# Patient Record
Sex: FEMALE | Race: WHITE | NOT HISPANIC OR LATINO | Employment: OTHER | URBAN - METROPOLITAN AREA
[De-identification: names, ages, dates, MRNs, and addresses within clinical notes are randomized per-mention and may not be internally consistent; named-entity substitution may affect disease eponyms.]

---

## 2017-02-13 RX ORDER — LEVOTHYROXINE SODIUM 0.15 MG/1
TABLET ORAL
Qty: 30 TABLET | Refills: 2 | Status: SHIPPED | OUTPATIENT
Start: 2017-02-13 | End: 2017-06-20 | Stop reason: SDUPTHER

## 2017-02-18 RX ORDER — DILTIAZEM HYDROCHLORIDE 60 MG/1
TABLET, FILM COATED ORAL
Qty: 1 INHALER | Refills: 0 | Status: SHIPPED | OUTPATIENT
Start: 2017-02-18 | End: 2017-02-25 | Stop reason: SDUPTHER

## 2017-02-27 RX ORDER — DILTIAZEM HYDROCHLORIDE 60 MG/1
TABLET, FILM COATED ORAL
Qty: 1 INHALER | Refills: 0 | Status: SHIPPED | OUTPATIENT
Start: 2017-02-27 | End: 2017-03-03

## 2017-03-03 RX ORDER — BUDESONIDE AND FORMOTEROL FUMARATE DIHYDRATE 160; 4.5 UG/1; UG/1
2 AEROSOL RESPIRATORY (INHALATION)
Qty: 1 INHALER | Refills: 2 | Status: SHIPPED | OUTPATIENT
Start: 2017-03-03 | End: 2017-05-15 | Stop reason: SDUPTHER

## 2017-03-16 ENCOUNTER — OFFICE VISIT (OUTPATIENT)
Dept: FAMILY MEDICINE CLINIC | Facility: CLINIC | Age: 57
End: 2017-03-16

## 2017-03-16 VITALS
BODY MASS INDEX: 29.76 KG/M2 | TEMPERATURE: 98.4 F | HEIGHT: 65 IN | OXYGEN SATURATION: 97 % | WEIGHT: 178.6 LBS | HEART RATE: 75 BPM | SYSTOLIC BLOOD PRESSURE: 132 MMHG | DIASTOLIC BLOOD PRESSURE: 80 MMHG | RESPIRATION RATE: 18 BRPM

## 2017-03-16 DIAGNOSIS — J11.1 FLU: ICD-10-CM

## 2017-03-16 DIAGNOSIS — R68.89 FLU-LIKE SYMPTOMS: Primary | ICD-10-CM

## 2017-03-16 LAB
EXPIRATION DATE: NORMAL
FLUAV AG NPH QL: NORMAL
FLUBV AG NPH QL: NORMAL
INTERNAL CONTROL: NORMAL
Lab: NORMAL

## 2017-03-16 PROCEDURE — 87804 INFLUENZA ASSAY W/OPTIC: CPT | Performed by: FAMILY MEDICINE

## 2017-03-16 PROCEDURE — 99213 OFFICE O/P EST LOW 20 MIN: CPT | Performed by: FAMILY MEDICINE

## 2017-03-16 RX ORDER — OSELTAMIVIR PHOSPHATE 75 MG/1
75 CAPSULE ORAL 2 TIMES DAILY
Qty: 10 CAPSULE | Refills: 0 | Status: SHIPPED | OUTPATIENT
Start: 2017-03-16 | End: 2017-05-25

## 2017-04-03 RX ORDER — ESCITALOPRAM OXALATE 20 MG/1
TABLET ORAL
Qty: 30 TABLET | Refills: 4 | Status: SHIPPED | OUTPATIENT
Start: 2017-04-03 | End: 2017-06-01

## 2017-05-15 RX ORDER — BUDESONIDE AND FORMOTEROL FUMARATE DIHYDRATE 160; 4.5 UG/1; UG/1
AEROSOL RESPIRATORY (INHALATION)
Qty: 1 INHALER | Refills: 3 | Status: SHIPPED | OUTPATIENT
Start: 2017-05-15 | End: 2017-08-27 | Stop reason: SDUPTHER

## 2017-05-25 ENCOUNTER — HOSPITAL ENCOUNTER (OUTPATIENT)
Dept: GENERAL RADIOLOGY | Facility: HOSPITAL | Age: 57
Discharge: HOME | End: 2017-05-25
Attending: FAMILY MEDICINE | Admitting: FAMILY MEDICINE

## 2017-05-25 ENCOUNTER — OFFICE VISIT (OUTPATIENT)
Dept: FAMILY MEDICINE CLINIC | Facility: CLINIC | Age: 57
End: 2017-05-25

## 2017-05-25 VITALS
HEART RATE: 60 BPM | WEIGHT: 177.4 LBS | OXYGEN SATURATION: 97 % | RESPIRATION RATE: 18 BRPM | DIASTOLIC BLOOD PRESSURE: 82 MMHG | TEMPERATURE: 98.3 F | BODY MASS INDEX: 29.56 KG/M2 | SYSTOLIC BLOOD PRESSURE: 122 MMHG | HEIGHT: 65 IN

## 2017-05-25 DIAGNOSIS — R07.81 PLEURITIC CHEST PAIN: ICD-10-CM

## 2017-05-25 DIAGNOSIS — M50.10 CERVICAL DISC DISORDER WITH RADICULOPATHY: ICD-10-CM

## 2017-05-25 DIAGNOSIS — M50.10 CERVICAL DISC DISORDER WITH RADICULOPATHY: Primary | ICD-10-CM

## 2017-05-25 PROCEDURE — 72040 X-RAY EXAM NECK SPINE 2-3 VW: CPT

## 2017-05-25 PROCEDURE — 71020 HC CHEST PA AND LATERAL: CPT

## 2017-05-25 PROCEDURE — 99213 OFFICE O/P EST LOW 20 MIN: CPT | Performed by: FAMILY MEDICINE

## 2017-05-25 RX ORDER — DILTIAZEM HYDROCHLORIDE 60 MG/1
TABLET, FILM COATED ORAL
COMMUNITY
Start: 2017-03-24 | End: 2018-02-02

## 2017-05-25 RX ORDER — METHYLPREDNISOLONE 4 MG/1
TABLET ORAL
Qty: 1 EACH | Refills: 0 | Status: SHIPPED | OUTPATIENT
Start: 2017-05-25 | End: 2017-06-01

## 2017-05-25 RX ORDER — BACLOFEN 10 MG/1
10 TABLET ORAL 3 TIMES DAILY
Qty: 30 TABLET | Refills: 0 | Status: SHIPPED | OUTPATIENT
Start: 2017-05-25 | End: 2019-04-17

## 2017-06-01 ENCOUNTER — OFFICE VISIT (OUTPATIENT)
Dept: FAMILY MEDICINE CLINIC | Facility: CLINIC | Age: 57
End: 2017-06-01

## 2017-06-01 ENCOUNTER — DOCUMENTATION (OUTPATIENT)
Dept: FAMILY MEDICINE CLINIC | Facility: CLINIC | Age: 57
End: 2017-06-01

## 2017-06-01 VITALS
HEIGHT: 65 IN | BODY MASS INDEX: 29.56 KG/M2 | OXYGEN SATURATION: 97 % | DIASTOLIC BLOOD PRESSURE: 88 MMHG | SYSTOLIC BLOOD PRESSURE: 130 MMHG | HEART RATE: 59 BPM | WEIGHT: 177.4 LBS | RESPIRATION RATE: 16 BRPM

## 2017-06-01 DIAGNOSIS — M50.10 CERVICAL DISC DISORDER WITH RADICULOPATHY: Primary | ICD-10-CM

## 2017-06-01 DIAGNOSIS — F32.89 OTHER DEPRESSION: ICD-10-CM

## 2017-06-01 PROCEDURE — 99214 OFFICE O/P EST MOD 30 MIN: CPT | Performed by: FAMILY MEDICINE

## 2017-06-01 RX ORDER — TRAMADOL HYDROCHLORIDE 50 MG/1
50 TABLET ORAL AS NEEDED
COMMUNITY
Start: 2017-05-25 | End: 2018-02-02 | Stop reason: SDUPTHER

## 2017-06-01 RX ORDER — VENLAFAXINE HYDROCHLORIDE 75 MG/1
75 CAPSULE, EXTENDED RELEASE ORAL DAILY
Qty: 302 CAPSULE | Refills: 1 | Status: SHIPPED | OUTPATIENT
Start: 2017-06-01 | End: 2017-10-27 | Stop reason: SDUPTHER

## 2017-06-01 RX ORDER — GABAPENTIN 100 MG/1
CAPSULE ORAL
Qty: 60 CAPSULE | Refills: 1 | Status: SHIPPED | OUTPATIENT
Start: 2017-06-01 | End: 2017-10-27 | Stop reason: SDUPTHER

## 2017-06-01 NOTE — PROGRESS NOTES
"Subjective   Yessica Galvin is a 56 y.o. female.     Neck Pain    This is a chronic (Radicular pain from the left neck and the left arm and upper chest) problem. The current episode started 1 to 4 weeks ago. The problem occurs constantly. The problem has been gradually improving. The pain is present in the left side. The pain is moderate. Associated symptoms include numbness. Pertinent negatives include no chest pain, fever, headaches or pain with swallowing. The treatment provided moderate relief.   Depression   Visit Type: follow-up  Patient presents with the following symptoms: anhedonia, decreased concentration, excessive worry, fatigue, irritability, nervousness/anxiety and shortness of breath.  Patient is not experiencing: palpitations, panic, restlessness and suicidal ideas.       Yessica feels like her and her depression is no longer effective she has many days where she does not feel like getting out of bed.  She has a decreased energy level and mood and lacks enjoyment.  The following portions of the patient's history were reviewed and updated as appropriate: allergies, current medications, past social history and problem list.    Review of Systems   Constitutional: Positive for irritability. Negative for fever.   HENT: Negative for congestion and sore throat.    Respiratory: Positive for cough, shortness of breath and wheezing.    Cardiovascular: Negative for chest pain and palpitations.   Gastrointestinal: Negative for diarrhea, nausea and vomiting.   Genitourinary: Negative for dysuria and hematuria.   Musculoskeletal: Positive for arthralgias, neck pain and neck stiffness.   Neurological: Positive for numbness. Negative for dizziness, light-headedness and headaches.   Psychiatric/Behavioral: Positive for decreased concentration. Negative for suicidal ideas. The patient is nervous/anxious.        Objective   /88  Pulse 59  Resp 16  Ht 65\" (165.1 cm)  Wt 177 lb 6.4 oz (80.5 kg)  SpO2 97%  BMI " 29.52 kg/m2  Physical Exam   Constitutional: She is oriented to person, place, and time. She appears well-developed and well-nourished. She is cooperative.   HENT:   Head: Normocephalic.   Nose: Nose normal.   Mouth/Throat: Oropharynx is clear and moist.   Eyes: Conjunctivae are normal. Pupils are equal, round, and reactive to light. No scleral icterus.   Neck: Neck supple. Carotid bruit is not present. No thyromegaly present.   Cardiovascular: Normal rate and regular rhythm.    Pulmonary/Chest: Effort normal. She has wheezes.   Scattered upper airway rhonchi   Abdominal: There is no hepatosplenomegaly.   Musculoskeletal: Normal range of motion.       paresthesia of the left upper arm and left scapular region radiating around to the left axillary region   Neurological: She is alert and oriented to person, place, and time.   No focal deficits no lateralizing signs   Skin: Skin is warm and dry. No rash noted.   Psychiatric: She has a normal mood and affect. Her behavior is normal. Cognition and memory are normal.   Nursing note and vitals reviewed.      Assessment/Plan   Problem List Items Addressed This Visit        Musculoskeletal and Integument    Cervical disc disorder with radiculopathy - Primary      Other Visit Diagnoses     Other depression        Relevant Medications    venlafaxine XR (EFFEXOR XR) 75 MG 24 hr capsule          New Medications Ordered This Visit   Medications   • traMADol (ULTRAM) 50 MG tablet     Sig: Take 50 mg by mouth As Needed.   • venlafaxine XR (EFFEXOR XR) 75 MG 24 hr capsule     Sig: Take 1 capsule by mouth Daily.     Dispense:  302 capsule     Refill:  1   • gabapentin (NEURONTIN) 100 MG capsule     Sig: Take 1 po at night for 1 week then 2 qhs     Dispense:  60 capsule     Refill:  1     Follow-up with her neck surgeon is recommended she was treated by someone at [facility] and will try to find that him and and schedule an appointment.  I gave her stop smoking advice again.   Patient agreed to a trial of gabapentin for her nerve pain.  May need to consider pain clinic referral.  Switch to Effexor for better depression and energy level treatment all if experiences any problems.

## 2017-06-20 RX ORDER — LEVOTHYROXINE SODIUM 0.15 MG/1
TABLET ORAL
Qty: 30 TABLET | Refills: 0 | Status: SHIPPED | OUTPATIENT
Start: 2017-06-20 | End: 2017-07-29 | Stop reason: SDUPTHER

## 2017-07-31 RX ORDER — LEVOTHYROXINE SODIUM 0.15 MG/1
TABLET ORAL
Qty: 30 TABLET | Refills: 4 | Status: SHIPPED | OUTPATIENT
Start: 2017-07-31 | End: 2018-02-02 | Stop reason: SDUPTHER

## 2017-08-22 ENCOUNTER — OFFICE VISIT (OUTPATIENT)
Dept: FAMILY MEDICINE CLINIC | Facility: CLINIC | Age: 57
End: 2017-08-22

## 2017-08-22 VITALS
BODY MASS INDEX: 29.39 KG/M2 | HEIGHT: 65 IN | OXYGEN SATURATION: 96 % | WEIGHT: 176.4 LBS | SYSTOLIC BLOOD PRESSURE: 126 MMHG | DIASTOLIC BLOOD PRESSURE: 70 MMHG | RESPIRATION RATE: 16 BRPM | HEART RATE: 65 BPM

## 2017-08-22 DIAGNOSIS — M54.40 LOW BACK PAIN WITH SCIATICA, SCIATICA LATERALITY UNSPECIFIED, UNSPECIFIED BACK PAIN LATERALITY, UNSPECIFIED CHRONICITY: Primary | ICD-10-CM

## 2017-08-22 PROCEDURE — 99213 OFFICE O/P EST LOW 20 MIN: CPT | Performed by: FAMILY MEDICINE

## 2017-08-22 RX ORDER — DICLOFENAC SODIUM 75 MG/1
75 TABLET, DELAYED RELEASE ORAL 2 TIMES DAILY
Qty: 60 TABLET | Refills: 1 | Status: SHIPPED | OUTPATIENT
Start: 2017-08-22 | End: 2017-10-27 | Stop reason: SDUPTHER

## 2017-08-22 RX ORDER — ESCITALOPRAM OXALATE 20 MG/1
20 TABLET ORAL NIGHTLY
COMMUNITY
Start: 2017-08-21 | End: 2017-10-27

## 2017-08-23 NOTE — PROGRESS NOTES
"Subjective   Yessica Galvin is a 56 y.o. female.     Back Pain   This is a new problem. The current episode started 1 to 4 weeks ago. The problem occurs constantly. The problem has been gradually worsening since onset. The pain is present in the lumbar spine. The quality of the pain is described as aching. The pain radiates to the left thigh and right thigh. The pain is moderate. The symptoms are aggravated by standing. Associated symptoms include numbness and tingling. Pertinent negatives include no bladder incontinence, bowel incontinence, chest pain, dysuria, fever or weakness. Risk factors include menopause, poor posture and sedentary lifestyle. She has tried analgesics for the symptoms. The treatment provided mild relief.        The following portions of the patient's history were reviewed and updated as appropriate: allergies, current medications, past social history and problem list.    Review of Systems   Constitutional: Negative.  Negative for chills and fever.   HENT: Negative for congestion, postnasal drip and sore throat.    Respiratory: Negative.  Negative for cough and shortness of breath.    Cardiovascular: Negative for chest pain and palpitations.   Gastrointestinal: Negative.  Negative for bowel incontinence.   Endocrine: Negative for cold intolerance and heat intolerance.   Genitourinary: Negative for bladder incontinence, dysuria and flank pain.   Musculoskeletal: Positive for back pain. Negative for arthralgias, gait problem and myalgias.   Neurological: Positive for tingling and numbness. Negative for dizziness, tremors and weakness.   Psychiatric/Behavioral: Negative for behavioral problems and dysphoric mood. The patient is not nervous/anxious.        Objective   /70  Pulse 65  Resp 16  Ht 65\" (165.1 cm)  Wt 176 lb 6.4 oz (80 kg)  SpO2 96%  BMI 29.35 kg/m2  Physical Exam   Constitutional: She is oriented to person, place, and time. She appears well-developed and well-nourished. She " is cooperative.   HENT:   Head: Normocephalic.   Right Ear: External ear normal.   Left Ear: External ear normal.   Nose: Nose normal.   Mouth/Throat: Oropharynx is clear and moist.   Eyes: Conjunctivae are normal. Pupils are equal, round, and reactive to light. No scleral icterus.   Neck: Neck supple. Carotid bruit is not present. No thyromegaly present.   Cardiovascular: Normal rate and regular rhythm.    Pulmonary/Chest: Effort normal and breath sounds normal.   Abdominal: There is no hepatosplenomegaly.   Musculoskeletal: Normal range of motion. She exhibits tenderness.   Localizes pain to lower lumbar region   Neurological: She is alert and oriented to person, place, and time.   SLR neg dtrs equal pulses intact    Skin: Skin is warm and dry. No rash noted.   Psychiatric: She has a normal mood and affect. Cognition and memory are normal.   Nursing note and vitals reviewed.      Assessment/Plan   Problem List Items Addressed This Visit        Nervous and Auditory    Low back pain - Primary    Relevant Orders    XR Spine Lumbar 2 or 3 View          New Medications Ordered This Visit   Medications   • escitalopram (LEXAPRO) 20 MG tablet     Sig: Take 20 mg by mouth Every Night.   • diclofenac (VOLTAREN) 75 MG EC tablet     Sig: Take 1 tablet by mouth 2 (Two) Times a Day.     Dispense:  60 tablet     Refill:  1

## 2017-08-24 ENCOUNTER — HOSPITAL ENCOUNTER (OUTPATIENT)
Dept: GENERAL RADIOLOGY | Facility: HOSPITAL | Age: 57
Discharge: HOME | End: 2017-08-24
Attending: FAMILY MEDICINE | Admitting: FAMILY MEDICINE

## 2017-08-24 DIAGNOSIS — M54.40 LOW BACK PAIN WITH SCIATICA, SCIATICA LATERALITY UNSPECIFIED, UNSPECIFIED BACK PAIN LATERALITY, UNSPECIFIED CHRONICITY: ICD-10-CM

## 2017-08-24 PROCEDURE — 72100 X-RAY EXAM L-S SPINE 2/3 VWS: CPT

## 2017-08-28 RX ORDER — BUDESONIDE AND FORMOTEROL FUMARATE DIHYDRATE 160; 4.5 UG/1; UG/1
AEROSOL RESPIRATORY (INHALATION)
Qty: 1 INHALER | Refills: 4 | Status: SHIPPED | OUTPATIENT
Start: 2017-08-28 | End: 2018-01-01 | Stop reason: SDUPTHER

## 2017-09-19 ENCOUNTER — OFFICE VISIT (OUTPATIENT)
Dept: FAMILY MEDICINE CLINIC | Facility: CLINIC | Age: 57
End: 2017-09-19

## 2017-09-19 VITALS
WEIGHT: 180.2 LBS | RESPIRATION RATE: 16 BRPM | HEART RATE: 66 BPM | SYSTOLIC BLOOD PRESSURE: 136 MMHG | BODY MASS INDEX: 30.02 KG/M2 | OXYGEN SATURATION: 97 % | DIASTOLIC BLOOD PRESSURE: 82 MMHG | HEIGHT: 65 IN

## 2017-09-19 DIAGNOSIS — L03.114 CELLULITIS OF LEFT UPPER EXTREMITY: ICD-10-CM

## 2017-09-19 DIAGNOSIS — M70.32 BURSITIS OF LEFT ELBOW: Primary | ICD-10-CM

## 2017-09-19 PROBLEM — M51.9 LUMBAR DISC DISEASE: Status: ACTIVE | Noted: 2017-09-19

## 2017-09-19 PROCEDURE — 99213 OFFICE O/P EST LOW 20 MIN: CPT | Performed by: FAMILY MEDICINE

## 2017-09-19 RX ORDER — METHYLPREDNISOLONE 4 MG/1
TABLET ORAL
Qty: 1 EACH | Refills: 0 | Status: SHIPPED | OUTPATIENT
Start: 2017-09-19 | End: 2017-10-27

## 2017-09-19 RX ORDER — INFLUENZA VIRUS VACCINE 15; 15; 15; 15 UG/.5ML; UG/.5ML; UG/.5ML; UG/.5ML
SUSPENSION INTRAMUSCULAR
COMMUNITY
Start: 2017-08-16 | End: 2018-05-08

## 2017-09-19 RX ORDER — SULFAMETHOXAZOLE AND TRIMETHOPRIM 800; 160 MG/1; MG/1
1 TABLET ORAL 2 TIMES DAILY
Qty: 14 TABLET | Refills: 0 | Status: SHIPPED | OUTPATIENT
Start: 2017-09-19 | End: 2017-10-27

## 2017-09-19 NOTE — PROGRESS NOTES
"Subjective   Yessica Galvin is a 56 y.o. female.     Upper Extremity Issue   This is a new problem. The current episode started in the past 7 days. The problem occurs constantly. The problem has been gradually improving. Associated symptoms include coughing, joint swelling and a rash. Pertinent negatives include no arthralgias, chest pain, chills, congestion, fever or sore throat. The symptoms are aggravated by exertion. She has tried immobilization for the symptoms. The treatment provided mild relief.        The following portions of the patient's history were reviewed and updated as appropriate: allergies, current medications, past social history and problem list.    Review of Systems   Constitutional: Negative for chills and fever.   HENT: Negative for congestion, postnasal drip and sore throat.    Respiratory: Positive for cough. Negative for shortness of breath.    Cardiovascular: Negative for chest pain and leg swelling.   Musculoskeletal: Positive for joint swelling. Negative for arthralgias.   Skin: Positive for color change and rash.       Objective   /82  Pulse 66  Resp 16  Ht 65\" (165.1 cm)  Wt 180 lb 3.2 oz (81.7 kg)  SpO2 97%  BMI 29.99 kg/m2  Physical Exam   Constitutional: She is oriented to person, place, and time. She appears well-developed and well-nourished.   HENT:   Head: Normocephalic and atraumatic.   Eyes: Conjunctivae are normal. Pupils are equal, round, and reactive to light.   Neck: Neck supple.   Cardiovascular: Normal rate and regular rhythm.    Pulmonary/Chest: Effort normal.   Distant breath sounds and a few crackles   Musculoskeletal:   Swelling of the left olecranon bursa and some surrounding erythema and some swelling extending down the forearm oh streaks up the arm peripheral pulses normal sensation intact   Neurological: She is alert and oriented to person, place, and time.   Nursing note and vitals reviewed.      Assessment/Plan   Problem List Items Addressed This " Visit     None      Visit Diagnoses     Bursitis of left elbow    -  Primary    Cellulitis of left upper extremity              New Medications Ordered This Visit   Medications   • sulfamethoxazole-trimethoprim (BACTRIM DS,SEPTRA DS) 800-160 MG per tablet     Sig: Take 1 tablet by mouth 2 (Two) Times a Day.     Dispense:  14 tablet     Refill:  0   • MethylPREDNISolone (MEDROL, REGINA,) 4 MG tablet     Sig: Take as directed on package instructions.     Dispense:  1 each     Refill:  0     Return to clinic if symptoms do not improve over the next 2-3 days.

## 2017-10-27 ENCOUNTER — OFFICE VISIT (OUTPATIENT)
Dept: FAMILY MEDICINE CLINIC | Facility: CLINIC | Age: 57
End: 2017-10-27

## 2017-10-27 VITALS
DIASTOLIC BLOOD PRESSURE: 76 MMHG | OXYGEN SATURATION: 97 % | BODY MASS INDEX: 30.09 KG/M2 | SYSTOLIC BLOOD PRESSURE: 124 MMHG | WEIGHT: 180.6 LBS | HEART RATE: 57 BPM | RESPIRATION RATE: 16 BRPM | HEIGHT: 65 IN

## 2017-10-27 DIAGNOSIS — M25.562 CHRONIC PAIN OF BOTH KNEES: Primary | ICD-10-CM

## 2017-10-27 DIAGNOSIS — F41.1 GENERALIZED ANXIETY DISORDER: ICD-10-CM

## 2017-10-27 DIAGNOSIS — M25.561 CHRONIC PAIN OF BOTH KNEES: ICD-10-CM

## 2017-10-27 DIAGNOSIS — M25.561 CHRONIC PAIN OF BOTH KNEES: Primary | ICD-10-CM

## 2017-10-27 DIAGNOSIS — M54.9 OTHER CHRONIC BACK PAIN: ICD-10-CM

## 2017-10-27 DIAGNOSIS — G89.29 CHRONIC PAIN OF BOTH KNEES: ICD-10-CM

## 2017-10-27 DIAGNOSIS — M25.562 CHRONIC PAIN OF BOTH KNEES: ICD-10-CM

## 2017-10-27 DIAGNOSIS — G89.29 OTHER CHRONIC BACK PAIN: ICD-10-CM

## 2017-10-27 DIAGNOSIS — G89.29 CHRONIC PAIN OF BOTH KNEES: Primary | ICD-10-CM

## 2017-10-27 DIAGNOSIS — E03.9 ACQUIRED HYPOTHYROIDISM: ICD-10-CM

## 2017-10-27 DIAGNOSIS — M51.9 LUMBAR DISC DISEASE: Primary | ICD-10-CM

## 2017-10-27 LAB
25(OH)D3 SERPL-MCNC: 11.1 NG/ML
ALBUMIN SERPL-MCNC: 4 G/DL (ref 3.2–4.8)
ALBUMIN/GLOB SERPL: 1.9 G/DL (ref 1.5–2.5)
ALP SERPL-CCNC: 70 U/L (ref 25–100)
ALT SERPL W P-5'-P-CCNC: 22 U/L (ref 7–40)
ANION GAP SERPL CALCULATED.3IONS-SCNC: 7 MMOL/L (ref 3–11)
ARTICHOKE IGE QN: 143 MG/DL (ref 0–130)
AST SERPL-CCNC: 22 U/L (ref 0–33)
BASOPHILS # BLD AUTO: 0.02 10*3/MM3 (ref 0–0.2)
BASOPHILS NFR BLD AUTO: 0.3 % (ref 0–1)
BILIRUB SERPL-MCNC: 0.3 MG/DL (ref 0.3–1.2)
BUN BLD-MCNC: 13 MG/DL (ref 9–23)
BUN/CREAT SERPL: 21.7 (ref 7–25)
CALCIUM SPEC-SCNC: 9.3 MG/DL (ref 8.7–10.4)
CHLORIDE SERPL-SCNC: 104 MMOL/L (ref 99–109)
CHOLEST SERPL-MCNC: 214 MG/DL (ref 0–200)
CO2 SERPL-SCNC: 26 MMOL/L (ref 20–31)
CREAT BLD-MCNC: 0.6 MG/DL (ref 0.6–1.3)
DEPRECATED RDW RBC AUTO: 54.7 FL (ref 37–54)
EOSINOPHIL # BLD AUTO: 0.28 10*3/MM3 (ref 0–0.3)
EOSINOPHIL NFR BLD AUTO: 4 % (ref 0–3)
ERYTHROCYTE [DISTWIDTH] IN BLOOD BY AUTOMATED COUNT: 15.2 % (ref 11.3–14.5)
ERYTHROCYTE [SEDIMENTATION RATE] IN BLOOD: 6 MM/HR (ref 0–30)
GFR SERPL CREATININE-BSD FRML MDRD: 102 ML/MIN/1.73
GLOBULIN UR ELPH-MCNC: 2.1 GM/DL
GLUCOSE BLD-MCNC: 86 MG/DL (ref 70–100)
HCT VFR BLD AUTO: 42 % (ref 34.5–44)
HDLC SERPL-MCNC: 86 MG/DL (ref 40–60)
HGB BLD-MCNC: 13.1 G/DL (ref 11.5–15.5)
IMM GRANULOCYTES # BLD: 0.03 10*3/MM3 (ref 0–0.03)
IMM GRANULOCYTES NFR BLD: 0.4 % (ref 0–0.6)
LYMPHOCYTES # BLD AUTO: 2.38 10*3/MM3 (ref 0.6–4.8)
LYMPHOCYTES NFR BLD AUTO: 34 % (ref 24–44)
MCH RBC QN AUTO: 30.3 PG (ref 27–31)
MCHC RBC AUTO-ENTMCNC: 31.2 G/DL (ref 32–36)
MCV RBC AUTO: 97.2 FL (ref 80–99)
MONOCYTES # BLD AUTO: 0.5 10*3/MM3 (ref 0–1)
MONOCYTES NFR BLD AUTO: 7.2 % (ref 0–12)
NEUTROPHILS # BLD AUTO: 3.78 10*3/MM3 (ref 1.5–8.3)
NEUTROPHILS NFR BLD AUTO: 54.1 % (ref 41–71)
PLATELET # BLD AUTO: 306 10*3/MM3 (ref 150–450)
PMV BLD AUTO: 9.7 FL (ref 6–12)
POTASSIUM BLD-SCNC: 4.4 MMOL/L (ref 3.5–5.5)
PROT SERPL-MCNC: 6.1 G/DL (ref 5.7–8.2)
RBC # BLD AUTO: 4.32 10*6/MM3 (ref 3.89–5.14)
SODIUM BLD-SCNC: 137 MMOL/L (ref 132–146)
TRIGL SERPL-MCNC: 64 MG/DL (ref 0–150)
TSH SERPL DL<=0.05 MIU/L-ACNC: 4.47 MIU/ML (ref 0.35–5.35)
URATE SERPL-MCNC: 4.3 MG/DL (ref 3.1–7.8)
WBC NRBC COR # BLD: 6.99 10*3/MM3 (ref 3.5–10.8)

## 2017-10-27 PROCEDURE — 82306 VITAMIN D 25 HYDROXY: CPT | Performed by: FAMILY MEDICINE

## 2017-10-27 PROCEDURE — 36415 COLL VENOUS BLD VENIPUNCTURE: CPT | Performed by: FAMILY MEDICINE

## 2017-10-27 PROCEDURE — 84443 ASSAY THYROID STIM HORMONE: CPT | Performed by: FAMILY MEDICINE

## 2017-10-27 PROCEDURE — 99214 OFFICE O/P EST MOD 30 MIN: CPT | Performed by: FAMILY MEDICINE

## 2017-10-27 PROCEDURE — 86431 RHEUMATOID FACTOR QUANT: CPT | Performed by: FAMILY MEDICINE

## 2017-10-27 PROCEDURE — 86038 ANTINUCLEAR ANTIBODIES: CPT | Performed by: FAMILY MEDICINE

## 2017-10-27 PROCEDURE — 84550 ASSAY OF BLOOD/URIC ACID: CPT | Performed by: FAMILY MEDICINE

## 2017-10-27 PROCEDURE — 80053 COMPREHEN METABOLIC PANEL: CPT | Performed by: FAMILY MEDICINE

## 2017-10-27 PROCEDURE — 80061 LIPID PANEL: CPT | Performed by: FAMILY MEDICINE

## 2017-10-27 PROCEDURE — 85652 RBC SED RATE AUTOMATED: CPT | Performed by: FAMILY MEDICINE

## 2017-10-27 PROCEDURE — 85025 COMPLETE CBC W/AUTO DIFF WBC: CPT | Performed by: FAMILY MEDICINE

## 2017-10-27 RX ORDER — GABAPENTIN 100 MG/1
200 CAPSULE ORAL
Qty: 60 CAPSULE | Refills: 3 | Status: SHIPPED | OUTPATIENT
Start: 2017-10-27 | End: 2018-02-02 | Stop reason: DRUGHIGH

## 2017-10-27 RX ORDER — DICLOFENAC SODIUM 75 MG/1
75 TABLET, DELAYED RELEASE ORAL 2 TIMES DAILY
Qty: 60 TABLET | Refills: 5 | Status: SHIPPED | OUTPATIENT
Start: 2017-10-27 | End: 2019-05-31 | Stop reason: SDUPTHER

## 2017-10-27 RX ORDER — VENLAFAXINE HYDROCHLORIDE 75 MG/1
75 CAPSULE, EXTENDED RELEASE ORAL DAILY
Qty: 30 CAPSULE | Refills: 3 | Status: SHIPPED | OUTPATIENT
Start: 2017-10-27 | End: 2019-02-11 | Stop reason: SDUPTHER

## 2017-10-27 NOTE — PROGRESS NOTES
Subjective   Yessica Galvin is a 57 y.o. female.     Back Pain   This is a recurrent problem. The problem occurs daily. The problem is unchanged. The pain is present in the lumbar spine. The quality of the pain is described as aching and shooting. The pain is moderate. The pain is worse during the day. The symptoms are aggravated by standing. Associated symptoms include paresthesias. Pertinent negatives include no abdominal pain, bladder incontinence, bowel incontinence, dysuria, headaches, numbness, perianal numbness, tingling or weakness. Risk factors: stands on concrete all day at work. She has tried NSAIDs and analgesics for the symptoms. The treatment provided mild relief.   Knee Pain    The incident occurred more than 1 week ago. There was no injury mechanism. The pain is present in the right knee and left knee. The quality of the pain is described as aching. The pain is moderate. The pain has been worsening since onset. Pertinent negatives include no numbness or tingling. The symptoms are aggravated by weight bearing. She has tried NSAIDs for the symptoms. The treatment provided mild relief.   Hypothyroidism   This is a chronic problem. The problem has been unchanged. Associated symptoms include arthralgias and myalgias. Pertinent negatives include no abdominal pain, congestion, fatigue, headaches, nausea, numbness or weakness. Nothing aggravates the symptoms. The treatment provided significant relief.   Anxiety   Presents for follow-up visit. Symptoms include decreased concentration, irritability and nervous/anxious behavior. Patient reports no confusion, dizziness, nausea, panic, shortness of breath or suicidal ideas. Symptoms occur most days. The severity of symptoms is moderate. The quality of sleep is fair. Nighttime awakenings: occasional.            The following portions of the patient's history were reviewed and updated as appropriate: allergies, current medications, past social history and problem  "list.    Review of Systems   Constitutional: Positive for irritability. Negative for appetite change and fatigue.   HENT: Negative for congestion and sinus pressure.    Respiratory: Negative for chest tightness and shortness of breath.    Gastrointestinal: Negative for abdominal pain, bowel incontinence, diarrhea and nausea.   Genitourinary: Negative for bladder incontinence, dysuria and hematuria.   Musculoskeletal: Positive for arthralgias, back pain and myalgias.   Neurological: Positive for paresthesias. Negative for dizziness, tingling, tremors, weakness, light-headedness, numbness and headaches.   Psychiatric/Behavioral: Positive for decreased concentration and sleep disturbance. Negative for agitation, behavioral problems, confusion, dysphoric mood and suicidal ideas. The patient is nervous/anxious.        Objective   /76  Pulse 57  Resp 16  Ht 65\" (165.1 cm)  Wt 180 lb 9.6 oz (81.9 kg)  SpO2 97%  BMI 30.05 kg/m2  Physical Exam   Constitutional: She is oriented to person, place, and time. She appears well-developed and well-nourished. She is cooperative.   HENT:   Head: Normocephalic.   Right Ear: External ear normal.   Left Ear: External ear normal.   Nose: Nose normal.   Mouth/Throat: Oropharynx is clear and moist.   Eyes: Conjunctivae are normal. Pupils are equal, round, and reactive to light. No scleral icterus.   Neck: Neck supple. Carotid bruit is not present. No thyromegaly present.   Cardiovascular: Normal rate, regular rhythm and normal heart sounds.    Pulmonary/Chest: Effort normal and breath sounds normal.   Abdominal: There is no hepatosplenomegaly.   Musculoskeletal: Normal range of motion. She exhibits tenderness.   Knee rom normal some creps  Lumbar pain xray reviewed shows DDD and OA   Neurological: She is alert and oriented to person, place, and time.   No focal deficits no lateralizing signs   Skin: Skin is warm and dry. No rash noted.   Psychiatric: She has a normal mood and " affect. Cognition and memory are normal.   Nursing note and vitals reviewed.      Assessment/Plan   Problem List Items Addressed This Visit     Generalized anxiety disorder    Relevant Medications    venlafaxine XR (EFFEXOR XR) 75 MG 24 hr capsule    Hypothyroidism    Relevant Orders    Comprehensive Metabolic Panel    Lipid Panel    CBC & Differential    TSH    Vitamin D 25 Hydroxy    KISHA    Rheumatoid Factor, Quant    Sedimentation Rate    Uric Acid    Ambulatory Referral to Orthopedic Surgery    CBC Auto Differential    Lumbar disc disease - Primary      Other Visit Diagnoses     Chronic pain of both knees        Relevant Orders    Rheumatoid Factor, Quant    Sedimentation Rate    Uric Acid          New Medications Ordered This Visit   Medications   • venlafaxine XR (EFFEXOR XR) 75 MG 24 hr capsule     Sig: Take 1 capsule by mouth Daily.     Dispense:  30 capsule     Refill:  3   • diclofenac (VOLTAREN) 75 MG EC tablet     Sig: Take 1 tablet by mouth 2 (Two) Times a Day.     Dispense:  60 tablet     Refill:  5   • gabapentin (NEURONTIN) 100 MG capsule     Sig: Take 2 capsules by mouth every night at bedtime. Take 1 po at night for 1 week then 2 qhs     Dispense:  60 capsule     Refill:  3

## 2017-10-28 LAB — RHEUMATOID FACT SERPL-ACNC: NEGATIVE [IU]/ML

## 2017-10-30 LAB — ANA SER QL: NEGATIVE

## 2017-11-12 RX ORDER — ERGOCALCIFEROL 1.25 MG/1
50000 CAPSULE ORAL WEEKLY
Qty: 12 CAPSULE | Refills: 0 | Status: SHIPPED | OUTPATIENT
Start: 2017-11-12 | End: 2018-02-02 | Stop reason: SDUPTHER

## 2017-11-28 RX ORDER — ESCITALOPRAM OXALATE 20 MG/1
TABLET ORAL
Qty: 30 TABLET | Refills: 4 | Status: SHIPPED | OUTPATIENT
Start: 2017-11-28 | End: 2018-04-29 | Stop reason: SDUPTHER

## 2017-11-30 ENCOUNTER — OFFICE VISIT (OUTPATIENT)
Dept: FAMILY MEDICINE CLINIC | Facility: CLINIC | Age: 57
End: 2017-11-30

## 2017-11-30 VITALS
BODY MASS INDEX: 33.31 KG/M2 | SYSTOLIC BLOOD PRESSURE: 130 MMHG | RESPIRATION RATE: 22 BRPM | HEART RATE: 60 BPM | HEIGHT: 62 IN | TEMPERATURE: 98.5 F | OXYGEN SATURATION: 95 % | WEIGHT: 181 LBS | DIASTOLIC BLOOD PRESSURE: 76 MMHG

## 2017-11-30 DIAGNOSIS — R11.0 NAUSEA: ICD-10-CM

## 2017-11-30 DIAGNOSIS — R09.89 UPPER RESPIRATORY SYMPTOM: Primary | ICD-10-CM

## 2017-11-30 LAB
EXPIRATION DATE: NORMAL
FLUAV AG NPH QL: NEGATIVE
FLUBV AG NPH QL: NEGATIVE
INTERNAL CONTROL: NORMAL
Lab: NORMAL

## 2017-11-30 PROCEDURE — 87804 INFLUENZA ASSAY W/OPTIC: CPT | Performed by: NURSE PRACTITIONER

## 2017-11-30 PROCEDURE — 99213 OFFICE O/P EST LOW 20 MIN: CPT | Performed by: NURSE PRACTITIONER

## 2017-11-30 RX ORDER — ONDANSETRON 4 MG/1
4 TABLET, ORALLY DISINTEGRATING ORAL EVERY 8 HOURS PRN
Qty: 20 TABLET | Refills: 0 | Status: SHIPPED | OUTPATIENT
Start: 2017-11-30 | End: 2018-02-02

## 2017-11-30 RX ORDER — PREDNISONE 10 MG/1
10 TABLET ORAL SEE ADMIN INSTRUCTIONS
Qty: 21 TABLET | Refills: 0 | Status: SHIPPED | OUTPATIENT
Start: 2017-11-30 | End: 2017-12-06

## 2018-01-02 RX ORDER — BUDESONIDE AND FORMOTEROL FUMARATE DIHYDRATE 160; 4.5 UG/1; UG/1
AEROSOL RESPIRATORY (INHALATION)
Qty: 1 INHALER | Refills: 1 | Status: SHIPPED | OUTPATIENT
Start: 2018-01-02 | End: 2018-01-04 | Stop reason: SDUPTHER

## 2018-01-04 RX ORDER — BUDESONIDE AND FORMOTEROL FUMARATE DIHYDRATE 160; 4.5 UG/1; UG/1
2 AEROSOL RESPIRATORY (INHALATION)
Qty: 3 INHALER | Refills: 2 | Status: SHIPPED | OUTPATIENT
Start: 2018-01-04 | End: 2018-02-02 | Stop reason: SDUPTHER

## 2018-02-02 ENCOUNTER — OFFICE VISIT (OUTPATIENT)
Dept: FAMILY MEDICINE CLINIC | Facility: CLINIC | Age: 58
End: 2018-02-02

## 2018-02-02 VITALS
WEIGHT: 175 LBS | SYSTOLIC BLOOD PRESSURE: 124 MMHG | HEART RATE: 60 BPM | OXYGEN SATURATION: 98 % | HEIGHT: 62 IN | DIASTOLIC BLOOD PRESSURE: 82 MMHG | BODY MASS INDEX: 32.2 KG/M2 | RESPIRATION RATE: 16 BRPM

## 2018-02-02 DIAGNOSIS — F41.1 GENERALIZED ANXIETY DISORDER: Primary | ICD-10-CM

## 2018-02-02 DIAGNOSIS — J45.40 MODERATE PERSISTENT ASTHMATIC BRONCHITIS WITHOUT COMPLICATION: ICD-10-CM

## 2018-02-02 DIAGNOSIS — M19.90 ARTHRITIS: ICD-10-CM

## 2018-02-02 PROCEDURE — 99214 OFFICE O/P EST MOD 30 MIN: CPT | Performed by: FAMILY MEDICINE

## 2018-02-02 RX ORDER — GABAPENTIN 300 MG/1
300 CAPSULE ORAL
Qty: 30 CAPSULE | Refills: 5
Start: 2018-02-02 | End: 2018-08-07 | Stop reason: SDUPTHER

## 2018-02-02 RX ORDER — ERGOCALCIFEROL 1.25 MG/1
50000 CAPSULE ORAL WEEKLY
Qty: 12 CAPSULE | Refills: 0 | Status: SHIPPED | OUTPATIENT
Start: 2018-02-02 | End: 2018-05-03

## 2018-02-02 RX ORDER — ALBUTEROL SULFATE 90 UG/1
2 AEROSOL, METERED RESPIRATORY (INHALATION) EVERY 6 HOURS PRN
Qty: 1 INHALER | Refills: 5 | Status: SHIPPED | OUTPATIENT
Start: 2018-02-02 | End: 2020-05-11 | Stop reason: SDUPTHER

## 2018-02-02 RX ORDER — TRAMADOL HYDROCHLORIDE 50 MG/1
100 TABLET ORAL EVERY 8 HOURS PRN
Qty: 180 TABLET | Refills: 2
Start: 2018-02-02 | End: 2018-05-08 | Stop reason: SDUPTHER

## 2018-02-02 RX ORDER — LEVOTHYROXINE SODIUM 0.15 MG/1
150 TABLET ORAL DAILY
Qty: 90 TABLET | Refills: 3 | Status: SHIPPED | OUTPATIENT
Start: 2018-02-02 | End: 2019-03-06 | Stop reason: SDUPTHER

## 2018-02-02 RX ORDER — BUDESONIDE AND FORMOTEROL FUMARATE DIHYDRATE 160; 4.5 UG/1; UG/1
2 AEROSOL RESPIRATORY (INHALATION)
Qty: 3 INHALER | Refills: 2 | Status: SHIPPED | OUTPATIENT
Start: 2018-02-02 | End: 2019-02-03 | Stop reason: SDUPTHER

## 2018-02-02 NOTE — PROGRESS NOTES
Subjective   Yessica Galvin is a 57 y.o. female.     Knee Pain    The incident occurred more than 1 week ago. There was no injury mechanism. The pain is present in the right knee and left knee. The quality of the pain is described as aching. The pain is moderate. The pain has been worsening since onset. The symptoms are aggravated by weight bearing. She has tried NSAIDs for the symptoms. The treatment provided mild relief.   Hypothyroidism   This is a chronic problem. The problem has been unchanged. Associated symptoms include arthralgias and coughing. Pertinent negatives include no chest pain, chills, congestion, fatigue, fever, myalgias or nausea. Nothing aggravates the symptoms. The treatment provided significant relief.   Anxiety   Presents for follow-up visit. Symptoms include decreased concentration, irritability and nervous/anxious behavior. Patient reports no chest pain, confusion, dizziness, nausea, panic, shortness of breath or suicidal ideas. Symptoms occur occasionally. The severity of symptoms is moderate. The quality of sleep is fair. Nighttime awakenings: occasional.     Her past medical history is significant for asthma.   Wheezing    This is a chronic problem. The current episode started more than 1 year ago. The problem has been gradually improving. Associated symptoms include coughing. Pertinent negatives include no chest pain, chills, fever, hemoptysis or shortness of breath. Exacerbated by: smoking. She has tried beta agonist inhalers and steroid inhaler for the symptoms. The treatment provided moderate relief. Her past medical history is significant for asthma.        The following portions of the patient's history were reviewed and updated as appropriate: allergies, current medications, past social history and problem list.    Review of Systems   Constitutional: Positive for irritability. Negative for chills, fatigue and fever.   HENT: Negative for congestion.    Respiratory: Positive for  "cough and wheezing. Negative for hemoptysis, chest tightness and shortness of breath.    Cardiovascular: Negative for chest pain.   Gastrointestinal: Negative for nausea.   Endocrine: Negative for cold intolerance and heat intolerance.   Genitourinary: Negative for dysuria and hematuria.   Musculoskeletal: Positive for arthralgias. Negative for myalgias.   Skin: Negative.    Neurological: Negative for dizziness and syncope.   Psychiatric/Behavioral: Positive for decreased concentration. Negative for confusion and suicidal ideas. The patient is nervous/anxious.        Objective   /82  Pulse 60  Resp 16  Ht 157.5 cm (62\")  Wt 79.4 kg (175 lb)  SpO2 98%  BMI 32.01 kg/m2  Physical Exam   Constitutional: She is oriented to person, place, and time. She appears well-developed and well-nourished. She is cooperative.   HENT:   Head: Normocephalic.   Right Ear: External ear normal.   Left Ear: External ear normal.   Nose: Nose normal.   Mouth/Throat: Oropharynx is clear and moist.   Eyes: Conjunctivae are normal. Pupils are equal, round, and reactive to light. No scleral icterus.   Neck: Neck supple. Carotid bruit is not present. No thyromegaly present.   Cardiovascular: Normal rate, regular rhythm and normal heart sounds.    Pulmonary/Chest: Effort normal. She has wheezes.   Abdominal: There is no hepatosplenomegaly.   Musculoskeletal: Normal range of motion. She exhibits tenderness. She exhibits no edema.   Neurological: She is alert and oriented to person, place, and time.   No focal deficits no lateralizing signs   Skin: Skin is warm and dry. No rash noted.   Psychiatric: She has a normal mood and affect. Cognition and memory are normal.   Nursing note and vitals reviewed.      Assessment/Plan   Problem List Items Addressed This Visit     Asthmatic bronchitis    Relevant Medications    budesonide-formoterol (SYMBICORT) 160-4.5 MCG/ACT inhaler    albuterol (VENTOLIN HFA) 108 (90 Base) MCG/ACT inhaler    " Generalized anxiety disorder - Primary      Other Visit Diagnoses     Arthritis              New Medications Ordered This Visit   Medications   • ergocalciferol (DRISDOL) 09789 units capsule     Sig: Take 1 capsule by mouth 1 (One) Time Per Week for 90 days.     Dispense:  12 capsule     Refill:  0   • levothyroxine (SYNTHROID, LEVOTHROID) 150 MCG tablet     Sig: Take 1 tablet by mouth Daily.     Dispense:  90 tablet     Refill:  3   • budesonide-formoterol (SYMBICORT) 160-4.5 MCG/ACT inhaler     Sig: Inhale 2 puffs 2 (Two) Times a Day.     Dispense:  3 inhaler     Refill:  2   • albuterol (VENTOLIN HFA) 108 (90 Base) MCG/ACT inhaler     Sig: Inhale 2 puffs Every 6 (Six) Hours As Needed for Wheezing.     Dispense:  1 inhaler     Refill:  5   • traMADol (ULTRAM) 50 MG tablet     Sig: Take 2 tablets by mouth Every 8 (Eight) Hours As Needed for Moderate Pain .     Dispense:  180 tablet     Refill:  2   • gabapentin (NEURONTIN) 300 MG capsule     Sig: Take 1 capsule by mouth every night at bedtime.     Dispense:  30 capsule     Refill:  5

## 2018-04-30 ENCOUNTER — OFFICE VISIT (OUTPATIENT)
Dept: FAMILY MEDICINE CLINIC | Facility: CLINIC | Age: 58
End: 2018-04-30

## 2018-04-30 VITALS
HEIGHT: 62 IN | WEIGHT: 172 LBS | BODY MASS INDEX: 31.65 KG/M2 | DIASTOLIC BLOOD PRESSURE: 82 MMHG | SYSTOLIC BLOOD PRESSURE: 122 MMHG | HEART RATE: 67 BPM | OXYGEN SATURATION: 98 % | RESPIRATION RATE: 16 BRPM

## 2018-04-30 DIAGNOSIS — S61.218D LACERATION OF INDEX FINGER WITHOUT FOREIGN BODY WITHOUT DAMAGE TO NAIL, UNSPECIFIED LATERALITY, SUBSEQUENT ENCOUNTER: Primary | ICD-10-CM

## 2018-04-30 PROCEDURE — 90471 IMMUNIZATION ADMIN: CPT | Performed by: FAMILY MEDICINE

## 2018-04-30 PROCEDURE — 90715 TDAP VACCINE 7 YRS/> IM: CPT | Performed by: FAMILY MEDICINE

## 2018-04-30 PROCEDURE — 99213 OFFICE O/P EST LOW 20 MIN: CPT | Performed by: FAMILY MEDICINE

## 2018-04-30 RX ORDER — CEPHALEXIN 500 MG/1
500 CAPSULE ORAL 3 TIMES DAILY
Qty: 21 CAPSULE | Refills: 0 | Status: SHIPPED | OUTPATIENT
Start: 2018-04-30 | End: 2018-05-08

## 2018-04-30 RX ORDER — ESCITALOPRAM OXALATE 20 MG/1
TABLET ORAL
Qty: 90 TABLET | Refills: 1 | Status: SHIPPED | OUTPATIENT
Start: 2018-04-30 | End: 2019-02-11

## 2018-04-30 NOTE — PROGRESS NOTES
"Subjective   Yessica Galvin is a 57 y.o. female.     Yessica cut her index finger on a cabinet she taped the edges together placed a bandage and went on to work this morning.  He has had some bleeding and some tenderness particularly over the PIP      Finger Laceration   This is a new problem. The current episode started today. The problem has been unchanged. Pertinent negatives include no chest pain, chills, congestion, coughing, fever, rash or sore throat. The symptoms are aggravated by bending. Treatments tried: wound strips. The treatment provided significant relief.        The following portions of the patient's history were reviewed and updated as appropriate: allergies, current medications, past social history and problem list.    Review of Systems   Constitutional: Negative for chills and fever.   HENT: Negative for congestion and sore throat.    Respiratory: Negative for cough and choking.    Cardiovascular: Negative for chest pain and leg swelling.   Skin: Positive for wound. Negative for color change, pallor and rash.       Objective   /82   Pulse 67   Resp 16   Ht 157.5 cm (62\")   Wt 78 kg (172 lb)   SpO2 98%   BMI 31.46 kg/m²   Physical Exam   Constitutional: She appears well-developed and well-nourished.   Eyes: Conjunctivae are normal. Pupils are equal, round, and reactive to light.   Neck: Neck supple.   Cardiovascular: Normal rate and regular rhythm.    Pulmonary/Chest: Effort normal.   Skin:   Ulceration to the index finger perpendicular to the finger in the midline of the flexor surface wound is closed nicely with the bandage strips she applied early this morning and in my judgment is too late to apply sutures.  Bulky tube gauze bandage was applied asked her to leave the pain gets in tact until I check it again in 2-3 days.   Nursing note and vitals reviewed.      Assessment/Plan   Problem List Items Addressed This Visit     None      Visit Diagnoses     Laceration of index finger " without foreign body without damage to nail, unspecified laterality, subsequent encounter    -  Primary          New Medications Ordered This Visit   Medications   • cephalexin (KEFLEX) 500 MG capsule     Sig: Take 1 capsule by mouth 3 (Three) Times a Day.     Dispense:  21 capsule     Refill:  0     DTaP was given for tetanus, will return sooner if redness swelling pus or bleeding ensue or increased pain

## 2018-05-03 ENCOUNTER — OFFICE VISIT (OUTPATIENT)
Dept: FAMILY MEDICINE CLINIC | Facility: CLINIC | Age: 58
End: 2018-05-03

## 2018-05-03 VITALS
OXYGEN SATURATION: 98 % | HEIGHT: 62 IN | DIASTOLIC BLOOD PRESSURE: 78 MMHG | RESPIRATION RATE: 16 BRPM | SYSTOLIC BLOOD PRESSURE: 132 MMHG | HEART RATE: 95 BPM

## 2018-05-03 DIAGNOSIS — S61.210S: Primary | ICD-10-CM

## 2018-05-03 PROCEDURE — 99213 OFFICE O/P EST LOW 20 MIN: CPT | Performed by: FAMILY MEDICINE

## 2018-05-03 RX ORDER — HEPATITIS A VACCINE 1440 [IU]/ML
INJECTION, SUSPENSION INTRAMUSCULAR
COMMUNITY
Start: 2018-04-25 | End: 2019-09-26

## 2018-05-04 NOTE — PROGRESS NOTES
"Subjective   Yessica Galvin is a 57 y.o. female.     Finger Laceration   This is a new problem. The current episode started in the past 7 days. The problem has been gradually improving. Associated symptoms include coughing. Pertinent negatives include no chest pain, chills, congestion, fever or sore throat. The symptoms are aggravated by smoking. The treatment provided moderate relief.        The following portions of the patient's history were reviewed and updated as appropriate: allergies, current medications, past social history and problem list.    Review of Systems   Constitutional: Negative for chills and fever.   HENT: Negative for congestion and sore throat.    Respiratory: Positive for cough and wheezing.    Cardiovascular: Negative for chest pain and leg swelling.   Genitourinary: Negative for dysuria and hematuria.   Skin: Positive for wound. Negative for color change.       Objective   /78   Pulse 95   Resp 16   Ht 157.5 cm (62\")   SpO2 98%   Physical Exam   Constitutional: She appears well-developed and well-nourished.   HENT:   Head: Normocephalic and atraumatic.   Eyes: Conjunctivae are normal. Pupils are equal, round, and reactive to light.   Cardiovascular: Normal rate.    Pulmonary/Chest: Effort normal.   Skin:   Wound is healing but has been wet   Nursing note and vitals reviewed.      Assessment/Plan   Problem List Items Addressed This Visit     None      Visit Diagnoses     Laceration of right index finger without damage to nail, foreign body presence unspecified, sequela    -  Primary          No orders of the defined types were placed in this encounter.  steri strips replaced  and a sterile gauze bandage placed do not put hand in water rtc 1 week if not healed         "

## 2018-05-08 ENCOUNTER — OFFICE VISIT (OUTPATIENT)
Dept: FAMILY MEDICINE CLINIC | Facility: CLINIC | Age: 58
End: 2018-05-08

## 2018-05-08 VITALS
OXYGEN SATURATION: 98 % | HEART RATE: 62 BPM | HEIGHT: 62 IN | DIASTOLIC BLOOD PRESSURE: 82 MMHG | RESPIRATION RATE: 16 BRPM | BODY MASS INDEX: 32.39 KG/M2 | SYSTOLIC BLOOD PRESSURE: 122 MMHG | WEIGHT: 176 LBS

## 2018-05-08 DIAGNOSIS — F17.200 SMOKER: ICD-10-CM

## 2018-05-08 DIAGNOSIS — F41.1 GENERALIZED ANXIETY DISORDER: Primary | ICD-10-CM

## 2018-05-08 DIAGNOSIS — Z12.9 SCREENING FOR CANCER: ICD-10-CM

## 2018-05-08 DIAGNOSIS — E03.9 ACQUIRED HYPOTHYROIDISM: ICD-10-CM

## 2018-05-08 PROCEDURE — 99213 OFFICE O/P EST LOW 20 MIN: CPT | Performed by: FAMILY MEDICINE

## 2018-05-08 RX ORDER — TRAMADOL HYDROCHLORIDE 50 MG/1
100 TABLET ORAL EVERY 8 HOURS PRN
Qty: 180 TABLET | Refills: 2
Start: 2018-05-08 | End: 2018-08-07 | Stop reason: SDUPTHER

## 2018-05-09 NOTE — PROGRESS NOTES
Subjective   Yessica Galvin is a 57 y.o. female.     Anxiety   Presents for follow-up visit. Symptoms include decreased concentration, irritability and nervous/anxious behavior. Patient reports no chest pain, confusion, dizziness, nausea, panic, shortness of breath or suicidal ideas. Symptoms occur most days. The severity of symptoms is moderate. The quality of sleep is fair. Nighttime awakenings: occasional.     Her past medical history is significant for asthma.   Arthritis   Presents for follow-up visit. She complains of stiffness. The symptoms have been stable. Affected locations include the right hip, left hip, right wrist and left wrist. Pertinent negatives include no diarrhea, dysuria, fatigue, fever or rash.   Hypothyroidism   This is a chronic problem. The problem has been unchanged. Associated symptoms include arthralgias and myalgias. Pertinent negatives include no abdominal pain, chest pain, chills, congestion, coughing, fatigue, fever, headaches, nausea, rash or weakness. Nothing aggravates the symptoms. The treatment provided significant relief.   Wheezing    This is a chronic problem. The current episode started more than 1 year ago. The problem occurs daily. The problem has been unchanged. Pertinent negatives include no abdominal pain, chest pain, chills, coughing, diarrhea, fever, headaches, rash or shortness of breath. The symptoms are aggravated by smoke. She has tried beta agonist inhalers and steroid inhaler for the symptoms. The treatment provided significant relief. Her past medical history is significant for asthma and COPD.        The following portions of the patient's history were reviewed and updated as appropriate: allergies, current medications, past social history and problem list.    Review of Systems   Constitutional: Positive for irritability. Negative for appetite change, chills, fatigue and fever.   HENT: Negative for congestion.    Respiratory: Positive for wheezing. Negative for  "cough, chest tightness and shortness of breath.    Cardiovascular: Negative for chest pain.   Gastrointestinal: Negative for abdominal pain, diarrhea and nausea.   Genitourinary: Negative for dysuria.   Musculoskeletal: Positive for arthralgias, arthritis, myalgias and stiffness.   Skin: Negative for rash.   Neurological: Negative for dizziness, tremors, weakness, light-headedness and headaches.   Psychiatric/Behavioral: Positive for decreased concentration, dysphoric mood and sleep disturbance. Negative for agitation, behavioral problems, confusion and suicidal ideas. The patient is nervous/anxious.        Objective   /82   Pulse 62   Resp 16   Ht 157.5 cm (62\")   Wt 79.8 kg (176 lb)   SpO2 98%   BMI 32.19 kg/m²   Physical Exam   Constitutional: She is oriented to person, place, and time. She appears well-developed and well-nourished. She is cooperative.   HENT:   Head: Normocephalic.   Right Ear: External ear normal.   Left Ear: External ear normal.   Nose: Nose normal.   Mouth/Throat: Oropharynx is clear and moist.   Eyes: Conjunctivae are normal. Pupils are equal, round, and reactive to light. No scleral icterus.   Neck: Neck supple. No JVD present. Carotid bruit is not present. No thyromegaly present.   Cardiovascular: Normal rate and regular rhythm.    Pulmonary/Chest: Effort normal. She has wheezes.   Abdominal: There is no hepatosplenomegaly.   Musculoskeletal: Normal range of motion.   Neurological: She is alert and oriented to person, place, and time.   No focal deficits no lateralizing signs   Skin: Skin is warm and dry. No rash noted.   Psychiatric: She has a normal mood and affect. Cognition and memory are normal.   Nursing note and vitals reviewed.      Assessment/Plan   Problem List Items Addressed This Visit     Generalized anxiety disorder - Primary    Relevant Medications    varenicline (CHANTIX STARTING MONTH REGINA) 0.5 MG X 11 & 1 MG X 42 tablet    Hypothyroidism      Other Visit " Diagnoses     Screening for cancer        Relevant Orders    Ambulatory Referral For Screening Colonoscopy    Mammo Screening Digital Tomosynthesis Bilateral With CAD    Smoker              New Medications Ordered This Visit   Medications   • traMADol (ULTRAM) 50 MG tablet     Sig: Take 2 tablets by mouth Every 8 (Eight) Hours As Needed for Moderate Pain .     Dispense:  180 tablet     Refill:  2   • varenicline (CHANTIX STARTING MONTH PAK) 0.5 MG X 11 & 1 MG X 42 tablet     Sig: Take 0.5 mg one daily on days 1-3 and and 0.5 mg twice daily on days 4-7.Then 1 mg twice daily for a total of 12 weeks.     Dispense:  42 tablet     Refill:  0

## 2018-06-14 DIAGNOSIS — Z12.11 SCREENING FOR COLON CANCER: Primary | ICD-10-CM

## 2018-06-14 RX ORDER — SODIUM, POTASSIUM,MAG SULFATES 17.5-3.13G
1 SOLUTION, RECONSTITUTED, ORAL ORAL TAKE AS DIRECTED
Qty: 2 BOTTLE | Refills: 0 | Status: SHIPPED | OUTPATIENT
Start: 2018-06-14 | End: 2018-09-20

## 2018-06-21 ENCOUNTER — LAB REQUISITION (OUTPATIENT)
Dept: LAB | Facility: HOSPITAL | Age: 58
End: 2018-06-21

## 2018-06-21 ENCOUNTER — OUTSIDE FACILITY SERVICE (OUTPATIENT)
Dept: GASTROENTEROLOGY | Facility: CLINIC | Age: 58
End: 2018-06-21

## 2018-06-21 DIAGNOSIS — D12.5 BENIGN NEOPLASM OF SIGMOID COLON: ICD-10-CM

## 2018-06-21 DIAGNOSIS — Z12.11 ENCOUNTER FOR SCREENING FOR MALIGNANT NEOPLASM OF COLON: ICD-10-CM

## 2018-06-21 DIAGNOSIS — K64.8 OTHER HEMORRHOIDS: ICD-10-CM

## 2018-06-21 DIAGNOSIS — D12.3 BENIGN NEOPLASM OF TRANSVERSE COLON: ICD-10-CM

## 2018-06-21 PROCEDURE — 45385 COLONOSCOPY W/LESION REMOVAL: CPT | Performed by: INTERNAL MEDICINE

## 2018-06-21 PROCEDURE — 88305 TISSUE EXAM BY PATHOLOGIST: CPT | Performed by: INTERNAL MEDICINE

## 2018-06-22 LAB
CYTO UR: NORMAL
LAB AP CASE REPORT: NORMAL
LAB AP CLINICAL INFORMATION: NORMAL
PATH REPORT.FINAL DX SPEC: NORMAL
PATH REPORT.GROSS SPEC: NORMAL

## 2018-06-29 ENCOUNTER — TELEPHONE (OUTPATIENT)
Dept: GASTROENTEROLOGY | Facility: CLINIC | Age: 58
End: 2018-06-29

## 2018-06-29 NOTE — TELEPHONE ENCOUNTER
----- Message from Atul Tavarez MD sent at 6/28/2018  6:44 PM EDT -----  Let Mrs. Galvin know there were 3 adenoma type polyps.  She will need a repeat exam in 3 years.  Thank you,  JUAN

## 2018-08-03 ENCOUNTER — HOSPITAL ENCOUNTER (OUTPATIENT)
Dept: MAMMOGRAPHY | Facility: HOSPITAL | Age: 58
Discharge: HOME | End: 2018-08-03
Attending: FAMILY MEDICINE | Admitting: FAMILY MEDICINE

## 2018-08-03 DIAGNOSIS — Z12.9 SCREENING FOR CANCER: ICD-10-CM

## 2018-08-03 PROCEDURE — 77063 BREAST TOMOSYNTHESIS BI: CPT

## 2018-08-03 PROCEDURE — 77063 BREAST TOMOSYNTHESIS BI: CPT | Performed by: RADIOLOGY

## 2018-08-03 PROCEDURE — 77067 SCR MAMMO BI INCL CAD: CPT | Performed by: RADIOLOGY

## 2018-08-03 PROCEDURE — 77067 SCR MAMMO BI INCL CAD: CPT

## 2018-08-07 ENCOUNTER — OFFICE VISIT (OUTPATIENT)
Dept: FAMILY MEDICINE CLINIC | Facility: CLINIC | Age: 58
End: 2018-08-07

## 2018-08-07 VITALS
SYSTOLIC BLOOD PRESSURE: 126 MMHG | BODY MASS INDEX: 32.2 KG/M2 | HEART RATE: 50 BPM | WEIGHT: 175 LBS | DIASTOLIC BLOOD PRESSURE: 82 MMHG | HEIGHT: 62 IN | OXYGEN SATURATION: 97 % | RESPIRATION RATE: 16 BRPM

## 2018-08-07 DIAGNOSIS — F41.1 GENERALIZED ANXIETY DISORDER: Primary | ICD-10-CM

## 2018-08-07 DIAGNOSIS — M51.9 LUMBAR DISC DISEASE: ICD-10-CM

## 2018-08-07 DIAGNOSIS — E03.9 ACQUIRED HYPOTHYROIDISM: ICD-10-CM

## 2018-08-07 PROCEDURE — 99213 OFFICE O/P EST LOW 20 MIN: CPT | Performed by: FAMILY MEDICINE

## 2018-08-07 RX ORDER — GABAPENTIN 300 MG/1
300 CAPSULE ORAL
Qty: 30 CAPSULE | Refills: 5
Start: 2018-08-07 | End: 2020-05-20

## 2018-08-07 RX ORDER — TRAMADOL HYDROCHLORIDE 50 MG/1
100 TABLET ORAL EVERY 8 HOURS PRN
Qty: 180 TABLET | Refills: 2
Start: 2018-08-07 | End: 2019-04-17 | Stop reason: SDUPTHER

## 2018-08-07 NOTE — PROGRESS NOTES
Subjective   Yessica Galvin is a 57 y.o. female.     Yessica is generally doing some better she is no longer working and has applied for disability, she has stopped smoking and her breathing is better.      Anxiety   Presents for follow-up visit. Symptoms include decreased concentration, irritability and nervous/anxious behavior. Patient reports no confusion, dizziness, nausea, panic, shortness of breath or suicidal ideas. Symptoms occur most days. The severity of symptoms is moderate. The quality of sleep is fair. Nighttime awakenings: occasional.       Hypothyroidism   This is a chronic problem. The problem has been unchanged. Associated symptoms include arthralgias, coughing and myalgias. Pertinent negatives include no congestion, fatigue, nausea, sore throat, vomiting or weakness. Nothing aggravates the symptoms. The treatment provided significant relief.   Arthritis   Presents for follow-up visit. She complains of stiffness. The symptoms have been stable. Affected locations include the right hip, left hip, right wrist and left wrist. Pertinent negatives include no diarrhea, dysuria or fatigue.        The following portions of the patient's history were reviewed and updated as appropriate: allergies, current medications, past social history and problem list.    Review of Systems   Constitutional: Positive for irritability. Negative for fatigue.   HENT: Negative for congestion and sore throat.    Respiratory: Positive for cough. Negative for shortness of breath and wheezing.    Gastrointestinal: Negative for diarrhea, nausea and vomiting.   Endocrine: Negative for cold intolerance and heat intolerance.   Genitourinary: Negative for dysuria and hematuria.   Musculoskeletal: Positive for arthralgias, arthritis, myalgias and stiffness.   Skin: Negative.    Neurological: Negative for dizziness and weakness.   Psychiatric/Behavioral: Positive for decreased concentration. Negative for confusion and suicidal ideas. The  "patient is nervous/anxious.        Objective   /82   Pulse 50   Resp 16   Ht 157.5 cm (62\")   Wt 79.4 kg (175 lb)   SpO2 97%   BMI 32.01 kg/m²   Physical Exam   Constitutional: She is oriented to person, place, and time. She appears well-developed and well-nourished. She is cooperative.   HENT:   Head: Normocephalic.   Right Ear: External ear normal.   Left Ear: External ear normal.   Nose: Nose normal.   Mouth/Throat: Oropharynx is clear and moist.   Eyes: Pupils are equal, round, and reactive to light. Conjunctivae are normal. No scleral icterus.   Neck: Neck supple. Carotid bruit is not present. No thyromegaly present.   Cardiovascular: Normal rate, regular rhythm and normal heart sounds.    Pulmonary/Chest: Effort normal and breath sounds normal. She has no wheezes.   Abdominal: There is no hepatosplenomegaly.   Musculoskeletal: Normal range of motion.   Neurological: She is alert and oriented to person, place, and time.   No focal deficits no lateralizing signs   Skin: Skin is warm and dry. No rash noted.   Psychiatric: She has a normal mood and affect. Cognition and memory are normal.   Nursing note and vitals reviewed.      Assessment/Plan   Problem List Items Addressed This Visit     Generalized anxiety disorder - Primary    Hypothyroidism    Lumbar disc disease          New Medications Ordered This Visit   Medications   • traMADol (ULTRAM) 50 MG tablet     Sig: Take 2 tablets by mouth Every 8 (Eight) Hours As Needed for Moderate Pain .     Dispense:  180 tablet     Refill:  2   • gabapentin (NEURONTIN) 300 MG capsule     Sig: Take 1 capsule by mouth every night at bedtime.     Dispense:  30 capsule     Refill:  5              "

## 2018-08-08 RX ORDER — CEPHALEXIN 500 MG/1
500 CAPSULE ORAL 3 TIMES DAILY
Qty: 30 CAPSULE | Refills: 0 | Status: SHIPPED | OUTPATIENT
Start: 2018-08-08 | End: 2018-09-20

## 2018-09-20 ENCOUNTER — OFFICE VISIT (OUTPATIENT)
Dept: FAMILY MEDICINE CLINIC | Facility: CLINIC | Age: 58
End: 2018-09-20

## 2018-09-20 VITALS
DIASTOLIC BLOOD PRESSURE: 82 MMHG | TEMPERATURE: 97.9 F | HEART RATE: 65 BPM | SYSTOLIC BLOOD PRESSURE: 140 MMHG | RESPIRATION RATE: 16 BRPM | WEIGHT: 174 LBS | OXYGEN SATURATION: 98 % | BODY MASS INDEX: 31.83 KG/M2

## 2018-09-20 DIAGNOSIS — B34.9 VIRAL SYNDROME: Primary | ICD-10-CM

## 2018-09-20 DIAGNOSIS — R68.89 FLU-LIKE SYMPTOMS: ICD-10-CM

## 2018-09-20 PROCEDURE — 87804 INFLUENZA ASSAY W/OPTIC: CPT | Performed by: FAMILY MEDICINE

## 2018-09-20 PROCEDURE — 99213 OFFICE O/P EST LOW 20 MIN: CPT | Performed by: FAMILY MEDICINE

## 2018-09-20 RX ORDER — PROMETHAZINE HYDROCHLORIDE 25 MG/1
25 TABLET ORAL EVERY 6 HOURS PRN
Qty: 20 TABLET | Refills: 0 | Status: SHIPPED | OUTPATIENT
Start: 2018-09-20 | End: 2019-09-26 | Stop reason: SDUPTHER

## 2018-09-20 NOTE — PROGRESS NOTES
Subjective   Yessica Galvin is a 57 y.o. female.     Nausea   This is a new problem. The current episode started yesterday. The problem occurs constantly. The problem has been unchanged. Associated symptoms include congestion, coughing, headaches, nausea, vomiting and weakness. Pertinent negatives include no chest pain. Nothing aggravates the symptoms. She has tried rest and drinking for the symptoms. The treatment provided mild relief.        The following portions of the patient's history were reviewed and updated as appropriate: allergies, current medications, past social history and problem list.    Review of Systems   HENT: Positive for congestion and sinus pressure.    Respiratory: Positive for cough. Negative for shortness of breath.    Cardiovascular: Negative for chest pain and palpitations.   Gastrointestinal: Positive for nausea and vomiting. Negative for diarrhea.   Genitourinary: Negative for dysuria and hematuria.   Skin: Negative.    Neurological: Positive for weakness and headaches.       Objective   /82   Pulse 65   Temp 97.9 °F (36.6 °C) (Temporal Artery )   Resp 16   Wt 78.9 kg (174 lb)   SpO2 98%   BMI 31.83 kg/m²   Physical Exam   Constitutional: She is oriented to person, place, and time. She appears well-developed and well-nourished. She is cooperative.   HENT:   Head: Normocephalic.   Right Ear: External ear normal.   Left Ear: External ear normal.   Nose: Nose normal.   Mouth/Throat: Oropharynx is clear and moist.   Clear postnasal drip no exudate   Eyes: Pupils are equal, round, and reactive to light. Conjunctivae are normal. No scleral icterus.   Neck: Neck supple. Carotid bruit is not present. No thyromegaly present.   Cardiovascular: Normal rate and regular rhythm.    Pulmonary/Chest: Effort normal and breath sounds normal.   A few scattered upper airway rhonchi no rales   Abdominal: Soft. Bowel sounds are normal. There is no hepatosplenomegaly. There is no tenderness.    Musculoskeletal: Normal range of motion.   Neurological: She is alert and oriented to person, place, and time. She displays normal reflexes. No cranial nerve deficit. Coordination normal.   No focal deficits no lateralizing signs   Skin: Skin is warm and dry. No rash noted.   Psychiatric: She has a normal mood and affect. Cognition and memory are normal.   Nursing note and vitals reviewed.      Assessment/Plan   Problem List Items Addressed This Visit     None      Visit Diagnoses     Flu-like symptoms    -  Primary    Relevant Orders    POCT Influenza A/B (Completed)          New Medications Ordered This Visit   Medications   • promethazine (PHENERGAN) 25 MG tablet     Sig: Take 1 tablet by mouth Every 6 (Six) Hours As Needed for Nausea or Vomiting.     Dispense:  20 tablet     Refill:  0       Increase hydration, take Tylenol or tramadol as needed for headache under clinic if symptoms persist or worsen.

## 2018-11-02 ENCOUNTER — OFFICE VISIT (OUTPATIENT)
Dept: FAMILY MEDICINE CLINIC | Facility: CLINIC | Age: 58
End: 2018-11-02

## 2018-11-02 VITALS
BODY MASS INDEX: 32.19 KG/M2 | RESPIRATION RATE: 20 BRPM | HEART RATE: 65 BPM | OXYGEN SATURATION: 94 % | SYSTOLIC BLOOD PRESSURE: 132 MMHG | DIASTOLIC BLOOD PRESSURE: 74 MMHG | TEMPERATURE: 98.9 F | WEIGHT: 176 LBS

## 2018-11-02 DIAGNOSIS — J45.20 MILD INTERMITTENT ASTHMATIC BRONCHITIS WITHOUT COMPLICATION: ICD-10-CM

## 2018-11-02 DIAGNOSIS — J01.00 ACUTE MAXILLARY SINUSITIS, RECURRENCE NOT SPECIFIED: Primary | ICD-10-CM

## 2018-11-02 PROCEDURE — 99213 OFFICE O/P EST LOW 20 MIN: CPT | Performed by: NURSE PRACTITIONER

## 2018-11-02 RX ORDER — AZITHROMYCIN 250 MG/1
TABLET, FILM COATED ORAL
Qty: 6 TABLET | Refills: 0 | Status: SHIPPED | OUTPATIENT
Start: 2018-11-02 | End: 2018-11-07

## 2018-11-02 RX ORDER — DEXTROMETHORPHAN HYDROBROMIDE AND PROMETHAZINE HYDROCHLORIDE 15; 6.25 MG/5ML; MG/5ML
5 SYRUP ORAL 4 TIMES DAILY PRN
Qty: 240 ML | Refills: 0 | Status: SHIPPED | OUTPATIENT
Start: 2018-11-02 | End: 2018-11-07

## 2018-11-02 NOTE — PROGRESS NOTES
Subjective   Yessica Galvin is a 58 y.o. female.     History of Present Illness 10 days ago had sinus pressure and epistaxis right nostril. Now complains of nasal congestion, cloudy nasal discharge, cough productive of light green sputum. Had fever and sweats last week. Associates with myaglia and nausea. Now she just feels weak. Has some sight headache. Has a poor appetite. Using OTC meds for cough suppressant. Using proventil inhaler tid and symbicort bid. Former smoker    The following portions of the patient's history were reviewed and updated as appropriate: allergies, current medications, past family history, past medical history, past social history, past surgical history and problem list.    Review of Systems   Constitutional: Positive for fatigue. Negative for appetite change, fever, unexpected weight gain and unexpected weight loss.   HENT: Positive for congestion, nosebleeds, rhinorrhea, sinus pressure and sore throat. Negative for trouble swallowing.    Eyes: Negative for visual disturbance.   Respiratory: Positive for cough. Negative for shortness of breath and wheezing.    Cardiovascular: Negative for chest pain, palpitations and leg swelling.   Gastrointestinal: Positive for nausea. Negative for abdominal pain, blood in stool, constipation, diarrhea and vomiting.   Endocrine: Negative for polydipsia, polyphagia and polyuria.   Genitourinary: Negative for dysuria, frequency and hematuria.   Musculoskeletal: Positive for myalgias. Negative for arthralgias and joint swelling.   Skin: Negative for rash.   Neurological: Negative for dizziness, seizures, syncope and numbness.   Hematological: Negative for adenopathy. Does not bruise/bleed easily.   Psychiatric/Behavioral: Negative for behavioral problems, sleep disturbance and depressed mood. The patient is not nervous/anxious.        Objective   Physical Exam   Constitutional: She is oriented to person, place, and time. She appears well-developed and  well-nourished. No distress.   HENT:   Head: Normocephalic and atraumatic.   Right Ear: Tympanic membrane and external ear normal.   Left Ear: Tympanic membrane and external ear normal.   Nose: Right sinus exhibits maxillary sinus tenderness and frontal sinus tenderness. Left sinus exhibits maxillary sinus tenderness and frontal sinus tenderness.   Mouth/Throat: Oropharynx is clear and moist. No oropharyngeal exudate.   Eyes: Pupils are equal, round, and reactive to light. Conjunctivae are normal. Right eye exhibits no discharge. Left eye exhibits no discharge. No scleral icterus.   Neck: Neck supple. No tracheal deviation present. No thyromegaly present.   Cardiovascular: Normal rate, regular rhythm and normal heart sounds.  Exam reveals no gallop and no friction rub.    No murmur heard.  Pulmonary/Chest: Effort normal and breath sounds normal. No respiratory distress. She has no wheezes.   Abdominal: Soft. Bowel sounds are normal. She exhibits no distension and no mass. There is no tenderness.   Musculoskeletal: She exhibits no edema or deformity.   Lymphadenopathy:     She has no cervical adenopathy.   Neurological: She is alert and oriented to person, place, and time. Coordination normal.   Skin: Skin is warm and dry. Capillary refill takes less than 2 seconds. No rash noted. No erythema.   Psychiatric: She has a normal mood and affect. Her speech is normal and behavior is normal. Judgment and thought content normal.   Nursing note and vitals reviewed.        Assessment/Plan   Yessica was seen today for headache.    Diagnoses and all orders for this visit:    Acute maxillary sinusitis, recurrence not specified  -     azithromycin (ZITHROMAX Z-REGINA) 250 MG tablet; Take 2 tablets the first day, then 1 tablet daily for 4 days.  -     promethazine-dextromethorphan (PROMETHAZINE-DM) 6.25-15 MG/5ML syrup; Take 5 mL by mouth 4 (Four) Times a Day As Needed for Cough.    Mild intermittent asthmatic bronchitis without  complication    Increase fluids. Use inhaler 2 puffs qid. Instructions on proper use of inhaler provided. Follow up symptoms persist or worsen.

## 2018-11-07 ENCOUNTER — HOSPITAL ENCOUNTER (OUTPATIENT)
Dept: GENERAL RADIOLOGY | Facility: HOSPITAL | Age: 58
Discharge: HOME | End: 2018-11-07
Attending: FAMILY MEDICINE | Admitting: FAMILY MEDICINE

## 2018-11-07 ENCOUNTER — OFFICE VISIT (OUTPATIENT)
Dept: FAMILY MEDICINE CLINIC | Facility: CLINIC | Age: 58
End: 2018-11-07

## 2018-11-07 VITALS
HEART RATE: 61 BPM | BODY MASS INDEX: 32.79 KG/M2 | WEIGHT: 178.2 LBS | SYSTOLIC BLOOD PRESSURE: 130 MMHG | DIASTOLIC BLOOD PRESSURE: 80 MMHG | RESPIRATION RATE: 16 BRPM | HEIGHT: 62 IN

## 2018-11-07 DIAGNOSIS — J40 BRONCHITIS: Primary | ICD-10-CM

## 2018-11-07 DIAGNOSIS — J40 BRONCHITIS: ICD-10-CM

## 2018-11-07 PROCEDURE — 99213 OFFICE O/P EST LOW 20 MIN: CPT | Performed by: FAMILY MEDICINE

## 2018-11-07 PROCEDURE — 71046 X-RAY EXAM CHEST 2 VIEWS: CPT

## 2018-11-07 RX ORDER — DOXYCYCLINE HYCLATE 100 MG
100 TABLET ORAL DAILY
Qty: 21 TABLET | Refills: 0 | Status: SHIPPED | OUTPATIENT
Start: 2018-11-07 | End: 2019-02-11

## 2018-11-07 NOTE — PROGRESS NOTES
"Subjective   Yessica Galvin is a 58 y.o. female.     Anxiety   Presents for follow-up visit. Symptoms include shortness of breath. Patient reports no chest pain, nausea or palpitations. The quality of sleep is fair. Nighttime awakenings: occasional.       Hypothyroidism   This is a chronic problem. The current episode started more than 1 year ago. The problem occurs constantly. The problem has been unchanged. Associated symptoms include congestion and coughing. Pertinent negatives include no arthralgias, chest pain, chills, fever, myalgias, nausea or vomiting. The treatment provided significant relief.   Cough   This is a recurrent problem. The current episode started 1 to 4 weeks ago. The problem has been unchanged. The problem occurs every few minutes. The cough is non-productive. Associated symptoms include postnasal drip and shortness of breath. Pertinent negatives include no chest pain, chills, fever, hemoptysis or myalgias. The symptoms are aggravated by exercise. She has tried a beta-agonist inhaler (zithromax) for the symptoms. The treatment provided mild relief. Her past medical history is significant for bronchitis and COPD.        The following portions of the patient's history were reviewed and updated as appropriate: allergies, current medications, past social history and problem list.    Review of Systems   Constitutional: Negative for chills and fever.   HENT: Positive for congestion and postnasal drip. Negative for voice change.    Respiratory: Positive for cough and shortness of breath. Negative for hemoptysis.    Cardiovascular: Negative for chest pain and palpitations.   Gastrointestinal: Negative for diarrhea, nausea and vomiting.   Endocrine: Negative for cold intolerance and heat intolerance.   Genitourinary: Negative for dysuria and hematuria.   Musculoskeletal: Negative for arthralgias and myalgias.       Objective   /80   Pulse 61   Resp 16   Ht 157.5 cm (62\")   Wt 80.8 kg (178 lb " 3.2 oz)   BMI 32.59 kg/m²   Physical Exam   Constitutional: She is oriented to person, place, and time. She appears well-developed and well-nourished. She is cooperative.   HENT:   Head: Normocephalic.   Right Ear: External ear normal.   Left Ear: External ear normal.   Nose: Nose normal.   Mouth/Throat: Oropharynx is clear and moist.   Cloudy postnasal drip   Eyes: Pupils are equal, round, and reactive to light. Conjunctivae are normal. No scleral icterus.   Neck: Neck supple. Carotid bruit is not present. No thyromegaly present.   Cardiovascular: Normal rate and regular rhythm.    Pulmonary/Chest: Effort normal.   Dry staccato upper airway rhonchi   Abdominal: There is no hepatosplenomegaly.   Musculoskeletal: Normal range of motion.   Neurological: She is alert and oriented to person, place, and time.   No focal deficits no lateralizing signs   Skin: Skin is warm and dry. No rash noted.   Psychiatric: She has a normal mood and affect. Cognition and memory are normal.   Nursing note and vitals reviewed.      Assessment/Plan   Problem List Items Addressed This Visit     None      Visit Diagnoses     Bronchitis    -  Primary    Relevant Orders    XR Chest 2 View (Completed)          New Medications Ordered This Visit   Medications   • doxycycline (VIBRAMYICN) 100 MG tablet     Sig: Take 1 tablet by mouth Daily.     Dispense:  21 tablet     Refill:  0     Increase hydration continue Mucinex and respiratory medications

## 2018-11-09 RX ORDER — ERGOCALCIFEROL 1.25 MG/1
CAPSULE ORAL
Qty: 12 CAPSULE | Refills: 0 | OUTPATIENT
Start: 2018-11-09

## 2019-02-04 RX ORDER — BUDESONIDE AND FORMOTEROL FUMARATE DIHYDRATE 160; 4.5 UG/1; UG/1
AEROSOL RESPIRATORY (INHALATION)
Qty: 1 INHALER | Refills: 3 | Status: SHIPPED | OUTPATIENT
Start: 2019-02-04 | End: 2019-12-03 | Stop reason: SDUPTHER

## 2019-02-11 ENCOUNTER — OFFICE VISIT (OUTPATIENT)
Dept: FAMILY MEDICINE CLINIC | Facility: CLINIC | Age: 59
End: 2019-02-11

## 2019-02-11 VITALS
HEIGHT: 62 IN | SYSTOLIC BLOOD PRESSURE: 138 MMHG | RESPIRATION RATE: 16 BRPM | OXYGEN SATURATION: 98 % | TEMPERATURE: 98.7 F | HEART RATE: 76 BPM | BODY MASS INDEX: 33.23 KG/M2 | DIASTOLIC BLOOD PRESSURE: 88 MMHG | WEIGHT: 180.6 LBS

## 2019-02-11 DIAGNOSIS — E55.9 VITAMIN D DEFICIENCY: ICD-10-CM

## 2019-02-11 DIAGNOSIS — F32.0 CURRENT MILD EPISODE OF MAJOR DEPRESSIVE DISORDER, UNSPECIFIED WHETHER RECURRENT (HCC): Primary | ICD-10-CM

## 2019-02-11 LAB — 25(OH)D3+25(OH)D2 SERPL-MCNC: 25.5 NG/ML

## 2019-02-11 PROCEDURE — 99213 OFFICE O/P EST LOW 20 MIN: CPT | Performed by: FAMILY MEDICINE

## 2019-02-11 PROCEDURE — 36415 COLL VENOUS BLD VENIPUNCTURE: CPT | Performed by: FAMILY MEDICINE

## 2019-02-11 RX ORDER — VENLAFAXINE HYDROCHLORIDE 75 MG/1
75 CAPSULE, EXTENDED RELEASE ORAL DAILY
Qty: 30 CAPSULE | Refills: 2 | Status: SHIPPED | OUTPATIENT
Start: 2019-02-11 | End: 2019-04-17 | Stop reason: SDUPTHER

## 2019-02-12 NOTE — PROGRESS NOTES
"Subjective   Yessica Galvin is a 58 y.o. female.     Cough   This is a recurrent problem. The current episode started 1 to 4 weeks ago. The problem has been gradually improving. The problem occurs hourly. The cough is non-productive. Associated symptoms include postnasal drip. Pertinent negatives include no chills, fever, headaches, hemoptysis or shortness of breath. The symptoms are aggravated by exercise. She has tried OTC cough suppressant for the symptoms. The treatment provided mild relief. Her past medical history is significant for bronchitis and COPD.   Depression   Visit Type: follow-up (azam and get down in mood a lot concerned med not helping)  Patient presents with the following symptoms: depressed mood, irritability and nervousness/anxiety.  Patient is not experiencing: confusion, decreased concentration, shortness of breath and suicidal ideas.  Frequency of symptoms: most days   Severity: moderate   Sleep quality: fair  Nighttime awakenings: occasional         The following portions of the patient's history were reviewed and updated as appropriate: allergies, current medications, past social history and problem list.    Review of Systems   Constitutional: Positive for irritability. Negative for appetite change, chills, fatigue and fever.   HENT: Positive for postnasal drip.    Respiratory: Positive for cough. Negative for hemoptysis, chest tightness and shortness of breath.    Gastrointestinal: Negative for abdominal pain, diarrhea and nausea.   Neurological: Negative for dizziness, tremors, weakness, light-headedness and headaches.   Psychiatric/Behavioral: Positive for dysphoric mood and sleep disturbance. Negative for agitation, behavioral problems, confusion, decreased concentration and suicidal ideas. The patient is nervous/anxious.        Objective   /88   Pulse 76   Temp 98.7 °F (37.1 °C)   Resp 16   Ht 157.5 cm (62\")   Wt 81.9 kg (180 lb 9.6 oz)   SpO2 98%   BMI 33.03 kg/m² "   Physical Exam   Constitutional: She appears well-developed and well-nourished. No distress.   HENT:   Head: Normocephalic and atraumatic.   Nose: Nose normal.   Mouth/Throat: Oropharynx is clear and moist.   Eyes: Conjunctivae are normal. Pupils are equal, round, and reactive to light.   Neck: Neck supple. No JVD present.   Cardiovascular: Normal rate, regular rhythm and normal heart sounds.   No murmur heard.  Pulmonary/Chest: Effort normal. No stridor. No respiratory distress. She has wheezes.   Upper rhonchi improved   Musculoskeletal: She exhibits no edema.   Lymphadenopathy:     She has no cervical adenopathy.   Skin: She is not diaphoretic.   Psychiatric:   Decreased mood and energy   Nursing note and vitals reviewed.      Assessment/Plan   Problem List Items Addressed This Visit     None      Visit Diagnoses     Current mild episode of major depressive disorder, unspecified whether recurrent (CMS/HCC)    -  Primary    Relevant Medications    venlafaxine XR (EFFEXOR XR) 75 MG 24 hr capsule    Vitamin D deficiency        Relevant Orders    Vitamin D 25 Hydroxy (Completed)          New Medications Ordered This Visit   Medications   • venlafaxine XR (EFFEXOR XR) 75 MG 24 hr capsule     Sig: Take 1 capsule by mouth Daily.     Dispense:  30 capsule     Refill:  2

## 2019-02-13 RX ORDER — ERGOCALCIFEROL 1.25 MG/1
50000 CAPSULE ORAL WEEKLY
Qty: 12 CAPSULE | Refills: 0 | Status: SHIPPED | OUTPATIENT
Start: 2019-02-13 | End: 2019-05-14

## 2019-02-28 ENCOUNTER — OFFICE VISIT (OUTPATIENT)
Dept: FAMILY MEDICINE CLINIC | Facility: CLINIC | Age: 59
End: 2019-02-28

## 2019-02-28 VITALS
DIASTOLIC BLOOD PRESSURE: 86 MMHG | WEIGHT: 181 LBS | HEIGHT: 62 IN | TEMPERATURE: 99 F | OXYGEN SATURATION: 97 % | HEART RATE: 84 BPM | BODY MASS INDEX: 33.31 KG/M2 | RESPIRATION RATE: 16 BRPM | SYSTOLIC BLOOD PRESSURE: 132 MMHG

## 2019-02-28 DIAGNOSIS — J40 BRONCHITIS: ICD-10-CM

## 2019-02-28 DIAGNOSIS — J01.00 SUBACUTE MAXILLARY SINUSITIS: Primary | ICD-10-CM

## 2019-02-28 PROCEDURE — 96372 THER/PROPH/DIAG INJ SC/IM: CPT | Performed by: FAMILY MEDICINE

## 2019-02-28 PROCEDURE — 99213 OFFICE O/P EST LOW 20 MIN: CPT | Performed by: FAMILY MEDICINE

## 2019-02-28 RX ORDER — SULFAMETHOXAZOLE AND TRIMETHOPRIM 800; 160 MG/1; MG/1
1 TABLET ORAL 2 TIMES DAILY
Qty: 20 TABLET | Refills: 0 | Status: SHIPPED | OUTPATIENT
Start: 2019-02-28 | End: 2019-04-17

## 2019-02-28 RX ORDER — METHYLPREDNISOLONE ACETATE 40 MG/ML
40 INJECTION, SUSPENSION INTRA-ARTICULAR; INTRALESIONAL; INTRAMUSCULAR; SOFT TISSUE ONCE
Status: COMPLETED | OUTPATIENT
Start: 2019-02-28 | End: 2019-02-28

## 2019-02-28 RX ADMIN — METHYLPREDNISOLONE ACETATE 40 MG: 40 INJECTION, SUSPENSION INTRA-ARTICULAR; INTRALESIONAL; INTRAMUSCULAR; SOFT TISSUE at 15:00

## 2019-03-06 RX ORDER — LEVOTHYROXINE SODIUM 0.15 MG/1
TABLET ORAL
Qty: 30 TABLET | Refills: 0 | Status: SHIPPED | OUTPATIENT
Start: 2019-03-06 | End: 2019-04-17 | Stop reason: SDUPTHER

## 2019-04-15 RX ORDER — LEVOTHYROXINE SODIUM 0.15 MG/1
150 TABLET ORAL DAILY
Qty: 30 TABLET | Refills: 0 | Status: CANCELLED | OUTPATIENT
Start: 2019-04-15

## 2019-04-17 ENCOUNTER — OFFICE VISIT (OUTPATIENT)
Dept: FAMILY MEDICINE CLINIC | Facility: CLINIC | Age: 59
End: 2019-04-17

## 2019-04-17 VITALS
SYSTOLIC BLOOD PRESSURE: 122 MMHG | RESPIRATION RATE: 16 BRPM | HEIGHT: 62 IN | OXYGEN SATURATION: 96 % | HEART RATE: 68 BPM | BODY MASS INDEX: 33.79 KG/M2 | WEIGHT: 183.6 LBS | DIASTOLIC BLOOD PRESSURE: 78 MMHG

## 2019-04-17 DIAGNOSIS — E03.9 ACQUIRED HYPOTHYROIDISM: ICD-10-CM

## 2019-04-17 DIAGNOSIS — M54.50 CHRONIC MIDLINE LOW BACK PAIN WITHOUT SCIATICA: ICD-10-CM

## 2019-04-17 DIAGNOSIS — G89.29 CHRONIC MIDLINE LOW BACK PAIN WITHOUT SCIATICA: ICD-10-CM

## 2019-04-17 DIAGNOSIS — F41.9 ANXIETY: Primary | ICD-10-CM

## 2019-04-17 DIAGNOSIS — E55.9 VITAMIN D DEFICIENCY: ICD-10-CM

## 2019-04-17 PROCEDURE — 99214 OFFICE O/P EST MOD 30 MIN: CPT | Performed by: FAMILY MEDICINE

## 2019-04-17 RX ORDER — LEVOTHYROXINE SODIUM 0.15 MG/1
150 TABLET ORAL DAILY
Qty: 90 TABLET | Refills: 1 | Status: SHIPPED | OUTPATIENT
Start: 2019-04-17 | End: 2019-11-26 | Stop reason: SDUPTHER

## 2019-04-17 RX ORDER — VENLAFAXINE HYDROCHLORIDE 75 MG/1
75 CAPSULE, EXTENDED RELEASE ORAL DAILY
Qty: 30 CAPSULE | Refills: 5 | Status: SHIPPED | OUTPATIENT
Start: 2019-04-17 | End: 2019-04-17

## 2019-04-17 RX ORDER — VENLAFAXINE HYDROCHLORIDE 150 MG/1
150 CAPSULE, EXTENDED RELEASE ORAL DAILY
Qty: 30 CAPSULE | Refills: 2 | Status: SHIPPED | OUTPATIENT
Start: 2019-04-17 | End: 2020-01-07 | Stop reason: SDUPTHER

## 2019-04-17 RX ORDER — TRAMADOL HYDROCHLORIDE 50 MG/1
100 TABLET ORAL EVERY 8 HOURS PRN
Qty: 180 TABLET | Refills: 2
Start: 2019-04-17 | End: 2019-09-26 | Stop reason: SDUPTHER

## 2019-04-18 LAB
25(OH)D3+25(OH)D2 SERPL-MCNC: 26.8 NG/ML (ref 30–100)
BUN SERPL-MCNC: 4 MG/DL (ref 8–23)
BUN/CREAT SERPL: 6.6 (ref 7–25)
CALCIUM SERPL-MCNC: 9.3 MG/DL (ref 8.6–10.5)
CHLORIDE SERPL-SCNC: 101 MMOL/L (ref 98–107)
CO2 SERPL-SCNC: 27.9 MMOL/L (ref 22–29)
CREAT SERPL-MCNC: 0.61 MG/DL (ref 0.57–1)
ERYTHROCYTE [DISTWIDTH] IN BLOOD BY AUTOMATED COUNT: 14.4 % (ref 12.3–15.4)
GLUCOSE SERPL-MCNC: 89 MG/DL (ref 65–99)
HCT VFR BLD AUTO: 37.1 % (ref 34–46.6)
HGB BLD-MCNC: 11.7 G/DL (ref 12–15.9)
MCH RBC QN AUTO: 30.4 PG (ref 26.6–33)
MCHC RBC AUTO-ENTMCNC: 31.5 G/DL (ref 31.5–35.7)
MCV RBC AUTO: 96.4 FL (ref 79–97)
PLATELET # BLD AUTO: 280 10*3/MM3 (ref 140–450)
POTASSIUM SERPL-SCNC: 4 MMOL/L (ref 3.5–5.2)
RBC # BLD AUTO: 3.85 10*6/MM3 (ref 3.77–5.28)
SODIUM SERPL-SCNC: 139 MMOL/L (ref 136–145)
TSH SERPL DL<=0.005 MIU/L-ACNC: 8.87 UIU/ML (ref 0.45–4.5)
WBC # BLD AUTO: 7.17 10*3/MM3 (ref 3.4–10.8)

## 2019-04-18 NOTE — PROGRESS NOTES
Subjective   Yessica Galvin is a 58 y.o. female.     Hypothyroidism   This is a chronic problem. The current episode started more than 1 year ago. The problem occurs constantly. The problem has been unchanged. Associated symptoms include congestion. Pertinent negatives include no abdominal pain, arthralgias, fatigue, headaches, myalgias, nausea, numbness, vomiting or weakness. The treatment provided significant relief.   Back Pain   This is a chronic problem. The problem occurs daily. The problem is unchanged. The pain is present in the lumbar spine. The quality of the pain is described as aching. The pain does not radiate. The pain is moderate. The symptoms are aggravated by bending and standing. Pertinent negatives include no abdominal pain, bladder incontinence, bowel incontinence, headaches, leg pain, numbness or weakness. She has tried analgesics, NSAIDs and heat for the symptoms. The treatment provided moderate relief.   Anxiety   Presents for follow-up (anxiety is a little worse mood is some better) visit. Symptoms include nervous/anxious behavior. Patient reports no confusion, decreased concentration, dizziness, nausea, palpitations, shortness of breath or suicidal ideas. The quality of sleep is fair. Nighttime awakenings: occasional.            The following portions of the patient's history were reviewed and updated as appropriate: allergies, current medications, past social history and problem list.    Review of Systems   Constitutional: Negative for appetite change and fatigue.   HENT: Positive for congestion.    Respiratory: Negative for chest tightness and shortness of breath.    Cardiovascular: Negative for palpitations.   Gastrointestinal: Negative for abdominal pain, bowel incontinence, diarrhea, nausea and vomiting.   Endocrine: Negative for cold intolerance and heat intolerance.   Genitourinary: Negative for bladder incontinence.   Musculoskeletal: Positive for back pain. Negative for arthralgias  "and myalgias.   Neurological: Negative for dizziness, tremors, weakness, light-headedness, numbness and headaches.   Psychiatric/Behavioral: Positive for sleep disturbance. Negative for agitation, behavioral problems, confusion, decreased concentration, dysphoric mood and suicidal ideas. The patient is nervous/anxious.        Objective   /78   Pulse 68   Resp 16   Ht 157.5 cm (62\")   Wt 83.3 kg (183 lb 9.6 oz)   SpO2 96%   BMI 33.58 kg/m²   Physical Exam   Constitutional: She is oriented to person, place, and time. She appears well-developed and well-nourished. She is cooperative.   HENT:   Head: Normocephalic.   Right Ear: External ear normal.   Left Ear: External ear normal.   Nose: Nose normal.   Mouth/Throat: Oropharynx is clear and moist.   Eyes: Conjunctivae are normal. Pupils are equal, round, and reactive to light. No scleral icterus.   Neck: Neck supple. No JVD present. Carotid bruit is not present. No thyromegaly present.   Cardiovascular: Normal rate, regular rhythm, normal heart sounds and intact distal pulses.   Pulmonary/Chest: Effort normal and breath sounds normal.   Abdominal: There is no hepatosplenomegaly.   Musculoskeletal: Normal range of motion. She exhibits no edema.   Lymphadenopathy:     She has no cervical adenopathy.   Neurological: She is alert and oriented to person, place, and time.   No focal deficits no lateralizing signs   Skin: Skin is warm and dry. No rash noted.   Psychiatric: She has a normal mood and affect. Cognition and memory are normal.   anxious   Nursing note and vitals reviewed.      Assessment/Plan   Problem List Items Addressed This Visit        Active Problems    Acquired hypothyroidism    Relevant Medications    levothyroxine (SYNTHROID, LEVOTHROID) 150 MCG tablet    Other Relevant Orders    TSH    CBC (No Diff)    Basic Metabolic Panel    Chronic midline low back pain without sciatica      Other Visit Diagnoses     Anxiety    -  Primary    Relevant Orders "    Basic Metabolic Panel    Vitamin D deficiency        Relevant Orders    Vitamin D 25 Hydroxy          New Medications Ordered This Visit   Medications   • levothyroxine (SYNTHROID, LEVOTHROID) 150 MCG tablet     Sig: Take 1 tablet by mouth Daily.     Dispense:  90 tablet     Refill:  1     Patient needs appt for labs for further refills   • venlafaxine XR (EFFEXOR XR) 150 MG 24 hr capsule     Sig: Take 1 capsule by mouth Daily.     Dispense:  30 capsule     Refill:  2     Disregard previous 75 mg refill thanks   • traMADol (ULTRAM) 50 MG tablet     Sig: Take 2 tablets by mouth Every 8 (Eight) Hours As Needed for Moderate Pain .     Dispense:  180 tablet     Refill:  2     Increase effexor and reduce caffeine and more exercise

## 2019-06-03 RX ORDER — DICLOFENAC SODIUM 75 MG/1
TABLET, DELAYED RELEASE ORAL
Qty: 60 TABLET | Refills: 2 | Status: SHIPPED | OUTPATIENT
Start: 2019-06-03 | End: 2020-01-30 | Stop reason: SDUPTHER

## 2019-06-07 ENCOUNTER — OFFICE VISIT (OUTPATIENT)
Dept: FAMILY MEDICINE CLINIC | Facility: CLINIC | Age: 59
End: 2019-06-07

## 2019-06-07 VITALS
WEIGHT: 181.6 LBS | BODY MASS INDEX: 33.42 KG/M2 | DIASTOLIC BLOOD PRESSURE: 80 MMHG | RESPIRATION RATE: 16 BRPM | HEART RATE: 76 BPM | OXYGEN SATURATION: 93 % | HEIGHT: 62 IN | SYSTOLIC BLOOD PRESSURE: 154 MMHG

## 2019-06-07 DIAGNOSIS — J40 BRONCHITIS: Primary | ICD-10-CM

## 2019-06-07 DIAGNOSIS — E03.9 ACQUIRED HYPOTHYROIDISM: ICD-10-CM

## 2019-06-07 PROCEDURE — 96372 THER/PROPH/DIAG INJ SC/IM: CPT | Performed by: FAMILY MEDICINE

## 2019-06-07 PROCEDURE — 99213 OFFICE O/P EST LOW 20 MIN: CPT | Performed by: FAMILY MEDICINE

## 2019-06-07 RX ORDER — METHYLPREDNISOLONE ACETATE 40 MG/ML
40 INJECTION, SUSPENSION INTRA-ARTICULAR; INTRALESIONAL; INTRAMUSCULAR; SOFT TISSUE ONCE
Status: COMPLETED | OUTPATIENT
Start: 2019-06-07 | End: 2019-06-07

## 2019-06-07 RX ORDER — LEVOFLOXACIN 500 MG/1
500 TABLET, FILM COATED ORAL DAILY
Qty: 10 TABLET | Refills: 0 | Status: SHIPPED | OUTPATIENT
Start: 2019-06-07 | End: 2019-09-26

## 2019-06-07 RX ADMIN — METHYLPREDNISOLONE ACETATE 40 MG: 40 INJECTION, SUSPENSION INTRA-ARTICULAR; INTRALESIONAL; INTRAMUSCULAR; SOFT TISSUE at 11:58

## 2019-06-07 NOTE — PROGRESS NOTES
Subjective   Yessica Galvin is a 58 y.o. female seen today for Cough (productive) and Shortness of Breath.     Cough   This is a new problem. The current episode started 1 to 4 weeks ago. The problem has been gradually worsening. The problem occurs every few minutes. The cough is productive of purulent sputum. Associated symptoms include ear pain, postnasal drip, shortness of breath and wheezing. Pertinent negatives include no chest pain, chills, fever, headaches, rash or sore throat. Exacerbated by: exertion, allergies, asthma  Treatments tried: symbicort, albuterol  The treatment provided mild relief. Her past medical history is significant for asthma and environmental allergies.   Shortness of Breath   This is a new problem. The current episode started 1 to 4 weeks ago. The problem occurs intermittently. The problem has been gradually worsening. Associated symptoms include ear pain, sputum production and wheezing. Pertinent negatives include no chest pain, fever, headaches, leg swelling, rash, sore throat or vomiting. Exacerbated by: exertion  Treatments tried: symbicort, albuterol. The treatment provided mild relief. Her past medical history is significant for asthma.        The following portions of the patient's history were reviewed and updated as appropriate: allergies, current medications, past social history and problem list.    Review of Systems   Constitutional: Negative for chills, fatigue and fever.   HENT: Positive for ear pain and postnasal drip. Negative for congestion and sore throat.         Ear itching   Eyes: Negative for pain and visual disturbance.   Respiratory: Positive for cough, sputum production, shortness of breath and wheezing. Negative for chest tightness.    Cardiovascular: Negative for chest pain, palpitations and leg swelling.   Gastrointestinal: Positive for nausea. Negative for constipation, diarrhea and vomiting.   Endocrine: Negative for polydipsia, polyphagia and polyuria.  "  Genitourinary: Negative for dysuria and hematuria.   Musculoskeletal: Positive for arthralgias. Negative for joint swelling.   Skin: Negative for color change and rash.   Allergic/Immunologic: Positive for environmental allergies. Negative for food allergies.   Neurological: Negative for syncope and headaches.   Hematological: Negative for adenopathy. Does not bruise/bleed easily.   Psychiatric/Behavioral: Negative for decreased concentration and sleep disturbance. The patient is not nervous/anxious.        Objective   /80   Pulse 76   Resp 16   Ht 157.5 cm (62\")   Wt 82.4 kg (181 lb 9.6 oz)   SpO2 93%   BMI 33.22 kg/m²   Physical Exam   Constitutional: She is oriented to person, place, and time. She appears well-developed and well-nourished.   HENT:   Head: Normocephalic.   Right Ear: External ear normal.   Left Ear: External ear normal.   Nose: Nose normal.   Mouth/Throat: Oropharynx is clear and moist.   Eyes: Conjunctivae are normal. Pupils are equal, round, and reactive to light. No scleral icterus.   Neck: Neck supple. No thyromegaly present.   Cardiovascular: Normal rate, regular rhythm and normal heart sounds.   Pulmonary/Chest: Effort normal. She has wheezes.   Coarse upper airway rhonchi   Musculoskeletal: Normal range of motion.   Lymphadenopathy:     She has no cervical adenopathy.   Neurological: She is alert and oriented to person, place, and time.   Skin: Skin is warm and dry. No rash noted.   Psychiatric: She has a normal mood and affect.   Nursing note and vitals reviewed.      Assessment/Plan   Problem List Items Addressed This Visit        Active Problems    Acquired hypothyroidism      Other Visit Diagnoses     Bronchitis    -  Primary    Relevant Medications    methylPREDNISolone acetate (DEPO-medrol) injection 40 mg (Completed)    levoFLOXacin (LEVAQUIN) 500 MG tablet       Continue inhalers.        "

## 2019-06-08 RX ORDER — GABAPENTIN 300 MG/1
CAPSULE ORAL
Qty: 30 CAPSULE | Refills: 4 | OUTPATIENT
Start: 2019-06-08

## 2019-06-12 ENCOUNTER — OFFICE VISIT (OUTPATIENT)
Dept: FAMILY MEDICINE CLINIC | Facility: CLINIC | Age: 59
End: 2019-06-12

## 2019-06-12 VITALS
HEART RATE: 83 BPM | RESPIRATION RATE: 16 BRPM | HEIGHT: 62 IN | DIASTOLIC BLOOD PRESSURE: 80 MMHG | OXYGEN SATURATION: 96 % | SYSTOLIC BLOOD PRESSURE: 122 MMHG | BODY MASS INDEX: 33.22 KG/M2

## 2019-06-12 DIAGNOSIS — E03.9 ACQUIRED HYPOTHYROIDISM: Primary | ICD-10-CM

## 2019-06-12 DIAGNOSIS — R05.9 COUGH: ICD-10-CM

## 2019-06-12 PROCEDURE — 99213 OFFICE O/P EST LOW 20 MIN: CPT | Performed by: FAMILY MEDICINE

## 2019-06-12 PROCEDURE — 36415 COLL VENOUS BLD VENIPUNCTURE: CPT | Performed by: FAMILY MEDICINE

## 2019-06-12 NOTE — PROGRESS NOTES
Subjective   Yessica Galvin is a 58 y.o. female seen today for Bronchitis (f/u ) and Labs Only (TSH recheck).     Cough   This is a new problem. The current episode started 1 to 4 weeks ago. The problem has been gradually improving. The problem occurs every few minutes. The cough is productive of sputum. Associated symptoms include postnasal drip. Pertinent negatives include no chills or myalgias. Exacerbated by: exertion  Treatments tried: mucinex, symbicort, albuterol, antibiotics, steroid injection. The treatment provided moderate relief. Her past medical history is significant for bronchitis. There is no history of environmental allergies.   Hypothyroidism   This is a chronic problem. The current episode started more than 1 year ago. The problem occurs constantly. The problem has been unchanged. Associated symptoms include congestion, coughing and fatigue. Pertinent negatives include no arthralgias, chills, joint swelling or myalgias. Treatments tried: levothyroxine  The treatment provided significant relief.        The following portions of the patient's history were reviewed and updated as appropriate: allergies, current medications, past social history and problem list.    Review of Systems   Constitutional: Positive for fatigue. Negative for chills.   HENT: Positive for congestion and postnasal drip.    Eyes: Negative for pain and visual disturbance.   Respiratory: Positive for cough.         Congestion   Gastrointestinal: Negative for constipation and diarrhea.   Endocrine: Negative for polydipsia, polyphagia and polyuria.   Genitourinary: Negative for hematuria.   Musculoskeletal: Negative for arthralgias, joint swelling and myalgias.   Allergic/Immunologic: Negative for environmental allergies and food allergies.   Neurological: Negative for syncope.   Hematological: Negative for adenopathy. Does not bruise/bleed easily.   Psychiatric/Behavioral: Negative for dysphoric mood and sleep disturbance.  "      Objective   /80   Pulse 83   Resp 16   Ht 157.5 cm (62\")   SpO2 96%   BMI 33.22 kg/m²   Physical Exam   Constitutional: She is oriented to person, place, and time. She appears well-developed and well-nourished.   HENT:   Head: Normocephalic and atraumatic.   Eyes: Conjunctivae are normal. Pupils are equal, round, and reactive to light. No scleral icterus.   Neck: Neck supple. No JVD present. No thyromegaly present.   Cardiovascular: Normal rate, regular rhythm and normal heart sounds.   Pulmonary/Chest: Effort normal. She has wheezes.   Rhonchi and expiratory wheezes are improving   Musculoskeletal: Normal range of motion. She exhibits no edema.   Lymphadenopathy:     She has no cervical adenopathy.   Neurological: She is alert and oriented to person, place, and time.   Skin: Skin is warm and dry. No rash noted.   Psychiatric: She has a normal mood and affect.   Nursing note and vitals reviewed.      Assessment/Plan   Problem List Items Addressed This Visit        Endocrine    Acquired hypothyroidism - Primary    Relevant Orders    TSH      Other Visit Diagnoses     Cough            Finish course of Levaquin and continue inhalers as directed.       "

## 2019-06-13 LAB — TSH SERPL DL<=0.005 MIU/L-ACNC: 1.81 UIU/ML (ref 0.45–4.5)

## 2019-09-26 ENCOUNTER — OFFICE VISIT (OUTPATIENT)
Dept: FAMILY MEDICINE CLINIC | Facility: CLINIC | Age: 59
End: 2019-09-26

## 2019-09-26 ENCOUNTER — TELEPHONE (OUTPATIENT)
Dept: FAMILY MEDICINE CLINIC | Facility: CLINIC | Age: 59
End: 2019-09-26

## 2019-09-26 VITALS
HEIGHT: 62 IN | BODY MASS INDEX: 33.13 KG/M2 | DIASTOLIC BLOOD PRESSURE: 76 MMHG | RESPIRATION RATE: 16 BRPM | HEART RATE: 87 BPM | SYSTOLIC BLOOD PRESSURE: 130 MMHG | WEIGHT: 180 LBS | OXYGEN SATURATION: 97 %

## 2019-09-26 DIAGNOSIS — J45.909 ASTHMATIC BRONCHITIS WITHOUT COMPLICATION, UNSPECIFIED ASTHMA SEVERITY, UNSPECIFIED WHETHER PERSISTENT: ICD-10-CM

## 2019-09-26 DIAGNOSIS — K59.00 CONSTIPATION, UNSPECIFIED CONSTIPATION TYPE: ICD-10-CM

## 2019-09-26 DIAGNOSIS — E03.9 ACQUIRED HYPOTHYROIDISM: Primary | ICD-10-CM

## 2019-09-26 PROCEDURE — 99213 OFFICE O/P EST LOW 20 MIN: CPT | Performed by: FAMILY MEDICINE

## 2019-09-26 RX ORDER — PROMETHAZINE HYDROCHLORIDE 25 MG/1
25 TABLET ORAL EVERY 6 HOURS PRN
Qty: 20 TABLET | Refills: 1 | Status: SHIPPED | OUTPATIENT
Start: 2019-09-26 | End: 2020-01-30 | Stop reason: SDUPTHER

## 2019-09-26 RX ORDER — TRAMADOL HYDROCHLORIDE 50 MG/1
100 TABLET ORAL EVERY 8 HOURS PRN
Qty: 180 TABLET | Refills: 2
Start: 2019-09-26 | End: 2020-03-17 | Stop reason: HOSPADM

## 2019-09-26 NOTE — TELEPHONE ENCOUNTER
Pt was informed.  ABIOLA Lr  RMA    ----- Message from Kota Swain MD sent at 9/26/2019  4:31 PM EDT -----  Regarding: RE: RX REFILL  Contact: [phone]  rx written  ----- Message -----  From: James Lr MA  Sent: 9/26/2019   3:23 PM  To: Kota Swain MD  Subject: FW: RX REFILL                                        ----- Message -----  From: Zelda Cowan RegSched Rep  Sent: 9/26/2019   3:01 PM  To: James Lr MA  Subject: RX REFILL                                        PT FORGOT TO GET A REFILL OF traMADol (ULTRAM) 50 MG tablet WHILE SHE WAS HERE TODAY. CAN THAT BE DONE SO SHE CAN COME PICK IT UP?

## 2019-09-27 NOTE — PROGRESS NOTES
"Subjective   Yessica Galvin is a 58 y.o. female.     Nausea   This is a new problem. The current episode started in the past 7 days. The problem has been gradually improving. Associated symptoms include coughing and nausea. Pertinent negatives include no chest pain, chills, congestion, fever, sore throat or vomiting. Nothing aggravates the symptoms. She has tried rest (Liquid diet) for the symptoms. The treatment provided moderate relief.   Constipation   This is a recurrent problem. The current episode started 1 to 4 weeks ago. The problem is unchanged. The patient is not on a high fiber diet. She does not exercise regularly. There has not been adequate water intake. Associated symptoms include nausea. Pertinent negatives include no fever or vomiting.        The following portions of the patient's history were reviewed and updated as appropriate: allergies, current medications, past social history and problem list.    Review of Systems   Constitutional: Positive for appetite change. Negative for chills and fever.   HENT: Negative for congestion and sore throat.    Respiratory: Positive for cough. Negative for shortness of breath.    Cardiovascular: Negative for chest pain and palpitations.   Gastrointestinal: Positive for constipation and nausea. Negative for blood in stool and vomiting.   Genitourinary: Negative for dysuria and hematuria.       Objective   /76   Pulse 87   Resp 16   Ht 157.5 cm (62\")   Wt 81.6 kg (180 lb)   SpO2 97%   BMI 32.92 kg/m²   Physical Exam   Constitutional: She is oriented to person, place, and time. She appears well-developed and well-nourished. She is cooperative.   HENT:   Head: Normocephalic.   Right Ear: External ear normal.   Left Ear: External ear normal.   Nose: Nose normal.   Mouth/Throat: Oropharynx is clear and moist.   Eyes: Conjunctivae are normal. Pupils are equal, round, and reactive to light. No scleral icterus.   Neck: Neck supple. Carotid bruit is not " present. No thyromegaly present.   Cardiovascular: Normal rate, regular rhythm and normal heart sounds.   Pulmonary/Chest: Effort normal and breath sounds normal.   Abdominal: Soft. Bowel sounds are normal. There is no hepatosplenomegaly. There is no tenderness.   Musculoskeletal: Normal range of motion.   Neurological: She is alert and oriented to person, place, and time.   No focal deficits no lateralizing signs   Skin: Skin is warm and dry. No rash noted.   Psychiatric: She has a normal mood and affect. Cognition and memory are normal.   Nursing note and vitals reviewed.      Assessment/Plan   Problem List Items Addressed This Visit        Active Problems    Asthmatic bronchitis    Relevant Medications    promethazine (PHENERGAN) 25 MG tablet    Acquired hypothyroidism - Primary      Other Visit Diagnoses     Constipation, unspecified constipation type              New Medications Ordered This Visit   Medications   • promethazine (PHENERGAN) 25 MG tablet     Sig: Take 1 tablet by mouth Every 6 (Six) Hours As Needed for Nausea or Vomiting.     Dispense:  20 tablet     Refill:  1     Start MiraLAX on a daily basis increase water in the diet.  To clinic if symptoms persist or worsen.

## 2019-10-29 ENCOUNTER — OFFICE VISIT (OUTPATIENT)
Dept: FAMILY MEDICINE CLINIC | Facility: CLINIC | Age: 59
End: 2019-10-29

## 2019-10-29 VITALS
DIASTOLIC BLOOD PRESSURE: 72 MMHG | RESPIRATION RATE: 16 BRPM | WEIGHT: 180 LBS | SYSTOLIC BLOOD PRESSURE: 122 MMHG | BODY MASS INDEX: 33.13 KG/M2 | HEART RATE: 73 BPM | HEIGHT: 62 IN | TEMPERATURE: 97.1 F | OXYGEN SATURATION: 96 %

## 2019-10-29 DIAGNOSIS — J01.00 SUBACUTE MAXILLARY SINUSITIS: Primary | ICD-10-CM

## 2019-10-29 PROCEDURE — 99213 OFFICE O/P EST LOW 20 MIN: CPT | Performed by: FAMILY MEDICINE

## 2019-10-29 RX ORDER — LEVOCETIRIZINE DIHYDROCHLORIDE 5 MG/1
5 TABLET, FILM COATED ORAL EVERY EVENING
Qty: 30 TABLET | Refills: 1 | Status: SHIPPED | OUTPATIENT
Start: 2019-10-29 | End: 2020-01-30 | Stop reason: SDUPTHER

## 2019-10-29 RX ORDER — DOXYCYCLINE HYCLATE 100 MG/1
100 CAPSULE ORAL 2 TIMES DAILY
Qty: 20 CAPSULE | Refills: 0 | Status: SHIPPED | OUTPATIENT
Start: 2019-10-29 | End: 2020-01-30

## 2019-10-30 NOTE — PROGRESS NOTES
"Subjective   Yessica Galvin is a 59 y.o. female.     Sinusitis   This is a new problem. The current episode started 1 to 4 weeks ago. The problem has been gradually worsening since onset. The pain is mild. Associated symptoms include congestion, coughing, ear pain, headaches and sinus pressure. Pertinent negatives include no chills, shortness of breath, sore throat or swollen glands. Past treatments include acetaminophen. The treatment provided mild relief.        The following portions of the patient's history were reviewed and updated as appropriate: allergies, current medications, past social history and problem list.    Review of Systems   Constitutional: Negative for chills, fatigue and fever.   HENT: Positive for congestion, ear pain, postnasal drip, rhinorrhea and sinus pressure. Negative for sore throat.    Eyes: Negative for pain, discharge and itching.   Respiratory: Positive for cough. Negative for shortness of breath.    Cardiovascular: Negative for chest pain and palpitations.   Gastrointestinal: Negative.    Genitourinary: Negative.    Musculoskeletal: Negative for myalgias.   Neurological: Positive for headaches. Negative for dizziness and light-headedness.   Hematological: Negative for adenopathy.   Psychiatric/Behavioral: Negative for sleep disturbance.       Objective   /72   Pulse 73   Temp 97.1 °F (36.2 °C)   Resp 16   Ht 157.5 cm (62\")   Wt 81.6 kg (180 lb)   SpO2 96%   BMI 32.92 kg/m²   Physical Exam   Constitutional: She is oriented to person, place, and time. She appears well-developed and well-nourished. She is cooperative.   HENT:   Head: Normocephalic.   Right Ear: External ear normal.   Left Ear: External ear normal.   Nose: Nose normal.   Thick yellow pnd   Eyes: Conjunctivae are normal. Pupils are equal, round, and reactive to light. No scleral icterus.   Neck: Neck supple. No JVD present. Carotid bruit is not present. No thyromegaly present.   Cardiovascular: Normal rate, " regular rhythm, normal heart sounds and intact distal pulses.   Pulmonary/Chest: Effort normal. She has no wheezes. She has no rales.   Scattered upper rhonchi   Abdominal: There is no hepatosplenomegaly.   Musculoskeletal: Normal range of motion. She exhibits no edema.   Neurological: She is alert and oriented to person, place, and time.   No focal deficits no lateralizing signs   Skin: Skin is warm and dry. No rash noted.   Psychiatric: She has a normal mood and affect. Cognition and memory are normal.   Nursing note and vitals reviewed.      Assessment/Plan   Problem List Items Addressed This Visit     None      Visit Diagnoses     Subacute maxillary sinusitis    -  Primary    Relevant Medications    doxycycline (VIBRAMYCIN) 100 MG capsule    levocetirizine (XYZAL) 5 MG tablet          New Medications Ordered This Visit   Medications   • doxycycline (VIBRAMYCIN) 100 MG capsule     Sig: Take 1 capsule by mouth 2 (Two) Times a Day.     Dispense:  20 capsule     Refill:  0   • levocetirizine (XYZAL) 5 MG tablet     Sig: Take 1 tablet by mouth Every Evening.     Dispense:  30 tablet     Refill:  1

## 2019-11-26 RX ORDER — LEVOTHYROXINE SODIUM 0.15 MG/1
150 TABLET ORAL DAILY
Qty: 30 TABLET | Refills: 3 | Status: SHIPPED | OUTPATIENT
Start: 2019-11-26 | End: 2020-01-30 | Stop reason: SDUPTHER

## 2019-12-04 RX ORDER — BUDESONIDE AND FORMOTEROL FUMARATE DIHYDRATE 160; 4.5 UG/1; UG/1
AEROSOL RESPIRATORY (INHALATION)
Qty: 1 INHALER | Refills: 5 | Status: SHIPPED | OUTPATIENT
Start: 2019-12-04 | End: 2020-06-01 | Stop reason: SDUPTHER

## 2019-12-30 RX ORDER — VENLAFAXINE HYDROCHLORIDE 75 MG/1
CAPSULE, EXTENDED RELEASE ORAL
Qty: 30 CAPSULE | Refills: 4 | OUTPATIENT
Start: 2019-12-30

## 2020-01-02 RX ORDER — VENLAFAXINE HYDROCHLORIDE 75 MG/1
CAPSULE, EXTENDED RELEASE ORAL
Qty: 30 CAPSULE | Refills: 4 | OUTPATIENT
Start: 2020-01-02

## 2020-01-07 ENCOUNTER — TELEPHONE (OUTPATIENT)
Dept: FAMILY MEDICINE CLINIC | Facility: CLINIC | Age: 60
End: 2020-01-07

## 2020-01-07 RX ORDER — VENLAFAXINE HYDROCHLORIDE 150 MG/1
150 CAPSULE, EXTENDED RELEASE ORAL DAILY
Qty: 30 CAPSULE | Refills: 0 | Status: SHIPPED | OUTPATIENT
Start: 2020-01-07 | End: 2020-01-30 | Stop reason: SDUPTHER

## 2020-01-07 RX ORDER — VENLAFAXINE HYDROCHLORIDE 150 MG/1
150 CAPSULE, EXTENDED RELEASE ORAL DAILY
Qty: 30 CAPSULE | Refills: 2 | Status: CANCELLED | OUTPATIENT
Start: 2020-01-07

## 2020-01-07 NOTE — TELEPHONE ENCOUNTER
Pt called stating she has been out of her meds for a week now and [facility] [address] Pt stated she needs the gabapentin and the venlafaxine filled. Please give patient a call and let her know if this can't be done.

## 2020-01-08 RX ORDER — GABAPENTIN 300 MG/1
CAPSULE ORAL
Qty: 30 CAPSULE | Refills: 4 | OUTPATIENT
Start: 2020-01-08

## 2020-01-30 ENCOUNTER — OFFICE VISIT (OUTPATIENT)
Dept: FAMILY MEDICINE CLINIC | Facility: CLINIC | Age: 60
End: 2020-01-30

## 2020-01-30 VITALS
WEIGHT: 184.8 LBS | HEIGHT: 62 IN | RESPIRATION RATE: 16 BRPM | TEMPERATURE: 97 F | DIASTOLIC BLOOD PRESSURE: 82 MMHG | OXYGEN SATURATION: 96 % | HEART RATE: 70 BPM | BODY MASS INDEX: 34.01 KG/M2 | SYSTOLIC BLOOD PRESSURE: 138 MMHG

## 2020-01-30 DIAGNOSIS — N95.9 POST MENOPAUSAL PROBLEMS: ICD-10-CM

## 2020-01-30 DIAGNOSIS — Z00.00 WELL ADULT EXAM: Primary | ICD-10-CM

## 2020-01-30 DIAGNOSIS — E03.9 ACQUIRED HYPOTHYROIDISM: ICD-10-CM

## 2020-01-30 DIAGNOSIS — M19.90 ARTHRITIS: ICD-10-CM

## 2020-01-30 DIAGNOSIS — E55.9 VITAMIN D DEFICIENCY: ICD-10-CM

## 2020-01-30 DIAGNOSIS — Z12.39 BREAST CANCER SCREENING: ICD-10-CM

## 2020-01-30 LAB
BILIRUB BLD-MCNC: NEGATIVE MG/DL
CLARITY, POC: CLEAR
COLOR UR: YELLOW
GLUCOSE UR STRIP-MCNC: NEGATIVE MG/DL
KETONES UR QL: NEGATIVE
LEUKOCYTE EST, POC: NEGATIVE
NITRITE UR-MCNC: NEGATIVE MG/ML
PH UR: 5 [PH] (ref 5–8)
PROT UR STRIP-MCNC: NEGATIVE MG/DL
RBC # UR STRIP: NEGATIVE /UL
SP GR UR: 1.01 (ref 1–1.03)
UROBILINOGEN UR QL: NORMAL

## 2020-01-30 PROCEDURE — 99396 PREV VISIT EST AGE 40-64: CPT | Performed by: FAMILY MEDICINE

## 2020-01-30 PROCEDURE — 81003 URINALYSIS AUTO W/O SCOPE: CPT | Performed by: FAMILY MEDICINE

## 2020-01-30 RX ORDER — PROMETHAZINE HYDROCHLORIDE 25 MG/1
25 TABLET ORAL EVERY 6 HOURS PRN
Qty: 20 TABLET | Refills: 1 | Status: SHIPPED | OUTPATIENT
Start: 2020-01-30 | End: 2020-03-17 | Stop reason: HOSPADM

## 2020-01-30 RX ORDER — LEVOCETIRIZINE DIHYDROCHLORIDE 5 MG/1
5 TABLET, FILM COATED ORAL EVERY EVENING
Qty: 30 TABLET | Refills: 1 | Status: SHIPPED | OUTPATIENT
Start: 2020-01-30 | End: 2020-08-20 | Stop reason: SDUPTHER

## 2020-01-30 RX ORDER — DICLOFENAC SODIUM 75 MG/1
75 TABLET, DELAYED RELEASE ORAL 2 TIMES DAILY
Qty: 60 TABLET | Refills: 5 | Status: SHIPPED | OUTPATIENT
Start: 2020-01-30 | End: 2020-03-17 | Stop reason: HOSPADM

## 2020-01-30 RX ORDER — VENLAFAXINE HYDROCHLORIDE 150 MG/1
150 CAPSULE, EXTENDED RELEASE ORAL DAILY
Qty: 30 CAPSULE | Refills: 3 | Status: SHIPPED | OUTPATIENT
Start: 2020-01-30 | End: 2020-05-20 | Stop reason: SDUPTHER

## 2020-01-30 RX ORDER — LEVOTHYROXINE SODIUM 0.15 MG/1
150 TABLET ORAL DAILY
Qty: 90 TABLET | Refills: 3 | Status: SHIPPED | OUTPATIENT
Start: 2020-01-30 | End: 2020-12-02 | Stop reason: DRUGHIGH

## 2020-01-30 NOTE — PROGRESS NOTES
"Subjective   Yessica Galvin is a 59 y.o. female and is here for a comprehensive physical exam. The patient reports problems - Coughing drainage.      The following portions of the patient's history were reviewed and updated as appropriate: allergies, current medications, past family history, past medical history, past social history, past surgical history and problem list.    Allergies   Allergen Reactions   • Aspirin    • Codeine    • Penicillins        Past Medical History:   Diagnosis Date   • Acute bronchitis    • Acute sinusitis    • Asthma    • Asthma with acute exacerbation    • Asthmatic bronchitis    • Disc degeneration, lumbar    • Disc degeneration, lumbar    • History of mammogram    • Hypothyroidism    • Lower back pain    • Plantar fasciitis    • Restless legs syndrome      Past Surgical History:   Procedure Laterality Date   • HYSTERECTOMY     • NECK SURGERY     • OOPHORECTOMY       Family History   Problem Relation Age of Onset   • Heart disease Mother    • Diabetes Mother    • Alzheimer's disease Father    • Asthma Father    • Breast cancer Neg Hx    • Ovarian cancer Neg Hx      Social History     Socioeconomic History   • Marital status:      Spouse name: Not on file   • Number of children: Not on file   • Years of education: Not on file   • Highest education level: Not on file   Tobacco Use   • Smoking status: Former Smoker     Types: Cigarettes   • Smokeless tobacco: Never Used   Substance and Sexual Activity   • Alcohol use: No   • Drug use: No   • Sexual activity: Defer         Objective     Review of Systems    /82   Pulse 70   Temp 97 °F (36.1 °C)   Resp 16   Ht 157.5 cm (62\")   Wt 83.8 kg (184 lb 12.8 oz)   SpO2 96%   BMI 33.80 kg/m²     Review of Systems   Constitutional: Negative for activity change and unexpected weight change.   HENT: Positive for postnasal drip. Negative for congestion and sore throat.    Eyes: Negative for pain and visual disturbance.        Eye " exam due   Respiratory: Negative for cough and shortness of breath.    Cardiovascular: Negative for chest pain, palpitations and leg swelling.   Gastrointestinal: Negative for diarrhea, nausea and vomiting.        Occasional heartburn   Endocrine: Negative for cold intolerance and heat intolerance.   Genitourinary: Negative for dysuria and hematuria.   Musculoskeletal: Positive for arthralgias. Negative for joint swelling.        Arthritis in the hands and lower back   Skin: Negative for color change and rash.   Allergic/Immunologic: Negative for environmental allergies and food allergies.   Neurological: Negative for syncope and headaches.   Hematological: Negative for adenopathy. Does not bruise/bleed easily.   Psychiatric/Behavioral: Negative for dysphoric mood and sleep disturbance. The patient is not nervous/anxious.            Physical Exam     Physical Exam   Constitutional: She is oriented to person, place, and time. She appears well-developed and well-nourished. She is cooperative.   HENT:   Head: Normocephalic.   Right Ear: External ear normal.   Left Ear: External ear normal.   Nose: Nose normal.   Mouth/Throat: Oropharynx is clear and moist.   Cloudy postnasal drip   Eyes: Pupils are equal, round, and reactive to light. Conjunctivae are normal. No scleral icterus.   Neck: Neck supple. No JVD present. Carotid bruit is not present. No thyromegaly present.   Cardiovascular: Normal rate, regular rhythm and normal heart sounds.   Pulmonary/Chest: Effort normal and breath sounds normal. She has no wheezes. She has no rales.   Abdominal: Soft. Bowel sounds are normal. She exhibits no mass. There is no hepatosplenomegaly.   Musculoskeletal: Normal range of motion. She exhibits tenderness. She exhibits no edema.   Neurological: She is alert and oriented to person, place, and time.   No focal deficits no lateralizing signs   Skin: Skin is warm and dry. No rash noted.   Psychiatric: She has a normal mood and  affect. Her behavior is normal. Judgment and thought content normal. Cognition and memory are normal.   Nursing note and vitals reviewed.          Assessment/Plan   Healthy female exam.     1.   Patient Active Problem List   Diagnosis   • Asthmatic bronchitis   • Generalized anxiety disorder   • Gout   • Headache   • Acquired hypothyroidism   • Chronic midline low back pain without sciatica   • Seasonal allergic rhinitis   • Cervical disc disorder with radiculopathy   • Lumbar disc disease     2. Patient Counseling:  --Nutrition: Stressed importance of moderation in sodium/caffeine intake, saturated fat and cholesterol, caloric balance, sufficient intake of fresh fruits, vegetables, fiber, calcium, iron, and 1 mg of folate supplement per day (for females capable of pregnancy).  --Discussed the issue of estrogen replacement, calcium supplement, and the daily use of baby aspirin.  --Exercise: Stressed the importance of regular exercise.   --Substance Abuse: Discussed cessation/primary prevention of tobacco, alcohol, or other drug use; driving or other dangerous activities under the influence; availability of treatment for abuse.    --Sexuality: Discussed sexually transmitted diseases, partner selection, use of condoms, avoidance of unintended pregnancy  and contraceptive alternatives.   --Injury prevention: Discussed safety belts, safety helmets, smoke detector, smoking near bedding or upholstery.   --Dental health: Discussed importance of regular tooth brushing, flossing, and dental visits.  --Immunizations reviewed.  --Discussed benefits of screening colonoscopy.  --After hours service discussed with patient    3. Discussed the patient's BMI with her.  The BMI is above average; BMI management plan is completed  4. Follow up in 3 months    See form  Procedures         Kota Swain MD  01/30/2020  5:32 PM

## 2020-01-31 LAB
25(OH)D3+25(OH)D2 SERPL-MCNC: 24.9 NG/ML (ref 30–100)
ALBUMIN SERPL-MCNC: 4.5 G/DL (ref 3.5–5.2)
ALBUMIN/GLOB SERPL: 2 G/DL
ALP SERPL-CCNC: 83 U/L (ref 39–117)
ALT SERPL-CCNC: 17 U/L (ref 1–33)
AST SERPL-CCNC: 16 U/L (ref 1–32)
BILIRUB SERPL-MCNC: 0.2 MG/DL (ref 0.2–1.2)
BUN SERPL-MCNC: 10 MG/DL (ref 8–23)
BUN/CREAT SERPL: 16.1 (ref 7–25)
CALCIUM SERPL-MCNC: 9.3 MG/DL (ref 8.6–10.5)
CHLORIDE SERPL-SCNC: 97 MMOL/L (ref 98–107)
CHOLEST SERPL-MCNC: 239 MG/DL (ref 0–200)
CO2 SERPL-SCNC: 28.3 MMOL/L (ref 22–29)
CREAT SERPL-MCNC: 0.62 MG/DL (ref 0.57–1)
ERYTHROCYTE [DISTWIDTH] IN BLOOD BY AUTOMATED COUNT: 13.4 % (ref 12.3–15.4)
GLOBULIN SER CALC-MCNC: 2.2 GM/DL
GLUCOSE SERPL-MCNC: 77 MG/DL (ref 65–99)
HCT VFR BLD AUTO: 39.2 % (ref 34–46.6)
HCV AB S/CO SERPL IA: 0.1 S/CO RATIO (ref 0–0.9)
HDLC SERPL-MCNC: 91 MG/DL (ref 40–60)
HGB BLD-MCNC: 13.2 G/DL (ref 12–15.9)
LDLC SERPL CALC-MCNC: 134 MG/DL (ref 0–100)
MCH RBC QN AUTO: 31.2 PG (ref 26.6–33)
MCHC RBC AUTO-ENTMCNC: 33.7 G/DL (ref 31.5–35.7)
MCV RBC AUTO: 92.7 FL (ref 79–97)
PLATELET # BLD AUTO: 294 10*3/MM3 (ref 140–450)
POTASSIUM SERPL-SCNC: 4.2 MMOL/L (ref 3.5–5.2)
PROT SERPL-MCNC: 6.7 G/DL (ref 6–8.5)
RBC # BLD AUTO: 4.23 10*6/MM3 (ref 3.77–5.28)
SODIUM SERPL-SCNC: 139 MMOL/L (ref 136–145)
TRIGL SERPL-MCNC: 68 MG/DL (ref 0–150)
TSH SERPL DL<=0.005 MIU/L-ACNC: 0.8 UIU/ML (ref 0.27–4.2)
VLDLC SERPL CALC-MCNC: 13.6 MG/DL
WBC # BLD AUTO: 9.98 10*3/MM3 (ref 3.4–10.8)

## 2020-02-08 ENCOUNTER — OFFICE VISIT (OUTPATIENT)
Dept: FAMILY MEDICINE CLINIC | Facility: CLINIC | Age: 60
End: 2020-02-08

## 2020-02-08 VITALS
TEMPERATURE: 97.6 F | HEART RATE: 93 BPM | HEIGHT: 62 IN | DIASTOLIC BLOOD PRESSURE: 72 MMHG | BODY MASS INDEX: 33.86 KG/M2 | OXYGEN SATURATION: 95 % | SYSTOLIC BLOOD PRESSURE: 136 MMHG | WEIGHT: 184 LBS

## 2020-02-08 DIAGNOSIS — J40 BRONCHITIS: Primary | ICD-10-CM

## 2020-02-08 PROCEDURE — 99213 OFFICE O/P EST LOW 20 MIN: CPT | Performed by: FAMILY MEDICINE

## 2020-02-08 RX ORDER — SULFAMETHOXAZOLE AND TRIMETHOPRIM 800; 160 MG/1; MG/1
1 TABLET ORAL 2 TIMES DAILY
Qty: 20 TABLET | Refills: 0 | Status: SHIPPED | OUTPATIENT
Start: 2020-02-08 | End: 2020-03-17 | Stop reason: HOSPADM

## 2020-02-08 RX ORDER — DEXTROMETHORPHAN HYDROBROMIDE AND PROMETHAZINE HYDROCHLORIDE 15; 6.25 MG/5ML; MG/5ML
5 SYRUP ORAL 4 TIMES DAILY PRN
Qty: 120 ML | Refills: 0 | Status: SHIPPED | OUTPATIENT
Start: 2020-02-08 | End: 2020-03-17 | Stop reason: HOSPADM

## 2020-02-08 NOTE — PROGRESS NOTES
"Subjective   Yessica Galvin is a 59 y.o. female.     Cough   This is a new problem. The current episode started in the past 7 days. The problem has been gradually worsening. The cough is productive of sputum. Associated symptoms include postnasal drip and wheezing. Pertinent negatives include no chest pain, chills, fever, hemoptysis, sore throat or shortness of breath. The symptoms are aggravated by exercise. She has tried OTC cough suppressant and a beta-agonist inhaler for the symptoms. The treatment provided mild relief. Her past medical history is significant for asthma and bronchitis.        The following portions of the patient's history were reviewed and updated as appropriate: allergies, current medications, past social history and problem list.    Review of Systems   Constitutional: Negative for chills, fatigue and fever.   HENT: Positive for postnasal drip. Negative for congestion and sore throat.    Respiratory: Positive for cough, chest tightness and wheezing. Negative for hemoptysis and shortness of breath.    Cardiovascular: Negative for chest pain and palpitations.   Gastrointestinal: Negative for diarrhea, nausea and vomiting.   Skin: Negative.        Objective   /72   Pulse 93   Temp 97.6 °F (36.4 °C)   Ht 157.5 cm (62\")   Wt 83.5 kg (184 lb)   SpO2 95%   Breastfeeding No   BMI 33.65 kg/m²   Physical Exam   Constitutional: She is oriented to person, place, and time. She appears well-developed and well-nourished. She is cooperative.   HENT:   Head: Normocephalic.   Right Ear: External ear normal.   Left Ear: External ear normal.   Nose: Nose normal.   Cloudy postnasal drip   Eyes: Pupils are equal, round, and reactive to light. Conjunctivae are normal. No scleral icterus.   Neck: Neck supple. Carotid bruit is not present. No thyromegaly present.   Cardiovascular: Normal rate and regular rhythm.   Pulmonary/Chest: Effort normal.   Coarse upper airway rhonchi few scattered expiratory " wheezes   Abdominal: There is no hepatosplenomegaly.   Musculoskeletal: Normal range of motion.   Lymphadenopathy:     She has no cervical adenopathy.   Neurological: She is alert and oriented to person, place, and time.   No focal deficits no lateralizing signs   Skin: Skin is warm and dry. No rash noted.   Psychiatric: She has a normal mood and affect. Cognition and memory are normal.   Nursing note and vitals reviewed.      Assessment/Plan   Problem List Items Addressed This Visit     None      Visit Diagnoses     Bronchitis    -  Primary    Relevant Medications    sulfamethoxazole-trimethoprim (BACTRIM DS) 800-160 MG per tablet    promethazine-dextromethorphan (PROMETHAZINE-DM) 6.25-15 MG/5ML syrup          New Medications Ordered This Visit   Medications   • sulfamethoxazole-trimethoprim (BACTRIM DS) 800-160 MG per tablet     Sig: Take 1 tablet by mouth 2 (Two) Times a Day.     Dispense:  20 tablet     Refill:  0   • promethazine-dextromethorphan (PROMETHAZINE-DM) 6.25-15 MG/5ML syrup     Sig: Take 5 mL by mouth 4 (Four) Times a Day As Needed for Cough.     Dispense:  120 mL     Refill:  0

## 2020-03-07 ENCOUNTER — HOSPITAL ENCOUNTER (INPATIENT)
Facility: HOSPITAL | Age: 60
LOS: 10 days | Discharge: NURSING, REHAB OR LONG-TERM CARE | End: 2020-03-17
Attending: EMERGENCY MEDICINE | Admitting: INTERNAL MEDICINE

## 2020-03-07 ENCOUNTER — APPOINTMENT (OUTPATIENT)
Dept: GENERAL RADIOLOGY | Facility: HOSPITAL | Age: 60
End: 2020-03-07

## 2020-03-07 ENCOUNTER — APPOINTMENT (OUTPATIENT)
Dept: CT IMAGING | Facility: HOSPITAL | Age: 60
End: 2020-03-07
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
## 2020-03-07 DIAGNOSIS — Z78.9 IMPAIRED MOBILITY AND ADLS: ICD-10-CM

## 2020-03-07 DIAGNOSIS — Z74.09 IMPAIRED MOBILITY AND ADLS: ICD-10-CM

## 2020-03-07 DIAGNOSIS — S82.141A CLOSED FRACTURE OF RIGHT TIBIAL PLATEAU, INITIAL ENCOUNTER: Primary | ICD-10-CM

## 2020-03-07 DIAGNOSIS — S52.532A CLOSED COLLES' FRACTURE OF LEFT RADIUS, INITIAL ENCOUNTER: ICD-10-CM

## 2020-03-07 DIAGNOSIS — S70.01XA CONTUSION OF RIGHT HIP, INITIAL ENCOUNTER: ICD-10-CM

## 2020-03-07 PROBLEM — S82.209A TIBIAL FRACTURE: Status: ACTIVE | Noted: 2020-03-07

## 2020-03-07 PROBLEM — Z72.0 TOBACCO ABUSE: Status: ACTIVE | Noted: 2020-03-07

## 2020-03-07 PROBLEM — M54.9 CHRONIC BACK PAIN: Status: ACTIVE | Noted: 2020-03-07

## 2020-03-07 PROBLEM — S52.92XA LEFT RADIAL FRACTURE: Status: ACTIVE | Noted: 2020-03-07

## 2020-03-07 PROBLEM — J45.909 ASTHMA: Status: ACTIVE | Noted: 2020-03-07

## 2020-03-07 PROBLEM — G89.29 CHRONIC BACK PAIN: Status: ACTIVE | Noted: 2020-03-07

## 2020-03-07 PROBLEM — S82.201A FRACTURE OF RIGHT TIBIA: Status: ACTIVE | Noted: 2020-03-07

## 2020-03-07 PROBLEM — R74.01 ELEVATED TRANSAMINASE LEVEL: Status: ACTIVE | Noted: 2020-03-07

## 2020-03-07 LAB
ALBUMIN SERPL-MCNC: 4.2 G/DL (ref 3.5–5.2)
ALBUMIN/GLOB SERPL: 1.8 G/DL
ALP SERPL-CCNC: 89 U/L (ref 39–117)
ALT SERPL W P-5'-P-CCNC: 61 U/L (ref 1–33)
ANION GAP SERPL CALCULATED.3IONS-SCNC: 15 MMOL/L (ref 5–15)
AST SERPL-CCNC: 103 U/L (ref 1–32)
BASOPHILS # BLD AUTO: 0.03 10*3/MM3 (ref 0–0.2)
BASOPHILS NFR BLD AUTO: 0.3 % (ref 0–1.5)
BILIRUB SERPL-MCNC: 0.6 MG/DL (ref 0.2–1.2)
BUN BLD-MCNC: 11 MG/DL (ref 8–23)
BUN/CREAT SERPL: 19.3 (ref 7–25)
CALCIUM SPEC-SCNC: 9.4 MG/DL (ref 8.6–10.5)
CHLORIDE SERPL-SCNC: 98 MMOL/L (ref 98–107)
CO2 SERPL-SCNC: 25 MMOL/L (ref 22–29)
CREAT BLD-MCNC: 0.57 MG/DL (ref 0.57–1)
DEPRECATED RDW RBC AUTO: 49.2 FL (ref 37–54)
EOSINOPHIL # BLD AUTO: 0.27 10*3/MM3 (ref 0–0.4)
EOSINOPHIL NFR BLD AUTO: 2.4 % (ref 0.3–6.2)
ERYTHROCYTE [DISTWIDTH] IN BLOOD BY AUTOMATED COUNT: 14.4 % (ref 12.3–15.4)
GFR SERPL CREATININE-BSD FRML MDRD: 108 ML/MIN/1.73
GLOBULIN UR ELPH-MCNC: 2.3 GM/DL
GLUCOSE BLD-MCNC: 107 MG/DL (ref 65–99)
HCT VFR BLD AUTO: 37.5 % (ref 34–46.6)
HGB BLD-MCNC: 12.2 G/DL (ref 12–15.9)
IMM GRANULOCYTES # BLD AUTO: 0.02 10*3/MM3 (ref 0–0.05)
IMM GRANULOCYTES NFR BLD AUTO: 0.2 % (ref 0–0.5)
LYMPHOCYTES # BLD AUTO: 2.39 10*3/MM3 (ref 0.7–3.1)
LYMPHOCYTES NFR BLD AUTO: 21.1 % (ref 19.6–45.3)
MCH RBC QN AUTO: 30.3 PG (ref 26.6–33)
MCHC RBC AUTO-ENTMCNC: 32.5 G/DL (ref 31.5–35.7)
MCV RBC AUTO: 93.3 FL (ref 79–97)
MONOCYTES # BLD AUTO: 1.08 10*3/MM3 (ref 0.1–0.9)
MONOCYTES NFR BLD AUTO: 9.5 % (ref 5–12)
NEUTROPHILS # BLD AUTO: 7.53 10*3/MM3 (ref 1.7–7)
NEUTROPHILS NFR BLD AUTO: 66.5 % (ref 42.7–76)
NRBC BLD AUTO-RTO: 0 /100 WBC (ref 0–0.2)
PLATELET # BLD AUTO: 303 10*3/MM3 (ref 140–450)
PMV BLD AUTO: 9.1 FL (ref 6–12)
POTASSIUM BLD-SCNC: 4.4 MMOL/L (ref 3.5–5.2)
PROT SERPL-MCNC: 6.5 G/DL (ref 6–8.5)
RBC # BLD AUTO: 4.02 10*6/MM3 (ref 3.77–5.28)
SODIUM BLD-SCNC: 138 MMOL/L (ref 136–145)
WBC NRBC COR # BLD: 11.32 10*3/MM3 (ref 3.4–10.8)

## 2020-03-07 PROCEDURE — 73560 X-RAY EXAM OF KNEE 1 OR 2: CPT

## 2020-03-07 PROCEDURE — 25010000002 LORAZEPAM PER 2 MG: Performed by: EMERGENCY MEDICINE

## 2020-03-07 PROCEDURE — 73700 CT LOWER EXTREMITY W/O DYE: CPT

## 2020-03-07 PROCEDURE — 25010000002 HYDROMORPHONE PER 4 MG: Performed by: NURSE PRACTITIONER

## 2020-03-07 PROCEDURE — 93005 ELECTROCARDIOGRAM TRACING: CPT | Performed by: NURSE PRACTITIONER

## 2020-03-07 PROCEDURE — 80053 COMPREHEN METABOLIC PANEL: CPT | Performed by: NURSE PRACTITIONER

## 2020-03-07 PROCEDURE — 86850 RBC ANTIBODY SCREEN: CPT | Performed by: INTERNAL MEDICINE

## 2020-03-07 PROCEDURE — 71045 X-RAY EXAM CHEST 1 VIEW: CPT

## 2020-03-07 PROCEDURE — 25010000002 HYDROMORPHONE 1 MG/ML SOLUTION: Performed by: EMERGENCY MEDICINE

## 2020-03-07 PROCEDURE — 2W3DX1Z IMMOBILIZATION OF LEFT LOWER ARM USING SPLINT: ICD-10-PCS | Performed by: INTERNAL MEDICINE

## 2020-03-07 PROCEDURE — 99223 1ST HOSP IP/OBS HIGH 75: CPT | Performed by: INTERNAL MEDICINE

## 2020-03-07 PROCEDURE — 86900 BLOOD TYPING SEROLOGIC ABO: CPT | Performed by: INTERNAL MEDICINE

## 2020-03-07 PROCEDURE — 73110 X-RAY EXAM OF WRIST: CPT

## 2020-03-07 PROCEDURE — 2W3LX1Z IMMOBILIZATION OF RIGHT LOWER EXTREMITY USING SPLINT: ICD-10-PCS | Performed by: INTERNAL MEDICINE

## 2020-03-07 PROCEDURE — 25010000002 HYDROMORPHONE 1 MG/ML SOLUTION: Performed by: NURSE PRACTITIONER

## 2020-03-07 PROCEDURE — 80074 ACUTE HEPATITIS PANEL: CPT | Performed by: INTERNAL MEDICINE

## 2020-03-07 PROCEDURE — 73502 X-RAY EXAM HIP UNI 2-3 VIEWS: CPT

## 2020-03-07 PROCEDURE — 86901 BLOOD TYPING SEROLOGIC RH(D): CPT | Performed by: INTERNAL MEDICINE

## 2020-03-07 PROCEDURE — 99284 EMERGENCY DEPT VISIT MOD MDM: CPT

## 2020-03-07 PROCEDURE — 73610 X-RAY EXAM OF ANKLE: CPT

## 2020-03-07 PROCEDURE — 85025 COMPLETE CBC W/AUTO DIFF WBC: CPT | Performed by: NURSE PRACTITIONER

## 2020-03-07 PROCEDURE — 25010000002 ONDANSETRON PER 1 MG: Performed by: EMERGENCY MEDICINE

## 2020-03-07 RX ORDER — VENLAFAXINE HYDROCHLORIDE 75 MG/1
150 CAPSULE, EXTENDED RELEASE ORAL DAILY
Status: DISCONTINUED | OUTPATIENT
Start: 2020-03-08 | End: 2020-03-17 | Stop reason: HOSPADM

## 2020-03-07 RX ORDER — ONDANSETRON 2 MG/ML
4 INJECTION INTRAMUSCULAR; INTRAVENOUS EVERY 6 HOURS PRN
Status: DISCONTINUED | OUTPATIENT
Start: 2020-03-07 | End: 2020-03-17 | Stop reason: HOSPADM

## 2020-03-07 RX ORDER — ACETAMINOPHEN 160 MG/5ML
650 SOLUTION ORAL EVERY 4 HOURS PRN
Status: DISCONTINUED | OUTPATIENT
Start: 2020-03-07 | End: 2020-03-17 | Stop reason: HOSPADM

## 2020-03-07 RX ORDER — BUDESONIDE AND FORMOTEROL FUMARATE DIHYDRATE 160; 4.5 UG/1; UG/1
2 AEROSOL RESPIRATORY (INHALATION)
Status: DISCONTINUED | OUTPATIENT
Start: 2020-03-08 | End: 2020-03-17 | Stop reason: HOSPADM

## 2020-03-07 RX ORDER — ACETAMINOPHEN 650 MG/1
650 SUPPOSITORY RECTAL EVERY 4 HOURS PRN
Status: DISCONTINUED | OUTPATIENT
Start: 2020-03-07 | End: 2020-03-17 | Stop reason: HOSPADM

## 2020-03-07 RX ORDER — SODIUM CHLORIDE 0.9 % (FLUSH) 0.9 %
10 SYRINGE (ML) INJECTION AS NEEDED
Status: DISCONTINUED | OUTPATIENT
Start: 2020-03-07 | End: 2020-03-17 | Stop reason: HOSPADM

## 2020-03-07 RX ORDER — HYDROMORPHONE HYDROCHLORIDE 1 MG/ML
0.5 INJECTION, SOLUTION INTRAMUSCULAR; INTRAVENOUS; SUBCUTANEOUS
Status: DISCONTINUED | OUTPATIENT
Start: 2020-03-07 | End: 2020-03-08

## 2020-03-07 RX ORDER — NICOTINE 21 MG/24HR
1 PATCH, TRANSDERMAL 24 HOURS TRANSDERMAL ONCE AS NEEDED
Status: ACTIVE | OUTPATIENT
Start: 2020-03-07 | End: 2020-03-08

## 2020-03-07 RX ORDER — LEVOTHYROXINE SODIUM 0.15 MG/1
150 TABLET ORAL
Status: DISCONTINUED | OUTPATIENT
Start: 2020-03-08 | End: 2020-03-17 | Stop reason: HOSPADM

## 2020-03-07 RX ORDER — ALBUTEROL SULFATE 2.5 MG/3ML
2.5 SOLUTION RESPIRATORY (INHALATION) EVERY 6 HOURS PRN
Status: DISCONTINUED | OUTPATIENT
Start: 2020-03-07 | End: 2020-03-17 | Stop reason: HOSPADM

## 2020-03-07 RX ORDER — SODIUM CHLORIDE 9 MG/ML
125 INJECTION, SOLUTION INTRAVENOUS CONTINUOUS
Status: DISCONTINUED | OUTPATIENT
Start: 2020-03-07 | End: 2020-03-17

## 2020-03-07 RX ORDER — NICOTINE 21 MG/24HR
1 PATCH, TRANSDERMAL 24 HOURS TRANSDERMAL
Status: DISCONTINUED | OUTPATIENT
Start: 2020-03-08 | End: 2020-03-17 | Stop reason: HOSPADM

## 2020-03-07 RX ORDER — ACETAMINOPHEN 325 MG/1
650 TABLET ORAL EVERY 4 HOURS PRN
Status: DISCONTINUED | OUTPATIENT
Start: 2020-03-07 | End: 2020-03-17 | Stop reason: HOSPADM

## 2020-03-07 RX ORDER — HYDROMORPHONE HYDROCHLORIDE 1 MG/ML
0.5 INJECTION, SOLUTION INTRAMUSCULAR; INTRAVENOUS; SUBCUTANEOUS ONCE
Status: COMPLETED | OUTPATIENT
Start: 2020-03-07 | End: 2020-03-07

## 2020-03-07 RX ORDER — TRAMADOL HYDROCHLORIDE 50 MG/1
100 TABLET ORAL EVERY 8 HOURS PRN
Status: DISCONTINUED | OUTPATIENT
Start: 2020-03-07 | End: 2020-03-08

## 2020-03-07 RX ORDER — GABAPENTIN 300 MG/1
300 CAPSULE ORAL NIGHTLY
Status: DISCONTINUED | OUTPATIENT
Start: 2020-03-08 | End: 2020-03-17 | Stop reason: HOSPADM

## 2020-03-07 RX ORDER — NALOXONE HCL 0.4 MG/ML
0.4 VIAL (ML) INJECTION
Status: DISCONTINUED | OUTPATIENT
Start: 2020-03-07 | End: 2020-03-08

## 2020-03-07 RX ORDER — LORAZEPAM 2 MG/ML
1 INJECTION INTRAMUSCULAR ONCE
Status: COMPLETED | OUTPATIENT
Start: 2020-03-07 | End: 2020-03-07

## 2020-03-07 RX ORDER — SODIUM CHLORIDE 0.9 % (FLUSH) 0.9 %
10 SYRINGE (ML) INJECTION EVERY 12 HOURS SCHEDULED
Status: DISCONTINUED | OUTPATIENT
Start: 2020-03-08 | End: 2020-03-17 | Stop reason: HOSPADM

## 2020-03-07 RX ORDER — SODIUM CHLORIDE 9 MG/ML
75 INJECTION, SOLUTION INTRAVENOUS CONTINUOUS
Status: ACTIVE | OUTPATIENT
Start: 2020-03-08 | End: 2020-03-08

## 2020-03-07 RX ORDER — ONDANSETRON 2 MG/ML
4 INJECTION INTRAMUSCULAR; INTRAVENOUS ONCE
Status: COMPLETED | OUTPATIENT
Start: 2020-03-07 | End: 2020-03-07

## 2020-03-07 RX ADMIN — ONDANSETRON 4 MG: 2 INJECTION INTRAMUSCULAR; INTRAVENOUS at 18:10

## 2020-03-07 RX ADMIN — HYDROMORPHONE HYDROCHLORIDE 0.5 MG: 1 INJECTION, SOLUTION INTRAMUSCULAR; INTRAVENOUS; SUBCUTANEOUS at 18:38

## 2020-03-07 RX ADMIN — SODIUM CHLORIDE 125 ML/HR: 9 INJECTION, SOLUTION INTRAVENOUS at 23:08

## 2020-03-07 RX ADMIN — HYDROMORPHONE HYDROCHLORIDE 0.5 MG: 1 INJECTION, SOLUTION INTRAMUSCULAR; INTRAVENOUS; SUBCUTANEOUS at 18:10

## 2020-03-07 RX ADMIN — HYDROMORPHONE HYDROCHLORIDE 0.5 MG: 1 INJECTION, SOLUTION INTRAMUSCULAR; INTRAVENOUS; SUBCUTANEOUS at 21:21

## 2020-03-07 RX ADMIN — HYDROMORPHONE HYDROCHLORIDE 0.5 MG: 1 INJECTION, SOLUTION INTRAMUSCULAR; INTRAVENOUS; SUBCUTANEOUS at 20:33

## 2020-03-07 RX ADMIN — SODIUM CHLORIDE 500 ML: 9 INJECTION, SOLUTION INTRAVENOUS at 23:39

## 2020-03-07 RX ADMIN — LORAZEPAM 1 MG: 2 INJECTION INTRAMUSCULAR; INTRAVENOUS at 22:01
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
## 2020-03-08 ENCOUNTER — ANESTHESIA EVENT (OUTPATIENT)
Dept: TELEMETRY | Facility: HOSPITAL | Age: 60
End: 2020-03-08

## 2020-03-08 ENCOUNTER — ANESTHESIA (OUTPATIENT)
Dept: TELEMETRY | Facility: HOSPITAL | Age: 60
End: 2020-03-08

## 2020-03-08 LAB
ABO GROUP BLD: NORMAL
ABO GROUP BLD: NORMAL
ALBUMIN SERPL-MCNC: 3.7 G/DL (ref 3.5–5.2)
ALP SERPL-CCNC: 108 U/L (ref 39–117)
ALT SERPL W P-5'-P-CCNC: 230 U/L (ref 1–33)
ANION GAP SERPL CALCULATED.3IONS-SCNC: 9 MMOL/L (ref 5–15)
AST SERPL-CCNC: 320 U/L (ref 1–32)
BASOPHILS # BLD AUTO: 0.03 10*3/MM3 (ref 0–0.2)
BASOPHILS NFR BLD AUTO: 0.3 % (ref 0–1.5)
BILIRUB CONJ SERPL-MCNC: 0.2 MG/DL (ref 0.2–0.3)
BILIRUB INDIRECT SERPL-MCNC: 0.3 MG/DL
BILIRUB SERPL-MCNC: 0.5 MG/DL (ref 0.2–1.2)
BILIRUB UR QL STRIP: NEGATIVE
BLD GP AB SCN SERPL QL: NEGATIVE
BUN BLD-MCNC: 11 MG/DL (ref 8–23)
BUN/CREAT SERPL: 20.4 (ref 7–25)
CALCIUM SPEC-SCNC: 8.8 MG/DL (ref 8.6–10.5)
CHLORIDE SERPL-SCNC: 103 MMOL/L (ref 98–107)
CHOLEST SERPL-MCNC: 216 MG/DL (ref 0–200)
CLARITY UR: CLEAR
CO2 SERPL-SCNC: 27 MMOL/L (ref 22–29)
COLOR UR: YELLOW
CREAT BLD-MCNC: 0.54 MG/DL (ref 0.57–1)
DEPRECATED RDW RBC AUTO: 51.6 FL (ref 37–54)
EOSINOPHIL # BLD AUTO: 0.09 10*3/MM3 (ref 0–0.4)
EOSINOPHIL NFR BLD AUTO: 1 % (ref 0.3–6.2)
ERYTHROCYTE [DISTWIDTH] IN BLOOD BY AUTOMATED COUNT: 14.7 % (ref 12.3–15.4)
GFR SERPL CREATININE-BSD FRML MDRD: 115 ML/MIN/1.73
GLUCOSE BLD-MCNC: 129 MG/DL (ref 65–99)
GLUCOSE UR STRIP-MCNC: NEGATIVE MG/DL
HAV IGM SERPL QL IA: NORMAL
HBV CORE IGM SERPL QL IA: NORMAL
HBV SURFACE AG SERPL QL IA: NORMAL
HCT VFR BLD AUTO: 33 % (ref 34–46.6)
HCV AB SER DONR QL: NORMAL
HDLC SERPL-MCNC: 86 MG/DL (ref 40–60)
HGB BLD-MCNC: 10.6 G/DL (ref 12–15.9)
HGB UR QL STRIP.AUTO: NEGATIVE
HOLD SPECIMEN: NORMAL
IMM GRANULOCYTES # BLD AUTO: 0.06 10*3/MM3 (ref 0–0.05)
IMM GRANULOCYTES NFR BLD AUTO: 0.7 % (ref 0–0.5)
KETONES UR QL STRIP: ABNORMAL
LDLC SERPL CALC-MCNC: 118 MG/DL (ref 0–100)
LDLC/HDLC SERPL: 1.37 {RATIO}
LEUKOCYTE ESTERASE UR QL STRIP.AUTO: NEGATIVE
LYMPHOCYTES # BLD AUTO: 1.01 10*3/MM3 (ref 0.7–3.1)
LYMPHOCYTES NFR BLD AUTO: 11.1 % (ref 19.6–45.3)
MCH RBC QN AUTO: 30.9 PG (ref 26.6–33)
MCHC RBC AUTO-ENTMCNC: 32.1 G/DL (ref 31.5–35.7)
MCV RBC AUTO: 96.2 FL (ref 79–97)
MONOCYTES # BLD AUTO: 1.06 10*3/MM3 (ref 0.1–0.9)
MONOCYTES NFR BLD AUTO: 11.7 % (ref 5–12)
NEUTROPHILS # BLD AUTO: 6.84 10*3/MM3 (ref 1.7–7)
NEUTROPHILS NFR BLD AUTO: 75.2 % (ref 42.7–76)
NITRITE UR QL STRIP: NEGATIVE
NRBC BLD AUTO-RTO: 0 /100 WBC (ref 0–0.2)
PH UR STRIP.AUTO: 5.5 [PH] (ref 5–8)
PLATELET # BLD AUTO: 246 10*3/MM3 (ref 140–450)
PMV BLD AUTO: 9.4 FL (ref 6–12)
POTASSIUM BLD-SCNC: 4 MMOL/L (ref 3.5–5.2)
PROT SERPL-MCNC: 5.9 G/DL (ref 6–8.5)
PROT UR QL STRIP: NEGATIVE
RBC # BLD AUTO: 3.43 10*6/MM3 (ref 3.77–5.28)
RH BLD: POSITIVE
RH BLD: POSITIVE
SODIUM BLD-SCNC: 139 MMOL/L (ref 136–145)
SP GR UR STRIP: 1.01 (ref 1–1.03)
T&S EXPIRATION DATE: NORMAL
TRIGL SERPL-MCNC: 61 MG/DL (ref 0–150)
TSH SERPL DL<=0.05 MIU/L-ACNC: 1.28 UIU/ML (ref 0.27–4.2)
UROBILINOGEN UR QL STRIP: ABNORMAL
VLDLC SERPL-MCNC: 12.2 MG/DL
WBC NRBC COR # BLD: 9.09 10*3/MM3 (ref 3.4–10.8)

## 2020-03-08 PROCEDURE — 86901 BLOOD TYPING SEROLOGIC RH(D): CPT

## 2020-03-08 PROCEDURE — 80061 LIPID PANEL: CPT | Performed by: INTERNAL MEDICINE

## 2020-03-08 PROCEDURE — 94799 UNLISTED PULMONARY SVC/PX: CPT

## 2020-03-08 PROCEDURE — 80076 HEPATIC FUNCTION PANEL: CPT | Performed by: INTERNAL MEDICINE

## 2020-03-08 PROCEDURE — 86900 BLOOD TYPING SEROLOGIC ABO: CPT

## 2020-03-08 PROCEDURE — 85025 COMPLETE CBC W/AUTO DIFF WBC: CPT | Performed by: INTERNAL MEDICINE

## 2020-03-08 PROCEDURE — P9612 CATHETERIZE FOR URINE SPEC: HCPCS

## 2020-03-08 PROCEDURE — 94640 AIRWAY INHALATION TREATMENT: CPT

## 2020-03-08 PROCEDURE — 25010000002 HYDROMORPHONE PER 4 MG: Performed by: INTERNAL MEDICINE

## 2020-03-08 PROCEDURE — 84443 ASSAY THYROID STIM HORMONE: CPT | Performed by: INTERNAL MEDICINE

## 2020-03-08 PROCEDURE — 80048 BASIC METABOLIC PNL TOTAL CA: CPT | Performed by: INTERNAL MEDICINE

## 2020-03-08 PROCEDURE — 25010000002 HYDROMORPHONE 1 MG/ML SOLUTION: Performed by: NURSE PRACTITIONER

## 2020-03-08 PROCEDURE — 99232 SBSQ HOSP IP/OBS MODERATE 35: CPT | Performed by: ORTHOPAEDIC SURGERY

## 2020-03-08 PROCEDURE — 25010000002 KETOROLAC TROMETHAMINE PER 15 MG: Performed by: NURSE PRACTITIONER

## 2020-03-08 PROCEDURE — 81003 URINALYSIS AUTO W/O SCOPE: CPT | Performed by: INTERNAL MEDICINE

## 2020-03-08 PROCEDURE — 99232 SBSQ HOSP IP/OBS MODERATE 35: CPT | Performed by: INTERNAL MEDICINE

## 2020-03-08 PROCEDURE — 25010000002 ROPIVACAINE PER 1 MG: Performed by: ANESTHESIOLOGY

## 2020-03-08 PROCEDURE — 99024 POSTOP FOLLOW-UP VISIT: CPT | Performed by: ORTHOPAEDIC SURGERY

## 2020-03-08 RX ORDER — BUPIVACAINE HYDROCHLORIDE 2.5 MG/ML
INJECTION, SOLUTION EPIDURAL; INFILTRATION; INTRACAUDAL
Status: COMPLETED | OUTPATIENT
Start: 2020-03-08 | End: 2020-03-08

## 2020-03-08 RX ORDER — HYDROMORPHONE HYDROCHLORIDE 1 MG/ML
0.5 INJECTION, SOLUTION INTRAMUSCULAR; INTRAVENOUS; SUBCUTANEOUS
Status: CANCELLED | OUTPATIENT
Start: 2020-03-08 | End: 2020-03-18

## 2020-03-08 RX ORDER — ROPIVACAINE HYDROCHLORIDE 2 MG/ML
10 INJECTION, SOLUTION EPIDURAL; INFILTRATION CONTINUOUS
Status: DISCONTINUED | OUTPATIENT
Start: 2020-03-08 | End: 2020-03-10

## 2020-03-08 RX ORDER — NALOXONE HCL 0.4 MG/ML
0.4 VIAL (ML) INJECTION
Status: DISCONTINUED | OUTPATIENT
Start: 2020-03-08 | End: 2020-03-17 | Stop reason: HOSPADM

## 2020-03-08 RX ORDER — KETOROLAC TROMETHAMINE 15 MG/ML
15 INJECTION, SOLUTION INTRAMUSCULAR; INTRAVENOUS ONCE AS NEEDED
Status: COMPLETED | OUTPATIENT
Start: 2020-03-08 | End: 2020-03-08

## 2020-03-08 RX ORDER — HYDROCODONE BITARTRATE AND ACETAMINOPHEN 7.5; 325 MG/1; MG/1
1 TABLET ORAL EVERY 6 HOURS PRN
Status: DISCONTINUED | OUTPATIENT
Start: 2020-03-08 | End: 2020-03-17

## 2020-03-08 RX ORDER — NALOXONE HCL 0.4 MG/ML
0.4 VIAL (ML) INJECTION
Status: CANCELLED | OUTPATIENT
Start: 2020-03-08

## 2020-03-08 RX ORDER — HYDROXYZINE HYDROCHLORIDE 25 MG/1
25 TABLET, FILM COATED ORAL 3 TIMES DAILY PRN
Status: DISCONTINUED | OUTPATIENT
Start: 2020-03-08 | End: 2020-03-17 | Stop reason: HOSPADM

## 2020-03-08 RX ADMIN — SODIUM CHLORIDE 125 ML/HR: 9 INJECTION, SOLUTION INTRAVENOUS at 06:04

## 2020-03-08 RX ADMIN — TRAMADOL HYDROCHLORIDE 100 MG: 50 TABLET, FILM COATED ORAL at 05:14

## 2020-03-08 RX ADMIN — HYDROMORPHONE HYDROCHLORIDE 0.5 MG: 1 INJECTION, SOLUTION INTRAMUSCULAR; INTRAVENOUS; SUBCUTANEOUS at 03:30

## 2020-03-08 RX ADMIN — BUPIVACAINE HYDROCHLORIDE 15 ML: 2.5 INJECTION, SOLUTION EPIDURAL; INFILTRATION; INTRACAUDAL; PERINEURAL at 17:13

## 2020-03-08 RX ADMIN — NICOTINE 1 PATCH: 21 PATCH, EXTENDED RELEASE TRANSDERMAL at 08:00

## 2020-03-08 RX ADMIN — HYDROCODONE BITARTRATE AND ACETAMINOPHEN 1 TABLET: 7.5; 325 TABLET ORAL at 20:18

## 2020-03-08 RX ADMIN — BUPIVACAINE HYDROCHLORIDE 15 ML: 2.5 INJECTION, SOLUTION EPIDURAL; INFILTRATION; INTRACAUDAL; PERINEURAL at 17:14

## 2020-03-08 RX ADMIN — GABAPENTIN 300 MG: 300 CAPSULE ORAL at 20:17

## 2020-03-08 RX ADMIN — BUDESONIDE AND FORMOTEROL FUMARATE DIHYDRATE 2 PUFF: 160; 4.5 AEROSOL RESPIRATORY (INHALATION) at 21:12

## 2020-03-08 RX ADMIN — HYDROMORPHONE HYDROCHLORIDE 0.5 MG: 1 INJECTION, SOLUTION INTRAMUSCULAR; INTRAVENOUS; SUBCUTANEOUS at 15:21

## 2020-03-08 RX ADMIN — ACETAMINOPHEN 650 MG: 325 TABLET, FILM COATED ORAL at 17:33

## 2020-03-08 RX ADMIN — HYDROMORPHONE HYDROCHLORIDE 0.5 MG: 1 INJECTION, SOLUTION INTRAMUSCULAR; INTRAVENOUS; SUBCUTANEOUS at 09:09

## 2020-03-08 RX ADMIN — LEVOTHYROXINE SODIUM 150 MCG: 150 TABLET ORAL at 05:14

## 2020-03-08 RX ADMIN — ACETAMINOPHEN 650 MG: 325 TABLET, FILM COATED ORAL at 06:35

## 2020-03-08 RX ADMIN — ROPIVACAINE HYDROCHLORIDE 10 ML/HR: 2 INJECTION, SOLUTION EPIDURAL; INFILTRATION at 17:17

## 2020-03-08 RX ADMIN — KETOROLAC TROMETHAMINE 15 MG: 15 INJECTION, SOLUTION INTRAMUSCULAR; INTRAVENOUS at 06:47

## 2020-03-08 RX ADMIN — HYDROCODONE BITARTRATE AND ACETAMINOPHEN 1 TABLET: 7.5; 325 TABLET ORAL at 12:11

## 2020-03-08 RX ADMIN — BUDESONIDE AND FORMOTEROL FUMARATE DIHYDRATE 2 PUFF: 160; 4.5 AEROSOL RESPIRATORY (INHALATION) at 08:59

## 2020-03-08 RX ADMIN — HYDROMORPHONE HYDROCHLORIDE 0.5 MG: 1 INJECTION, SOLUTION INTRAMUSCULAR; INTRAVENOUS; SUBCUTANEOUS at 05:58
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
## 2020-03-08 NOTE — ANESTHESIA PROCEDURE NOTES
Peripheral Block      Patient reassessed immediately prior to procedure    Patient location during procedure: OR  Stop time: 3/8/2020 5:15 PM  Reason for block: post-op pain management  Performed by  Anesthesiologist: Julio Leiva MD  Preanesthetic Checklist  Completed: patient identified, site marked, surgical consent, pre-op evaluation, timeout performed, IV checked and monitors and equipment checked  Prep:  Pt Position: supine  Sterile barriers:gloves, cap, sterile barriers and mask  Prep: ChloraPrep  Patient monitoring: blood pressure monitoring, continuous pulse oximetry and EKG  Procedure    Guidance:ultrasound guided, nerve stimulator and landmark technique  Images:still images not obtained    Block Type:femoral  Injection Technique:single-shot  Needle Type:short-bevel  Needle Gauge:22 G  Resistance on Injection: none    Medications Used: bupivacaine PF (MARCAINE) 0.25 % injection, 15 mL      Post Assessment  Injection Assessment: negative aspiration for heme, no paresthesia on injection and incremental injection  Patient Tolerance:comfortable throughout block  Complications:no  Additional Notes  The BBRaun 360 degree echogenic needle was introduced in plane, in a lateral to medial direction at the level of the inguinal crease.  Under ultrasound guidance, the femoral artery and vein where located.  The needle was then directed below Fascia Iliacus towards the Femoral nerve.  NS was utilized to hydro dissect and jonathon needle advancement towards the target structure.   LA was injected incrementally in 3-5 ml aliquots with negative aspirate.  LA spread was visualized around the nerve, negative intraneural injection, low injection pressures.  Thank you
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
## 2020-03-08 NOTE — PLAN OF CARE
Problem: Skin Injury Risk (Adult)  Goal: Identify Related Risk Factors and Signs and Symptoms  Outcome: Ongoing (interventions implemented as appropriate)  Flowsheets (Taken 3/8/2020 1919)  Related Risk Factors (Skin Injury Risk): mobility impaired     Problem: Fall Risk (Adult)  Goal: Identify Related Risk Factors and Signs and Symptoms  Outcome: Ongoing (interventions implemented as appropriate)  Flowsheets (Taken 3/8/2020 1919)  Related Risk Factors (Fall Risk): gait/mobility problems; history of falls; fatigue/slow reaction  Goal: Absence of Fall  Outcome: Ongoing (interventions implemented as appropriate)  Flowsheets (Taken 3/8/2020 1919)  Absence of Fall: achieves outcome     Problem: Patient Care Overview  Goal: Plan of Care Review  Outcome: Ongoing (interventions implemented as appropriate)  Flowsheets (Taken 3/8/2020 1919)  Progress: improving  Plan of Care Reviewed With: patient  Outcome Summary: Patient has been in significant RLE pain throughout shift. Diladid and Norco given PRN Tramadol d/c as pt reported no relief when taking it. Ice pack used as well. Contact Dr. Thakur if ok for anasthesia to place block. Anaesthesia placed ropivacaine pump on LUE and one time block for right knee. On arrival pt very anxious PRN Atarax ordered by .  Unable to void I & O cathed at 1800 600 ml obtained. Pt currently asleep resting. Surgery scheduled for Tuesday. Will continue to monitor   Goal: Individualization and Mutuality  Outcome: Ongoing (interventions implemented as appropriate)

## 2020-03-08 NOTE — ANESTHESIA PROCEDURE NOTES
Peripheral Block      Patient reassessed immediately prior to procedure    Patient location during procedure: pre-op  Reason for block: at surgeon's request and post-op pain management  Performed by  Anesthesiologist: Julio Leiva MD  Preanesthetic Checklist  Completed: patient identified, site marked, surgical consent, pre-op evaluation, timeout performed, risks and benefits discussed and monitors and equipment checked  Prep:  Sterile barriers:cap, gloves, mask and sterile barriers  Prep: ChloraPrep  Patient monitoring: blood pressure monitoring, continuous pulse oximetry and EKG  Procedure  Sedation:yes    Guidance:ultrasound guided  Images:still images obtained, printed/placed on chart    Laterality:left  Block Type:infraclavicular  Injection Technique:catheter  Needle Type:Tuohy and echogenic  Needle Gauge:18 G  Resistance on Injection: none  Catheter Size:20 G  Cath Depth at skin: 8 cm    Medications Used: bupivacaine PF (MARCAINE) 0.25 % injection, 15 mL      Post Assessment  Injection Assessment: negative aspiration for heme, no paresthesia on injection and incremental injection  Patient Tolerance:comfortable throughout block  Complications:no  Additional Notes  Procedure:                                                 The affected upper extremity was adducted within the patients ROM.  The US probe was placed approximately at the distal inferior third of the clavicle in a cephalad to caudad orientation.  The brachial plexus, subclavian artery and vein where visualized and the patient was marked and sterile prep and drape where completed.  The pt was anesthetized with  IV Sedation( see meds).  Skin and cutaneous tissue was infiltrated and anesthetized with 1% Lidocaine 3 mls via a 25g needle.   A 4 inch B-Grant echogenic Touhy 360 degree needle was placed inferiorly to the clavicle in a caudad direction, in plane US technique.  The needle was visualized as it passed through Pectoralis Major and to the  posterior aspect of the artery where LA was placed and spread was visualized. Injection of LA was incremental, and negative aspiration every 5 MLs.  Injection pressure was also noted as minimal and patient denied pain on injection.   A 20 gauge catheter was then placed through the needle and positioned and location was confirmed with injection of LA.  The catheter insertions site was sealed with skin afix and steristrips secured the curled catheter.  A sterile CHG/Tegaderm was placed over the site and the LABELED catheter taped to skin.  Thank you

## 2020-03-08 NOTE — PLAN OF CARE
Pt presents with a left wrist and right knee fracture, both areas splinted, and a bruised hip. Arrived to room 353 from the ED at 2300, pt very sedated, requiring 3L of O2, BP low, IVF NS at 125ml/hr started, BP still low, Lea FRIEDMAN contacted at 2330, 500ml NS bolus, BP improved to low 90's, HS gabapentin held due to sedation. NSR, oriented x4. Denies n/t, +2 RLE pulse, unable to feel LUE radial pulse due to splint, weak , weak dorsi plantarflexion. Hospitalist admitted, Dr Thakur to see AM. Labs in AM pending. Pt NPO since 0000, CHG x1 completed. Spouse will be here in the AM. UA collected via in/out cath, pt unable to void due to pain, UA pending. Difficulty managing pain PRN tramadol and dilaudid used, ineffective, pt refused ice packs, Chavo FRIEDMAN re-contacted one time Toradol 15mg IV ordered this AM.
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
## 2020-03-09 ENCOUNTER — ANESTHESIA EVENT (OUTPATIENT)
Dept: TELEMETRY | Facility: HOSPITAL | Age: 60
End: 2020-03-09

## 2020-03-09 ENCOUNTER — ANESTHESIA (OUTPATIENT)
Dept: TELEMETRY | Facility: HOSPITAL | Age: 60
End: 2020-03-09

## 2020-03-09 PROBLEM — S82.141A CLOSED FRACTURE OF RIGHT TIBIAL PLATEAU: Status: ACTIVE | Noted: 2020-03-07

## 2020-03-09 LAB
ANION GAP SERPL CALCULATED.3IONS-SCNC: 11 MMOL/L (ref 5–15)
BASOPHILS # BLD AUTO: 0.03 10*3/MM3 (ref 0–0.2)
BASOPHILS NFR BLD AUTO: 0.3 % (ref 0–1.5)
BUN BLD-MCNC: 7 MG/DL (ref 8–23)
BUN/CREAT SERPL: 15.6 (ref 7–25)
CALCIUM SPEC-SCNC: 8.5 MG/DL (ref 8.6–10.5)
CHLORIDE SERPL-SCNC: 101 MMOL/L (ref 98–107)
CO2 SERPL-SCNC: 24 MMOL/L (ref 22–29)
CREAT BLD-MCNC: 0.45 MG/DL (ref 0.57–1)
DEPRECATED RDW RBC AUTO: 51.5 FL (ref 37–54)
EOSINOPHIL # BLD AUTO: 0.16 10*3/MM3 (ref 0–0.4)
EOSINOPHIL NFR BLD AUTO: 1.9 % (ref 0.3–6.2)
ERYTHROCYTE [DISTWIDTH] IN BLOOD BY AUTOMATED COUNT: 14.3 % (ref 12.3–15.4)
GFR SERPL CREATININE-BSD FRML MDRD: 142 ML/MIN/1.73
GLUCOSE BLD-MCNC: 98 MG/DL (ref 65–99)
HCT VFR BLD AUTO: 31.9 % (ref 34–46.6)
HGB BLD-MCNC: 10 G/DL (ref 12–15.9)
IMM GRANULOCYTES # BLD AUTO: 0.02 10*3/MM3 (ref 0–0.05)
IMM GRANULOCYTES NFR BLD AUTO: 0.2 % (ref 0–0.5)
LYMPHOCYTES # BLD AUTO: 1.54 10*3/MM3 (ref 0.7–3.1)
LYMPHOCYTES NFR BLD AUTO: 17.9 % (ref 19.6–45.3)
MCH RBC QN AUTO: 30.8 PG (ref 26.6–33)
MCHC RBC AUTO-ENTMCNC: 31.3 G/DL (ref 31.5–35.7)
MCV RBC AUTO: 98.2 FL (ref 79–97)
MONOCYTES # BLD AUTO: 1.02 10*3/MM3 (ref 0.1–0.9)
MONOCYTES NFR BLD AUTO: 11.9 % (ref 5–12)
NEUTROPHILS # BLD AUTO: 5.81 10*3/MM3 (ref 1.7–7)
NEUTROPHILS NFR BLD AUTO: 67.8 % (ref 42.7–76)
NRBC BLD AUTO-RTO: 0 /100 WBC (ref 0–0.2)
PLATELET # BLD AUTO: 191 10*3/MM3 (ref 140–450)
PMV BLD AUTO: 9.7 FL (ref 6–12)
POTASSIUM BLD-SCNC: 3.8 MMOL/L (ref 3.5–5.2)
RBC # BLD AUTO: 3.25 10*6/MM3 (ref 3.77–5.28)
SODIUM BLD-SCNC: 136 MMOL/L (ref 136–145)
WBC NRBC COR # BLD: 8.58 10*3/MM3 (ref 3.4–10.8)

## 2020-03-09 PROCEDURE — 94799 UNLISTED PULMONARY SVC/PX: CPT

## 2020-03-09 PROCEDURE — 85025 COMPLETE CBC W/AUTO DIFF WBC: CPT | Performed by: INTERNAL MEDICINE

## 2020-03-09 PROCEDURE — 25010000002 PROPOFOL 10 MG/ML EMULSION: Performed by: NURSE ANESTHETIST, CERTIFIED REGISTERED

## 2020-03-09 PROCEDURE — 25010000002 HYDROMORPHONE 1 MG/ML SOLUTION: Performed by: NURSE PRACTITIONER

## 2020-03-09 PROCEDURE — P9612 CATHETERIZE FOR URINE SPEC: HCPCS

## 2020-03-09 PROCEDURE — 80048 BASIC METABOLIC PNL TOTAL CA: CPT | Performed by: INTERNAL MEDICINE

## 2020-03-09 PROCEDURE — 25010000002 DEXAMETHASONE SODIUM PHOSPHATE 10 MG/ML SOLUTION: Performed by: NURSE ANESTHETIST, CERTIFIED REGISTERED

## 2020-03-09 PROCEDURE — 25010000002 ROPIVACAINE PER 1 MG: Performed by: ANESTHESIOLOGY

## 2020-03-09 PROCEDURE — 99232 SBSQ HOSP IP/OBS MODERATE 35: CPT | Performed by: NURSE PRACTITIONER

## 2020-03-09 PROCEDURE — 25010000002 BUPRENORPHINE PER 0.1 MG: Performed by: NURSE ANESTHETIST, CERTIFIED REGISTERED

## 2020-03-09 RX ORDER — BUPRENORPHINE HYDROCHLORIDE 0.32 MG/ML
INJECTION INTRAMUSCULAR; INTRAVENOUS
Status: COMPLETED | OUTPATIENT
Start: 2020-03-09 | End: 2020-03-09

## 2020-03-09 RX ORDER — DEXAMETHASONE SODIUM PHOSPHATE 10 MG/ML
INJECTION, SOLUTION INTRAMUSCULAR; INTRAVENOUS
Status: COMPLETED | OUTPATIENT
Start: 2020-03-09 | End: 2020-03-09

## 2020-03-09 RX ORDER — BUPIVACAINE HYDROCHLORIDE 2.5 MG/ML
INJECTION, SOLUTION EPIDURAL; INFILTRATION; INTRACAUDAL
Status: COMPLETED | OUTPATIENT
Start: 2020-03-09 | End: 2020-03-09

## 2020-03-09 RX ORDER — PROPOFOL 10 MG/ML
VIAL (ML) INTRAVENOUS AS NEEDED
Status: DISCONTINUED | OUTPATIENT
Start: 2020-03-09 | End: 2020-03-12 | Stop reason: HOSPADM

## 2020-03-09 RX ADMIN — ACETAMINOPHEN 650 MG: 325 TABLET, FILM COATED ORAL at 09:51

## 2020-03-09 RX ADMIN — GABAPENTIN 300 MG: 300 CAPSULE ORAL at 20:41

## 2020-03-09 RX ADMIN — VENLAFAXINE HYDROCHLORIDE 150 MG: 75 CAPSULE, EXTENDED RELEASE ORAL at 08:24

## 2020-03-09 RX ADMIN — HYDROCODONE BITARTRATE AND ACETAMINOPHEN 1 TABLET: 7.5; 325 TABLET ORAL at 03:33

## 2020-03-09 RX ADMIN — PROPOFOL 30 MG: 10 INJECTION, EMULSION INTRAVENOUS at 09:03

## 2020-03-09 RX ADMIN — NICOTINE 1 PATCH: 21 PATCH, EXTENDED RELEASE TRANSDERMAL at 08:23

## 2020-03-09 RX ADMIN — HYDROCODONE BITARTRATE AND ACETAMINOPHEN 1 TABLET: 7.5; 325 TABLET ORAL at 19:22

## 2020-03-09 RX ADMIN — BUDESONIDE AND FORMOTEROL FUMARATE DIHYDRATE 2 PUFF: 160; 4.5 AEROSOL RESPIRATORY (INHALATION) at 19:21

## 2020-03-09 RX ADMIN — BUPIVACAINE HYDROCHLORIDE 30 ML: 2.5 INJECTION, SOLUTION EPIDURAL; INFILTRATION; INTRACAUDAL; PERINEURAL at 09:15

## 2020-03-09 RX ADMIN — ROPIVACAINE HYDROCHLORIDE 10 ML/HR: 2 INJECTION, SOLUTION EPIDURAL; INFILTRATION at 15:31

## 2020-03-09 RX ADMIN — LEVOTHYROXINE SODIUM 150 MCG: 150 TABLET ORAL at 05:51

## 2020-03-09 RX ADMIN — HYDROCODONE BITARTRATE AND ACETAMINOPHEN 1 TABLET: 7.5; 325 TABLET ORAL at 14:01

## 2020-03-09 RX ADMIN — BUPRENORPHINE HYDROCHLORIDE 0.3 MG: 0.32 INJECTION INTRAMUSCULAR; INTRAVENOUS at 09:15

## 2020-03-09 RX ADMIN — DEXAMETHASONE SODIUM PHOSPHATE 2 MG: 10 INJECTION, SOLUTION INTRAMUSCULAR; INTRAVENOUS at 09:15

## 2020-03-09 RX ADMIN — HYDROMORPHONE HYDROCHLORIDE 0.5 MG: 1 INJECTION, SOLUTION INTRAMUSCULAR; INTRAVENOUS; SUBCUTANEOUS at 08:23

## 2020-03-09 NOTE — PROGRESS NOTES
[facility]    Acute pain service Inpatient Progress Note    Patient Name: Yessica Galvin  :  1960  MRN:  [id number]        Acute Pain  Service Inpatient Progress Note:    Analgesia:Good  Pain Score:2/10  LOC: alert and awake  Resp Status: supplemental oxygen  Cardiac: VS stable  Side Effects:None  Catheter Site:clean, dressing intact and dry  Cath type: peripheral nerve cath with ON Q  Infusion rate: 8ml/hr  Dosing/Volume: ropivacaine 0.2%  Catheter Plan:Catheter to remain Insitu and Continue catheter infusion rate unchanged  Comments: ---------------------------------------------------------------------------------  Physical Therapy Manual Muscle Testing Results:   ]    Physical Therapy - Plan of Care Review - Outcome Summary:   ]    Occupational Therapy - Plan of Care Review - Outcome Summary:   ]  ----------------------------------------------------------------------------------
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
## 2020-03-09 NOTE — ANESTHESIA PROCEDURE NOTES
Peripheral Block      Patient reassessed immediately prior to procedure    Patient location during procedure: pre-op  Start time: 3/9/2020 9:00 AM  Stop time: 3/9/2020 9:15 AM  Reason for block: at surgeon's request and post-op pain management  Performed by  CRNA: Akash Cazares CRNA  Assisted by: Km Fernández CRNA  Preanesthetic Checklist  Completed: patient identified, site marked, surgical consent, pre-op evaluation, timeout performed, IV checked, risks and benefits discussed and monitors and equipment checked  Prep:  Pt Position: left lateral decubitus  Sterile barriers:cap, gloves, mask and sterile barriers  Prep: ChloraPrep  Patient monitoring: blood pressure monitoring, continuous pulse oximetry and EKG  Procedure  Sedation:yes  Performed under: local infiltration  Guidance:ultrasound guided  ULTRASOUND INTERPRETATION. Using ultrasound guidance a 20 G gauge needle was placed in close proximity to the nerve, at which point, under ultrasound guidance anesthetic was injected in the area of the nerve and spread of the anesthesia was seen on ultrasound in close proximity thereto.  There were no abnormalities seen on ultrasound; a digital image was taken; and the patient tolerated the procedure with no complications. Images:still images obtained, printed/placed on chart    Laterality:right  Block Type:popliteal  Injection Technique:single-shot  Needle Type:echogenic  Needle Gauge:20 G  Resistance on Injection: none  Catheter Size:20 G    Medications Used: buprenorphine (BUPRENEX) injection, 0.3 mg  dexamethasone sodium phosphate injection, 2 mg  bupivacaine PF (MARCAINE) 0.25 % injection, 30 mL  Med admintered at 3/9/2020 9:15 AM      Post Assessment  Injection Assessment: negative aspiration for heme, no paresthesia on injection and incremental injection  Patient Tolerance:comfortable throughout block  Complications:no  Additional Notes  Procedure:                                                         The  pt was placed in  lateral position.  The Insertion site was  prepped and Draped in sterile fashion.  The pt was anesthetized with  IV Sedation( see meds).  Skin and cutaneous tissue was infiltrated and anesthetized with 1% Lidocaine 3 mls via a 25g needle.  A BBraun 4 inch 20ga echogenic needle was then  inserted approximately 3 cm proximal to the popliteal judie a at the lateral mid biceps femoris and advanced In-plane with Ultrasound guidance.  Normal Saline PSF was utilized for hydrodissection of tissue.  The popliteal artery was visualized and the common peroneal and tibial bifurcation was located.  LA injection spread was visualized, injection was incremental 1-5ml, injection pressure was normal or little, no intraneural injection, no vascular injection. Attempted to view sciatic nerve on proximal thigh to avoid surgical site, but unable to do so with confidence. Opted to place single shot sciatic injection at location stated above, confirmed with nerve stimulation Will reassess after surgery for need of continuous catheter. Thank you

## 2020-03-09 NOTE — PLAN OF CARE
Problem: Skin Injury Risk (Adult)  Goal: Identify Related Risk Factors and Signs and Symptoms  Outcome: Ongoing (interventions implemented as appropriate)  Flowsheets (Taken 3/9/2020 1834)  Related Risk Factors (Skin Injury Risk): advanced age; mobility impaired  Goal: Skin Health and Integrity  Outcome: Ongoing (interventions implemented as appropriate)  Flowsheets (Taken 3/9/2020 1834)  Skin Health and Integrity: making progress toward outcome     Problem: Fall Risk (Adult)  Goal: Identify Related Risk Factors and Signs and Symptoms  Outcome: Ongoing (interventions implemented as appropriate)  Flowsheets (Taken 3/9/2020 1834)  Related Risk Factors (Fall Risk): fatigue/slow reaction; gait/mobility problems; history of falls  Goal: Absence of Fall  Outcome: Ongoing (interventions implemented as appropriate)  Flowsheets (Taken 3/9/2020 1834)  Absence of Fall: achieves outcome     Problem: Patient Care Overview  Goal: Plan of Care Review  Outcome: Ongoing (interventions implemented as appropriate)  Flowsheets (Taken 3/9/2020 1834)  Progress: improving  Plan of Care Reviewed With: patient  Outcome Summary: Patient taken down by ronnie in the morning to block back part of the knee since yesterday only front part of the knee was blocked. back to the unit at aprox. Pt has been noticeably more comfortable. Has finally been able to sleep in between care. Pt has been able to turn independently no skin issues noted.  Vitals have been stable low grade temp in the morning tylenol given. Pain managed only with oral pain meds after block last dose given at 1400. I&O cathed twice this shift. 1000 ml removed at 1730. APRN notified while on unit. Surgery scheduled tomorrow consent obtained.   Goal: Individualization and Mutuality  Outcome: Ongoing (interventions implemented as appropriate)

## 2020-03-09 NOTE — PROGRESS NOTES
Discharge Planning Assessment  [facility]     Patient Name: Yessica Galvin  MRN: [id number]  Today's Date: 3/9/2020    Admit Date: 3/7/2020    Discharge Needs Assessment     Row Name 03/09/20 1153       Living Environment    Lives With  alone    Current Living Arrangements  home/apartment/condo    Primary Care Provided by  self    Provides Primary Care For  no one    Family Caregiver if Needed  spouse;child(harry), adult    Able to Return to Prior Arrangements  yes       Transition Planning    Patient/Family Anticipates Transition to  home    Transportation Anticipated  family or friend will provide       Discharge Needs Assessment    Equipment Currently Used at Home  walker, rolling;shower chair        Discharge Plan     Row Name 03/09/20 1150       Plan    Plan  TBD    Provided Post Acute Provider List?  Yes    Post Acute Provider List  Nursing Home;Inpatient Rehab    Patient/Family in Agreement with Plan  yes    Plan Comments  Spoke w/ patient and family at bedside. She lives with her  in a two story, bed and bath on the first floor, in [facility]. PTA she was independent with ADLs and used a RW for mobility assist. Per Ortho note, surgery tentatively planned for tomorrow. Will await therapy eval s/p surgery. D/w patient and she is interested in short term rehab, if indicated, and she is agreeable. Her first choices are, [facility], [facility], and [facility]. Referrals will be made pending therapy recs. Plan tbd. CM following.         Destination      Coordination has not been started for this encounter.      Durable Medical Equipment      Coordination has not been started for this encounter.      Dialysis/Infusion      Coordination has not been started for this encounter.      Home Medical Care      Coordination has not been started for this encounter.      Therapy      Coordination has not been started for this encounter.      Community Resources      Coordination has not been started for this  encounter.        Expected Discharge Date and Time     Expected Discharge Date Expected Discharge Time    Mar 11, 2020         Demographic Summary     Row Name 03/09/20 1153       General Information    Arrived From  home    Reason for Consult  discharge planning        Functional Status     Row Name 03/09/20 1153       Functional Status    Usual Activity Tolerance  moderate        Psychosocial    No documentation.       Abuse/Neglect    No documentation.       Legal    No documentation.       Substance Abuse    No documentation.       Patient Forms    No documentation.           Olena Osman RN

## 2020-03-10 ENCOUNTER — ANESTHESIA EVENT (OUTPATIENT)
Dept: PERIOP | Facility: HOSPITAL | Age: 60
End: 2020-03-10

## 2020-03-10 ENCOUNTER — APPOINTMENT (OUTPATIENT)
Dept: GENERAL RADIOLOGY | Facility: HOSPITAL | Age: 60
End: 2020-03-10

## 2020-03-10 ENCOUNTER — ANESTHESIA (OUTPATIENT)
Dept: PERIOP | Facility: HOSPITAL | Age: 60
End: 2020-03-10

## 2020-03-10 PROBLEM — E87.6 HYPOKALEMIA: Status: ACTIVE | Noted: 2020-03-10

## 2020-03-10 LAB
ALBUMIN SERPL-MCNC: 2.7 G/DL (ref 3.5–5.2)
ALP SERPL-CCNC: 96 U/L (ref 39–117)
ALT SERPL W P-5'-P-CCNC: 105 U/L (ref 1–33)
ANION GAP SERPL CALCULATED.3IONS-SCNC: 9 MMOL/L (ref 5–15)
AST SERPL-CCNC: 44 U/L (ref 1–32)
BASOPHILS # BLD AUTO: 0.02 10*3/MM3 (ref 0–0.2)
BASOPHILS NFR BLD AUTO: 0.3 % (ref 0–1.5)
BILIRUB CONJ SERPL-MCNC: <0.2 MG/DL (ref 0.2–0.3)
BILIRUB INDIRECT SERPL-MCNC: ABNORMAL MG/DL
BILIRUB SERPL-MCNC: 0.4 MG/DL (ref 0.2–1.2)
BUN BLD-MCNC: 4 MG/DL (ref 8–23)
BUN/CREAT SERPL: 12.5 (ref 7–25)
CALCIUM SPEC-SCNC: 7.2 MG/DL (ref 8.6–10.5)
CHLORIDE SERPL-SCNC: 107 MMOL/L (ref 98–107)
CO2 SERPL-SCNC: 23 MMOL/L (ref 22–29)
CREAT BLD-MCNC: 0.32 MG/DL (ref 0.57–1)
DEPRECATED RDW RBC AUTO: 49 FL (ref 37–54)
EOSINOPHIL # BLD AUTO: 0.24 10*3/MM3 (ref 0–0.4)
EOSINOPHIL NFR BLD AUTO: 3.1 % (ref 0.3–6.2)
ERYTHROCYTE [DISTWIDTH] IN BLOOD BY AUTOMATED COUNT: 13.9 % (ref 12.3–15.4)
GFR SERPL CREATININE-BSD FRML MDRD: >150 ML/MIN/1.73
GLUCOSE BLD-MCNC: 92 MG/DL (ref 65–99)
HCT VFR BLD AUTO: 29.4 % (ref 34–46.6)
HGB BLD-MCNC: 9.4 G/DL (ref 12–15.9)
IMM GRANULOCYTES # BLD AUTO: 0.03 10*3/MM3 (ref 0–0.05)
IMM GRANULOCYTES NFR BLD AUTO: 0.4 % (ref 0–0.5)
LYMPHOCYTES # BLD AUTO: 1.37 10*3/MM3 (ref 0.7–3.1)
LYMPHOCYTES NFR BLD AUTO: 17.8 % (ref 19.6–45.3)
MCH RBC QN AUTO: 31.1 PG (ref 26.6–33)
MCHC RBC AUTO-ENTMCNC: 32 G/DL (ref 31.5–35.7)
MCV RBC AUTO: 97.4 FL (ref 79–97)
MONOCYTES # BLD AUTO: 0.99 10*3/MM3 (ref 0.1–0.9)
MONOCYTES NFR BLD AUTO: 12.9 % (ref 5–12)
NEUTROPHILS # BLD AUTO: 5.03 10*3/MM3 (ref 1.7–7)
NEUTROPHILS NFR BLD AUTO: 65.5 % (ref 42.7–76)
NRBC BLD AUTO-RTO: 0 /100 WBC (ref 0–0.2)
PLATELET # BLD AUTO: 165 10*3/MM3 (ref 140–450)
PMV BLD AUTO: 9.8 FL (ref 6–12)
POTASSIUM BLD-SCNC: 2.8 MMOL/L (ref 3.5–5.2)
POTASSIUM BLD-SCNC: 3.3 MMOL/L (ref 3.5–5.2)
PROT SERPL-MCNC: 4.9 G/DL (ref 6–8.5)
RBC # BLD AUTO: 3.02 10*6/MM3 (ref 3.77–5.28)
SODIUM BLD-SCNC: 139 MMOL/L (ref 136–145)
WBC NRBC COR # BLD: 7.68 10*3/MM3 (ref 3.4–10.8)

## 2020-03-10 PROCEDURE — C1713 ANCHOR/SCREW BN/BN,TIS/BN: HCPCS | Performed by: ORTHOPAEDIC SURGERY

## 2020-03-10 PROCEDURE — 94799 UNLISTED PULMONARY SVC/PX: CPT

## 2020-03-10 PROCEDURE — 25010000002 FENTANYL CITRATE (PF) 100 MCG/2ML SOLUTION: Performed by: NURSE ANESTHETIST, CERTIFIED REGISTERED

## 2020-03-10 PROCEDURE — 25010000002 ROPIVACAINE PER 1 MG: Performed by: NURSE ANESTHETIST, CERTIFIED REGISTERED

## 2020-03-10 PROCEDURE — 25010000002 DEXAMETHASONE SODIUM PHOSPHATE 10 MG/ML SOLUTION: Performed by: NURSE ANESTHETIST, CERTIFIED REGISTERED

## 2020-03-10 PROCEDURE — 25010000002 PROPOFOL 10 MG/ML EMULSION: Performed by: NURSE ANESTHETIST, CERTIFIED REGISTERED

## 2020-03-10 PROCEDURE — 80048 BASIC METABOLIC PNL TOTAL CA: CPT | Performed by: NURSE PRACTITIONER

## 2020-03-10 PROCEDURE — 29877 ARTHRS KNEE SURG DBRDMT/SHVG: CPT | Performed by: ORTHOPAEDIC SURGERY

## 2020-03-10 PROCEDURE — 99232 SBSQ HOSP IP/OBS MODERATE 35: CPT | Performed by: INTERNAL MEDICINE

## 2020-03-10 PROCEDURE — 0QSG04Z REPOSITION RIGHT TIBIA WITH INTERNAL FIXATION DEVICE, OPEN APPROACH: ICD-10-PCS | Performed by: ORTHOPAEDIC SURGERY

## 2020-03-10 PROCEDURE — C1769 GUIDE WIRE: HCPCS | Performed by: ORTHOPAEDIC SURGERY

## 2020-03-10 PROCEDURE — 27536 TREAT KNEE FRACTURE: CPT | Performed by: ORTHOPAEDIC SURGERY

## 2020-03-10 PROCEDURE — 80076 HEPATIC FUNCTION PANEL: CPT | Performed by: INTERNAL MEDICINE

## 2020-03-10 PROCEDURE — 25010000002 BUPRENORPHINE PER 0.1 MG: Performed by: NURSE ANESTHETIST, CERTIFIED REGISTERED

## 2020-03-10 PROCEDURE — 0PSJ04Z REPOSITION LEFT RADIUS WITH INTERNAL FIXATION DEVICE, OPEN APPROACH: ICD-10-PCS | Performed by: ORTHOPAEDIC SURGERY

## 2020-03-10 PROCEDURE — 25010000003 CEFAZOLIN IN DEXTROSE 2-4 GM/100ML-% SOLUTION: Performed by: ORTHOPAEDIC SURGERY

## 2020-03-10 PROCEDURE — 25010000002 NEOSTIGMINE 10 MG/10ML SOLUTION: Performed by: NURSE ANESTHETIST, CERTIFIED REGISTERED

## 2020-03-10 PROCEDURE — 25609 OPTX DST RD XART FX/EP SEP3+: CPT | Performed by: ORTHOPAEDIC SURGERY

## 2020-03-10 PROCEDURE — 25010000002 ONDANSETRON PER 1 MG: Performed by: NURSE ANESTHETIST, CERTIFIED REGISTERED

## 2020-03-10 PROCEDURE — 84132 ASSAY OF SERUM POTASSIUM: CPT | Performed by: INTERNAL MEDICINE

## 2020-03-10 PROCEDURE — 76000 FLUOROSCOPY <1 HR PHYS/QHP: CPT

## 2020-03-10 PROCEDURE — 85025 COMPLETE CBC W/AUTO DIFF WBC: CPT | Performed by: NURSE PRACTITIONER

## 2020-03-10 PROCEDURE — 25010000002 ENOXAPARIN PER 10 MG: Performed by: ORTHOPAEDIC SURGERY

## 2020-03-10 PROCEDURE — 0SCC4ZZ EXTIRPATION OF MATTER FROM RIGHT KNEE JOINT, PERCUTANEOUS ENDOSCOPIC APPROACH: ICD-10-PCS | Performed by: ORTHOPAEDIC SURGERY

## 2020-03-10 PROCEDURE — 25010000002 PROMETHAZINE PER 50 MG: Performed by: NURSE ANESTHETIST, CERTIFIED REGISTERED

## 2020-03-10 DEVICE — SCRW LK VA W STRDRV 2.4X22MM: Type: IMPLANTABLE DEVICE | Status: FUNCTIONAL

## 2020-03-10 DEVICE — SCRW LK VA/LCP S/TAP STRDRV 3.5X54MM: Type: IMPLANTABLE DEVICE | Status: FUNCTIONAL

## 2020-03-10 DEVICE — IMPLANTABLE DEVICE: Type: IMPLANTABLE DEVICE | Status: FUNCTIONAL

## 2020-03-10 DEVICE — SCRW LK VA W STRDRV 2.4X24MM: Type: IMPLANTABLE DEVICE | Status: FUNCTIONAL

## 2020-03-10 DEVICE — SCRW LK VA/LCP S/TAP STRDRV 3.5X75MM: Type: IMPLANTABLE DEVICE | Status: FUNCTIONAL

## 2020-03-10 DEVICE — SCRW LK VA W STRDRV 2.4X16MM: Type: IMPLANTABLE DEVICE | Status: FUNCTIONAL

## 2020-03-10 DEVICE — SCRW CORT S/TAP STRDRV 2.7X18MM: Type: IMPLANTABLE DEVICE | Status: FUNCTIONAL

## 2020-03-10 DEVICE — SCRW CORT S/TAP 3.5X38MM: Type: IMPLANTABLE DEVICE | Status: FUNCTIONAL

## 2020-03-10 DEVICE — SCRW LK VA/LCP S/TAP STRDRV 3.5X80MM: Type: IMPLANTABLE DEVICE | Status: FUNCTIONAL

## 2020-03-10 DEVICE — PLT LCP VA 2CLMN NRW6H 2HL SHFT SS2.4LT: Type: IMPLANTABLE DEVICE | Status: FUNCTIONAL

## 2020-03-10 DEVICE — SCRW LK VA/LCP S/TAP STRDRV 3.5X34MM: Type: IMPLANTABLE DEVICE | Status: FUNCTIONAL

## 2020-03-10 DEVICE — SCRW LK VA/LCP S/TAP STRDRV 3.5X32MM: Type: IMPLANTABLE DEVICE | Status: FUNCTIONAL

## 2020-03-10 DEVICE — SCRW LK VA W STRDRV 2.4X18MM: Type: IMPLANTABLE DEVICE | Status: FUNCTIONAL

## 2020-03-10 DEVICE — SCRW LK VA W STRDRV 2.4X20MM: Type: IMPLANTABLE DEVICE | Status: FUNCTIONAL

## 2020-03-10 RX ORDER — MAGNESIUM HYDROXIDE 1200 MG/15ML
LIQUID ORAL AS NEEDED
Status: DISCONTINUED | OUTPATIENT
Start: 2020-03-10 | End: 2020-03-10 | Stop reason: HOSPADM

## 2020-03-10 RX ORDER — SODIUM CHLORIDE 0.9 % (FLUSH) 0.9 %
10 SYRINGE (ML) INJECTION AS NEEDED
Status: DISCONTINUED | OUTPATIENT
Start: 2020-03-10 | End: 2020-03-17 | Stop reason: HOSPADM

## 2020-03-10 RX ORDER — SODIUM CHLORIDE 0.9 % (FLUSH) 0.9 %
10 SYRINGE (ML) INJECTION AS NEEDED
Status: DISCONTINUED | OUTPATIENT
Start: 2020-03-10 | End: 2020-03-10 | Stop reason: HOSPADM

## 2020-03-10 RX ORDER — NEOSTIGMINE METHYLSULFATE 1 MG/ML
INJECTION, SOLUTION INTRAVENOUS AS NEEDED
Status: DISCONTINUED | OUTPATIENT
Start: 2020-03-10 | End: 2020-03-10 | Stop reason: SURG

## 2020-03-10 RX ORDER — SODIUM CHLORIDE, SODIUM LACTATE, POTASSIUM CHLORIDE, CALCIUM CHLORIDE 600; 310; 30; 20 MG/100ML; MG/100ML; MG/100ML; MG/100ML
9 INJECTION, SOLUTION INTRAVENOUS CONTINUOUS PRN
Status: DISCONTINUED | OUTPATIENT
Start: 2020-03-10 | End: 2020-03-17 | Stop reason: HOSPADM

## 2020-03-10 RX ORDER — PROMETHAZINE HYDROCHLORIDE 25 MG/ML
12.5 INJECTION, SOLUTION INTRAMUSCULAR; INTRAVENOUS ONCE AS NEEDED
Status: COMPLETED | OUTPATIENT
Start: 2020-03-10 | End: 2020-03-10

## 2020-03-10 RX ORDER — CEFAZOLIN SODIUM 2 G/100ML
2 INJECTION, SOLUTION INTRAVENOUS EVERY 8 HOURS
Status: COMPLETED | OUTPATIENT
Start: 2020-03-10 | End: 2020-03-11

## 2020-03-10 RX ORDER — FAMOTIDINE 20 MG/1
20 TABLET, FILM COATED ORAL ONCE
Status: DISCONTINUED | OUTPATIENT
Start: 2020-03-10 | End: 2020-03-10

## 2020-03-10 RX ORDER — BISACODYL 10 MG
10 SUPPOSITORY, RECTAL RECTAL DAILY PRN
Status: DISCONTINUED | OUTPATIENT
Start: 2020-03-10 | End: 2020-03-17 | Stop reason: HOSPADM

## 2020-03-10 RX ORDER — PROMETHAZINE HYDROCHLORIDE 25 MG/1
25 TABLET ORAL ONCE AS NEEDED
Status: COMPLETED | OUTPATIENT
Start: 2020-03-10 | End: 2020-03-10

## 2020-03-10 RX ORDER — DEXAMETHASONE SODIUM PHOSPHATE 10 MG/ML
INJECTION, SOLUTION INTRAMUSCULAR; INTRAVENOUS
Status: COMPLETED | OUTPATIENT
Start: 2020-03-10 | End: 2020-03-10

## 2020-03-10 RX ORDER — SODIUM CHLORIDE 0.9 % (FLUSH) 0.9 %
10 SYRINGE (ML) INJECTION EVERY 12 HOURS SCHEDULED
Status: DISCONTINUED | OUTPATIENT
Start: 2020-03-10 | End: 2020-03-10

## 2020-03-10 RX ORDER — SODIUM CHLORIDE 0.9 % (FLUSH) 0.9 %
10 SYRINGE (ML) INJECTION EVERY 12 HOURS SCHEDULED
Status: DISCONTINUED | OUTPATIENT
Start: 2020-03-10 | End: 2020-03-17 | Stop reason: HOSPADM

## 2020-03-10 RX ORDER — CEFAZOLIN SODIUM 2 G/100ML
2 INJECTION, SOLUTION INTRAVENOUS ONCE
Status: COMPLETED | OUTPATIENT
Start: 2020-03-10 | End: 2020-03-10

## 2020-03-10 RX ORDER — SODIUM CHLORIDE, SODIUM LACTATE, POTASSIUM CHLORIDE, CALCIUM CHLORIDE 600; 310; 30; 20 MG/100ML; MG/100ML; MG/100ML; MG/100ML
9 INJECTION, SOLUTION INTRAVENOUS CONTINUOUS
Status: DISCONTINUED | OUTPATIENT
Start: 2020-03-10 | End: 2020-03-10

## 2020-03-10 RX ORDER — PROMETHAZINE HYDROCHLORIDE 25 MG/1
25 SUPPOSITORY RECTAL ONCE AS NEEDED
Status: COMPLETED | OUTPATIENT
Start: 2020-03-10 | End: 2020-03-10

## 2020-03-10 RX ORDER — POTASSIUM CHLORIDE 750 MG/1
40 CAPSULE, EXTENDED RELEASE ORAL AS NEEDED
Status: DISCONTINUED | OUTPATIENT
Start: 2020-03-10 | End: 2020-03-17 | Stop reason: HOSPADM

## 2020-03-10 RX ORDER — POTASSIUM CHLORIDE 7.45 MG/ML
10 INJECTION INTRAVENOUS
Status: DISCONTINUED | OUTPATIENT
Start: 2020-03-10 | End: 2020-03-17 | Stop reason: HOSPADM

## 2020-03-10 RX ORDER — SODIUM CHLORIDE, SODIUM LACTATE, POTASSIUM CHLORIDE, CALCIUM CHLORIDE 600; 310; 30; 20 MG/100ML; MG/100ML; MG/100ML; MG/100ML
INJECTION, SOLUTION INTRAVENOUS CONTINUOUS PRN
Status: DISCONTINUED | OUTPATIENT
Start: 2020-03-10 | End: 2020-03-10 | Stop reason: SURG

## 2020-03-10 RX ORDER — ROPIVACAINE HYDROCHLORIDE 2 MG/ML
8 INJECTION, SOLUTION EPIDURAL; INFILTRATION CONTINUOUS
Status: DISPENSED | OUTPATIENT
Start: 2020-03-10 | End: 2020-03-15

## 2020-03-10 RX ORDER — BUPRENORPHINE HYDROCHLORIDE 0.32 MG/ML
INJECTION INTRAMUSCULAR; INTRAVENOUS
Status: COMPLETED | OUTPATIENT
Start: 2020-03-10 | End: 2020-03-10

## 2020-03-10 RX ORDER — BISACODYL 5 MG/1
5 TABLET, DELAYED RELEASE ORAL DAILY PRN
Status: DISCONTINUED | OUTPATIENT
Start: 2020-03-10 | End: 2020-03-17 | Stop reason: HOSPADM

## 2020-03-10 RX ORDER — PROPOFOL 10 MG/ML
VIAL (ML) INTRAVENOUS AS NEEDED
Status: DISCONTINUED | OUTPATIENT
Start: 2020-03-10 | End: 2020-03-10 | Stop reason: SURG

## 2020-03-10 RX ORDER — SODIUM CHLORIDE 9 MG/ML
INJECTION, SOLUTION INTRAVENOUS CONTINUOUS PRN
Status: DISCONTINUED | OUTPATIENT
Start: 2020-03-10 | End: 2020-03-10 | Stop reason: SURG

## 2020-03-10 RX ORDER — FENTANYL CITRATE 50 UG/ML
INJECTION, SOLUTION INTRAMUSCULAR; INTRAVENOUS
Status: COMPLETED | OUTPATIENT
Start: 2020-03-10 | End: 2020-03-10

## 2020-03-10 RX ORDER — POTASSIUM CHLORIDE 1.5 G/1.77G
40 POWDER, FOR SOLUTION ORAL AS NEEDED
Status: DISCONTINUED | OUTPATIENT
Start: 2020-03-10 | End: 2020-03-17 | Stop reason: HOSPADM

## 2020-03-10 RX ORDER — BUPIVACAINE HYDROCHLORIDE 2.5 MG/ML
INJECTION, SOLUTION EPIDURAL; INFILTRATION; INTRACAUDAL
Status: COMPLETED | OUTPATIENT
Start: 2020-03-10 | End: 2020-03-10

## 2020-03-10 RX ORDER — LIDOCAINE HYDROCHLORIDE 10 MG/ML
0.5 INJECTION, SOLUTION EPIDURAL; INFILTRATION; INTRACAUDAL; PERINEURAL ONCE AS NEEDED
Status: DISCONTINUED | OUTPATIENT
Start: 2020-03-10 | End: 2020-03-10 | Stop reason: HOSPADM

## 2020-03-10 RX ORDER — FAMOTIDINE 10 MG/ML
20 INJECTION, SOLUTION INTRAVENOUS ONCE
Status: DISCONTINUED | OUTPATIENT
Start: 2020-03-10 | End: 2020-03-10

## 2020-03-10 RX ORDER — DEXAMETHASONE SODIUM PHOSPHATE 10 MG/ML
INJECTION, SOLUTION INTRAMUSCULAR; INTRAVENOUS AS NEEDED
Status: DISCONTINUED | OUTPATIENT
Start: 2020-03-10 | End: 2020-03-10 | Stop reason: SURG

## 2020-03-10 RX ORDER — HYDROMORPHONE HYDROCHLORIDE 1 MG/ML
0.5 INJECTION, SOLUTION INTRAMUSCULAR; INTRAVENOUS; SUBCUTANEOUS
Status: DISCONTINUED | OUTPATIENT
Start: 2020-03-10 | End: 2020-03-10 | Stop reason: HOSPADM

## 2020-03-10 RX ORDER — ROCURONIUM BROMIDE 10 MG/ML
INJECTION, SOLUTION INTRAVENOUS AS NEEDED
Status: DISCONTINUED | OUTPATIENT
Start: 2020-03-10 | End: 2020-03-10 | Stop reason: SURG

## 2020-03-10 RX ORDER — SODIUM CHLORIDE, SODIUM LACTATE, POTASSIUM CHLORIDE, AND CALCIUM CHLORIDE .6; .31; .03; .02 G/100ML; G/100ML; G/100ML; G/100ML
IRRIGANT IRRIGATION AS NEEDED
Status: DISCONTINUED | OUTPATIENT
Start: 2020-03-10 | End: 2020-03-10 | Stop reason: HOSPADM

## 2020-03-10 RX ORDER — GLYCOPYRROLATE 0.2 MG/ML
INJECTION INTRAMUSCULAR; INTRAVENOUS AS NEEDED
Status: DISCONTINUED | OUTPATIENT
Start: 2020-03-10 | End: 2020-03-10 | Stop reason: SURG

## 2020-03-10 RX ORDER — ROPIVACAINE HYDROCHLORIDE 5 MG/ML
INJECTION, SOLUTION EPIDURAL; INFILTRATION; PERINEURAL
Status: COMPLETED | OUTPATIENT
Start: 2020-03-10 | End: 2020-03-10

## 2020-03-10 RX ORDER — ONDANSETRON 2 MG/ML
INJECTION INTRAMUSCULAR; INTRAVENOUS AS NEEDED
Status: DISCONTINUED | OUTPATIENT
Start: 2020-03-10 | End: 2020-03-10 | Stop reason: SURG

## 2020-03-10 RX ORDER — FAMOTIDINE 20 MG/1
20 TABLET, FILM COATED ORAL
Status: COMPLETED | OUTPATIENT
Start: 2020-03-10 | End: 2020-03-10

## 2020-03-10 RX ADMIN — PROPOFOL 25 MCG/KG/MIN: 10 INJECTION, EMULSION INTRAVENOUS at 12:57

## 2020-03-10 RX ADMIN — NICOTINE 1 PATCH: 21 PATCH, EXTENDED RELEASE TRANSDERMAL at 08:11

## 2020-03-10 RX ADMIN — PROMETHAZINE HYDROCHLORIDE 12.5 MG: 25 INJECTION INTRAMUSCULAR; INTRAVENOUS at 15:50

## 2020-03-10 RX ADMIN — HYDROXYZINE HYDROCHLORIDE 25 MG: 25 TABLET, FILM COATED ORAL at 03:47

## 2020-03-10 RX ADMIN — HYDROCODONE BITARTRATE AND ACETAMINOPHEN 1 TABLET: 7.5; 325 TABLET ORAL at 08:11

## 2020-03-10 RX ADMIN — ROCURONIUM BROMIDE 40 MG: 10 INJECTION INTRAVENOUS at 12:51

## 2020-03-10 RX ADMIN — ENOXAPARIN SODIUM 40 MG: 40 INJECTION SUBCUTANEOUS at 19:58

## 2020-03-10 RX ADMIN — GLYCOPYRROLATE 0.4 MG: 0.2 INJECTION INTRAMUSCULAR; INTRAVENOUS at 15:04

## 2020-03-10 RX ADMIN — FENTANYL CITRATE 100 MCG: 50 INJECTION, SOLUTION INTRAMUSCULAR; INTRAVENOUS at 12:00

## 2020-03-10 RX ADMIN — VENLAFAXINE HYDROCHLORIDE 150 MG: 75 CAPSULE, EXTENDED RELEASE ORAL at 08:11

## 2020-03-10 RX ADMIN — HYDROCODONE BITARTRATE AND ACETAMINOPHEN 1 TABLET: 7.5; 325 TABLET ORAL at 20:01

## 2020-03-10 RX ADMIN — FENTANYL CITRATE 50 MCG: 50 INJECTION, SOLUTION INTRAMUSCULAR; INTRAVENOUS at 14:10

## 2020-03-10 RX ADMIN — BUPRENORPHINE HYDROCHLORIDE 0.15 MG: 0.32 INJECTION INTRAMUSCULAR; INTRAVENOUS at 12:25

## 2020-03-10 RX ADMIN — BUDESONIDE AND FORMOTEROL FUMARATE DIHYDRATE 2 PUFF: 160; 4.5 AEROSOL RESPIRATORY (INHALATION) at 07:00

## 2020-03-10 RX ADMIN — DEXAMETHASONE SODIUM PHOSPHATE 2 MG: 10 INJECTION INTRAMUSCULAR; INTRAVENOUS at 12:25

## 2020-03-10 RX ADMIN — BUPIVACAINE HYDROCHLORIDE 20 ML: 2.5 INJECTION, SOLUTION EPIDURAL; INFILTRATION; INTRACAUDAL; PERINEURAL at 12:15

## 2020-03-10 RX ADMIN — BUPRENORPHINE HYDROCHLORIDE 0.15 MG: 0.32 INJECTION INTRAMUSCULAR; INTRAVENOUS at 12:15

## 2020-03-10 RX ADMIN — DEXAMETHASONE SODIUM PHOSPHATE 2 MG: 10 INJECTION INTRAMUSCULAR; INTRAVENOUS at 12:15

## 2020-03-10 RX ADMIN — LEVOTHYROXINE SODIUM 150 MCG: 150 TABLET ORAL at 05:43

## 2020-03-10 RX ADMIN — PROPOFOL 150 MG: 10 INJECTION, EMULSION INTRAVENOUS at 12:51

## 2020-03-10 RX ADMIN — SODIUM CHLORIDE, PRESERVATIVE FREE 10 ML: 5 INJECTION INTRAVENOUS at 11:10

## 2020-03-10 RX ADMIN — POTASSIUM CHLORIDE 40 MEQ: 10 CAPSULE, COATED, EXTENDED RELEASE ORAL at 10:37

## 2020-03-10 RX ADMIN — FENTANYL CITRATE 50 MCG: 50 INJECTION, SOLUTION INTRAMUSCULAR; INTRAVENOUS at 14:45

## 2020-03-10 RX ADMIN — DEXAMETHASONE SODIUM PHOSPHATE 8 MG: 10 INJECTION INTRAMUSCULAR; INTRAVENOUS at 13:27

## 2020-03-10 RX ADMIN — FAMOTIDINE 20 MG: 20 TABLET ORAL at 11:33

## 2020-03-10 RX ADMIN — SODIUM CHLORIDE: 9 INJECTION, SOLUTION INTRAVENOUS at 14:48

## 2020-03-10 RX ADMIN — CEFAZOLIN SODIUM 1 G: 2 INJECTION, SOLUTION INTRAVENOUS at 14:25

## 2020-03-10 RX ADMIN — SODIUM CHLORIDE, POTASSIUM CHLORIDE, SODIUM LACTATE AND CALCIUM CHLORIDE 9 ML/HR: 600; 310; 30; 20 INJECTION, SOLUTION INTRAVENOUS at 11:10

## 2020-03-10 RX ADMIN — EPHEDRINE SULFATE 5 MG: 50 INJECTION INTRAMUSCULAR; INTRAVENOUS; SUBCUTANEOUS at 14:27

## 2020-03-10 RX ADMIN — ACETAMINOPHEN 650 MG: 325 TABLET, FILM COATED ORAL at 03:47

## 2020-03-10 RX ADMIN — ROPIVACAINE HYDROCHLORIDE 8 ML/HR: 2 INJECTION, SOLUTION EPIDURAL; INFILTRATION at 15:35

## 2020-03-10 RX ADMIN — ROCURONIUM BROMIDE 10 MG: 10 INJECTION INTRAVENOUS at 14:39

## 2020-03-10 RX ADMIN — NEOSTIGMINE 3 MG: 1 INJECTION INTRAVENOUS at 15:04

## 2020-03-10 RX ADMIN — CEFAZOLIN SODIUM 2 G: 2 INJECTION, SOLUTION INTRAVENOUS at 12:59

## 2020-03-10 RX ADMIN — PROPOFOL 50 MG: 10 INJECTION, EMULSION INTRAVENOUS at 12:53

## 2020-03-10 RX ADMIN — ROPIVACAINE HYDROCHLORIDE 15 ML: 5 INJECTION, SOLUTION EPIDURAL; INFILTRATION; PERINEURAL at 12:00

## 2020-03-10 RX ADMIN — SODIUM CHLORIDE, POTASSIUM CHLORIDE, SODIUM LACTATE AND CALCIUM CHLORIDE: 600; 310; 30; 20 INJECTION, SOLUTION INTRAVENOUS at 12:51

## 2020-03-10 RX ADMIN — EPHEDRINE SULFATE 5 MG: 50 INJECTION INTRAMUSCULAR; INTRAVENOUS; SUBCUTANEOUS at 14:24

## 2020-03-10 RX ADMIN — HYDROCODONE BITARTRATE AND ACETAMINOPHEN 1 TABLET: 7.5; 325 TABLET ORAL at 01:23

## 2020-03-10 RX ADMIN — BUDESONIDE AND FORMOTEROL FUMARATE DIHYDRATE 2 PUFF: 160; 4.5 AEROSOL RESPIRATORY (INHALATION) at 20:06

## 2020-03-10 RX ADMIN — ONDANSETRON 4 MG: 2 INJECTION INTRAMUSCULAR; INTRAVENOUS at 14:59

## 2020-03-10 RX ADMIN — CEFAZOLIN SODIUM 2 G: 2 INJECTION, SOLUTION INTRAVENOUS at 19:58

## 2020-03-10 RX ADMIN — BUPIVACAINE HYDROCHLORIDE 20 ML: 2.5 INJECTION, SOLUTION EPIDURAL; INFILTRATION; INTRACAUDAL; PERINEURAL at 12:25

## 2020-03-10 RX ADMIN — GABAPENTIN 300 MG: 300 CAPSULE ORAL at 19:58

## 2020-03-10 NOTE — OP NOTE
OPERATIVE REPORT     DATE OF PROCEDURE: 3/10/2020     SURGEON: Guanaco Thakur M.D.     STAFF: Circulator: Sneha Pedroza RN  Radiology Technologist: Ruddy Sams  Scrub Person: GiNader  Vendor Representative: Toni Rod  Nursing Assistant: Omid Martines  Assistant: Kaila Davis PA     PREOPERATIVE DIAGNOSIS: Closed fracture of right tibial plateau, initial encounter [S82.141A]  Left wrist fracture  POSTOPERATIVE DIAGNOSIS: Right tibial plateau fracture and left wrist fracture       Post-Op Diagnosis Codes:     * Closed fracture of right tibial plateau, initial encounter [S82.141A] and left wrist fracture    Procedure(s):  TIBIAL PLATEAU OPEN REDUCTION INTERNAL FIXATION, right  Right KNEE ARTHROSCOPY  WRIST OPEN REDUCTION INTERNAL FIXATION LEFT    Surgeon(s):  Guanaco Thakur MD     SURGICAL DETAILS:     APPROACH: Arthroscopic right knee,  open right tibia and open left wrist    ANESTHESIA: General with Block    PREOPERATIVE ANTIBIOTICS: Ancef    ESTIMATED BLOOD LOSS: none     TOURNIQUET TIME: 65 minutes on the right lower extremity and 331 minutes at 250 mmHg on the left upper extremity    SPECIMENS:   Order Name Source Comment Collection Info Order Time   POTASSIUM  For all patients with renal disease, within 3 days if taking digoxin, potassium-depleting anti-hypertensives, or diuretics.  3/10/2020 11:39 AM        IMPLANTS:   Implants     Implant    Plt Tib Va/Lcp Prox Lgbnd Ss 6h 3.5x117 Rt - [id number] - Implanted      Inventory item: PLT TIB VA/LCP PROX LGBND SS 6H 3.5X117 RT Model/Cat number: [id number]    : DEPUY SYNTHES      As of 3/10/2020     Status: Implanted                  Scrw Cem S/Tap 3.5x38mm - [id number] - Implanted      Inventory item: SCRW CEM S/TAP 3.5X38MM Model/Cat number: [id number]    : DEPUY SYNTHES      As of 3/10/2020     Status: Implanted                  Scrw Lk Va/Lcp S/Tap Strdrv 3.5x80mm - [id number] - Implanted      Inventory item:  SCRW LK VA/LCP S/TAP STRDRV 3.5X80MM Model/Cat number: [id number]    : DEPUY SYNTHES      As of 3/10/2020     Status: Implanted                  Scrw Lk Va/Lcp S/Tap Strdrv 3.5x85mm - [id number] - Implanted      Inventory item: SCRW LK VA/LCP S/TAP STRDRV 3.5X85MM Model/Cat number: [id number]    : DEPUY SYNTHES      As of 3/10/2020     Status: Implanted                  Scrw Lk Va/Lcp S/Tap Strdrv 3.5x75mm - [id number] - Implanted      Inventory item: SCRW LK VA/LCP S/TAP STRDRV 3.5X75MM Model/Cat number: [id number]    : DEPUY SYNTHES      As of 3/10/2020     Status: Implanted                  Scrw Lk Va/Lcp S/Tap Strdrv 3.5x34mm - [id number] - Implanted      Inventory item: SCRW LK VA/LCP S/TAP STRDRV 3.5X34MM Model/Cat number: [id number]    : DEPUY SYNTHES      As of 3/10/2020     Status: Implanted                  Scrw Lk Va/Lcp S/Tap Strdrv 3.5x32mm - [id number] - Implanted      Inventory item: SCRW LK VA/LCP S/TAP STRDRV 3.5X32MM Model/Cat number: [id number]    : DEPUY SYNTHES      As of 3/10/2020     Status: Implanted                  Scrw Lk Va/Lcp S/Tap Strdrv 3.5x54mm - [id number] - Implanted      Inventory item: SCRW LK VA/LCP S/TAP STRDRV 3.5X54MM Model/Cat number: [id number]    : DEPUY SYNTHES      As of 3/10/2020     Status: Implanted                  Plt Lcp Va 2clmn Nrw6h 2hl Shft Ss2.4lt - [id number] - Implanted      Inventory item: PLT LCP VA 2CLMN NRW6H 2HL SHFT SS2.4LT Model/Cat number: [id number]    : DEPUY SYNTHES      As of 3/10/2020     Status: Implanted                  Scrw Lk Va W Strdrv 2.4x20mm - [id number] - Implanted      Inventory item: SCRW LK VA W STRDRV 2.4X20MM Model/Cat number: [id number]    : DEPeRelyx SYNTHES      As of 3/10/2020     Status: Implanted                  Scrw Lk Va W Strdrv 2.4x24mm - [id number] - Implanted      Inventory item: SCRW LK VA W STRDRV 2.4X24MM Model/Cat number: [id number]     : DEPUY SYNTHES      As of 3/10/2020     Status: Implanted                  Scrw Lk Va W Strdrv 2.4x22mm - [id number] - Implanted      Inventory item: SCRW LK VA W STRDRV 2.4X22MM Model/Cat number: [id number]    : DEPUY SYNTHES      As of 3/10/2020     Status: Implanted                  Scrw Lk Va W Strdrv 2.4x18mm - [id number] - Implanted      Inventory item: SCRW LK VA W STRDRV 2.4X18MM Model/Cat number: [id number]    : DEPUY SYNTHES      As of 3/10/2020     Status: Implanted                  Scrw Cem S/Tap Strdrv 2.7x18mm - [id number] - Implanted      Inventory item: SCRW CEM S/TAP STRDRV 2.7X18MM Model/Cat number: [id number]    : DEPUY SYNTHES      As of 3/10/2020     Status: Implanted                  Scrw Lk Va W Strdrv 2.4x16mm - [id number] - Implanted      Inventory item: SCRW LK VA W STRDRV 2.4X16MM Model/Cat number: [id number]    : DEPUY Idomoo      As of 3/10/2020     Status: Implanted                         DRAINS:   [REMOVED] External Urinary Catheter (Removed)   Site Assessment Clean;Skin intact 3/10/2020 10:00 AM   Application/Removal external catheter applied;skin care provided 3/7/2020 11:00 PM   Collection Container Wall suction 3/10/2020 10:00 AM   Wall suction (mmHG) 80 mmHG 3/9/2020  8:00 PM   Securement Method Securing device 3/10/2020 10:00 AM       COMPLICATIONS: None       INDICATIONS FOR PROCEDURE:  The risks, benefits, alternatives, and potential complications of the this surgery were discussed with the patient in detail to include but not limited to infection, bleeding, anesthesia risks, nerve injury, vessel injury, pain, blood clots, possible need for blood transfusion, possible need for further procedures, myocardial infarction, stroke, and death. Specific details of the procedure, hospitalization, recovery, rehabilitation, and long-term precautions were also provided. Pre-operative teaching was provided. Surgical device selection  was outlined, and the many options available were explained; final choices will be made at the time of the procedure to match the anatomy and condition of the bone, and soft tissue structures. Understanding of all topics was conveyed to me by the patient, and consent was given to proceed with right knee arthroscopy, right tibia open reduction and fixation and left wrist open reduction internal fixation    INTRAOPERATIVE FINDINGS: Intra-articular right knee loose cartilage    PROCEDURE: The patient was identified in the preoperative holding area. The operative site was confirmed and marked. A sequential compression device was placed on the nonoperative leg. The risks, benefits, and alternatives to surgery were again confirmed with the patient and the patient wished to proceed. The patient was brought to the operating room and placed on the operating room table in the supine position. A huddle was performed with the patient and all vital surgical team members to confirm the correct operative site, procedure, anesthesia type, and operative plan with the patient. After anesthesia was performed, the patient was positioned in the supine position. All bony prominences were padded. Intravenous antibiotic prophylaxis was given and confirmed with the anesthesia team. The operative extremity was prepped and draped in the usual sterile fashion. A surgical time out was performed immediately preceding the incision with all personnel in the operating room to confirm patient identity, the correct operative site and extremity, correct radiographic studies, availability of appropriate surgical equipment and agreement on the planned procedure.     First right knee arthroscopy was performed there is extensive hematoma in the joint.  There is scar tissue and loose cartilage floating around but had to be removed with the shaver.  The ACL and PCL are intact.  The medial lateral meniscus intact.  Mostly chondromalacia was in the lateral  compartment.  It was debrided with a shaver to a smooth surface.    Next an open incision was made over the lateral tibial plateau.  A Synthes lateral tibial plateau plate was applied.  First it was held in place with guidewires to make sure reduction and plate placement was correct.  Then under fluoroscopic guidance proximal locking screws and distal locking screws were applied.  There is anatomic reduction.  Multiple screws were placed.  The wound was closed in layers.  The fascia was pie crusted and Vicryl suture was used to close the fascia followed by 2-0 Vicryl subcuticular layer 3 oh strata fix in the skin subcuticular layer and pernio dressing.    The left wrist was done next after routine prep and drape.  A volar incision was made.  Blunt dissection down to the bone.  The fracture was reduced.  The fracture extended into the joint.  There is dorsal comminution.  There was greater than 3 fragments.  A Synthes short narrow locking plate was applied.  All screw holes were filled with a combination of locking screws after an initial compression screw to pull the bone reduced to the plate.  After anatomic reduction the wound was irrigated closed with Vicryl and Monocryl.  A sterile dressing and splint was applied.    No apparent complications occurred during the procedure Instrument, sponge and needle counts were correct x 2.     POST OPERATIVE PLAN:   Weight Bearing: Nonweightbearing right lower extremity and left wrist, she may weight-bear through the left elbow  DVT prophylaxis: Lovenox  23 hour perioperative antibiotic prophylaxis   Social work for discharge planning

## 2020-03-10 NOTE — ANESTHESIA PREPROCEDURE EVALUATION
Anesthesia Evaluation     Patient summary reviewed and Nursing notes reviewed   NPO Solid Status: > 8 hours  NPO Liquid Status: > 8 hours           Airway   Mallampati: II  TM distance: >3 FB  Neck ROM: full  No difficulty expected  Dental - normal exam     Pulmonary - normal exam   (+) a smoker, asthma,  Cardiovascular - negative cardio ROS and normal exam        Neuro/Psych- negative ROS  GI/Hepatic/Renal/Endo    (+) morbid obesity,  thyroid problem hypothyroidism    Musculoskeletal     Abdominal  - normal exam    Bowel sounds: normal.   Substance History - negative use     OB/GYN negative ob/gyn ROS         Other   arthritis,                    Anesthesia Plan    ASA 3     general with block     intravenous induction     Anesthetic plan, all risks, benefits, and alternatives have been provided, discussed and informed consent has been obtained with: patient.    Plan discussed with CRNA.

## 2020-03-10 NOTE — PROGRESS NOTES
[facility]    Acute pain service Inpatient Progress Note    Patient Name: Yessica Galvin  :  1960  MRN:  [id number]        Acute Pain  Service Inpatient Progress Note:    Analgesia:Poor  Pain Score:8/10  LOC: alert and awake  Side Effects:None  Catheter Site:clean, dry and dressing intact  Cath type: peripheral nerve cath(MOOG pump)  Infusion rate: 8ml/hr  Catheter Plan:Catheter to remain Insitu and Continue catheter infusion rate unchanged  Comments: Will evaluate catheter in preop later today. Fingers are numb.

## 2020-03-10 NOTE — PLAN OF CARE
Problem: Pain, Chronic (Adult)  Goal: Identify Related Risk Factors and Signs and Symptoms  Outcome: Ongoing (interventions implemented as appropriate)  Goal: Acceptable Pain/Comfort Level and Functional Ability  Outcome: Ongoing (interventions implemented as appropriate)     Problem: Skin Injury Risk (Adult)  Goal: Identify Related Risk Factors and Signs and Symptoms  Outcome: Ongoing (interventions implemented as appropriate)  Goal: Skin Health and Integrity  Outcome: Ongoing (interventions implemented as appropriate)     Problem: Fall Risk (Adult)  Goal: Identify Related Risk Factors and Signs and Symptoms  Outcome: Ongoing (interventions implemented as appropriate)  Goal: Absence of Fall  Outcome: Ongoing (interventions implemented as appropriate)     Problem: Patient Care Overview  Goal: Plan of Care Review  Outcome: Ongoing (interventions implemented as appropriate)  Flowsheets (Taken 3/10/2020 1966)  Progress: improving  Plan of Care Reviewed With: patient  Outcome Summary: Pt went down for surgery at 1030 this morning. Pt arrived back on the floor from PACU around 1630. VSS, 5L NC. Pt still very drowsy from anethesia, but opens eyes and arouses to voice. Pt is still due to void. No C/O of pain. Ropive @8ml/hr. Dressing to RLE and LUE CDI. Knee immobilizer to RLE. Continue to monitor. CM following.  Goal: Individualization and Mutuality  Outcome: Ongoing (interventions implemented as appropriate)  Goal: Discharge Needs Assessment  Outcome: Ongoing (interventions implemented as appropriate)  Goal: Interprofessional Rounds/Family Conf  Outcome: Ongoing (interventions implemented as appropriate)

## 2020-03-10 NOTE — PLAN OF CARE
Problem: Skin Injury Risk (Adult)  Goal: Identify Related Risk Factors and Signs and Symptoms  Flowsheets (Taken 3/9/2020 1834 by Asuncion Garza, RN)  Related Risk Factors (Skin Injury Risk): advanced age;mobility impaired     Problem: Fall Risk (Adult)  Goal: Identify Related Risk Factors and Signs and Symptoms  Flowsheets (Taken 3/9/2020 1834 by Asuncion Garza, RN)  Related Risk Factors (Fall Risk): fatigue/slow reaction;gait/mobility problems;history of falls     Problem: Patient Care Overview  Goal: Plan of Care Review  Flowsheets (Taken 3/10/2020 0623)  Progress: no change  Plan of Care Reviewed With: patient  Outcome Summary: VSS, voids well, npo since midnight, possible surgery today, will continue to monitor for changes.

## 2020-03-10 NOTE — ANESTHESIA PROCEDURE NOTES
Airway  Urgency: elective    Date/Time: 3/10/2020 12:53 PM  Airway not difficult    General Information and Staff    Patient location during procedure: OR  CRNA: Giselle Caro CRNA    Indications and Patient Condition  Indications for airway management: airway protection    Preoxygenated: yes  MILS not maintained throughout  Mask difficulty assessment: 1 - vent by mask    Final Airway Details  Final airway type: endotracheal airway      Successful airway: ETT  Cuffed: yes   Successful intubation technique: direct laryngoscopy  Facilitating devices/methods: intubating stylet  Endotracheal tube insertion site: oral  Blade: Juaquin  Blade size: 3  ETT size (mm): 7.0  Cormack-Lehane Classification: grade I - full view of glottis  Placement verified by: chest auscultation and capnometry   Measured from: lips  ETT/EBT  to lips (cm): 21  Number of attempts at approach: 1  Assessment: lips, teeth, and gum same as pre-op and atraumatic intubation    Additional Comments  Negative epigastric sounds, Breath sound equal bilaterally with symmetric chest rise and fall

## 2020-03-10 NOTE — ANESTHESIA PROCEDURE NOTES
Peripheral Block      Patient reassessed immediately prior to procedure    Patient location during procedure: OR  Reason for block: post-op pain management  Performed by  CRNA: Vladimir Johnson CRNA  Assisted by: Km Fernández CRNA  Preanesthetic Checklist  Completed: patient identified, site marked, surgical consent, pre-op evaluation, timeout performed, IV checked and monitors and equipment checked  Prep:  Sterile barriers:gloves, cap, sterile barriers and mask  Prep: ChloraPrep  Patient monitoring: blood pressure monitoring, continuous pulse oximetry and EKG  Procedure    Guidance:ultrasound guided, nerve stimulator and landmark technique  Images:still images not obtained    Laterality:right  Block Type:femoral  Injection Technique:single-shot  Needle Type:short-bevel  Needle Gauge:20 G  Resistance on Injection: none    Medications Used: buprenorphine (BUPRENEX) injection, 0.15 mg  dexamethasone sodium phosphate injection, 2 mg  bupivacaine PF (MARCAINE) 0.25 % injection, 20 mL  Med admintered at 3/10/2020 12:15 PM      Post Assessment  Injection Assessment: negative aspiration for heme, no paresthesia on injection and incremental injection  Patient Tolerance:comfortable throughout block  Complications:no  Additional Notes  The BBRaun 360 degree echogenic needle was introduced in plane, in a lateral to medial direction at the level of the inguinal crease.  Under ultrasound guidance, the femoral artery and vein where located.  The needle was then directed below Fascia Iliacus towards the Femoral nerve.  NS was utilized to hydro dissect and jonahton needle advancement towards the target structure.   LA was injected incrementally in 3-5 ml aliquots with negative aspirate.  LA spread was visualized around the nerve, negative intraneural injection, low injection pressures.  Thank you

## 2020-03-10 NOTE — ANESTHESIA PROCEDURE NOTES
Peripheral Block      Patient reassessed immediately prior to procedure    Patient location during procedure: pre-op  Reason for block: at surgeon's request and post-op pain management  Performed by  CRNA: Vladimir Johnson CRNA  Assisted by: Kevin Silva CRNA  Preanesthetic Checklist  Completed: patient identified, site marked, surgical consent, pre-op evaluation, timeout performed, risks and benefits discussed and monitors and equipment checked  Prep:  Sterile barriers:cap, gloves, mask and sterile barriers  Prep: ChloraPrep  Patient monitoring: blood pressure monitoring, continuous pulse oximetry and EKG  Procedure  Sedation:yes    Guidance:ultrasound guided  Images:still images obtained, printed/placed on chart    Block Type:infraclavicular  Injection Technique:single-shot  Needle Type:Tuohy and echogenic  Needle Gauge:18 G  Resistance on Injection: none  Catheter Size:20 G    Medications Used: buprenorphine (BUPRENEX) injection, 0.15 mg  dexamethasone sodium phosphate injection, 2 mg  bupivacaine PF (MARCAINE) 0.25 % injection, 20 mL  Med admintered at 3/10/2020 12:25 PM      Post Assessment  Injection Assessment: negative aspiration for heme, no paresthesia on injection and incremental injection  Patient Tolerance:comfortable throughout block  Complications:no  Additional Notes  Procedure:                                                 The affected upper extremity was adducted within the patients ROM.  The US probe was placed approximately at the distal inferior third of the clavicle in a cephalad to caudad orientation.  The brachial plexus, subclavian artery and vein where visualized and the patient was marked and sterile prep and drape where completed.  The pt was anesthetized with  IV Sedation( see meds).  Skin and cutaneous tissue was infiltrated and anesthetized with 1% Lidocaine 3 mls via a 25g needle.   A 4 inch B-Grant echogenic Touhy 360 degree needle was placed inferiorly to the clavicle in a  caudad direction, in plane US technique.  The needle was visualized as it passed through Pectoralis Major and to the posterior aspect of the artery where LA was placed and spread was visualized. Injection of LA was incremental, and negative aspiration every 5 MLs.  Injection pressure was also noted as minimal and patient denied pain on injection.  Thank you

## 2020-03-10 NOTE — ANESTHESIA POSTPROCEDURE EVALUATION
Patient: Yessica Galvin    Procedure Summary     Date:  03/10/20 Room / Location:   [facility]  [facility]    Anesthesia Start:  1245 Anesthesia Stop:      Procedures:       TIBIAL PLATEAU OPEN REDUCTION INTERNAL FIXATION, right (Right Leg Lower)      Right KNEE ARTHROSCOPY (Right Knee)      WRIST OPEN REDUCTION INTERNAL FIXATION LEFT (Left Hand) Diagnosis:       Closed fracture of right tibial plateau, initial encounter      (Closed fracture of right tibial plateau, initial encounter [S82.141A])    Surgeon:  Guancao Thakur MD Provider:  Kam Puente MD    Anesthesia Type:  general with block ASA Status:  3          Anesthesia Type: general with block    Vitals  Vitals Value Taken Time   /61 3/10/2020  3:16 PM   Temp 98 °F (36.7 °C) 3/10/2020  3:15 PM   Pulse 74 3/10/2020  3:18 PM   Resp 16 3/10/2020  3:15 PM   SpO2 91 % 3/10/2020  3:18 PM   Vitals shown include unvalidated device data.        Post Anesthesia Care and Evaluation    Patient location during evaluation: PACU  Patient participation: waiting for patient participation  Pain score: 0  Pain management: adequate  Airway patency: patent  Anesthetic complications: No anesthetic complications  PONV Status: none  Cardiovascular status: hemodynamically stable and acceptable  Respiratory status: nonlabored ventilation, acceptable, nasal cannula and oral airway  Hydration status: acceptable

## 2020-03-10 NOTE — ANESTHESIA PROCEDURE NOTES
Peripheral Block      Patient reassessed immediately prior to procedure    Patient location during procedure: pre-op  Reason for block: at surgeon's request and post-op pain management  Performed by  Anesthesiologist: Julio Leiva MD  Assisted by: Vladimir Johnson CRNA  Preanesthetic Checklist  Completed: patient identified, site marked, surgical consent, pre-op evaluation, timeout performed, IV checked, risks and benefits discussed and monitors and equipment checked  Prep:  Sterile barriers:cap, gloves, mask and sterile barriers  Prep: ChloraPrep  Patient monitoring: blood pressure monitoring, continuous pulse oximetry and EKG  Procedure  Sedation:yes  Performed under: local infiltration  Guidance:ultrasound guided  Images:still images obtained, printed/placed on chart    Laterality:right  Block Type:popliteal  Injection Technique:catheter  Needle Type:echogenic  Needle Gauge:18 G  Resistance on Injection: none  Catheter Size:20 G  Cath Depth at skin: 10 cm    Medications Used: fentaNYL citrate (PF) (SUBLIMAZE) injection, 100 mcg  ropivacaine (NAROPIN) 0.5 % injection, 15 mL  Med admintered at 3/10/2020 12:00 PM      Post Assessment  Injection Assessment: negative aspiration for heme, no paresthesia on injection and incremental injection  Patient Tolerance:comfortable throughout block  Complications:no  Additional Notes  Procedure:                                                         The pt was placed in  lateral position.  The Insertion site was  prepped and Draped in sterile fashion.  The pt was anesthetized with  IV Sedation( see meds).  Skin and cutaneous tissue was infiltrated and anesthetized with 1% Lidocaine 3 mls via a 25g needle.  A BBraun 4 inch 18g echogenic needle was then  inserted approximately 3 cm proximal to the popliteal judie a at the lateral mid biceps femoris and advanced In-plane with Ultrasound guidance.  Normal Saline PSF was utilized for hydrodissection of tissue.  The popliteal artery  was visualized and the common peroneal and tibial bifurcation was located.  LA injection spread was visualized, injection was incremental 1-5ml, injection pressure was normal or little, no intraneural injection, no vascular injection.  .  A BBraun 20g wire stylet catheter was placed via the needle with ultrasound visualization and confirmation with NS fluid bolus. The labeled Catheter was then secured at insertions site with skin afix,  mastisol, steristrips  and a CHG transparent dressing was placed over. Thank you

## 2020-03-10 NOTE — PROGRESS NOTES
[facility] Medicine Services  PROGRESS NOTE    Patient Name: Yessica Galvin  : 1960  MRN: [id number]    Date of Admission: 3/7/2020  Primary Care Physician: Kota Swain MD    Subjective   Subjective     CC:  Fall with left radial/right tibial plateau fracture    HPI:  Patient resting in bed. Family at bedside.  Reports good pain control yesterday but slight worsening overnight. Nerve block remains in place.  Anticipating OR today around noon.   All questions answered.     Review of Systems   Gen- No fevers, chills  CV- No chest pain, palpitations  Resp- No cough, dyspnea  GI- No N/V/D, abd pain  MS- right leg pain and left wrist pain persists    Objective   Objective     Vital Signs:   Temp:  [98.3 °F (36.8 °C)-99.4 °F (37.4 °C)] 98.9 °F (37.2 °C)  Heart Rate:  [67-87] 67  Resp:  [14-18] 16  BP: (124-154)/(72-83) 153/83        Physical Exam:  Constitutional: Awake, alert, resting in bed with spouse at the bedside, drowsy   Eyes: PERRLA, sclerae anicteric, no conjunctival injection  HENT: NCAT, mucous membranes moist  Neck: Supple, no thyromegaly, no lymphadenopathy, trachea midline  Respiratory: ctab, on NC  Cardiovascular: RRR, no murmurs  Gastrointestinal: Positive bowel sounds, soft, nontender, nondistended  Musculoskeletal: left wrist wrapped- right leg wrapped- did not remove dressing  Psychiatric: Appropriate affect, cooperative  Neurologic: Oriented x 3, no focal deficits noted  Skin: No rashes    Results Reviewed:  Results from last 7 days   Lab Units 03/10/20  0549 20  0603 20  0510   WBC 10*3/mm3 7.68 8.58 9.09   HEMOGLOBIN g/dL 9.4* 10.0* 10.6*   HEMATOCRIT % 29.4* 31.9* 33.0*   PLATELETS 10*3/mm3 165 191 246     Estimated Creatinine Clearance: 206.9 mL/min (A) (by C-G formula based on SCr of 0.32 mg/dL (L)).    I have reviewed the medications:  Scheduled Meds:    budesonide-formoterol 2 puff Inhalation BID - RT   gabapentin 300 mg Oral Nightly      levothyroxine 150 mcg Oral Q AM   nicotine 1 patch Transdermal Q24H   sodium chloride 10 mL Intravenous Q12H   venlafaxine  mg Oral Daily     Continuous Infusions:    ropivacaine (NAROPIN) 0.2% peripheral nerve cath (moog) 10 mL/hr Last Rate: 10 mL/hr (03/09/20 1531)   sodium chloride 125 mL/hr Last Rate: 125 mL/hr (03/08/20 0604)     PRN Meds:.•  acetaminophen **OR** acetaminophen **OR** acetaminophen  •  albuterol  •  HYDROcodone-acetaminophen  •  HYDROmorphone **AND** [PENDING] HYDROmorphone **AND** naloxone **AND** [PENDING] naloxone  •  hydrOXYzine  •  ondansetron  •  sodium chloride    Assessment/Plan   Assessment & Plan     Active Hospital Problems    Diagnosis  POA   • **Closed fracture of right tibial plateau [S82.141A]  Unknown   • Fracture of right tibia [S82.201A]  Yes   • Left radial fracture [S52.92XA]  Yes   • Asthma [J45.909]  Yes   • Tobacco abuse [Z72.0]  Yes   • Elevated transaminase level [R74.0]  Yes   • Chronic back pain [M54.9, G89.29]  Unknown   • Generalized anxiety disorder [F41.1]  Yes   • Acquired hypothyroidism [E03.9]  Yes      Resolved Hospital Problems   No resolved problems to display.        Brief Hospital Course to date:  Yessica Galvin is a 59 y.o. female with history of asthma, chronic pain, hypothyroidism who presents 3/7 evening after injury at home (per report was noted to fall off scooter while playing with grandkids) to RLE and left wrist. Patient sustained a right tibia fx, left radius fx. Planning for ORIF of R tibial fx as well as L radial fx 3/10 afternoon.     Right tibia fracture, left radius fracture  Hypoxia related to pain medication  - orthopedics recommendations reviewed for ORIF of right tibial plateau fx as well as left radial fx-- tentatively planned for 3/10 afternoon, patient is NPO in prep for this  -- per orthopedics recs, patient to be NWB RLE for 6 weeks   -- continue pain control, cautious as patient developed somnolence and hypoxia following  pain medication administration in ED, nerve block placed 3/8, remains in place   -- CXR reviewed without acute process   -- pulmonary toilet, wean oxygen as able     Hypokalemia  -- noted 2.8 today- replace aggressively per electrolyte replacement protocol  -- BMP in AM     Asthma, tobacco abuse  - Counseled smoking cessation, nicotine patch  - Symbicort, scheduled nebs  - CXRAY: pulmonary hyperinflation, otherwise, neg chest xray     Elevated transaminase level, mild  - Acute hepatitis panel checked and negative  - worsened 3/8 but improving on labs today, monitor      Hypothyroidism  -Continue home levothyroxine  -TSH noted to be normal      Generalized anxiety disorder  - Continue home Effexor     Chronic back pain  -Continue home tramadol, gabapentin        DVT prophylaxis: SCD to LLE       Disposition: I expect the patient to be discharged pending further workup and surgery. Suspect patient will need rehab following OR.    CODE STATUS:   Code Status and Medical Interventions:   Ordered at: 03/07/20 2212     Level Of Support Discussed With:    Patient     Code Status:    CPR     Medical Interventions (Level of Support Prior to Arrest):    Full     Electronically signed by Lexie Samaniego MD, 03/10/20, 09:35.

## 2020-03-11 ENCOUNTER — APPOINTMENT (OUTPATIENT)
Dept: GENERAL RADIOLOGY | Facility: HOSPITAL | Age: 60
End: 2020-03-11

## 2020-03-11 LAB
B PARAPERT DNA SPEC QL NAA+PROBE: NOT DETECTED
B PERT DNA SPEC QL NAA+PROBE: NOT DETECTED
BASOPHILS # BLD AUTO: 0.01 10*3/MM3 (ref 0–0.2)
BASOPHILS NFR BLD AUTO: 0.1 % (ref 0–1.5)
BILIRUB UR QL STRIP: NEGATIVE
C PNEUM DNA NPH QL NAA+NON-PROBE: NOT DETECTED
CLARITY UR: CLEAR
COLOR UR: YELLOW
DEPRECATED RDW RBC AUTO: 49.6 FL (ref 37–54)
EOSINOPHIL # BLD AUTO: 0.07 10*3/MM3 (ref 0–0.4)
EOSINOPHIL NFR BLD AUTO: 0.9 % (ref 0.3–6.2)
ERYTHROCYTE [DISTWIDTH] IN BLOOD BY AUTOMATED COUNT: 14.1 % (ref 12.3–15.4)
FLUAV H1 2009 PAND RNA NPH QL NAA+PROBE: NOT DETECTED
FLUAV H1 HA GENE NPH QL NAA+PROBE: NOT DETECTED
FLUAV H3 RNA NPH QL NAA+PROBE: NOT DETECTED
FLUAV SUBTYP SPEC NAA+PROBE: NOT DETECTED
FLUBV RNA ISLT QL NAA+PROBE: NOT DETECTED
GLUCOSE UR STRIP-MCNC: NEGATIVE MG/DL
HADV DNA SPEC NAA+PROBE: NOT DETECTED
HCOV 229E RNA SPEC QL NAA+PROBE: NOT DETECTED
HCOV HKU1 RNA SPEC QL NAA+PROBE: NOT DETECTED
HCOV NL63 RNA SPEC QL NAA+PROBE: NOT DETECTED
HCOV OC43 RNA SPEC QL NAA+PROBE: NOT DETECTED
HCT VFR BLD AUTO: 25 % (ref 34–46.6)
HGB BLD-MCNC: 8.1 G/DL (ref 12–15.9)
HGB UR QL STRIP.AUTO: NEGATIVE
HMPV RNA NPH QL NAA+NON-PROBE: NOT DETECTED
HPIV1 RNA SPEC QL NAA+PROBE: NOT DETECTED
HPIV2 RNA SPEC QL NAA+PROBE: NOT DETECTED
HPIV3 RNA NPH QL NAA+PROBE: NOT DETECTED
HPIV4 P GENE NPH QL NAA+PROBE: NOT DETECTED
IMM GRANULOCYTES # BLD AUTO: 0.03 10*3/MM3 (ref 0–0.05)
IMM GRANULOCYTES NFR BLD AUTO: 0.4 % (ref 0–0.5)
KETONES UR QL STRIP: NEGATIVE
LEUKOCYTE ESTERASE UR QL STRIP.AUTO: NEGATIVE
LYMPHOCYTES # BLD AUTO: 1.23 10*3/MM3 (ref 0.7–3.1)
LYMPHOCYTES NFR BLD AUTO: 15.3 % (ref 19.6–45.3)
M PNEUMO IGG SER IA-ACNC: NOT DETECTED
MCH RBC QN AUTO: 31.2 PG (ref 26.6–33)
MCHC RBC AUTO-ENTMCNC: 32.4 G/DL (ref 31.5–35.7)
MCV RBC AUTO: 96.2 FL (ref 79–97)
MONOCYTES # BLD AUTO: 1.06 10*3/MM3 (ref 0.1–0.9)
MONOCYTES NFR BLD AUTO: 13.2 % (ref 5–12)
NEUTROPHILS # BLD AUTO: 5.63 10*3/MM3 (ref 1.7–7)
NEUTROPHILS NFR BLD AUTO: 70.1 % (ref 42.7–76)
NITRITE UR QL STRIP: NEGATIVE
NRBC BLD AUTO-RTO: 0 /100 WBC (ref 0–0.2)
PH UR STRIP.AUTO: 6 [PH] (ref 5–8)
PLATELET # BLD AUTO: 178 10*3/MM3 (ref 140–450)
PMV BLD AUTO: 9.7 FL (ref 6–12)
PROT UR QL STRIP: NEGATIVE
RBC # BLD AUTO: 2.6 10*6/MM3 (ref 3.77–5.28)
RHINOVIRUS RNA SPEC NAA+PROBE: NOT DETECTED
RSV RNA NPH QL NAA+NON-PROBE: NOT DETECTED
SP GR UR STRIP: 1.01 (ref 1–1.03)
UROBILINOGEN UR QL STRIP: NORMAL
WBC NRBC COR # BLD: 8.03 10*3/MM3 (ref 3.4–10.8)

## 2020-03-11 PROCEDURE — 87040 BLOOD CULTURE FOR BACTERIA: CPT | Performed by: NURSE PRACTITIONER

## 2020-03-11 PROCEDURE — 25010000002 CEFTRIAXONE PER 250 MG: Performed by: HOSPITALIST

## 2020-03-11 PROCEDURE — 25010000002 ROPIVACAINE PER 1 MG: Performed by: ORTHOPAEDIC SURGERY

## 2020-03-11 PROCEDURE — 94799 UNLISTED PULMONARY SVC/PX: CPT

## 2020-03-11 PROCEDURE — 25010000002 ENOXAPARIN PER 10 MG: Performed by: ORTHOPAEDIC SURGERY

## 2020-03-11 PROCEDURE — 97162 PT EVAL MOD COMPLEX 30 MIN: CPT

## 2020-03-11 PROCEDURE — 0100U HC BIOFIRE FILMARRAY RESP PANEL 2: CPT | Performed by: NURSE PRACTITIONER

## 2020-03-11 PROCEDURE — 71045 X-RAY EXAM CHEST 1 VIEW: CPT

## 2020-03-11 PROCEDURE — 25010000002 HYDROMORPHONE 1 MG/ML SOLUTION: Performed by: ORTHOPAEDIC SURGERY

## 2020-03-11 PROCEDURE — 85025 COMPLETE CBC W/AUTO DIFF WBC: CPT | Performed by: ORTHOPAEDIC SURGERY

## 2020-03-11 PROCEDURE — 97166 OT EVAL MOD COMPLEX 45 MIN: CPT

## 2020-03-11 PROCEDURE — 81003 URINALYSIS AUTO W/O SCOPE: CPT | Performed by: NURSE PRACTITIONER

## 2020-03-11 PROCEDURE — 25010000003 CEFAZOLIN IN DEXTROSE 2-4 GM/100ML-% SOLUTION: Performed by: ORTHOPAEDIC SURGERY

## 2020-03-11 PROCEDURE — 94640 AIRWAY INHALATION TREATMENT: CPT

## 2020-03-11 PROCEDURE — 99233 SBSQ HOSP IP/OBS HIGH 50: CPT | Performed by: HOSPITALIST

## 2020-03-11 PROCEDURE — 99024 POSTOP FOLLOW-UP VISIT: CPT | Performed by: ORTHOPAEDIC SURGERY

## 2020-03-11 RX ORDER — DOXYCYCLINE 100 MG/1
100 CAPSULE ORAL EVERY 12 HOURS SCHEDULED
Status: DISCONTINUED | OUTPATIENT
Start: 2020-03-11 | End: 2020-03-17

## 2020-03-11 RX ORDER — IPRATROPIUM BROMIDE AND ALBUTEROL SULFATE 2.5; .5 MG/3ML; MG/3ML
3 SOLUTION RESPIRATORY (INHALATION)
Status: DISCONTINUED | OUTPATIENT
Start: 2020-03-11 | End: 2020-03-13

## 2020-03-11 RX ADMIN — SODIUM CHLORIDE, PRESERVATIVE FREE 10 ML: 5 INJECTION INTRAVENOUS at 20:38

## 2020-03-11 RX ADMIN — DOXYCYCLINE 100 MG: 100 CAPSULE ORAL at 20:33

## 2020-03-11 RX ADMIN — ACETAMINOPHEN 650 MG: 325 TABLET, FILM COATED ORAL at 08:04

## 2020-03-11 RX ADMIN — ENOXAPARIN SODIUM 40 MG: 40 INJECTION SUBCUTANEOUS at 17:39

## 2020-03-11 RX ADMIN — HYDROCODONE BITARTRATE AND ACETAMINOPHEN 1 TABLET: 7.5; 325 TABLET ORAL at 01:02

## 2020-03-11 RX ADMIN — GABAPENTIN 300 MG: 300 CAPSULE ORAL at 20:33

## 2020-03-11 RX ADMIN — HYDROCODONE BITARTRATE AND ACETAMINOPHEN 1 TABLET: 7.5; 325 TABLET ORAL at 08:04

## 2020-03-11 RX ADMIN — MICONAZOLE NITRATE 1 APPLICATION: 2 CREAM TOPICAL at 14:31

## 2020-03-11 RX ADMIN — CEFAZOLIN SODIUM 2 G: 2 INJECTION, SOLUTION INTRAVENOUS at 05:34

## 2020-03-11 RX ADMIN — IPRATROPIUM BROMIDE AND ALBUTEROL SULFATE 3 ML: 2.5; .5 SOLUTION RESPIRATORY (INHALATION) at 15:36

## 2020-03-11 RX ADMIN — SODIUM CHLORIDE, PRESERVATIVE FREE 10 ML: 5 INJECTION INTRAVENOUS at 13:06

## 2020-03-11 RX ADMIN — CEFTRIAXONE SODIUM 1 G: 1 INJECTION, POWDER, FOR SOLUTION INTRAMUSCULAR; INTRAVENOUS at 10:39

## 2020-03-11 RX ADMIN — IPRATROPIUM BROMIDE AND ALBUTEROL SULFATE 3 ML: 2.5; .5 SOLUTION RESPIRATORY (INHALATION) at 19:18

## 2020-03-11 RX ADMIN — HYDROCODONE BITARTRATE AND ACETAMINOPHEN 1 TABLET: 7.5; 325 TABLET ORAL at 14:26

## 2020-03-11 RX ADMIN — ACETAMINOPHEN 650 MG: 325 TABLET, FILM COATED ORAL at 03:26

## 2020-03-11 RX ADMIN — HYDROMORPHONE HYDROCHLORIDE 0.5 MG: 1 INJECTION, SOLUTION INTRAMUSCULAR; INTRAVENOUS; SUBCUTANEOUS at 03:26

## 2020-03-11 RX ADMIN — ROPIVACAINE HYDROCHLORIDE 8 ML/HR: 2 INJECTION, SOLUTION EPIDURAL; INFILTRATION at 14:26

## 2020-03-11 RX ADMIN — VENLAFAXINE HYDROCHLORIDE 150 MG: 75 CAPSULE, EXTENDED RELEASE ORAL at 08:04

## 2020-03-11 RX ADMIN — HYDROMORPHONE HYDROCHLORIDE 0.5 MG: 1 INJECTION, SOLUTION INTRAMUSCULAR; INTRAVENOUS; SUBCUTANEOUS at 17:39

## 2020-03-11 RX ADMIN — HYDROMORPHONE HYDROCHLORIDE 0.5 MG: 1 INJECTION, SOLUTION INTRAMUSCULAR; INTRAVENOUS; SUBCUTANEOUS at 20:33

## 2020-03-11 RX ADMIN — HYDROMORPHONE HYDROCHLORIDE 0.5 MG: 1 INJECTION, SOLUTION INTRAMUSCULAR; INTRAVENOUS; SUBCUTANEOUS at 12:38

## 2020-03-11 RX ADMIN — BUDESONIDE AND FORMOTEROL FUMARATE DIHYDRATE 2 PUFF: 160; 4.5 AEROSOL RESPIRATORY (INHALATION) at 19:20

## 2020-03-11 RX ADMIN — HYDROCODONE BITARTRATE AND ACETAMINOPHEN 1 TABLET: 7.5; 325 TABLET ORAL at 20:33

## 2020-03-11 RX ADMIN — LEVOTHYROXINE SODIUM 150 MCG: 150 TABLET ORAL at 05:34

## 2020-03-11 RX ADMIN — HYDROXYZINE HYDROCHLORIDE 25 MG: 25 TABLET, FILM COATED ORAL at 03:26

## 2020-03-11 RX ADMIN — MICONAZOLE NITRATE: 2 CREAM TOPICAL at 20:38

## 2020-03-11 RX ADMIN — DOXYCYCLINE 100 MG: 100 CAPSULE ORAL at 10:40

## 2020-03-11 NOTE — THERAPY EVALUATION
Patient Name: Yessica Galvin  : 1960    MRN: [id number]                              Today's Date: 3/11/2020       Admit Date: 3/7/2020    Visit Dx:     ICD-10-CM ICD-9-CM   1. Closed fracture of right tibial plateau, initial encounter S82.141A 823.00   2. Closed Colles' fracture of left radius, initial encounter S52.532A 813.41   3. Fall with significant injury, initial encounter W19.XXXA E888.9   4. Contusion of right hip, initial encounter S70.01XA 924.01   5. Impaired mobility and ADLs Z74.09 799.89     Patient Active Problem List   Diagnosis   • Asthmatic bronchitis   • Generalized anxiety disorder   • Gout   • Headache   • Acquired hypothyroidism   • Chronic midline low back pain without sciatica   • Seasonal allergic rhinitis   • Cervical disc disorder with radiculopathy   • Lumbar disc disease   • Fracture of right tibia   • Left radial fracture   • Asthma   • Tobacco abuse   • Elevated transaminase level   • Chronic back pain   • Closed fracture of right tibial plateau   • Hypokalemia     Past Medical History:   Diagnosis Date   • Acute bronchitis    • Acute sinusitis    • Asthma    • Asthma with acute exacerbation    • Asthmatic bronchitis    • Disc degeneration, lumbar    • Disc degeneration, lumbar    • History of mammogram    • Hypothyroidism    • Lower back pain    • Plantar fasciitis    • Restless legs syndrome      Past Surgical History:   Procedure Laterality Date   • HYSTERECTOMY     • KNEE ARTHROSCOPY Right 3/10/2020    Procedure: KNEE ARTHROSCOPY RIGHT;  Surgeon: Guanaco Thakur MD;  Location:  [facility];  Service: Orthopedics;  Laterality: Right;   • NECK SURGERY     • OOPHORECTOMY     • ORIF WRIST FRACTURE Left 3/10/2020    Procedure: WRIST OPEN REDUCTION INTERNAL FIXATION LEFT;  Surgeon: Guanaco Thakur MD;  Location: [facility] OR;  Service: Orthopedics;  Laterality: Left;   • TIBIAL PLATEAU OPEN REDUCTION INTERNAL FIXATION Right 3/10/2020    Procedure: TIBIAL PLATEAU OPEN REDUCTION  INTERNAL FIXATION RIGHT;  Surgeon: Guanaco Thakur MD;  Location: [facility];  Service: Orthopedics;  Laterality: Right;     General Information     Row Name 03/11/20 1146          PT Evaluation Time/Intention    Document Type  evaluation  -SC     Mode of Treatment  physical therapy  -SC     Row Name 03/11/20 1146          General Information    Patient Profile Reviewed?  yes  -SC     Existing Precautions/Restrictions  -- NWB R LE, L UE , KI on R LE  -SC     Row Name 03/11/20 1146          Cognitive Assessment/Intervention- PT/OT    Orientation Status (Cognition)  oriented x 4  -SC     Cognitive Assessment/Intervention Comment  following commands, alert  -SC     Row Name 03/11/20 1146          Safety Issues, Functional Mobility    Safety Issues Affecting Function (Mobility)  -- nerve cath to R le  -SC     Impairments Affecting Function (Mobility)  balance;pain;range of motion (ROM);strength;sensation/sensory awareness R foot numbness  -SC       User Key  (r) = Recorded By, (t) = Taken By, (c) = Cosigned By    Initials Name Provider Type    SC Rosaline Fierro PT Physical Therapist        Mobility     Row Name 03/11/20 1147          Bed Mobility Assessment/Treatment    Bed Mobility Assessment/Treatment  supine-sit;scooting/bridging  -SC     Scooting/Bridging [place] (Bed Mobility)  independent;verbal cues  -SC     Supine-Sit [place] (Bed Mobility)  verbal cues;minimum assist (75% patient effort)  -SC     Assistive Device (Bed Mobility)  head of bed elevated  -SC     Comment (Bed Mobility)  up to edge of bed with cues not to push with L wrist  -SC     Row Name 03/11/20 1147          Transfer Assessment/Treatment    Comment (Transfers)  STS from elevated bed with cues for R UE pushing. Able to stand and not wt bear R LE. Cues to hold R LE off floor more  -SC     Row Name 03/11/20 1147          Sit-Stand Transfer    Sit-Stand [place] (Transfers)  verbal cues;2 person assist;minimum assist (75% patient  effort)  -SC     Assistive Device (Sit-Stand Transfers)  walker, rolling platform  -[facility] Name 03/11/20 1147          Gait/Stairs Assessment/Training    Gait/Stairs Assessment/Training  gait/ambulation independence  -SC     [place] Level (Gait)  contact guard;2 person assist;minimum assist (75% patient effort)  -SC     Assistive Device (Gait)  walker, rolling platform  -SC     Distance in Feet (Gait)  4  -SC     Pattern (Gait)  step-to  -SC     Comment (Gait/Stairs)  Gt training focued on wt bearing through L elbow and sequencing walker. Required some help to turn walker  -[facility] Name 03/11/20 1147          Mobility Assessment/Intervention    Extremity Weight-bearing Status  left upper extremity;right lower extremity  -SC     Left Upper Extremity (Weight-bearing Status)  non weight-bearing (NWB)  -SC     Right Lower Extremity (Weight-bearing Status)  non weight-bearing (NWB)  -SC       User Key  (r) = Recorded By, (t) = Taken By, (c) = Cosigned By    Initials Name Provider Type    SC Rosaline iFerro, PT Physical Therapist        Obj/Interventions     [facility] Name 03/11/20 1151          General ROM    GENERAL ROM COMMENTS  LE in knee immobilizer,. L wirst splinted. other jts wfl  -[facility] Name 03/11/20 1151          MMT (Manual Muscle Testing)    General MMT Comments  R LE : foot 0/5, quads1/5. L LE 5/5  -[facility] Name 03/11/20 1151          Therapeutic Exercise    Lower Extremity (Therapeutic Exercise)  gastroc stretch, bilateral;gluteal sets;quad sets, bilateral  -SC     Sets/Reps (Therapeutic Exercise)  10  -[facility] Name 03/11/20 1151          Dynamic Standing Balance    Level of [place], Reaches Outside Midline (Standing, Dynamic Balance)  1 person to manage equipment  -SC     Assistive Device Utilized (Supported Standing, Dynamic Balance)  walker, rolling platform  -[facility] Name 03/11/20 1151          Sensory Assessment/Intervention    Sensory General Assessment  -- R foot +numbness   -SC       User Key  (r) = Recorded By, (t) = Taken By, (c) = Cosigned By    Initials Name Provider Type    SC Rosaline Fierro A, PT Physical Therapist        Goals/Plan     Row Name 03/11/20 1158          Bed Mobility Goal 1 (PT)    Activity/Assistive Device (Bed Mobility Goal 1, PT)  bed mobility activities, all  -SC     [place] Level/Cues Needed (Bed Mobility Goal 1, PT)  conditional independence  -SC     Time Frame (Bed Mobility Goal 1, PT)  long term goal (LTG);10 days  -[facility] Name 03/11/20 1158          Transfer Goal 1 (PT)    Activity/Assistive Device (Transfer Goal 1, PT)  transfers, all;bed-to-chair/chair-to-bed;walker, platform  -SC     [place] Level/Cues Needed (Transfer Goal 1, PT)  contact guard assist  -SC     Time Frame (Transfer Goal 1, PT)  long term goal (LTG);10 days  -[facility] Name 03/11/20 1158          Gait Training Goal 1 (PT)    Activity/Assistive Device (Gait Training Goal 1, PT)  gait (walking locomotion);walker, platform  -SC     [place] Level (Gait Training Goal 1, PT)  contact guard assist  -SC     Distance (Gait Goal 1, PT)  150  -SC     Time Frame (Gait Training Goal 1, PT)  long term goal (LTG);10 days  -[facility] Name 03/11/20 1158          Stairs Goal 1 (PT)    Activity/Assistive Device (Stairs Goal 1, PT)  stairs, all skills  -SC     [place] Level/Cues Needed (Stairs Goal 1, PT)  verbal cues required;minimum assist (75% or more patient effort)  -SC     Number of Stairs (Stairs Goal 1, PT)  1  -SC     Time Frame (Stairs Goal 1, PT)  long term goal (LTG);10 days  -SC       User Key  (r) = Recorded By, (t) = Taken By, (c) = Cosigned By    Initials Name Provider Type    SC Rosaline Fierro, PT Physical Therapist        Clinical Impression     [facility] Name 03/11/20 7522          Pain Assessment    Additional Documentation  Pain Scale: FACES Pre/Post-Treatment (Group)  -SC     Row Name 03/11/20 3965          Pain Scale: Numbers Pre/Post-Treatment    Pain Scale:  Numbers, Pretreatment  0/10 - no pain  -SC     Pain Scale: Numbers, Post-Treatment  0/10 - no pain  -SC     Row Name 03/11/20 1155          Plan of Care Review    Plan of Care Reviewed With  patient;spouse  -SC     Progress  improving  -SC     Outcome Summary  Patient requires minimal assistance to get out of bed. She was able to stand and take steps maintaining nwb R LE. She required cues for safety and quickly fatigues.  She needs 24/7 care if going home. Recommend rolling platform walker and also can benefit from w/c rental  -SC     Row Name 03/11/20 1153          Physical Therapy Clinical Impression    Criteria for Skilled Interventions Met (PT Clinical Impression)  yes;treatment indicated  -SC     Rehab Potential (PT Clinical Summary)  good, to achieve stated therapy goals  -SC     Row Name 03/11/20 1153          Positioning and Restraints    Pre-Treatment Position  in bed  -SC     Post Treatment Position  chair  -SC     In Chair  notified nsg;reclined;sitting;call light within reach;encouraged to call for assist;with family/caregiver  -SC       User Key  (r) = Recorded By, (t) = Taken By, (c) = Cosigned By    Initials Name Provider Type    Rosaline Vinson, PT Physical Therapist        Outcome Measures     Row Name 03/11/20 5267          How much help from another person do you currently need...    Turning from your back to your side while in flat bed without using bedrails?  3  -SC     Moving from lying on back to sitting on the side of a flat bed without bedrails?  3  -SC     Moving to and from a bed to a chair (including a wheelchair)?  3  -SC     Standing up from a chair using your arms (e.g., wheelchair, bedside chair)?  3  -SC     Climbing 3-5 steps with a railing?  1  -SC     To walk in hospital room?  3  -SC     AM-PAC 6 Clicks Score (PT)  16  -SC     Row Name 03/11/20 1155          Functional Assessment    Outcome Measure Options  AM-PAC 6 Clicks Basic Mobility (PT)  -SC       User Key  (r) =  Recorded By, (t) = Taken By, (c) = Cosigned By    Initials Name Provider Type    SC Rosaline Fierro PT Physical Therapist          PT Recommendation and Plan  Planned Therapy Interventions (PT Eval): bed mobility training, gait training, home exercise program, patient/family education, strengthening, transfer training  Outcome Summary/Treatment Plan (PT)  Anticipated Equipment Needs at Discharge (PT): platform walker, wheelchair  Anticipated Discharge Disposition (PT): home with assist  Plan of Care Reviewed With: patient, spouse  Progress: improving  Outcome Summary: Patient requires minimal assistance to get out of bed. She was able to stand and take steps maintaining nwb R LE. She required cues for safety and quickly fatigues.  She needs 24/7 care if going home. Recommend rolling platform walker and also can benefit from w/c rental     Time Calculation:   PT Charges     Row Name 03/11/20 1100             Time Calculation    Start Time  1100  -SC      PT Received On  03/04/20  -SC      PT Goal Re-Cert Due Date  03/21/20  -SC        User Key  (r) = Recorded By, (t) = Taken By, (c) = Cosigned By    Initials Name Provider Type    SC Rosaline Fierro PT Physical Therapist        Therapy Charges for Today     Code Description Service Date Service Provider Modifiers Qty    [id number]  PT EVAL MOD COMPLEXITY 4 3/11/2020 Rosaline Fierro PT GP 1          PT G-Codes  Outcome Measure Options: AM-PAC 6 Clicks Basic Mobility (PT)  AM-PAC 6 Clicks Score (PT): 16  AM-PAC 6 Clicks Score (OT): 11    Rosaline Fierro PT  3/11/2020

## 2020-03-11 NOTE — PROGRESS NOTES
Continued Stay Note  [facility]     Patient Name: Yessica Galvin  MRN: [id number]  Today's Date: 3/11/2020    Admit Date: 3/7/2020    Discharge Plan     Row Name 03/11/20 1351       Plan    Plan  Home    Patient/Family in Agreement with Plan  yes    Plan Comments  Spoke with patient and her  in room.  Therapy recommending home with 24/7 assist.  Patient states that her  will be available to assist.  therapy also recommending a bedside commode, transport chair and rolling walker with left platform attachment.  Orders called to [place] with [facility].  DME to be delivered to patient's bedside prior to discharge.  Patient denies any other needs at this time.  CM will continue to follow.  Bridgette Kaufman RN [phone]    Final Discharge Disposition Code  01 - home or self-care        Discharge Codes    No documentation.       Expected Discharge Date and Time     Expected Discharge Date Expected Discharge Time    Mar 12, 2020             Bridgette Kaufman RN

## 2020-03-11 NOTE — PLAN OF CARE
Problem: Skin Injury Risk (Adult)  Goal: Identify Related Risk Factors and Signs and Symptoms  Flowsheets (Taken 3/9/2020 1834 by Asuncion Garza, RN)  Related Risk Factors (Skin Injury Risk): advanced age;mobility impaired     Problem: Fall Risk (Adult)  Goal: Identify Related Risk Factors and Signs and Symptoms  Flowsheets (Taken 3/9/2020 1834 by Asuncion Garza, RN)  Related Risk Factors (Fall Risk): fatigue/slow reaction;gait/mobility problems;history of falls     Problem: Patient Care Overview  Goal: Plan of Care Review  Flowsheets (Taken 3/11/2020 0506)  Progress: no change  Plan of Care Reviewed With: patient  Outcome Summary: VSS, voids well pt spiked temp tonight, 4L NC, pain managed with PRN medications, will continue to monitor for changes.

## 2020-03-11 NOTE — PLAN OF CARE
Problem: Patient Care Overview  Goal: Plan of Care Review  Outcome: Ongoing (interventions implemented as appropriate)  Flowsheets (Taken 3/11/2020 4583)  Progress: improving  Plan of Care Reviewed With: patient; spouse  Note:   Patient consult for red/bruised gluteals and reddened area to back. Bruising noted from fall-off-load per patient comfort-may apply Z guard for moisture barrier due to patient low mobility. Two red/raised areas on back-present as fungal rash-Micotin cream ordered-all interventions in place-WOC will continue to follow. RN at bedside and updated on POC

## 2020-03-11 NOTE — PROGRESS NOTES
"Orthopaedic Surgery Progress Note     LOS: 4 days   Patient Care Team:  Kota Swain MD as PCP - General    POD 1    Subjective     Interval History:   Patient Reports: Complaints of pain.  Anesthesia here to re-dose nerve catheter.    Objective     Vital Signs:  Temp (24hrs), Av.9 °F (37.2 °C), Min:98 °F (36.7 °C), Max:101 °F (38.3 °C)    /87 (BP Location: Right arm, Patient Position: Lying)   Pulse 97   Temp 99.2 °F (37.3 °C)   Resp 12   Ht 165.1 cm (65\")   Wt 92.1 kg (203 lb)   SpO2 100%   BMI 33.78 kg/m²     Labs:  Lab Results (last 24 hours)     Procedure Component Value Units Date/Time    Urinalysis With Culture If Indicated - Urine, Clean Catch [[id number]]  (Normal) Collected:  20    Specimen:  Urine, Clean Catch Updated:  20     Color, UA Yellow     Appearance, UA Clear     pH, UA 6.0     Specific Gravity, UA 1.009     Glucose, UA Negative     Ketones, UA Negative     Bilirubin, UA Negative     Blood, UA Negative     Protein, UA Negative     Leuk Esterase, UA Negative     Nitrite, UA Negative     Urobilinogen, UA 1.0 E.U./dL    Narrative:       Urine microscopic not indicated.    Blood Culture - Blood, Hand, Right [[id number]] Collected:  20    Specimen:  Blood from Hand, Right Updated:  20    Blood Culture - Blood, Arm, Right [[id number]] Collected:  20    Specimen:  Blood from Arm, Right Updated:  20    CBC & Differential [[id number]] Collected:  20    Specimen:  Blood Updated:  20 05    Narrative:       The following orders were created for panel order CBC & Differential.  Procedure                               Abnormality         Status                     ---------                               -----------         ------                     CBC Auto Differential[[id number]]        Abnormal            Final result                 Please view results for these tests on the individual orders.    CBC Auto " Differential [[id number]]  (Abnormal) Collected:  03/11/20 0519    Specimen:  Blood Updated:  03/11/20 0535     WBC 8.03 10*3/mm3      RBC 2.60 10*6/mm3      Hemoglobin 8.1 g/dL      Hematocrit 25.0 %      MCV 96.2 fL      MCH 31.2 pg      MCHC 32.4 g/dL      RDW 14.1 %      RDW-SD 49.6 fl      MPV 9.7 fL      Platelets 178 10*3/mm3      Neutrophil % 70.1 %      Lymphocyte % 15.3 %      Monocyte % 13.2 %      Eosinophil % 0.9 %      Basophil % 0.1 %      Immature Grans % 0.4 %      Neutrophils, Absolute 5.63 10*3/mm3      Lymphocytes, Absolute 1.23 10*3/mm3      Monocytes, Absolute 1.06 10*3/mm3      Eosinophils, Absolute 0.07 10*3/mm3      Basophils, Absolute 0.01 10*3/mm3      Immature Grans, Absolute 0.03 10*3/mm3      nRBC 0.0 /100 WBC     Potassium [[id number]]  (Abnormal) Collected:  03/10/20 1117    Specimen:  Blood Updated:  03/10/20 1143     Potassium 3.3 mmol/L     Hepatic Function Panel [[id number]]  (Abnormal) Collected:  03/10/20 0549    Specimen:  Blood Updated:  03/10/20 0834     Total Protein 4.9 g/dL      Albumin 2.70 g/dL      ALT (SGPT) 105 U/L      AST (SGOT) 44 U/L      Alkaline Phosphatase 96 U/L      Total Bilirubin 0.4 mg/dL      Bilirubin, Direct <0.2 mg/dL      Bilirubin, Indirect --     Comment: Unable to calculate             Imaging:  FL C Arm During Surgery  Narrative: EXAMINATION: FL C-ARM DURING SURGERY-03/10/2020:     INDICATION: ORIF Tibial Plateau; S82.141A-Displaced bicondylar fracture  of right tibia, initial encounter for closed fracture; S52.532A-Colles'  fracture of left radius, initial encounter for closed fracture;  W19.XXXA-Unspecified fall, initial encounter; S70.01XA-Contusion of  right hip, initial encounter.      TECHNIQUE: Intraoperative fluoroscopy for improved localization and  treatment planning.     COMPARISON: NONE.     FINDINGS: Intraoperative fluoroscopy with total fluoroscopic time usage  38 seconds and a single representative image saved during right  tibial  plateau ORIF and left wrist ORIF.     Impression: Intraoperative fluoroscopy utilized during right tibial ORIF  and left wrist ORIF.     D:  03/10/2020  E:  03/10/2020            This report was finalized on 3/10/2020 6:37 PM by Dr. Jimi Ayoub.          Physical Exam:  Right lower extremity compartments are very soft.  Motor function and sensory unable to truly assess secondary to the block.  Left upper extremity fingers motion sensory grossly intact  Assessment/Plan   Postop day 1 status post right tibia ORIF and left wrist ORIF  She is nonweightbearing right lower extremity and nonweightbearing left wrist.  She may weight-bear through the left elbow.  Continue physical therapy.  Anesthesia to work on pain management  Guanaco Thakur MD  03/11/20  08:05

## 2020-03-11 NOTE — PLAN OF CARE
Problem: Patient Care Overview  Goal: Plan of Care Review  Flowsheets (Taken 3/11/2020 7254)  Outcome Summary: OT IE completed. Pt presents with significant pain and bilateral WB precautions limiting mobility and ADL performance. Assist x2 for bed mobility. Dependent for LB ADLs and toileting. Education initiated for ROM HEP initiated LUE/hand and positioning for edema mgmt. OT will follow IP to advance ADL and transfers as tolerated. Recommend IRF at d/c for best outcome.

## 2020-03-11 NOTE — PLAN OF CARE
Problem: Patient Care Overview  Goal: Plan of Care Review  Flowsheets  Taken 3/11/2020 1200  Progress: improving  Plan of Care Reviewed With: patient;spouse  Taken 3/11/2020 1153  Outcome Summary: Patient requires minimal assistance to get out of bed. She was able to stand and take steps maintaining nwb R LE. She required cues for safety and quickly fatigues.  She needs 24/7 care if going home. Recommend rolling platform walker and also can benefit from w/c rental

## 2020-03-11 NOTE — PROGRESS NOTES
[facility] Medicine Services  PROGRESS NOTE    Patient Name: Yessica Galvin  : 1960  MRN: [id number]    Date of Admission: 3/7/2020  Primary Care Physician: Kota Swain MD    Subjective   Subjective     CC:  R Leg pain    HPI:  Patient resting in bed. Notes RLE pain. Having throbbing, but better now. Throbbing in L wrist. Notes cough/congestion. No dysuria. No n/v. No diarrhea.    Review of Systems   HENT: Positive for congestion.    Respiratory: Positive for cough and wheezing.    Cardiovascular: Negative.    Gastrointestinal: Negative.    Genitourinary: Negative.    Musculoskeletal: Positive for arthralgias and myalgias.   Skin: Negative.    Neurological: Negative.    Hematological: Negative.    Psychiatric/Behavioral: Positive for dysphoric mood.          Objective   Objective     Vital Signs:   Temp:  [98 °F (36.7 °C)-101 °F (38.3 °C)] 99.9 °F (37.7 °C)  Heart Rate:  [] 100  Resp:  [12-20] 16  BP: (109-139)/(61-95) 123/79        Physical Exam:  NAD, alert and oriented x 3  OP clear, MMM  PERRL  Face symmetric, speech clear  Neck supple  No LAD  Tachy  Coarse cough noted, B wheezing, no rhonchi/rales  +BS, ND, NT, soft  No c/c/e  R LE in splints, LUE in splint, KIM otherwise  No obvious rashes  Normal affect    Results Reviewed:  Results from last 7 days   Lab Units 20  0519 03/10/20  0549 20  0603   WBC 10*3/mm3 8.03 7.68 8.58   HEMOGLOBIN g/dL 8.1* 9.4* 10.0*   HEMATOCRIT % 25.0* 29.4* 31.9*   PLATELETS 10*3/mm3 178 165 191     Estimated Creatinine Clearance: 206.9 mL/min (A) (by C-G formula based on SCr of 0.32 mg/dL (L)).    I have reviewed the medications:  Scheduled Meds:    budesonide-formoterol 2 puff Inhalation BID - RT   enoxaparin 40 mg Subcutaneous Q24H   gabapentin 300 mg Oral Nightly   levothyroxine 150 mcg Oral Q AM   nicotine 1 patch Transdermal Q24H   sodium chloride 10 mL Intravenous Q12H   sodium chloride 10 mL Intravenous Q12H      venlafaxine  mg Oral Daily     Continuous Infusions:    lactated ringers 9 mL/hr Last Rate: 9 mL/hr (03/10/20 1110)   ropivacaine (NAROPIN) 0.2% peripheral nerve cath (moog) 8 mL/hr Last Rate: 8 mL/hr (03/10/20 1535)   sodium chloride 125 mL/hr Last Rate: 125 mL/hr (03/08/20 0604)     PRN Meds:.•  acetaminophen **OR** acetaminophen **OR** acetaminophen  •  albuterol  •  bisacodyl  •  bisacodyl  •  HYDROcodone-acetaminophen  •  HYDROmorphone **AND** [PENDING] HYDROmorphone **AND** naloxone **AND** [PENDING] naloxone  •  hydrOXYzine  •  lactated ringers  •  magnesium hydroxide  •  ondansetron  •  potassium chloride **OR** potassium chloride **OR** potassium chloride  •  sodium chloride  •  sodium chloride    Assessment/Plan   Assessment & Plan     Active Hospital Problems    Diagnosis  POA   • **Closed fracture of right tibial plateau [S82.141A]  Unknown   • Hypokalemia [E87.6]  Unknown   • Fracture of right tibia [S82.201A]  Yes   • Left radial fracture [S52.92XA]  Yes   • Asthma [J45.909]  Yes   • Tobacco abuse [Z72.0]  Yes   • Elevated transaminase level [R74.0]  Yes   • Chronic back pain [M54.9, G89.29]  Unknown   • Generalized anxiety disorder [F41.1]  Yes   • Acquired hypothyroidism [E03.9]  Yes      Resolved Hospital Problems   No resolved problems to display.        Brief Hospital Course to date:  Yessica Galvin is a 59 y.o. female with history of asthma, chronic pain, hypothyroidism who presents 3/7 evening after injury at home (per report was noted to fall off scooter while playing with grandkids) to RLE and left wrist. Patient sustained a right tibia fx, left radius fx. S/P ORIF of RLE and L wrist    Right tibia fracture, left radius fracture  Hypoxia related to pain medication  --s/p ORIF of R tibia and L radial fracture  --nonweightbearing, PT/OT as tolerated/instructed  --awaiting rehab  --anesthesia optimizing pain control    Cough/congestion  --flutter valve/spirometer, repeat CXR  --nebs  prn    Hypokalemia  --replace prn     Asthma, tobacco abuse  -nebs/spirometer     Elevated transaminase level, mild  -Acute hepatitis panel checked and negative  -Monitor     Hypothyroidism  -stable on levothyroxine     Generalized anxiety disorder  -Stable on Effexor     Chronic back pain  -Continue home tramadol, gabapentin        DVT prophylaxis: SCD to LLE       Disposition: I expect the patient to be discharged to rehab soon    CODE STATUS:   Code Status and Medical Interventions:   Ordered at: 03/07/20 2212     Level Of Support Discussed With:    Patient     Code Status:    CPR     Medical Interventions (Level of Support Prior to Arrest):    Full     Electronically signed by Nomi Ortiz MD, 03/11/20, 08:14.

## 2020-03-11 NOTE — THERAPY EVALUATION
Acute Care - Occupational Therapy Initial Evaluation  [facility]     Patient Name: Yessica Galvin  : 1960  MRN: [id number]  Today's Date: 3/11/2020  Onset of Illness/Injury or Date of Surgery: 20  Date of Referral to OT: 03/10/20  Referring Physician: Blaze    Admit Date: 3/7/2020       ICD-10-CM ICD-9-CM   1. Closed fracture of right tibial plateau, initial encounter S82.141A 823.00   2. Closed Colles' fracture of left radius, initial encounter S52.532A 813.41   3. Fall with significant injury, initial encounter W19.XXXA E888.9   4. Contusion of right hip, initial encounter S70.01XA 924.01   5. Impaired mobility and ADLs Z74.09 799.89     Patient Active Problem List   Diagnosis   • Asthmatic bronchitis   • Generalized anxiety disorder   • Gout   • Headache   • Acquired hypothyroidism   • Chronic midline low back pain without sciatica   • Seasonal allergic rhinitis   • Cervical disc disorder with radiculopathy   • Lumbar disc disease   • Fracture of right tibia   • Left radial fracture   • Asthma   • Tobacco abuse   • Elevated transaminase level   • Chronic back pain   • Closed fracture of right tibial plateau   • Hypokalemia     Past Medical History:   Diagnosis Date   • Acute bronchitis    • Acute sinusitis    • Asthma    • Asthma with acute exacerbation    • Asthmatic bronchitis    • Disc degeneration, lumbar    • Disc degeneration, lumbar    • History of mammogram    • Hypothyroidism    • Lower back pain    • Plantar fasciitis    • Restless legs syndrome      Past Surgical History:   Procedure Laterality Date   • HYSTERECTOMY     • NECK SURGERY     • OOPHORECTOMY            OT ASSESSMENT FLOWSHEET (last 12 hours)      Occupational Therapy Evaluation     Row Name 20 0755                   OT Evaluation Time/Intention    Subjective Information  complains of;pain  -TB        Document Type  evaluation  -TB        Mode of Treatment  occupational therapy;individual therapy  -TB        Patient  Effort  good  -TB        Symptoms Noted During/After Treatment  increased pain  -TB        Comment  Pt motivated to work with therapy  -TB           General Information    Patient Profile Reviewed?  yes  -TB        Onset of Illness/Injury or Date of Surgery  03/07/20  -TB        Referring Physician  Blaze  -TB        Patient Observations  alert;cooperative;agree to therapy  -TB        Patient/Family Observations  No family present  -TB        Equipment Currently Used at Home  none  -TB        Pertinent History of Current Functional Problem  s/p fall with R tibial plateau fracture and L wrist fracture; s/p ORIF R tibia and s/p ORIF L wrist  -TB        Existing Precautions/Restrictions  (S) fall;non-weight bearing;other (see comments);brace worn when out of bed NWB and KI LLE, NWB L wrist - cleared to WB through L elbow  -TB        Limitations/Impairments  safety/cognitive  -TB           Relationship/Environment    Lives With  spouse  -TB        Family Caregiver if Needed  spouse;child(harry), adult  -TB           Resource/Environmental Concerns    Current Living Arrangements  home/apartment/condo  -TB           Home Main Entrance    Number of Stairs, Main Entrance  two  -TB           Cognitive Assessment/Intervention- PT/OT    Orientation Status (Cognition)  oriented x 4  -TB        Follows Commands (Cognition)  follows one step commands;over 90% accuracy;verbal cues/prompting required  -TB        Safety Deficit (Cognitive)  safety precautions awareness;safety precautions follow-through/compliance  -TB        Cognitive Assessment/Intervention Comment  New learning deficits  -TB           Safety Issues, Functional Mobility    Safety Issues Affecting Function (Mobility)  safety precaution awareness;safety precautions follow-through/compliance  -TB        Impairments Affecting Function (Mobility)  balance;pain;range of motion (ROM);strength;sensation/sensory awareness R foot densely numb  -TB        Comment, Safety  Issues/Impairments (Mobility)  --  -TB           Mobility Assessment/Treatment    Extremity Weight-bearing Status  left upper extremity;right lower extremity  -TB        Left Upper Extremity (Weight-bearing Status)  non weight-bearing (NWB) NWB at wrist, cleared to WB through L elbow  -TB        Right Lower Extremity (Weight-bearing Status)  non weight-bearing (NWB)  -TB           Bed Mobility Assessment/Treatment    Bed Mobility Assessment/Treatment  rolling left;rolling right  -TB        Rolling Left [place] (Bed Mobility)  maximum assist (25% patient effort);2 person assist  -TB        Rolling Right [place] (Bed Mobility)  maximum assist (25% patient effort);2 person assist  -TB        Bed Mobility, Safety Issues  decreased use of arms for pushing/pulling;decreased use of legs for bridging/pushing  -TB        Assistive Device (Bed Mobility)  draw sheet  -TB           Functional Mobility    Functional Mobility- Comment  Defer to PT  -TB           Transfer Assessment/Treatment    Comment (Transfers)  Deferred d/t acute pain and densely numb RLE  -TB           ADL Assessment/Intervention    BADL Assessment/Intervention  grooming;feeding;toileting  -TB           Grooming Assessment/Training    [place] Level (Grooming)  set up;wash face, hands  -TB        Grooming Position  supine  -TB           Self-Feeding Assessment/Training    [place] Level (Feeding)  set up;prepare tray/open items;independent;scoop food and bring to mouth;liquids to mouth  -TB           Toileting Assessment/Training    [place] Level (Toileting)  dependent (less than 25% patient effort)  -TB        Toileting Position  supine  -TB           BADL Safety/Performance    Impairments, BADL Safety/Performance  balance;endurance/activity tolerance;pain;range of motion;sensory awareness;strength  -TB        Skilled BADL Treatment/Intervention  BADL process/adaptation training  -TB           General ROM    GENERAL ROM COMMENTS  RUE  AROM WFL, L shoulder elbow WFL and pt tolerates flex/ext all digits - splint L wrist  -TB           MMT (Manual Muscle Testing)    General MMT Comments  Generalized weakness; RUE functionally 4/5, Lshoulder and elbow 3+/5  -TB           Motor Assessment/Interventions    Additional Documentation  Therapeutic Exercise (Group);Therapeutic Exercise Interventions (Group)  -TB           Therapeutic Exercise    Additional Documentation  Therapeutic Exercise (Row)  -TB           Therapeutic Exercise    Upper Extremity Range of Motion (Therapeutic Exercise)  shoulder flexion/extension, left;shoulder abduction/adduction, left;elbow flexion/extension, left  -TB        Hand (Therapeutic Exercise)  finger flexion/extension, left;thumb finger opposition, left  -TB        Exercise Type (Therapeutic Exercise)  AROM (active range of motion)  -TB        Sets/Reps (Therapeutic Exercise)  2x5  -TB        Comment (Therapeutic Exercise)  Education initiated for LUE/hand ROM HEP and elevated positioning to support edema mgmt  -TB           Sensory Assessment/Intervention    Sensory General Assessment  light touch sensation deficits identified  -TB           Light Touch Sensation Assessment    Left Upper Extremity: Light Touch Sensation Assessment  intact  -TB        Right Upper Extremity: Light Touch Sensation Assessment  intact  -TB        Left Lower Extremity: Light Touch Sensation Assessment  intact  -TB        Right Lower Extremity: Light Touch Sensation Assessment  severe impairment, less than 50% correct responses  -TB        Comment, Right Lower Extremity: Light Touch Sensation Assessment  Nerve block   -TB           Positioning and Restraints    Pre-Treatment Position  in bed  -TB        Post Treatment Position  bed  -TB        In Bed  notified nsg;fowlers;call light within reach;encouraged to call for assist;exit alarm on  -TB           Pain Assessment    Additional Documentation  Pain Scale: Numbers Pre/Post-Treatment (Group)   -TB           Pain Scale: Numbers Pre/Post-Treatment    Pain Scale: Numbers, Pretreatment  10/10  -TB        Pain Scale: Numbers, Post-Treatment  10/10  -TB        Pain Location - Side  Left  -TB        Pain Location  knee  -TB        Pre/Post Treatment Pain Comment  Pt tolerates activity  -TB        Pain Intervention(s)  Repositioned gentle ROM, RN passed pain meds  -TB           Edema Assessment & Management    Location (Edema)  upper extremity, left  -TB        Additional Documentation  Edema Symptoms/Interventions (Group);Location (Edema) (Row)  -TB           Edema Symptoms/Interventions    Treatment Interventions (Edema)  active range of motion;positioning  -TB           Wound 03/10/20 1311 Right anterior;lower leg Incision    Wound - Properties Group Date first assessed: 03/10/20  -TV Time first assessed: 1311  -TV Present on Hospital Admission: N  -TV Side: Right  -TV Orientation: anterior;lower  -TV Location: leg  -TV Primary Wound Type: Incision  -TV       Wound 03/10/20 1311 Left lower wrist Incision    Wound - Properties Group Date first assessed: 03/10/20  -TV Time first assessed: 1311  -TV Present on Hospital Admission: N  -TV Side: Left  -TV Orientation: lower  -TV Location: wrist  -TV Primary Wound Type: Incision  -TV       Coping    Observed Emotional State  calm;cooperative  -TB        Verbalized Emotional State  acceptance  -TB           Plan of Care Review    Plan of Care Reviewed With  patient  -TB           Clinical Impression (OT)    Date of Referral to OT  03/10/20  -TB        OT Diagnosis  Impaired mobility and ADL  -TB        Patient/Family Goals Statement (OT Eval)  pt requesting rehab at d/c  -TB        Criteria for Skilled Therapeutic Interventions Met (OT Eval)  yes;treatment indicated  -TB        Rehab Potential (OT Eval)  fair, will monitor progress closely  -TB        Therapy Frequency (OT Eval)  daily  -TB        Care Plan Review (OT)  evaluation/treatment results reviewed;care  plan/treatment goals reviewed;risks/benefits reviewed;patient/other agree to care plan  -TB        Anticipated Equipment Needs at Discharge (OT)  bedside commode;tub bench Defer to rehab  -TB        Anticipated Discharge Disposition (OT)  inpatient rehabilitation facility  -TB           Vital Signs    Pre Systolic BP Rehab  -- RN cleared OT, VSS per nursing assessment  -TB        O2 Delivery Post Treatment  room air  -TB        Pre Patient Position  Supine  -TB        Intra Patient Position  Side Lying  -TB        Post Patient Position  Supine  -TB           Planned OT Interventions    Planned Therapy Interventions (OT Eval)  transfer/mobility retraining;ROM/therapeutic exercise;occupation/activity based interventions;adaptive equipment training;BADL retraining  -TB           OT Goals    Transfer Goal Selection (OT)  transfer, OT goal 1  -TB        Dressing Goal Selection (OT)  dressing, OT goal 1  -TB        ROM Goal Selection (OT)  ROM, OT goal 1  -TB           Transfer Goal 1 (OT)    Activity/Assistive Device (Transfer Goal 1, OT)  toilet;commode, bedside without drop arms;walker, platform  -TB        Vincent Level/Cues Needed (Transfer Goal 1, OT)  moderate assist (50-74% patient effort);2 person assist;verbal cues required  -TB        Barriers (Transfers Goal 1, OT)  NWB RLE, L wrist; platform wx  -TB        Progress/Outcome (Transfer Goal 1, OT)  goal ongoing  -TB           Dressing Goal 1 (OT)    Activity/Assistive Device (Dressing Goal 1, OT)  lower body dressing  -TB        Vincent/Cues Needed (Dressing Goal 1, OT)  maximum assist (25-49% patient effort);verbal cues required;tactile cues required  -TB        Time Frame (Dressing Goal 1, OT)  by discharge  -TB        Barriers (Dressing Goal 1, OT)  AE TBD in treatment  -TB        Progress/Outcome (Dressing Goal 1, OT)  goal ongoing  -TB           ROM Goal 1 (OT)    ROM Goal 1 (OT)  Pt demonstrates understanding to complete LUE/hand ROM and  positioning for edema mgmt   -TB        Time Frame (ROM Goal 1, OT)  by discharge  -TB        Progress/Outcome (ROM Goal 1, OT)  goal ongoing  -TB           Living Environment    Home Accessibility  stairs to enter home;tub/shower is not walk in Education initiated for DME options for Tub and BSC  -TB          User Key  (r) = Recorded By, (t) = Taken By, (c) = Cosigned By    Initials Name Effective Dates    TB Charlee Hogan, OT 06/08/18 -     TV Sneha Pedroza RN 06/16/16 -                OT Recommendation and Plan  Outcome Summary/Treatment Plan (OT)  Anticipated Equipment Needs at Discharge (OT): bedside commode, tub bench(Defer to rehab)  Anticipated Discharge Disposition (OT): inpatient rehabilitation facility  Planned Therapy Interventions (OT Eval): transfer/mobility retraining, ROM/therapeutic exercise, occupation/activity based interventions, adaptive equipment training, BADL retraining  Therapy Frequency (OT Eval): daily  Plan of Care Review  Plan of Care Reviewed With: patient  Plan of Care Reviewed With: patient  Outcome Summary: OT IE completed. Pt presents with significant pain and bilateral WB precautions limiting mobility and ADL performance. Assist x2 for bed mobility. Dependent for LB ADLs and toileting. Education initiated for ROM HEP initiated LUE/hand and positioning for edema mgmt. OT will follow IP to advance ADL and transfers as tolerated. Recommend IRF at d/c for best outcome.    Outcome Measures     Row Name 03/11/20 0755             How much help from another is currently needed...    Putting on and taking off regular lower body clothing?  1  -TB      Bathing (including washing, rinsing, and drying)  2  -TB      Toileting (which includes using toilet bed pan or urinal)  1  -TB      Putting on and taking off regular upper body clothing  2  -TB      Taking care of personal grooming (such as brushing teeth)  2  -TB      Eating meals  3  -TB      AM-PAC 6 Clicks Score (OT)  11   -TB         Functional Assessment    Outcome Measure Options  AM-PAC 6 Clicks Daily Activity (OT)  -TB        User Key  (r) = Recorded By, (t) = Taken By, (c) = Cosigned By    Initials Name Provider Type    Charlee Hassan OT Occupational Therapist          Time Calculation:   Time Calculation- OT     Row Name 03/11/20 0903             Time Calculation- OT    OT Start Time  0755  -TB      OT Received On  03/11/20  -TB      OT Goal Re-Cert Due Date  03/21/20  -TB        User Key  (r) = Recorded By, (t) = Taken By, (c) = Cosigned By    Initials Name Provider Type    Charlee Hassan OT Occupational Therapist        Therapy Charges for Today     Code Description Service Date Service Provider Modifiers Qty    09222753812 HC OT EVAL MOD COMPLEXITY 4 3/11/2020 Charlee Hogan OT GO 1               Charlee Hogan OT  3/11/2020

## 2020-03-11 NOTE — PROGRESS NOTES
[facility]    Acute pain service Inpatient Progress Note    Patient Name: Yessica Galvin  :  1960  MRN:  [id number]        Acute Pain  Service Inpatient Progress Note:    Analgesia:Fair  Pain Score:7/10 (pain below knee down shin bone)  Side Effects:None  Catheter Site:clean, dry and dressing intact  Cath type: peripheral nerve cath(MOOG pump)  Infusion rate: 6ml/hr  Anesthesia block local: 0.375% 15 ml given via cath.  Catheter Plan:Catheter to remain Insitu and Bolus Catheter

## 2020-03-12 PROCEDURE — 94799 UNLISTED PULMONARY SVC/PX: CPT

## 2020-03-12 PROCEDURE — 99024 POSTOP FOLLOW-UP VISIT: CPT | Performed by: ORTHOPAEDIC SURGERY

## 2020-03-12 PROCEDURE — 99232 SBSQ HOSP IP/OBS MODERATE 35: CPT | Performed by: HOSPITALIST

## 2020-03-12 PROCEDURE — 25010000002 ENOXAPARIN PER 10 MG: Performed by: ORTHOPAEDIC SURGERY

## 2020-03-12 PROCEDURE — 25010000002 ROPIVACAINE PER 1 MG: Performed by: ORTHOPAEDIC SURGERY

## 2020-03-12 PROCEDURE — 25010000002 HYDROMORPHONE 1 MG/ML SOLUTION: Performed by: ORTHOPAEDIC SURGERY

## 2020-03-12 PROCEDURE — 97116 GAIT TRAINING THERAPY: CPT

## 2020-03-12 PROCEDURE — 25010000002 CEFTRIAXONE PER 250 MG: Performed by: HOSPITALIST

## 2020-03-12 PROCEDURE — 97530 THERAPEUTIC ACTIVITIES: CPT

## 2020-03-12 PROCEDURE — 97110 THERAPEUTIC EXERCISES: CPT

## 2020-03-12 RX ORDER — GUAIFENESIN 600 MG/1
600 TABLET, EXTENDED RELEASE ORAL EVERY 12 HOURS SCHEDULED
Status: DISCONTINUED | OUTPATIENT
Start: 2020-03-12 | End: 2020-03-17 | Stop reason: HOSPADM

## 2020-03-12 RX ORDER — SACCHAROMYCES BOULARDII 250 MG
250 CAPSULE ORAL 2 TIMES DAILY
Status: DISCONTINUED | OUTPATIENT
Start: 2020-03-12 | End: 2020-03-17 | Stop reason: HOSPADM

## 2020-03-12 RX ADMIN — ROPIVACAINE HYDROCHLORIDE 8 ML/HR: 2 INJECTION, SOLUTION EPIDURAL; INFILTRATION at 05:25

## 2020-03-12 RX ADMIN — IPRATROPIUM BROMIDE AND ALBUTEROL SULFATE 3 ML: 2.5; .5 SOLUTION RESPIRATORY (INHALATION) at 16:29

## 2020-03-12 RX ADMIN — ACETAMINOPHEN 650 MG: 325 TABLET, FILM COATED ORAL at 00:30

## 2020-03-12 RX ADMIN — ROPIVACAINE HYDROCHLORIDE 8 ML/HR: 2 INJECTION, SOLUTION EPIDURAL; INFILTRATION at 23:36

## 2020-03-12 RX ADMIN — HYDROCODONE BITARTRATE AND ACETAMINOPHEN 1 TABLET: 7.5; 325 TABLET ORAL at 20:14

## 2020-03-12 RX ADMIN — MICONAZOLE NITRATE: 2 CREAM TOPICAL at 20:14

## 2020-03-12 RX ADMIN — DOXYCYCLINE 100 MG: 100 CAPSULE ORAL at 20:14

## 2020-03-12 RX ADMIN — MICONAZOLE NITRATE: 2 CREAM TOPICAL at 09:01

## 2020-03-12 RX ADMIN — HYDROCODONE BITARTRATE AND ACETAMINOPHEN 1 TABLET: 7.5; 325 TABLET ORAL at 13:06

## 2020-03-12 RX ADMIN — CEFTRIAXONE SODIUM 1 G: 1 INJECTION, POWDER, FOR SOLUTION INTRAMUSCULAR; INTRAVENOUS at 09:02

## 2020-03-12 RX ADMIN — GABAPENTIN 300 MG: 300 CAPSULE ORAL at 20:13

## 2020-03-12 RX ADMIN — IPRATROPIUM BROMIDE AND ALBUTEROL SULFATE 3 ML: 2.5; .5 SOLUTION RESPIRATORY (INHALATION) at 19:25

## 2020-03-12 RX ADMIN — ENOXAPARIN SODIUM 40 MG: 40 INJECTION SUBCUTANEOUS at 16:19

## 2020-03-12 RX ADMIN — ACETAMINOPHEN 650 MG: 325 TABLET, FILM COATED ORAL at 15:23

## 2020-03-12 RX ADMIN — BISACODYL 10 MG: 10 SUPPOSITORY RECTAL at 10:30

## 2020-03-12 RX ADMIN — HYDROMORPHONE HYDROCHLORIDE 0.5 MG: 1 INJECTION, SOLUTION INTRAMUSCULAR; INTRAVENOUS; SUBCUTANEOUS at 00:30

## 2020-03-12 RX ADMIN — ROPIVACAINE HYDROCHLORIDE 8 ML/HR: 2 INJECTION, SOLUTION EPIDURAL; INFILTRATION at 13:03

## 2020-03-12 RX ADMIN — LEVOTHYROXINE SODIUM 150 MCG: 150 TABLET ORAL at 05:24

## 2020-03-12 RX ADMIN — HYDROMORPHONE HYDROCHLORIDE 0.5 MG: 1 INJECTION, SOLUTION INTRAMUSCULAR; INTRAVENOUS; SUBCUTANEOUS at 10:30

## 2020-03-12 RX ADMIN — HYDROMORPHONE HYDROCHLORIDE 0.5 MG: 1 INJECTION, SOLUTION INTRAMUSCULAR; INTRAVENOUS; SUBCUTANEOUS at 15:23

## 2020-03-12 RX ADMIN — SODIUM CHLORIDE, PRESERVATIVE FREE 10 ML: 5 INJECTION INTRAVENOUS at 09:03

## 2020-03-12 RX ADMIN — IPRATROPIUM BROMIDE AND ALBUTEROL SULFATE 3 ML: 2.5; .5 SOLUTION RESPIRATORY (INHALATION) at 12:17

## 2020-03-12 RX ADMIN — VENLAFAXINE HYDROCHLORIDE 150 MG: 75 CAPSULE, EXTENDED RELEASE ORAL at 09:03

## 2020-03-12 RX ADMIN — Medication 10 ML: at 09:18

## 2020-03-12 RX ADMIN — BUDESONIDE AND FORMOTEROL FUMARATE DIHYDRATE 2 PUFF: 160; 4.5 AEROSOL RESPIRATORY (INHALATION) at 07:00

## 2020-03-12 RX ADMIN — BUDESONIDE AND FORMOTEROL FUMARATE DIHYDRATE 2 PUFF: 160; 4.5 AEROSOL RESPIRATORY (INHALATION) at 19:25

## 2020-03-12 RX ADMIN — GUAIFENESIN 600 MG: 600 TABLET, EXTENDED RELEASE ORAL at 10:30

## 2020-03-12 RX ADMIN — GUAIFENESIN 600 MG: 600 TABLET, EXTENDED RELEASE ORAL at 20:13

## 2020-03-12 RX ADMIN — DOXYCYCLINE 100 MG: 100 CAPSULE ORAL at 09:02

## 2020-03-12 RX ADMIN — BISACODYL 5 MG: 5 TABLET, COATED ORAL at 09:02

## 2020-03-12 RX ADMIN — IPRATROPIUM BROMIDE AND ALBUTEROL SULFATE 3 ML: 2.5; .5 SOLUTION RESPIRATORY (INHALATION) at 07:00

## 2020-03-12 RX ADMIN — Medication 250 MG: at 20:14

## 2020-03-12 RX ADMIN — Medication 250 MG: at 10:30

## 2020-03-12 RX ADMIN — HYDROCODONE BITARTRATE AND ACETAMINOPHEN 1 TABLET: 7.5; 325 TABLET ORAL at 05:24

## 2020-03-12 NOTE — PROGRESS NOTES
[facility] Medicine Services  PROGRESS NOTE    Patient Name: Yessica Galvin  : 1960  MRN: [id number]    Date of Admission: 3/7/2020  Primary Care Physician: Kota Swain MD    Subjective   Subjective     CC:  R Leg pain    HPI:    Patient resting in bed. Cough/congestion better. Notes leg and arm throbbing. NO f/c. NO n/v. No diarrhea. No dysuria.    Review of Systems   HENT: Positive for congestion.    Respiratory: Positive for cough and wheezing.    Cardiovascular: Negative.    Gastrointestinal: Negative.    Genitourinary: Negative.    Musculoskeletal: Positive for arthralgias and myalgias.   Skin: Negative.    Neurological: Negative.    Hematological: Negative.    Psychiatric/Behavioral: Positive for dysphoric mood.   Cough and congestion there but better, otherwise unchanged 3/11      Objective   Objective     Vital Signs:   Temp:  [97.6 °F (36.4 °C)-100.1 °F (37.8 °C)] 98.5 °F (36.9 °C)  Heart Rate:  [75-91] 83  Resp:  [14-18] 16  BP: (102-135)/(59-70) 102/61        Physical Exam:  NAD, alert and oriented x 3, stable  OP clear, MMM, stable  PERRL  Face symmetric, speech clear, stable  Neck supple  No LAD  RRR  Coarse cough noted, no wheezing, no rales  +BS, ND, NT, soft, unchanged  No c/c/e, stable  R LE in splints, LUE in splint, KIM otherwise, stable  No obvious rashes  Normal affect    Results Reviewed:  Results from last 7 days   Lab Units 20  0519 03/10/20  0549 20  0603   WBC 10*3/mm3 8.03 7.68 8.58   HEMOGLOBIN g/dL 8.1* 9.4* 10.0*   HEMATOCRIT % 25.0* 29.4* 31.9*   PLATELETS 10*3/mm3 178 165 191     Estimated Creatinine Clearance: 206.9 mL/min (A) (by C-G formula based on SCr of 0.32 mg/dL (L)).    I have reviewed the medications:  Scheduled Meds:    budesonide-formoterol 2 puff Inhalation BID - RT   cefTRIAXone 1 g Intravenous Daily   doxycycline 100 mg Oral Q12H   enoxaparin 40 mg Subcutaneous Q24H   gabapentin 300 mg Oral Nightly   guaiFENesin 600  mg Oral Q12H   ipratropium-albuterol 3 mL Nebulization 4x Daily - RT   levothyroxine 150 mcg Oral Q AM   miconazole  Topical Q12H   nicotine 1 patch Transdermal Q24H   saccharomyces boulardii 250 mg Oral BID   sodium chloride 10 mL Intravenous Q12H   sodium chloride 10 mL Intravenous Q12H   venlafaxine  mg Oral Daily     Continuous Infusions:    lactated ringers 9 mL/hr Last Rate: 9 mL/hr (03/10/20 1110)   ropivacaine (NAROPIN) 0.2% peripheral nerve cath (moog) 8 mL/hr Last Rate: 8 mL/hr (03/12/20 0525)   sodium chloride 125 mL/hr Last Rate: 125 mL/hr (03/08/20 0604)     PRN Meds:.•  acetaminophen **OR** acetaminophen **OR** acetaminophen  •  albuterol  •  bisacodyl  •  bisacodyl  •  HYDROcodone-acetaminophen  •  HYDROmorphone **AND** [PENDING] HYDROmorphone **AND** naloxone **AND** [PENDING] naloxone  •  hydrOXYzine  •  lactated ringers  •  magnesium hydroxide  •  ondansetron  •  potassium chloride **OR** potassium chloride **OR** potassium chloride  •  sodium chloride  •  sodium chloride    Assessment/Plan   Assessment & Plan     Active Hospital Problems    Diagnosis  POA   • **Closed fracture of right tibial plateau [S82.141A]  Unknown   • Hypokalemia [E87.6]  Unknown   • Fracture of right tibia [S82.201A]  Yes   • Left radial fracture [S52.92XA]  Yes   • Asthma [J45.909]  Yes   • Tobacco abuse [Z72.0]  Yes   • Elevated transaminase level [R74.0]  Yes   • Chronic back pain [M54.9, G89.29]  Unknown   • Generalized anxiety disorder [F41.1]  Yes   • Acquired hypothyroidism [E03.9]  Yes      Resolved Hospital Problems   No resolved problems to display.        Brief Hospital Course to date:  Yessica Galvin is a 59 y.o. female with history of asthma, chronic pain, hypothyroidism who presents 3/7 evening after injury at home (per report was noted to fall off scooter while playing with grandkids) to RLE and left wrist. Patient sustained a right tibia fx, left radius fx. S/P ORIF of RLE and L wrist    Right tibia  fracture, left radius fracture  Hypoxia related to pain medication  --s/p ORIF of R tibia and L radial fracture  --nonweightbearing, PT/OT as tolerated/instructed  --awaiting rehab, possibly home with HH  --anesthesia optimizing pain control    PNA  --ceftriaxone/doxy  --nebs/mucinex/OPEP    Hypokalemia  --replace prn     Asthma, tobacco abuse  -nebs/spirometer     Elevated transaminase level, mild  -Acute hepatitis panel checked and negative  -Monitor     Hypothyroidism  -stable on levothyroxine     Generalized anxiety disorder  -Stable on Effexor     Chronic back pain  -Continue home tramadol, gabapentin        DVT prophylaxis: SCD to LLE       Disposition: I expect the patient to be discharged to rehab soon    CODE STATUS:   Code Status and Medical Interventions:   Ordered at: 03/07/20 2212     Level Of Support Discussed With:    Patient     Code Status:    CPR     Medical Interventions (Level of Support Prior to Arrest):    Full     Electronically signed by Nomi Ortiz MD, 03/12/20, 09:40.

## 2020-03-12 NOTE — THERAPY TREATMENT NOTE
Patient Name: Yessica Galvin  : 1960    MRN: [id number]                              Today's Date: 3/12/2020       Admit Date: 3/7/2020    Visit Dx:     ICD-10-CM ICD-9-CM   1. Closed fracture of right tibial plateau, initial encounter S82.141A 823.00   2. Closed Colles' fracture of left radius, initial encounter S52.532A 813.41   3. Fall with significant injury, initial encounter W19.XXXA E888.9   4. Contusion of right hip, initial encounter S70.01XA 924.01   5. Impaired mobility and ADLs Z74.09 799.89     Patient Active Problem List   Diagnosis   • Asthmatic bronchitis   • Generalized anxiety disorder   • Gout   • Headache   • Acquired hypothyroidism   • Chronic midline low back pain without sciatica   • Seasonal allergic rhinitis   • Cervical disc disorder with radiculopathy   • Lumbar disc disease   • Fracture of right tibia   • Left radial fracture   • Asthma   • Tobacco abuse   • Elevated transaminase level   • Chronic back pain   • Closed fracture of right tibial plateau   • Hypokalemia     Past Medical History:   Diagnosis Date   • Acute bronchitis    • Acute sinusitis    • Asthma    • Asthma with acute exacerbation    • Asthmatic bronchitis    • Disc degeneration, lumbar    • Disc degeneration, lumbar    • History of mammogram    • Hypothyroidism    • Lower back pain    • Plantar fasciitis    • Restless legs syndrome      Past Surgical History:   Procedure Laterality Date   • HYSTERECTOMY     • KNEE ARTHROSCOPY Right 3/10/2020    Procedure: KNEE ARTHROSCOPY RIGHT;  Surgeon: Guanaco Thakur MD;  Location:  [facility];  Service: Orthopedics;  Laterality: Right;   • NECK SURGERY     • OOPHORECTOMY     • ORIF WRIST FRACTURE Left 3/10/2020    Procedure: WRIST OPEN REDUCTION INTERNAL FIXATION LEFT;  Surgeon: Guanaco Thakur MD;  [facility];  Service: Orthopedics;  Laterality: Left;   • TIBIAL PLATEAU OPEN REDUCTION INTERNAL FIXATION Right 3/10/2020    Procedure: TIBIAL PLATEAU OPEN REDUCTION  INTERNAL FIXATION RIGHT;  Surgeon: Guanaco Thakur MD;  Location: [facility];  Service: Orthopedics;  Laterality: Right;     General Information     Row Name 03/12/20 1423          PT Evaluation Time/Intention    Document Type  therapy note (daily note)  -KG     Mode of Treatment  physical therapy  -KG     Row Name 03/12/20 1423          General Information    Patient Profile Reviewed?  yes  -KG     Existing Precautions/Restrictions  other (see comments) NWB RLE, LUE, KI on RLE when OOB, peripheral nerve catheter (MOOG pump)  -KG     Row Name 03/12/20 1423          Cognitive Assessment/Intervention- PT/OT    Orientation Status (Cognition)  oriented x 4  -KG     Row Name 03/12/20 1423          Safety Issues, Functional Mobility    Impairments Affecting Function (Mobility)  balance;pain;range of motion (ROM);strength;sensation/sensory awareness  -KG       User Key  (r) = Recorded By, (t) = Taken By, (c) = Cosigned By    Initials Name Provider Type    KG Meli Sánchez Physical Therapist        Mobility     Row Name 03/12/20 1424          Bed Mobility Assessment/Treatment    Bed Mobility Assessment/Treatment  supine-sit;scooting/bridging  -KG     Rolling Left [place] (Bed Mobility)  minimum assist (75% patient effort)  -KG     Rolling Right [place] (Bed Mobility)  minimum assist (75% patient effort)  -KG     Scooting/Bridging [place] (Bed Mobility)  independent;verbal cues  -KG     Supine-Sit [place] (Bed Mobility)  verbal cues;minimum assist (75% patient effort)  -KG     Assistive Device (Bed Mobility)  head of bed elevated  -KG     Comment (Bed Mobility)  cues not to push with LUE, leg  to assist RLE  -KG     Row Name 03/12/20 1424          Transfer Assessment/Treatment    Comment (Transfers)  STS from elevated bed, cues to push with RUE and avoid WB RLE.  STS x3 from EOB  -KG     Row Name 03/12/20 1424          Sit-Stand Transfer    Sit-Stand [place] (Transfers)  verbal cues;2  person assist;minimum assist (75% patient effort)  -KG     Assistive Device (Sit-Stand Transfers)  walker, rolling platform  -KG     Row Name 03/12/20 1424          Gait/Stairs Assessment/Training    Gait/Stairs Assessment/Training  gait/ambulation independence  -KG     [place] Level (Gait)  contact guard;2 person assist;minimum assist (75% patient effort)  -KG     Assistive Device (Gait)  walker, rolling platform  -KG     Distance in Feet (Gait)  7 ft  -KG     Pattern (Gait)  step-to  -KG     Comment (Gait/Stairs)  Gait training with cues to maintain RLE NWB and avoid WB through left wrist.  Required min-A for walker management.  Limited by SpO2, fatigue, pain and HR.  HR elevated to 143 bpm, but lowered to 100 after 1 min sitting.  SpO2 dropped to 85%, but improved to 92% after 1 min sitting.    -KG     Row Name 03/12/20 1424          Mobility Assessment/Intervention    Extremity Weight-bearing Status  left upper extremity;right lower extremity  -KG     Left Upper Extremity (Weight-bearing Status)  non weight-bearing (NWB)  -KG     Right Lower Extremity (Weight-bearing Status)  non weight-bearing (NWB)  -KG       User Key  (r) = Recorded By, (t) = Taken By, (c) = Cosigned By    Initials Name Provider Type    KG Meli Sánchez Physical Therapist        Obj/Interventions     Row Name 03/12/20 2049          Therapeutic Exercise    Lower Extremity (Therapeutic Exercise)  heel slides, right;SLR (straight leg raise), right;quad sets, bilateral  -KG     Lower Extremity Range of Motion (Therapeutic Exercise)  ankle dorsiflexion/plantar flexion, bilateral  -KG     Exercise Type (Therapeutic Exercise)  AROM (active range of motion);AAROM (active assistive range of motion)  -KG     Position (Therapeutic Exercise)  supine  -KG     Sets/Reps (Therapeutic Exercise)  10  -KG     Expected Outcome (Therapeutic Exercise)  improve functional stability;facilitate normal movement patterns  -KG     Row Name 03/12/20 4389           Static Sitting Balance    Level of [place] (Unsupported Sitting, Static Balance)  supervision  -KG     Sitting Position (Unsupported Sitting, Static Balance)  sitting on edge of bed  -KG     Row Name 03/12/20 1433          Static Standing Balance    Level of [place] (Supported Standing, Static Balance)  contact guard assist  -KG     Time Able to Maintain Position (Supported Standing, Static Balance)  1 to 2 minutes  -KG     Assistive Device Utilized (Supported Standing, Static Balance)  walker, rolling platform  -KG     Row Name 03/12/20 1433          Dynamic Standing Balance    Level of [place], Reaches Outside Midline (Standing, Dynamic Balance)  minimal assist, 75% patient effort;contact guard assist  -KG     Time Able to Maintain Position, Reaches Outside Midline (Standing, Dynamic Balance)  30 to 45 seconds  -KG     Assistive Device Utilized (Supported Standing, Dynamic Balance)  walker, rolling platform  -KG       User Key  (r) = Recorded By, (t) = Taken By, (c) = Cosigned By    Initials Name Provider Type    KG Meli Sánchez Physical Therapist        Goals/Plan    No documentation.       Clinical Impression     Row Name 03/12/20 1434          Pain Assessment    Additional Documentation  Pain Scale: Numbers Pre/Post-Treatment (Group)  -KG     Row Name 03/12/20 1434          Pain Scale: Numbers Pre/Post-Treatment    Pain Scale: Numbers, Pretreatment  4/10  -KG     Pain Scale: Numbers, Post-Treatment  4/10  -KG     Pain Location - Side  Right  -KG     Pain Location  knee  -KG     Pain Intervention(s)  Repositioned;Ambulation/increased activity  -KG     Row Name 03/12/20 1434          Plan of Care Review    Plan of Care Reviewed With  patient  -KG     Progress  improving  -KG     Outcome Summary  Gait training: CGA to min-A, 7 ft platform walker with chair follow, cues to maintain RLE NWB and avoid WB through left wrist.  Limited by SpO2, fatigue, pain and HR.  HR elevated to 143 bpm, but  lowered to 100 after 1 min sitting.  SpO2 dropped to 85%, but improved to 92% after 1 min sitting.  Greatly concerned about patient safety and ability if d/c home, recommend inpatient rehab.    -KG     Row Name 03/12/20 1434          Vital Signs    Pre SpO2 (%)  92  -KG     O2 Delivery Pre Treatment  supplemental O2  -KG     Intra SpO2 (%)  84  -KG     O2 Delivery Intra Treatment  supplemental O2  -KG     Post SpO2 (%)  93  -KG     O2 Delivery Post Treatment  supplemental O2  -KG     Row Name 03/12/20 1434          Positioning and Restraints    Pre-Treatment Position  in bed  -KG     Post Treatment Position  chair  -KG     In Chair  notified nsg;call light within reach;exit alarm on;encouraged to call for assist;waffle cushion;on mechanical lift sling;legs elevated  -KG       User Key  (r) = Recorded By, (t) = Taken By, (c) = Cosigned By    Initials Name Provider Type    Meli Shelton Physical Therapist        Outcome Measures     Row Name 03/12/20 1439          How much help from another person do you currently need...    Turning from your back to your side while in flat bed without using bedrails?  3  -KG     Moving from lying on back to sitting on the side of a flat bed without bedrails?  3  -KG     Moving to and from a bed to a chair (including a wheelchair)?  3  -KG     Standing up from a chair using your arms (e.g., wheelchair, bedside chair)?  3  -KG     Climbing 3-5 steps with a railing?  1  -KG     To walk in hospital room?  2  -KG     AM-PAC 6 Clicks Score (PT)  15  -KG     Row Name 03/12/20 1439          Functional Assessment    Outcome Measure Options  AM-PAC 6 Clicks Basic Mobility (PT)  -KG       User Key  (r) = Recorded By, (t) = Taken By, (c) = Cosigned By    Initials Name Provider Type    Meli Shelton Physical Therapist          PT Recommendation and Plan     Outcome Summary/Treatment Plan (PT)  Anticipated Discharge Disposition (PT): inpatient rehabilitation facility  Plan of Care  Reviewed With: patient  Progress: improving  Outcome Summary: Gait training: CGA to min-A, 7 ft platform walker with chair follow, cues to maintain RLE NWB and avoid WB through left wrist.  Limited by SpO2, fatigue, pain and HR.  HR elevated to 143 bpm, but lowered to 100 after 1 min sitting.  SpO2 dropped to 85%, but improved to 92% after 1 min sitting.  Greatly concerned about patient safety and ability if d/c home, recommend inpatient rehab.       Time Calculation:   PT Charges     Row Name 03/12/20 1441 03/12/20 0631          Time Calculation    Start Time  1300  -KG  --     PT Received On  03/12/20  -KG  03/11/20  -MARTHA     PT Goal Re-Cert Due Date  03/21/20  -KG  --        Time Calculation- PT    Total Timed Code Minutes- PT  40 minute(s)  -KG  --        Timed Charges    69841 - PT Therapeutic Exercise Minutes  15  -KG  --     63982 - Gait Training Minutes   13  -KG  --     55489 - PT Therapeutic Activity Minutes  10  -KG  --       User Key  (r) = Recorded By, (t) = Taken By, (c) = Cosigned By    Initials Name Provider Type    ES Cecelia Roberts, PT Physical Therapist    KG Meli Sánchez Physical Therapist        Therapy Charges for Today     Code Description Service Date Service Provider Modifiers Qty    48263017435 HC PT THER PROC EA 15 MIN 3/12/2020 Meli Sánchez GP 1    67356475627 HC GAIT TRAINING EA 15 MIN 3/12/2020 Meli Sánchez GP 1    13132666748 HC PT THERAPEUTIC ACT EA 15 MIN 3/12/2020 Meli Sánchez GP 1          PT G-Codes  Outcome Measure Options: AM-PAC 6 Clicks Basic Mobility (PT)  AM-PAC 6 Clicks Score (PT): 15  AM-PAC 6 Clicks Score (OT): 11    Meli Sánchez  3/12/2020

## 2020-03-12 NOTE — PLAN OF CARE
Problem: Patient Care Overview  Goal: Plan of Care Review  Flowsheets (Taken 3/12/2020 1434)  Progress: improving  Plan of Care Reviewed With: patient  Outcome Summary: Gait training: CGA to min-A, 7 ft platform walker with chair follow, cues to maintain RLE NWB and avoid WB through left wrist.  Limited by SpO2, fatigue, pain and HR.  HR elevated to 143 bpm, but lowered to 100 after 1 min sitting.  SpO2 dropped to 85%, but improved to 92% after 1 min sitting.  Greatly concerned about patient safety and ability if d/c home, recommend inpatient rehab.

## 2020-03-12 NOTE — PLAN OF CARE
Pt alert and oriented; has been on 4 L oxygen and down to 2 liters; voiding well; bowel movement today; up in chair; immobilizer on when oob; ropivocaine @ 8 ml/hr; has required IV pain medicine a couple times today. To rehab tomorrow possibly. Deciding on transportation to facility.

## 2020-03-12 NOTE — PROGRESS NOTES
"Orthopaedic Surgery Progress Note     LOS: 5 days   Patient Care Team:  Kota Swain MD as PCP - General    POD 2    Subjective     Interval History:   Patient Reports: feels bwtter    Objective     Vital Signs:  Temp (24hrs), Av.7 °F (37.1 °C), Min:97.6 °F (36.4 °C), Max:100.1 °F (37.8 °C)    /67 (BP Location: Right arm, Patient Position: Lying)   Pulse 87   Temp 98.5 °F (36.9 °C) (Oral)   Resp 16   Ht 165.1 cm (65\")   Wt 92.1 kg (203 lb)   SpO2 94%   BMI 33.78 kg/m²     Labs:  Lab Results (last 24 hours)     Procedure Component Value Units Date/Time    Blood Culture - Blood, Hand, Right [[id number]] Collected:  20    Specimen:  Blood from Hand, Right Updated:  20 0600     Blood Culture No growth at 24 hours    Blood Culture - Blood, Arm, Right [[id number]] Collected:  20    Specimen:  Blood from Arm, Right Updated:  20 0600     Blood Culture No growth at 24 hours    Respiratory Panel, PCR - Swab, Nasopharynx [[id number]]  (Normal) Collected:  20 1125    Specimen:  Swab from Nasopharynx Updated:  20 1222     ADENOVIRUS, PCR Not Detected     Coronavirus 229E Not Detected     Coronavirus HKU1 Not Detected     Coronavirus NL63 Not Detected     Coronavirus OC43 Not Detected     Human Metapneumovirus Not Detected     Human Rhinovirus/Enterovirus Not Detected     Influenza B PCR Not Detected     Parainfluenza Virus 1 Not Detected     Parainfluenza Virus 2 Not Detected     Parainfluenza Virus 3 Not Detected     Parainfluenza Virus 4 Not Detected     Bordetella pertussis pcr Not Detected     Influenza A H1 2009 PCR Not Detected     Chlamydophila pneumoniae PCR Not Detected     Mycoplasma pneumo by PCR Not Detected     Influenza A PCR Not Detected     Influenza A H3 Not Detected     Influenza A H1 Not Detected     RSV, PCR Not Detected     Bordetella parapertussis PCR Not Detected    Narrative:       The coronavirus on the RVP is NOT COVID-19 and is NOT " indicative of infection with COVID-19.           Imaging:  XR Chest 1 View  Narrative: EXAMINATION: XR CHEST 1 VW-03/11/2020:      INDICATION: Cough; S82.141A-Displaced bicondylar fracture of right  tibia, initial encounter for closed fracture; S52.532A-Colles' fracture  of left radius, initial encounter for closed fracture;  W19.XXXA-Unspecified fall, initial encounter; S70.01XA-Contusion of  right hip, initial encounter; Z74.09-Other reduced mobility.      COMPARISON: 03/07/2020.     FINDINGS: Portable chest reveals cardiac and mediastinal silhouettes  within normal limits. Ill-defined opacification seen throughout the  right lung concerning for infiltrate. The left lung is clear.  Degenerative changes seen within the spine.  No pleural effusion or  pneumothorax. Pulmonary vascularity is within normal limits.         Impression: Increased markings seen diffusely throughout the right lung  concerning for infiltrate. The left lung remains grossly clear.     D:  03/11/2020  E:  03/11/2020     This report was finalized on 3/11/2020 4:50 PM by Dr. Meli Olson MD.          Physical Exam:  Right leg compartments are soft, nerve block still intact.  Minimal foot plantarflexion.  Unable to dorsiflex foot and leg is numb from the block.  But her compartments are very soft and the dressing is intact.  Left upper extremity she is able to move her fingers and splint with dressing is intact.    Assessment/Plan   Postop day 2 status post right tibia ORIF and left wrist ORIF  She will continue physical therapy.  She is nonweightbearing left upper extremity and right lower extremity  She may weight-bear at the left elbow.  I am okay with discharge with follow-up with me in 2 to 3 weeks  She may do range of motion right knee but she needs to wear the knee immobilizer when up  Guanaco Thakur MD  03/12/20  11:33

## 2020-03-12 NOTE — PLAN OF CARE
Problem: Patient Care Overview  Goal: Plan of Care Review  Flowsheets  Taken 3/12/2020 0437  Progress: no change  Outcome Summary: Pt had uneventful night.  IV and PO meds given for L wrist and R knee pain, ropivacaine infusing at 8 ml/hr.  Immobilizer to R leg.  Remains on 4L NC,pt encouraged to use I/S.  SR per tele.  Pt alert, oriented.  VSS  Taken 3/11/2020 2031  Plan of Care Reviewed With: patient     Problem: Pain, Chronic (Adult)  Goal: Identify Related Risk Factors and Signs and Symptoms  Flowsheets (Taken 3/12/2020 0437)  Related Risk Factors (Chronic Pain): coping ineffective; knowledge deficit; surgery  Signs and Symptoms (Chronic Pain): BADLs/IADLs, reluctance/inability to perform; sleep pattern change; verbalization of pain descriptors

## 2020-03-12 NOTE — PROGRESS NOTES
Continued Stay Note  [facility]     Patient Name: Yessica Galvin  MRN: [id number]  Today's Date: 3/12/2020    Admit Date: 3/7/2020    Discharge Plan     Row Name 03/12/20 1501       Plan    Plan Comments  Cm spoke with pt who now feels she is not able to return home in her current state. She has requested referrals be made to both [facility] [place] [facility] This was completed.        Discharge Codes    No documentation.       Expected Discharge Date and Time     Expected Discharge Date Expected Discharge Time    Mar 12, 2020             Zelda Gallego RN

## 2020-03-12 NOTE — PROGRESS NOTES
[facility]    Acute pain service Inpatient Progress Note    Patient Name: Yessica Galvin  :  1960  MRN:  [id number]        Acute Pain  Service Inpatient Progress Note:    Analgesia:Poor  Pain Score:6/10  LOC: alert and awake  Resp Status: room air  Cardiac: VS stable  Side Effects:None  Catheter Site:clean, dressing intact and dry  Cath type: peripheral nerve cath(MOOG pump)  Infusion rate: 8ml/hr  Catheter Plan:Catheter to remain Insitu and Continue catheter infusion rate unchanged  Comments: ---------------------------------------------------------------------------------  Physical Therapy Manual Muscle Testing Results:  MMT (Manual Muscle Testing)  General MMT Comments: R LE : foot 0/5, quads1/5. L LE 5/5 (20 8421)]    Physical Therapy - Plan of Care Review - Outcome Summary:  Outcome Summary: Patient requires minimal assistance to get out of bed. She was able to stand and take steps maintaining nwb R LE. She required cues for safety and quickly fatigues.  She needs 24/7 care if going home. Recommend rolling platform walker and also can benefit from w/c rental (20 9993)]    Occupational Therapy - Plan of Care Review - Outcome Summary:  Outcome Summary: OT IE completed. Pt presents with significant pain and bilateral WB precautions limiting mobility and ADL performance. Assist x2 for bed mobility. Dependent for LB ADLs and toileting. Education initiated for ROM HEP initiated LUE/hand and positioning for edema mgmt. OT will follow IP to advance ADL and transfers as tolerated. Recommend IRF at d/c for best outcome. (20 1016)]  ----------------------------------------------------------------------------------

## 2020-03-13 ENCOUNTER — APPOINTMENT (OUTPATIENT)
Dept: GENERAL RADIOLOGY | Facility: HOSPITAL | Age: 60
End: 2020-03-13

## 2020-03-13 LAB
ANION GAP SERPL CALCULATED.3IONS-SCNC: 9 MMOL/L (ref 5–15)
BUN BLD-MCNC: 5 MG/DL (ref 8–23)
BUN/CREAT SERPL: 12.5 (ref 7–25)
CALCIUM SPEC-SCNC: 8.2 MG/DL (ref 8.6–10.5)
CHLORIDE SERPL-SCNC: 98 MMOL/L (ref 98–107)
CO2 SERPL-SCNC: 31 MMOL/L (ref 22–29)
CREAT BLD-MCNC: 0.4 MG/DL (ref 0.57–1)
DEPRECATED RDW RBC AUTO: 49.1 FL (ref 37–54)
ERYTHROCYTE [DISTWIDTH] IN BLOOD BY AUTOMATED COUNT: 13.9 % (ref 12.3–15.4)
GFR SERPL CREATININE-BSD FRML MDRD: >150 ML/MIN/1.73
GLUCOSE BLD-MCNC: 109 MG/DL (ref 65–99)
HCT VFR BLD AUTO: 24.5 % (ref 34–46.6)
HGB BLD-MCNC: 7.9 G/DL (ref 12–15.9)
MCH RBC QN AUTO: 31 PG (ref 26.6–33)
MCHC RBC AUTO-ENTMCNC: 32.2 G/DL (ref 31.5–35.7)
MCV RBC AUTO: 96.1 FL (ref 79–97)
PLATELET # BLD AUTO: 246 10*3/MM3 (ref 140–450)
PMV BLD AUTO: 9.8 FL (ref 6–12)
POTASSIUM BLD-SCNC: 2.9 MMOL/L (ref 3.5–5.2)
POTASSIUM BLD-SCNC: 3.9 MMOL/L (ref 3.5–5.2)
RBC # BLD AUTO: 2.55 10*6/MM3 (ref 3.77–5.28)
SODIUM BLD-SCNC: 138 MMOL/L (ref 136–145)
WBC NRBC COR # BLD: 6.34 10*3/MM3 (ref 3.4–10.8)

## 2020-03-13 PROCEDURE — 99233 SBSQ HOSP IP/OBS HIGH 50: CPT | Performed by: HOSPITALIST

## 2020-03-13 PROCEDURE — 25010000002 CEFTRIAXONE PER 250 MG: Performed by: HOSPITALIST

## 2020-03-13 PROCEDURE — 25010000002 ENOXAPARIN PER 10 MG: Performed by: ORTHOPAEDIC SURGERY

## 2020-03-13 PROCEDURE — 25010000002 HYDROMORPHONE 1 MG/ML SOLUTION: Performed by: ORTHOPAEDIC SURGERY

## 2020-03-13 PROCEDURE — 97116 GAIT TRAINING THERAPY: CPT

## 2020-03-13 PROCEDURE — 99024 POSTOP FOLLOW-UP VISIT: CPT | Performed by: ORTHOPAEDIC SURGERY

## 2020-03-13 PROCEDURE — 80048 BASIC METABOLIC PNL TOTAL CA: CPT | Performed by: HOSPITALIST

## 2020-03-13 PROCEDURE — 85027 COMPLETE CBC AUTOMATED: CPT | Performed by: HOSPITALIST

## 2020-03-13 PROCEDURE — 97530 THERAPEUTIC ACTIVITIES: CPT

## 2020-03-13 PROCEDURE — 97110 THERAPEUTIC EXERCISES: CPT

## 2020-03-13 PROCEDURE — 71045 X-RAY EXAM CHEST 1 VIEW: CPT

## 2020-03-13 PROCEDURE — 94799 UNLISTED PULMONARY SVC/PX: CPT

## 2020-03-13 PROCEDURE — 84132 ASSAY OF SERUM POTASSIUM: CPT | Performed by: HOSPITALIST

## 2020-03-13 RX ORDER — IPRATROPIUM BROMIDE AND ALBUTEROL SULFATE 2.5; .5 MG/3ML; MG/3ML
3 SOLUTION RESPIRATORY (INHALATION) EVERY 4 HOURS PRN
Status: DISCONTINUED | OUTPATIENT
Start: 2020-03-13 | End: 2020-03-17 | Stop reason: HOSPADM

## 2020-03-13 RX ORDER — CEFUROXIME AXETIL 250 MG/1
250 TABLET ORAL EVERY 12 HOURS SCHEDULED
Status: DISCONTINUED | OUTPATIENT
Start: 2020-03-14 | End: 2020-03-17 | Stop reason: HOSPADM

## 2020-03-13 RX ADMIN — SODIUM CHLORIDE, PRESERVATIVE FREE 10 ML: 5 INJECTION INTRAVENOUS at 08:13

## 2020-03-13 RX ADMIN — POTASSIUM CHLORIDE 40 MEQ: 10 CAPSULE, COATED, EXTENDED RELEASE ORAL at 17:50

## 2020-03-13 RX ADMIN — HYDROCODONE BITARTRATE AND ACETAMINOPHEN 1 TABLET: 7.5; 325 TABLET ORAL at 02:08

## 2020-03-13 RX ADMIN — POTASSIUM CHLORIDE 40 MEQ: 10 CAPSULE, COATED, EXTENDED RELEASE ORAL at 10:35

## 2020-03-13 RX ADMIN — MICONAZOLE NITRATE: 2 CREAM TOPICAL at 20:23

## 2020-03-13 RX ADMIN — LEVOTHYROXINE SODIUM 150 MCG: 150 TABLET ORAL at 05:25

## 2020-03-13 RX ADMIN — DOXYCYCLINE 100 MG: 100 CAPSULE ORAL at 20:23

## 2020-03-13 RX ADMIN — HYDROMORPHONE HYDROCHLORIDE 0.5 MG: 1 INJECTION, SOLUTION INTRAMUSCULAR; INTRAVENOUS; SUBCUTANEOUS at 17:55

## 2020-03-13 RX ADMIN — GUAIFENESIN 600 MG: 600 TABLET, EXTENDED RELEASE ORAL at 08:14

## 2020-03-13 RX ADMIN — Medication 250 MG: at 20:23

## 2020-03-13 RX ADMIN — MICONAZOLE NITRATE: 2 CREAM TOPICAL at 08:13

## 2020-03-13 RX ADMIN — HYDROCODONE BITARTRATE AND ACETAMINOPHEN 1 TABLET: 7.5; 325 TABLET ORAL at 14:18

## 2020-03-13 RX ADMIN — HYDROCODONE BITARTRATE AND ACETAMINOPHEN 1 TABLET: 7.5; 325 TABLET ORAL at 20:23

## 2020-03-13 RX ADMIN — GABAPENTIN 300 MG: 300 CAPSULE ORAL at 20:23

## 2020-03-13 RX ADMIN — HYDROCODONE BITARTRATE AND ACETAMINOPHEN 1 TABLET: 7.5; 325 TABLET ORAL at 08:13

## 2020-03-13 RX ADMIN — GUAIFENESIN 600 MG: 600 TABLET, EXTENDED RELEASE ORAL at 20:23

## 2020-03-13 RX ADMIN — BUDESONIDE AND FORMOTEROL FUMARATE DIHYDRATE 2 PUFF: 160; 4.5 AEROSOL RESPIRATORY (INHALATION) at 07:56

## 2020-03-13 RX ADMIN — CEFTRIAXONE SODIUM 1 G: 1 INJECTION, POWDER, FOR SOLUTION INTRAMUSCULAR; INTRAVENOUS at 08:14

## 2020-03-13 RX ADMIN — POTASSIUM CHLORIDE 40 MEQ: 10 CAPSULE, COATED, EXTENDED RELEASE ORAL at 14:18

## 2020-03-13 RX ADMIN — ALBUTEROL SULFATE 2.5 MG: 2.5 SOLUTION RESPIRATORY (INHALATION) at 19:18

## 2020-03-13 RX ADMIN — SODIUM CHLORIDE, PRESERVATIVE FREE 10 ML: 5 INJECTION INTRAVENOUS at 20:24

## 2020-03-13 RX ADMIN — BUDESONIDE AND FORMOTEROL FUMARATE DIHYDRATE 2 PUFF: 160; 4.5 AEROSOL RESPIRATORY (INHALATION) at 19:18

## 2020-03-13 RX ADMIN — IPRATROPIUM BROMIDE AND ALBUTEROL SULFATE 3 ML: 2.5; .5 SOLUTION RESPIRATORY (INHALATION) at 07:56

## 2020-03-13 RX ADMIN — DOXYCYCLINE 100 MG: 100 CAPSULE ORAL at 08:13

## 2020-03-13 RX ADMIN — ENOXAPARIN SODIUM 40 MG: 40 INJECTION SUBCUTANEOUS at 17:49

## 2020-03-13 RX ADMIN — VENLAFAXINE HYDROCHLORIDE 150 MG: 75 CAPSULE, EXTENDED RELEASE ORAL at 08:13

## 2020-03-13 RX ADMIN — Medication 250 MG: at 08:14

## 2020-03-13 NOTE — THERAPY TREATMENT NOTE
Patient Name: Yessica Galvin  : 1960    MRN: [id number]                              Today's Date: 3/13/2020       Admit Date: 3/7/2020    Visit Dx:     ICD-10-CM ICD-9-CM   1. Closed fracture of right tibial plateau, initial encounter S82.141A 823.00   2. Closed Colles' fracture of left radius, initial encounter S52.532A 813.41   3. Fall with significant injury, initial encounter W19.XXXA E888.9   4. Contusion of right hip, initial encounter S70.01XA 924.01   5. Impaired mobility and ADLs Z74.09 799.89     Patient Active Problem List   Diagnosis   • Asthmatic bronchitis   • Generalized anxiety disorder   • Gout   • Headache   • Acquired hypothyroidism   • Chronic midline low back pain without sciatica   • Seasonal allergic rhinitis   • Cervical disc disorder with radiculopathy   • Lumbar disc disease   • Fracture of right tibia   • Left radial fracture   • Asthma   • Tobacco abuse   • Elevated transaminase level   • Chronic back pain   • Closed fracture of right tibial plateau   • Hypokalemia     Past Medical History:   Diagnosis Date   • Acute bronchitis    • Acute sinusitis    • Asthma    • Asthma with acute exacerbation    • Asthmatic bronchitis    • Disc degeneration, lumbar    • Disc degeneration, lumbar    • History of mammogram    • Hypothyroidism    • Lower back pain    • Plantar fasciitis    • Restless legs syndrome      Past Surgical History:   Procedure Laterality Date   • HYSTERECTOMY     • KNEE ARTHROSCOPY Right 3/10/2020    Procedure: KNEE ARTHROSCOPY RIGHT;  Surgeon: Guanaco Thakur MD;  Location:  [facility];  Service: Orthopedics;  Laterality: Right;   • NECK SURGERY     • OOPHORECTOMY     • ORIF WRIST FRACTURE Left 3/10/2020    Procedure: WRIST OPEN REDUCTION INTERNAL FIXATION LEFT;  Surgeon: Guanaco Thakur MD;  Location: [facility] OR;  Service: Orthopedics;  Laterality: Left;   • TIBIAL PLATEAU OPEN REDUCTION INTERNAL FIXATION Right 3/10/2020    Procedure: TIBIAL PLATEAU OPEN REDUCTION  INTERNAL FIXATION RIGHT;  Surgeon: Guanaco Thakur MD;  Location: [facility];  Service: Orthopedics;  Laterality: Right;     General Information     Row Name 03/13/20 1217          PT Evaluation Time/Intention    Document Type  therapy note (daily note)  -SC     Mode of Treatment  physical therapy  -SC     Row Name 03/13/20 1217          General Information    Patient Profile Reviewed?  yes  -SC     Existing Precautions/Restrictions  other (see comments);brace worn when out of bed;fall  -SC     Row Name 03/13/20 1217          Cognitive Assessment/Intervention- PT/OT    Orientation Status (Cognition)  oriented x 4  -SC     Cognitive Assessment/Intervention Comment  alert, following commands  -SC     Row Name 03/13/20 1217          Safety Issues, Functional Mobility    Impairments Affecting Function (Mobility)  balance;pain;range of motion (ROM);strength;sensation/sensory awareness  -SC       User Key  (r) = Recorded By, (t) = Taken By, (c) = Cosigned By    Initials Name Provider Type    SC Rosaline Fierro PT Physical Therapist        Mobility     Row Name 03/13/20 1218          Bed Mobility Assessment/Treatment    Bed Mobility Assessment/Treatment  scooting/bridging;supine-sit  -SC     Scooting/Bridging Willis (Bed Mobility)  independent  -SC     Supine-Sit Willis (Bed Mobility)  verbal cues;minimum assist (75% patient effort)  -SC     Assistive Device (Bed Mobility)  head of bed elevated  -SC     Comment (Bed Mobility)  needed some help with leg mobility, demonstrated ability to wt bear on L elbow  -SC     Row Name 03/13/20 1218          Transfer Assessment/Treatment    Comment (Transfers)  Worked on side transfers to [facility] with cues for hand placement, Patient able to sustain nwb. Also worked on sts from w/c into platform walker-cues for hand placement   -SC     Row Name 03/13/20 1218          Sit-Stand Transfer    Sit-Stand Willis (Transfers)  verbal cues;1 person assist;contact guard  -SC      Assistive Device (Sit-Stand Transfers)  walker, rolling platform  -SC     Row Name 03/13/20 1218          Gait/Stairs Assessment/Training    Gait/Stairs Assessment/Training  gait/ambulation independence  -SC     Gordon Level (Gait)  verbal cues;1 person assist  -SC     Assistive Device (Gait)  walker, rolling platform  -SC     Distance in Feet (Gait)  3  -SC     Pattern (Gait)  step-to  -SC     Comment (Gait/Stairs)  Worked on short distance walking from w/c to chair with cues for wt bearing on elbow. Demonstrated ability to advance walker. Also worked on w/c mobility in hallway x 300 feet  with cues for sequencing turns with R foot. Required min assist at times.  -SC     Row Name 03/13/20 1218          Mobility Assessment/Intervention    Extremity Weight-bearing Status  left upper extremity;right lower extremity  -SC     Left Upper Extremity (Weight-bearing Status)  non weight-bearing (NWB)  -SC     Right Lower Extremity (Weight-bearing Status)  non weight-bearing (NWB)  -SC       User Key  (r) = Recorded By, (t) = Taken By, (c) = Cosigned By    Initials Name Provider Type    SC Rosaline Fierro, PT Physical Therapist        Obj/Interventions     Row Name 03/13/20 1223          Therapeutic Exercise    Lower Extremity (Therapeutic Exercise)  gastroc stretch, bilateral;SLR (straight leg raise), right;quad sets, right  -SC     Exercise Type (Therapeutic Exercise)  AAROM (active assistive range of motion);isotonic contraction, concentric  -SC     Sets/Reps (Therapeutic Exercise)  10  -SC     Row Name 03/13/20 1225          Dynamic Standing Balance    Level of Gordon, Reaches Outside Midline (Standing, Dynamic Balance)  minimal assist, 75% patient effort;1 person to manage equipment;1 person assist  -SC     Assistive Device Utilized (Supported Standing, Dynamic Balance)  walker, rolling platform  -SC       User Key  (r) = Recorded By, (t) = Taken By, (c) = Cosigned By    Initials Name Provider Type    SC  Rosaline Fierro, PT Physical Therapist        Goals/Plan    No documentation.       Clinical Impression     Row Name 03/13/20 1226          Pain Assessment    Additional Documentation  Pain Scale: FACES Pre/Post-Treatment (Group)  -SC     Row Name 03/13/20 1226          Pain Scale: Numbers Pre/Post-Treatment    Pain Location - Side  Right  -SC     Pain Location  knee  -SC     Pain Intervention(s)  Repositioned  -SC     Row Name 03/13/20 1226          Pain Scale: FACES Pre/Post-Treatment    Pain: FACES Scale, Pretreatment  2-->hurts little bit  -SC     Pain: FACES Scale, Post-Treatment  2-->hurts little bit  -SC     Row Name 03/13/20 1226          Plan of Care Review    Progress  improving  -SC     Outcome Summary  Worked on w/c transfers and w/c mobility today.   -SC     Row Name 03/13/20 1226          Physical Therapy Clinical Impression    Criteria for Skilled Interventions Met (PT Clinical Impression)  yes;treatment indicated  -SC     Rehab Potential (PT Clinical Summary)  good, to achieve stated therapy goals  -SC     Row Name 03/13/20 1226          Positioning and Restraints    Pre-Treatment Position  in bed  -SC     Post Treatment Position  chair  -SC     In Chair  reclined;sitting;call light within reach;notified [facility]  -SC       User Key  (r) = Recorded By, (t) = Taken By, (c) = Cosigned By    Initials Name Provider Type    SC Rosaline Fierro, PT Physical Therapist        Outcome Measures     Row Name 03/13/20 1227          How much help from another person do you currently need...    Turning from your back to your side while in flat bed without using bedrails?  3  -SC     Moving from lying on back to sitting on the side of a flat bed without bedrails?  3  -SC     Moving to and from a bed to a chair (including a wheelchair)?  3  -SC     Standing up from a chair using your arms (e.g., wheelchair, bedside chair)?  3  -SC     Climbing 3-5 steps with a railing?  1  -SC     To walk in hospital room?  3  -SC      AM-PAC 6 Clicks Score (PT)  16  -SC       User Key  (r) = Recorded By, (t) = Taken By, (c) = Cosigned By    Initials Name Provider Type    Rosaline Vinson PT Physical Therapist          PT Recommendation and Plan  Planned Therapy Interventions (PT Eval): bed mobility training, gait training, home exercise program, patient/family education, strengthening, transfer training  Outcome Summary/Treatment Plan (PT)  Anticipated Equipment Needs at Discharge (PT): platform walker, wheelchair  Anticipated Discharge Disposition (PT): inpatient rehabilitation facility  Plan of Care Reviewed With: patient  Progress: improving  Outcome Summary: Worked on w/c transfers and w/c mobility today.      Time Calculation:   PT Charges     Row Name 03/13/20 1030             Time Calculation    Start Time  1030  -SC      PT Received On  03/13/20  -SC      PT Goal Re-Cert Due Date  03/21/20  -SC         Time Calculation- PT    Total Timed Code Minutes- PT  38 minute(s)  -SC         Timed Charges    03027 - PT Therapeutic Exercise Minutes  10  -SC      49428 - Gait Training Minutes   8  -SC      98460 - PT Therapeutic Activity Minutes  20  -SC        User Key  (r) = Recorded By, (t) = Taken By, (c) = Cosigned By    Initials Name Provider Type    SC Rosaline Fierro PT Physical Therapist        Therapy Charges for Today     Code Description Service Date Service Provider Modifiers Qty    66450458481 HC PT THER PROC EA 15 MIN 3/13/2020 Rosaline Fierro, PT GP 1    42169507363 HC GAIT TRAINING EA 15 MIN 3/13/2020 Rosaline Fierro, PT GP 1    99530008092 HC PT THERAPEUTIC ACT EA 15 MIN 3/13/2020 Rosaline Fierro, PT GP 1          PT G-Codes  Outcome Measure Options: AM-PAC 6 Clicks Basic Mobility (PT)  AM-PAC 6 Clicks Score (PT): 16  AM-PAC 6 Clicks Score (OT): 11    Rosaline Fierro PT  3/13/2020

## 2020-03-13 NOTE — PROGRESS NOTES
[facility]    Acute pain service Inpatient Progress Note    Patient Name: Yessica Galvin  :  1960  MRN:  [id number]        Acute Pain  Service Inpatient Progress Note:    Analgesia:Good  Pain Score:3/10  LOC: alert and awake  Resp Status: room air  Cardiac: VS stable  Side Effects:None  Catheter Plan:RN to remove catheter  Comments: ---------------------------------------------------------------------------------  Physical Therapy Manual Muscle Testing Results:  MMT (Manual Muscle Testing)  General MMT Comments: R LE : foot 0/5, quads1/5. L LE 5/5 (20 8406)]    Physical Therapy - Plan of Care Review - Outcome Summary:  Outcome Summary: Gait training: CGA to min-A, 7 ft platform walker with chair follow, cues to maintain RLE NWB and avoid WB through left wrist.  Limited by SpO2, fatigue, pain and HR.  HR elevated to 143 bpm, but lowered to 100 after 1 min sitting.  SpO2 dropped to 85%, but improved to 92% after 1 min sitting.  Greatly concerned about patient safety and ability if d/c home, recommend inpatient rehab.   (20 2639)]    Occupational Therapy - Plan of Care Review - Outcome Summary:  Outcome Summary: OT IE completed. Pt presents with significant pain and bilateral WB precautions limiting mobility and ADL performance. Assist x2 for bed mobility. Dependent for LB ADLs and toileting. Education initiated for ROM HEP initiated LUE/hand and positioning for edema mgmt. OT will follow IP to advance ADL and transfers as tolerated. Recommend IRF at d/c for best outcome. (20 8383)]  ----------------------------------------------------------------------------------

## 2020-03-13 NOTE — PROGRESS NOTES
"                  Clinical Nutrition     Reason for Visit:   LOS    Patient Name: Yessiac Galvin  YOB: 1960  MRN: [id number]  Date of Encounter: 03/13/20 14:04  Admission date: 3/7/2020    Nutrition Assessment   Assessment     Admission diagnosis  R tibia fracture / L radius fracture    Additional diagnosis/conditions/procedures this admission  Pneumonia  Hypoxia  Hypokalemia  Asthma  Tobacco abuse    (3/10) s/p ORIF R tibia, L radius    Additional PMH/procedures:  Hypothyroid  Anxiety  Chronic back pain  Asthma    Neck surgery      Reported/Observed/Food/Nutrition Related History:       Anthropometrics     Height: 165.1 cm (65\")  Last filed wt: Weight: 92.1 kg (203 lb) (03/09/20 0500)  Weight Method: Bed scale    BMI: BMI (Calculated): 31  Obese Class I: 30-34.9kg/m2    Ideal Body Weight (IBW) (kg): 57.29  Admission wt: 182 lb  Method obtained: stated weight per charting 3/7     Labs reviewed     Results from last 7 days   Lab Units 03/13/20  0613 03/10/20  1117 03/10/20  0549 03/09/20  0603 03/08/20  0510 03/07/20  2139   GLUCOSE mg/dL 109*  --  92 98 129* 107*   BUN mg/dL 5*  --  4* 7* 11 11   CREATININE mg/dL 0.40*  --  0.32* 0.45* 0.54* 0.57   SODIUM mmol/L 138  --  139 136 139 138   CHLORIDE mmol/L 98  --  107 101 103 98   POTASSIUM mmol/L 2.9* 3.3* 2.8* 3.8 4.0 4.4   ALT (SGPT) U/L  --   --  105*  --  230* 61*     Results from last 7 days   Lab Units 03/10/20  0549 03/08/20  0510 03/07/20  2139   ALBUMIN g/dL 2.70* 3.70 4.20   CHOLESTEROL mg/dL  --  216*  --    TRIGLYCERIDES mg/dL  --  61  --        Medications reviewed   Pertinent: ceftin, gabapentin, docycyline, synthroid, florastor, effexor    Intake/Output 24 hrs (7:00AM - 6:59 AM)     Intake & Output (last day)       03/12 0701 - 03/13 0700 03/13 0701 - 03/14 0700    P.O. 780 240    Total Intake(mL/kg) 780 (8.5) 240 (2.6)    Urine (mL/kg/hr) 1800 (0.8)     Wound      Total Output 1800     Net -1020 +240          Urine Unmeasured " Occurrence 2 x 1 x    Stool Unmeasured Occurrence 2 x         Current Nutrition Prescription     PO: Diet Regular  Intake: 67% x 3 meals    Nutrition Diagnosis     3/13  Problem No nutrition diagnosis at this time   Etiology    Signs/Symptoms        Nutrition Intervention   1.  Follow treatment progress, Care plan reviewed  2. RD to continue to monitor patient PO intake/adequacy    Goal:   General: Nutrition support treatment  PO: Continue positive trend      Monitoring/Evaluation:   Per protocol, PO intake    Will Continue to follow per protocol    Louisa Pretty RDN, LD  Time Spent: 25 minutes

## 2020-03-13 NOTE — PLAN OF CARE
Problem: Patient Care Overview  Goal: Plan of Care Review  Flowsheets  Taken 3/13/2020 0503  Progress: improving  Outcome Summary: Pt had uneventful night, slept well.  PO pain meds given for R knee/L wrist pain.  Ropivacaine infusion removed @ 0600 per order. SR per tele. Pt alert, oriented.  Anticipating d/c to rehab when ins approves.  Taken 3/12/2020 2014  Plan of Care Reviewed With: patient     Problem: Fall Risk (Adult)  Goal: Identify Related Risk Factors and Signs and Symptoms  Flowsheets  Taken 3/13/2020 0503 by Rupa Cardoso RN  Related Risk Factors (Fall Risk): gait/mobility problems;history of falls;objects hard to reach  Taken 3/12/2020 1544 by Andree Puente RN  Signs and Symptoms (Fall Risk): presence of risk factors

## 2020-03-13 NOTE — PROGRESS NOTES
Continued Stay Note  [facility]     Patient Name: Yessica Galvin  MRN: [id number]  Today's Date: 3/13/2020    Admit Date: 3/7/2020    Discharge Plan     Row Name 03/13/20 1357       Plan    Plan  [facility]    Patient/Family in Agreement with Plan  yes    Plan Comments  Spoke with Charlene.  She is able to offer patient a bed at [facility] and will initiate precert with insurance today.  CM will continue to follow.  Updated patient in room.  Bridgette Kaufman RN [phone]    Final Discharge Disposition Code  03 - skilled nursing facility (SNF)        Discharge Codes    No documentation.       Expected Discharge Date and Time     Expected Discharge Date Expected Discharge Time    Mar 16, 2020             Bridgette Kaufman RN

## 2020-03-13 NOTE — PLAN OF CARE
Problem: Patient Care Overview  Goal: Plan of Care Review  Flowsheets  Taken 3/13/2020 1228  Plan of Care Reviewed With: patient  Taken 3/13/2020 1226  Outcome Summary: Worked on w/c transfers and w/c mobility today.

## 2020-03-13 NOTE — PROGRESS NOTES
[facility] Medicine Services  PROGRESS NOTE    Patient Name: Yessica Galvin  : 1960  MRN: [id number]    Date of Admission: 3/7/2020  Primary Care Physician: Kota Swain MD    Subjective   Subjective     CC:  R Leg pain    HPI:    Patient resting in bed. Cough/congestion better. No SOA. No f/c. No n/v. Pain reasonably controlled.    Review of Systems   HENT: Positive for congestion.    Respiratory: Positive for cough. Negative for wheezing.    Cardiovascular: Negative.    Gastrointestinal: Negative.    Genitourinary: Negative.    Musculoskeletal: Positive for arthralgias and myalgias.   Skin: Negative.    Neurological: Negative.    Hematological: Negative.    Psychiatric/Behavioral: Positive for dysphoric mood.   Cough and congestion there but better, otherwise unchanged 3/12      Objective   Objective     Vital Signs:   Temp:  [98.5 °F (36.9 °C)-100.2 °F (37.9 °C)] 98.8 °F (37.1 °C)  Heart Rate:  [69-99] 69  Resp:  [16-18] 18  BP: (102-132)/(61-87) 131/71        Physical Exam:  NAD, alert and oriented x 3, unchanged  OP clear, MMM, unchanged  PERRL  Face symmetric, speech clear, unchanged  Neck supple, unchanged  No LAD  RRR, unchanged  Coarse cough noted, no wheezing, no rales, unchanged  +BS, ND, NT, soft, stable  No c/c/e, unchanged  R LE in splints, LUE in splint, KIM otherwise, stable  No obvious rashes, unchanged  Normal affect    Results Reviewed:  Results from last 7 days   Lab Units 20  0613 20  0519 03/10/20  0549   WBC 10*3/mm3 6.34 8.03 7.68   HEMOGLOBIN g/dL 7.9* 8.1* 9.4*   HEMATOCRIT % 24.5* 25.0* 29.4*   PLATELETS 10*3/mm3 246 178 165     Estimated Creatinine Clearance: 165.5 mL/min (A) (by C-G formula based on SCr of 0.4 mg/dL (L)).    I have reviewed the medications:  Scheduled Meds:    budesonide-formoterol 2 puff Inhalation BID - RT   cefuroxime 250 mg Oral Q12H   doxycycline 100 mg Oral Q12H   enoxaparin 40 mg Subcutaneous Q24H   gabapentin  300 mg Oral Nightly   guaiFENesin 600 mg Oral Q12H   ipratropium-albuterol 3 mL Nebulization 4x Daily - RT   levothyroxine 150 mcg Oral Q AM   miconazole  Topical Q12H   nicotine 1 patch Transdermal Q24H   saccharomyces boulardii 250 mg Oral BID   sodium chloride 10 mL Intravenous Q12H   sodium chloride 10 mL Intravenous Q12H   venlafaxine  mg Oral Daily     Continuous Infusions:    lactated ringers 9 mL/hr Last Rate: 9 mL/hr (03/10/20 1110)   ropivacaine (NAROPIN) 0.2% peripheral nerve cath (moog) 8 mL/hr Last Rate: 8 mL/hr (03/12/20 2336)   sodium chloride 125 mL/hr Last Rate: 125 mL/hr (03/08/20 0604)     PRN Meds:.•  acetaminophen **OR** acetaminophen **OR** acetaminophen  •  albuterol  •  bisacodyl  •  bisacodyl  •  HYDROcodone-acetaminophen  •  HYDROmorphone **AND** [PENDING] HYDROmorphone **AND** naloxone **AND** [PENDING] naloxone  •  hydrOXYzine  •  lactated ringers  •  magnesium hydroxide  •  ondansetron  •  potassium chloride **OR** potassium chloride **OR** potassium chloride  •  sodium chloride  •  sodium chloride    Assessment/Plan   Assessment & Plan     Active Hospital Problems    Diagnosis  POA   • **Closed fracture of right tibial plateau [S82.141A]  Unknown   • Hypokalemia [E87.6]  Unknown   • Fracture of right tibia [S82.201A]  Yes   • Left radial fracture [S52.92XA]  Yes   • Asthma [J45.909]  Yes   • Tobacco abuse [Z72.0]  Yes   • Elevated transaminase level [R74.0]  Yes   • Chronic back pain [M54.9, G89.29]  Unknown   • Generalized anxiety disorder [F41.1]  Yes   • Acquired hypothyroidism [E03.9]  Yes      Resolved Hospital Problems   No resolved problems to display.        Brief Hospital Course to date:  Yessica Galvin is a 59 y.o. female with history of asthma, chronic pain, hypothyroidism who presents 3/7 evening after injury at home (per report was noted to fall off scooter while playing with grandkids) to RLE and left wrist. Patient sustained a right tibia fx, left radius fx. S/P  ORIF of RLE and L wrist    Right tibia fracture, left radius fracture  Hypoxia related to pain medication  --s/p ORIF of R tibia and L radial fracture  --nonweightbearing, PT/OT as tolerated/instructed  --awaiting rehab, to SNF likely now  --pain reasonably controlled now    PNA  --taper to PO antibiotics now  --nebs/mucinex/OPEP    Hypokalemia  --replace prn     Asthma, tobacco abuse  -nebs/spirometer, stable     Elevated transaminase level, mild  -Acute hepatitis panel checked and negative  -Monitor     Hypothyroidism  -stable on levothyroxine, unchanged     Generalized anxiety disorder  -Stable on Effexor     Chronic back pain  -Continue home tramadol, gabapentin        DVT prophylaxis: SCD to LLE       Disposition: I expect the patient to be discharged to rehab soon    CODE STATUS:   Code Status and Medical Interventions:   Ordered at: 03/07/20 2212     Level Of Support Discussed With:    Patient     Code Status:    CPR     Medical Interventions (Level of Support Prior to Arrest):    Full     Electronically signed by Nomi Ortiz MD, 03/13/20, 08:53.

## 2020-03-13 NOTE — PLAN OF CARE
Problem: Patient Care Overview  Goal: Plan of Care Review  3/13/2020 1229 by Rosaline Fierro, PT  Flowsheets  Taken 3/13/2020 1229  Progress: improving  Plan of Care Reviewed With: patient  Taken 3/13/2020 1226  Outcome Summary: Worked on w/c transfers and w/c mobility today.

## 2020-03-13 NOTE — PLAN OF CARE
Pt alert and oriented; vss; nsr on telemetry; voiding well; ambulating with therapy today; skin intact; immobilizer on when oob; up in chair for several hours today; 2 liters oxygen; pain managed with prn po pain meds. Probably will be here over the weekend due to placement.

## 2020-03-13 NOTE — PROGRESS NOTES
"Orthopaedic Surgery Progress Note     LOS: 6 days   Patient Care Team:  Kota Swain MD as PCP - General    POD 4  Subjective     Interval History:   Patient Reports: No new complaints.  Follow-up right tibia ORIF and left wrist ORIF.  She says she is feeling better and plans to go to rehab soon     Objective     Vital Signs:  Temp (24hrs), Av.4 °F (37.4 °C), Min:98.8 °F (37.1 °C), Max:100.2 °F (37.9 °C)    /71 (BP Location: Right arm, Patient Position: Lying)   Pulse 69   Temp 98.8 °F (37.1 °C) (Oral)   Resp 18   Ht 165.1 cm (65\")   Wt 92.1 kg (203 lb)   SpO2 93%   BMI 33.78 kg/m²     Labs:  Lab Results (last 24 hours)     Procedure Component Value Units Date/Time    Basic Metabolic Panel [[id number]]  (Abnormal) Collected:  20    Specimen:  Blood Updated:  20     Glucose 109 mg/dL      BUN 5 mg/dL      Creatinine 0.40 mg/dL      Sodium 138 mmol/L      Potassium 2.9 mmol/L      Chloride 98 mmol/L      CO2 31.0 mmol/L      Calcium 8.2 mg/dL      eGFR Non African Amer >150 mL/min/1.73      BUN/Creatinine Ratio 12.5     Anion Gap 9.0 mmol/L     Narrative:       GFR Normal >60  Chronic Kidney Disease <60  Kidney Failure <15      CBC (No Diff) [[id number]]  (Abnormal) Collected:  20    Specimen:  Blood Updated:  20     WBC 6.34 10*3/mm3      RBC 2.55 10*6/mm3      Hemoglobin 7.9 g/dL      Hematocrit 24.5 %      MCV 96.1 fL      MCH 31.0 pg      MCHC 32.2 g/dL      RDW 13.9 %      RDW-SD 49.1 fl      MPV 9.8 fL      Platelets 246 10*3/mm3     Blood Culture - Blood, Hand, Right [[id number]] Collected:  20    Specimen:  Blood from Hand, Right Updated:  20 06     Blood Culture No growth at 2 days    Blood Culture - Blood, Arm, Right [[id number]] Collected:  20    Specimen:  Blood from Arm, Right Updated:  20 0600     Blood Culture No growth at 2 days          Imaging:  XR Chest 1 View  Narrative: EXAMINATION: XR CHEST 1 VW-     "   INDICATION: Dyspnea, on exertion.     COMPARISON: 03/11/2020.     FINDINGS: Right hemidiaphragm is now mildly elevated. The heart and  vasculature appear normal in size. Mild diffuse right lung interstitial  changes, mostly the upper lobe appear a little increased. Left lung  remains grossly clear. No lung consolidation, effusion or pneumothorax  is seen.     Impression: Mildly increased interstitial changes at the right lung  particularly the right upper lobe. Mild new elevation of the right  hemidiaphragm, perhaps reflect some generalized right lung volume loss.  No new chest disease is seen elsewhere.      D:  03/13/2020  E:  03/13/2020          Physical Exam:  Right tibia wound clean dry and intact.  Foot sensation returning but still very weak dorsiflexion.  Plantarflexion intact.  Her block was just removed today.  Left wrist splint intact and finger motion intact  Assessment/Plan   Postop day 3 status post right tibia ORIF and left wrist ORIF  Nonweightbearing left lower extremity x6 weeks  Peroneal nerve motor function still not working after the block and will be followed.  Sensation has returned to the peroneal nerve distribution but is still diminished  Continue PT and follow-up with me in 2 to 4 weeks  Guanaco Thakur MD  03/13/20  12:01

## 2020-03-14 PROCEDURE — 25010000002 ENOXAPARIN PER 10 MG: Performed by: ORTHOPAEDIC SURGERY

## 2020-03-14 PROCEDURE — 97116 GAIT TRAINING THERAPY: CPT

## 2020-03-14 PROCEDURE — 99232 SBSQ HOSP IP/OBS MODERATE 35: CPT | Performed by: NURSE PRACTITIONER

## 2020-03-14 PROCEDURE — 97110 THERAPEUTIC EXERCISES: CPT

## 2020-03-14 PROCEDURE — 94799 UNLISTED PULMONARY SVC/PX: CPT

## 2020-03-14 RX ADMIN — MICONAZOLE NITRATE 1 APPLICATION: 2 CREAM TOPICAL at 20:09

## 2020-03-14 RX ADMIN — GUAIFENESIN 600 MG: 600 TABLET, EXTENDED RELEASE ORAL at 20:09

## 2020-03-14 RX ADMIN — ACETAMINOPHEN 650 MG: 325 TABLET, FILM COATED ORAL at 13:48

## 2020-03-14 RX ADMIN — DOXYCYCLINE 100 MG: 100 CAPSULE ORAL at 08:48

## 2020-03-14 RX ADMIN — MICONAZOLE NITRATE: 2 CREAM TOPICAL at 08:48

## 2020-03-14 RX ADMIN — HYDROCODONE BITARTRATE AND ACETAMINOPHEN 1 TABLET: 7.5; 325 TABLET ORAL at 03:24

## 2020-03-14 RX ADMIN — ALBUTEROL SULFATE 2.5 MG: 2.5 SOLUTION RESPIRATORY (INHALATION) at 03:35

## 2020-03-14 RX ADMIN — ACETAMINOPHEN 650 MG: 325 TABLET, FILM COATED ORAL at 08:48

## 2020-03-14 RX ADMIN — HYDROCODONE BITARTRATE AND ACETAMINOPHEN 1 TABLET: 7.5; 325 TABLET ORAL at 21:27

## 2020-03-14 RX ADMIN — DOXYCYCLINE 100 MG: 100 CAPSULE ORAL at 20:09

## 2020-03-14 RX ADMIN — MAGNESIUM HYDROXIDE 10 ML: 2400 SUSPENSION ORAL at 08:48

## 2020-03-14 RX ADMIN — GABAPENTIN 300 MG: 300 CAPSULE ORAL at 20:09

## 2020-03-14 RX ADMIN — BUDESONIDE AND FORMOTEROL FUMARATE DIHYDRATE 2 PUFF: 160; 4.5 AEROSOL RESPIRATORY (INHALATION) at 08:26

## 2020-03-14 RX ADMIN — LEVOTHYROXINE SODIUM 150 MCG: 150 TABLET ORAL at 05:45

## 2020-03-14 RX ADMIN — CEFUROXIME AXETIL 250 MG: 250 TABLET ORAL at 20:09

## 2020-03-14 RX ADMIN — Medication 250 MG: at 20:09

## 2020-03-14 RX ADMIN — HYDROCODONE BITARTRATE AND ACETAMINOPHEN 1 TABLET: 7.5; 325 TABLET ORAL at 15:05

## 2020-03-14 RX ADMIN — VENLAFAXINE HYDROCHLORIDE 150 MG: 75 CAPSULE, EXTENDED RELEASE ORAL at 08:48

## 2020-03-14 RX ADMIN — HYDROCODONE BITARTRATE AND ACETAMINOPHEN 1 TABLET: 7.5; 325 TABLET ORAL at 08:49

## 2020-03-14 RX ADMIN — CEFUROXIME AXETIL 250 MG: 250 TABLET ORAL at 08:48

## 2020-03-14 RX ADMIN — GUAIFENESIN 600 MG: 600 TABLET, EXTENDED RELEASE ORAL at 08:48

## 2020-03-14 RX ADMIN — ENOXAPARIN SODIUM 40 MG: 40 INJECTION SUBCUTANEOUS at 17:52

## 2020-03-14 RX ADMIN — Medication 250 MG: at 08:48

## 2020-03-14 RX ADMIN — BISACODYL 5 MG: 5 TABLET, COATED ORAL at 08:48

## 2020-03-14 NOTE — PLAN OF CARE
"  Problem: Patient Care Overview  Goal: Plan of Care Review  Flowsheets  Taken 3/14/2020 0440  Progress: improving  Outcome Summary: Pt doing better.  Getting up to BSC w/assist x1 although she does become SOA and has increased R knee pain.  Medicated with lortab.  Reports pain level as a 9/10 even upon being awakened.  Repeat K+ at 2200 result 3.9.  \"yeast\" rash to back doesn't seem to be improving with use of miconazole.  Pt alert, cooperative.  Voiding well.  Anticipating d/c to [facility] when ins approves.  Taken 3/13/2020 2015  Plan of Care Reviewed With: patient     Problem: Fall Risk (Adult)  Goal: Identify Related Risk Factors and Signs and Symptoms  Flowsheets  Taken 3/13/2020 0503 by Rupa Cardoso RN  Related Risk Factors (Fall Risk): gait/mobility problems;history of falls;objects hard to reach  Taken 3/12/2020 1544 by Andree Puente RN  Signs and Symptoms (Fall Risk): presence of risk factors     "

## 2020-03-14 NOTE — PROGRESS NOTES
[facility] Medicine Services  PROGRESS NOTE    Patient Name: Yessica Galvin  : 1960  MRN: [id number]    Date of Admission: 3/7/2020  Length of Stay: 7  Primary Care Physician: Kota Swain MD    Subjective   Subjective     CC:  Follow up right tibial and left wrist fractures    HPI:  Patient resting in bed in no apparent distress. No family at bedside. Nursing notes reviewed. No acute events overnight. Patient reports continued right leg pain that is reasonably controlled on current regimen. No new complaints at this time.     ROS:  Gen- No fevers, chills  HENT- +nasal congestion  CV- No chest pain, palpitations  Resp- occasional cough, denies dyspnea  GI- No N/V/D, abd pain    Objective   Objective     Vital Signs:   Temp:  [98 °F (36.7 °C)-99.6 °F (37.6 °C)] 98.8 °F (37.1 °C)  Heart Rate:  [68-89] 70  Resp:  [16] 16  BP: (113-141)/(55-75) 119/70        Physical Exam:  Constitutional: No acute distress, awake, alert  HENT: NCAT, mucous membranes moist  Respiratory: Clear to auscultation bilaterally, respiratory effort normal   Cardiovascular: RRR, no murmurs, rubs, or gallops, palpable pedal pulses bilaterally  Gastrointestinal: Positive bowel sounds, soft, nontender, nondistended  Musculoskeletal: RLE incision CDI, LUE splinted with ace wrap in place.   Psychiatric: Appropriate affect, cooperative  Neurologic: Oriented x 3, strength symmetric in all extremities, speech clear  Skin: warm, dry, diffuse rash on back/buttocks     Results Reviewed:  I have personally reviewed current lab, radiology, and data and agree.    Results from last 7 days   Lab Units 20  0613 20  0519 03/10/20  0549   WBC 10*3/mm3 6.34 8.03 7.68   HEMOGLOBIN g/dL 7.9* 8.1* 9.4*   HEMATOCRIT % 24.5* 25.0* 29.4*   PLATELETS 10*3/mm3 246 178 165     Results from last 7 days   Lab Units 20  2155 20  0613 03/10/20  1117 03/10/20  0549 20  0603 20  0510 20  2134      SODIUM mmol/L  --  138  --  139 136 139 138   POTASSIUM mmol/L 3.9 2.9* 3.3* 2.8* 3.8 4.0 4.4   CHLORIDE mmol/L  --  98  --  107 101 103 98   CO2 mmol/L  --  31.0*  --  23.0 24.0 27.0 25.0   BUN mg/dL  --  5*  --  4* 7* 11 11   CREATININE mg/dL  --  0.40*  --  0.32* 0.45* 0.54* 0.57   GLUCOSE mg/dL  --  109*  --  92 98 129* 107*   CALCIUM mg/dL  --  8.2*  --  7.2* 8.5* 8.8 9.4   ALT (SGPT) U/L  --   --   --  105*  --  230* 61*   AST (SGOT) U/L  --   --   --  44*  --  320* 103*     Estimated Creatinine Clearance: 165.5 mL/min (A) (by C-G formula based on SCr of 0.4 mg/dL (L)).  No results found for: BNP    Microbiology Results Abnormal     Procedure Component Value - Date/Time    Blood Culture - Blood, Hand, Right [[id number]] Collected:  03/11/20 0519    Lab Status:  Preliminary result Specimen:  Blood from Hand, Right Updated:  03/14/20 0600     Blood Culture No growth at 3 days    Blood Culture - Blood, Arm, Right [[id number]] Collected:  03/11/20 0519    Lab Status:  Preliminary result Specimen:  Blood from Arm, Right Updated:  03/14/20 0600     Blood Culture No growth at 3 days    Respiratory Panel, PCR - Swab, Nasopharynx [[id number]]  (Normal) Collected:  03/11/20 1125    Lab Status:  Final result Specimen:  Swab from Nasopharynx Updated:  03/11/20 1222     ADENOVIRUS, PCR Not Detected     Coronavirus 229E Not Detected     Coronavirus HKU1 Not Detected     Coronavirus NL63 Not Detected     Coronavirus OC43 Not Detected     Human Metapneumovirus Not Detected     Human Rhinovirus/Enterovirus Not Detected     Influenza B PCR Not Detected     Parainfluenza Virus 1 Not Detected     Parainfluenza Virus 2 Not Detected     Parainfluenza Virus 3 Not Detected     Parainfluenza Virus 4 Not Detected     Bordetella pertussis pcr Not Detected     Influenza A H1 2009 PCR Not Detected     Chlamydophila pneumoniae PCR Not Detected     Mycoplasma pneumo by PCR Not Detected     Influenza A PCR Not Detected     Influenza A H3 Not  Detected     Influenza A H1 Not Detected     RSV, PCR Not Detected     Bordetella parapertussis PCR Not Detected    Narrative:       The coronavirus on the RVP is NOT COVID-19 and is NOT indicative of infection with COVID-19.           Imaging Results (Last 24 Hours)     Procedure Component Value Units Date/Time    XR Chest 1 View [[id number]] Collected:  03/13/20 0842     Updated:  03/14/20 0010    Narrative:       EXAMINATION: XR CHEST 1 VW-     INDICATION: Dyspnea, on exertion.     COMPARISON: 03/11/2020.     FINDINGS: Right hemidiaphragm is now mildly elevated. The heart and  vasculature appear normal in size. Mild diffuse right lung interstitial  changes, mostly the upper lobe appear a little increased. Left lung  remains grossly clear. No lung consolidation, effusion or pneumothorax  is seen.       Impression:       Mildly increased interstitial changes at the right lung  particularly the right upper lobe. Mild new elevation of the right  hemidiaphragm, perhaps reflect some generalized right lung volume loss.  No new chest disease is seen elsewhere.      D:  03/13/2020  E:  03/13/2020     This report was finalized on 3/14/2020 12:07 AM by Dr. Nomi Brownlee MD.                I have reviewed the medications.  Scheduled Meds:  budesonide-formoterol 2 puff Inhalation BID - RT   cefuroxime 250 mg Oral Q12H   doxycycline 100 mg Oral Q12H   enoxaparin 40 mg Subcutaneous Q24H   gabapentin 300 mg Oral Nightly   guaiFENesin 600 mg Oral Q12H   levothyroxine 150 mcg Oral Q AM   miconazole  Topical Q12H   nicotine 1 patch Transdermal Q24H   saccharomyces boulardii 250 mg Oral BID   sodium chloride 10 mL Intravenous Q12H   sodium chloride 10 mL Intravenous Q12H   venlafaxine  mg Oral Daily     Continuous Infusions:  lactated ringers 9 mL/hr Last Rate: 9 mL/hr (03/10/20 1110)   ropivacaine (NAROPIN) 0.2% peripheral nerve cath (moog) 8 mL/hr Last Rate: 8 mL/hr (03/12/20 5896)   sodium chloride 125 mL/hr Last Rate: 125  mL/hr (03/08/20 0604)     PRN Meds:.•  acetaminophen **OR** acetaminophen **OR** acetaminophen  •  albuterol  •  bisacodyl  •  bisacodyl  •  HYDROcodone-acetaminophen  •  HYDROmorphone **AND** [PENDING] HYDROmorphone **AND** naloxone **AND** [PENDING] naloxone  •  hydrOXYzine  •  ipratropium-albuterol  •  lactated ringers  •  magnesium hydroxide  •  ondansetron  •  potassium chloride **OR** potassium chloride **OR** potassium chloride  •  sodium chloride  •  sodium chloride    Assessment/Plan   Assessment / Plan     Active Hospital Problems    Diagnosis  POA   • **Closed fracture of right tibial plateau [S82.141A]  Unknown   • Hypokalemia [E87.6]  Unknown   • Fracture of right tibia [S82.201A]  Yes   • Left radial fracture [S52.92XA]  Yes   • Asthma [J45.909]  Yes   • Tobacco abuse [Z72.0]  Yes   • Elevated transaminase level [R74.0]  Yes   • Chronic back pain [M54.9, G89.29]  Unknown   • Generalized anxiety disorder [F41.1]  Yes   • Acquired hypothyroidism [E03.9]  Yes      Resolved Hospital Problems   No resolved problems to display.          Brief Hospital Course to date:  Yessica Galvin is a 59 y.o. female with history of asthma, chronic pain, hypothyroidism who presents 3/7 evening after injury at home (per report was noted to fall off scooter while playing with grandkids) to RLE and left wrist. Patient sustained a right tibia fx, left radius fx. S/P ORIF of RLE and L wrist    This patient's problems and plans were partially entered by my partner and updated as appropriate by me 03/14/20.    Right tibia fracture, left radius fracture  Hypoxia related to pain medication  --s/p ORIF of R tibia and L radial fracture, post op day 4  --nonweightbearing, PT/OT as tolerated/instructed  --awaiting rehab, to SNF likely now  --pain reasonably controlled now  --ropivacaine nerve block for postoperative pain   --CBC in AM     PNA   --continue Ceftin and Docy PO  --nebs/mucinex/OPEP     Hypokalemia  --Potassium 3.9 on  3/13/20  --replace prn  --BMP in AM     Asthma, tobacco abuse  -nebs/spirometer, stable     Elevated transaminase level, mild  -Acute hepatitis panel checked and negative  -Monitor     Hypothyroidism  -stable on levothyroxine, unchanged     Generalized anxiety disorder  -Stable on Effexor     Chronic back pain  -Continue home tramadol, gabapentin        DVT prophylaxis: Lovenox    CODE STATUS:   Code Status and Medical Interventions:   Ordered at: 03/07/20 2212     Level Of Support Discussed With:    Patient     Code Status:    CPR     Medical Interventions (Level of Support Prior to Arrest):    Full           Electronically signed by IDALIA Saldivar, 03/14/20, 12:47.

## 2020-03-14 NOTE — THERAPY TREATMENT NOTE
Patient Name: Yessica Galvin  : 1960    MRN: [id number]                              Today's Date: 3/14/2020       Admit Date: 3/7/2020    Visit Dx:     ICD-10-CM ICD-9-CM   1. Closed fracture of right tibial plateau, initial encounter S82.141A 823.00   2. Closed Colles' fracture of left radius, initial encounter S52.532A 813.41   3. Fall with significant injury, initial encounter W19.XXXA E888.9   4. Contusion of right hip, initial encounter S70.01XA 924.01   5. Impaired mobility and ADLs Z74.09 799.89     Patient Active Problem List   Diagnosis   • Asthmatic bronchitis   • Generalized anxiety disorder   • Gout   • Headache   • Acquired hypothyroidism   • Chronic midline low back pain without sciatica   • Seasonal allergic rhinitis   • Cervical disc disorder with radiculopathy   • Lumbar disc disease   • Fracture of right tibia   • Left radial fracture   • Asthma   • Tobacco abuse   • Elevated transaminase level   • Chronic back pain   • Closed fracture of right tibial plateau   • Hypokalemia     Past Medical History:   Diagnosis Date   • Acute bronchitis    • Acute sinusitis    • Asthma    • Asthma with acute exacerbation    • Asthmatic bronchitis    • Disc degeneration, lumbar    • Disc degeneration, lumbar    • History of mammogram    • Hypothyroidism    • Lower back pain    • Plantar fasciitis    • Restless legs syndrome      Past Surgical History:   Procedure Laterality Date   • HYSTERECTOMY     • KNEE ARTHROSCOPY Right 3/10/2020    Procedure: KNEE ARTHROSCOPY RIGHT;  Surgeon: Guanaco Thakur MD;  Location:  [facility] OR;  Service: Orthopedics;  Laterality: Right;   • NECK SURGERY     • OOPHORECTOMY     • ORIF WRIST FRACTURE Left 3/10/2020    Procedure: WRIST OPEN REDUCTION INTERNAL FIXATION LEFT;  Surgeon: Guanaco Thakur MD;  Location: [facility];  Service: Orthopedics;  Laterality: Left;   • TIBIAL PLATEAU OPEN REDUCTION INTERNAL FIXATION Right 3/10/2020    Procedure: TIBIAL PLATEAU OPEN REDUCTION  INTERNAL FIXATION RIGHT;  Surgeon: Guanaco Thakur MD;  Location: [facility];  Service: Orthopedics;  Laterality: Right;     General Information     Row Name 03/14/20 1449          PT Evaluation Time/Intention    Document Type  therapy note (daily note)  -KM     Mode of Treatment  physical therapy  -     Row Name 03/14/20 1449          General Information    Patient Profile Reviewed?  yes  -KM     Existing Precautions/Restrictions  brace worn when out of bed;fall  -     Row Name 03/14/20 1449          Cognitive Assessment/Intervention- PT/OT    Orientation Status (Cognition)  oriented x 4  -KM     Row Name 03/14/20 1449          Safety Issues, Functional Mobility    Impairments Affecting Function (Mobility)  balance;motor planning  -       User Key  (r) = Recorded By, (t) = Taken By, (c) = Cosigned By    Initials Name Provider Type    KM Gisselle Aranda, PT Physical Therapist        Mobility     Row Name 03/14/20 1453          Bed Mobility Assessment/Treatment    Comment (Bed Mobility)  UIC  -     Row Name 03/14/20 1453          Transfer Assessment/Treatment    Comment (Transfers)  transferred chair to w/c for propulsion with c.g.assist and cues for L NWB  -KM     Row Name 03/14/20 1453          Sit-Stand Transfer    Sit-Stand [place] (Transfers)  verbal cues;1 person assist;contact guard  -KM     Assistive Device (Sit-Stand Transfers)  walker, platform  -KM     Row Name 03/14/20 1453          Gait/Stairs Assessment/Training    Gait/Stairs Assessment/Training  gait/ambulation independence  -     [place] Level (Gait)  verbal cues;1 person assist  -KM     Assistive Device (Gait)  walker, rolling platform  -KM     Distance in Feet (Gait)  5  -KM     Pattern (Gait)  step-to  -KM     Comment (Gait/Stairs)  Pt propelled w/c 300 ft with cues and c.g.assist for steering using R u/e and L l/e  -KM     Row Name 03/14/20 1458          Mobility Assessment/Intervention    Extremity Weight-bearing  Status  left upper extremity;right lower extremity  -KM     Left Upper Extremity (Weight-bearing Status)  non weight-bearing (NWB)  -KM     Right Lower Extremity (Weight-bearing Status)  non weight-bearing (NWB)  -KM       User Key  (r) = Recorded By, (t) = Taken By, (c) = Cosigned By    Initials Name Provider Type    Gisselle Crawford, PT Physical Therapist        Obj/Interventions     Row Name 03/14/20 1458          Therapeutic Exercise    Lower Extremity (Therapeutic Exercise)  gastroc stretch, bilateral;SLR (straight leg raise), right;quad sets, right  -KM     Lower Extremity Range of Motion (Therapeutic Exercise)  ankle dorsiflexion/plantar flexion, bilateral  -KM     Exercise Type (Therapeutic Exercise)  AROM (active range of motion);AAROM (active assistive range of motion)  -KM     Position (Therapeutic Exercise)  seated  -KM     Sets/Reps (Therapeutic Exercise)  10x  -KM     Row Name 03/14/20 1458          Static Sitting Balance    Sitting Position (Unsupported Sitting, Static Balance)  sitting in chair  -KM     Row Name 03/14/20 1458          Static Standing Balance    Level of [place] (Supported Standing, Static Balance)  contact guard assist  -KM     Assistive Device Utilized (Supported Standing, Static Balance)  walker, rolling platform  -KM     Row Name 03/14/20 1458          Dynamic Standing Balance    Level of [place], Reaches Outside Midline (Standing, Dynamic Balance)  contact guard assist  -KM     Assistive Device Utilized (Supported Standing, Dynamic Balance)  walker, rolling platform  -KM       User Key  (r) = Recorded By, (t) = Taken By, (c) = Cosigned By    Initials Name Provider Type    Gisselle Crawford, PT Physical Therapist        Goals/Plan    No documentation.       Clinical Impression     Row Name 03/14/20 1501          Pain Scale: Numbers Pre/Post-Treatment    Pain Scale: Numbers, Pretreatment  5/10  -KM     Pain Scale: Numbers, Post-Treatment  5/10  -KM      Pain Location - Side  Right  -KM     Pain Location  knee  -KM     Pre/Post Treatment Pain Comment  encouraged to seek medication as needed  -KM     Pain Intervention(s)  Repositioned  -KM     Row Name 03/14/20 1501          Plan of Care Review    Plan of Care Reviewed With  patient  -KM     Progress  improving  -KM     Outcome Summary  Pt transfers with c.g.assist maintaining R l/e nwb, requires cues to maintain L u/e NWB. Pt ambul 5 ft with L platform r wx and w/c propulsion 300 ft per note  -KM     Row Name 03/14/20 1501          Positioning and Restraints    Pre-Treatment Position  sitting in chair/recliner  -KM     Post Treatment Position  chair  -KM     In Chair  reclined;call light within reach;encouraged to call for assist;exit alarm on;RLE elevated  -KM       User Key  (r) = Recorded By, (t) = Taken By, (c) = Cosigned By    Initials Name Provider Type    Gisselle Crawford, PT Physical Therapist        Outcome Measures     Row Name 03/14/20 1506          How much help from another person do you currently need...    Turning from your back to your side while in flat bed without using bedrails?  3  -KM     Moving from lying on back to sitting on the side of a flat bed without bedrails?  3  -KM     Moving to and from a bed to a chair (including a wheelchair)?  3  -KM     Standing up from a chair using your arms (e.g., wheelchair, bedside chair)?  3  -KM     Climbing 3-5 steps with a railing?  1  -KM     To walk in hospital room?  3  -KM     AM-PAC 6 Clicks Score (PT)  16  -KM     Row Name 03/14/20 1506          Functional Assessment    Outcome Measure Options  AM-PAC 6 Clicks Basic Mobility (PT)  -KM       User Key  (r) = Recorded By, (t) = Taken By, (c) = Cosigned By    Initials Name Provider Type    Gisselle Crawford, PT Physical Therapist          PT Recommendation and Plan     Plan of Care Reviewed With: patient  Progress: improving  Outcome Summary: Pt transfers with c.g.assist maintaining R  l/e nwb, requires cues to maintain L u/e NWB. Pt ambul 5 ft with L platform r wx and w/c propulsion 300 ft per note     Time Calculation:   PT Charges     Row Name 03/14/20 1508             Time Calculation    Start Time  1350  -KM      PT Received On  03/14/20  -KM      PT Goal Re-Cert Due Date  03/21/20  -KM         Time Calculation- PT    Total Timed Code Minutes- PT  24 minute(s)  -KM         Timed Charges    12408 - PT Therapeutic Exercise Minutes  10  -KM      86442 - Gait Training Minutes   15  -KM        User Key  (r) = Recorded By, (t) = Taken By, (c) = Cosigned By    Initials Name Provider Type     Gisselle Aranda, PT Physical Therapist        Therapy Charges for Today     Code Description Service Date Service Provider Modifiers Qty    98725997738 HC PT THER PROC EA 15 MIN 3/14/2020 Gisselle Aranda, PT GP 1    09446803797 HC GAIT TRAINING EA 15 MIN 3/14/2020 Gisselle Aranda, PT GP 1          PT G-Codes  Outcome Measure Options: AM-PAC 6 Clicks Basic Mobility (PT)  AM-PAC 6 Clicks Score (PT): 16  AM-PAC 6 Clicks Score (OT): 11    Gisselle Aranda PT  3/14/2020

## 2020-03-14 NOTE — PLAN OF CARE
Pt vss; alert and oriented; worked well with therapy today-see notes; up in chair for several hours; bathed by family; large bowel movement today; pain managed with q6 hour norco-however pain score is always 9 when asked, with and without medicine. 2 liters oxygen. Incentive spirometer encouraged. Rash on back treated with the anti fungal cream.

## 2020-03-14 NOTE — PLAN OF CARE
Problem: Patient Care Overview  Goal: Plan of Care Review  Flowsheets  Taken 3/14/2020 1507  Progress: improving  Plan of Care Reviewed With: patient  Taken 3/14/2020 1501  Outcome Summary: Pt transfers with c.g.assist maintaining R l/e nwb, requires cues to maintain L u/e NWB. Pt ambul 5 ft with L platform r wx and w/c propulsion 300 ft per note

## 2020-03-15 LAB
ANION GAP SERPL CALCULATED.3IONS-SCNC: 11 MMOL/L (ref 5–15)
BUN BLD-MCNC: 7 MG/DL (ref 8–23)
BUN/CREAT SERPL: 21.9 (ref 7–25)
CALCIUM SPEC-SCNC: 8.9 MG/DL (ref 8.6–10.5)
CHLORIDE SERPL-SCNC: 99 MMOL/L (ref 98–107)
CO2 SERPL-SCNC: 29 MMOL/L (ref 22–29)
CREAT BLD-MCNC: 0.32 MG/DL (ref 0.57–1)
DEPRECATED RDW RBC AUTO: 48.9 FL (ref 37–54)
ERYTHROCYTE [DISTWIDTH] IN BLOOD BY AUTOMATED COUNT: 14.2 % (ref 12.3–15.4)
GFR SERPL CREATININE-BSD FRML MDRD: >150 ML/MIN/1.73
GLUCOSE BLD-MCNC: 106 MG/DL (ref 65–99)
HCT VFR BLD AUTO: 25.6 % (ref 34–46.6)
HGB BLD-MCNC: 8 G/DL (ref 12–15.9)
MCH RBC QN AUTO: 29.9 PG (ref 26.6–33)
MCHC RBC AUTO-ENTMCNC: 31.3 G/DL (ref 31.5–35.7)
MCV RBC AUTO: 95.5 FL (ref 79–97)
PLATELET # BLD AUTO: 411 10*3/MM3 (ref 140–450)
PMV BLD AUTO: 9.8 FL (ref 6–12)
POTASSIUM BLD-SCNC: 3.3 MMOL/L (ref 3.5–5.2)
RBC # BLD AUTO: 2.68 10*6/MM3 (ref 3.77–5.28)
SODIUM BLD-SCNC: 139 MMOL/L (ref 136–145)
WBC NRBC COR # BLD: 6.62 10*3/MM3 (ref 3.4–10.8)

## 2020-03-15 PROCEDURE — 97116 GAIT TRAINING THERAPY: CPT

## 2020-03-15 PROCEDURE — 80048 BASIC METABOLIC PNL TOTAL CA: CPT | Performed by: NURSE PRACTITIONER

## 2020-03-15 PROCEDURE — 97530 THERAPEUTIC ACTIVITIES: CPT

## 2020-03-15 PROCEDURE — 97110 THERAPEUTIC EXERCISES: CPT

## 2020-03-15 PROCEDURE — 25010000002 ENOXAPARIN PER 10 MG: Performed by: ORTHOPAEDIC SURGERY

## 2020-03-15 PROCEDURE — 94799 UNLISTED PULMONARY SVC/PX: CPT

## 2020-03-15 PROCEDURE — 85027 COMPLETE CBC AUTOMATED: CPT | Performed by: NURSE PRACTITIONER

## 2020-03-15 PROCEDURE — 99232 SBSQ HOSP IP/OBS MODERATE 35: CPT | Performed by: NURSE PRACTITIONER

## 2020-03-15 RX ADMIN — GUAIFENESIN 600 MG: 600 TABLET, EXTENDED RELEASE ORAL at 20:16

## 2020-03-15 RX ADMIN — ENOXAPARIN SODIUM 40 MG: 40 INJECTION SUBCUTANEOUS at 17:42

## 2020-03-15 RX ADMIN — MICONAZOLE NITRATE: 2 CREAM TOPICAL at 08:44

## 2020-03-15 RX ADMIN — HYDROCODONE BITARTRATE AND ACETAMINOPHEN 1 TABLET: 7.5; 325 TABLET ORAL at 14:32

## 2020-03-15 RX ADMIN — LEVOTHYROXINE SODIUM 150 MCG: 150 TABLET ORAL at 05:44

## 2020-03-15 RX ADMIN — Medication 250 MG: at 08:42

## 2020-03-15 RX ADMIN — BUDESONIDE AND FORMOTEROL FUMARATE DIHYDRATE 2 PUFF: 160; 4.5 AEROSOL RESPIRATORY (INHALATION) at 12:09

## 2020-03-15 RX ADMIN — DOXYCYCLINE 100 MG: 100 CAPSULE ORAL at 08:43

## 2020-03-15 RX ADMIN — ACETAMINOPHEN 650 MG: 325 TABLET, FILM COATED ORAL at 11:57

## 2020-03-15 RX ADMIN — HYDROXYZINE HYDROCHLORIDE 25 MG: 25 TABLET, FILM COATED ORAL at 11:57

## 2020-03-15 RX ADMIN — BUDESONIDE AND FORMOTEROL FUMARATE DIHYDRATE 2 PUFF: 160; 4.5 AEROSOL RESPIRATORY (INHALATION) at 20:01

## 2020-03-15 RX ADMIN — HYDROCODONE BITARTRATE AND ACETAMINOPHEN 1 TABLET: 7.5; 325 TABLET ORAL at 20:16

## 2020-03-15 RX ADMIN — HYDROCODONE BITARTRATE AND ACETAMINOPHEN 1 TABLET: 7.5; 325 TABLET ORAL at 03:01

## 2020-03-15 RX ADMIN — CEFUROXIME AXETIL 250 MG: 250 TABLET ORAL at 20:17

## 2020-03-15 RX ADMIN — GUAIFENESIN 600 MG: 600 TABLET, EXTENDED RELEASE ORAL at 08:42

## 2020-03-15 RX ADMIN — POTASSIUM CHLORIDE 40 MEQ: 10 CAPSULE, COATED, EXTENDED RELEASE ORAL at 14:32

## 2020-03-15 RX ADMIN — HYDROCODONE BITARTRATE AND ACETAMINOPHEN 1 TABLET: 7.5; 325 TABLET ORAL at 08:42

## 2020-03-15 RX ADMIN — VENLAFAXINE HYDROCHLORIDE 150 MG: 75 CAPSULE, EXTENDED RELEASE ORAL at 08:43

## 2020-03-15 RX ADMIN — DOXYCYCLINE 100 MG: 100 CAPSULE ORAL at 20:16

## 2020-03-15 RX ADMIN — HYDROXYZINE HYDROCHLORIDE 25 MG: 25 TABLET, FILM COATED ORAL at 03:01

## 2020-03-15 RX ADMIN — POTASSIUM CHLORIDE 40 MEQ: 10 CAPSULE, COATED, EXTENDED RELEASE ORAL at 08:43

## 2020-03-15 RX ADMIN — CEFUROXIME AXETIL 250 MG: 250 TABLET ORAL at 08:42

## 2020-03-15 RX ADMIN — ALBUTEROL SULFATE 2.5 MG: 2.5 SOLUTION RESPIRATORY (INHALATION) at 02:50

## 2020-03-15 RX ADMIN — GABAPENTIN 300 MG: 300 CAPSULE ORAL at 20:16

## 2020-03-15 RX ADMIN — Medication 250 MG: at 20:17

## 2020-03-15 NOTE — THERAPY TREATMENT NOTE
Patient Name: Yessica Galvin  : 1960    MRN: [id number]                              Today's Date: 3/15/2020       Admit Date: 3/7/2020    Visit Dx:     ICD-10-CM ICD-9-CM   1. Closed fracture of right tibial plateau, initial encounter S82.141A 823.00   2. Closed Colles' fracture of left radius, initial encounter S52.532A 813.41   3. Fall with significant injury, initial encounter W19.XXXA E888.9   4. Contusion of right hip, initial encounter S70.01XA 924.01   5. Impaired mobility and ADLs Z74.09 799.89     Patient Active Problem List   Diagnosis   • Asthmatic bronchitis   • Generalized anxiety disorder   • Gout   • Headache   • Acquired hypothyroidism   • Chronic midline low back pain without sciatica   • Seasonal allergic rhinitis   • Cervical disc disorder with radiculopathy   • Lumbar disc disease   • Fracture of right tibia   • Left radial fracture   • Asthma   • Tobacco abuse   • Elevated transaminase level   • Chronic back pain   • Closed fracture of right tibial plateau   • Hypokalemia     Past Medical History:   Diagnosis Date   • Acute bronchitis    • Acute sinusitis    • Asthma    • Asthma with acute exacerbation    • Asthmatic bronchitis    • Disc degeneration, lumbar    • Disc degeneration, lumbar    • History of mammogram    • Hypothyroidism    • Lower back pain    • Plantar fasciitis    • Restless legs syndrome      Past Surgical History:   Procedure Laterality Date   • HYSTERECTOMY     • KNEE ARTHROSCOPY Right 3/10/2020    Procedure: KNEE ARTHROSCOPY RIGHT;  Surgeon: Guanaco Thakur MD;  Location:  [facility];  Service: Orthopedics;  Laterality: Right;   • NECK SURGERY     • OOPHORECTOMY     • ORIF WRIST FRACTURE Left 3/10/2020    Procedure: WRIST OPEN REDUCTION INTERNAL FIXATION LEFT;  Surgeon: Guanaco Thakur MD;  Location: [facility] OR;  Service: Orthopedics;  Laterality: Left;   • TIBIAL PLATEAU OPEN REDUCTION INTERNAL FIXATION Right 3/10/2020    Procedure: TIBIAL PLATEAU OPEN REDUCTION  INTERNAL FIXATION RIGHT;  Surgeon: Guanaco Thakur MD;  Location: [facility];  Service: Orthopedics;  Laterality: Right;     General Information     Row Name 03/15/20 0926          PT Evaluation Time/Intention    Document Type  therapy note (daily note)  -AA     Mode of Treatment  physical therapy  -AA     Row Name 03/15/20 0926          General Information    Patient Profile Reviewed?  yes  -AA     Existing Precautions/Restrictions  brace worn when out of bed;fall  -AA     Row Name 03/15/20 0926          Cognitive Assessment/Intervention- PT/OT    Orientation Status (Cognition)  oriented x 4  -AA     Row Name 03/15/20 0926          Safety Issues, Functional Mobility    Impairments Affecting Function (Mobility)  balance;motor planning  -AA       User Key  (r) = Recorded By, (t) = Taken By, (c) = Cosigned By    Initials Name Provider Type    AA Toshia Ahuja, PT Physical Therapist        Mobility     Row Name 03/15/20 0926          Bed Mobility Assessment/Treatment    Bed Mobility Assessment/Treatment  scooting/bridging;supine-sit  -AA     Scooting/Bridging [place] (Bed Mobility)  independent  -AA     Supine-Sit [place] (Bed Mobility)  contact guard;verbal cues  -AA     Assistive Device (Bed Mobility)  head of bed elevated;leg   -AA     Comment (Bed Mobility)  KI donned in supine RLE  -AA     Row Name 03/15/20 0926          Transfer Assessment/Treatment    Comment (Transfers)  SPT bed to BSC and BSC to recliner; STS from EOB and recliner x3 each; no VC for NWB required  -AA     Row Name 03/15/20 0926          Bed-Chair Transfer    Bed-Chair [place] (Transfers)  contact guard;verbal cues  -AA     Assistive Device (Bed-Chair Transfers)  walker, rolling platform  -AA     Row Name 03/15/20 0926          Sit-Stand Transfer    Sit-Stand [place] (Transfers)  contact guard;verbal cues  -AA     Assistive Device (Sit-Stand Transfers)  walker, rolling platform  -AA     Row Name 03/15/20 0926           Gait/Stairs Assessment/Training    Gait/Stairs Assessment/Training  gait/ambulation independence;gait/ambulation assistive device;maintains weight-bearing status  -AA     [place] Level (Gait)  minimum assist (75% patient effort);verbal cues  -AA     Assistive Device (Gait)  walker, rolling platform  -AA     Distance in Feet (Gait)  5x2  -AA     Pattern (Gait)  step-to  -AA     Deviations/Abnormal Patterns (Gait)  bilateral deviations;james decreased;steppage;stride length decreased  -AA     Bilateral Gait Deviations  forward flexed posture  -AA     Comment (Gait/Stairs)  Min A for platform walker advancement; good ability to adhere to NWB RLE and LUE  -AA     Row Name 03/15/20 0926          Mobility Assessment/Intervention    Extremity Weight-bearing Status  left upper extremity;right lower extremity  -AA     Left Upper Extremity (Weight-bearing Status)  non weight-bearing (NWB)  -AA     Right Lower Extremity (Weight-bearing Status)  non weight-bearing (NWB)  -AA       User Key  (r) = Recorded By, (t) = Taken By, (c) = Cosigned By    Initials Name Provider Type    Toshia Oleary, PT Physical Therapist        Obj/Interventions     Row Name 03/15/20 0926          Therapeutic Exercise    Lower Extremity (Therapeutic Exercise)  gluteal sets;heel slides, left;LAQ (long arc quad), left;quad sets, left;SLR (straight leg raise), bilateral  -AA     Lower Extremity Range of Motion (Therapeutic Exercise)  ankle dorsiflexion/plantar flexion, bilateral;hip abduction/adduction, bilateral  -AA     Exercise Type (Therapeutic Exercise)  AAROM (active assistive range of motion);AROM (active range of motion)  -AA     Position (Therapeutic Exercise)  seated  -AA     Sets/Reps (Therapeutic Exercise)  10  -AA     Row Name 03/15/20 0926          Static Sitting Balance    Level of [place] (Unsupported Sitting, Static Balance)  conditional independence  -AA     Sitting Position (Unsupported Sitting, Static Balance)   sitting on edge of bed;other (see comments) [facility]  -AA     Time Able to Maintain Position (Unsupported Sitting, Static Balance)  more than 5 minutes  -AA     [facility] Name 03/15/20 0926          Static Standing Balance    Level of [place] (Supported Standing, Static Balance)  contact guard assist  -AA     Assistive Device Utilized (Supported Standing, Static Balance)  walker, rolling platform  -AA     Row Name 03/15/20 0926          Dynamic Standing Balance    Level of [place], Reaches Outside Midline (Standing, Dynamic Balance)  minimal assist, 75% patient effort  -AA     Assistive Device Utilized (Supported Standing, Dynamic Balance)  walker, rolling platform  -AA       User Key  (r) = Recorded By, (t) = Taken By, (c) = Cosigned By    Initials Name Provider Type    Toshia Oleary, PT Physical Therapist        Goals/Plan     Row Name 03/15/20 0926          Bed Mobility Goal 1 (PT)    Progress/Outcomes (Bed Mobility Goal 1, PT)  goal ongoing  -AA     Row Name 03/15/20 0926          Transfer Goal 1 (PT)    Progress/Outcome (Transfer Goal 1, PT)  goal ongoing;good progress toward goal  -AA     Row Name 03/15/20 0926          Gait Training Goal 1 (PT)    Progress/Outcome (Gait Training Goal 1, PT)  goal ongoing  -       User Key  (r) = Recorded By, (t) = Taken By, (c) = Cosigned By    Initials Name Provider Type    Tsohia Oleary, PT Physical Therapist        Clinical Impression     Row Name 03/15/20 0926          Pain Assessment    Additional Documentation  Pain Scale: Numbers Pre/Post-Treatment (Group)  -AA     Row Name 03/15/20 0926          Pain Scale: Numbers Pre/Post-Treatment    Pain Scale: Numbers, Pretreatment  7/10  -AA     Pain Scale: Numbers, Post-Treatment  7/10  -AA     Pain Location - Side  Right  -AA     Pain Location  knee  -AA     Pain Intervention(s)  Repositioned;Ambulation/increased activity  -AA     Row Name 03/15/20 0926          Plan of Care Review    Plan of Care Reviewed With   patient;spouse  -AA     Progress  improving  -AA     Row Name 03/15/20 0926          Vital Signs    Pre SpO2 (%)  91  -AA     O2 Delivery Pre Treatment  supplemental O2  -AA     Post SpO2 (%)  93  -AA     O2 Delivery Post Treatment  supplemental O2  -AA     Pre Patient Position  Supine  -AA     Intra Patient Position  Standing  -AA     Post Patient Position  Sitting  -AA     Row Name 03/15/20 0926          Positioning and Restraints    Pre-Treatment Position  in bed  -AA     Post Treatment Position  chair  -AA     In Chair  notified nsg;exit alarm on;waffle cushion;reclined;with family/caregiver;call light within reach;encouraged to call for assist;with brace  -AA       User Key  (r) = Recorded By, (t) = Taken By, (c) = Cosigned By    Initials Name Provider Type    Toshia Oleary, PT Physical Therapist        Outcome Measures     Row Name 03/15/20 0926          How much help from another person do you currently need...    Turning from your back to your side while in flat bed without using bedrails?  4  -AA     Moving from lying on back to sitting on the side of a flat bed without bedrails?  3  -AA     Moving to and from a bed to a chair (including a wheelchair)?  3  -AA     Standing up from a chair using your arms (e.g., wheelchair, bedside chair)?  3  -AA     Climbing 3-5 steps with a railing?  1  -AA     To walk in hospital room?  3  -AA     AM-PAC 6 Clicks Score (PT)  17  -AA     Row Name 03/15/20 0926          Functional Assessment    Outcome Measure Options  AM-PAC 6 Clicks Basic Mobility (PT)  -AA       User Key  (r) = Recorded By, (t) = Taken By, (c) = Cosigned By    Initials Name Provider Type    Toshia Oleary PT Physical Therapist          PT Recommendation and Plan     Outcome Summary/Treatment Plan (PT)  Anticipated Equipment Needs at Discharge (PT): platform walker, wheelchair  Anticipated Discharge Disposition (PT): inpatient rehabilitation facility  Plan of Care Reviewed With: patient,  spouse  Progress: improving  Outcome Summary: Pt performs bed mobiltiy with SPV.  STS and SPT with rolling platform walker, KI RLE, and ability to maintain NWB RLE and LUE with CGA.  Ambulation 5'x2 with rolling platform and min A.  All activity on 2L with sat >90%.  Pt easily fatigued.  Motivated and good participation.  Awaiting placement for rehab     Time Calculation:   PT Charges     Row Name 03/15/20 0926             Time Calculation    Start Time  0926  -AA      PT Received On  03/15/20  -AA      PT Goal Re-Cert Due Date  03/21/20  -AA         Time Calculation- PT    Total Timed Code Minutes- PT  39 minute(s)  -AA         Timed Charges    06118 - PT Therapeutic Exercise Minutes  14  -AA      62576 - Gait Training Minutes   11  -AA      20529 - PT Therapeutic Activity Minutes  14  -AA        User Key  (r) = Recorded By, (t) = Taken By, (c) = Cosigned By    Initials Name Provider Type    AA Toshia Ahuja, PT Physical Therapist        Therapy Charges for Today     Code Description Service Date Service Provider Modifiers Qty    98431971256 HC PT THER PROC EA 15 MIN 3/15/2020 Toshia Ahuja, PT GP 1    04320637578 HC GAIT TRAINING EA 15 MIN 3/15/2020 Toshia Ahuja, PT GP 1    58549251882 HC PT THERAPEUTIC ACT EA 15 MIN 3/15/2020 Toshia Ahuja, PT GP 1          PT G-Codes  Outcome Measure Options: AM-PAC 6 Clicks Basic Mobility (PT)  AM-PAC 6 Clicks Score (PT): 17  AM-PAC 6 Clicks Score (OT): 11 April JAZIEL Ahuja PT  3/15/2020

## 2020-03-15 NOTE — PLAN OF CARE
Problem: Patient Care Overview  Goal: Plan of Care Review  Outcome: Ongoing (interventions implemented as appropriate)  Flowsheets (Taken 3/15/2020 3612)  Outcome Summary: Pt performs bed mobiltiy with SPV.  STS and SPT with rolling platform walker, KI RLE, and ability to maintain NWB RLE and LUE with CGA.  Ambulation 5'x2 with rolling platform and min A.  All activity on 2L with sat >90%.  Pt easily fatigued.  Motivated and good participation.  Awaiting placement for rehab

## 2020-03-15 NOTE — PLAN OF CARE
Problem: Patient Care Overview  Goal: Plan of Care Review  Outcome: Ongoing (interventions implemented as appropriate)  Flowsheets (Taken 3/15/2020 1528)  Progress: improving  Plan of Care Reviewed With: patient  Outcome Summary: Patient able to stad and take a couple of steps with walker ad assist of 1 to chair. Compliant with non-weight bearing status. Remains alert and oriented. vitals have been stable. pt still requires 1.5 to 2 L NC O2 sats in the low 90s. Main issue has been right knee pain. Contacted Daniel FRIEDMAN. Tatyana d/C as pt Post OP day 5 however no other adjustments made. Pain managed with Norco and tylenol. Presert began for romeo D/C date unknown. Will continue to monitor

## 2020-03-15 NOTE — PROGRESS NOTES
[facility] Medicine Services  PROGRESS NOTE    Patient Name: Yessica Galvin  : 1960  MRN: [id number]    Date of Admission: 3/7/2020  Length of Stay: 8  Primary Care Physician: Kota Swain MD    Subjective   Subjective     CC:  Closed fracture of right tibial plateau    HPI:  Patient resting in bed in no apparent distress. No family at bedside. No acute events overnight per nursing. Patient reports that she had difficulty sleeping last night due to pain in right knee and ankle. She had a BM yesterday. No additional complaints at this time.     ROS:  Gen- No fevers, chills  CV- No chest pain, palpitations  Resp- No cough, dyspnea  GI- No N/V/D, abd pain  MSK- right LE pain, left wrist pain    Objective   Objective     Vital Signs:   Temp:  [97.8 °F (36.6 °C)-98.8 °F (37.1 °C)] 98 °F (36.7 °C)  Heart Rate:  [] 58  Resp:  [16-19] 16  BP: (119-133)/(67-75) 130/67        Physical Exam:  Constitutional: No acute distress, awake, alert  HENT: NCAT, mucous membranes moist  Respiratory: Clear to auscultation bilaterally, respiratory effort normal   Cardiovascular: RRR, no murmurs,  palpable pedal pulses bilaterally  Gastrointestinal: Positive bowel sounds, soft, nontender, nondistended  Musculoskeletal: RLE incision CDI Immobilizer in place, LUE splinted with ace wrap in place  Psychiatric: Appropriate affect, cooperative  Neurologic: Oriented x 3, strength symmetric in all extremities, speech clear  Skin: warm, dry, rash on back/buttocks improving    Results Reviewed:    Results from last 7 days   Lab Units 03/15/20  0517 20  0613 20  0519   WBC 10*3/mm3 6.62 6.34 8.03   HEMOGLOBIN g/dL 8.0* 7.9* 8.1*   HEMATOCRIT % 25.6* 24.5* 25.0*   PLATELETS 10*3/mm3 411 246 178     Results from last 7 days   Lab Units 03/15/20  0517 20  2155 20  0613  03/10/20  0549   SODIUM mmol/L 139  --  138  --  139   POTASSIUM mmol/L 3.3* 3.9 2.9*   < > 2.8*   CHLORIDE mmol/L  99  --  98  --  107   CO2 mmol/L 29.0  --  31.0*  --  23.0   BUN mg/dL 7*  --  5*  --  4*   CREATININE mg/dL 0.32*  --  0.40*  --  0.32*   GLUCOSE mg/dL 106*  --  109*  --  92   CALCIUM mg/dL 8.9  --  8.2*  --  7.2*   ALT (SGPT) U/L  --   --   --   --  105*   AST (SGOT) U/L  --   --   --   --  44*    < > = values in this interval not displayed.     Estimated Creatinine Clearance: 206.9 mL/min (A) (by C-G formula based on SCr of 0.32 mg/dL (L)).  No results found for: BNP    Microbiology Results Abnormal     Procedure Component Value - Date/Time    Blood Culture - Blood, Hand, Right [[id number]] Collected:  03/11/20 0519    Lab Status:  Preliminary result Specimen:  Blood from Hand, Right Updated:  03/15/20 0600     Blood Culture No growth at 4 days    Blood Culture - Blood, Arm, Right [[id number]] Collected:  03/11/20 0519    Lab Status:  Preliminary result Specimen:  Blood from Arm, Right Updated:  03/15/20 0600     Blood Culture No growth at 4 days    Respiratory Panel, PCR - Swab, Nasopharynx [[id number]]  (Normal) Collected:  03/11/20 1125    Lab Status:  Final result Specimen:  Swab from Nasopharynx Updated:  03/11/20 1222     ADENOVIRUS, PCR Not Detected     Coronavirus 229E Not Detected     Coronavirus HKU1 Not Detected     Coronavirus NL63 Not Detected     Coronavirus OC43 Not Detected     Human Metapneumovirus Not Detected     Human Rhinovirus/Enterovirus Not Detected     Influenza B PCR Not Detected     Parainfluenza Virus 1 Not Detected     Parainfluenza Virus 2 Not Detected     Parainfluenza Virus 3 Not Detected     Parainfluenza Virus 4 Not Detected     Bordetella pertussis pcr Not Detected     Influenza A H1 2009 PCR Not Detected     Chlamydophila pneumoniae PCR Not Detected     Mycoplasma pneumo by PCR Not Detected     Influenza A PCR Not Detected     Influenza A H3 Not Detected     Influenza A H1 Not Detected     RSV, PCR Not Detected     Bordetella parapertussis PCR Not Detected    Narrative:       The  coronavirus on the RVP is NOT COVID-19 and is NOT indicative of infection with COVID-19.           Imaging Results (Last 24 Hours)     ** No results found for the last 24 hours. **        I have reviewed the medications.  Scheduled Meds:  budesonide-formoterol 2 puff Inhalation BID - RT   cefuroxime 250 mg Oral Q12H   doxycycline 100 mg Oral Q12H   enoxaparin 40 mg Subcutaneous Q24H   gabapentin 300 mg Oral Nightly   guaiFENesin 600 mg Oral Q12H   levothyroxine 150 mcg Oral Q AM   miconazole  Topical Q12H   nicotine 1 patch Transdermal Q24H   saccharomyces boulardii 250 mg Oral BID   sodium chloride 10 mL Intravenous Q12H   sodium chloride 10 mL Intravenous Q12H   venlafaxine  mg Oral Daily     Continuous Infusions:  lactated ringers 9 mL/hr Last Rate: 9 mL/hr (03/10/20 1110)   ropivacaine (NAROPIN) 0.2% peripheral nerve cath (moog) 8 mL/hr Last Rate: 8 mL/hr (03/12/20 2336)   sodium chloride 125 mL/hr Last Rate: 125 mL/hr (03/08/20 0604)     PRN Meds:.•  acetaminophen **OR** acetaminophen **OR** acetaminophen  •  albuterol  •  bisacodyl  •  bisacodyl  •  HYDROcodone-acetaminophen  •  HYDROmorphone **AND** [PENDING] HYDROmorphone **AND** naloxone **AND** [PENDING] naloxone  •  hydrOXYzine  •  ipratropium-albuterol  •  lactated ringers  •  magnesium hydroxide  •  ondansetron  •  potassium chloride **OR** potassium chloride **OR** potassium chloride  •  sodium chloride  •  sodium chloride    Assessment/Plan   Assessment / Plan     Active Hospital Problems    Diagnosis  POA   • **Closed fracture of right tibial plateau [S82.141A]  Unknown   • Hypokalemia [E87.6]  Unknown   • Fracture of right tibia [S82.201A]  Yes   • Left radial fracture [S52.92XA]  Yes   • Asthma [J45.909]  Yes   • Tobacco abuse [Z72.0]  Yes   • Elevated transaminase level [R74.0]  Yes   • Chronic back pain [M54.9, G89.29]  Unknown   • Generalized anxiety disorder [F41.1]  Yes   • Acquired hypothyroidism [E03.9]  Yes      Resolved Hospital  Problems   No resolved problems to display.          Brief Hospital Course to date:  Yessica Galvin is a 59 y.o. female with history of asthma, chronic pain, hypothyroidism who presents 3/7 evening after injury at home (per report was noted to fall off scooter while playing with grandkids) to RLE and left wrist. Patient sustained a right tibia fx, left radius fx. S/P ORIF of RLE and L wrist.     This patient's problems and plans were partially entered by my partner and updated as appropriate by me 03/15/20.     Right tibia fracture, left radius fracture  Hypoxia related to pain medication  --s/p ORIF of R tibia and L radial fracture, post op day 4  --nonweightbearing, PT/OT as tolerated/instructed  --awaiting rehab, to SNF likely now  --pain reasonably controlled now  --ropivacaine nerve block for postoperative pain   --Hbg 8.0 today   --Repeat CBC in AM     PNA   --continue Ceftin and Docy PO  --nebs/mucinex/OPEP     Hypokalemia  --Potassium 3.3 today  --Replace per protocol  --Repeat BMP in AM     Asthma, tobacco abuse  -nebs/spirometer, stable     Elevated transaminase level, mild  -Acute hepatitis panel checked and negative  -Monitor     Hypothyroidism  -stable on levothyroxine, unchanged     Generalized anxiety disorder  -Stable on Effexor     Chronic back pain  -Continue home tramadol, gabapentin     DVT prophylaxis: Lovenox     CODE STATUS:   Code Status and Medical Interventions:   Ordered at: 03/07/20 2212     Level Of Support Discussed With:    Patient     Code Status:    CPR     Medical Interventions (Level of Support Prior to Arrest):    Full           Electronically signed by IDALIA Saldivar, 03/15/20, 08:38.

## 2020-03-15 NOTE — PLAN OF CARE
Pt alert, oriented x4, and cooperative.  Vss. Tele nsr.  Up with assist to bsc with immobilizer to right knee on. Tolerates well.  Remains on o2 at 2liters per nc this shift due to saturation of 87-89% on 1liter. Cont c/o pain this shift requring pain pill every time its due.  Rash to back and buttocks noted with cream applied as ordered.  Will cont to monitor.

## 2020-03-16 LAB
ANION GAP SERPL CALCULATED.3IONS-SCNC: 10 MMOL/L (ref 5–15)
BACTERIA SPEC AEROBE CULT: NORMAL
BACTERIA SPEC AEROBE CULT: NORMAL
BUN BLD-MCNC: 7 MG/DL (ref 8–23)
BUN/CREAT SERPL: 20.6 (ref 7–25)
CALCIUM SPEC-SCNC: 9 MG/DL (ref 8.6–10.5)
CHLORIDE SERPL-SCNC: 100 MMOL/L (ref 98–107)
CO2 SERPL-SCNC: 28 MMOL/L (ref 22–29)
CREAT BLD-MCNC: 0.34 MG/DL (ref 0.57–1)
DEPRECATED RDW RBC AUTO: 51 FL (ref 37–54)
ERYTHROCYTE [DISTWIDTH] IN BLOOD BY AUTOMATED COUNT: 14.4 % (ref 12.3–15.4)
GFR SERPL CREATININE-BSD FRML MDRD: >150 ML/MIN/1.73
GLUCOSE BLD-MCNC: 104 MG/DL (ref 65–99)
HCT VFR BLD AUTO: 27.2 % (ref 34–46.6)
HGB BLD-MCNC: 8.4 G/DL (ref 12–15.9)
MCH RBC QN AUTO: 30 PG (ref 26.6–33)
MCHC RBC AUTO-ENTMCNC: 30.9 G/DL (ref 31.5–35.7)
MCV RBC AUTO: 97.1 FL (ref 79–97)
PLATELET # BLD AUTO: 487 10*3/MM3 (ref 140–450)
PMV BLD AUTO: 9.7 FL (ref 6–12)
POTASSIUM BLD-SCNC: 3.9 MMOL/L (ref 3.5–5.2)
RBC # BLD AUTO: 2.8 10*6/MM3 (ref 3.77–5.28)
SODIUM BLD-SCNC: 138 MMOL/L (ref 136–145)
WBC NRBC COR # BLD: 6.81 10*3/MM3 (ref 3.4–10.8)

## 2020-03-16 PROCEDURE — 94799 UNLISTED PULMONARY SVC/PX: CPT

## 2020-03-16 PROCEDURE — 85027 COMPLETE CBC AUTOMATED: CPT | Performed by: NURSE PRACTITIONER

## 2020-03-16 PROCEDURE — 97110 THERAPEUTIC EXERCISES: CPT

## 2020-03-16 PROCEDURE — 99232 SBSQ HOSP IP/OBS MODERATE 35: CPT | Performed by: NURSE PRACTITIONER

## 2020-03-16 PROCEDURE — 97116 GAIT TRAINING THERAPY: CPT | Performed by: PHYSICAL THERAPIST

## 2020-03-16 PROCEDURE — 99024 POSTOP FOLLOW-UP VISIT: CPT | Performed by: ORTHOPAEDIC SURGERY

## 2020-03-16 PROCEDURE — 80048 BASIC METABOLIC PNL TOTAL CA: CPT | Performed by: NURSE PRACTITIONER

## 2020-03-16 PROCEDURE — 25010000002 ENOXAPARIN PER 10 MG: Performed by: ORTHOPAEDIC SURGERY

## 2020-03-16 PROCEDURE — 97535 SELF CARE MNGMENT TRAINING: CPT

## 2020-03-16 RX ADMIN — ACETAMINOPHEN 650 MG: 325 TABLET, FILM COATED ORAL at 20:48

## 2020-03-16 RX ADMIN — GUAIFENESIN 600 MG: 600 TABLET, EXTENDED RELEASE ORAL at 20:49

## 2020-03-16 RX ADMIN — HYDROCODONE BITARTRATE AND ACETAMINOPHEN 1 TABLET: 7.5; 325 TABLET ORAL at 16:51

## 2020-03-16 RX ADMIN — GUAIFENESIN 600 MG: 600 TABLET, EXTENDED RELEASE ORAL at 08:58

## 2020-03-16 RX ADMIN — DOXYCYCLINE 100 MG: 100 CAPSULE ORAL at 20:49

## 2020-03-16 RX ADMIN — ACETAMINOPHEN 650 MG: 325 TABLET, FILM COATED ORAL at 00:23

## 2020-03-16 RX ADMIN — HYDROXYZINE HYDROCHLORIDE 25 MG: 25 TABLET, FILM COATED ORAL at 20:48

## 2020-03-16 RX ADMIN — CEFUROXIME AXETIL 250 MG: 250 TABLET ORAL at 08:58

## 2020-03-16 RX ADMIN — Medication 250 MG: at 08:58

## 2020-03-16 RX ADMIN — Medication 250 MG: at 20:49

## 2020-03-16 RX ADMIN — ENOXAPARIN SODIUM 40 MG: 40 INJECTION SUBCUTANEOUS at 16:51

## 2020-03-16 RX ADMIN — BUDESONIDE AND FORMOTEROL FUMARATE DIHYDRATE 2 PUFF: 160; 4.5 AEROSOL RESPIRATORY (INHALATION) at 21:24

## 2020-03-16 RX ADMIN — HYDROCODONE BITARTRATE AND ACETAMINOPHEN 1 TABLET: 7.5; 325 TABLET ORAL at 10:48

## 2020-03-16 RX ADMIN — DOXYCYCLINE 100 MG: 100 CAPSULE ORAL at 08:58

## 2020-03-16 RX ADMIN — GABAPENTIN 300 MG: 300 CAPSULE ORAL at 20:48

## 2020-03-16 RX ADMIN — ACETAMINOPHEN 650 MG: 325 TABLET, FILM COATED ORAL at 08:58

## 2020-03-16 RX ADMIN — VENLAFAXINE HYDROCHLORIDE 150 MG: 75 CAPSULE, EXTENDED RELEASE ORAL at 08:58

## 2020-03-16 RX ADMIN — HYDROCODONE BITARTRATE AND ACETAMINOPHEN 1 TABLET: 7.5; 325 TABLET ORAL at 23:03

## 2020-03-16 RX ADMIN — LEVOTHYROXINE SODIUM 150 MCG: 150 TABLET ORAL at 05:10

## 2020-03-16 RX ADMIN — BUDESONIDE AND FORMOTEROL FUMARATE DIHYDRATE 2 PUFF: 160; 4.5 AEROSOL RESPIRATORY (INHALATION) at 07:23

## 2020-03-16 RX ADMIN — HYDROCODONE BITARTRATE AND ACETAMINOPHEN 1 TABLET: 7.5; 325 TABLET ORAL at 05:10

## 2020-03-16 RX ADMIN — HYDROXYZINE HYDROCHLORIDE 25 MG: 25 TABLET, FILM COATED ORAL at 00:24

## 2020-03-16 RX ADMIN — CEFUROXIME AXETIL 250 MG: 250 TABLET ORAL at 20:48

## 2020-03-16 NOTE — PROGRESS NOTES
[facility] Medicine Services  PROGRESS NOTE    Patient Name: Yessica Galvin  : 1960  MRN: [id number]    Date of Admission: 3/7/2020  Length of Stay: 9  Primary Care Physician: Kota Swain MD    Subjective   Subjective     CC:  Closed fracture of right tibial plateau    HPI:  No issues overnight  Pain tolerable    ROS:  Gen- No fevers, chills  CV- No chest pain, palpitations  Resp- + cough, dyspnea  GI- No N/V/D, abd pain  MS- right LE pain, left wrist pain    Objective   Objective     Vital Signs:   Temp:  [98 °F (36.7 °C)-98.7 °F (37.1 °C)] 98.7 °F (37.1 °C)  Heart Rate:  [55-90] 90  Resp:  [15-18] 18  BP: (112-131)/(67-76) 121/76        Physical Exam:  Constitutional: No acute distress, awake, alert  HENT: NCAT, mucous membranes moist  Respiratory: Clear to auscultation bilaterally, respiratory effort normal   Cardiovascular: RRR, no murmurs,  palpable pedal pulses bilaterally  Gastrointestinal: Positive bowel sounds, soft, nontender, nondistended  Musculoskeletal: RLE incision c/d/i, immobilizer in place, LUE splinted with ace wrap in place  Psychiatric: Appropriate affect, cooperative  Neurologic: Oriented x 3, KIM, speech clear    Results Reviewed:    Results from last 7 days   Lab Units 20  0534 03/15/20  0517 20  0613   WBC 10*3/mm3 6.81 6.62 6.34   HEMOGLOBIN g/dL 8.4* 8.0* 7.9*   HEMATOCRIT % 27.2* 25.6* 24.5*   PLATELETS 10*3/mm3 487* 411 246     Results from last 7 days   Lab Units 20  0534 03/15/20  0517 20  2155 20  0613  03/10/20  0549   SODIUM mmol/L 138 139  --  138  --  139   POTASSIUM mmol/L 3.9 3.3* 3.9 2.9*   < > 2.8*   CHLORIDE mmol/L 100 99  --  98  --  107   CO2 mmol/L 28.0 29.0  --  31.0*  --  23.0   BUN mg/dL 7* 7*  --  5*  --  4*   CREATININE mg/dL 0.34* 0.32*  --  0.40*  --  0.32*   GLUCOSE mg/dL 104* 106*  --  109*  --  92   CALCIUM mg/dL 9.0 8.9  --  8.2*  --  7.2*   ALT (SGPT) U/L  --   --   --   --   --  105*   AST  (SGOT) U/L  --   --   --   --   --  44*    < > = values in this interval not displayed.     Estimated Creatinine Clearance: 194.8 mL/min (A) (by C-G formula based on SCr of 0.34 mg/dL (L)).  No results found for: BNP    Microbiology Results Abnormal     Procedure Component Value - Date/Time    Blood Culture - Blood, Hand, Right [[id number]] Collected:  03/11/20 0519    Lab Status:  Final result Specimen:  Blood from Hand, Right Updated:  03/16/20 0600     Blood Culture No growth at 5 days    Blood Culture - Blood, Arm, Right [[id number]] Collected:  03/11/20 0519    Lab Status:  Final result Specimen:  Blood from Arm, Right Updated:  03/16/20 0600     Blood Culture No growth at 5 days    Respiratory Panel, PCR - Swab, Nasopharynx [[id number]]  (Normal) Collected:  03/11/20 1125    Lab Status:  Final result Specimen:  Swab from Nasopharynx Updated:  03/11/20 1222     ADENOVIRUS, PCR Not Detected     Coronavirus 229E Not Detected     Coronavirus HKU1 Not Detected     Coronavirus NL63 Not Detected     Coronavirus OC43 Not Detected     Human Metapneumovirus Not Detected     Human Rhinovirus/Enterovirus Not Detected     Influenza B PCR Not Detected     Parainfluenza Virus 1 Not Detected     Parainfluenza Virus 2 Not Detected     Parainfluenza Virus 3 Not Detected     Parainfluenza Virus 4 Not Detected     Bordetella pertussis pcr Not Detected     Influenza A H1 2009 PCR Not Detected     Chlamydophila pneumoniae PCR Not Detected     Mycoplasma pneumo by PCR Not Detected     Influenza A PCR Not Detected     Influenza A H3 Not Detected     Influenza A H1 Not Detected     RSV, PCR Not Detected     Bordetella parapertussis PCR Not Detected    Narrative:       The coronavirus on the RVP is NOT COVID-19 and is NOT indicative of infection with COVID-19.           Imaging Results (Last 24 Hours)     ** No results found for the last 24 hours. **        I have reviewed the medications.  Scheduled Meds:    budesonide-formoterol 2 puff  Inhalation BID - RT   cefuroxime 250 mg Oral Q12H   doxycycline 100 mg Oral Q12H   enoxaparin 40 mg Subcutaneous Q24H   gabapentin 300 mg Oral Nightly   guaiFENesin 600 mg Oral Q12H   levothyroxine 150 mcg Oral Q AM   miconazole  Topical Q12H   nicotine 1 patch Transdermal Q24H   saccharomyces boulardii 250 mg Oral BID   sodium chloride 10 mL Intravenous Q12H   sodium chloride 10 mL Intravenous Q12H   venlafaxine  mg Oral Daily     Continuous Infusions:    lactated ringers 9 mL/hr Last Rate: 9 mL/hr (03/10/20 1110)   sodium chloride 125 mL/hr Last Rate: 125 mL/hr (03/08/20 0604)     PRN Meds:.•  acetaminophen **OR** acetaminophen **OR** acetaminophen  •  albuterol  •  bisacodyl  •  bisacodyl  •  HYDROcodone-acetaminophen  •  hydrOXYzine  •  ipratropium-albuterol  •  lactated ringers  •  magnesium hydroxide  •  [DISCONTINUED] HYDROmorphone **AND** [PENDING] HYDROmorphone **AND** naloxone **AND** [PENDING] naloxone  •  ondansetron  •  potassium chloride **OR** potassium chloride **OR** potassium chloride  •  sodium chloride  •  sodium chloride    Assessment/Plan   Assessment / Plan     Active Hospital Problems    Diagnosis  POA   • **Closed fracture of right tibial plateau [S82.141A]  Unknown   • Hypokalemia [E87.6]  Unknown   • Fracture of right tibia [S82.201A]  Yes   • Left radial fracture [S52.92XA]  Yes   • Asthma [J45.909]  Yes   • Tobacco abuse [Z72.0]  Yes   • Elevated transaminase level [R74.0]  Yes   • Chronic back pain [M54.9, G89.29]  Unknown   • Generalized anxiety disorder [F41.1]  Yes   • Acquired hypothyroidism [E03.9]  Yes      Resolved Hospital Problems   No resolved problems to display.          Brief Hospital Course to date:  Yessica Galvin is a 59 y.o. female with history of asthma, chronic pain, hypothyroidism who presents 3/7 evening after injury at home (per report was noted to fall off scooter while playing with grandkids) to RLE and left wrist. Patient sustained a right tibia fx, left  radius fx. S/P ORIF of RLE and L wrist.     This patient's problems and plans were partially entered by my partner and updated as appropriate by me 03/16/20.     Right tibia fracture, left radius fracture  Hypoxia related to pain medication  --s/p ORIF of R tibia and L radial fracture  --nonweightbearing RLE, may bear weight at left elbow.    --PT/OT as tolerated/instructed  --awaiting rehab  --pain reasonably controlled      PNA   --continue Ceftin and Doxy PO  --nebs/mucinex/OPEP     Hypokalemia, resolved  --Replace per protocol     Asthma, tobacco abuse  -nebs/spirometer, stable     Elevated transaminase level, mild  -Acute hepatitis panel checked and negative  -Monitor     Hypothyroidism  -stable on levothyroxine, unchanged     Generalized anxiety disorder  -Stable on Effexor     Chronic back pain  -Continue home tramadol, gabapentin     DVT prophylaxis: Lovenox     CODE STATUS:   Code Status and Medical Interventions:   Ordered at: 03/07/20 2212     Level Of Support Discussed With:    Patient     Code Status:    CPR     Medical Interventions (Level of Support Prior to Arrest):    Full           Electronically signed by IDALIA Castro, 03/16/20, 15:03.

## 2020-03-16 NOTE — PROGRESS NOTES
Continued Stay Note  [facility]     Patient Name: Yessica Galvin  MRN: [id number]  Today's Date: 3/16/2020    Admit Date: 3/7/2020    Discharge Plan     Row Name 03/16/20 1123       Plan    Plan  [facility]    Patient/Family in Agreement with Plan  yes    Plan Comments  Spoke with Charlene and we are still waiting on insurance approval.  Updated patient and her  in room.  [facility] to [facility] tomorrow, 3/17 at 1330.   will continue to follow.  Bridgette Kaufman RN [phone]     Final Discharge Disposition Code  03 - skilled nursing facility (SNF)        Discharge Codes    No documentation.       Expected Discharge Date and Time     Expected Discharge Date Expected Discharge Time    Mar 16, 2020             Bridgette Kaufman RN

## 2020-03-16 NOTE — PROGRESS NOTES
"Orthopaedic Surgery Progress Note     LOS: 9 days   Patient Care Team:  Kota Swain MD as PCP - General    POD 7    Subjective     Interval History:   Patient Reports: Feeling better and ready for discharge to rehab at [facility] tomorrow    Objective     Vital Signs:  Temp (24hrs), Av.3 °F (36.8 °C), Min:98 °F (36.7 °C), Max:98.7 °F (37.1 °C)    /76 (BP Location: Right arm, Patient Position: Lying)   Pulse 90   Temp 98.7 °F (37.1 °C) (Oral)   Resp 18   Ht 165.1 cm (65\")   Wt 92.1 kg (203 lb)   SpO2 96%   BMI 33.78 kg/m²     Labs:  Lab Results (last 24 hours)     Procedure Component Value Units Date/Time    Basic Metabolic Panel [[id number]]  (Abnormal) Collected:  20    Specimen:  Blood Updated:  20     Glucose 104 mg/dL      BUN 7 mg/dL      Creatinine 0.34 mg/dL      Sodium 138 mmol/L      Potassium 3.9 mmol/L      Chloride 100 mmol/L      CO2 28.0 mmol/L      Calcium 9.0 mg/dL      eGFR Non African Amer >150 mL/min/1.73      BUN/Creatinine Ratio 20.6     Anion Gap 10.0 mmol/L     Narrative:       GFR Normal >60  Chronic Kidney Disease <60  Kidney Failure <15      CBC (No Diff) [[id number]]  (Abnormal) Collected:  20    Specimen:  Blood Updated:  20     WBC 6.81 10*3/mm3      RBC 2.80 10*6/mm3      Hemoglobin 8.4 g/dL      Hematocrit 27.2 %      MCV 97.1 fL      MCH 30.0 pg      MCHC 30.9 g/dL      RDW 14.4 %      RDW-SD 51.0 fl      MPV 9.7 fL      Platelets 487 10*3/mm3     Blood Culture - Blood, Hand, Right [[id number]] Collected:  20    Specimen:  Blood from Hand, Right Updated:  20     Blood Culture No growth at 5 days    Blood Culture - Blood, Arm, Right [[id number]] Collected:  20    Specimen:  Blood from Arm, Right Updated:  03/16/20 0600     Blood Culture No growth at 5 days          Imaging:  XR Chest 1 View  Narrative: EXAMINATION: XR CHEST 1 VW-     INDICATION: Dyspnea, on exertion.     COMPARISON: " 03/11/2020.     FINDINGS: Right hemidiaphragm is now mildly elevated. The heart and  vasculature appear normal in size. Mild diffuse right lung interstitial  changes, mostly the upper lobe appear a little increased. Left lung  remains grossly clear. No lung consolidation, effusion or pneumothorax  is seen.     Impression: Mildly increased interstitial changes at the right lung  particularly the right upper lobe. Mild new elevation of the right  hemidiaphragm, perhaps reflect some generalized right lung volume loss.  No new chest disease is seen elsewhere.      D:  03/13/2020  E:  03/13/2020     This report was finalized on 3/14/2020 12:07 AM by Dr. Nomi Brownlee MD.          Physical Exam:  Right foot has return of dorsiflexion.  Right leg incision looks good.  Left wrist incision looks good.    Assessment/Plan   Postop day 7 status post right tibia ORIF and left wrist ORIF  Continue nonweightbearing right lower extremity and left wrist x6 weeks.  She may weight-bear at the left elbow.  She is to wear the brace on the left wrist and may remove it for washing her left arm.  Do not submerge left wrist or right knee in water for at least 3 weeks  Continue anticoagulation for DVT prophylaxis  Follow-up with me in about 4 weeks for x-rays.  Guanaco Thakur MD  03/16/20  12:49

## 2020-03-16 NOTE — PROGRESS NOTES
Case Management Discharge Note      Final Note: PAtient's plan is a skilled bed at [facility] tomorrow, 3/17.  [facility] scheduled to transport at 1330.  Patient needs to be at Maternity entrance by 1325.  Nurse to call report to [phone].  Please place a copy of the transfer summary and any scripts in the packet to be sent to the facility with the patient.  Michelle Kaufman RN [phone]    Provided Post Acute Provider List?: Yes  Post Acute Provider List: Nursing Home, Inpatient Rehab    Destination - Selection Complete      Service Provider Request Status Selected Services Address Phone Number Fax Number    [facility] Selected Skilled Nursing [address] [id number] [phone]      Durable Medical Equipment      Service Provider Request Status Selected Services Address Phone Number Fax Number    [facility] Selected Durable Medical Equipment [address] [facility] [address] [id number] [phone]      Dialysis/Infusion      No service has been selected for the patient.      Home Medical Care      No service has been selected for the patient.      Therapy      No service has been selected for the patient.      Community Resources      No service has been selected for the patient.             Final Discharge Disposition Code: 03 - skilled nursing facility (SNF)

## 2020-03-16 NOTE — THERAPY TREATMENT NOTE
Patient Name: Yessica Galvin  : 1960    MRN: [id number]                              Today's Date: 3/16/2020       Admit Date: 3/7/2020    Visit Dx:     ICD-10-CM ICD-9-CM   1. Closed fracture of right tibial plateau, initial encounter S82.141A 823.00   2. Closed Colles' fracture of left radius, initial encounter S52.532A 813.41   3. Fall with significant injury, initial encounter W19.XXXA E888.9   4. Contusion of right hip, initial encounter S70.01XA 924.01   5. Impaired mobility and ADLs Z74.09 799.89     Patient Active Problem List   Diagnosis   • Asthmatic bronchitis   • Generalized anxiety disorder   • Gout   • Headache   • Acquired hypothyroidism   • Chronic midline low back pain without sciatica   • Seasonal allergic rhinitis   • Cervical disc disorder with radiculopathy   • Lumbar disc disease   • Fracture of right tibia   • Left radial fracture   • Asthma   • Tobacco abuse   • Elevated transaminase level   • Chronic back pain   • Closed fracture of right tibial plateau   • Hypokalemia     Past Medical History:   Diagnosis Date   • Acute bronchitis    • Acute sinusitis    • Asthma    • Asthma with acute exacerbation    • Asthmatic bronchitis    • Disc degeneration, lumbar    • Disc degeneration, lumbar    • History of mammogram    • Hypothyroidism    • Lower back pain    • Plantar fasciitis    • Restless legs syndrome      Past Surgical History:   Procedure Laterality Date   • HYSTERECTOMY     • KNEE ARTHROSCOPY Right 3/10/2020    Procedure: KNEE ARTHROSCOPY RIGHT;  Surgeon: Guanaco Thakur MD;  Location:  [facility];  Service: Orthopedics;  Laterality: Right;   • NECK SURGERY     • OOPHORECTOMY     • ORIF WRIST FRACTURE Left 3/10/2020    Procedure: WRIST OPEN REDUCTION INTERNAL FIXATION LEFT;  Surgeon: Guanaco Thakur MD;  [facility];  Service: Orthopedics;  Laterality: Left;   • TIBIAL PLATEAU OPEN REDUCTION INTERNAL FIXATION Right 3/10/2020    Procedure: TIBIAL PLATEAU OPEN REDUCTION  INTERNAL FIXATION RIGHT;  Surgeon: Guanaco Thakur MD;  Location: [facility];  Service: Orthopedics;  Laterality: Right;     General Information     Row Name 03/16/20 1045          PT Evaluation Time/Intention    Document Type  therapy note (daily note)  -CS     Mode of Treatment  physical therapy  -CS     Row Name 03/16/20 1045          General Information    Patient Profile Reviewed?  yes  -CS     Existing Precautions/Restrictions  fall;left;right;non-weight bearing;oxygen therapy device and L/min;other (see comments) NWB on L UE and NWB on R LE; KI on R LE for mobility  -CS     Row Name 03/16/20 1045          Cognitive Assessment/Intervention- PT/OT    Orientation Status (Cognition)  oriented x 4  -CS     Row Name 03/16/20 1045          Safety Issues, Functional Mobility    Safety Issues Affecting Function (Mobility)  safety precautions follow-through/compliance;safety precaution awareness  -CS     Impairments Affecting Function (Mobility)  endurance/activity tolerance;pain;range of motion (ROM);strength  -CS       User Key  (r) = Recorded By, (t) = Taken By, (c) = Cosigned By    Initials Name Provider Type    CS Nancy Quispe, PT Physical Therapist        Mobility     Row Name 03/16/20 1045          Bed Mobility Assessment/Treatment    Comment (Bed Mobility)  Pt UIC at start and end of therapy session.   -     Row Name 03/16/20 1045          Transfer Assessment/Treatment    Comment (Transfers)  VC's to maintain NWB on L UE. VC to push off with R UE to stand and to reach back for R UE to sit. VC's to maintain NWB on R LE with transfers.   -     Row Name 03/16/20 1045          Sit-Stand Transfer    Sit-Stand [place] (Transfers)  contact guard;verbal cues  -CS     Assistive Device (Sit-Stand Transfers)  walker, rolling platform  -     Row Name 03/16/20 1045          Gait/Stairs Assessment/Training    45983 - Gait Training Minutes   15  -CS     Gait/Stairs Assessment/Training  gait/ambulation  independence;gait/ambulation assistive device;maintains weight-bearing status  -CS     [place] Level (Gait)  verbal cues;contact guard  -CS     Assistive Device (Gait)  walker, rolling platform  -CS     Distance in Feet (Gait)  30' x 2  -CS     Pattern (Gait)  step-to  -CS     Deviations/Abnormal Patterns (Gait)  bilateral deviations;james decreased;gait speed decreased;stride length decreased  -CS     Bilateral Gait Deviations  forward flexed posture  -CS     Comment (Gait/Stairs)  Pt able to amb 30' x 2 with chair follow and sitting rest break in between reps. Pt able to maintain NWB on R LE and L UE. Pt does have forward flexed posture but difficult to correct due to using platform walker. VC's for patient to take adequate rest breaks when she fatigues.   -CS     Row Name 03/16/20 1045          Mobility Assessment/Intervention    Extremity Weight-bearing Status  left upper extremity;right lower extremity  -CS     Left Upper Extremity (Weight-bearing Status)  (S) non weight-bearing (NWB)  -CS     Right Lower Extremity (Weight-bearing Status)  (S) non weight-bearing (NWB)  -CS       User Key  (r) = Recorded By, (t) = Taken By, (c) = Cosigned By    Initials Name Provider Type    CS Nancy Quispe PT Physical Therapist        Obj/Interventions     Row Name 03/16/20 1045          Therapeutic Exercise    Lower Extremity (Therapeutic Exercise)  gluteal sets;heel slides, right;LAQ (long arc quad), right;quad sets, right;SAQ (short arc quad), right;SLR (straight leg raise), right  -CS     Lower Extremity Range of Motion (Therapeutic Exercise)  ankle dorsiflexion/plantar flexion, bilateral  -CS     Exercise Type (Therapeutic Exercise)  AAROM (active assistive range of motion);isometric contraction, static;isotonic contraction, concentric;AROM (active range of motion)  -CS     Position (Therapeutic Exercise)  seated  -CS     Sets/Reps (Therapeutic Exercise)  10 reps each   -CS     Comment (Therapeutic Exercise)   VC's on technique. Min assist with SAQ, LAQ, and SLR.  -[facility] Name 03/16/20 1045          Static Sitting Balance    Level of [place] (Unsupported Sitting, Static Balance)  independent  -CS     Sitting Position (Unsupported Sitting, Static Balance)  sitting in chair  -     Time Able to Maintain Position (Unsupported Sitting, Static Balance)  2 to 3 minutes  -CS     Row Name 03/16/20 1045          Dynamic Sitting Balance    Level of [place], Reaches Outside Midline (Sitting, Dynamic Balance)  independent  -CS     Sitting Position, Reaches Outside Midline (Sitting, Dynamic Balance)  sitting in chair  -CS     Row Name 03/16/20 1045          Static Standing Balance    Level of [place] (Supported Standing, Static Balance)  contact guard assist  -CS     Time Able to Maintain Position (Supported Standing, Static Balance)  1 to 2 minutes  -     Assistive Device Utilized (Supported Standing, Static Balance)  walker, rolling platform  -CS     Row Name 03/16/20 1045          Dynamic Standing Balance    Level of [place], Reaches Outside Midline (Standing, Dynamic Balance)  contact guard assist  -     Time Able to Maintain Position, Reaches Outside Midline (Standing, Dynamic Balance)  more than 5 minutes  -     Assistive Device Utilized (Supported Standing, Dynamic Balance)  walker, rolling platform  -       User Key  (r) = Recorded By, (t) = Taken By, (c) = Cosigned By    Initials Name Provider Type    Nancy Townsend PT Physical Therapist        Goals/Plan    No documentation.       Clinical Impression     Row Name 03/16/20 1045          Pain Assessment    Additional Documentation  Pain Scale: Numbers Pre/Post-Treatment (Group)  -CS     Row Name 03/16/20 1045          Pain Scale: Numbers Pre/Post-Treatment    Pain Scale: Numbers, Pretreatment  9/10  -CS     Pain Scale: Numbers, Post-Treatment  8/10  -CS     Pain Location - Side  Right  -CS     Pain Location - Orientation  generalized   -CS     Pain Location  knee  -CS     Pain Intervention(s)  Repositioned;Ambulation/increased activity  -CS     Row Name 03/16/20 1045          Plan of Care Review    Plan of Care Reviewed With  patient  -CS     Progress  improving  -CS     Outcome Summary  Pt able to amb 30' x 2 with rolling platform walker CGA with step to gait mechanics with chair follow and able to maintain NWB on L UE and R LE. Pt limited by fatigue. Continue to recommend IP rehab at discharge.   -CS     Row Name 03/16/20 1045          Physical Therapy Clinical Impression    Criteria for Skilled Interventions Met (PT Clinical Impression)  yes;treatment indicated  -CS     Rehab Potential (PT Clinical Summary)  good, to achieve stated therapy goals  -CS     Row Name 03/16/20 1045          Vital Signs    Pre SpO2 (%)  92  -CS     O2 Delivery Pre Treatment  supplemental O2  -CS     Post SpO2 (%)  90  -CS     O2 Delivery Post Treatment  supplemental O2  -CS     Row Name 03/16/20 1045          Positioning and Restraints    Pre-Treatment Position  sitting in chair/recliner  -CS     Post Treatment Position  chair  -CS     In Chair  notified nsg;reclined;call light within reach;encouraged to call for assist;exit alarm on;with family/caregiver;legs elevated  -CS       User Key  (r) = Recorded By, (t) = Taken By, (c) = Cosigned By    Initials Name Provider Type    CS Nancy Quispe, PT Physical Therapist        Outcome Measures     Row Name 03/16/20 1045          How much help from another person do you currently need...    Turning from your back to your side while in flat bed without using bedrails?  4  -CS     Moving from lying on back to sitting on the side of a flat bed without bedrails?  4  -CS     Moving to and from a bed to a chair (including a wheelchair)?  3  -CS     Standing up from a chair using your arms (e.g., wheelchair, bedside chair)?  3  -CS     Climbing 3-5 steps with a railing?  1  -CS     To walk in hospital room?  3  -CS      AM-PAC 6 Clicks Score (PT)  18  -CS     Row Name 03/16/20 1045          Functional Assessment    Outcome Measure Options  AM-PAC 6 Clicks Basic Mobility (PT)  -CS       User Key  (r) = Recorded By, (t) = Taken By, (c) = Cosigned By    Initials Name Provider Type    CS Nancy Quispe PT Physical Therapist          PT Recommendation and Plan  Planned Therapy Interventions (PT Eval): balance training, bed mobility training, gait training, home exercise program, neuromuscular re-education, patient/family education, ROM (range of motion), strengthening, transfer training  Plan of Care Reviewed With: patient  Progress: improving  Outcome Summary: Pt able to amb 30' x 2 with rolling platform walker CGA with step to gait mechanics with chair follow and able to maintain NWB on L UE and R LE. Pt limited by fatigue. Continue to recommend IP rehab at discharge.      Time Calculation:   PT Charges     Row Name 03/16/20 1045             Time Calculation    Start Time  1045  -CS      PT Received On  03/16/20  -         Time Calculation- PT    Total Timed Code Minutes- PT  20 minute(s)  -CS         Timed Charges    25670 - PT Therapeutic Exercise Minutes  5  -CS      82104 - Gait Training Minutes   15  -CS        User Key  (r) = Recorded By, (t) = Taken By, (c) = Cosigned By    Initials Name Provider Type    Nancy Townsend PT Physical Therapist        Therapy Charges for Today     Code Description Service Date Service Provider Modifiers Qty    [id number] HC GAIT TRAINING EA 15 MIN 3/16/2020 Nancy Quispe, PT GP 1          PT G-Codes  Outcome Measure Options: AM-PAC 6 Clicks Basic Mobility (PT)  AM-PAC 6 Clicks Score (PT): 18  AM-PAC 6 Clicks Score (OT): 14    Nancy Quispe PT  3/16/2020

## 2020-03-16 NOTE — THERAPY TREATMENT NOTE
Acute Care - Occupational Therapy Treatment Note  [facility]     Patient Name: Yessica Galvin  : 1960  MRN: [id number]  Today's Date: 3/16/2020  Onset of Illness/Injury or Date of Surgery: 20  Date of Referral to OT: 03/10/20  Referring Physician: Blaze    Admit Date: 3/7/2020       ICD-10-CM ICD-9-CM   1. Closed fracture of right tibial plateau, initial encounter S82.141A 823.00   2. Closed Colles' fracture of left radius, initial encounter S52.532A 813.41   3. Fall with significant injury, initial encounter W19.XXXA E888.9   4. Contusion of right hip, initial encounter S70.01XA 924.01   5. Impaired mobility and ADLs Z74.09 799.89     Patient Active Problem List   Diagnosis   • Asthmatic bronchitis   • Generalized anxiety disorder   • Gout   • Headache   • Acquired hypothyroidism   • Chronic midline low back pain without sciatica   • Seasonal allergic rhinitis   • Cervical disc disorder with radiculopathy   • Lumbar disc disease   • Fracture of right tibia   • Left radial fracture   • Asthma   • Tobacco abuse   • Elevated transaminase level   • Chronic back pain   • Closed fracture of right tibial plateau   • Hypokalemia     Past Medical History:   Diagnosis Date   • Acute bronchitis    • Acute sinusitis    • Asthma    • Asthma with acute exacerbation    • Asthmatic bronchitis    • Disc degeneration, lumbar    • Disc degeneration, lumbar    • History of mammogram    • Hypothyroidism    • Lower back pain    • Plantar fasciitis    • Restless legs syndrome      Past Surgical History:   Procedure Laterality Date   • HYSTERECTOMY     • KNEE ARTHROSCOPY Right 3/10/2020    Procedure: KNEE ARTHROSCOPY RIGHT;  Surgeon: Guanaco Thakur MD;  Location:  [facility];  Service: Orthopedics;  Laterality: Right;   • NECK SURGERY     • OOPHORECTOMY     • ORIF WRIST FRACTURE Left 3/10/2020    Procedure: WRIST OPEN REDUCTION INTERNAL FIXATION LEFT;  Surgeon: Guanaco Thakur MD;  Location:  [facility];  Service:  Orthopedics;  Laterality: Left;   • TIBIAL PLATEAU OPEN REDUCTION INTERNAL FIXATION Right 3/10/2020    Procedure: TIBIAL PLATEAU OPEN REDUCTION INTERNAL FIXATION RIGHT;  Surgeon: Guanaco Thakur MD;  Location: [facility];  Service: Orthopedics;  Laterality: Right;       Therapy Treatment    Rehabilitation Treatment Summary     Row Name 03/16/20 1040             Treatment Time/Intention    Discipline  occupational therapist  -HK      Document Type  therapy note (daily note)  -HK      Subjective Information  pain  -HK      Mode of Treatment  occupational therapy  -HK      Patient/Family Observations  Pt recieved upright in chair with IV heplocked and  at bedside.   -HK      Care Plan Review  care plan/treatment goals reviewed;risks/benefits reviewed;patient/other agree to care plan  -HK      Care Plan Review, Other Participant(s)  spouse  -HK      Patient Effort  good  -HK      Existing Precautions/Restrictions  (S) fall;non-weight bearing;left;right;other (see comments) NWB L UE, NWB R LE  -HK      Recorded by [HK] Jolynn Grady, OT 03/16/20 1316      Row Name 03/16/20 1040             Vital Signs    Pre Systolic BP Rehab  -- RN cleared for tx; VSS   -HK      Pre Patient Position  Sitting  -HK      Intra Patient Position  Standing  -HK      Post Patient Position  Sitting  -HK      Recorded by [HK] Jolynn Grady, OT 03/16/20 1316      Row Name 03/16/20 1040             Cognitive Assessment/Intervention    Additional Documentation  Cognitive Assessment/Intervention (Group)  -HK      Recorded by [HK] Jolynn Grady OT 03/16/20 1316      Row Name 03/16/20 1040             Cognitive Assessment/Intervention- PT/OT    Affect/Mental Status (Cognitive)  WFL  -HK      Orientation Status (Cognition)  oriented x 4  -HK      Follows Commands (Cognition)  follows one step commands;over 90% accuracy  -HK      Cognitive Function (Cognitive)  safety deficit  -HK      Safety Deficit (Cognitive)  mild deficit;safety  precautions follow-through/compliance;safety precautions awareness  -HK      Recorded by [HK] Jolynn Grady, OT 03/16/20 1316      Row Name 03/16/20 1040             Safety Issues, Functional Mobility    Safety Issues Affecting Function (Mobility)  safety precautions follow-through/compliance;safety precaution awareness;judgment  -HK      Impairments Affecting Function (Mobility)  balance;endurance/activity tolerance;pain;strength;range of motion (ROM)  -HK      Recorded by [HK] Jolynn Grady, OT 03/16/20 1316      Row Name 03/16/20 1040             Mobility Assessment/Intervention    Extremity Weight-bearing Status  left upper extremity;right lower extremity  -HK      Left Upper Extremity (Weight-bearing Status)  (S) non weight-bearing (NWB)  -HK      Right Lower Extremity (Weight-bearing Status)  (S) non weight-bearing (NWB)  -HK      Recorded by [HK] Jolynn Grady, OT 03/16/20 1316      Row Name 03/16/20 1040             Bed Mobility Assessment/Treatment    Comment (Bed Mobility)  Pt received and left UIC   -HK      Recorded by [HK] Jolynn Grady, OT 03/16/20 1316      Row Name 03/16/20 1040             Functional Mobility    Functional Mobility- Ind. Level  contact guard assist;verbal cues required  -HK      Functional Mobility- Device  rolling platform walker  -HK      Functional Mobility- Safety Issues  step length decreased;weight-shifting ability decreased  -HK      Functional Mobility- Comment  Pt ambulating with CGA and platform walker.   -HK      Recorded by [HK] Jolynn Grady, OT 03/16/20 1316      Row Name 03/16/20 1040             Transfer Assessment/Treatment    Transfer Assessment/Treatment  sit-stand transfer;stand-sit transfer  -HK      Comment (Transfers)  Pt demonstrates good recall on safety precautions and hand placement.  -HK      Recorded by [HK] Jolynn Grady, OT 03/16/20 1316      Row Name 03/16/20 1040             Sit-Stand Transfer    Sit-Stand [place] (Transfers)  contact  guard;verbal cues  -HK      Assistive Device (Sit-Stand Transfers)  walker, rolling platform  -HK      Recorded by [HK] Jolynn Grady, OT 03/16/20 1316      Row Name 03/16/20 1040             Stand-Sit Transfer    Stand-Sit [place] (Transfers)  contact guard;verbal cues  -HK      Assistive Device (Stand-Sit Transfers)  walker, rolling platform  -HK      Recorded by [HK] Jolynn Grady, OT 03/16/20 1316      Row Name 03/16/20 1040             ADL Assessment/Intervention    BADL Assessment/Intervention  lower body dressing;grooming  -HK      Recorded by [HK] Jolynn Grady, OT 03/16/20 1316      Row Name 03/16/20 1040             Lower Body Dressing Assessment/Training    Lower Body Dressing [place] Level  don;socks;maximum assist (25% patient effort)  -HK      Lower Body Dressing Position  supported sitting  -HK      Recorded by [HK] Jolynn Grady, OT 03/16/20 1316      Row Name 03/16/20 1040             Grooming Assessment/Training    [place] Level (Grooming)  oral care regimen;wash face, hands;minimum assist (75% patient effort);verbal cues  -HK      Grooming Position  unsupported sitting  -HK      Comment (Grooming)  Pt required Michelle for all grooming due to inability to use R hand.   -HK      Recorded by [HK] Jolynn Grady, OT 03/16/20 1316      Row Name 03/16/20 1040             BADL Safety/Performance    Impairments, BADL Safety/Performance  balance;endurance/activity tolerance;pain;strength;range of motion  -HK      Skilled BADL Treatment/Intervention  adaptive equipment training;BADL process/adaptation training  -HK      Recorded by [HK] Jolynn Grady, OT 03/16/20 1316      Row Name 03/16/20 1040             Motor Skills Assessment/Interventions    Additional Documentation  Balance (Group);Therapeutic Exercise (Group);Therapeutic Exercise Interventions (Group)  -HK      Recorded by [HK] Jolynn Grady, OT 03/16/20 1316      Row Name 03/16/20 1040             Therapeutic Exercise    94511 - OT  Therapeutic Exercise Minutes  5  -HK      Recorded by [HK] Jolynn Grady, OT 03/16/20 1316      Row Name 03/16/20 1040             Therapeutic Exercise    Upper Extremity Range of Motion (Therapeutic Exercise)  elbow flexion/extension, left  -HK      Hand (Therapeutic Exercise)  finger flexion/extension, right;thumb finger opposition, left  -HK      Exercise Type (Therapeutic Exercise)  AROM (active range of motion)  -HK      Position (Therapeutic Exercise)  seated  -HK      Sets/Reps (Therapeutic Exercise)  1/10  -HK      Recorded by [HK] Jolynn Grady, OT 03/16/20 1316      Row Name 03/16/20 1040             Balance    Balance  static sitting balance;static standing balance;dynamic sitting balance  -HK      Recorded by [HK] Jolynn Grady, OT 03/16/20 1316      Row Name 03/16/20 1040             Static Sitting Balance    Level of [place] (Unsupported Sitting, Static Balance)  independent  -HK      Sitting Position (Unsupported Sitting, Static Balance)  sitting in chair  -HK      Time Able to Maintain Position (Unsupported Sitting, Static Balance)  2 to 3 minutes  -HK      Recorded by [HK] Jolynn Grady, OT 03/16/20 1316      Row Name 03/16/20 1040             Dynamic Sitting Balance    Level of [place], Reaches Outside Midline (Sitting, Dynamic Balance)  independent  -HK      Sitting Position, Reaches Outside Midline (Sitting, Dynamic Balance)  sitting in chair  -HK      Comment, Reaches Outside Midline (Sitting, Dynamic Balance)  completing grooming   -HK      Recorded by [HK] Jolynn Grady, OT 03/16/20 1316      Row Name 03/16/20 1040             Static Standing Balance    Level of [place] (Supported Standing, Static Balance)  contact guard assist  -HK      Time Able to Maintain Position (Supported Standing, Static Balance)  1 to 2 minutes  -HK      Assistive Device Utilized (Supported Standing, Static Balance)  walker, rolling platform  -HK      Recorded by [HK] Jolynn Grady, OT 03/16/20 1316       Row Name 03/16/20 1040             Positioning and Restraints    Pre-Treatment Position  sitting in chair/recliner  -HK      Post Treatment Position  chair  -HK      In Chair  notified nsg;reclined;encouraged to call for assist;call light within reach;exit alarm on;with family/caregiver  -HK      Recorded by [HK] Jolynn Grady, OT 03/16/20 1316      Row Name 03/16/20 1040             Pain Assessment    Additional Documentation  Pain Scale: Numbers Pre/Post-Treatment (Group)  -HK      Recorded by [HK] Jolynn Grady, OT 03/16/20 1316      Row Name 03/16/20 1040             Pain Scale: Numbers Pre/Post-Treatment    Pain Scale: Numbers, Pretreatment  9/10  -HK      Pain Scale: Numbers, Post-Treatment  8/10  -HK      Pain Location - Side  Right  -HK      Pain Location - Orientation  generalized  -HK      Pain Location  knee  -HK      Pain Intervention(s)  Repositioned;Ambulation/increased activity  -HK      Recorded by [HK] Jolynn Grady, OT 03/16/20 1316      Row Name                Wound 03/10/20 1311 Right anterior;lower leg Incision    Wound - Properties Group Date first assessed: 03/10/20 [TV] Time first assessed: 1311 [TV] Present on Hospital Admission: N [TV] Side: Right [TV] Orientation: anterior;lower [TV] Location: leg [TV] Primary Wound Type: Incision [TV] Recorded by:  [TV] Sneha Pedroza RN 03/10/20 1327    Row Name                Wound 03/10/20 1311 Left lower wrist Incision    Wound - Properties Group Date first assessed: 03/10/20 [TV] Time first assessed: 1311 [TV] Present on Hospital Admission: N [TV] Side: Left [TV] Orientation: lower [TV] Location: wrist [TV] Primary Wound Type: Incision [TV] Recorded by:  [TV] Sneha Pedroza RN 03/10/20 1328    Row Name 03/16/20 1040             Coping    Observed Emotional State  accepting;calm;cooperative  -HK      Recorded by [HK] Jolynn Grady, OT 03/16/20 1316      Row Name 03/16/20 1040             Plan of Care Review    Plan of Care Reviewed With   patient;spouse  -HK      Progress  improving  -HK      Recorded by [HK] Jolynn Grady, OT 03/16/20 1316      Row Name 03/16/20 1040             Outcome Summary/Treatment Plan (OT)    Daily Summary of Progress (OT)  progress toward functional goals is good  -HK      Recorded by [HK] Jolynn Grady, OT 03/16/20 1316        User Key  (r) = Recorded By, (t) = Taken By, (c) = Cosigned By    Initials Name Effective Dates Discipline    TV Sneha Pedroza RN 06/16/16 -  Nurse    HK Jolynn Grady, OT 03/07/18 -  OT        Wound 03/10/20 1311 Right anterior;lower leg Incision (Active)   Dressing Appearance dry;intact;no drainage 3/16/2020  9:00 AM   Closure Open to air;Liquid skin adhesive 3/15/2020  8:00 PM   Base dry;clean;pink 3/15/2020  8:00 PM   Periwound intact;dry;pink 3/15/2020  8:00 PM   Periwound Temperature warm 3/15/2020  8:00 PM   Periwound Skin Turgor soft 3/15/2020  8:00 PM   Drainage Amount none 3/15/2020  8:00 PM       Wound 03/10/20 1311 Left lower wrist Incision (Active)   Dressing Appearance dry;intact;no drainage 3/16/2020  9:00 AM   Closure ROLANDO 3/15/2020  8:00 PM   Base dressing in place, unable to visualize 3/15/2020  8:00 PM   Drainage Amount none 3/16/2020  9:00 AM           OT Recommendation and Plan  Outcome Summary/Treatment Plan (OT)  Daily Summary of Progress (OT): progress toward functional goals is good  Daily Summary of Progress (OT): progress toward functional goals is good  Plan of Care Review  Plan of Care Reviewed With: patient, spouse  Plan of Care Reviewed With: patient, spouse  Outcome Summary: Pt continues to make improvements in transfers and mobility. Pt completes sit to stand with CGA and ambulates short distance with CGA and rolling platform walker. Pt required maxA for LBD and required Michelle for all grooming. Pt completed x10 hand pumps and elbow flexion/extension. Continue IP OT per POC.  Outcome Measures     Row Name 03/16/20 1040             How much help from another is  currently needed...    Putting on and taking off regular lower body clothing?  1  -HK      Bathing (including washing, rinsing, and drying)  2  -HK      Toileting (which includes using toilet bed pan or urinal)  2  -HK      Putting on and taking off regular upper body clothing  3  -HK      Taking care of personal grooming (such as brushing teeth)  3  -HK      Eating meals  3  -HK      AM-PAC 6 Clicks Score (OT)  14  -HK         Functional Assessment    Outcome Measure Options  AM-PAC 6 Clicks Daily Activity (OT)  -HK        User Key  (r) = Recorded By, (t) = Taken By, (c) = Cosigned By    Initials Name Provider Type     Jolynn Grady OT Occupational Therapist           Time Calculation:   Time Calculation- OT     Row Name 03/16/20 1040             Time Calculation- OT    OT Start Time  1040  -      OT Received On  03/16/20  -      OT Goal Re-Cert Due Date  03/21/20  -         Timed Charges    79090 - OT Therapeutic Exercise Minutes  5  -      37270 - OT Therapeutic Activity Minutes  3  -      71412 - OT Self Care/Mgmt Minutes  15  -HK        User Key  (r) = Recorded By, (t) = Taken By, (c) = Cosigned By    Initials Name Provider Type     Jolynn Grady OT Occupational Therapist        Therapy Charges for Today     Code Description Service Date Service Provider Modifiers Qty    [id number]  OT SELF CARE/MGMT/TRAIN EA 15 MIN 3/16/2020 Jolynn Grady OT GO 1    [id number]  OT THER PROC EA 15 MIN 3/16/2020 Jolynn Grady OT GO 1               Jolynn Grady OT  3/16/2020

## 2020-03-16 NOTE — PLAN OF CARE
Problem: Patient Care Overview  Goal: Plan of Care Review  Outcome: Ongoing (interventions implemented as appropriate)  Flowsheets  Taken 3/16/2020 1352 by Nancy Quispe, PT  Progress: improving  Taken 3/16/2020 1048 by Jackelyn Lux RN  Plan of Care Reviewed With: patient  Taken 3/16/2020 1045 by Nancy Quispe, PT  Outcome Summary: Pt able to amb 30' x 2 with rolling platform walker CGA with step to gait mechanics with chair follow and able to maintain NWB on L UE and R LE. Pt limited by fatigue and took sitting rest break in between reps. Continue to recommend IP rehab at discharge.

## 2020-03-16 NOTE — PLAN OF CARE
Problem: Patient Care Overview  Goal: Plan of Care Review  3/16/2020 1320 by Jolynn Grady OT  Outcome: Ongoing (interventions implemented as appropriate)  Flowsheets (Taken 3/16/2020 1040)  Outcome Summary: Pt continues to make improvements in transfers and mobility. Pt completes sit to stand with CGA and ambulates short distance with CGA and rolling platform walker. Pt required maxA for LBD and required Michelle for all grooming. Pt completed x10 hand pumps and elbow flexion/extension. Continue IP OT per POC.

## 2020-03-16 NOTE — PLAN OF CARE
Problem: Pain, Chronic (Adult)  Goal: Identify Related Risk Factors and Signs and Symptoms  Flowsheets (Taken 3/12/2020 1544 by Andree Puente RN)  Related Risk Factors (Chronic Pain): surgery  Signs and Symptoms (Chronic Pain): verbalization of pain descriptors     Problem: Skin Injury Risk (Adult)  Goal: Identify Related Risk Factors and Signs and Symptoms  Flowsheets (Taken 3/12/2020 1544 by Andree Puente RN)  Related Risk Factors (Skin Injury Risk): edema;mobility impaired;hospitalization prolonged;infection     Problem: Fall Risk (Adult)  Goal: Identify Related Risk Factors and Signs and Symptoms  Flowsheets  Taken 3/13/2020 0503 by Rupa Cardoso RN  Related Risk Factors (Fall Risk): gait/mobility problems;history of falls;objects hard to reach  Taken 3/14/2020 1707 by Andree Puente RN  Signs and Symptoms (Fall Risk): presence of risk factors     Problem: Patient Care Overview  Goal: Plan of Care Review  Flowsheets (Taken 3/16/2020 0343)  Progress: no change  Plan of Care Reviewed With: patient  Outcome Summary: VSS, voids well, pt ambulates to bedside, pt rested throughout the night, will continue to monitor for changes.

## 2020-03-17 VITALS
SYSTOLIC BLOOD PRESSURE: 103 MMHG | TEMPERATURE: 98.5 F | HEIGHT: 65 IN | BODY MASS INDEX: 33.82 KG/M2 | DIASTOLIC BLOOD PRESSURE: 60 MMHG | RESPIRATION RATE: 16 BRPM | HEART RATE: 66 BPM | OXYGEN SATURATION: 93 % | WEIGHT: 203 LBS

## 2020-03-17 PROBLEM — E87.6 HYPOKALEMIA: Status: RESOLVED | Noted: 2020-03-10 | Resolved: 2020-03-17

## 2020-03-17 PROCEDURE — 94799 UNLISTED PULMONARY SVC/PX: CPT

## 2020-03-17 PROCEDURE — 99239 HOSP IP/OBS DSCHRG MGMT >30: CPT | Performed by: NURSE PRACTITIONER

## 2020-03-17 RX ORDER — HYDROXYZINE HYDROCHLORIDE 25 MG/1
25 TABLET, FILM COATED ORAL 3 TIMES DAILY PRN
Start: 2020-03-17 | End: 2021-08-26

## 2020-03-17 RX ORDER — SACCHAROMYCES BOULARDII 250 MG
250 CAPSULE ORAL 2 TIMES DAILY
Start: 2020-03-17

## 2020-03-17 RX ORDER — HYDROCODONE BITARTRATE AND ACETAMINOPHEN 10; 325 MG/1; MG/1
1 TABLET ORAL EVERY 4 HOURS PRN
Qty: 18 TABLET | Refills: 0 | Status: SHIPPED | OUTPATIENT
Start: 2020-03-17 | End: 2020-04-17

## 2020-03-17 RX ORDER — HYDROCODONE BITARTRATE AND ACETAMINOPHEN 10; 325 MG/1; MG/1
1 TABLET ORAL EVERY 4 HOURS PRN
Status: DISCONTINUED | OUTPATIENT
Start: 2020-03-17 | End: 2020-03-17 | Stop reason: HOSPADM

## 2020-03-17 RX ORDER — ACETAMINOPHEN 325 MG/1
650 TABLET ORAL EVERY 4 HOURS PRN
Start: 2020-03-17

## 2020-03-17 RX ORDER — BISACODYL 5 MG/1
5 TABLET, DELAYED RELEASE ORAL DAILY PRN
Start: 2020-03-17 | End: 2021-02-23

## 2020-03-17 RX ORDER — CEFUROXIME AXETIL 250 MG/1
250 TABLET ORAL EVERY 12 HOURS SCHEDULED
Qty: 1 TABLET | Refills: 0
Start: 2020-03-17 | End: 2020-03-18

## 2020-03-17 RX ORDER — NICOTINE 21 MG/24HR
1 PATCH, TRANSDERMAL 24 HOURS TRANSDERMAL
Start: 2020-03-18 | End: 2020-08-20

## 2020-03-17 RX ADMIN — CEFUROXIME AXETIL 250 MG: 250 TABLET ORAL at 08:10

## 2020-03-17 RX ADMIN — BUDESONIDE AND FORMOTEROL FUMARATE DIHYDRATE 2 PUFF: 160; 4.5 AEROSOL RESPIRATORY (INHALATION) at 07:59

## 2020-03-17 RX ADMIN — VENLAFAXINE HYDROCHLORIDE 150 MG: 75 CAPSULE, EXTENDED RELEASE ORAL at 08:10

## 2020-03-17 RX ADMIN — LEVOTHYROXINE SODIUM 150 MCG: 150 TABLET ORAL at 08:17

## 2020-03-17 RX ADMIN — ACETAMINOPHEN 650 MG: 325 TABLET, FILM COATED ORAL at 11:20

## 2020-03-17 RX ADMIN — HYDROCODONE BITARTRATE AND ACETAMINOPHEN 1 TABLET: 7.5; 325 TABLET ORAL at 07:12

## 2020-03-17 RX ADMIN — HYDROXYZINE HYDROCHLORIDE 25 MG: 25 TABLET, FILM COATED ORAL at 11:20

## 2020-03-17 RX ADMIN — GUAIFENESIN 600 MG: 600 TABLET, EXTENDED RELEASE ORAL at 08:10

## 2020-03-17 RX ADMIN — HYDROCODONE BITARTRATE AND ACETAMINOPHEN 1 TABLET: 10; 325 TABLET ORAL at 12:02

## 2020-03-17 RX ADMIN — Medication 250 MG: at 08:10

## 2020-03-17 NOTE — PLAN OF CARE
Problem: Pain, Chronic (Adult)  Goal: Identify Related Risk Factors and Signs and Symptoms  Flowsheets (Taken 3/12/2020 1544 by Andree Puente RN)  Related Risk Factors (Chronic Pain): surgery  Signs and Symptoms (Chronic Pain): verbalization of pain descriptors     Problem: Skin Injury Risk (Adult)  Goal: Identify Related Risk Factors and Signs and Symptoms  Flowsheets (Taken 3/12/2020 1544 by Andree Puente RN)  Related Risk Factors (Skin Injury Risk): edema;mobility impaired;hospitalization prolonged;infection     Problem: Fall Risk (Adult)  Goal: Identify Related Risk Factors and Signs and Symptoms  Flowsheets  Taken 3/13/2020 0503 by Rupa Cardoso RN  Related Risk Factors (Fall Risk): gait/mobility problems;history of falls;objects hard to reach  Taken 3/14/2020 1707 by Andree Puente RN  Signs and Symptoms (Fall Risk): presence of risk factors     Problem: Patient Care Overview  Goal: Plan of Care Review  Flowsheets (Taken 3/17/2020 0442)  Progress: no change  Plan of Care Reviewed With: patient  Outcome Summary: VSS, voids well, pt ambulates to bedside, pain managed with PRN medications, will continue to monitor for changes.
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
## 2020-03-17 NOTE — PLAN OF CARE
Problem: Patient Care Overview  Goal: Plan of Care Review  Outcome: Ongoing (interventions implemented as appropriate)  Flowsheets (Taken 3/17/2020 1205)  Progress: improving  Plan of Care Reviewed With: patient; spouse  Note:   Patient follow up for fungal rash to back-have been treating with micotin cream-area is pink/dry/almost gone-patient preparing for discharge-no new concerns-WOC will sign off-please notify for questions or concerns. Thank you.

## 2020-03-18 NOTE — PAYOR COMM NOTE
"Ref # [id number]  Gisele Bryson RN, BSN  Phone # [phone]  Fax # [phone]  Yessica Galvin (59 y.o. Female)     Date of Birth Social Security Number Address Home Phone MRN    1960  [address]  [facility] [address]  [id number]    Spiritism Marital Status          [employer]        Admission Date Admission Type Admitting Provider Attending Provider Department, Room/Bed    3/7/20 Emergency [facility] 3G, S353/1    Discharge Date Discharge Disposition Discharge Destination        3/17/2020 Rehab Facility or Unit (DC - External)              Attending Provider:  (none)   Allergies:  Codeine    Isolation:  None   Infection:  None   Code Status:  Prior    Ht:  165.1 cm (65\")   Wt:  92.1 kg (203 lb)    Admission Cmt:  None   Principal Problem:  Closed fracture of right tibial plateau [S82.141A] More...                 Active Insurance as of 3/7/2020     Primary Coverage     Payor Plan Insurance Group Employer/Plan Group    HUMANA HUMANA [id number]     Payor Plan Address Payor Plan Phone Number Payor Plan Fax Number Effective Dates    [address] [address] [phone]  2018 - None Entered    [facility] [address]       Subscriber Name Subscriber Birth Date Member ID       VARGAS GALVIN 1961 [id number]                   Physician Progress Notes (last 72 hours) (Notes from 03/15/20 1433 through 20 1433)      Kae Mtz APRN at 20 [address] [place] [address] Medicine Services  PROGRESS NOTE    Patient Name: Yessica Galvin  : 1960  MRN: [id number]    Date of Admission: 3/7/2020  Length of Stay: 9  Primary Care Physician: Kota Swain MD    Subjective   Subjective     CC:  Closed fracture of right tibial plateau    HPI:  No issues overnight  Pain tolerable    ROS:  Gen- No fevers, chills  CV- No chest pain, palpitations  Resp- + cough, dyspnea  GI- No N/V/D, abd pain  MS- right LE pain, left wrist " pain    Objective   Objective     Vital Signs:   Temp:  [98 °F (36.7 °C)-98.7 °F (37.1 °C)] 98.7 °F (37.1 °C)  Heart Rate:  [55-90] 90  Resp:  [15-18] 18  BP: (112-131)/(67-76) 121/76        Physical Exam:  Constitutional: No acute distress, awake, alert  HENT: NCAT, mucous membranes moist  Respiratory: Clear to auscultation bilaterally, respiratory effort normal   Cardiovascular: RRR, no murmurs,  palpable pedal pulses bilaterally  Gastrointestinal: Positive bowel sounds, soft, nontender, nondistended  Musculoskeletal: RLE incision c/d/i, immobilizer in place, LUE splinted with ace wrap in place  Psychiatric: Appropriate affect, cooperative  Neurologic: Oriented x 3, KIM, speech clear    Results Reviewed:    Results from last 7 days   Lab Units 03/16/20  0534 03/15/20  0517 03/13/20  0613   WBC 10*3/mm3 6.81 6.62 6.34   HEMOGLOBIN g/dL 8.4* 8.0* 7.9*   HEMATOCRIT % 27.2* 25.6* 24.5*   PLATELETS 10*3/mm3 487* 411 246     Results from last 7 days   Lab Units 03/16/20  0534 03/15/20  0517 03/13/20  2155 03/13/20  0613  03/10/20  0549   SODIUM mmol/L 138 139  --  138  --  139   POTASSIUM mmol/L 3.9 3.3* 3.9 2.9*   < > 2.8*   CHLORIDE mmol/L 100 99  --  98  --  107   CO2 mmol/L 28.0 29.0  --  31.0*  --  23.0   BUN mg/dL 7* 7*  --  5*  --  4*   CREATININE mg/dL 0.34* 0.32*  --  0.40*  --  0.32*   GLUCOSE mg/dL 104* 106*  --  109*  --  92   CALCIUM mg/dL 9.0 8.9  --  8.2*  --  7.2*   ALT (SGPT) U/L  --   --   --   --   --  105*   AST (SGOT) U/L  --   --   --   --   --  44*    < > = values in this interval not displayed.     Estimated Creatinine Clearance: 194.8 mL/min (A) (by C-G formula based on SCr of 0.34 mg/dL (L)).  No results found for: BNP    Microbiology Results Abnormal     Procedure Component Value - Date/Time    Blood Culture - Blood, Hand, Right [[id number]] Collected:  03/11/20 0519    Lab Status:  Final result Specimen:  Blood from Hand, Right Updated:  03/16/20 0600     Blood Culture No growth at 5 days     Blood Culture - Blood, Arm, Right [[id number]] Collected:  03/11/20 0519    Lab Status:  Final result Specimen:  Blood from Arm, Right Updated:  03/16/20 0600     Blood Culture No growth at 5 days    Respiratory Panel, PCR - Swab, Nasopharynx [[id number]]  (Normal) Collected:  03/11/20 1125    Lab Status:  Final result Specimen:  Swab from Nasopharynx Updated:  03/11/20 1222     ADENOVIRUS, PCR Not Detected     Coronavirus 229E Not Detected     Coronavirus HKU1 Not Detected     Coronavirus NL63 Not Detected     Coronavirus OC43 Not Detected     Human Metapneumovirus Not Detected     Human Rhinovirus/Enterovirus Not Detected     Influenza B PCR Not Detected     Parainfluenza Virus 1 Not Detected     Parainfluenza Virus 2 Not Detected     Parainfluenza Virus 3 Not Detected     Parainfluenza Virus 4 Not Detected     Bordetella pertussis pcr Not Detected     Influenza A H1 2009 PCR Not Detected     Chlamydophila pneumoniae PCR Not Detected     Mycoplasma pneumo by PCR Not Detected     Influenza A PCR Not Detected     Influenza A H3 Not Detected     Influenza A H1 Not Detected     RSV, PCR Not Detected     Bordetella parapertussis PCR Not Detected    Narrative:       The coronavirus on the RVP is NOT COVID-19 and is NOT indicative of infection with COVID-19.           Imaging Results (Last 24 Hours)     ** No results found for the last 24 hours. **        I have reviewed the medications.  Scheduled Meds:    budesonide-formoterol 2 puff Inhalation BID - RT   cefuroxime 250 mg Oral Q12H   doxycycline 100 mg Oral Q12H   enoxaparin 40 mg Subcutaneous Q24H   gabapentin 300 mg Oral Nightly   guaiFENesin 600 mg Oral Q12H   levothyroxine 150 mcg Oral Q AM   miconazole  Topical Q12H   nicotine 1 patch Transdermal Q24H   saccharomyces boulardii 250 mg Oral BID   sodium chloride 10 mL Intravenous Q12H   sodium chloride 10 mL Intravenous Q12H   venlafaxine  mg Oral Daily     Continuous Infusions:    lactated ringers 9 mL/hr Last  Rate: 9 mL/hr (03/10/20 1110)   sodium chloride 125 mL/hr Last Rate: 125 mL/hr (03/08/20 0604)     PRN Meds:.•  acetaminophen **OR** acetaminophen **OR** acetaminophen  •  albuterol  •  bisacodyl  •  bisacodyl  •  HYDROcodone-acetaminophen  •  hydrOXYzine  •  ipratropium-albuterol  •  lactated ringers  •  magnesium hydroxide  •  [DISCONTINUED] HYDROmorphone **AND** [PENDING] HYDROmorphone **AND** naloxone **AND** [PENDING] naloxone  •  ondansetron  •  potassium chloride **OR** potassium chloride **OR** potassium chloride  •  sodium chloride  •  sodium chloride    Assessment/Plan   Assessment / Plan     Active Hospital Problems    Diagnosis  POA   • **Closed fracture of right tibial plateau [S82.141A]  Unknown   • Hypokalemia [E87.6]  Unknown   • Fracture of right tibia [S82.201A]  Yes   • Left radial fracture [S52.92XA]  Yes   • Asthma [J45.909]  Yes   • Tobacco abuse [Z72.0]  Yes   • Elevated transaminase level [R74.0]  Yes   • Chronic back pain [M54.9, G89.29]  Unknown   • Generalized anxiety disorder [F41.1]  Yes   • Acquired hypothyroidism [E03.9]  Yes      Resolved Hospital Problems   No resolved problems to display.          Brief Hospital Course to date:  Yessica Galvin is a 59 y.o. female with history of asthma, chronic pain, hypothyroidism who presents 3/7 evening after injury at home (per report was noted to fall off scooter while playing with grandkids) to RLE and left wrist. Patient sustained a right tibia fx, left radius fx. S/P ORIF of RLE and L wrist.     This patient's problems and plans were partially entered by my partner and updated as appropriate by me 03/16/20.     Right tibia fracture, left radius fracture  Hypoxia related to pain medication  --s/p ORIF of R tibia and L radial fracture  --nonweightbearing RLE, may bear weight at left elbow.    --PT/OT as tolerated/instructed  --awaiting rehab  --pain reasonably controlled      PNA   --continue Ceftin and Doxy  "PO  --nebs/mucinex/OPEP     Hypokalemia, resolved  --Replace per protocol     Asthma, tobacco abuse  -nebs/spirometer, stable     Elevated transaminase level, mild  -Acute hepatitis panel checked and negative  -Monitor     Hypothyroidism  -stable on levothyroxine, unchanged     Generalized anxiety disorder  -Stable on Effexor     Chronic back pain  -Continue home tramadol, gabapentin     DVT prophylaxis: Lovenox     CODE STATUS:   Code Status and Medical Interventions:   Ordered at: 20 2212     Level Of Support Discussed With:    Patient     Code Status:    CPR     Medical Interventions (Level of Support Prior to Arrest):    Full           Electronically signed by IDALIA Castro, 20, 15:03.      Electronically signed by Kae Mtz APRN at 20 1524     Guanaco Thakur MD at 20 1249          Orthopaedic Surgery Progress Note     LOS: 9 days   Patient Care Team:  Kota Swain MD as PCP - General    POD 7    Subjective     Interval History:   Patient Reports: Feeling better and ready for discharge to rehab at [facility] [place] tomorrow    Objective     Vital Signs:  Temp (24hrs), Av.3 °F (36.8 °C), Min:98 °F (36.7 °C), Max:98.7 °F (37.1 °C)    /76 (BP Location: Right arm, Patient Position: Lying)   Pulse 90   Temp 98.7 °F (37.1 °C) (Oral)   Resp 18   Ht 165.1 cm (65\")   Wt 92.1 kg (203 lb)   SpO2 96%   BMI 33.78 kg/m²      Labs:  Lab Results (last 24 hours)     Procedure Component Value Units Date/Time    Basic Metabolic Panel [[id number]]  (Abnormal) Collected:  20    Specimen:  Blood Updated:  20     Glucose 104 mg/dL      BUN 7 mg/dL      Creatinine 0.34 mg/dL      Sodium 138 mmol/L      Potassium 3.9 mmol/L      Chloride 100 mmol/L      CO2 28.0 mmol/L      Calcium 9.0 mg/dL      eGFR Non African Amer >150 mL/min/1.73      BUN/Creatinine Ratio 20.6     Anion Gap 10.0 mmol/L     Narrative:       GFR Normal >60  Chronic Kidney Disease <60  Kidney Failure " <15      CBC (No Diff) [[id number]]  (Abnormal) Collected:  03/16/20 0534    Specimen:  Blood Updated:  03/16/20 0649     WBC 6.81 10*3/mm3      RBC 2.80 10*6/mm3      Hemoglobin 8.4 g/dL      Hematocrit 27.2 %      MCV 97.1 fL      MCH 30.0 pg      MCHC 30.9 g/dL      RDW 14.4 %      RDW-SD 51.0 fl      MPV 9.7 fL      Platelets 487 10*3/mm3     Blood Culture - Blood, Hand, Right [[id number]] Collected:  03/11/20 0519    Specimen:  Blood from Hand, Right Updated:  03/16/20 0600     Blood Culture No growth at 5 days    Blood Culture - Blood, Arm, Right [[id number]] Collected:  03/11/20 0519    Specimen:  Blood from Arm, Right Updated:  03/16/20 0600     Blood Culture No growth at 5 days          Imaging:  XR Chest 1 View  Narrative: EXAMINATION: XR CHEST 1 VW-     INDICATION: Dyspnea, on exertion.     COMPARISON: 03/11/2020.     FINDINGS: Right hemidiaphragm is now mildly elevated. The heart and  vasculature appear normal in size. Mild diffuse right lung interstitial  changes, mostly the upper lobe appear a little increased. Left lung  remains grossly clear. No lung consolidation, effusion or pneumothorax  is seen.     Impression: Mildly increased interstitial changes at the right lung  particularly the right upper lobe. Mild new elevation of the right  hemidiaphragm, perhaps reflect some generalized right lung volume loss.  No new chest disease is seen elsewhere.      D:  03/13/2020  E:  03/13/2020     This report was finalized on 3/14/2020 12:07 AM by Dr. Nomi Brownlee MD.          Physical Exam:  Right foot has return of dorsiflexion.  Right leg incision looks good.  Left wrist incision looks good.    Assessment/Plan   Postop day 7 status post right tibia ORIF and left wrist ORIF  Continue nonweightbearing right lower extremity and left wrist x6 weeks.  She may weight-bear at the left elbow.  She is to wear the brace on the left wrist and may remove it for washing her left arm.  Do not submerge left wrist or right knee  in water for at least 3 weeks  Continue anticoagulation for DVT prophylaxis  Follow-up with me in about 4 weeks for x-rays.  Guanaco Thakur MD  20  12:49          Electronically signed by Guanaco Thakur MD at 20 1251            Discharge Summary      Kae Mtz APRN at 20 1120     Attestation signed by Karl Skinner DO at 20 1313      Attending Cosmelvin     I supervised care of the patient on day of service with direct care provided by the advanced care provider (APC).    Electronically signed by Karl Skinner DO, 20, 1:13 PM.                        [facility] Medicine Services  DISCHARGE SUMMARY    Patient Name: Yessica Galvin  : 1960  MRN: [id number]    Date of Admission: 3/7/2020  5:40 PM  Date of Discharge: 20    Primary Care Physician: Kota Swain MD    Consults     Date and Time Order Name Status Description    3/7/2020 2304 Inpatient Orthopedic Surgery Consult Completed           Hospital Course     Presenting Problem:   Tibial fracture [S82.209A]    Active Hospital Problems    Diagnosis  POA   • **Closed fracture of right tibial plateau [S82.141A]  Yes   • Left radial fracture [S52.92XA]  Yes   • Asthma [J45.909]  Yes   • Tobacco abuse [Z72.0]  Yes   • Elevated transaminase level [R74.0]  Yes   • Chronic back pain [M54.9, G89.29]  Yes   • Generalized anxiety disorder [F41.1]  Yes   • Acquired hypothyroidism [E03.9]  Yes      Resolved Hospital Problems    Diagnosis Date Resolved POA   • Hypokalemia [E87.6] 2020 No          Hospital Course:  Yessica Galvin is a 59 y.o. female with history of asthma, chronic pain, hypothyroidism who presents 3/7 evening after injury at home (per report was noted to fall off scooter while playing with grandkids) to RLE and left wrist. Patient sustained a right tibia fx, left radius fx. S/P ORIF of RLE and L wrist.        Right tibia fracture, left radius fracture  Hypoxia  related to pain medication  --s/p ORIF of R tibia and L radial fracture  --nonweightbearing RLE, may bear weight at left elbow.    --PT/OT as tolerated/instructed  --to rehab today  --pain poorly controlled today, norco increased     PNA   --completed doxy, 1 more dose of ceftin to complete treatment  --nebs/mucinex/OPEP     Hypokalemia, resolved  --Replace per protocol     Asthma, tobacco abuse  -nebs/spirometer, stable     Elevated transaminase level, mild  -Acute hepatitis panel checked and negative  -Monitor     Hypothyroidism  -stable on levothyroxine, unchanged     Generalized anxiety disorder  -Stable on Effexor     Chronic back pain  -Continue home gabapentin  --tramadol on hold while taking norco      Discharge Follow Up Recommendations for outpatient labs/diagnostics:  PCP after dc from rehab  Dr. Thakur in 4 weeks for repeat imaging    nonweightbearing right lower extremity and left wrist x6 weeks. She may weight-bear at the left elbow.  She is to wear the brace on the left wrist and may remove it for washing her left arm.  Do not submerge left wrist or right knee in water for at least 3 weeks    Day of Discharge     HPI:   Pain uncontrolled since IV meds discontinued, oral not helping at all    Review of Systems  Gen- No fevers, chills  CV- No chest pain, palpitations  Resp- +cough, no dyspnea  GI- No N/V/D, abd pain    Vital Signs:   Temp:  [98.4 °F (36.9 °C)-98.5 °F (36.9 °C)] 98.5 °F (36.9 °C)  Heart Rate:  [57-90] 66  Resp:  [16-18] 16  BP: (103-128)/(60-81) 103/60     Physical Exam:  Constitutional: No acute distress, awake, alert, tearful  HENT: NCAT, mucous membranes moist  Respiratory: Clear to auscultation bilaterally, respiratory effort normal   Cardiovascular: RRR, no murmurs, rubs, or gallops, palpable pedal pulses bilaterally  Gastrointestinal: Positive bowel sounds, soft, nontender, nondistended  Musculoskeletal: No bilateral ankle edema  Psychiatric: Appropriate affect,  cooperative  Neurologic: Oriented x 3, RLE in knee immobilizer, left wrist in splint, speech clear  Skin: No rashes      Pertinent  and/or Most Recent Results     Results from last 7 days   Lab Units 03/16/20  0534 03/15/20  0517 03/13/20 2155 03/13/20  0613 03/11/20 0519   WBC 10*3/mm3 6.81 6.62  --  6.34 8.03   HEMOGLOBIN g/dL 8.4* 8.0*  --  7.9* 8.1*   HEMATOCRIT % 27.2* 25.6*  --  24.5* 25.0*   PLATELETS 10*3/mm3 487* 411  --  246 178   SODIUM mmol/L 138 139  --  138  --    POTASSIUM mmol/L 3.9 3.3* 3.9 2.9*  --    CHLORIDE mmol/L 100 99  --  98  --    CO2 mmol/L 28.0 29.0  --  31.0*  --    BUN mg/dL 7* 7*  --  5*  --    CREATININE mg/dL 0.34* 0.32*  --  0.40*  --    GLUCOSE mg/dL 104* 106*  --  109*  --    CALCIUM mg/dL 9.0 8.9  --  8.2*  --        Brief Urine Lab Results  (Last result in the past 365 days)      Color   Clarity   Blood   Leuk Est   Nitrite   Protein   CREAT   Urine HCG        03/11/20 0621 Yellow Clear Negative Negative Negative Negative               Microbiology Results Abnormal     Procedure Component Value - Date/Time    Blood Culture - Blood, Hand, Right [[id number]] Collected:  03/11/20 0519    Lab Status:  Final result Specimen:  Blood from Hand, Right Updated:  03/16/20 0600     Blood Culture No growth at 5 days    Blood Culture - Blood, Arm, Right [[id number]] Collected:  03/11/20 0519    Lab Status:  Final result Specimen:  Blood from Arm, Right Updated:  03/16/20 0600     Blood Culture No growth at 5 days    Respiratory Panel, PCR - Swab, Nasopharynx [[id number]]  (Normal) Collected:  03/11/20 1125    Lab Status:  Final result Specimen:  Swab from Nasopharynx Updated:  03/11/20 1222     ADENOVIRUS, PCR Not Detected     Coronavirus 229E Not Detected     Coronavirus HKU1 Not Detected     Coronavirus NL63 Not Detected     Coronavirus OC43 Not Detected     Human Metapneumovirus Not Detected     Human Rhinovirus/Enterovirus Not Detected     Influenza B PCR Not Detected     Parainfluenza  Virus 1 Not Detected     Parainfluenza Virus 2 Not Detected     Parainfluenza Virus 3 Not Detected     Parainfluenza Virus 4 Not Detected     Bordetella pertussis pcr Not Detected     Influenza A H1 2009 PCR Not Detected     Chlamydophila pneumoniae PCR Not Detected     Mycoplasma pneumo by PCR Not Detected     Influenza A PCR Not Detected     Influenza A H3 Not Detected     Influenza A H1 Not Detected     RSV, PCR Not Detected     Bordetella parapertussis PCR Not Detected    Narrative:       The coronavirus on the RVP is NOT COVID-19 and is NOT indicative of infection with COVID-19.           Imaging Results (All)     Procedure Component Value Units Date/Time    XR Chest 1 View [[id number]] Collected:  03/13/20 0842     Updated:  03/14/20 0010    Narrative:       EXAMINATION: XR CHEST 1 VW-     INDICATION: Dyspnea, on exertion.     COMPARISON: 03/11/2020.     FINDINGS: Right hemidiaphragm is now mildly elevated. The heart and  vasculature appear normal in size. Mild diffuse right lung interstitial  changes, mostly the upper lobe appear a little increased. Left lung  remains grossly clear. No lung consolidation, effusion or pneumothorax  is seen.       Impression:       Mildly increased interstitial changes at the right lung  particularly the right upper lobe. Mild new elevation of the right  hemidiaphragm, perhaps reflect some generalized right lung volume loss.  No new chest disease is seen elsewhere.      D:  03/13/2020  E:  03/13/2020     This report was finalized on 3/14/2020 12:07 AM by Dr. Nomi Brownlee MD.       XR Chest 1 View [[id number]] Collected:  03/11/20 0901     Updated:  03/11/20 1653    Narrative:       EXAMINATION: XR CHEST 1 VW-03/11/2020:      INDICATION: Cough; S82.141A-Displaced bicondylar fracture of right  tibia, initial encounter for closed fracture; S52.532A-Colles' fracture  of left radius, initial encounter for closed fracture;  W19.XXXA-Unspecified fall, initial encounter; S70.01XA-Contusion  of  right hip, initial encounter; Z74.09-Other reduced mobility.      COMPARISON: 03/07/2020.     FINDINGS: Portable chest reveals cardiac and mediastinal silhouettes  within normal limits. Ill-defined opacification seen throughout the  right lung concerning for infiltrate. The left lung is clear.  Degenerative changes seen within the spine.  No pleural effusion or  pneumothorax. Pulmonary vascularity is within normal limits.           Impression:       Increased markings seen diffusely throughout the right lung  concerning for infiltrate. The left lung remains grossly clear.     D:  03/11/2020  E:  03/11/2020     This report was finalized on 3/11/2020 4:50 PM by Dr. Meli Olson MD.       FL C Arm During Surgery [[id number]] Collected:  03/10/20 1532     Updated:  03/10/20 1840    Narrative:       EXAMINATION: FL C-ARM DURING SURGERY-03/10/2020:     INDICATION: ORIF Tibial Plateau; S82.141A-Displaced bicondylar fracture  of right tibia, initial encounter for closed fracture; S52.532A-Colles'  fracture of left radius, initial encounter for closed fracture;  W19.XXXA-Unspecified fall, initial encounter; S70.01XA-Contusion of  right hip, initial encounter.      TECHNIQUE: Intraoperative fluoroscopy for improved localization and  treatment planning.     COMPARISON: NONE.     FINDINGS: Intraoperative fluoroscopy with total fluoroscopic time usage  38 seconds and a single representative image saved during right tibial  plateau ORIF and left wrist ORIF.       Impression:       Intraoperative fluoroscopy utilized during right tibial ORIF  and left wrist ORIF.     D:  03/10/2020  E:  03/10/2020            This report was finalized on 3/10/2020 6:37 PM by Dr. Jimi Ayoub.       XR Chest 1 View [[id number]] Collected:  03/07/20 2221     Updated:  03/07/20 2223    Narrative:       CR Chest 1 Vw    INDICATION:   59-year-old female who fell earlier today. Tibial fracture. Preop imaging.     COMPARISON:     11/7/2018    FINDINGS:  Single portable AP view(s) of the chest.    No visible support lines. Status post cervical thoracic fusion. Cardiac silhouette is within normal limits and the vascularity is unremarkable. The lungs are hyperinflated but clear. There is no pneumothorax.          Impression:         1. Pulmonary hyperinflation, otherwise negative chest.    Signer Name: Dutch Brown MD   Signed: 3/7/2020 10:21 PM   Workstation [facility]: [id number]    [facility]    CT Lower Extremity Right Without Contrast [[id number]] Collected:  03/07/20 2132     Updated:  03/07/20 2134    Narrative:          CT LE RT WO     Clinical history: Abnormal plain films. CT imaging for more complete evaluation.    Comparison: Conventional radiographs of the same date    TECHNIQUE:   CT of the right lower extremitywithout contrast. Coronal and sagittal reconstructions were obtained.  Radiation dose reduction techniques included automated exposure control or exposure modulation based on body size. Radiation audit for number of CT and  nuclear cardiology exams performed in the last year: 0.      Findings:  The greater trochanter of the right hip shows no evidence of fracture. There is prominent osteophyte demonstrated. The right femoral neck is intact. No pelvic bone fractures are seen.    The right knee demonstrates a comminuted fracture involving both the right and left tibial plateaus. The fractures extend to the articular surface. There is mild depression of both the medial and lateral tibial plateaus.        Impression:       Impression:     No evidence of a fracture of the right hip. The greater trochanter appears intact.    Comminuted fracture of the tibial plateau of the right knee both medially and laterally with extension to the articular surface.      Signer Name: Osmani Watson MD   Signed: 3/7/2020 9:32 PM   Workstation [facility]: [id number]    Radiology [facility]    XR Knee 1 or 2 View  Right [[id number]] Collected:  03/07/20 2009     Updated:  03/07/20 2012    Narrative:       CR Knee 1 or 2 Vws RT    INDICATION:   Fell off a scooter today with right knee pain    COMPARISON:   None available.    FINDINGS:  3 view(s) of the right knee. There is a comminuted depressed intra-articular fracture of the proximal tibia. There is fracture extends to the articular surface. Depression of fracture fragments is about 1.3 cm. This mild apex anterior angulation. There  is a large joint effusion. The comminuted fracture line extending to the midline articular surface is displaced. The bones are demineralized. No radiopaque foreign body.        Impression:         There is a comminuted intra-articular fracture of the proximal tibia with depression of the tibial plateaus and mild apex anterior angulation. There is a large joint effusion. The bones are demineralized.    Signer Name: Judith Rae MD   Signed: 3/7/2020 8:09 PM   Workstation Name: [email or web address]    [facility]    XR Hip With or Without Pelvis 2 - 3 View Right [[id number]] Collected:  03/07/20 2008     Updated:  03/07/20 2010    Narrative:       CR Hip Uni Comp Min 2 Vws RT    INDICATION:   Fell off scooter today with right hip pain posterior and lateral hip.    COMPARISON:   None.    FINDINGS:  AP and frog-leg lateral view(s) of the right hip attempted. 4 films submitted. Frontal view of the pelvis obtained only includes the lower pelvis..  The bones are demineralized. There is apparent irregularity of the cortex of the greater trochanter which  could represent a essentially nondisplaced fracture. The femoral head is located. There is arthritis at the hip joints. No femoral neck fracture is suspected. No radiopaque foreign body        Impression:         1. There is an irregular appearance to the cortex superior aspect of the greater trochanter concerning for nondisplaced fracture. Femoral head is located and no femoral neck fracture  is suspected    Signer Name: Judith Rae MD   Signed: 3/7/2020 8:08 PM   Workstation Name: [facility]    [facility]    XR Wrist 3+ View Left [[id number]] Collected:  03/07/20 2004     Updated:  03/07/20 2006    Narrative:       left wrist    INDICATION:   Left wrist pain. Patient fell off scooter today    COMPARISON:   None available.    FINDINGS:   3views of the left wrist. There is a impacted comminuted Fracture with apex volar angulation left distal radius. The bones are demineralized. There is associated soft tissue swelling. Carpal bones are aligned with the distal radial articular surface.  There is pre-existing osteoarthritis and probably an old fracture of the ulnar styloid. Fracture fragments are impacted by about a centimeter the dorsum.       Impression:       Comminuted fracture distal left radius with apex volar angulation and impaction. There is no definite extension to the articular surface. Probably an old fracture of the ulnar styloid. The bones are demineralized.    Signer Name: Judith Rae MD   Signed: 3/7/2020 8:04 PM   Workstation Name: [email or web address]    [facility]    XR Ankle 3+ View Right [[id number]] Collected:  03/07/20 2002     Updated:  03/07/20 2004    Narrative:       CR Ankle Min 3 Vws RT    INDICATION:   Fell off scooter today and right anterior ankle.    COMPARISON:   None.    FINDINGS:  3 view(s) of the right ankle.  There is a moderate-sized plantar calcaneal spur. There is tibiotalar joint arthritis. There is soft tissue swelling without evidence for acute fracture or dislocation or radiopaque foreign body bones are mildly  demineralized.       Impression:       No acute fracture or dislocation right ankle. There is some anterior soft tissue swelling.    Signer Name: Judith Rae MD   Signed: 3/7/2020 8:02 PM   Workstation Name: [facility]    Radiology [facility]            Discharge Details        Discharge Medications       New Medications      Instructions Start Date   acetaminophen 325 MG tablet  Commonly known as:  TYLENOL   650 mg, Oral, Every 4 Hours PRN      bisacodyl 5 MG EC tablet  Commonly known as:  DULCOLAX   5 mg, Oral, Daily PRN      cefuroxime 250 MG tablet  Commonly known as:  CEFTIN   250 mg, Oral, Every 12 Hours Scheduled      enoxaparin 40 MG/0.4ML solution syringe  Commonly known as:  LOVENOX   40 mg, Subcutaneous, Every 24 Hours      HYDROcodone-acetaminophen  MG per tablet  Commonly known as:  Norco   1 tablet, Oral, Every 4 Hours PRN      hydrOXYzine 25 MG tablet  Commonly known as:  ATARAX   25 mg, Oral, 3 Times Daily PRN      miconazole 2 % cream  Commonly known as:  MICOTIN   Topical, Every 12 Hours Scheduled      nicotine 21 MG/24HR patch  Commonly known as:  NICODERM CQ   1 patch, Transdermal, Every 24 Hours Scheduled   Start Date:  March 18, 2020     saccharomyces boulardii 250 MG capsule  Commonly known as:  FLORASTOR   250 mg, Oral, 2 Times Daily         Continue These Medications      Instructions Start Date   albuterol (2.5 MG/3ML) 0.083% nebulizer solution  Commonly known as:  PROVENTIL   USE ONE VIAL VIA NEBULIZER BY MOUTH EVERY 6 HOURS AS NEEDED FOR WHEEZING.      albuterol sulfate  (90 Base) MCG/ACT inhaler  Commonly known as:  Ventolin HFA   2 puffs, Inhalation, Every 6 Hours PRN      aspirin 81 MG tablet   1 tablet, Oral, Daily      gabapentin 300 MG capsule  Commonly known as:  NEURONTIN   300 mg, Oral, Every Night at Bedtime      levocetirizine 5 MG tablet  Commonly known as:  Xyzal   5 mg, Oral, Every Evening      levothyroxine 150 MCG tablet  Commonly known as:  SYNTHROID, LEVOTHROID   150 mcg, Oral, Daily      Symbicort 160-4.5 MCG/ACT inhaler  Generic drug:  budesonide-formoterol   INHALE TWO PUFFS BY MOUTH TWICE A DAY      venlafaxine  MG 24 hr capsule  Commonly known as:  Effexor XR   150 mg, Oral, Daily         Stop These Medications    diclofenac 75 MG EC  tablet  Commonly known as:  VOLTAREN     promethazine 25 MG tablet  Commonly known as:  PHENERGAN     promethazine-dextromethorphan 6.25-15 MG/5ML syrup  Commonly known as:  PROMETHAZINE-DM     sulfamethoxazole-trimethoprim 800-160 MG per tablet  Commonly known as:  Bactrim DS     traMADol 50 MG tablet  Commonly known as:  ULTRAM            Allergies   Allergen Reactions   • Codeine Nausea And Vomiting         Discharge Disposition:  Rehab Facility or Unit (DC - External)    Diet:  Hospital:  Diet Order   Procedures   • Diet Regular       Activity:  Activity Instructions     Other Activity Instructions      Activity Instructions: nonweightbearing right lower extremity and left wrist x6 weeks.  She may weight-bear at the left elbow.  She is to wear the brace on the left wrist and may remove it for washing her left arm.  Do not submerge left wrist or right knee in water for at least 3 weeks             CODE STATUS:    Code Status and Medical Interventions:   Ordered at: 03/07/20 2212     Level Of Support Discussed With:    Patient     Code Status:    CPR     Medical Interventions (Level of Support Prior to Arrest):    Full       Future Appointments   Date Time Provider Department Center   4/29/2020 11:15 AM Kota Swain MD [facility] None   6/1/2020  3:00 PM [facility] MAMM 1 [facility] BR [facility]   6/1/2020  3:30 PM [facility] DEXA 1 [facility] DX [facility]       Additional Instructions for the Follow-ups that You Need to Schedule     Discharge Follow-up with PCP   As directed       Currently Documented PCP:    Kota Swain MD    PCP Phone Number:    [phone]     Follow Up Details:  after dc from rehab         Discharge Follow-up with Specified Provider: Blaze; 1 Month   As directed      To:  Blaze    Follow Up:  1 Month    Follow Up Details:  repeat imaging               Time Spent on Discharge:  35 minutes    Electronically signed by IDALIA Castro, 03/17/20, 11:50 AM.        Electronically signed by  Gabriela Osborn, DO at 03/17/20 1313       Discharge Order (From admission, onward)     Start     Ordered    03/17/20 1133  Discharge patient  Once     Expected Discharge Date:  03/17/20    Discharge Disposition:  Rehab Facility or Unit (DC - External)    Physician of Record for Attribution - Please select from Treatment Team:  GABRIELA OSBORN [[id number]]    Review needed by CMO to determine Physician of Record:  No       Question Answer Comment   Physician of Record for Attribution - Please select from Treatment Team GABRIELA OSBORN    Review needed by CMO to determine Physician of Record No        03/17/20 1147

## 2020-03-20 ENCOUNTER — TELEPHONE (OUTPATIENT)
Dept: FAMILY MEDICINE CLINIC | Facility: CLINIC | Age: 60
End: 2020-03-20

## 2020-03-20 NOTE — TELEPHONE ENCOUNTER
SAKINA, FROM [facility], CALLED AND WANTED TO KNOW IF DR. DIAS WILL CONTINUE CARE TO FOLLOW PATIENT WITH HOME HEALTH. PATIENT WAS DISCHARGED TODAY FROM HOSPITAL. SHE HAD A FEW QUESTIONS REGARDING SOME INFORMATION ON PATIENT. PLEASE ADVISE.    SAKINA'S CALLBACK # [phone]

## 2020-03-23 ENCOUNTER — TELEPHONE (OUTPATIENT)
Dept: ORTHOPEDIC SURGERY | Facility: CLINIC | Age: 60
End: 2020-03-23

## 2020-03-23 DIAGNOSIS — S82.141A CLOSED FRACTURE OF RIGHT TIBIAL PLATEAU, INITIAL ENCOUNTER: ICD-10-CM

## 2020-03-23 RX ORDER — HYDROCODONE BITARTRATE AND ACETAMINOPHEN 10; 325 MG/1; MG/1
1 TABLET ORAL EVERY 4 HOURS PRN
Qty: 18 TABLET | Refills: 0 | OUTPATIENT
Start: 2020-03-23

## 2020-03-23 NOTE — TELEPHONE ENCOUNTER
Called patient back- let her know that Dr Thakur would not refill prescription and that she needs to take OTC medication at this time. She understood.       [place]

## 2020-03-23 NOTE — TELEPHONE ENCOUNTER
Will you give refill on Charlo? She had script from you at time of surgery and then additional script on 03/17 from LIZA Nuñez

## 2020-03-23 NOTE — TELEPHONE ENCOUNTER
PATIENT WAS IN THE HOSPITAL LAST WEEK FOR CRUSHED HER BONE IN RIGHT KNEE, HAD KNEE SURGERY. WAS TOLD TO CALL PCP FOR REFILL ON PAIN MEDICATION:   [facility]    NORCO  MG   EVERY 4 HOURS   [facility]

## 2020-03-23 NOTE — TELEPHONE ENCOUNTER
PATIENT CALLED TO REQUEST PAIN MEDICATION. SHE WAS REFERRING TO A PRESCRIPTION PRESCRIBED BY ANOTHER PHYSICIAN BUT SHE HAD SURGERY. I EXPLAINED SOMEONE ELSE PRESCRIBED IT AND GAVE HER THEIR CONTACT INFORMATION BUT SHE DID HAVE SURGERY WITH DR ADAMS. THE PATIENT SAID SHE DID NOT HAVE ENOUGH TO MAKE IT THROUGH THE WEEKEND. SHE CAN BE REACHED .[phone].

## 2020-03-25 ENCOUNTER — TELEPHONE (OUTPATIENT)
Dept: FAMILY MEDICINE CLINIC | Facility: CLINIC | Age: 60
End: 2020-03-25

## 2020-03-25 NOTE — TELEPHONE ENCOUNTER
Cuca from [facility] called and stated the pt has declined Occupational Therapy Evaluation due to insurance because she has a co-pay.

## 2020-03-26 ENCOUNTER — TELEPHONE (OUTPATIENT)
Dept: FAMILY MEDICINE CLINIC | Facility: CLINIC | Age: 60
End: 2020-03-26

## 2020-03-26 NOTE — TELEPHONE ENCOUNTER
ANDRE WITH [facility] CALLED STATING THAT THE PT  REFUSE OCCUPATIONAL THERAPY AND PHYSICAL THERAPY DUE TO HER CO PAY.    ANDRE'S CONTACT [phone]

## 2020-03-27 ENCOUNTER — TELEPHONE (OUTPATIENT)
Dept: FAMILY MEDICINE CLINIC | Facility: CLINIC | Age: 60
End: 2020-03-27

## 2020-03-27 NOTE — TELEPHONE ENCOUNTER
Pt called in requesting med refill on tramadom 50MG to be sent to the [address] [place] [address] [phone] [facility] [phone]

## 2020-04-01 ENCOUNTER — TELEPHONE (OUTPATIENT)
Dept: FAMILY MEDICINE CLINIC | Facility: CLINIC | Age: 60
End: 2020-04-01

## 2020-04-01 ENCOUNTER — TELEPHONE (OUTPATIENT)
Dept: ORTHOPEDIC SURGERY | Facility: CLINIC | Age: 60
End: 2020-04-01

## 2020-04-01 NOTE — TELEPHONE ENCOUNTER
PATIENT CALLED STATED SHE WAS TAKING BLOOD THINNER SHOTS AND WOULD LIKE TO KNOW IF SHE SHOULD CONTINUE. PATIENT CALL BE REACHED [phone].

## 2020-04-01 NOTE — TELEPHONE ENCOUNTER
PATIENT CALLED AND REQUESTED A REFILL ON RX, TRAMADOL 50 MG EVERY 4 HOURS. I COULDN'T FIND IT IN PT'S MED LIST. PLEASE ADVISE.    [facility] CONFIRMED    PATIENT CALLBACK # [phone]

## 2020-04-01 NOTE — TELEPHONE ENCOUNTER
I called patient and explained what Dr. Thakur said and she understood. She is at 3 weeks post operative saran and will continue the Lovenox injections for another week.  Sis

## 2020-04-02 DIAGNOSIS — S82.141A CLOSED FRACTURE OF RIGHT TIBIAL PLATEAU, INITIAL ENCOUNTER: Primary | ICD-10-CM

## 2020-04-02 RX ORDER — TRAMADOL HYDROCHLORIDE 50 MG/1
50 TABLET ORAL EVERY 6 HOURS PRN
Qty: 60 TABLET | Refills: 0
Start: 2020-04-02 | End: 2020-05-20 | Stop reason: SDUPTHER

## 2020-04-03 ENCOUNTER — TELEPHONE (OUTPATIENT)
Dept: FAMILY MEDICINE CLINIC | Facility: CLINIC | Age: 60
End: 2020-04-03

## 2020-04-03 RX ORDER — AZITHROMYCIN 250 MG/1
TABLET, FILM COATED ORAL
Qty: 6 TABLET | Refills: 0 | Status: SHIPPED | OUTPATIENT
Start: 2020-04-03 | End: 2020-04-06

## 2020-04-03 NOTE — TELEPHONE ENCOUNTER
Pt said for the past 3 days she has had a cough that is producing a dark green mucus.  She also has shortness of breath, a headache and feels feverish.  Pt is requesting something be called in to [facility].

## 2020-04-03 NOTE — TELEPHONE ENCOUNTER
[facility] CALLED AND STATED THAT THEY ARE REQUIRING A DIAGNOSIS CODE FOR Z-PAKS IN ORDER TO FILL. PLEASE ADVISE.    [facility] CONFIRMED    [facility] # [phone]

## 2020-04-06 ENCOUNTER — TELEPHONE (OUTPATIENT)
Dept: ORTHOPEDIC SURGERY | Facility: CLINIC | Age: 60
End: 2020-04-06

## 2020-04-06 DIAGNOSIS — J40 BRONCHITIS: Primary | ICD-10-CM

## 2020-04-06 RX ORDER — DOXYCYCLINE HYCLATE 100 MG
100 TABLET ORAL 2 TIMES DAILY
Qty: 20 TABLET | Refills: 0 | Status: SHIPPED | OUTPATIENT
Start: 2020-04-06 | End: 2020-05-20

## 2020-04-17 ENCOUNTER — OFFICE VISIT (OUTPATIENT)
Dept: ORTHOPEDIC SURGERY | Facility: CLINIC | Age: 60
End: 2020-04-17

## 2020-04-17 DIAGNOSIS — S52.592D OTHER CLOSED FRACTURE OF DISTAL END OF LEFT RADIUS WITH ROUTINE HEALING, SUBSEQUENT ENCOUNTER: ICD-10-CM

## 2020-04-17 DIAGNOSIS — S82.141D CLOSED FRACTURE OF RIGHT TIBIAL PLATEAU WITH ROUTINE HEALING, SUBSEQUENT ENCOUNTER: ICD-10-CM

## 2020-04-17 DIAGNOSIS — Z09 SURGERY FOLLOW-UP: Primary | ICD-10-CM

## 2020-04-17 DIAGNOSIS — Z47.89 ORTHOPEDIC AFTERCARE: ICD-10-CM

## 2020-04-17 PROCEDURE — 99024 POSTOP FOLLOW-UP VISIT: CPT | Performed by: ORTHOPAEDIC SURGERY

## 2020-04-17 RX ORDER — ASPIRIN 81 MG/1
TABLET, COATED ORAL
COMMUNITY
Start: 2020-03-19 | End: 2020-06-02

## 2020-04-17 NOTE — PROGRESS NOTES
Chief Complaint   Patient presents with   • Post-op     1 month status post left wrist ORIF and right knee tibial plateau ORIF 03/10/20           HPI  She is doing well.  She is follow-up right tibial plateau ORIF and left wrist ORIF from March 11      There were no vitals filed for this visit.      Physical Exam:  Her incisions look great.  She is motor sensory intact in both left upper extremity and right lower extremity.  Negative Homans sign.  Minimal swelling.      X-RAY REPORT:  Imaging Results (Last 7 Days)     Procedure Component Value Units Date/Time    XR Knee 1 or 2 View Right [[id number]] Resulted:  04/17/20 0856     Updated:  04/17/20 0857    Narrative:       Knee X-Ray  Indication: Pain    Upright AP . Lateral, views of right knee     Findings:  Healing ORIF right tibial plateau  No bony lesion  Normal soft tissues  Normal joint spaces    prior studies were available for comparison.      XR Wrist 3+ View Left [[id number]] Resulted:  04/17/20 0856     Updated:  04/17/20 0856    Narrative:       Left Wrist X-Ray  Indication: Pain  AP, Lateral, and Oblique views    Findings:  Healing of left wrist ORIF  No bony lesion  Normal soft tissues  Normal joint spaces     prior studies were available for comparison.                  ICD-10-CM ICD-9-CM   1. Surgery follow-up Z09 V67.00   2. Orthopedic aftercare Z47.89 V54.9   3. Other closed fracture of distal end of left radius with routine healing, subsequent encounter S52.592D V54.12   4. Closed fracture of right tibial plateau with routine healing, subsequent encounter S82.141D V54.16       Orders Placed This Encounter   Procedures   • XR Wrist 3+ View Left   • XR Knee 1 or 2 View Right     We will transition her to a right knee hinged knee brace to wear with ambulation.  She may start to weight-bear as tolerated.  She will follow-up in a month with x-rays right knee and left wrist.  She may discontinue the left wrist brace.  She will do home exercises for now  and at next visit perhaps go to outpatient physical therapy

## 2020-05-06 RX ORDER — ALBUTEROL SULFATE 2.5 MG/3ML
2.5 SOLUTION RESPIRATORY (INHALATION) EVERY 6 HOURS PRN
Qty: 360 ML | Refills: 1 | Status: SHIPPED | OUTPATIENT
Start: 2020-05-06

## 2020-05-06 RX ORDER — ALBUTEROL SULFATE 2.5 MG/3ML
SOLUTION RESPIRATORY (INHALATION)
Status: CANCELLED | OUTPATIENT
Start: 2020-05-06

## 2020-05-06 NOTE — TELEPHONE ENCOUNTER
PT REQUESTING REFILL ON  albuterol (PROVENTIL) (2.5 MG/3ML) 0.083% nebulizer solution    PHARMACY CONFIRMED    PLEASE ADVISE

## 2020-05-11 RX ORDER — ALBUTEROL SULFATE 90 UG/1
2 AEROSOL, METERED RESPIRATORY (INHALATION) EVERY 6 HOURS PRN
Qty: 1 INHALER | Refills: 1 | Status: SHIPPED | OUTPATIENT
Start: 2020-05-11 | End: 2020-06-23 | Stop reason: SDUPTHER

## 2020-05-11 RX ORDER — ALBUTEROL SULFATE 90 UG/1
2 AEROSOL, METERED RESPIRATORY (INHALATION) EVERY 6 HOURS PRN
Qty: 1 INHALER | Refills: 5 | Status: CANCELLED | OUTPATIENT
Start: 2020-05-11

## 2020-05-11 NOTE — TELEPHONE ENCOUNTER
PT CALLED REQUESTING A REFILL FOR:  albuterol (VENTOLIN HFA) 108 (90 Base) MCG/ACT inhaler    [facility] [address] [place] [address] [phone] [facility] [phone] FX

## 2020-05-18 ENCOUNTER — OFFICE VISIT (OUTPATIENT)
Dept: ORTHOPEDIC SURGERY | Facility: CLINIC | Age: 60
End: 2020-05-18

## 2020-05-18 DIAGNOSIS — S52.592D OTHER CLOSED FRACTURE OF DISTAL END OF LEFT RADIUS WITH ROUTINE HEALING, SUBSEQUENT ENCOUNTER: ICD-10-CM

## 2020-05-18 DIAGNOSIS — S82.141D CLOSED FRACTURE OF RIGHT TIBIAL PLATEAU WITH ROUTINE HEALING, SUBSEQUENT ENCOUNTER: ICD-10-CM

## 2020-05-18 DIAGNOSIS — Z98.890 S/P ORIF (OPEN REDUCTION INTERNAL FIXATION) FRACTURE: Primary | ICD-10-CM

## 2020-05-18 DIAGNOSIS — Z09 SURGERY FOLLOW-UP: ICD-10-CM

## 2020-05-18 DIAGNOSIS — Z87.81 S/P ORIF (OPEN REDUCTION INTERNAL FIXATION) FRACTURE: Primary | ICD-10-CM

## 2020-05-18 PROCEDURE — 99024 POSTOP FOLLOW-UP VISIT: CPT | Performed by: ORTHOPAEDIC SURGERY

## 2020-05-18 NOTE — PROGRESS NOTES
Chief Complaint   Patient presents with   • Right Knee - Follow-up     Tibial Plateau Open Reduction Internal Fixation Right - Right Knee Arthroscopy 3/10/20       • Left Wrist - Follow-up     status post left wrist ORIF 03/10/20           HPI    She is doing well.  She says her left wrist is great.  Her right knee is still little sore.  She had some arthritis in the knee before the tibial plateau fracture.  The wrist pain is 0 the knee pain is 8 at times.  She would like a larger hinged knee brace.    There were no vitals filed for this visit.      Physical Exam:  Left wrist has full motion.  Full strength.  Right knee gets full extension.  Flexes 210 degrees.  Trace effusion.  Negative Homans sign.      X-RAY REPORT:  Imaging Results (Last 7 Days)     Procedure Component Value Units Date/Time    XR Wrist 3+ View Left [[id number]] Resulted:  05/18/20 1524     Updated:  05/18/20 1524    Narrative:       Left Wrist X-Ray  Indication: Pain  AP, Lateral, and Oblique views    Findings:  Healing left distal radius ORIF  No bony lesion  Normal soft tissues  Normal joint spaces    prior studies were available for comparison.      XR Knee 1 or 2 View Right [[id number]] Resulted:  05/18/20 1523     Updated:  05/18/20 1524    Narrative:       Knee X-Ray  Indication: Pain    Upright AP  Lateral,views of right knee     Findings:  Healing tibia ORIF  No bony lesion  Normal soft tissues  Normal joint spaces    prior studies were available for comparison.                  ICD-10-CM ICD-9-CM   1. S/P ORIF (open reduction internal fixation) fracture Z98.890 V45.89    Z87.81 V15.51   2. Surgery follow-up Z09 V67.00   3. Other closed fracture of distal end of left radius with routine healing, subsequent encounter S52.592D V54.12   4. Closed fracture of right tibial plateau with routine healing, subsequent encounter S82.141D V54.16       Orders Placed This Encounter   Procedures   • XR Knee 1 or 2 View Right   • XR Wrist 3+ View Left      I offered her physical therapy for the knee but she says she would prefer to do a home exercise program.  She will follow-up in 6 weeks with x-rays.  I ordered a larger brace for her knee.

## 2020-05-20 ENCOUNTER — OFFICE VISIT (OUTPATIENT)
Dept: FAMILY MEDICINE CLINIC | Facility: CLINIC | Age: 60
End: 2020-05-20

## 2020-05-20 VITALS
WEIGHT: 180 LBS | HEART RATE: 91 BPM | SYSTOLIC BLOOD PRESSURE: 140 MMHG | RESPIRATION RATE: 18 BRPM | TEMPERATURE: 98.6 F | DIASTOLIC BLOOD PRESSURE: 86 MMHG | HEIGHT: 65 IN | BODY MASS INDEX: 29.99 KG/M2 | OXYGEN SATURATION: 99 %

## 2020-05-20 DIAGNOSIS — J45.40 MODERATE PERSISTENT ASTHMA WITHOUT COMPLICATION: ICD-10-CM

## 2020-05-20 DIAGNOSIS — S82.141A CLOSED FRACTURE OF RIGHT TIBIAL PLATEAU, INITIAL ENCOUNTER: ICD-10-CM

## 2020-05-20 DIAGNOSIS — F41.1 GENERALIZED ANXIETY DISORDER: ICD-10-CM

## 2020-05-20 DIAGNOSIS — M51.9 LUMBAR DISC DISEASE: Primary | ICD-10-CM

## 2020-05-20 PROCEDURE — 99214 OFFICE O/P EST MOD 30 MIN: CPT | Performed by: FAMILY MEDICINE

## 2020-05-20 RX ORDER — GABAPENTIN 300 MG/1
300 CAPSULE ORAL
Qty: 30 CAPSULE | Refills: 5 | Status: CANCELLED
Start: 2020-05-20

## 2020-05-20 RX ORDER — DICLOFENAC SODIUM 75 MG/1
75 TABLET, DELAYED RELEASE ORAL 2 TIMES DAILY
Qty: 60 TABLET | Refills: 2 | Status: SHIPPED | OUTPATIENT
Start: 2020-05-20 | End: 2020-06-02

## 2020-05-20 RX ORDER — GABAPENTIN 600 MG/1
600 TABLET ORAL 2 TIMES DAILY
Qty: 60 TABLET | Refills: 2 | Status: SHIPPED | OUTPATIENT
Start: 2020-05-20 | End: 2020-08-20 | Stop reason: SDUPTHER

## 2020-05-20 RX ORDER — VENLAFAXINE HYDROCHLORIDE 150 MG/1
150 CAPSULE, EXTENDED RELEASE ORAL DAILY
Qty: 30 CAPSULE | Refills: 3 | Status: SHIPPED | OUTPATIENT
Start: 2020-05-20 | End: 2020-08-20 | Stop reason: SDUPTHER

## 2020-05-20 RX ORDER — TRAMADOL HYDROCHLORIDE 50 MG/1
100 TABLET ORAL EVERY 6 HOURS PRN
Qty: 120 TABLET | Refills: 1 | Status: SHIPPED | OUTPATIENT
Start: 2020-05-20 | End: 2020-08-04 | Stop reason: SDUPTHER

## 2020-05-20 RX ORDER — CYCLOBENZAPRINE HCL 10 MG
10 TABLET ORAL 3 TIMES DAILY PRN
Qty: 90 TABLET | Refills: 2 | Status: SHIPPED | OUTPATIENT
Start: 2020-05-20 | End: 2020-09-14

## 2020-05-20 RX ORDER — PROMETHAZINE HYDROCHLORIDE 25 MG/1
25 TABLET ORAL EVERY 6 HOURS PRN
Qty: 20 TABLET | Refills: 1 | Status: SHIPPED | OUTPATIENT
Start: 2020-05-20 | End: 2020-08-12

## 2020-05-20 RX ORDER — TRAMADOL HYDROCHLORIDE 50 MG/1
50 TABLET ORAL EVERY 6 HOURS PRN
Qty: 60 TABLET | Refills: 0 | Status: CANCELLED
Start: 2020-05-20

## 2020-05-20 NOTE — PROGRESS NOTES
Subjective   Yessica Galvin is a 59 y.o. female.     Yessica continues to have some pain in her right lower extremity her left wrist is doing well.    Anxiety   Presents for follow-up visit. Symptoms include nervous/anxious behavior and shortness of breath. Patient reports no chest pain, confusion, depressed mood, excessive worry, nausea or palpitations. The quality of sleep is fair. Nighttime awakenings: occasional.       COPD   She complains of cough, shortness of breath and wheezing. There is no sputum production. This is a chronic problem. The current episode started more than 1 year ago. The problem occurs constantly. The problem has been unchanged. The cough is non-productive. Associated symptoms include headaches, myalgias and postnasal drip. Pertinent negatives include no chest pain or fever. Her symptoms are aggravated by emotional stress, pollen and exercise. Her symptoms are alleviated by beta-agonist. She reports significant improvement on treatment. Her symptoms are not alleviated by steroid inhaler and beta-agonist. Risk factors for lung disease include smoking/tobacco exposure. Her past medical history is significant for COPD.   Leg Pain    The incident occurred more than 1 week ago (Walking with a walker sometimes a cane at home). The incident occurred at home. The injury mechanism was a twisting injury. The pain is present in the right leg. The quality of the pain is described as aching. The pain is moderate. The pain has been fluctuating since onset. Pertinent negatives include no numbness or tingling. Treatments tried: Gabapentin and as needed tramadol. The treatment provided moderate relief.        The following portions of the patient's history were reviewed and updated as appropriate: allergies, current medications, past social history and problem list.    Review of Systems   Constitutional: Negative for chills and fever.   HENT: Positive for postnasal drip. Negative for congestion.   "  Respiratory: Positive for cough, shortness of breath and wheezing. Negative for sputum production.    Cardiovascular: Negative for chest pain and palpitations.   Gastrointestinal: Negative for diarrhea, nausea and vomiting.   Genitourinary: Negative for dysuria and hematuria.   Musculoskeletal: Positive for arthralgias, gait problem and myalgias.   Neurological: Positive for headaches. Negative for tingling and numbness.   Psychiatric/Behavioral: Negative for confusion. The patient is nervous/anxious.        Objective   /86   Pulse 91   Temp 98.6 °F (37 °C)   Resp 18   Ht 165.1 cm (65\")   Wt 81.6 kg (180 lb)   SpO2 99%   BMI 29.95 kg/m²   Physical Exam   Constitutional: She is oriented to person, place, and time. She appears well-developed and well-nourished. She is cooperative.   HENT:   Head: Normocephalic.   Right Ear: External ear normal.   Left Ear: External ear normal.   Nose: Nose normal.   Mouth/Throat: Oropharynx is clear and moist.   Eyes: Pupils are equal, round, and reactive to light. Conjunctivae are normal. No scleral icterus.   Neck: Neck supple. No JVD present. Carotid bruit is not present. No thyromegaly present.   Cardiovascular: Normal rate, regular rhythm and normal heart sounds.   Pulmonary/Chest: Effort normal. She has wheezes.   Abdominal: There is no hepatosplenomegaly.   Musculoskeletal: Normal range of motion. She exhibits edema.   Some edema at the right ankle   Neurological: She is alert and oriented to person, place, and time.   No focal deficits no lateralizing signs   Skin: Skin is warm and dry. No rash noted.   Psychiatric: She has a normal mood and affect. Cognition and memory are normal.   Nursing note and vitals reviewed.      Assessment/Plan   Problem List Items Addressed This Visit        Active Problems    Generalized anxiety disorder    Relevant Medications    venlafaxine XR (Effexor XR) 150 MG 24 hr capsule    Lumbar disc disease - Primary    Relevant " Medications    cyclobenzaprine (FLEXERIL) 10 MG tablet    diclofenac (VOLTAREN) 75 MG EC tablet    gabapentin (Neurontin) 600 MG tablet    Asthma    Closed fracture of right tibial plateau    Relevant Medications    cyclobenzaprine (FLEXERIL) 10 MG tablet    gabapentin (Neurontin) 600 MG tablet    traMADol (ULTRAM) 50 MG tablet          New Medications Ordered This Visit   Medications   • venlafaxine XR (Effexor XR) 150 MG 24 hr capsule     Sig: Take 1 capsule by mouth Daily.     Dispense:  30 capsule     Refill:  3   • promethazine (PHENERGAN) 25 MG tablet     Sig: Take 1 tablet by mouth Every 6 (Six) Hours As Needed for Nausea or Vomiting.     Dispense:  20 tablet     Refill:  1   • cyclobenzaprine (FLEXERIL) 10 MG tablet     Sig: Take 1 tablet by mouth 3 (Three) Times a Day As Needed for Muscle Spasms.     Dispense:  90 tablet     Refill:  2   • diclofenac (VOLTAREN) 75 MG EC tablet     Sig: Take 1 tablet by mouth 2 (Two) Times a Day.     Dispense:  60 tablet     Refill:  2   • gabapentin (Neurontin) 600 MG tablet     Sig: Take 1 tablet by mouth 2 (Two) Times a Day.     Dispense:  60 tablet     Refill:  2   • traMADol (ULTRAM) 50 MG tablet     Sig: Take 2 tablets by mouth Every 6 (Six) Hours As Needed for Moderate Pain .     Dispense:  120 tablet     Refill:  1

## 2020-06-01 ENCOUNTER — HOSPITAL ENCOUNTER (OUTPATIENT)
Dept: BONE DENSITY | Facility: HOSPITAL | Age: 60
Discharge: HOME | End: 2020-06-01
Admitting: FAMILY MEDICINE

## 2020-06-01 ENCOUNTER — HOSPITAL ENCOUNTER (OUTPATIENT)
Dept: MAMMOGRAPHY | Facility: HOSPITAL | Age: 60
Discharge: HOME | End: 2020-06-01
Admitting: FAMILY MEDICINE

## 2020-06-01 DIAGNOSIS — Z12.39 BREAST CANCER SCREENING: ICD-10-CM

## 2020-06-01 DIAGNOSIS — N95.9 POST MENOPAUSAL PROBLEMS: ICD-10-CM

## 2020-06-01 PROCEDURE — 77063 BREAST TOMOSYNTHESIS BI: CPT

## 2020-06-01 PROCEDURE — 77063 BREAST TOMOSYNTHESIS BI: CPT | Performed by: RADIOLOGY

## 2020-06-01 PROCEDURE — 77080 DXA BONE DENSITY AXIAL: CPT

## 2020-06-01 PROCEDURE — 77067 SCR MAMMO BI INCL CAD: CPT

## 2020-06-01 PROCEDURE — 77067 SCR MAMMO BI INCL CAD: CPT | Performed by: RADIOLOGY

## 2020-06-01 NOTE — TELEPHONE ENCOUNTER
PATIENT NEEDS PA FOR SYMBICORT, SAID RX HAS IT READY BUT WON'T RELEASE IT WITHOUT PA. SAID SHE IS OUT

## 2020-06-02 ENCOUNTER — OFFICE VISIT (OUTPATIENT)
Dept: ORTHOPEDIC SURGERY | Facility: CLINIC | Age: 60
End: 2020-06-02

## 2020-06-02 ENCOUNTER — TELEPHONE (OUTPATIENT)
Dept: ORTHOPEDIC SURGERY | Facility: CLINIC | Age: 60
End: 2020-06-02

## 2020-06-02 ENCOUNTER — OFFICE VISIT (OUTPATIENT)
Dept: FAMILY MEDICINE CLINIC | Facility: CLINIC | Age: 60
End: 2020-06-02

## 2020-06-02 VITALS
HEART RATE: 82 BPM | WEIGHT: 180 LBS | HEIGHT: 65 IN | BODY MASS INDEX: 29.99 KG/M2 | DIASTOLIC BLOOD PRESSURE: 80 MMHG | SYSTOLIC BLOOD PRESSURE: 134 MMHG | OXYGEN SATURATION: 98 % | RESPIRATION RATE: 18 BRPM | TEMPERATURE: 98.5 F

## 2020-06-02 DIAGNOSIS — I82.401 ACUTE DEEP VEIN THROMBOSIS (DVT) OF RIGHT LOWER EXTREMITY, UNSPECIFIED VEIN (HCC): Primary | ICD-10-CM

## 2020-06-02 DIAGNOSIS — M79.661 PAIN AND SWELLING OF RIGHT LOWER LEG: Primary | ICD-10-CM

## 2020-06-02 DIAGNOSIS — M79.89 PAIN AND SWELLING OF RIGHT LOWER LEG: Primary | ICD-10-CM

## 2020-06-02 DIAGNOSIS — L03.115 CELLULITIS OF RIGHT LOWER EXTREMITY: ICD-10-CM

## 2020-06-02 PROCEDURE — 99213 OFFICE O/P EST LOW 20 MIN: CPT | Performed by: FAMILY MEDICINE

## 2020-06-02 PROCEDURE — 99024 POSTOP FOLLOW-UP VISIT: CPT | Performed by: PHYSICIAN ASSISTANT

## 2020-06-02 RX ORDER — BUDESONIDE AND FORMOTEROL FUMARATE DIHYDRATE 160; 4.5 UG/1; UG/1
AEROSOL RESPIRATORY (INHALATION)
Qty: 1 INHALER | Refills: 5 | Status: SHIPPED | OUTPATIENT
Start: 2020-06-02 | End: 2020-06-03 | Stop reason: CLARIF

## 2020-06-02 RX ORDER — CEPHALEXIN 500 MG/1
500 CAPSULE ORAL 3 TIMES DAILY
Qty: 21 CAPSULE | Refills: 0 | Status: SHIPPED | OUTPATIENT
Start: 2020-06-02 | End: 2020-06-09

## 2020-06-02 NOTE — TELEPHONE ENCOUNTER
PATIENT CALLED ABOUT RIGHT LEG AND ANKLE. SHE SAID SINCE HER SURGERY IT HAS DEVELOPED AND IT IS WORSENING. SHE SAID IT HAS GOTTEN WORSE SINCE SHE WAS HERE. HER DOG SCRATCHED HER AND INSTEAD OF IT BLEEDING SHE SAID THERE WAS A CLEAR/WHITE LIQUID THAT CAME OUT. THE AREA IS WARM TO THE TOUCH, SWOLLEN, AND RED. THE AREA NOW GOES FROM MID TIBIA TO HER TOES. SHE SAID SHE HAS BEEN PUTTING ICE ON IT TO KEEP IT COOL BUT AFTER A WHILE IT FEELS LIKE ITS BURNING. PATIENT CAN BE REACHED [phone].

## 2020-06-02 NOTE — PROGRESS NOTES
[facility] Note        Subjective     Post-op (2 weeks follow up; 12 weeks status post KNEE ARTHROSCOPY RIGHT 3-)       CUCO Galvin is a 59 y.o. female.  Patient presents for evaluation of her right lower leg.  She stated since her last visit with Dr. Thakur on 5/18/2020 she has noted increasing redness, warmth and pain throughout the foot, ankle and senior living up the right lower leg.  She also reports that 3 days ago her dog scratched her on the medial aspect of the right lower leg.  Patient denies any fever, chills, nausea, vomiting.    Patient is currently using a walker to assist with ambulation.  She wears a hinged knee brace on the right knee.  She takes tramadol and Tylenol for pain control.        Objective      Physical Exam  There were no vitals taken for this visit.    There is no height or weight on file to calculate BMI.        Ortho Exam  Right lower extremity  Positive pain, redness, slight warmth and soft tissue swelling noted.  Scratch marks noted distal medial aspect of the tibia.  Vascular: 1+ dorsalis pedis  Calf: Proximal aspect nontender, distal aspect increased discomfort/tenderness with compressible compartments throughout.  Motor/sensory: Grossly intact L2-S1.    Knee motion: 0-120 and pain-free.      Imaging Reviewed:  No new imaging today.      Assessment:  1. Pain and swelling of right lower leg    2. Cellulitis of right lower extremity        Plan:  1. Pain and swelling right lower leg in a patient with a history of right tibial plateau ORIF March 2020--we will send for duplex venous Doppler today to rule out DVT.  2. Cellulitis right lower leg--placed on 7-day course of Keflex.  3. Discussed the importance of compression socks, elevation to help with inflammation/swelling control.  4. May continue with use of walker to assist with ambulation.  5. Follow-up on Monday with Dr. Thakur to make sure she is improving.  Reviewed signs and symptoms of  worsening infection with return precautions given.  If Doppler exam is positive for DVT patient will be referred to her PCP for management.  6. Questions and concerns answered.    Patient was also examined by Dr. Burger and he agrees with the above assessment and plan.      Nancy Gross PA-C  06/02/20  14:13

## 2020-06-03 ENCOUNTER — TELEPHONE (OUTPATIENT)
Dept: FAMILY MEDICINE CLINIC | Facility: CLINIC | Age: 60
End: 2020-06-03

## 2020-06-03 DIAGNOSIS — M79.661 PAIN AND SWELLING OF RIGHT LOWER LEG: ICD-10-CM

## 2020-06-03 DIAGNOSIS — M79.89 PAIN AND SWELLING OF RIGHT LOWER LEG: ICD-10-CM

## 2020-06-03 NOTE — TELEPHONE ENCOUNTER
PATIENT CALLED STATING HER PRESCRIPTION FOR   budesonide-formoterol (Symbicort) 160-4.5 MCG/ACT inhaler    CANNOT BE REFILLED  APPROVED BY [facility] UNTIL A REFERRAL HAS BEEN  RECEIVED FROM PROVIDER    PATIENT CALLBACK  [phone]

## 2020-06-03 NOTE — PROGRESS NOTES
"Subjective   Yessica Galvin is a 59 y.o. female.     Diagnosed with a DVT in rle the injured leg    Leg Swelling   This is a new problem. The current episode started in the past 7 days. The problem occurs constantly. Associated symptoms include arthralgias. Pertinent negatives include no chest pain, chills, congestion, coughing, fever, numbness or sore throat. The symptoms are aggravated by standing and walking. She has tried rest for the symptoms. The treatment provided mild relief.        The following portions of the patient's history were reviewed and updated as appropriate: allergies, current medications, past social history and problem list.    Review of Systems   Constitutional: Negative for chills and fever.   HENT: Negative for congestion and sore throat.    Respiratory: Negative for cough and shortness of breath.    Cardiovascular: Positive for leg swelling. Negative for chest pain.   Musculoskeletal: Positive for arthralgias and gait problem.   Skin: Positive for color change. Negative for wound.   Neurological: Negative for numbness.       Objective   /80   Pulse 82   Temp 98.5 °F (36.9 °C)   Resp 18   Ht 165.1 cm (65\")   Wt 81.6 kg (180 lb)   SpO2 98%   BMI 29.95 kg/m²   Physical Exam   Constitutional: She appears well-developed and well-nourished.   HENT:   Head: Normocephalic and atraumatic.   Eyes: Pupils are equal, round, and reactive to light. Conjunctivae are normal.   Neck: Neck supple.   Pulmonary/Chest: Effort normal and breath sounds normal.   Musculoskeletal: She exhibits edema and tenderness.   Palpable cords rle tight edema at ankle and redness   Nursing note and vitals reviewed.      Assessment/Plan   Problem List Items Addressed This Visit     None      Visit Diagnoses     Acute deep vein thrombosis (DVT) of right lower extremity, unspecified vein (CMS/HCC)    -  Primary    Relevant Medications    apixaban (ELIQUIS) 5 MG tablet tablet          New Medications Ordered This " Visit   Medications   • apixaban (ELIQUIS) 5 MG tablet tablet     Sig: Take 1 tablet by mouth Every 12 (Twelve) Hours.     Dispense:  60 tablet     Refill:  0     Keep ortho appt next week rtc if any worsening or soa avoid wearing brace at home

## 2020-06-08 ENCOUNTER — OFFICE VISIT (OUTPATIENT)
Dept: ORTHOPEDIC SURGERY | Facility: CLINIC | Age: 60
End: 2020-06-08

## 2020-06-08 DIAGNOSIS — Z87.81 S/P ORIF (OPEN REDUCTION INTERNAL FIXATION) FRACTURE: Primary | ICD-10-CM

## 2020-06-08 DIAGNOSIS — S52.592D OTHER CLOSED FRACTURE OF DISTAL END OF LEFT RADIUS WITH ROUTINE HEALING, SUBSEQUENT ENCOUNTER: ICD-10-CM

## 2020-06-08 DIAGNOSIS — I82.431 ACUTE DEEP VEIN THROMBOSIS (DVT) OF POPLITEAL VEIN OF RIGHT LOWER EXTREMITY (HCC): ICD-10-CM

## 2020-06-08 DIAGNOSIS — Z98.890 S/P ORIF (OPEN REDUCTION INTERNAL FIXATION) FRACTURE: Primary | ICD-10-CM

## 2020-06-08 PROCEDURE — 99024 POSTOP FOLLOW-UP VISIT: CPT | Performed by: ORTHOPAEDIC SURGERY

## 2020-06-08 NOTE — PROGRESS NOTES
[facility] [place] [facility] [place]    Subjective     Chief Complaint   Patient presents with   • Follow-up     1 week follow up; 3 months status post KNEE ARTHROSCOPY RIGHT 3-)        CUOC Galvin is a 59 y.o. female.  ***    Past Medical History:   Diagnosis Date   • Acute bronchitis    • Acute sinusitis    • Asthma    • Asthma with acute exacerbation    • Asthmatic bronchitis    • Disc degeneration, lumbar    • Disc degeneration, lumbar    • History of mammogram    • Hypothyroidism    • Lower back pain    • Plantar fasciitis    • Restless legs syndrome       Past Surgical History:   Procedure Laterality Date   • HYSTERECTOMY     • KNEE ARTHROSCOPY Right 3/10/2020    Procedure: KNEE ARTHROSCOPY RIGHT;  Surgeon: Guanaco Thakur MD;  Location:  [facility];  Service: Orthopedics;  Laterality: Right;   • NECK SURGERY     • OOPHORECTOMY     • ORIF WRIST FRACTURE Left 3/10/2020    Procedure: WRIST OPEN REDUCTION INTERNAL FIXATION LEFT;  Surgeon: Guanaco Thakur MD;  Location:  [facility];  Service: Orthopedics;  Laterality: Left;   • TIBIAL PLATEAU OPEN REDUCTION INTERNAL FIXATION Right 3/10/2020    Procedure: TIBIAL PLATEAU OPEN REDUCTION INTERNAL FIXATION RIGHT;  Surgeon: Guanaco Thakur MD;  Location:  [facility];  Service: Orthopedics;  Laterality: Right;      Family History   Problem Relation Age of Onset   • Heart disease Mother    • Diabetes Mother    • Alzheimer's disease Father    • Asthma Father    • Breast cancer Neg Hx    • Ovarian cancer Neg Hx      Social History     Socioeconomic History   • Marital status:      Spouse name: Not on file   • Number of children: Not on file   • Years of education: Not on file   • Highest education level: Not on file   Tobacco Use   • Smoking status: Current Every Day Smoker     Types: Cigarettes     Start date: 3/7/2000   • Smokeless tobacco: Never Used   Substance and Sexual Activity   • Alcohol use: No   • Drug use: No   • Sexual activity:  Defer      Current Outpatient Medications on File Prior to Visit   Medication Sig Dispense Refill   • acetaminophen (TYLENOL) 325 MG tablet Take 2 tablets by mouth Every 4 (Four) Hours As Needed for Mild Pain .     • albuterol (PROVENTIL) (2.5 MG/3ML) 0.083% nebulizer solution Take 2.5 mg by nebulization Every 6 (Six) Hours As Needed for Wheezing. 360 mL 1   • albuterol sulfate HFA (Ventolin HFA) 108 (90 Base) MCG/ACT inhaler Inhale 2 puffs Every 6 (Six) Hours As Needed for Wheezing. 1 inhaler 1   • apixaban (ELIQUIS) 5 MG tablet tablet Take 1 tablet by mouth Every 12 (Twelve) Hours. 60 tablet 0   • bisacodyl (DULCOLAX) 5 MG EC tablet Take 1 tablet by mouth Daily As Needed for Constipation.     • cephalexin (KEFLEX) 500 MG capsule Take 1 capsule by mouth 3 (Three) Times a Day for 7 days. 21 capsule 0   • cyclobenzaprine (FLEXERIL) 10 MG tablet Take 1 tablet by mouth 3 (Three) Times a Day As Needed for Muscle Spasms. 90 tablet 2   • fluticasone-salmeterol (Advair Diskus) 250-50 MCG/DOSE DISKUS Inhale 1 puff 2 (Two) Times a Day. 60 each 5   • gabapentin (Neurontin) 600 MG tablet Take 1 tablet by mouth 2 (Two) Times a Day. 60 tablet 2   • hydrOXYzine (ATARAX) 25 MG tablet Take 1 tablet by mouth 3 (Three) Times a Day As Needed for Itching.     • levocetirizine (XYZAL) 5 MG tablet Take 1 tablet by mouth Every Evening. 30 tablet 1   • levothyroxine (SYNTHROID, LEVOTHROID) 150 MCG tablet Take 1 tablet by mouth Daily. 90 tablet 3   • miconazole (MICOTIN) 2 % cream Apply  topically to the appropriate area as directed Every 12 (Twelve) Hours.     • nicotine (NICODERM CQ) 21 MG/24HR patch Place 1 patch on the skin as directed by provider Daily.     • promethazine (PHENERGAN) 25 MG tablet Take 1 tablet by mouth Every 6 (Six) Hours As Needed for Nausea or Vomiting. 20 tablet 1   • saccharomyces boulardii (FLORASTOR) 250 MG capsule Take 1 capsule by mouth 2 (Two) Times a Day.     • traMADol (ULTRAM) 50 MG tablet Take 2 tablets by  "mouth Every 6 (Six) Hours As Needed for Moderate Pain . 120 tablet 1   • venlafaxine XR (Effexor XR) 150 MG 24 hr capsule Take 1 capsule by mouth Daily. 30 capsule 3     No current facility-administered medications on file prior to visit.       Allergies   Allergen Reactions   • Codeine Nausea And Vomiting        The following portions of the patient's history were reviewed and updated as appropriate: {history reviewed:20406::\"allergies\",\"current medications\",\"past family history\",\"past medical history\",\"past social history\",\"past surgical history\",\"problem list\"}.    Review of Systems   Skin: Positive for color change.   Hematological:        Blood clots   Psychiatric/Behavioral: Positive for self-injury.        Objective      Physical Exam  There were no vitals taken for this visit.    There is no height or weight on file to calculate BMI.    GENERAL APPEARANCE: awake, alert & oriented x 3, in no acute distress and well developed, well nourished  PSYCH: normal mood and affect  LUNGS:  breathing nonlabored, no wheezing  EYES: sclera anicteric, pupils equal  CARDIOVASCULAR: palpable pulses dorsalis pedis, palpable posterior tibial bilaterally. Capillary refill less than 2 seconds  INTEGUMENTARY: skin intact, no clubbing, cyanosis  NEUROLOGIC:  Normal Sensation and reflexes       Ortho Exam  ***    Imaging/Studies  Imaging Results (Last 7 Days)     ** No results found for the last 168 hours. **          Assessment/Plan      No diagnosis found.    No orders of the defined types were placed in this encounter.       ***    Medical Decision Making  Management Options : {[facility] - Management Options:33759}  Data/Risk: {[facility] - Data/Risk:34038}    Cherelle Rashid MA  06/08/20  09:41         EMR Dragon/Transcription disclaimer:  Much of this encounter note is an electronic transcription of spoken language to printed text. Electronic transcription of spoken language may permit erroneous, or at times, nonsensical words or phrases " to be inadvertently transcribed. Although I have reviewed the note for such errors, some may still exist.

## 2020-06-08 NOTE — PROGRESS NOTES
Chief Complaint   Patient presents with   • Follow-up     1 week follow up; 3 months status post KNEE ARTHROSCOPY RIGHT 3-) and status post left wrist ORIF 03/10/20           HPI  She is follow-up her right tibial plateau ORIF and left wrist ORIF.  Last week she was diagnosed with cellulitis and DVT.      There were no vitals filed for this visit.      Physical Exam:  She has less erythema.  She does have some pitting edema in her right foot.  Her incisions have healed well.  Her right knee and left knee range of motion are great.      X-RAY REPORT:  Imaging Results (Last 7 Days)     Procedure Component Value Units Date/Time    XR Knee 3 View Right [[id number]] Resulted:  06/08/20 1001     Updated:  06/08/20 1001    Narrative:       Knee X-Ray  Indication: Pain    Upright AP Lateral, and Sunrise views of right knee     Findings:  Healing right tibial plateau ORIF  No bony lesion  Normal soft tissues  Normal joint spaces    prior studies were available for comparison.                  ICD-10-CM ICD-9-CM   1. S/P ORIF (open reduction internal fixation) fracture Z98.890 V45.89    Z87.81 V15.51   2. Other closed fracture of distal end of left radius with routine healing, subsequent encounter S52.592D V54.12   3. Acute deep vein thrombosis (DVT) of popliteal vein of right lower extremity (CMS/[facility]) I82.431 453.41       Orders Placed This Encounter   Procedures   • XR Wrist 3+ View Left   • XR Knee 3 View Right     She may weight-bear as tolerated.  She will complete her course of antibiotics and she is on Eliquis for the DVT.  She will follow-up in 1 month.  I offered her physical therapy but she does not want to go.  She says she is doing too well.

## 2020-06-22 RX ORDER — ALBUTEROL SULFATE 90 UG/1
AEROSOL, METERED RESPIRATORY (INHALATION)
Qty: 18 G | Refills: 0 | OUTPATIENT
Start: 2020-06-22

## 2020-06-23 ENCOUNTER — HOSPITAL ENCOUNTER (OUTPATIENT)
Dept: MAMMOGRAPHY | Facility: HOSPITAL | Age: 60
Discharge: HOME | End: 2020-06-23
Admitting: RADIOLOGY

## 2020-06-23 ENCOUNTER — OFFICE VISIT (OUTPATIENT)
Dept: FAMILY MEDICINE CLINIC | Facility: CLINIC | Age: 60
End: 2020-06-23

## 2020-06-23 VITALS
OXYGEN SATURATION: 97 % | BODY MASS INDEX: 29.49 KG/M2 | SYSTOLIC BLOOD PRESSURE: 136 MMHG | RESPIRATION RATE: 18 BRPM | HEART RATE: 83 BPM | HEIGHT: 65 IN | DIASTOLIC BLOOD PRESSURE: 80 MMHG | WEIGHT: 177 LBS | TEMPERATURE: 98.6 F

## 2020-06-23 DIAGNOSIS — R92.8 ABNORMAL MAMMOGRAM: ICD-10-CM

## 2020-06-23 DIAGNOSIS — Z79.01 ANTICOAGULATION ADEQUATE: ICD-10-CM

## 2020-06-23 DIAGNOSIS — I82.401 ACUTE DEEP VEIN THROMBOSIS (DVT) OF RIGHT LOWER EXTREMITY, UNSPECIFIED VEIN (HCC): Primary | ICD-10-CM

## 2020-06-23 DIAGNOSIS — J44.9 CHRONIC OBSTRUCTIVE PULMONARY DISEASE, UNSPECIFIED COPD TYPE (HCC): ICD-10-CM

## 2020-06-23 PROCEDURE — 99213 OFFICE O/P EST LOW 20 MIN: CPT | Performed by: FAMILY MEDICINE

## 2020-06-23 PROCEDURE — 77065 DX MAMMO INCL CAD UNI: CPT

## 2020-06-23 PROCEDURE — 77065 DX MAMMO INCL CAD UNI: CPT | Performed by: RADIOLOGY

## 2020-06-23 RX ORDER — BUDESONIDE AND FORMOTEROL FUMARATE DIHYDRATE 160; 4.5 UG/1; UG/1
2 AEROSOL RESPIRATORY (INHALATION)
Qty: 1 INHALER | Refills: 5 | Status: SHIPPED | OUTPATIENT
Start: 2020-06-23 | End: 2020-09-08

## 2020-06-23 RX ORDER — ALBUTEROL SULFATE 90 UG/1
2 AEROSOL, METERED RESPIRATORY (INHALATION) EVERY 6 HOURS PRN
Qty: 1 INHALER | Refills: 5 | Status: SHIPPED | OUTPATIENT
Start: 2020-06-23 | End: 2020-12-23

## 2020-06-23 NOTE — PROGRESS NOTES
"Subjective   Yessica Galvin is a 59 y.o. female.     Patient is doing well from an orthopedic standpoint her wrist is healed her leg is much improved.  She still on a knee brace and is continuing her anticoagulation.  Has any coughing of blood or worsening shortness of breath no fever chills or sweats.    COPD   She complains of shortness of breath and wheezing. There is no cough, hemoptysis or sputum production. This is a chronic problem. The current episode started more than 1 year ago. The problem occurs constantly. The problem has been unchanged. Associated symptoms include postnasal drip. Pertinent negatives include no chest pain, fever or PND. Her symptoms are aggravated by exercise. Her symptoms are alleviated by beta-agonist and steroid inhaler.        The following portions of the patient's history were reviewed and updated as appropriate: allergies, current medications, past social history and problem list.    Review of Systems   Constitutional: Negative for chills and fever.   HENT: Positive for postnasal drip. Negative for congestion.    Respiratory: Positive for shortness of breath and wheezing. Negative for cough, hemoptysis and sputum production.    Cardiovascular: Negative for chest pain and PND.   Musculoskeletal: Positive for arthralgias and gait problem.   Neurological: Negative for dizziness and syncope.   Psychiatric/Behavioral: Negative for dysphoric mood and sleep disturbance.       Objective   /80   Pulse 83   Temp 98.6 °F (37 °C)   Resp 18   Ht 165.1 cm (65\")   Wt 80.3 kg (177 lb)   SpO2 97%   BMI 29.45 kg/m²   Physical Exam   Constitutional: She is oriented to person, place, and time. She appears well-developed and well-nourished. She is cooperative.   HENT:   Head: Normocephalic and atraumatic.   Eyes: Pupils are equal, round, and reactive to light. Conjunctivae are normal. No scleral icterus.   Neck: Neck supple. No JVD present. Carotid bruit is not present. No thyromegaly " present.   Cardiovascular: Normal rate, regular rhythm, normal heart sounds and intact distal pulses.   Pulmonary/Chest: Effort normal. She has wheezes.   Abdominal: There is no hepatosplenomegaly.   Musculoskeletal: Normal range of motion.   Neurological: She is alert and oriented to person, place, and time.   No focal deficits no lateralizing signs   Skin: Skin is warm and dry. No rash noted.   Psychiatric: She has a normal mood and affect. Cognition and memory are normal.   Nursing note and vitals reviewed.      Assessment/Plan   Problem List Items Addressed This Visit     None      Visit Diagnoses     Acute deep vein thrombosis (DVT) of right lower extremity, unspecified vein (CMS/HCC)    -  Primary    Relevant Medications    apixaban (ELIQUIS) 5 MG tablet tablet    Anticoagulation adequate        Chronic obstructive pulmonary disease, unspecified COPD type (CMS/HCC)        Relevant Medications    budesonide-formoterol (SYMBICORT) 160-4.5 MCG/ACT inhaler    albuterol sulfate HFA (Ventolin HFA) 108 (90 Base) MCG/ACT inhaler          New Medications Ordered This Visit   Medications   • budesonide-formoterol (SYMBICORT) 160-4.5 MCG/ACT inhaler     Sig: Inhale 2 puffs 2 (Two) Times a Day.     Dispense:  1 inhaler     Refill:  5   • albuterol sulfate HFA (Ventolin HFA) 108 (90 Base) MCG/ACT inhaler     Sig: Inhale 2 puffs Every 6 (Six) Hours As Needed for Wheezing.     Dispense:  1 inhaler     Refill:  5   • apixaban (ELIQUIS) 5 MG tablet tablet     Sig: Take 1 tablet by mouth Every 12 (Twelve) Hours.     Dispense:  60 tablet     Refill:  1     Continue and anticoagulant for another 3 months then we may discontinue

## 2020-07-06 ENCOUNTER — OFFICE VISIT (OUTPATIENT)
Dept: ORTHOPEDIC SURGERY | Facility: CLINIC | Age: 60
End: 2020-07-06

## 2020-07-06 VITALS — HEIGHT: 65 IN | OXYGEN SATURATION: 97 % | HEART RATE: 110 BPM | BODY MASS INDEX: 28.56 KG/M2 | WEIGHT: 171.4 LBS

## 2020-07-06 DIAGNOSIS — S52.592D OTHER CLOSED FRACTURE OF DISTAL END OF LEFT RADIUS WITH ROUTINE HEALING, SUBSEQUENT ENCOUNTER: ICD-10-CM

## 2020-07-06 DIAGNOSIS — Z98.890 S/P ORIF (OPEN REDUCTION INTERNAL FIXATION) FRACTURE: Primary | ICD-10-CM

## 2020-07-06 DIAGNOSIS — Z87.81 S/P ORIF (OPEN REDUCTION INTERNAL FIXATION) FRACTURE: Primary | ICD-10-CM

## 2020-07-06 PROCEDURE — 99212 OFFICE O/P EST SF 10 MIN: CPT | Performed by: ORTHOPAEDIC SURGERY

## 2020-07-06 NOTE — PROGRESS NOTES
[facility] [place] [facility] [place]    Subjective     Chief Complaint   Patient presents with   • Follow-up     4 months status post KNEE ARTHROSCOPY RIGHT 3-) and status post left wrist ORIF 03/10/20        HPI  Yessica Galvin is a 59 y.o. female.  She doing much better.  She is 4 months postop.  Left wrist doing great.  Right knee is still little sore    Past Medical History:   Diagnosis Date   • Acute bronchitis    • Acute sinusitis    • Asthma    • Asthma with acute exacerbation    • Asthmatic bronchitis    • Disc degeneration, lumbar    • Disc degeneration, lumbar    • History of mammogram    • Hypothyroidism    • Lower back pain    • Plantar fasciitis    • Restless legs syndrome       Past Surgical History:   Procedure Laterality Date   • HYSTERECTOMY     • KNEE ARTHROSCOPY Right 3/10/2020    Procedure: KNEE ARTHROSCOPY RIGHT;  Surgeon: Guanaco Thakur MD;  Location:  [facility];  Service: Orthopedics;  Laterality: Right;   • NECK SURGERY     • OOPHORECTOMY     • ORIF WRIST FRACTURE Left 3/10/2020    Procedure: WRIST OPEN REDUCTION INTERNAL FIXATION LEFT;  Surgeon: Guanaco Thakur MD;  Location:  [facility];  Service: Orthopedics;  Laterality: Left;   • TIBIAL PLATEAU OPEN REDUCTION INTERNAL FIXATION Right 3/10/2020    Procedure: TIBIAL PLATEAU OPEN REDUCTION INTERNAL FIXATION RIGHT;  Surgeon: Guanaco Thakur MD;  Location:  [facility];  Service: Orthopedics;  Laterality: Right;      Family History   Problem Relation Age of Onset   • Heart disease Mother    • Diabetes Mother    • Alzheimer's disease Father    • Asthma Father    • Breast cancer Neg Hx    • Ovarian cancer Neg Hx      Social History     Socioeconomic History   • Marital status:      Spouse name: Not on file   • Number of children: Not on file   • Years of education: Not on file   • Highest education level: Not on file   Tobacco Use   • Smoking status: Current Every Day Smoker     Types: Cigarettes     Start date: 3/7/2000    • Smokeless tobacco: Never Used   Substance and Sexual Activity   • Alcohol use: No   • Drug use: No   • Sexual activity: Defer      Current Outpatient Medications on File Prior to Visit   Medication Sig Dispense Refill   • acetaminophen (TYLENOL) 325 MG tablet Take 2 tablets by mouth Every 4 (Four) Hours As Needed for Mild Pain .     • albuterol (PROVENTIL) (2.5 MG/3ML) 0.083% nebulizer solution Take 2.5 mg by nebulization Every 6 (Six) Hours As Needed for Wheezing. 360 mL 1   • albuterol sulfate HFA (Ventolin HFA) 108 (90 Base) MCG/ACT inhaler Inhale 2 puffs Every 6 (Six) Hours As Needed for Wheezing. 1 inhaler 5   • apixaban (ELIQUIS) 5 MG tablet tablet Take 1 tablet by mouth Every 12 (Twelve) Hours. 60 tablet 1   • bisacodyl (DULCOLAX) 5 MG EC tablet Take 1 tablet by mouth Daily As Needed for Constipation.     • budesonide-formoterol (SYMBICORT) 160-4.5 MCG/ACT inhaler Inhale 2 puffs 2 (Two) Times a Day. 1 inhaler 5   • cyclobenzaprine (FLEXERIL) 10 MG tablet Take 1 tablet by mouth 3 (Three) Times a Day As Needed for Muscle Spasms. 90 tablet 2   • fluticasone-salmeterol (Advair Diskus) 250-50 MCG/DOSE DISKUS Inhale 1 puff 2 (Two) Times a Day. 60 each 5   • gabapentin (Neurontin) 600 MG tablet Take 1 tablet by mouth 2 (Two) Times a Day. 60 tablet 2   • hydrOXYzine (ATARAX) 25 MG tablet Take 1 tablet by mouth 3 (Three) Times a Day As Needed for Itching.     • levocetirizine (XYZAL) 5 MG tablet Take 1 tablet by mouth Every Evening. 30 tablet 1   • levothyroxine (SYNTHROID, LEVOTHROID) 150 MCG tablet Take 1 tablet by mouth Daily. 90 tablet 3   • miconazole (MICOTIN) 2 % cream Apply  topically to the appropriate area as directed Every 12 (Twelve) Hours.     • nicotine (NICODERM CQ) 21 MG/24HR patch Place 1 patch on the skin as directed by provider Daily.     • promethazine (PHENERGAN) 25 MG tablet Take 1 tablet by mouth Every 6 (Six) Hours As Needed for Nausea or Vomiting. 20 tablet 1   • saccharomyces boulardii  "(FLORASTOR) 250 MG capsule Take 1 capsule by mouth 2 (Two) Times a Day.     • traMADol (ULTRAM) 50 MG tablet Take 2 tablets by mouth Every 6 (Six) Hours As Needed for Moderate Pain . 120 tablet 1   • venlafaxine XR (Effexor XR) 150 MG 24 hr capsule Take 1 capsule by mouth Daily. 30 capsule 3     No current facility-administered medications on file prior to visit.       Allergies   Allergen Reactions   • Codeine Nausea And Vomiting        The following portions of the patient's history were reviewed and updated as appropriate: allergies, current medications, past family history, past medical history, past social history, past surgical history and problem list.    Review of Systems   Constitutional: Negative.    HENT: Negative.    Eyes: Negative.    Respiratory: Negative.    Cardiovascular: Negative.    Gastrointestinal: Negative.    Endocrine: Negative.    Genitourinary: Negative.    Musculoskeletal: Positive for arthralgias and joint swelling.   Skin: Negative.    Allergic/Immunologic: Negative.    Neurological: Negative.    Hematological: Negative.    Psychiatric/Behavioral: Negative.         Objective      Physical Exam  Pulse 110   Ht 165.1 cm (65\")   Wt 77.7 kg (171 lb 6.4 oz)   SpO2 97%   BMI 28.52 kg/m²     Body mass index is 28.52 kg/m².    GENERAL APPEARANCE: awake, alert & oriented x 3, in no acute distress and well developed, well nourished  PSYCH: normal mood and affect  LUNGS:  breathing nonlabored, no wheezing  Right knee incision looks great.  She has full motion.  She walks with a slight limp.  The left wrist has full motion full-strength since incompletely healed.    Imaging/Studies  Imaging Results (Last 7 Days)     Procedure Component Value Units Date/Time    XR Knee 3+ View With [place] Right [[id number]] Resulted:  07/06/20 1149     Updated:  07/06/20 1150    Narrative:       Knee X-Ray  Indication: Pain    Upright AP . Lateral, and Sunrise views of right knee     Findings:  Healing right " tibial plateau ORIF  No bony lesion  Normal soft tissues  Normal joint spaces    prior studies were available for comparison.      XR Wrist 3+ View Left [[id number]] Resulted:  07/06/20 1149     Updated:  07/06/20 1149    Narrative:       Left Wrist X-Ray  Indication: Pain  AP, Lateral, and Oblique views    Findings:  The wrist fracture is healing  No bony lesion  Normal soft tissues  Normal joint spaces    prior studies were available for comparison.            Assessment/Plan        ICD-10-CM ICD-9-CM   1. S/P ORIF (open reduction internal fixation) fracture Z98.890 V45.89    Z87.81 V15.51   2. Other closed fracture of distal end of left radius with routine healing, subsequent encounter S52.592D V54.12       Orders Placed This Encounter   Procedures   • XR Wrist 3+ View Left   • XR Knee 3+ View With [place] Right      She is doing great.  She will follow-up..  She will continue home exercise program to get her leg stronger  Medical Decision Making  Management Options : over-the-counter medicine  Data/Risk: radiology tests and independent visualization of imaging, lab tests, or EMG/NCV    Guanaco Thakur MD  07/06/20  11:54         EMR Dragon/Transcription disclaimer:  Much of this encounter note is an electronic transcription of spoken language to printed text. Electronic transcription of spoken language may permit erroneous, or at times, nonsensical words or phrases to be inadvertently transcribed. Although I have reviewed the note for such errors, some may still exist.

## 2020-07-22 ENCOUNTER — TELEPHONE (OUTPATIENT)
Dept: FAMILY MEDICINE CLINIC | Facility: CLINIC | Age: 60
End: 2020-07-22

## 2020-08-04 DIAGNOSIS — S82.141A CLOSED FRACTURE OF RIGHT TIBIAL PLATEAU, INITIAL ENCOUNTER: ICD-10-CM

## 2020-08-04 RX ORDER — TRAMADOL HYDROCHLORIDE 50 MG/1
100 TABLET ORAL EVERY 6 HOURS PRN
Qty: 120 TABLET | Refills: 1 | Status: CANCELLED | OUTPATIENT
Start: 2020-08-04

## 2020-08-04 RX ORDER — TRAMADOL HYDROCHLORIDE 50 MG/1
100 TABLET ORAL EVERY 6 HOURS PRN
Qty: 60 TABLET | Refills: 0 | Status: SHIPPED | OUTPATIENT
Start: 2020-08-04 | End: 2020-08-20 | Stop reason: SDUPTHER

## 2020-08-04 NOTE — TELEPHONE ENCOUNTER
Patient is following up on the refill for: traMADol (ULTRAM) 50 MG tablet    Preferred pharmacy:  [address] [place] [address] [phone] [facility] [phone] FX     Patient stated she is out of medication

## 2020-08-04 NOTE — TELEPHONE ENCOUNTER
Caller: GalvinYessica    Relationship: Self    Best call back number: [phone]    Medication needed:   Requested Prescriptions     Pending Prescriptions Disp Refills   • traMADol (ULTRAM) 50 MG tablet 120 tablet 1     Sig: Take 2 tablets by mouth Every 6 (Six) Hours As Needed for Moderate Pain .       When do you need the refill by: 08/03/20    What details did the patient provide when requesting the medication: Patient is out of medication and would like to  paper prescriptions.    Does the patient have less than a 3 day supply:  [x] Yes  [] No    What is the patient's preferred pharmacy: [facility] [address] [place] [address] [phone] [facility] [phone] FX

## 2020-08-12 RX ORDER — PROMETHAZINE HYDROCHLORIDE 25 MG/1
TABLET ORAL
Qty: 20 TABLET | Refills: 0 | Status: SHIPPED | OUTPATIENT
Start: 2020-08-12 | End: 2020-09-14

## 2020-08-20 ENCOUNTER — OFFICE VISIT (OUTPATIENT)
Dept: FAMILY MEDICINE CLINIC | Facility: CLINIC | Age: 60
End: 2020-08-20

## 2020-08-20 VITALS
SYSTOLIC BLOOD PRESSURE: 132 MMHG | WEIGHT: 175 LBS | HEART RATE: 61 BPM | RESPIRATION RATE: 18 BRPM | BODY MASS INDEX: 29.16 KG/M2 | DIASTOLIC BLOOD PRESSURE: 70 MMHG | OXYGEN SATURATION: 96 % | HEIGHT: 65 IN | TEMPERATURE: 98 F

## 2020-08-20 DIAGNOSIS — I82.401 ACUTE DEEP VEIN THROMBOSIS (DVT) OF RIGHT LOWER EXTREMITY, UNSPECIFIED VEIN (HCC): ICD-10-CM

## 2020-08-20 DIAGNOSIS — F41.1 GENERALIZED ANXIETY DISORDER: ICD-10-CM

## 2020-08-20 DIAGNOSIS — J44.9 CHRONIC OBSTRUCTIVE PULMONARY DISEASE, UNSPECIFIED COPD TYPE (HCC): Primary | ICD-10-CM

## 2020-08-20 DIAGNOSIS — M51.9 LUMBAR DISC DISEASE: ICD-10-CM

## 2020-08-20 PROCEDURE — 90471 IMMUNIZATION ADMIN: CPT | Performed by: FAMILY MEDICINE

## 2020-08-20 PROCEDURE — 90732 PPSV23 VACC 2 YRS+ SUBQ/IM: CPT | Performed by: FAMILY MEDICINE

## 2020-08-20 PROCEDURE — 99214 OFFICE O/P EST MOD 30 MIN: CPT | Performed by: FAMILY MEDICINE

## 2020-08-20 RX ORDER — TRAMADOL HYDROCHLORIDE 50 MG/1
100 TABLET ORAL EVERY 8 HOURS PRN
Qty: 120 TABLET | Refills: 1 | Status: SHIPPED | OUTPATIENT
Start: 2020-08-20 | End: 2020-11-23 | Stop reason: SDUPTHER

## 2020-08-20 RX ORDER — GABAPENTIN 600 MG/1
600 TABLET ORAL 2 TIMES DAILY
Qty: 60 TABLET | Refills: 2 | Status: SHIPPED | OUTPATIENT
Start: 2020-08-20 | End: 2020-11-23 | Stop reason: SDUPTHER

## 2020-08-20 RX ORDER — VENLAFAXINE HYDROCHLORIDE 150 MG/1
150 CAPSULE, EXTENDED RELEASE ORAL DAILY
Qty: 30 CAPSULE | Refills: 2 | Status: SHIPPED | OUTPATIENT
Start: 2020-08-20 | End: 2020-11-23 | Stop reason: SDUPTHER

## 2020-08-20 RX ORDER — LEVOCETIRIZINE DIHYDROCHLORIDE 5 MG/1
5 TABLET, FILM COATED ORAL EVERY EVENING
Qty: 30 TABLET | Refills: 5 | Status: SHIPPED | OUTPATIENT
Start: 2020-08-20 | End: 2021-02-23 | Stop reason: SDUPTHER

## 2020-08-24 NOTE — PROGRESS NOTES
Subjective   Yessica Galvin is a 59 y.o. female.     Patient is doing well from an orthopedic standpoint her wrist is healed her leg is much improved.  .  Has no coughing of blood or worsening shortness of breath no fever chills or sweats.    COPD   She complains of shortness of breath and wheezing. There is no cough, hemoptysis or sputum production. This is a chronic problem. The current episode started more than 1 year ago. The problem occurs constantly. The problem has been unchanged. The cough is non-productive. Associated symptoms include headaches, myalgias and postnasal drip. Pertinent negatives include no chest pain, fever or PND. Her symptoms are aggravated by exercise. Her symptoms are alleviated by beta-agonist and steroid inhaler. She reports significant improvement on treatment. Her symptoms are not alleviated by steroid inhaler and beta-agonist. Risk factors for lung disease include smoking/tobacco exposure. Her past medical history is significant for COPD.   Anxiety   Presents for follow-up visit. Symptoms include nervous/anxious behavior and shortness of breath. Patient reports no chest pain, confusion, depressed mood, excessive worry, nausea or palpitations. The quality of sleep is fair. Nighttime awakenings: occasional.       Leg Pain    The incident occurred more than 1 week ago (Walking with a walker sometimes a cane at home). The incident occurred at home. The injury mechanism was a twisting injury. The pain is present in the right leg. The quality of the pain is described as aching. The pain is moderate. The pain has been fluctuating since onset. Pertinent negatives include no numbness or tingling. Treatments tried: Gabapentin and as needed tramadol. The treatment provided moderate relief.   Back Pain   This is a chronic problem. The current episode started more than 1 month ago. The problem occurs daily. The problem is unchanged. The pain is present in the lumbar spine. Associated symptoms  "include headaches and leg pain. Pertinent negatives include no chest pain, fever, numbness or tingling. Risk factors include recent trauma. She has tried analgesics for the symptoms. The treatment provided moderate relief.        The following portions of the patient's history were reviewed and updated as appropriate: allergies, current medications, past social history and problem list.    Review of Systems   Constitutional: Negative for fever.   HENT: Positive for postnasal drip.    Respiratory: Positive for shortness of breath and wheezing. Negative for cough, hemoptysis and sputum production.    Cardiovascular: Negative for chest pain, palpitations and PND.   Gastrointestinal: Negative for nausea.   Musculoskeletal: Positive for arthralgias, back pain and myalgias.   Neurological: Positive for headaches. Negative for tingling and numbness.   Psychiatric/Behavioral: Negative for confusion. The patient is nervous/anxious.        Objective   /70   Pulse 61   Temp 98 °F (36.7 °C)   Resp 18   Ht 165.1 cm (65\")   Wt 79.4 kg (175 lb)   SpO2 96%   BMI 29.12 kg/m²   Physical Exam   Constitutional: She is oriented to person, place, and time. She appears well-developed and well-nourished. She is cooperative.   HENT:   Head: Normocephalic.   Eyes: Pupils are equal, round, and reactive to light. Conjunctivae are normal. No scleral icterus.   Neck: Neck supple. No JVD present. Carotid bruit is not present. No thyromegaly present.   Cardiovascular: Normal rate and regular rhythm.   Pulmonary/Chest: Effort normal. She has no wheezes.   Distant breath sounds   Abdominal: There is no hepatosplenomegaly.   Musculoskeletal: Normal range of motion. She exhibits no edema.   Neurological: She is alert and oriented to person, place, and time. No cranial nerve deficit. Coordination normal.   No focal deficits no lateralizing signs   Skin: Skin is warm and dry. No rash noted.   Psychiatric: She has a normal mood and affect. " Her behavior is normal. Cognition and memory are normal.   Nursing note and vitals reviewed.      Assessment/Plan   Problem List Items Addressed This Visit        Active Problems    Generalized anxiety disorder    Relevant Medications    venlafaxine XR (Effexor XR) 150 MG 24 hr capsule    Lumbar disc disease    Relevant Medications    gabapentin (Neurontin) 600 MG tablet    traMADol (ULTRAM) 50 MG tablet      Other Visit Diagnoses     Chronic obstructive pulmonary disease, unspecified COPD type (CMS/HCC)    -  Primary    Relevant Medications    levocetirizine (Xyzal) 5 MG tablet    Other Relevant Orders    Pneumococcal Polysaccharide Vaccine 23-Valent Greater Than or Equal To 1yo Subcutaneous / IM (Completed)    Acute deep vein thrombosis (DVT) of right lower extremity, unspecified vein (CMS/[facility])        Relevant Medications    apixaban (ELIQUIS) 5 MG tablet tablet          New Medications Ordered This Visit   Medications   • apixaban (ELIQUIS) 5 MG tablet tablet     Sig: Take 1 tablet by mouth Every 12 (Twelve) Hours.     Dispense:  60 tablet     Refill:  0   • gabapentin (Neurontin) 600 MG tablet     Sig: Take 1 tablet by mouth 2 (Two) Times a Day.     Dispense:  60 tablet     Refill:  2   • levocetirizine (Xyzal) 5 MG tablet     Sig: Take 1 tablet by mouth Every Evening.     Dispense:  30 tablet     Refill:  5   • traMADol (ULTRAM) 50 MG tablet     Sig: Take 2 tablets by mouth Every 8 (Eight) Hours As Needed for Moderate Pain .     Dispense:  120 tablet     Refill:  1   • venlafaxine XR (Effexor XR) 150 MG 24 hr capsule     Sig: Take 1 capsule by mouth Daily.     Dispense:  30 capsule     Refill:  2     Consider discontinuing Eliquis in the next month or 2.

## 2020-08-25 ENCOUNTER — HOSPITAL ENCOUNTER (OUTPATIENT)
Dept: GENERAL RADIOLOGY | Facility: HOSPITAL | Age: 60
Discharge: HOME | End: 2020-08-25
Admitting: FAMILY MEDICINE

## 2020-08-25 ENCOUNTER — OFFICE VISIT (OUTPATIENT)
Dept: FAMILY MEDICINE CLINIC | Facility: CLINIC | Age: 60
End: 2020-08-25

## 2020-08-25 VITALS
OXYGEN SATURATION: 96 % | WEIGHT: 177 LBS | HEART RATE: 81 BPM | DIASTOLIC BLOOD PRESSURE: 72 MMHG | RESPIRATION RATE: 18 BRPM | BODY MASS INDEX: 29.49 KG/M2 | SYSTOLIC BLOOD PRESSURE: 134 MMHG | TEMPERATURE: 98.7 F | HEIGHT: 65 IN

## 2020-08-25 DIAGNOSIS — S29.9XXA RIB INJURY: Primary | ICD-10-CM

## 2020-08-25 DIAGNOSIS — S29.9XXA RIB INJURY: ICD-10-CM

## 2020-08-25 PROCEDURE — 99213 OFFICE O/P EST LOW 20 MIN: CPT | Performed by: FAMILY MEDICINE

## 2020-08-25 PROCEDURE — 71101 X-RAY EXAM UNILAT RIBS/CHEST: CPT

## 2020-08-25 RX ORDER — HYDROCODONE BITARTRATE AND ACETAMINOPHEN 5; 325 MG/1; MG/1
1 TABLET ORAL EVERY 6 HOURS PRN
Qty: 12 TABLET | Refills: 0 | Status: SHIPPED | OUTPATIENT
Start: 2020-08-25 | End: 2020-11-23

## 2020-08-26 NOTE — PROGRESS NOTES
"Subjective   Yessica Galvin is a 59 y.o. female.     Rib Injury   This is a new (fell against a fence injured lower ribs no soa) problem. The current episode started in the past 7 days. The problem occurs constantly. The problem has been waxing and waning. Associated symptoms include chest pain. Pertinent negatives include no chills, congestion, coughing, fever or sore throat. The symptoms are aggravated by bending and coughing. She has tried acetaminophen and rest for the symptoms. The treatment provided no relief.        The following portions of the patient's history were reviewed and updated as appropriate: allergies, current medications, past social history and problem list.    Review of Systems   Constitutional: Negative for chills and fever.   HENT: Negative for congestion and sore throat.    Respiratory: Negative for cough, chest tightness and shortness of breath.    Cardiovascular: Positive for chest pain. Negative for palpitations.       Objective   /72   Pulse 81   Temp 98.7 °F (37.1 °C)   Resp 18   Ht 165.1 cm (65\")   Wt 80.3 kg (177 lb)   SpO2 96%   BMI 29.45 kg/m²   Physical Exam   Constitutional: She appears well-developed and well-nourished.   HENT:   Head: Normocephalic and atraumatic.   Eyes: Pupils are equal, round, and reactive to light. Conjunctivae are normal.   Neck: Neck supple.   Cardiovascular: Normal rate and regular rhythm.   Pulmonary/Chest: Effort normal and breath sounds normal.   Musculoskeletal: She exhibits tenderness. She exhibits no edema.   Tender at cartilagenous insertion   Nursing note and vitals reviewed.      Assessment/Plan   Problem List Items Addressed This Visit     None      Visit Diagnoses     Rib injury    -  Primary    Relevant Medications    HYDROcodone-acetaminophen (Norco) 5-325 MG per tablet    Other Relevant Orders    XR Ribs Right With PA Chest          New Medications Ordered This Visit   Medications   • HYDROcodone-acetaminophen (Norco) 5-325 MG " per tablet     Sig: Take 1 tablet by mouth Every 6 (Six) Hours As Needed for Severe Pain .     Dispense:  12 tablet     Refill:  0     Rib belt avoid reinjury

## 2020-09-04 ENCOUNTER — TELEPHONE (OUTPATIENT)
Dept: FAMILY MEDICINE CLINIC | Facility: CLINIC | Age: 60
End: 2020-09-04

## 2020-09-04 NOTE — TELEPHONE ENCOUNTER
PATIENT CALLING IN TO REQUEST A DOSAGE INCREASE ON THE ADVAIR.  SHE SAID IT IS NOT HELPING WITH THE ASTHMA ON THE CURRENT DOSAGE.    PLEASE CALL AND ADVISE [phone]

## 2020-09-14 DIAGNOSIS — M51.9 LUMBAR DISC DISEASE: ICD-10-CM

## 2020-09-14 DIAGNOSIS — S82.141A CLOSED FRACTURE OF RIGHT TIBIAL PLATEAU, INITIAL ENCOUNTER: ICD-10-CM

## 2020-09-14 RX ORDER — PROMETHAZINE HYDROCHLORIDE 25 MG/1
TABLET ORAL
Qty: 20 TABLET | Refills: 0 | Status: SHIPPED | OUTPATIENT
Start: 2020-09-14 | End: 2020-10-14

## 2020-09-14 RX ORDER — CYCLOBENZAPRINE HCL 10 MG
TABLET ORAL
Qty: 90 TABLET | Refills: 1 | Status: SHIPPED | OUTPATIENT
Start: 2020-09-14 | End: 2020-11-15 | Stop reason: SDUPTHER

## 2020-10-14 RX ORDER — PROMETHAZINE HYDROCHLORIDE 25 MG/1
TABLET ORAL
Qty: 20 TABLET | Refills: 0 | Status: SHIPPED | OUTPATIENT
Start: 2020-10-14 | End: 2020-11-23 | Stop reason: SDUPTHER

## 2020-10-19 ENCOUNTER — TELEPHONE (OUTPATIENT)
Dept: FAMILY MEDICINE CLINIC | Facility: CLINIC | Age: 60
End: 2020-10-19

## 2020-10-19 NOTE — TELEPHONE ENCOUNTER
Pt said the Advair she is on is not working.  Pt is requesting to be put on Stiolto and Qvar instead.  Pt requesting call back to discuss options.

## 2020-10-25 DIAGNOSIS — M51.9 LUMBAR DISC DISEASE: ICD-10-CM

## 2020-10-27 RX ORDER — TRAMADOL HYDROCHLORIDE 50 MG/1
TABLET ORAL
Qty: 120 TABLET | Refills: 0 | OUTPATIENT
Start: 2020-10-27

## 2020-11-15 DIAGNOSIS — S82.141A CLOSED FRACTURE OF RIGHT TIBIAL PLATEAU, INITIAL ENCOUNTER: ICD-10-CM

## 2020-11-15 DIAGNOSIS — M51.9 LUMBAR DISC DISEASE: ICD-10-CM

## 2020-11-15 RX ORDER — CYCLOBENZAPRINE HCL 10 MG
10 TABLET ORAL 3 TIMES DAILY PRN
Qty: 90 TABLET | Refills: 0 | Status: SHIPPED | OUTPATIENT
Start: 2020-11-15 | End: 2021-02-03 | Stop reason: SDUPTHER

## 2020-11-23 ENCOUNTER — OFFICE VISIT (OUTPATIENT)
Dept: FAMILY MEDICINE CLINIC | Facility: CLINIC | Age: 60
End: 2020-11-23

## 2020-11-23 VITALS
WEIGHT: 173.4 LBS | OXYGEN SATURATION: 93 % | DIASTOLIC BLOOD PRESSURE: 82 MMHG | TEMPERATURE: 96.9 F | BODY MASS INDEX: 28.89 KG/M2 | HEIGHT: 65 IN | HEART RATE: 71 BPM | SYSTOLIC BLOOD PRESSURE: 160 MMHG

## 2020-11-23 DIAGNOSIS — E03.9 ACQUIRED HYPOTHYROIDISM: Primary | ICD-10-CM

## 2020-11-23 DIAGNOSIS — R74.01 ELEVATED TRANSAMINASE LEVEL: ICD-10-CM

## 2020-11-23 DIAGNOSIS — F41.1 GENERALIZED ANXIETY DISORDER: ICD-10-CM

## 2020-11-23 DIAGNOSIS — M51.9 LUMBAR DISC DISEASE: ICD-10-CM

## 2020-11-23 LAB
DEPRECATED RDW RBC AUTO: 44.4 FL (ref 37–54)
ERYTHROCYTE [DISTWIDTH] IN BLOOD BY AUTOMATED COUNT: 12.8 % (ref 12.3–15.4)
HCT VFR BLD AUTO: 36.9 % (ref 34–46.6)
HGB BLD-MCNC: 12.2 G/DL (ref 12–15.9)
MCH RBC QN AUTO: 30.8 PG (ref 26.6–33)
MCHC RBC AUTO-ENTMCNC: 33.1 G/DL (ref 31.5–35.7)
MCV RBC AUTO: 93.2 FL (ref 79–97)
PLATELET # BLD AUTO: 295 10*3/MM3 (ref 140–450)
PMV BLD AUTO: 9.8 FL (ref 6–12)
RBC # BLD AUTO: 3.96 10*6/MM3 (ref 3.77–5.28)
WBC # BLD AUTO: 6.56 10*3/MM3 (ref 3.4–10.8)

## 2020-11-23 PROCEDURE — 99214 OFFICE O/P EST MOD 30 MIN: CPT | Performed by: FAMILY MEDICINE

## 2020-11-23 PROCEDURE — 80050 GENERAL HEALTH PANEL: CPT | Performed by: FAMILY MEDICINE

## 2020-11-23 PROCEDURE — 80061 LIPID PANEL: CPT | Performed by: FAMILY MEDICINE

## 2020-11-23 RX ORDER — GABAPENTIN 600 MG/1
600 TABLET ORAL 2 TIMES DAILY
Qty: 60 TABLET | Refills: 2 | Status: SHIPPED | OUTPATIENT
Start: 2020-11-23 | End: 2021-02-23 | Stop reason: SDUPTHER

## 2020-11-23 RX ORDER — VENLAFAXINE HYDROCHLORIDE 150 MG/1
150 CAPSULE, EXTENDED RELEASE ORAL DAILY
Qty: 30 CAPSULE | Refills: 2 | Status: SHIPPED | OUTPATIENT
Start: 2020-11-23 | End: 2021-01-09 | Stop reason: SDUPTHER

## 2020-11-23 RX ORDER — TRAMADOL HYDROCHLORIDE 50 MG/1
100 TABLET ORAL 2 TIMES DAILY
Qty: 120 TABLET | Refills: 1 | Status: SHIPPED | OUTPATIENT
Start: 2020-11-23 | End: 2021-01-22 | Stop reason: SDUPTHER

## 2020-11-24 LAB
ALBUMIN SERPL-MCNC: 3.9 G/DL (ref 3.5–5.2)
ALBUMIN/GLOB SERPL: 1.6 G/DL
ALP SERPL-CCNC: 85 U/L (ref 39–117)
ALT SERPL W P-5'-P-CCNC: 15 U/L (ref 1–33)
ANION GAP SERPL CALCULATED.3IONS-SCNC: 6.8 MMOL/L (ref 5–15)
AST SERPL-CCNC: 16 U/L (ref 1–32)
BILIRUB SERPL-MCNC: 0.2 MG/DL (ref 0–1.2)
BUN SERPL-MCNC: 7 MG/DL (ref 8–23)
BUN/CREAT SERPL: 13 (ref 7–25)
CALCIUM SPEC-SCNC: 8.8 MG/DL (ref 8.6–10.5)
CHLORIDE SERPL-SCNC: 100 MMOL/L (ref 98–107)
CHOLEST SERPL-MCNC: 203 MG/DL (ref 0–200)
CO2 SERPL-SCNC: 27.2 MMOL/L (ref 22–29)
CREAT SERPL-MCNC: 0.54 MG/DL (ref 0.57–1)
GFR SERPL CREATININE-BSD FRML MDRD: 114 ML/MIN/1.73
GLOBULIN UR ELPH-MCNC: 2.4 GM/DL
GLUCOSE SERPL-MCNC: 80 MG/DL (ref 65–99)
HDLC SERPL-MCNC: 83 MG/DL (ref 40–60)
LDLC SERPL CALC-MCNC: 109 MG/DL (ref 0–100)
LDLC/HDLC SERPL: 1.3 {RATIO}
POTASSIUM SERPL-SCNC: 4.2 MMOL/L (ref 3.5–5.2)
PROT SERPL-MCNC: 6.3 G/DL (ref 6–8.5)
SODIUM SERPL-SCNC: 134 MMOL/L (ref 136–145)
TRIGL SERPL-MCNC: 60 MG/DL (ref 0–150)
TSH SERPL DL<=0.05 MIU/L-ACNC: 0.18 UIU/ML (ref 0.27–4.2)
VLDLC SERPL-MCNC: 11 MG/DL (ref 5–40)

## 2020-11-24 RX ORDER — PROMETHAZINE HYDROCHLORIDE 25 MG/1
25 TABLET ORAL EVERY 6 HOURS PRN
Qty: 20 TABLET | Refills: 0 | Status: SHIPPED | OUTPATIENT
Start: 2020-11-24 | End: 2020-12-15 | Stop reason: SDUPTHER

## 2020-11-24 NOTE — PROGRESS NOTES
Subjective   Yessica Galvin is a 60 y.o. female.     COPD  She complains of shortness of breath and wheezing. There is no cough, hemoptysis or sputum production. This is a chronic problem. The current episode started more than 1 year ago. The problem occurs constantly. The problem has been unchanged. The cough is non-productive. Associated symptoms include headaches, myalgias and postnasal drip. Pertinent negatives include no chest pain, fever or PND. Her symptoms are aggravated by exercise. Her symptoms are alleviated by beta-agonist and steroid inhaler. She reports significant improvement on treatment. Her symptoms are not alleviated by steroid inhaler and beta-agonist. Risk factors for lung disease include smoking/tobacco exposure. Her past medical history is significant for COPD.   Anxiety  Presents for follow-up visit. Symptoms include nervous/anxious behavior and shortness of breath. Patient reports no chest pain, confusion, depressed mood, excessive worry, nausea or palpitations. The quality of sleep is fair. Nighttime awakenings: occasional.       Back Pain  This is a chronic problem. The current episode started more than 1 month ago. The problem occurs daily. The problem is unchanged. The pain is present in the lumbar spine. The quality of the pain is described as aching. The pain does not radiate. The pain is moderate. Associated symptoms include headaches and numbness. Pertinent negatives include no chest pain or fever. Risk factors include recent trauma. She has tried analgesics and home exercises for the symptoms. The treatment provided moderate relief.   Hypothyroidism  This is a chronic problem. The current episode started more than 1 year ago. The problem has been unchanged. Associated symptoms include headaches, myalgias and numbness. Pertinent negatives include no chest pain, chills, congestion, coughing, fever or nausea. The treatment provided significant relief.        The following portions of  "the patient's history were reviewed and updated as appropriate: allergies, current medications, past social history and problem list.    Review of Systems   Constitutional: Negative for chills and fever.   HENT: Positive for postnasal drip. Negative for congestion.    Eyes: Negative for pain and visual disturbance.   Respiratory: Positive for shortness of breath and wheezing. Negative for cough, hemoptysis and sputum production.    Cardiovascular: Negative for chest pain, palpitations and PND.   Gastrointestinal: Negative for nausea.   Endocrine: Negative for cold intolerance and heat intolerance.   Musculoskeletal: Positive for back pain and myalgias.   Neurological: Positive for numbness and headaches. Negative for syncope.   Psychiatric/Behavioral: Negative for confusion. The patient is nervous/anxious.        Objective   /82   Pulse 71   Temp 96.9 °F (36.1 °C)   Ht 165.1 cm (65\")   Wt 78.7 kg (173 lb 6.4 oz)   SpO2 93%   Breastfeeding No   BMI 28.86 kg/m²   Physical Exam  Vitals signs and nursing note reviewed.   Constitutional:       Appearance: Normal appearance. She is well-developed.   HENT:      Head: Normocephalic.      Right Ear: External ear normal.      Left Ear: External ear normal.      Nose: Nose normal.   Eyes:      General: No scleral icterus.     Conjunctiva/sclera: Conjunctivae normal.      Pupils: Pupils are equal, round, and reactive to light.   Neck:      Musculoskeletal: Neck supple.      Thyroid: No thyromegaly.      Vascular: No carotid bruit.   Cardiovascular:      Rate and Rhythm: Normal rate and regular rhythm.   Pulmonary:      Effort: Pulmonary effort is normal.      Comments: Distant bs  Musculoskeletal: Normal range of motion.      Right lower leg: No edema.      Left lower leg: No edema.   Skin:     General: Skin is warm and dry.      Findings: No rash.   Neurological:      General: No focal deficit present.      Mental Status: She is alert and oriented to person, " place, and time.      Comments: No focal deficits no lateralizing signs   Psychiatric:         Mood and Affect: Mood normal.         Behavior: Behavior is cooperative.         Assessment/Plan   Problems Addressed this Visit        Active Problems    Generalized anxiety disorder    Relevant Medications    venlafaxine XR (Effexor XR) 150 MG 24 hr capsule    Acquired hypothyroidism - Primary    Relevant Orders    Lipid Panel    TSH    CBC (No Diff)    Lumbar disc disease    Relevant Medications    gabapentin (Neurontin) 600 MG tablet    traMADol (ULTRAM) 50 MG tablet    Other Relevant Orders    CBC (No Diff)    Elevated transaminase level    Relevant Orders    Comprehensive Metabolic Panel    CBC (No Diff)      Diagnoses       Codes Comments    Acquired hypothyroidism    -  Primary ICD-10-CM: E03.9  ICD-9-CM: 244.9     Generalized anxiety disorder     ICD-10-CM: F41.1  ICD-9-CM: 300.02     Lumbar disc disease     ICD-10-CM: M51.9  ICD-9-CM: 722.93     Elevated transaminase level     ICD-10-CM: R74.01  ICD-9-CM: 790.4           New Medications Ordered This Visit   Medications   • venlafaxine XR (Effexor XR) 150 MG 24 hr capsule     Sig: Take 1 capsule by mouth Daily.     Dispense:  30 capsule     Refill:  2   • gabapentin (Neurontin) 600 MG tablet     Sig: Take 1 tablet by mouth 2 (Two) Times a Day.     Dispense:  60 tablet     Refill:  2   • traMADol (ULTRAM) 50 MG tablet     Sig: Take 2 tablets by mouth 2 (two) times a day.     Dispense:  120 tablet     Refill:  1

## 2020-12-02 RX ORDER — LEVOTHYROXINE SODIUM 137 UG/1
137 TABLET ORAL DAILY
Qty: 90 TABLET | Refills: 0 | Status: SHIPPED | OUTPATIENT
Start: 2020-12-02 | End: 2021-02-23 | Stop reason: SDUPTHER

## 2020-12-15 RX ORDER — PROMETHAZINE HYDROCHLORIDE 25 MG/1
25 TABLET ORAL EVERY 6 HOURS PRN
Qty: 20 TABLET | Refills: 0 | Status: SHIPPED | OUTPATIENT
Start: 2020-12-15 | End: 2021-01-09 | Stop reason: SDUPTHER

## 2020-12-23 RX ORDER — ALBUTEROL SULFATE 90 UG/1
AEROSOL, METERED RESPIRATORY (INHALATION)
Qty: 18 G | Refills: 4 | Status: SHIPPED | OUTPATIENT
Start: 2020-12-23 | End: 2021-07-13 | Stop reason: SDUPTHER

## 2021-01-09 DIAGNOSIS — F41.1 GENERALIZED ANXIETY DISORDER: ICD-10-CM

## 2021-01-11 RX ORDER — PROMETHAZINE HYDROCHLORIDE 25 MG/1
25 TABLET ORAL EVERY 6 HOURS PRN
Qty: 20 TABLET | Refills: 0 | Status: SHIPPED | OUTPATIENT
Start: 2021-01-11 | End: 2021-02-23 | Stop reason: SDUPTHER

## 2021-01-11 RX ORDER — VENLAFAXINE HYDROCHLORIDE 150 MG/1
150 CAPSULE, EXTENDED RELEASE ORAL DAILY
Qty: 30 CAPSULE | Refills: 0 | Status: SHIPPED | OUTPATIENT
Start: 2021-01-11 | End: 2021-02-08 | Stop reason: SDUPTHER

## 2021-01-22 DIAGNOSIS — M51.9 LUMBAR DISC DISEASE: ICD-10-CM

## 2021-01-25 RX ORDER — TRAMADOL HYDROCHLORIDE 50 MG/1
100 TABLET ORAL 2 TIMES DAILY
Qty: 120 TABLET | Refills: 0 | Status: SHIPPED | OUTPATIENT
Start: 2021-01-25 | End: 2021-02-23 | Stop reason: SDUPTHER

## 2021-02-03 DIAGNOSIS — S82.141A CLOSED FRACTURE OF RIGHT TIBIAL PLATEAU, INITIAL ENCOUNTER: ICD-10-CM

## 2021-02-03 DIAGNOSIS — M51.9 LUMBAR DISC DISEASE: ICD-10-CM

## 2021-02-04 RX ORDER — CYCLOBENZAPRINE HCL 10 MG
10 TABLET ORAL 2 TIMES DAILY PRN
Qty: 60 TABLET | Refills: 0 | Status: SHIPPED | OUTPATIENT
Start: 2021-02-04 | End: 2021-04-11 | Stop reason: SDUPTHER

## 2021-02-08 DIAGNOSIS — F41.1 GENERALIZED ANXIETY DISORDER: ICD-10-CM

## 2021-02-08 RX ORDER — VENLAFAXINE HYDROCHLORIDE 150 MG/1
150 CAPSULE, EXTENDED RELEASE ORAL DAILY
Qty: 30 CAPSULE | Refills: 0 | Status: SHIPPED | OUTPATIENT
Start: 2021-02-08 | End: 2021-02-23 | Stop reason: SDUPTHER

## 2021-02-23 ENCOUNTER — OFFICE VISIT (OUTPATIENT)
Dept: FAMILY MEDICINE CLINIC | Facility: CLINIC | Age: 61
End: 2021-02-23

## 2021-02-23 VITALS
WEIGHT: 175.2 LBS | DIASTOLIC BLOOD PRESSURE: 74 MMHG | BODY MASS INDEX: 29.19 KG/M2 | HEART RATE: 71 BPM | SYSTOLIC BLOOD PRESSURE: 142 MMHG | HEIGHT: 65 IN | TEMPERATURE: 97.8 F | OXYGEN SATURATION: 96 %

## 2021-02-23 DIAGNOSIS — E03.9 ACQUIRED HYPOTHYROIDISM: Primary | ICD-10-CM

## 2021-02-23 DIAGNOSIS — M51.9 LUMBAR DISC DISEASE: ICD-10-CM

## 2021-02-23 DIAGNOSIS — J45.40 MODERATE PERSISTENT ASTHMATIC BRONCHITIS WITHOUT COMPLICATION: ICD-10-CM

## 2021-02-23 DIAGNOSIS — M54.50 CHRONIC MIDLINE LOW BACK PAIN WITHOUT SCIATICA: ICD-10-CM

## 2021-02-23 DIAGNOSIS — G89.29 CHRONIC MIDLINE LOW BACK PAIN WITHOUT SCIATICA: ICD-10-CM

## 2021-02-23 DIAGNOSIS — F41.1 GENERALIZED ANXIETY DISORDER: ICD-10-CM

## 2021-02-23 PROCEDURE — 99214 OFFICE O/P EST MOD 30 MIN: CPT | Performed by: FAMILY MEDICINE

## 2021-02-23 PROCEDURE — 80050 GENERAL HEALTH PANEL: CPT | Performed by: FAMILY MEDICINE

## 2021-02-23 PROCEDURE — 80061 LIPID PANEL: CPT | Performed by: FAMILY MEDICINE

## 2021-02-23 RX ORDER — BUDESONIDE AND FORMOTEROL FUMARATE DIHYDRATE 160; 4.5 UG/1; UG/1
2 AEROSOL RESPIRATORY (INHALATION)
Qty: 10.2 G | Refills: 5 | Status: SHIPPED | OUTPATIENT
Start: 2021-02-23 | End: 2021-07-13 | Stop reason: SDUPTHER

## 2021-02-23 RX ORDER — GABAPENTIN 600 MG/1
600 TABLET ORAL 2 TIMES DAILY
Qty: 60 TABLET | Refills: 2 | Status: SHIPPED | OUTPATIENT
Start: 2021-02-23 | End: 2021-05-24 | Stop reason: SDUPTHER

## 2021-02-23 RX ORDER — TRAMADOL HYDROCHLORIDE 50 MG/1
TABLET ORAL
Qty: 90 TABLET | Refills: 2 | Status: SHIPPED | OUTPATIENT
Start: 2021-02-23 | End: 2021-05-24 | Stop reason: SDUPTHER

## 2021-02-23 RX ORDER — VENLAFAXINE HYDROCHLORIDE 150 MG/1
150 CAPSULE, EXTENDED RELEASE ORAL DAILY
Qty: 30 CAPSULE | Refills: 2 | Status: SHIPPED | OUTPATIENT
Start: 2021-02-23 | End: 2021-05-24 | Stop reason: SDUPTHER

## 2021-02-23 RX ORDER — LEVOTHYROXINE SODIUM 137 UG/1
137 TABLET ORAL DAILY
Qty: 90 TABLET | Refills: 1 | Status: SHIPPED | OUTPATIENT
Start: 2021-02-23 | End: 2021-08-26 | Stop reason: SDUPTHER

## 2021-02-23 RX ORDER — LEVOCETIRIZINE DIHYDROCHLORIDE 5 MG/1
5 TABLET, FILM COATED ORAL EVERY EVENING
Qty: 30 TABLET | Refills: 5 | Status: SHIPPED | OUTPATIENT
Start: 2021-02-23 | End: 2021-08-26 | Stop reason: SDUPTHER

## 2021-02-23 RX ORDER — DICLOFENAC SODIUM 75 MG/1
TABLET, DELAYED RELEASE ORAL
COMMUNITY
Start: 2021-02-03 | End: 2021-05-24 | Stop reason: SDUPTHER

## 2021-02-23 RX ORDER — PROMETHAZINE HYDROCHLORIDE 25 MG/1
25 TABLET ORAL EVERY 6 HOURS PRN
Qty: 20 TABLET | Refills: 1 | Status: SHIPPED | OUTPATIENT
Start: 2021-02-23 | End: 2021-05-03

## 2021-02-24 LAB
ALBUMIN SERPL-MCNC: 4.1 G/DL (ref 3.5–5.2)
ALBUMIN/GLOB SERPL: 1.8 G/DL
ALP SERPL-CCNC: 76 U/L (ref 39–117)
ALT SERPL W P-5'-P-CCNC: 41 U/L (ref 1–33)
ANION GAP SERPL CALCULATED.3IONS-SCNC: 11 MMOL/L (ref 5–15)
AST SERPL-CCNC: 26 U/L (ref 1–32)
BILIRUB SERPL-MCNC: 0.3 MG/DL (ref 0–1.2)
BUN SERPL-MCNC: 14 MG/DL (ref 8–23)
BUN/CREAT SERPL: 23 (ref 7–25)
CALCIUM SPEC-SCNC: 9.4 MG/DL (ref 8.6–10.5)
CHLORIDE SERPL-SCNC: 100 MMOL/L (ref 98–107)
CHOLEST SERPL-MCNC: 258 MG/DL (ref 0–200)
CO2 SERPL-SCNC: 29 MMOL/L (ref 22–29)
CREAT SERPL-MCNC: 0.61 MG/DL (ref 0.57–1)
DEPRECATED RDW RBC AUTO: 44.2 FL (ref 37–54)
ERYTHROCYTE [DISTWIDTH] IN BLOOD BY AUTOMATED COUNT: 13.3 % (ref 12.3–15.4)
GFR SERPL CREATININE-BSD FRML MDRD: 99 ML/MIN/1.73
GLOBULIN UR ELPH-MCNC: 2.3 GM/DL
GLUCOSE SERPL-MCNC: 81 MG/DL (ref 65–99)
HCT VFR BLD AUTO: 37.4 % (ref 34–46.6)
HDLC SERPL-MCNC: 87 MG/DL (ref 40–60)
HGB BLD-MCNC: 12.6 G/DL (ref 12–15.9)
LDLC SERPL CALC-MCNC: 158 MG/DL (ref 0–100)
LDLC/HDLC SERPL: 1.79 {RATIO}
MCH RBC QN AUTO: 30.7 PG (ref 26.6–33)
MCHC RBC AUTO-ENTMCNC: 33.7 G/DL (ref 31.5–35.7)
MCV RBC AUTO: 91 FL (ref 79–97)
PLATELET # BLD AUTO: 303 10*3/MM3 (ref 140–450)
PMV BLD AUTO: 9.4 FL (ref 6–12)
POTASSIUM SERPL-SCNC: 4.5 MMOL/L (ref 3.5–5.2)
PROT SERPL-MCNC: 6.4 G/DL (ref 6–8.5)
RBC # BLD AUTO: 4.11 10*6/MM3 (ref 3.77–5.28)
SODIUM SERPL-SCNC: 140 MMOL/L (ref 136–145)
TRIGL SERPL-MCNC: 78 MG/DL (ref 0–150)
TSH SERPL DL<=0.05 MIU/L-ACNC: 2.19 UIU/ML (ref 0.27–4.2)
VLDLC SERPL-MCNC: 13 MG/DL (ref 5–40)
WBC # BLD AUTO: 7.59 10*3/MM3 (ref 3.4–10.8)

## 2021-02-24 NOTE — PROGRESS NOTES
Chief Complaint  Hypothyroidism    Subjective          Yessica Galvin presents to [facility]  Hypothyroidism  This is a chronic problem. The current episode started more than 1 year ago. The problem has been unchanged. Associated symptoms include headaches, myalgias and numbness. Pertinent negatives include no chest pain, chills, congestion, coughing, fever or nausea. The treatment provided significant relief.   COPD  She complains of shortness of breath and wheezing. There is no cough, hemoptysis or sputum production. Primary symptoms comments: Is back on Symbicort which she prefers. This is a chronic problem. The current episode started more than 1 year ago. The problem occurs constantly. The problem has been unchanged. The cough is non-productive. Associated symptoms include headaches, myalgias and postnasal drip. Pertinent negatives include no chest pain, fever or PND. Her symptoms are aggravated by exercise. Her symptoms are alleviated by beta-agonist and steroid inhaler. She reports significant improvement on treatment. Her symptoms are not alleviated by steroid inhaler and beta-agonist. Risk factors for lung disease include smoking/tobacco exposure. Her past medical history is significant for COPD.   Anxiety  Presents for follow-up visit. Symptoms include nervous/anxious behavior and shortness of breath. Patient reports no chest pain, confusion, depressed mood, excessive worry, nausea or palpitations. The quality of sleep is fair. Nighttime awakenings: occasional.       Back Pain  This is a chronic problem. The current episode started more than 1 month ago. The problem occurs daily. The problem is unchanged. The pain is present in the lumbar spine. The quality of the pain is described as aching. The pain does not radiate. The pain is moderate. Associated symptoms include headaches and numbness. Pertinent negatives include no chest pain or fever. Risk factors include recent  "trauma. She has tried analgesics and home exercises for the symptoms. The treatment provided moderate relief.       Review of Systems   Constitutional: Negative for chills and fever.   HENT: Positive for postnasal drip. Negative for congestion.    Respiratory: Positive for shortness of breath and wheezing. Negative for cough, hemoptysis and sputum production.    Cardiovascular: Negative for chest pain, palpitations and PND.   Gastrointestinal: Negative for nausea.   Musculoskeletal: Positive for back pain and myalgias.   Neurological: Positive for numbness. Negative for confusion.   Psychiatric/Behavioral: Negative for depressed mood. The patient is nervous/anxious.      Objective   Vital Signs:   /74   Pulse 71   Temp 97.8 °F (36.6 °C)   Ht 165.1 cm (65\")   Wt 79.5 kg (175 lb 3.2 oz)   SpO2 96%   BMI 29.15 kg/m²     Physical Exam  Vitals signs and nursing note reviewed.   Constitutional:       Appearance: Normal appearance. She is well-developed.   HENT:      Head: Normocephalic.      Right Ear: External ear normal.      Left Ear: External ear normal.      Nose: Nose normal.   Eyes:      General: No scleral icterus.     Conjunctiva/sclera: Conjunctivae normal.      Pupils: Pupils are equal, round, and reactive to light.   Neck:      Musculoskeletal: Neck supple.      Thyroid: No thyromegaly.      Vascular: No carotid bruit.   Cardiovascular:      Rate and Rhythm: Normal rate and regular rhythm.   Pulmonary:      Effort: Pulmonary effort is normal.      Breath sounds: Normal breath sounds. No wheezing.   Abdominal:      General: Bowel sounds are normal.      Palpations: There is no mass.      Tenderness: There is no abdominal tenderness.   Musculoskeletal: Normal range of motion.      Right lower leg: No edema.      Left lower leg: No edema.   Skin:     General: Skin is warm and dry.      Findings: No rash.   Neurological:      General: No focal deficit present.      Mental Status: She is alert and " oriented to person, place, and time.      Comments: No focal deficits no lateralizing signs   Psychiatric:         Mood and Affect: Mood normal.         Behavior: Behavior is cooperative.        Result Review :                 Assessment and Plan    Diagnoses and all orders for this visit:    1. Acquired hypothyroidism (Primary)  -     levothyroxine (SYNTHROID, LEVOTHROID) 137 MCG tablet; Take 1 tablet by mouth Daily.  Dispense: 90 tablet; Refill: 1  -     Comprehensive Metabolic Panel  -     Lipid Panel  -     TSH  -     CBC (No Diff)    2. Lumbar disc disease  -     gabapentin (Neurontin) 600 MG tablet; Take 1 tablet by mouth 2 (Two) Times a Day.  Dispense: 60 tablet; Refill: 2  -     traMADol (ULTRAM) 50 MG tablet; Take 1 in the morning and 2 in the evening  Dispense: 90 tablet; Refill: 2    3. Generalized anxiety disorder  -     venlafaxine XR (Effexor XR) 150 MG 24 hr capsule; Take 1 capsule by mouth Daily.  Dispense: 30 capsule; Refill: 2    4. Chronic midline low back pain without sciatica    5. Moderate persistent asthmatic bronchitis without complication  -     levocetirizine (Xyzal) 5 MG tablet; Take 1 tablet by mouth Every Evening.  Dispense: 30 tablet; Refill: 5  -     CBC (No Diff)  -     budesonide-formoterol (Symbicort) 160-4.5 MCG/ACT inhaler; Inhale 2 puffs 2 (Two) Times a Day.  Dispense: 10.2 g; Refill: 5    Other orders  -     promethazine (PHENERGAN) 25 MG tablet; Take 1 tablet by mouth Every 6 (Six) Hours As Needed for Nausea or Vomiting.  Dispense: 20 tablet; Refill: 1        Follow Up   Return in about 3 months (around 5/23/2021).  Patient was given instructions and counseling regarding her condition or for health maintenance advice. Please see specific information pulled into the AVS if appropriate.

## 2021-04-11 DIAGNOSIS — M51.9 LUMBAR DISC DISEASE: ICD-10-CM

## 2021-04-11 DIAGNOSIS — S82.141A CLOSED FRACTURE OF RIGHT TIBIAL PLATEAU, INITIAL ENCOUNTER: ICD-10-CM

## 2021-04-12 RX ORDER — CYCLOBENZAPRINE HCL 10 MG
10 TABLET ORAL 2 TIMES DAILY PRN
Qty: 60 TABLET | Refills: 0 | Status: SHIPPED | OUTPATIENT
Start: 2021-04-12 | End: 2021-07-02 | Stop reason: SDUPTHER

## 2021-05-03 RX ORDER — PROMETHAZINE HYDROCHLORIDE 25 MG/1
TABLET ORAL
Qty: 20 TABLET | Refills: 0 | Status: SHIPPED | OUTPATIENT
Start: 2021-05-03 | End: 2021-05-27 | Stop reason: SDUPTHER

## 2021-05-24 ENCOUNTER — OFFICE VISIT (OUTPATIENT)
Dept: FAMILY MEDICINE CLINIC | Facility: CLINIC | Age: 61
End: 2021-05-24

## 2021-05-24 VITALS
DIASTOLIC BLOOD PRESSURE: 80 MMHG | OXYGEN SATURATION: 96 % | WEIGHT: 179 LBS | TEMPERATURE: 98.7 F | HEIGHT: 65 IN | BODY MASS INDEX: 29.82 KG/M2 | HEART RATE: 88 BPM | SYSTOLIC BLOOD PRESSURE: 138 MMHG

## 2021-05-24 DIAGNOSIS — M15.0 PRIMARY GENERALIZED (OSTEO)ARTHRITIS: ICD-10-CM

## 2021-05-24 DIAGNOSIS — F41.1 GENERALIZED ANXIETY DISORDER: ICD-10-CM

## 2021-05-24 DIAGNOSIS — M51.9 LUMBAR DISC DISEASE: Primary | ICD-10-CM

## 2021-05-24 PROCEDURE — 99214 OFFICE O/P EST MOD 30 MIN: CPT | Performed by: FAMILY MEDICINE

## 2021-05-24 RX ORDER — VENLAFAXINE HYDROCHLORIDE 150 MG/1
150 CAPSULE, EXTENDED RELEASE ORAL DAILY
Qty: 30 CAPSULE | Refills: 2 | Status: SHIPPED | OUTPATIENT
Start: 2021-05-24 | End: 2021-06-09 | Stop reason: SDUPTHER

## 2021-05-24 RX ORDER — GABAPENTIN 600 MG/1
600 TABLET ORAL 2 TIMES DAILY
Qty: 60 TABLET | Refills: 2 | Status: SHIPPED | OUTPATIENT
Start: 2021-05-24 | End: 2021-08-26 | Stop reason: SDUPTHER

## 2021-05-24 RX ORDER — TRAMADOL HYDROCHLORIDE 50 MG/1
TABLET ORAL
Qty: 90 TABLET | Refills: 2 | Status: SHIPPED | OUTPATIENT
Start: 2021-05-24 | End: 2021-08-26 | Stop reason: SDUPTHER

## 2021-05-24 RX ORDER — DICLOFENAC SODIUM 75 MG/1
75 TABLET, DELAYED RELEASE ORAL 2 TIMES DAILY
Qty: 60 TABLET | Refills: 3 | Status: SHIPPED | OUTPATIENT
Start: 2021-05-24 | End: 2021-08-26 | Stop reason: SDUPTHER

## 2021-05-25 NOTE — PROGRESS NOTES
"Chief Complaint  Hypothyroidism    Subjective          Yessica Galvin presents to [facility]  Hypothyroidism  This is a chronic problem. The problem occurs constantly. The problem has been unchanged. Associated symptoms include joint swelling. The treatment provided significant relief.   Arthritis  Presents for follow-up visit. She complains of pain, stiffness and joint swelling. The symptoms have been worsening. Affected locations include the right PIP, right knee and right ankle. Pertinent negatives include no dysuria, pain at night or Raynaud's syndrome.       Objective   Vital Signs:   /80   Pulse 88   Temp 98.7 °F (37.1 °C)   Ht 165.1 cm (65\")   Wt 81.2 kg (179 lb)   SpO2 96%   BMI 29.79 kg/m²     Physical Exam  Vitals and nursing note reviewed.   Constitutional:       Appearance: Normal appearance. She is well-developed.   HENT:      Head: Normocephalic.      Right Ear: External ear normal.      Left Ear: External ear normal.      Nose: Nose normal.   Eyes:      General: No scleral icterus.     Conjunctiva/sclera: Conjunctivae normal.      Pupils: Pupils are equal, round, and reactive to light.   Neck:      Thyroid: No thyromegaly.   Cardiovascular:      Rate and Rhythm: Normal rate and regular rhythm.   Pulmonary:      Effort: Pulmonary effort is normal.      Breath sounds: Normal breath sounds.   Musculoskeletal:         General: Tenderness present. Normal range of motion.      Cervical back: Neck supple.      Comments: Right 3rd finger PIP tender and swollen   Skin:     General: Skin is warm and dry.      Findings: No rash.   Neurological:      General: No focal deficit present.      Mental Status: She is alert and oriented to person, place, and time.      Comments: No focal deficits no lateralizing signs   Psychiatric:         Behavior: Behavior is cooperative.        Result Review :                 Assessment and Plan    Diagnoses and all orders for this " visit:    1. Lumbar disc disease (Primary)  -     diclofenac (VOLTAREN) 75 MG EC tablet; Take 1 tablet by mouth 2 (Two) Times a Day.  Dispense: 60 tablet; Refill: 3  -     traMADol (ULTRAM) 50 MG tablet; Take 1 in the morning and 2 in the evening  Dispense: 90 tablet; Refill: 2  -     gabapentin (Neurontin) 600 MG tablet; Take 1 tablet by mouth 2 (Two) Times a Day.  Dispense: 60 tablet; Refill: 2    2. Generalized anxiety disorder  -     venlafaxine XR (Effexor XR) 150 MG 24 hr capsule; Take 1 capsule by mouth Daily.  Dispense: 30 capsule; Refill: 2    3. Primary generalized (osteo)arthritis  -     diclofenac (VOLTAREN) 75 MG EC tablet; Take 1 tablet by mouth 2 (Two) Times a Day.  Dispense: 60 tablet; Refill: 3        Follow Up   Return in about 3 months (around 8/24/2021) for Medicare Wellness, fasting appt.  Patient was given instructions and counseling regarding her condition or for health maintenance advice. Please see specific information pulled into the AVS if appropriate.

## 2021-05-27 RX ORDER — PROMETHAZINE HYDROCHLORIDE 25 MG/1
25 TABLET ORAL EVERY 6 HOURS PRN
Qty: 20 TABLET | Refills: 0 | Status: SHIPPED | OUTPATIENT
Start: 2021-05-27 | End: 2021-06-10

## 2021-06-09 DIAGNOSIS — F41.1 GENERALIZED ANXIETY DISORDER: ICD-10-CM

## 2021-06-10 RX ORDER — PROMETHAZINE HYDROCHLORIDE 25 MG/1
TABLET ORAL
Qty: 20 TABLET | Refills: 0 | Status: SHIPPED | OUTPATIENT
Start: 2021-06-10 | End: 2021-07-02 | Stop reason: SDUPTHER

## 2021-06-10 RX ORDER — VENLAFAXINE HYDROCHLORIDE 150 MG/1
150 CAPSULE, EXTENDED RELEASE ORAL DAILY
Qty: 30 CAPSULE | Refills: 2 | Status: SHIPPED | OUTPATIENT
Start: 2021-06-10 | End: 2021-08-26 | Stop reason: SDUPTHER

## 2021-07-02 DIAGNOSIS — S82.141A CLOSED FRACTURE OF RIGHT TIBIAL PLATEAU, INITIAL ENCOUNTER: ICD-10-CM

## 2021-07-02 DIAGNOSIS — M51.9 LUMBAR DISC DISEASE: ICD-10-CM

## 2021-07-02 RX ORDER — PROMETHAZINE HYDROCHLORIDE 25 MG/1
25 TABLET ORAL EVERY 6 HOURS PRN
Qty: 20 TABLET | Refills: 1 | Status: SHIPPED | OUTPATIENT
Start: 2021-07-02 | End: 2021-07-18 | Stop reason: SDUPTHER

## 2021-07-02 RX ORDER — CYCLOBENZAPRINE HCL 10 MG
10 TABLET ORAL 2 TIMES DAILY PRN
Qty: 60 TABLET | Refills: 0 | Status: SHIPPED | OUTPATIENT
Start: 2021-07-02 | End: 2021-09-07

## 2021-07-13 ENCOUNTER — OFFICE VISIT (OUTPATIENT)
Dept: FAMILY MEDICINE CLINIC | Facility: CLINIC | Age: 61
End: 2021-07-13

## 2021-07-13 VITALS
TEMPERATURE: 98.4 F | RESPIRATION RATE: 16 BRPM | OXYGEN SATURATION: 96 % | BODY MASS INDEX: 32.94 KG/M2 | SYSTOLIC BLOOD PRESSURE: 118 MMHG | DIASTOLIC BLOOD PRESSURE: 88 MMHG | HEIGHT: 62 IN | WEIGHT: 179 LBS

## 2021-07-13 DIAGNOSIS — R10.10 PAIN OF UPPER ABDOMEN: Primary | ICD-10-CM

## 2021-07-13 DIAGNOSIS — J45.40 MODERATE PERSISTENT ASTHMATIC BRONCHITIS WITHOUT COMPLICATION: ICD-10-CM

## 2021-07-13 DIAGNOSIS — R11.0 NAUSEA IN ADULT: ICD-10-CM

## 2021-07-13 LAB
BASOPHILS # BLD AUTO: 0.03 10*3/MM3 (ref 0–0.2)
BASOPHILS NFR BLD AUTO: 0.5 % (ref 0–1.5)
DEPRECATED RDW RBC AUTO: 47.8 FL (ref 37–54)
EOSINOPHIL # BLD AUTO: 0.25 10*3/MM3 (ref 0–0.4)
EOSINOPHIL NFR BLD AUTO: 4.1 % (ref 0.3–6.2)
ERYTHROCYTE [DISTWIDTH] IN BLOOD BY AUTOMATED COUNT: 13.3 % (ref 12.3–15.4)
HCT VFR BLD AUTO: 39.5 % (ref 34–46.6)
HGB BLD-MCNC: 12.9 G/DL (ref 12–15.9)
IMM GRANULOCYTES # BLD AUTO: 0.02 10*3/MM3 (ref 0–0.05)
IMM GRANULOCYTES NFR BLD AUTO: 0.3 % (ref 0–0.5)
LYMPHOCYTES # BLD AUTO: 2.24 10*3/MM3 (ref 0.7–3.1)
LYMPHOCYTES NFR BLD AUTO: 36.5 % (ref 19.6–45.3)
MCH RBC QN AUTO: 31.4 PG (ref 26.6–33)
MCHC RBC AUTO-ENTMCNC: 32.7 G/DL (ref 31.5–35.7)
MCV RBC AUTO: 96.1 FL (ref 79–97)
MONOCYTES # BLD AUTO: 0.49 10*3/MM3 (ref 0.1–0.9)
MONOCYTES NFR BLD AUTO: 8 % (ref 5–12)
NEUTROPHILS NFR BLD AUTO: 3.1 10*3/MM3 (ref 1.7–7)
NEUTROPHILS NFR BLD AUTO: 50.6 % (ref 42.7–76)
NRBC BLD AUTO-RTO: 0 /100 WBC (ref 0–0.2)
PLATELET # BLD AUTO: 305 10*3/MM3 (ref 140–450)
PMV BLD AUTO: 10.2 FL (ref 6–12)
RBC # BLD AUTO: 4.11 10*6/MM3 (ref 3.77–5.28)
WBC # BLD AUTO: 6.13 10*3/MM3 (ref 3.4–10.8)

## 2021-07-13 PROCEDURE — 80053 COMPREHEN METABOLIC PANEL: CPT | Performed by: FAMILY MEDICINE

## 2021-07-13 PROCEDURE — 83690 ASSAY OF LIPASE: CPT | Performed by: FAMILY MEDICINE

## 2021-07-13 PROCEDURE — 82150 ASSAY OF AMYLASE: CPT | Performed by: FAMILY MEDICINE

## 2021-07-13 PROCEDURE — 85025 COMPLETE CBC W/AUTO DIFF WBC: CPT | Performed by: FAMILY MEDICINE

## 2021-07-13 PROCEDURE — 99214 OFFICE O/P EST MOD 30 MIN: CPT | Performed by: FAMILY MEDICINE

## 2021-07-13 PROCEDURE — 36415 COLL VENOUS BLD VENIPUNCTURE: CPT | Performed by: FAMILY MEDICINE

## 2021-07-13 RX ORDER — ALBUTEROL SULFATE 90 UG/1
2 AEROSOL, METERED RESPIRATORY (INHALATION) EVERY 6 HOURS PRN
Qty: 18 G | Refills: 4 | Status: SHIPPED | OUTPATIENT
Start: 2021-07-13 | End: 2021-12-16

## 2021-07-13 RX ORDER — OMEPRAZOLE 40 MG/1
40 CAPSULE, DELAYED RELEASE ORAL DAILY
Qty: 30 CAPSULE | Refills: 1 | Status: SHIPPED | OUTPATIENT
Start: 2021-07-13 | End: 2021-08-26 | Stop reason: SDUPTHER

## 2021-07-13 RX ORDER — BUDESONIDE AND FORMOTEROL FUMARATE DIHYDRATE 160; 4.5 UG/1; UG/1
2 AEROSOL RESPIRATORY (INHALATION)
Qty: 10.2 G | Refills: 5 | Status: SHIPPED | OUTPATIENT
Start: 2021-07-13 | End: 2021-11-30

## 2021-07-13 NOTE — PROGRESS NOTES
"Chief Complaint  Abdominal Pain (2 weeks) and Nausea (2 weeks)    Subjective          Yessica Galvin presents to [facility]  History of Present Illness    Abdominal pain and nausea  The patient is here for evaluation of abdominal pain and nausea. This has been going on for a while, but she had increase in her symptoms yesterday. She has been vomiting. She is having loose bowel movements, but she states she always has loose bowels. She has lost weight. Her appetite is decreased. She may only eat a piece of bread or about 5 crackers a day to help her stomach.     Medication review  Medication list was reviewed with the patient and updated in the chart.     Objective   Vital Signs:   /88   Temp 98.4 °F (36.9 °C) (Temporal)   Resp 16   Ht 157.5 cm (62\")   Wt 81.2 kg (179 lb)   SpO2 96%   PF 69 L/min   BMI 32.74 kg/m²     Physical Exam  Vitals and nursing note reviewed.   Constitutional:       Appearance: Normal appearance. She is well-developed.   HENT:      Head: Normocephalic and atraumatic.   Eyes:      General: No scleral icterus.     Conjunctiva/sclera: Conjunctivae normal.      Pupils: Pupils are equal, round, and reactive to light.   Neck:      Thyroid: No thyromegaly.      Vascular: No carotid bruit.   Cardiovascular:      Rate and Rhythm: Normal rate and regular rhythm.   Pulmonary:      Effort: Pulmonary effort is normal.      Breath sounds: Normal breath sounds.   Abdominal:      General: Bowel sounds are normal.      Palpations: There is no mass.      Tenderness: There is abdominal tenderness.      Comments: Deep tenderness in the epigastric region she is status post cholecystectomy   Musculoskeletal:         General: Normal range of motion.      Cervical back: Neck supple.   Skin:     General: Skin is warm and dry.      Findings: No rash.   Neurological:      General: No focal deficit present.      Mental Status: She is alert and oriented to person, place, and " time.      Comments: No focal deficits no lateralizing signs   Psychiatric:         Behavior: Behavior is cooperative.        Result Review :                 Assessment and Plan    Diagnoses and all orders for this visit:    1. Pain of upper abdomen (Primary)  -     CBC & Differential  -     Comprehensive Metabolic Panel  -     Amylase  -     CT Abdomen Pelvis Without Contrast; Future  -     Lipase  -     Ambulatory referral for Screening EGD    2. Nausea in adult  -     Amylase  -     Lipase  -     Ambulatory referral for Screening EGD    Other orders  -     omeprazole (priLOSEC) 40 MG capsule; Take 1 capsule by mouth Daily.  Dispense: 30 capsule; Refill: 1        Follow Up   No follow-ups on file.  Patient was given instructions and counseling regarding her condition or for health maintenance advice. Please see specific information pulled into the AVS if appropriate.     Scribed for Kota Swain MD by Maria Luz Ridley.  07/13/21   18:03 EDT    I have personally performed the services described in this document as scribed by the above individual, and it is both accurate and complete.  Kota Swain MD  7/14/2021  12:07 EDT

## 2021-07-14 LAB
ALBUMIN SERPL-MCNC: 4.2 G/DL (ref 3.5–5.2)
ALBUMIN/GLOB SERPL: 1.9 G/DL
ALP SERPL-CCNC: 76 U/L (ref 39–117)
ALT SERPL W P-5'-P-CCNC: 22 U/L (ref 1–33)
AMYLASE SERPL-CCNC: 27 U/L (ref 28–100)
ANION GAP SERPL CALCULATED.3IONS-SCNC: 7.3 MMOL/L (ref 5–15)
AST SERPL-CCNC: 21 U/L (ref 1–32)
BILIRUB SERPL-MCNC: 0.3 MG/DL (ref 0–1.2)
BUN SERPL-MCNC: 9 MG/DL (ref 8–23)
BUN/CREAT SERPL: 14.8 (ref 7–25)
CALCIUM SPEC-SCNC: 8.9 MG/DL (ref 8.6–10.5)
CHLORIDE SERPL-SCNC: 105 MMOL/L (ref 98–107)
CO2 SERPL-SCNC: 26.7 MMOL/L (ref 22–29)
CREAT SERPL-MCNC: 0.61 MG/DL (ref 0.57–1)
GFR SERPL CREATININE-BSD FRML MDRD: 99 ML/MIN/1.73
GLOBULIN UR ELPH-MCNC: 2.2 GM/DL
GLUCOSE SERPL-MCNC: 82 MG/DL (ref 65–99)
LIPASE SERPL-CCNC: 21 U/L (ref 13–60)
POTASSIUM SERPL-SCNC: 4 MMOL/L (ref 3.5–5.2)
PROT SERPL-MCNC: 6.4 G/DL (ref 6–8.5)
SODIUM SERPL-SCNC: 139 MMOL/L (ref 136–145)

## 2021-07-19 ENCOUNTER — OFFICE VISIT (OUTPATIENT)
Dept: ORTHOPEDIC SURGERY | Facility: CLINIC | Age: 61
End: 2021-07-19

## 2021-07-19 VITALS
HEIGHT: 62 IN | BODY MASS INDEX: 32.94 KG/M2 | WEIGHT: 179.01 LBS | OXYGEN SATURATION: 99 % | DIASTOLIC BLOOD PRESSURE: 90 MMHG | HEART RATE: 97 BPM | SYSTOLIC BLOOD PRESSURE: 165 MMHG

## 2021-07-19 DIAGNOSIS — M17.31 POST-TRAUMATIC OSTEOARTHRITIS OF RIGHT KNEE: ICD-10-CM

## 2021-07-19 DIAGNOSIS — M25.561 RIGHT KNEE PAIN, UNSPECIFIED CHRONICITY: ICD-10-CM

## 2021-07-19 DIAGNOSIS — Z09 SURGERY FOLLOW-UP: Primary | ICD-10-CM

## 2021-07-19 PROCEDURE — 99213 OFFICE O/P EST LOW 20 MIN: CPT | Performed by: ORTHOPAEDIC SURGERY

## 2021-07-19 RX ORDER — PROMETHAZINE HYDROCHLORIDE 25 MG/1
25 TABLET ORAL EVERY 6 HOURS PRN
Qty: 20 TABLET | Refills: 0 | Status: SHIPPED | OUTPATIENT
Start: 2021-07-19 | End: 2021-08-03 | Stop reason: SDUPTHER

## 2021-07-19 NOTE — PROGRESS NOTES
"      [facility] Note    Subjective     CC: Follow-up (1 year follow up; 16 months status post KNEE ARTHROSCOPY RIGHT 3-)      CUCO Galvin is a 60 y.o. female.  About a month ago she started having increased right knee pain.  It started after moving a bathroom rug with her leg.    Review of Systems   Constitutional: Negative.  Negative for chills, fatigue and fever.   HENT: Negative.  Negative for congestion and dental problem.    Eyes: Negative.  Negative for blurred vision.   Respiratory: Negative.  Negative for shortness of breath.    Cardiovascular: Negative.  Negative for leg swelling.   Gastrointestinal: Negative.  Negative for abdominal pain.   Endocrine: Negative.  Negative for polyuria.   Genitourinary: Negative.  Negative for difficulty urinating.   Musculoskeletal: Positive for arthralgias.   Skin: Negative.    Allergic/Immunologic: Negative.    Neurological: Negative.    Hematological: Negative.  Negative for adenopathy.   Psychiatric/Behavioral: Negative.  Negative for behavioral problems.       ROS:    Constiutional:Pt denies fever, chills, nausea, or vomiting.  MSK:as above      Objective      Past Medical History  Past Medical History:   Diagnosis Date   • Acute bronchitis    • Acute sinusitis    • Asthma    • Asthma with acute exacerbation    • Asthmatic bronchitis    • Disc degeneration, lumbar    • Disc degeneration, lumbar    • History of mammogram    • Hypothyroidism    • Lower back pain    • Plantar fasciitis    • Restless legs syndrome          Physical Exam  /90   Pulse 97   Ht 157.5 cm (62.01\")   Wt 81.2 kg (179 lb 0.2 oz)   SpO2 99%   BMI 32.73 kg/m²     Body mass index is 32.73 kg/m².    Patient is well nourished and well developed.        Ortho Exam  No swelling no effusion right knee.  Stable ligaments.    Imaging/Labs/EMG Reviewed:  Imaging Results (Last 24 Hours)     Procedure Component Value Units Date/Time    XR Knee 1 or 2 View Right " [[id number]] Resulted: 07/19/21 1423     Updated: 07/19/21 1426    Narrative:      Knee X-Ray  Indication: Pain    Upright AP . Lateral,  views of right knee     Findings:  Healed proximal tibial plateau fracture with plate and screws in place.  Minimal arthritic changes  No bony lesion    prior studies were available for comparison.            Assessment:  1. Surgery follow-up    2. Right knee pain, unspecified chronicity    3. Post-traumatic osteoarthritis of right knee        Plan:  1. Recommend over the counter anti-inflammatories for pain and/or swelling  2. I have ordered physical therapy.  She will do that at [facility].    Follow Up:   Return in about 1 month (around 8/19/2021).      Medical Decision Making  Management Options : Low - OT or PT Therapy         Guanaco Thakur M.D., FAAOS  Orthopedic Surgeon  Fellowship Trained Sports Medicine  [facility]  Orthopedics and Sports Medicine  [address] [place] [address], [address]  [place] [address]

## 2021-07-25 ENCOUNTER — APPOINTMENT (OUTPATIENT)
Dept: PREADMISSION TESTING | Facility: HOSPITAL | Age: 61
End: 2021-07-25

## 2021-07-25 LAB — SARS-COV-2 RNA PNL SPEC NAA+PROBE: NOT DETECTED

## 2021-07-25 PROCEDURE — U0004 COV-19 TEST NON-CDC HGH THRU: HCPCS

## 2021-07-25 PROCEDURE — C9803 HOPD COVID-19 SPEC COLLECT: HCPCS

## 2021-07-27 ENCOUNTER — OUTSIDE FACILITY SERVICE (OUTPATIENT)
Dept: GASTROENTEROLOGY | Facility: CLINIC | Age: 61
End: 2021-07-27

## 2021-07-27 PROCEDURE — 43239 EGD BIOPSY SINGLE/MULTIPLE: CPT | Performed by: INTERNAL MEDICINE

## 2021-07-27 PROCEDURE — 88305 TISSUE EXAM BY PATHOLOGIST: CPT | Performed by: INTERNAL MEDICINE

## 2021-07-28 ENCOUNTER — LAB REQUISITION (OUTPATIENT)
Dept: LAB | Facility: HOSPITAL | Age: 61
End: 2021-07-28

## 2021-07-28 DIAGNOSIS — R10.10 UPPER ABDOMINAL PAIN, UNSPECIFIED: ICD-10-CM

## 2021-07-28 DIAGNOSIS — R11.0 NAUSEA: ICD-10-CM

## 2021-07-29 ENCOUNTER — TELEPHONE (OUTPATIENT)
Dept: GASTROENTEROLOGY | Facility: CLINIC | Age: 61
End: 2021-07-29

## 2021-07-29 DIAGNOSIS — R10.10 UPPER ABDOMINAL PAIN: ICD-10-CM

## 2021-07-29 DIAGNOSIS — R10.13 DYSPEPSIA: ICD-10-CM

## 2021-07-29 DIAGNOSIS — R11.0 NAUSEA: Primary | ICD-10-CM

## 2021-07-29 LAB
CYTO UR: NORMAL
LAB AP CASE REPORT: NORMAL
LAB AP CLINICAL INFORMATION: NORMAL
LAB AP DIAGNOSIS COMMENT: NORMAL
PATH REPORT.FINAL DX SPEC: NORMAL
PATH REPORT.GROSS SPEC: NORMAL

## 2021-07-29 NOTE — TELEPHONE ENCOUNTER
I spoke was Ms. Galvin this afternoon.  She did have a very short segment March's esophagus and will continue the proton pump medication.  The patient agrees to having a 4 hour gastric emptying study.  The patient admits to some nausea in the morning.

## 2021-08-02 ENCOUNTER — HOSPITAL ENCOUNTER (OUTPATIENT)
Dept: CT IMAGING | Facility: HOSPITAL | Age: 61
Discharge: HOME | End: 2021-08-02
Admitting: FAMILY MEDICINE

## 2021-08-02 DIAGNOSIS — R10.10 PAIN OF UPPER ABDOMEN: ICD-10-CM

## 2021-08-02 PROCEDURE — 74176 CT ABD & PELVIS W/O CONTRAST: CPT

## 2021-08-03 RX ORDER — PROMETHAZINE HYDROCHLORIDE 25 MG/1
25 TABLET ORAL EVERY 6 HOURS PRN
Qty: 20 TABLET | Refills: 0 | Status: SHIPPED | OUTPATIENT
Start: 2021-08-03 | End: 2021-08-24 | Stop reason: SDUPTHER

## 2021-08-24 RX ORDER — PROMETHAZINE HYDROCHLORIDE 25 MG/1
25 TABLET ORAL EVERY 6 HOURS PRN
Qty: 30 TABLET | Refills: 0 | Status: SHIPPED | OUTPATIENT
Start: 2021-08-24 | End: 2021-09-05 | Stop reason: SDUPTHER

## 2021-08-26 ENCOUNTER — OFFICE VISIT (OUTPATIENT)
Dept: FAMILY MEDICINE CLINIC | Facility: CLINIC | Age: 61
End: 2021-08-26

## 2021-08-26 VITALS
DIASTOLIC BLOOD PRESSURE: 80 MMHG | TEMPERATURE: 97.7 F | HEART RATE: 88 BPM | RESPIRATION RATE: 18 BRPM | SYSTOLIC BLOOD PRESSURE: 146 MMHG | BODY MASS INDEX: 32.5 KG/M2 | HEIGHT: 62 IN | OXYGEN SATURATION: 97 % | WEIGHT: 176.6 LBS

## 2021-08-26 DIAGNOSIS — M15.0 PRIMARY GENERALIZED (OSTEO)ARTHRITIS: ICD-10-CM

## 2021-08-26 DIAGNOSIS — M51.9 LUMBAR DISC DISEASE: ICD-10-CM

## 2021-08-26 DIAGNOSIS — E55.9 VITAMIN D DEFICIENCY: ICD-10-CM

## 2021-08-26 DIAGNOSIS — I70.223 REST PAIN OF BOTH LOWER EXTREMITIES DUE TO ATHEROSCLEROSIS (HCC): ICD-10-CM

## 2021-08-26 DIAGNOSIS — F41.1 GENERALIZED ANXIETY DISORDER: ICD-10-CM

## 2021-08-26 DIAGNOSIS — J45.40 MODERATE PERSISTENT ASTHMATIC BRONCHITIS WITHOUT COMPLICATION: ICD-10-CM

## 2021-08-26 DIAGNOSIS — M10.00 IDIOPATHIC GOUT, UNSPECIFIED CHRONICITY, UNSPECIFIED SITE: ICD-10-CM

## 2021-08-26 DIAGNOSIS — E03.9 ACQUIRED HYPOTHYROIDISM: ICD-10-CM

## 2021-08-26 DIAGNOSIS — Z00.00 MEDICARE ANNUAL WELLNESS VISIT, SUBSEQUENT: Primary | ICD-10-CM

## 2021-08-26 DIAGNOSIS — Z13.220 LIPID SCREENING: ICD-10-CM

## 2021-08-26 DIAGNOSIS — J45.20 MILD INTERMITTENT ASTHMA, UNSPECIFIED WHETHER COMPLICATED: ICD-10-CM

## 2021-08-26 PROCEDURE — 1160F RVW MEDS BY RX/DR IN RCRD: CPT | Performed by: FAMILY MEDICINE

## 2021-08-26 PROCEDURE — 1125F AMNT PAIN NOTED PAIN PRSNT: CPT | Performed by: FAMILY MEDICINE

## 2021-08-26 PROCEDURE — G0438 PPPS, INITIAL VISIT: HCPCS | Performed by: FAMILY MEDICINE

## 2021-08-26 PROCEDURE — 96160 PT-FOCUSED HLTH RISK ASSMT: CPT | Performed by: FAMILY MEDICINE

## 2021-08-26 PROCEDURE — 1170F FXNL STATUS ASSESSED: CPT | Performed by: FAMILY MEDICINE

## 2021-08-26 RX ORDER — LEVOCETIRIZINE DIHYDROCHLORIDE 5 MG/1
5 TABLET, FILM COATED ORAL EVERY EVENING
Qty: 90 TABLET | Refills: 1 | Status: SHIPPED | OUTPATIENT
Start: 2021-08-26 | End: 2022-02-01 | Stop reason: SDUPTHER

## 2021-08-26 RX ORDER — OMEPRAZOLE 40 MG/1
40 CAPSULE, DELAYED RELEASE ORAL DAILY
Qty: 90 CAPSULE | Refills: 1 | Status: SHIPPED | OUTPATIENT
Start: 2021-08-26 | End: 2022-02-01 | Stop reason: SDUPTHER

## 2021-08-26 RX ORDER — LEVOTHYROXINE SODIUM 137 UG/1
137 TABLET ORAL DAILY
Qty: 90 TABLET | Refills: 1 | Status: SHIPPED | OUTPATIENT
Start: 2021-08-26 | End: 2022-02-01 | Stop reason: SDUPTHER

## 2021-08-26 RX ORDER — TRAMADOL HYDROCHLORIDE 50 MG/1
TABLET ORAL
Qty: 90 TABLET | Refills: 2 | Status: SHIPPED | OUTPATIENT
Start: 2021-08-26 | End: 2021-11-30 | Stop reason: SDUPTHER

## 2021-08-26 RX ORDER — VENLAFAXINE HYDROCHLORIDE 150 MG/1
150 CAPSULE, EXTENDED RELEASE ORAL DAILY
Qty: 90 CAPSULE | Refills: 1 | Status: SHIPPED | OUTPATIENT
Start: 2021-08-26 | End: 2022-03-13 | Stop reason: SDUPTHER

## 2021-08-26 RX ORDER — DICLOFENAC SODIUM 75 MG/1
75 TABLET, DELAYED RELEASE ORAL 2 TIMES DAILY
Qty: 60 TABLET | Refills: 3 | Status: SHIPPED | OUTPATIENT
Start: 2021-08-26 | End: 2021-11-24 | Stop reason: SDUPTHER

## 2021-08-26 RX ORDER — GABAPENTIN 600 MG/1
600 TABLET ORAL 2 TIMES DAILY
Qty: 60 TABLET | Refills: 3 | Status: SHIPPED | OUTPATIENT
Start: 2021-08-26 | End: 2021-11-24 | Stop reason: SDUPTHER

## 2021-08-26 NOTE — PROGRESS NOTES
The ABCs of the Annual Wellness Visit  Subsequent Medicare Wellness Visit    Chief Complaint   Patient presents with   • Annual Exam       Subjective   History of Present Illness:  Yessica Galvin is a 60 y.o. female who presents for a Subsequent Medicare Wellness Visit.    HEALTH RISK ASSESSMENT    Recent Hospitalizations:  No hospitalization(s) within the last year.    Current Medical Providers:  Patient Care Team:  Kota Swain MD as PCP - Atul Cortez MD as Consulting Physician (Gastroenterology)    Smoking Status:  Social History     Tobacco Use   Smoking Status Former Smoker   • Packs/day: 1.00   • Years: 10.00   • Pack years: 10.00   • Types: Cigarettes   • Start date: 3/7/2000   • Quit date:    • Years since quittin.6   Smokeless Tobacco Never Used       Alcohol Consumption:  Social History     Substance and Sexual Activity   Alcohol Use No       Depression Screen:   PHQ-2/PHQ-9 Depression Screening 2021   Little interest or pleasure in doing things 0   Feeling down, depressed, or hopeless 0   Total Score 0       Fall Risk Screen:  STEADI Fall Risk Assessment has not been completed.    Health Habits and Functional and Cognitive Screening:  Functional & Cognitive Status 2021   Do you have difficulty preparing food and eating? No   Do you have difficulty bathing yourself, getting dressed or grooming yourself? No   Do you have difficulty using the toilet? No   Do you have difficulty moving around from place to place? No   Do you have trouble with steps or getting out of a bed or a chair? No   Current Diet Well Balanced Diet   Dental Exam Up to date   Eye Exam Not up to date   Exercise (times per week) 7 times per week   Current Exercises Include Walking   Do you need help using the phone?  No   Are you deaf or do you have serious difficulty hearing?  No   Do you need help with transportation? No   Do you need help shopping? No   Do you need help preparing meals?  No   Do  you need help with housework?  No   Do you need help with laundry? No   Do you need help taking your medications? No   Do you need help managing money? No   Do you ever drive or ride in a car without wearing a seat belt? No         Does the patient have evidence of cognitive impairment? No    Asprin use counseling:Taking ASA appropriately as indicated    Age-appropriate Screening Schedule:  Refer to the list below for future screening recommendations based on patient's age, sex and/or medical conditions. Orders for these recommended tests are listed in the plan section. The patient has been provided with a written plan.    Health Maintenance   Topic Date Due   • INFLUENZA VACCINE  10/01/2021   • MAMMOGRAM  06/23/2022   • TDAP/TD VACCINES (3 - Td or Tdap) 04/30/2028   • ZOSTER VACCINE  Completed   • PAP SMEAR  Discontinued          The following portions of the patient's history were reviewed and updated as appropriate: allergies, current medications, past family history, past medical history, past social history, past surgical history and problem list.    Outpatient Medications Prior to Visit   Medication Sig Dispense Refill   • acetaminophen (TYLENOL) 325 MG tablet Take 2 tablets by mouth Every 4 (Four) Hours As Needed for Mild Pain .     • albuterol (PROVENTIL) (2.5 MG/3ML) 0.083% nebulizer solution Take 2.5 mg by nebulization Every 6 (Six) Hours As Needed for Wheezing. 360 mL 1   • albuterol sulfate  (90 Base) MCG/ACT inhaler Inhale 2 puffs Every 6 (Six) Hours As Needed for Wheezing. 18 g 4   • budesonide-formoterol (Symbicort) 160-4.5 MCG/ACT inhaler Inhale 2 puffs 2 (Two) Times a Day. 10.2 g 5   • cyclobenzaprine (FLEXERIL) 10 MG tablet Take 1 tablet by mouth 2 (Two) Times a Day As Needed for Muscle Spasms. 60 tablet 0   • promethazine (PHENERGAN) 25 MG tablet Take 1 tablet by mouth Every 6 (Six) Hours As Needed for Nausea or Vomiting. 30 tablet 0   • saccharomyces boulardii (FLORASTOR) 250 MG capsule  Take 1 capsule by mouth 2 (Two) Times a Day.     • diclofenac (VOLTAREN) 75 MG EC tablet Take 1 tablet by mouth 2 (Two) Times a Day. 60 tablet 3   • gabapentin (Neurontin) 600 MG tablet Take 1 tablet by mouth 2 (Two) Times a Day. 60 tablet 2   • hydrOXYzine (ATARAX) 25 MG tablet Take 1 tablet by mouth 3 (Three) Times a Day As Needed for Itching.     • levocetirizine (Xyzal) 5 MG tablet Take 1 tablet by mouth Every Evening. 30 tablet 5   • levothyroxine (SYNTHROID, LEVOTHROID) 137 MCG tablet Take 1 tablet by mouth Daily. 90 tablet 1   • omeprazole (priLOSEC) 40 MG capsule Take 1 capsule by mouth Daily. 30 capsule 1   • traMADol (ULTRAM) 50 MG tablet Take 1 in the morning and 2 in the evening 90 tablet 2   • venlafaxine XR (Effexor XR) 150 MG 24 hr capsule Take 1 capsule by mouth Daily. 30 capsule 2     No facility-administered medications prior to visit.       Patient Active Problem List   Diagnosis   • Asthmatic bronchitis   • Generalized anxiety disorder   • Gout   • Headache   • Acquired hypothyroidism   • Chronic midline low back pain without sciatica   • Seasonal allergic rhinitis   • Cervical disc disorder with radiculopathy   • Lumbar disc disease   • Left radial fracture   • Asthma   • Tobacco abuse   • Elevated transaminase level   • Chronic back pain   • Closed fracture of right tibial plateau       Advanced Care Planning:  ACP discussion was held with the patient during this visit. Patient does not have an advance directive, information provided.    Review of Systems   Constitutional: Negative for activity change and unexpected weight change.   HENT: Positive for postnasal drip. Negative for congestion and sore throat.    Eyes: Negative for pain and visual disturbance.        Eye exam is due   Respiratory: Positive for cough and wheezing. Negative for shortness of breath.    Cardiovascular: Negative for chest pain, palpitations and leg swelling.   Gastrointestinal: Negative for diarrhea, nausea and  "vomiting.        Recent EGD showed March's   Endocrine: Negative for cold intolerance and heat intolerance.   Genitourinary: Negative for dysuria and hematuria.   Musculoskeletal: Negative for arthralgias and joint swelling.   Skin: Negative for color change and rash.   Allergic/Immunologic: Negative for environmental allergies and food allergies.   Neurological: Negative for syncope and headaches.   Hematological: Negative for adenopathy. Does not bruise/bleed easily.   Psychiatric/Behavioral: Negative for dysphoric mood and sleep disturbance. The patient is not nervous/anxious.        Compared to one year ago, the patient feels her physical health is worse.  Compared to one year ago, the patient feels her mental health is the same.    Reviewed chart for potential of high risk medication in the elderly: yes  Reviewed chart for potential of harmful drug interactions in the elderly:not applicable    Objective         Vitals:    08/26/21 1303   BP: 146/80   Pulse: 88   Resp: 18   Temp: 97.7 °F (36.5 °C)   SpO2: 97%   Weight: 80.1 kg (176 lb 9.6 oz)   Height: 157.5 cm (62.01\")       Body mass index is 32.29 kg/m².  Discussed the patient's BMI with her. The BMI is above average; BMI management plan is completed.    Physical Exam  Vitals and nursing note reviewed.   Constitutional:       Appearance: Normal appearance. She is well-developed.   HENT:      Head: Normocephalic and atraumatic.      Right Ear: External ear normal.      Left Ear: External ear normal.      Nose: Nose normal.   Eyes:      General: No scleral icterus.     Conjunctiva/sclera: Conjunctivae normal.      Pupils: Pupils are equal, round, and reactive to light.   Neck:      Thyroid: No thyromegaly.      Vascular: No carotid bruit.   Cardiovascular:      Rate and Rhythm: Normal rate and regular rhythm.      Comments: Diminished peripheral pulses  Pulmonary:      Effort: Pulmonary effort is normal.      Breath sounds: Normal breath sounds. "   Musculoskeletal:         General: Normal range of motion.      Cervical back: Neck supple.   Skin:     General: Skin is warm and dry.      Findings: No rash.   Neurological:      General: No focal deficit present.      Mental Status: She is alert and oriented to person, place, and time.      Comments: No focal deficits no lateralizing signs   Psychiatric:         Behavior: Behavior is cooperative.               Assessment/Plan   Medicare Risks and Personalized Health Plan  CMS Preventative Services Quick Reference  Breast Cancer/Mammogram Screening  Cardiovascular risk  Colon Cancer Screening  Lung Cancer Risk    The above risks/problems have been discussed with the patient.  Pertinent information has been shared with the patient in the After Visit Summary.  Follow up plans and orders are seen below in the Assessment/Plan Section.    Diagnoses and all orders for this visit:    1. Medicare annual wellness visit, subsequent (Primary)  -     Comprehensive Metabolic Panel  -     Lipid Panel  -     TSH  -     CBC (No Diff)  -     POC Urinalysis Dipstick, Automated  -     Uric Acid    2. Acquired hypothyroidism  -     TSH  -     levothyroxine (SYNTHROID, LEVOTHROID) 137 MCG tablet; Take 1 tablet by mouth Daily.  Dispense: 90 tablet; Refill: 1    3. Mild intermittent asthma, unspecified whether complicated    4. Idiopathic gout, unspecified chronicity, unspecified site  -     Uric Acid    5. Vitamin D deficiency  -     Vitamin D 25 Hydroxy    6. Lipid screening  -     Lipid Panel    7. Lumbar disc disease  -     diclofenac (VOLTAREN) 75 MG EC tablet; Take 1 tablet by mouth 2 (Two) Times a Day.  Dispense: 60 tablet; Refill: 3  -     gabapentin (Neurontin) 600 MG tablet; Take 1 tablet by mouth 2 (Two) Times a Day.  Dispense: 60 tablet; Refill: 3  -     traMADol (ULTRAM) 50 MG tablet; Take 1 in the morning and 2 in the evening  Dispense: 90 tablet; Refill: 2    8. Primary generalized (osteo)arthritis  -     diclofenac  (VOLTAREN) 75 MG EC tablet; Take 1 tablet by mouth 2 (Two) Times a Day.  Dispense: 60 tablet; Refill: 3    9. Moderate persistent asthmatic bronchitis without complication  -     levocetirizine (Xyzal) 5 MG tablet; Take 1 tablet by mouth Every Evening.  Dispense: 90 tablet; Refill: 1    10. Generalized anxiety disorder  -     venlafaxine XR (Effexor XR) 150 MG 24 hr capsule; Take 1 capsule by mouth Daily.  Dispense: 90 capsule; Refill: 1    Other orders  -     omeprazole (priLOSEC) 40 MG capsule; Take 1 capsule by mouth Daily.  Dispense: 90 capsule; Refill: 1      Follow Up:  Return in about 4 months (around 12/26/2021) for Recheck.     An After Visit Summary and PPPS were given to the patient.       Patient's pulses in her feet were checked by visiting doctor to her home who suggested an arterial Doppler might be in order.  She does have some rest pain in her feet, pulses are diminished but present.  Arterial Doppler will be ordered.

## 2021-08-27 LAB
25(OH)D3+25(OH)D2 SERPL-MCNC: 17.1 NG/ML (ref 30–100)
ALBUMIN SERPL-MCNC: 4.3 G/DL (ref 3.5–5.2)
ALBUMIN/GLOB SERPL: 2.2 G/DL
ALP SERPL-CCNC: 86 U/L (ref 39–117)
ALT SERPL-CCNC: 14 U/L (ref 1–33)
AST SERPL-CCNC: 21 U/L (ref 1–32)
BILIRUB SERPL-MCNC: 0.2 MG/DL (ref 0–1.2)
BUN SERPL-MCNC: 12 MG/DL (ref 8–23)
BUN/CREAT SERPL: 19 (ref 7–25)
CALCIUM SERPL-MCNC: 9.6 MG/DL (ref 8.6–10.5)
CHLORIDE SERPL-SCNC: 104 MMOL/L (ref 98–107)
CHOLEST SERPL-MCNC: 222 MG/DL (ref 0–200)
CO2 SERPL-SCNC: 24.7 MMOL/L (ref 22–29)
CREAT SERPL-MCNC: 0.63 MG/DL (ref 0.57–1)
ERYTHROCYTE [DISTWIDTH] IN BLOOD BY AUTOMATED COUNT: 13 % (ref 12.3–15.4)
GLOBULIN SER CALC-MCNC: 2 GM/DL
GLUCOSE SERPL-MCNC: 87 MG/DL (ref 65–99)
HCT VFR BLD AUTO: 37.8 % (ref 34–46.6)
HDLC SERPL-MCNC: 72 MG/DL (ref 40–60)
HGB BLD-MCNC: 12.3 G/DL (ref 12–15.9)
LDLC SERPL CALC-MCNC: 138 MG/DL (ref 0–100)
MCH RBC QN AUTO: 31.1 PG (ref 26.6–33)
MCHC RBC AUTO-ENTMCNC: 32.5 G/DL (ref 31.5–35.7)
MCV RBC AUTO: 95.5 FL (ref 79–97)
PLATELET # BLD AUTO: 317 10*3/MM3 (ref 140–450)
POTASSIUM SERPL-SCNC: 4.4 MMOL/L (ref 3.5–5.2)
PROT SERPL-MCNC: 6.3 G/DL (ref 6–8.5)
RBC # BLD AUTO: 3.96 10*6/MM3 (ref 3.77–5.28)
SODIUM SERPL-SCNC: 143 MMOL/L (ref 136–145)
TRIGL SERPL-MCNC: 69 MG/DL (ref 0–150)
TSH SERPL DL<=0.005 MIU/L-ACNC: 0.32 UIU/ML (ref 0.27–4.2)
URATE SERPL-MCNC: 4.3 MG/DL (ref 2.4–5.7)
VLDLC SERPL CALC-MCNC: 12 MG/DL (ref 5–40)
WBC # BLD AUTO: 7.1 10*3/MM3 (ref 3.4–10.8)

## 2021-09-05 DIAGNOSIS — M51.9 LUMBAR DISC DISEASE: ICD-10-CM

## 2021-09-05 DIAGNOSIS — S82.141A CLOSED FRACTURE OF RIGHT TIBIAL PLATEAU, INITIAL ENCOUNTER: ICD-10-CM

## 2021-09-07 RX ORDER — CYCLOBENZAPRINE HCL 10 MG
TABLET ORAL
Qty: 60 TABLET | Refills: 0 | Status: SHIPPED | OUTPATIENT
Start: 2021-09-07 | End: 2021-10-18 | Stop reason: SDUPTHER

## 2021-09-08 RX ORDER — PROMETHAZINE HYDROCHLORIDE 25 MG/1
25 TABLET ORAL EVERY 6 HOURS PRN
Qty: 30 TABLET | Refills: 0 | Status: SHIPPED | OUTPATIENT
Start: 2021-09-08 | End: 2021-10-05 | Stop reason: SDUPTHER

## 2021-09-12 RX ORDER — ERGOCALCIFEROL 1.25 MG/1
50000 CAPSULE ORAL WEEKLY
Qty: 12 CAPSULE | Refills: 0 | Status: SHIPPED | OUTPATIENT
Start: 2021-09-12 | End: 2021-12-11

## 2021-09-23 ENCOUNTER — HOSPITAL ENCOUNTER (OUTPATIENT)
Dept: CARDIOLOGY | Facility: HOSPITAL | Age: 61
Discharge: HOME | End: 2021-09-23
Admitting: FAMILY MEDICINE

## 2021-09-23 DIAGNOSIS — I70.223 REST PAIN OF BOTH LOWER EXTREMITIES DUE TO ATHEROSCLEROSIS (HCC): ICD-10-CM

## 2021-09-23 LAB
BH CV LOWER ARTERIAL LEFT ABI RATIO: 1.21
BH CV LOWER ARTERIAL LEFT CALF RATIO: 1.21
BH CV LOWER ARTERIAL LEFT DORSALIS PEDIS SYS MAX: 166 MMHG
BH CV LOWER ARTERIAL LEFT GREAT TOE SYS MAX: 106 MMHG
BH CV LOWER ARTERIAL LEFT HIGH THIGH SYS MAX: 188 MMHG
BH CV LOWER ARTERIAL LEFT LOW THIGH SYS MAX: 196 MMHG
BH CV LOWER ARTERIAL LEFT POPLITEAL SYS MAX: 177 MMHG
BH CV LOWER ARTERIAL LEFT POST TIBIAL SYS MAX: 176 MMHG
BH CV LOWER ARTERIAL LEFT TBI RATIO: 0.73
BH CV LOWER ARTERIAL RIGHT ABI RATIO: 1.12
BH CV LOWER ARTERIAL RIGHT CALF RATIO: 1.17
BH CV LOWER ARTERIAL RIGHT DORSALIS PEDIS SYS MAX: 161 MMHG
BH CV LOWER ARTERIAL RIGHT GREAT TOE SYS MAX: 139 MMHG
BH CV LOWER ARTERIAL RIGHT HIGH THIGH SYS MAX: 182 MMHG
BH CV LOWER ARTERIAL RIGHT LOW THIGH SYS MAX: 182 MMHG
BH CV LOWER ARTERIAL RIGHT POPLITEAL SYS MAX: 171 MMHG
BH CV LOWER ARTERIAL RIGHT POST TIBIAL SYS MAX: 164 MMHG
BH CV LOWER ARTERIAL RIGHT TBI RATIO: 0.95
UPPER ARTERIAL LEFT ARM BRACHIAL SYS MAX: 140 MMHG
UPPER ARTERIAL RIGHT ARM BRACHIAL SYS MAX: 146 MMHG

## 2021-09-23 PROCEDURE — 93923 UPR/LXTR ART STDY 3+ LVLS: CPT

## 2021-09-23 PROCEDURE — 93923 UPR/LXTR ART STDY 3+ LVLS: CPT | Performed by: INTERNAL MEDICINE

## 2021-09-24 ENCOUNTER — HOSPITAL ENCOUNTER (OUTPATIENT)
Dept: NUCLEAR MEDICINE | Facility: HOSPITAL | Age: 61
Discharge: HOME | End: 2021-09-24

## 2021-09-24 DIAGNOSIS — R11.0 NAUSEA: ICD-10-CM

## 2021-09-24 DIAGNOSIS — R10.13 DYSPEPSIA: ICD-10-CM

## 2021-09-24 DIAGNOSIS — R10.10 UPPER ABDOMINAL PAIN: ICD-10-CM

## 2021-09-24 PROCEDURE — 0 TECHNETIUM SULFUR COLLOID: Performed by: INTERNAL MEDICINE

## 2021-09-24 PROCEDURE — A9541 TC99M SULFUR COLLOID: HCPCS | Performed by: INTERNAL MEDICINE

## 2021-09-24 PROCEDURE — 78264 GASTRIC EMPTYING IMG STUDY: CPT

## 2021-09-24 RX ADMIN — TECHNETIUM TC 99M SULFUR COLLOID 1 DOSE: KIT at 11:06

## 2021-09-28 ENCOUNTER — TELEPHONE (OUTPATIENT)
Dept: GASTROENTEROLOGY | Facility: CLINIC | Age: 61
End: 2021-09-28

## 2021-09-28 NOTE — TELEPHONE ENCOUNTER
----- Message from Atul Tavarez MD sent at 9/27/2021  4:16 PM EDT -----  Let MsKandice Galvin know the gastric emptying study was normal.

## 2021-10-05 RX ORDER — PROMETHAZINE HYDROCHLORIDE 25 MG/1
25 TABLET ORAL EVERY 6 HOURS PRN
Qty: 30 TABLET | Refills: 1 | Status: SHIPPED | OUTPATIENT
Start: 2021-10-05 | End: 2021-11-24 | Stop reason: SDUPTHER

## 2021-10-18 DIAGNOSIS — S82.141A CLOSED FRACTURE OF RIGHT TIBIAL PLATEAU, INITIAL ENCOUNTER: ICD-10-CM

## 2021-10-18 DIAGNOSIS — M51.9 LUMBAR DISC DISEASE: ICD-10-CM

## 2021-10-18 RX ORDER — CYCLOBENZAPRINE HCL 10 MG
10 TABLET ORAL 2 TIMES DAILY PRN
Qty: 60 TABLET | Refills: 0 | Status: SHIPPED | OUTPATIENT
Start: 2021-10-18 | End: 2021-11-21

## 2021-11-01 ENCOUNTER — OFFICE VISIT (OUTPATIENT)
Dept: FAMILY MEDICINE CLINIC | Facility: CLINIC | Age: 61
End: 2021-11-01

## 2021-11-01 VITALS
SYSTOLIC BLOOD PRESSURE: 142 MMHG | BODY MASS INDEX: 34.23 KG/M2 | HEIGHT: 62 IN | TEMPERATURE: 96.9 F | WEIGHT: 186 LBS | HEART RATE: 68 BPM | OXYGEN SATURATION: 96 % | DIASTOLIC BLOOD PRESSURE: 90 MMHG | RESPIRATION RATE: 16 BRPM

## 2021-11-01 DIAGNOSIS — F41.1 GENERALIZED ANXIETY DISORDER: Primary | ICD-10-CM

## 2021-11-01 DIAGNOSIS — K21.9 GASTROESOPHAGEAL REFLUX DISEASE, UNSPECIFIED WHETHER ESOPHAGITIS PRESENT: ICD-10-CM

## 2021-11-01 DIAGNOSIS — J30.1 SEASONAL ALLERGIC RHINITIS DUE TO POLLEN: ICD-10-CM

## 2021-11-01 DIAGNOSIS — E03.9 ACQUIRED HYPOTHYROIDISM: ICD-10-CM

## 2021-11-01 PROCEDURE — 99214 OFFICE O/P EST MOD 30 MIN: CPT | Performed by: FAMILY MEDICINE

## 2021-11-01 RX ORDER — INFLUENZA A VIRUS A/MICHIGAN/45/2015 (H1N1) RECOMBINANT HEMAGGLUTININ ANTIGEN, INFLUENZA A VIRUS A/SINGAPORE/INFIMH-16-0019/2016 (H3N2) RECOMBINANT HEMAGGLUTININ ANTIGEN, INFLUENZA B VIRUS B/MARYLAND/15/2016 RECOMBINANT HEMAGGLUTININ ANTIGEN, AND INFLUENZA B VIRUS B/PHUKET/3073/2013 RECOMBINANT HEMAGGLUTININ ANTIGEN 45; 45; 45; 45 UG/.5ML; UG/.5ML; UG/.5ML; UG/.5ML
INJECTION INTRAMUSCULAR
COMMUNITY
Start: 2021-10-11 | End: 2023-01-23

## 2021-11-01 RX ORDER — MONTELUKAST SODIUM 10 MG/1
10 TABLET ORAL NIGHTLY
Qty: 30 TABLET | Refills: 1 | Status: SHIPPED | OUTPATIENT
Start: 2021-11-01 | End: 2022-02-01 | Stop reason: SDUPTHER

## 2021-11-01 NOTE — PROGRESS NOTES
"Chief Complaint  Hypothyroidism (3 mth f/up) and Anxiety    Subjective          Yessica June Galvin presents to [facility]     History of Present Illness  Yessica Galvin is a pleasant female who presents today for a follow-up.    Health Maintenance  The patient reports that she received her flu vaccine last weekend. She needs a refill on her tramadol prescription.    Seasonal Allergies  The patient states that she is losing her voice intermittently. The patient has also been having bilateral ear pain. She has been taking allergy medication for her allergies. She states that she has been working out in the yard.    Lower Extremity Edema  The patient notes that she has been experiencing swelling in her right leg. This is the same leg that she fractured previously. She has also noticed varicose veins.     Nausea  The patient states that she is only taking her nausea medication as needed.     Objective   Vital Signs:   /90   Pulse 68   Temp 96.9 °F (36.1 °C)   Resp 16   Ht 157.5 cm (62.01\")   Wt 84.4 kg (186 lb)   SpO2 96%   BMI 34.01 kg/m²       Physical Exam  Vitals and nursing note reviewed.   Constitutional:       Appearance: Normal appearance. She is well-developed.   HENT:      Head: Normocephalic and atraumatic.      Right Ear: External ear normal.      Left Ear: External ear normal.      Nose: Nose normal.   Eyes:      General: No scleral icterus.     Conjunctiva/sclera: Conjunctivae normal.      Pupils: Pupils are equal, round, and reactive to light.   Neck:      Thyroid: No thyromegaly.   Cardiovascular:      Rate and Rhythm: Normal rate and regular rhythm.   Pulmonary:      Effort: Pulmonary effort is normal.      Breath sounds: Rhonchi present.   Musculoskeletal:         General: Normal range of motion.      Cervical back: Neck supple.      Right lower leg: Edema present.   Skin:     General: Skin is warm and dry.      Findings: No rash.   Neurological:      " General: No focal deficit present.      Mental Status: She is alert and oriented to person, place, and time.      Comments: No focal deficits no lateralizing signs   Psychiatric:         Mood and Affect: Mood normal.         Behavior: Behavior is cooperative.        Result Review :               Assessment and Plan    Diagnoses and all orders for this visit:    1. Generalized anxiety disorder (Primary)    2. Acquired hypothyroidism    3. Gastroesophageal reflux disease, unspecified whether esophagitis present    4. Seasonal allergic rhinitis due to pollen  -     montelukast (Singulair) 10 MG tablet; Take 1 tablet by mouth Every Night.  Dispense: 30 tablet; Refill: 1        Follow Up   Return in about 3 months (around 2/1/2022) for fasting appt.  Patient was given instructions and counseling regarding her condition or for health maintenance advice. Please see specific information pulled into the AVS if appropriate.     Transcribed from ambient dictation for Kota Swain MD by Tim Troncoso.  11/01/21   15:50 EDT    I have personally performed the services described in this document as transcribed by the above individual, and it is both accurate and complete.  Kota Swain MD  11/1/2021  22:30 EDT

## 2021-11-20 DIAGNOSIS — M51.9 LUMBAR DISC DISEASE: ICD-10-CM

## 2021-11-20 DIAGNOSIS — S82.141A CLOSED FRACTURE OF RIGHT TIBIAL PLATEAU, INITIAL ENCOUNTER: ICD-10-CM

## 2021-11-21 RX ORDER — CYCLOBENZAPRINE HCL 10 MG
TABLET ORAL
Qty: 60 TABLET | Refills: 0 | Status: SHIPPED | OUTPATIENT
Start: 2021-11-21 | End: 2022-03-11 | Stop reason: SDUPTHER

## 2021-11-24 DIAGNOSIS — M15.0 PRIMARY GENERALIZED (OSTEO)ARTHRITIS: ICD-10-CM

## 2021-11-24 DIAGNOSIS — M51.9 LUMBAR DISC DISEASE: ICD-10-CM

## 2021-11-24 RX ORDER — PROMETHAZINE HYDROCHLORIDE 25 MG/1
25 TABLET ORAL EVERY 6 HOURS PRN
Qty: 30 TABLET | Refills: 0 | Status: SHIPPED | OUTPATIENT
Start: 2021-11-24 | End: 2021-12-14

## 2021-11-24 RX ORDER — GABAPENTIN 600 MG/1
600 TABLET ORAL 2 TIMES DAILY
Qty: 60 TABLET | Refills: 2 | Status: SHIPPED | OUTPATIENT
Start: 2021-11-24 | End: 2022-02-01 | Stop reason: SDUPTHER

## 2021-11-24 RX ORDER — DICLOFENAC SODIUM 75 MG/1
75 TABLET, DELAYED RELEASE ORAL 2 TIMES DAILY
Qty: 60 TABLET | Refills: 3 | Status: SHIPPED | OUTPATIENT
Start: 2021-11-24 | End: 2022-10-07 | Stop reason: SDUPTHER

## 2021-11-29 ENCOUNTER — OFFICE VISIT (OUTPATIENT)
Dept: ORTHOPEDIC SURGERY | Facility: CLINIC | Age: 61
End: 2021-11-29

## 2021-11-29 VITALS
DIASTOLIC BLOOD PRESSURE: 82 MMHG | WEIGHT: 186.07 LBS | BODY MASS INDEX: 34.24 KG/M2 | SYSTOLIC BLOOD PRESSURE: 124 MMHG | HEIGHT: 62 IN

## 2021-11-29 DIAGNOSIS — M79.642 LEFT HAND PAIN: Primary | ICD-10-CM

## 2021-11-29 PROCEDURE — 99213 OFFICE O/P EST LOW 20 MIN: CPT | Performed by: ORTHOPAEDIC SURGERY

## 2021-11-29 NOTE — PROGRESS NOTES
"      [facility] [place] [facility] Note    Subjective     CC: Follow-up (Left Wrist Pain, hx ORIF Left Distal Radius 5/18/20)      HPI    Yessica Galvin is a 61 y.o. female.  She woke up with a bruise and swollen about a week ago.  No known trauma that she knows.  No injury.  She had a wrist fracture surgery 2019.    Review of Systems   Musculoskeletal: Positive for arthralgias.   All other systems reviewed and are negative.      ROS:    Constiutional:Pt denies fever, chills, nausea, or vomiting.  MSK:as above      Objective      Past Medical History  Past Medical History:   Diagnosis Date   • Acute bronchitis    • Acute sinusitis    • Asthma    • Asthma with acute exacerbation    • Asthmatic bronchitis    • Disc degeneration, lumbar    • Disc degeneration, lumbar    • History of mammogram    • Hypothyroidism    • Lower back pain    • Plantar fasciitis    • Restless legs syndrome          Physical Exam  /82   Ht 157.5 cm (62.01\")   Wt 84.4 kg (186 lb 1.1 oz)   BMI 34.02 kg/m²     Body mass index is 34.02 kg/m².    Patient is well nourished and well developed.        Ortho Exam  Left hand is swollen and bruised.  Compartment soft.  No redness.  She is able to flex and extend the fingers.  Full flexion limited by swelling.  She has a ring on her ring finger that we will have to take off.  Pulses intact with brisk normal capillary refill to all fingers.  Sensation grossly intact    Imaging/Labs/EMG Reviewed:  Imaging Results (Last 24 Hours)     Procedure Component Value Units Date/Time    XR Hand 3+ View Left [[id number]] Resulted: 11/29/21 1508     Updated: 11/29/21 1509    Narrative:      Left Wrist X-Ray  Indication: Pain  AP, Lateral, and Oblique views    Findings:  Previous ORIF of the left distal radius looks good.  Some CMC joint   arthritis  No bony lesion   prior studies were available for comparison.            Assessment:  1. Left hand pain        Plan:  Recommend over the counter " anti-inflammatories for pain and/or swelling  Bruising swollen hand.  She had a similar episode happened to her right leg 2 weeks ago.  She was told she had varicose veins.  This looks like she busted blood vessel.  No history of trauma.  No sign of infection.  No compartment syndrome.  I ordered physical therapy for hand as well.    Follow Up:   Return in about 2 weeks (around 12/13/2021).      Medical Decision Making  Management Options : Low - OT or PT Therapy         Guanaco Thakur M.D., [facility]  Orthopedic Surgeon  Fellowship Trained Sports Medicine  [facility]  Orthopedics and Sports Medicine  [address] [place] [address], [address]  [place] [address]    EMR Dragon/Transcription disclaimer:  Much of this encounter note is an electronic transcription of spoken language to printed text. Electronic transcription of spoken language may permit erroneous, or at times, nonsensical words or phrases to be inadvertently transcribed. Although I have reviewed the note for such errors, some may still exist.

## 2021-11-30 DIAGNOSIS — M51.9 LUMBAR DISC DISEASE: ICD-10-CM

## 2021-11-30 DIAGNOSIS — J45.40 MODERATE PERSISTENT ASTHMATIC BRONCHITIS WITHOUT COMPLICATION: ICD-10-CM

## 2021-11-30 RX ORDER — TRAMADOL HYDROCHLORIDE 50 MG/1
TABLET ORAL
Qty: 90 TABLET | Refills: 0 | Status: SHIPPED | OUTPATIENT
Start: 2021-11-30 | End: 2021-12-30 | Stop reason: SDUPTHER

## 2021-11-30 RX ORDER — BUDESONIDE AND FORMOTEROL FUMARATE DIHYDRATE 160; 4.5 UG/1; UG/1
AEROSOL RESPIRATORY (INHALATION)
Qty: 10.2 G | Refills: 5 | Status: SHIPPED | OUTPATIENT
Start: 2021-11-30 | End: 2022-05-03 | Stop reason: SDUPTHER

## 2021-12-14 RX ORDER — PROMETHAZINE HYDROCHLORIDE 25 MG/1
TABLET ORAL
Qty: 30 TABLET | Refills: 0 | Status: SHIPPED | OUTPATIENT
Start: 2021-12-14 | End: 2022-01-05 | Stop reason: SDUPTHER

## 2021-12-16 RX ORDER — ALBUTEROL SULFATE 90 UG/1
AEROSOL, METERED RESPIRATORY (INHALATION)
Qty: 18 G | Refills: 2 | Status: SHIPPED | OUTPATIENT
Start: 2021-12-16 | End: 2022-03-11

## 2021-12-21 ENCOUNTER — OFFICE VISIT (OUTPATIENT)
Dept: FAMILY MEDICINE CLINIC | Facility: CLINIC | Age: 61
End: 2021-12-21

## 2021-12-21 VITALS
DIASTOLIC BLOOD PRESSURE: 86 MMHG | TEMPERATURE: 98.2 F | WEIGHT: 182.4 LBS | BODY MASS INDEX: 33.57 KG/M2 | OXYGEN SATURATION: 95 % | HEART RATE: 97 BPM | HEIGHT: 62 IN | SYSTOLIC BLOOD PRESSURE: 144 MMHG | RESPIRATION RATE: 18 BRPM

## 2021-12-21 DIAGNOSIS — J45.31 MILD PERSISTENT ASTHMATIC BRONCHITIS WITH ACUTE EXACERBATION: Primary | ICD-10-CM

## 2021-12-21 PROCEDURE — 99213 OFFICE O/P EST LOW 20 MIN: CPT | Performed by: FAMILY MEDICINE

## 2021-12-21 PROCEDURE — 96372 THER/PROPH/DIAG INJ SC/IM: CPT | Performed by: FAMILY MEDICINE

## 2021-12-21 RX ORDER — TRIAMCINOLONE ACETONIDE 40 MG/ML
40 INJECTION, SUSPENSION INTRA-ARTICULAR; INTRAMUSCULAR ONCE
Status: COMPLETED | OUTPATIENT
Start: 2021-12-21 | End: 2021-12-21

## 2021-12-21 RX ORDER — DOXYCYCLINE HYCLATE 100 MG
100 TABLET ORAL 2 TIMES DAILY
Qty: 20 TABLET | Refills: 1 | Status: SHIPPED | OUTPATIENT
Start: 2021-12-21 | End: 2022-01-20

## 2021-12-21 RX ADMIN — TRIAMCINOLONE ACETONIDE 40 MG: 40 INJECTION, SUSPENSION INTRA-ARTICULAR; INTRAMUSCULAR at 12:57

## 2021-12-21 NOTE — PROGRESS NOTES
"Chief Complaint  Cough and Nasal Congestion    Subjective          Yessica Galvin presents to [facility]  History of Present Illness     Patient verbalized consent to the visit recording.    Yessica Galvin is a pleasant female who presents today for a follow-up.    Upper respiratory infection.   The patient reports that she has been experiencing hoarseness since her last visit to the office and notes that it has worsened over time. She states that she has had a productive cough, which was yellow at the beginning and appeared green afterward. She reports that she had a fever last night. She states that she has used her inhaler this morning. The patient confirms that she has taken oral steroids in the past.    Objective   Vital Signs:   /86   Pulse 97   Temp 98.2 °F (36.8 °C)   Resp 18   Ht 157.5 cm (62.01\")   Wt 82.7 kg (182 lb 6.4 oz)   SpO2 95%   BMI 33.35 kg/m²     Physical Exam  Vitals and nursing note reviewed.   Constitutional:       Appearance: Normal appearance. She is well-developed.   HENT:      Head: Normocephalic and atraumatic.      Right Ear: Tympanic membrane normal.      Left Ear: Tympanic membrane normal.      Nose: Nose normal.   Eyes:      General: No scleral icterus.     Conjunctiva/sclera: Conjunctivae normal.      Pupils: Pupils are equal, round, and reactive to light.   Neck:      Thyroid: No thyromegaly.   Cardiovascular:      Rate and Rhythm: Normal rate and regular rhythm.   Pulmonary:      Effort: Pulmonary effort is normal.      Breath sounds: Wheezing and rhonchi present.   Musculoskeletal:         General: Normal range of motion.      Cervical back: Neck supple.   Lymphadenopathy:      Cervical: No cervical adenopathy.   Skin:     General: Skin is warm and dry.      Findings: No rash.   Neurological:      General: No focal deficit present.      Mental Status: She is alert and oriented to person, place, and time.      Comments: No focal " deficits no lateralizing signs   Psychiatric:         Behavior: Behavior is cooperative.        Result Review :                 Assessment and Plan    Diagnoses and all orders for this visit:    1. Mild persistent asthmatic bronchitis with acute exacerbation (Primary)  -     doxycycline (VIBRAMYICN) 100 MG tablet; Take 1 tablet by mouth 2 (Two) Times a Day.  Dispense: 20 tablet; Refill: 1  -     triamcinolone acetonide (KENALOG-40) injection 40 mg        Follow Up   No follow-ups on file.  Patient was given instructions and counseling regarding her condition or for health maintenance advice. Please see specific information pulled into the AVS if appropriate.     Transcribed from ambient dictation for Kota Swain MD by Lissy Lugo.  12/21/21   18:43 EST

## 2021-12-30 DIAGNOSIS — M51.9 LUMBAR DISC DISEASE: ICD-10-CM

## 2021-12-30 RX ORDER — TRAMADOL HYDROCHLORIDE 50 MG/1
TABLET ORAL
Qty: 90 TABLET | Refills: 0 | Status: SHIPPED | OUTPATIENT
Start: 2021-12-30 | End: 2022-02-01 | Stop reason: SDUPTHER

## 2022-01-05 ENCOUNTER — OFFICE VISIT (OUTPATIENT)
Dept: FAMILY MEDICINE CLINIC | Facility: CLINIC | Age: 62
End: 2022-01-05

## 2022-01-05 ENCOUNTER — LAB (OUTPATIENT)
Dept: LAB | Facility: HOSPITAL | Age: 62
End: 2022-01-05

## 2022-01-05 VITALS
BODY MASS INDEX: 32.94 KG/M2 | SYSTOLIC BLOOD PRESSURE: 146 MMHG | DIASTOLIC BLOOD PRESSURE: 82 MMHG | TEMPERATURE: 98.4 F | RESPIRATION RATE: 16 BRPM | HEIGHT: 62 IN | WEIGHT: 179 LBS | OXYGEN SATURATION: 93 % | HEART RATE: 92 BPM

## 2022-01-05 DIAGNOSIS — R51.9 ACUTE INTRACTABLE HEADACHE, UNSPECIFIED HEADACHE TYPE: ICD-10-CM

## 2022-01-05 DIAGNOSIS — M79.10 MYALGIA: Primary | ICD-10-CM

## 2022-01-05 DIAGNOSIS — M79.10 MYALGIA: ICD-10-CM

## 2022-01-05 PROCEDURE — 99214 OFFICE O/P EST MOD 30 MIN: CPT | Performed by: FAMILY MEDICINE

## 2022-01-05 PROCEDURE — U0004 COV-19 TEST NON-CDC HGH THRU: HCPCS | Performed by: FAMILY MEDICINE

## 2022-01-05 RX ORDER — PROMETHAZINE HYDROCHLORIDE 25 MG/1
25 TABLET ORAL EVERY 6 HOURS PRN
Qty: 30 TABLET | Refills: 0 | Status: SHIPPED | OUTPATIENT
Start: 2022-01-05 | End: 2022-02-01 | Stop reason: SDUPTHER

## 2022-01-05 RX ORDER — HYDROCODONE BITARTRATE AND ACETAMINOPHEN 5; 325 MG/1; MG/1
1 TABLET ORAL EVERY 6 HOURS PRN
Qty: 12 TABLET | Refills: 0 | Status: SHIPPED | OUTPATIENT
Start: 2022-01-05 | End: 2022-02-01

## 2022-01-06 LAB — SARS-COV-2 RNA NOSE QL NAA+PROBE: NOT DETECTED

## 2022-01-06 NOTE — PROGRESS NOTES
"Chief Complaint  Headache and Vomiting    Subjective          Yessica Galvin presents to [facility]  Headache   This is a recurrent problem. The problem occurs constantly. The pain is located in the frontal and parietal region. The pain does not radiate. The quality of the pain is described as boring. The pain is moderate. Associated symptoms include coughing, sinus pressure and vomiting. Pertinent negatives include no seizures. She has tried acetaminophen and Excedrin for the symptoms. The treatment provided mild relief. Her past medical history is significant for migraine headaches.   Vomiting   This is a new (vomited 2 or 3 times) problem. The current episode started in the past 7 days. The problem has been gradually improving. Associated symptoms include coughing and headaches.       Objective   Vital Signs:   /82   Pulse 92   Temp 98.4 °F (36.9 °C)   Resp 16   Ht 157.5 cm (62.01\")   Wt 81.2 kg (179 lb)   SpO2 93%   BMI 32.73 kg/m²     Physical Exam  Vitals and nursing note reviewed.   Constitutional:       Appearance: Normal appearance. She is well-developed.   HENT:      Head: Normocephalic and atraumatic.      Right Ear: External ear normal.      Left Ear: External ear normal.      Nose: Nose normal.      Mouth/Throat:      Mouth: Mucous membranes are moist.      Pharynx: Oropharynx is clear.   Eyes:      General: No scleral icterus.     Conjunctiva/sclera: Conjunctivae normal.      Pupils: Pupils are equal, round, and reactive to light.   Neck:      Thyroid: No thyromegaly.      Vascular: No carotid bruit.   Cardiovascular:      Rate and Rhythm: Normal rate and regular rhythm.   Pulmonary:      Effort: Pulmonary effort is normal.      Breath sounds: Normal breath sounds.   Musculoskeletal:         General: Normal range of motion.      Cervical back: Neck supple.   Skin:     General: Skin is warm and dry.      Findings: No rash.   Neurological:      General: No " focal deficit present.      Mental Status: She is alert and oriented to person, place, and time.      Cranial Nerves: No cranial nerve deficit.      Sensory: No sensory deficit.      Coordination: Coordination normal.      Deep Tendon Reflexes: Reflexes normal.      Comments: No focal deficits no lateralizing signs   Psychiatric:         Behavior: Behavior is cooperative.        Result Review :                 Assessment and Plan    Diagnoses and all orders for this visit:    1. Myalgia (Primary)  -     Cancel: COVID-19 PCR, [facility], NP SWAB IN [facility]/ORAL SWISH 24-30 HR TAT - Swab, Nasopharynx; Future  -     COVID-19 PCR, [facility], NP SWAB IN [facility]/ORAL SWISH 24-30 HR TAT - Swab, Nasopharynx; Future    2. Acute intractable headache, unspecified headache type  -     promethazine (PHENERGAN) 25 MG tablet; Take 1 tablet by mouth Every 6 (Six) Hours As Needed for Nausea or Vomiting.  Dispense: 30 tablet; Refill: 0  -     HYDROcodone-acetaminophen (Norco) 5-325 MG per tablet; Take 1 tablet by mouth Every 6 (Six) Hours As Needed for Severe Pain .  Dispense: 12 tablet; Refill: 0        Follow Up   No follow-ups on file.  Patient was given instructions and counseling regarding her condition or for health maintenance advice. Please see specific information pulled into the AVS if appropriate.

## 2022-01-10 ENCOUNTER — TELEPHONE (OUTPATIENT)
Dept: FAMILY MEDICINE CLINIC | Facility: CLINIC | Age: 62
End: 2022-01-10

## 2022-01-10 NOTE — TELEPHONE ENCOUNTER
----- Message from Kota Swain MD sent at 1/9/2022  9:55 PM EST -----  Please call the patient regarding her normal result.

## 2022-01-20 ENCOUNTER — TELEMEDICINE (OUTPATIENT)
Dept: FAMILY MEDICINE CLINIC | Facility: CLINIC | Age: 62
End: 2022-01-20

## 2022-01-20 DIAGNOSIS — J40 BRONCHITIS: Primary | ICD-10-CM

## 2022-01-20 PROCEDURE — 99442 PR PHYS/QHP TELEPHONE EVALUATION 11-20 MIN: CPT | Performed by: FAMILY MEDICINE

## 2022-01-20 RX ORDER — METHYLPREDNISOLONE 4 MG/1
TABLET ORAL
Qty: 1 EACH | Refills: 0 | Status: SHIPPED | OUTPATIENT
Start: 2022-01-20 | End: 2022-02-01

## 2022-01-20 RX ORDER — LEVOFLOXACIN 500 MG/1
500 TABLET, FILM COATED ORAL DAILY
Qty: 10 TABLET | Refills: 0 | Status: SHIPPED | OUTPATIENT
Start: 2022-01-20 | End: 2022-02-01

## 2022-01-21 NOTE — PROGRESS NOTES
Chief Complaint  No chief complaint on file.    Subjective          Yessica Galvin presents to [facility]  Cough  This is a recurrent problem. The current episode started in the past 7 days. The problem has been gradually worsening. The problem occurs every few minutes. The cough is productive of sputum. Associated symptoms include chest pain and shortness of breath. Pertinent negatives include no chills or fever. She has tried a beta-agonist inhaler and OTC cough suppressant for the symptoms. The treatment provided mild relief. Her past medical history is significant for asthma and bronchitis.       Objective   Vital Signs:   There were no vitals taken for this visit.    Physical Exam  Neurological:      Mental Status: She is alert.      telephonic visit lasting 15 minutes  Result Review :                 Assessment and Plan    There are no diagnoses linked to this encounter.    Follow Up   No follow-ups on file.  Patient was given instructions and counseling regarding her condition or for health maintenance advice. Please see specific information pulled into the AVS if appropriate.     RTC Monday if no better or go to ER if chest pain lasts over 5 minutes

## 2022-02-01 ENCOUNTER — LAB (OUTPATIENT)
Dept: LAB | Facility: HOSPITAL | Age: 62
End: 2022-02-01

## 2022-02-01 ENCOUNTER — TELEPHONE (OUTPATIENT)
Dept: FAMILY MEDICINE CLINIC | Facility: CLINIC | Age: 62
End: 2022-02-01

## 2022-02-01 ENCOUNTER — OFFICE VISIT (OUTPATIENT)
Dept: FAMILY MEDICINE CLINIC | Facility: CLINIC | Age: 62
End: 2022-02-01

## 2022-02-01 VITALS
SYSTOLIC BLOOD PRESSURE: 132 MMHG | OXYGEN SATURATION: 94 % | RESPIRATION RATE: 10 BRPM | HEART RATE: 80 BPM | TEMPERATURE: 98.6 F | DIASTOLIC BLOOD PRESSURE: 88 MMHG | BODY MASS INDEX: 32.76 KG/M2 | WEIGHT: 178 LBS | HEIGHT: 62 IN

## 2022-02-01 DIAGNOSIS — M51.9 LUMBAR DISC DISEASE: ICD-10-CM

## 2022-02-01 DIAGNOSIS — E03.9 ACQUIRED HYPOTHYROIDISM: ICD-10-CM

## 2022-02-01 DIAGNOSIS — J45.40 MODERATE PERSISTENT ASTHMATIC BRONCHITIS WITHOUT COMPLICATION: ICD-10-CM

## 2022-02-01 DIAGNOSIS — R74.01 ELEVATED TRANSAMINASE LEVEL: ICD-10-CM

## 2022-02-01 DIAGNOSIS — J30.1 SEASONAL ALLERGIC RHINITIS DUE TO POLLEN: ICD-10-CM

## 2022-02-01 DIAGNOSIS — R51.9 ACUTE INTRACTABLE HEADACHE, UNSPECIFIED HEADACHE TYPE: ICD-10-CM

## 2022-02-01 DIAGNOSIS — J40 BRONCHITIS: ICD-10-CM

## 2022-02-01 DIAGNOSIS — R07.89 OTHER CHEST PAIN: ICD-10-CM

## 2022-02-01 DIAGNOSIS — Z13.220 LIPID SCREENING: ICD-10-CM

## 2022-02-01 DIAGNOSIS — J40 BRONCHITIS: Primary | ICD-10-CM

## 2022-02-01 PROCEDURE — 84443 ASSAY THYROID STIM HORMONE: CPT

## 2022-02-01 PROCEDURE — 80061 LIPID PANEL: CPT

## 2022-02-01 PROCEDURE — 99214 OFFICE O/P EST MOD 30 MIN: CPT | Performed by: FAMILY MEDICINE

## 2022-02-01 PROCEDURE — 80053 COMPREHEN METABOLIC PANEL: CPT

## 2022-02-01 PROCEDURE — 85027 COMPLETE CBC AUTOMATED: CPT

## 2022-02-01 PROCEDURE — 93000 ELECTROCARDIOGRAM COMPLETE: CPT | Performed by: FAMILY MEDICINE

## 2022-02-01 RX ORDER — GABAPENTIN 600 MG/1
600 TABLET ORAL 2 TIMES DAILY
Qty: 60 TABLET | Refills: 2 | Status: SHIPPED | OUTPATIENT
Start: 2022-02-01 | End: 2022-05-03 | Stop reason: SDUPTHER

## 2022-02-01 RX ORDER — OMEPRAZOLE 40 MG/1
40 CAPSULE, DELAYED RELEASE ORAL DAILY
Qty: 90 CAPSULE | Refills: 1 | Status: SHIPPED | OUTPATIENT
Start: 2022-02-01 | End: 2022-03-22 | Stop reason: SDUPTHER

## 2022-02-01 RX ORDER — PROMETHAZINE HYDROCHLORIDE 25 MG/1
25 TABLET ORAL EVERY 6 HOURS PRN
Qty: 30 TABLET | Refills: 0 | Status: SHIPPED | OUTPATIENT
Start: 2022-02-01 | End: 2022-02-27 | Stop reason: SDUPTHER

## 2022-02-01 RX ORDER — AZITHROMYCIN 250 MG/1
TABLET, FILM COATED ORAL
Qty: 6 TABLET | Refills: 0 | Status: SHIPPED | OUTPATIENT
Start: 2022-02-01 | End: 2022-05-03

## 2022-02-01 RX ORDER — MONTELUKAST SODIUM 10 MG/1
10 TABLET ORAL NIGHTLY
Qty: 90 TABLET | Refills: 1 | Status: SHIPPED | OUTPATIENT
Start: 2022-02-01 | End: 2022-09-24 | Stop reason: SDUPTHER

## 2022-02-01 RX ORDER — TRAMADOL HYDROCHLORIDE 50 MG/1
TABLET ORAL
Qty: 90 TABLET | Refills: 1 | Status: SHIPPED | OUTPATIENT
Start: 2022-02-01 | End: 2022-03-30 | Stop reason: SDUPTHER

## 2022-02-01 RX ORDER — LEVOTHYROXINE SODIUM 137 UG/1
137 TABLET ORAL DAILY
Qty: 90 TABLET | Refills: 1 | Status: SHIPPED | OUTPATIENT
Start: 2022-02-01 | End: 2022-08-12 | Stop reason: SDUPTHER

## 2022-02-01 RX ORDER — LEVOCETIRIZINE DIHYDROCHLORIDE 5 MG/1
5 TABLET, FILM COATED ORAL EVERY EVENING
Qty: 90 TABLET | Refills: 1 | Status: SHIPPED | OUTPATIENT
Start: 2022-02-01

## 2022-02-01 NOTE — TELEPHONE ENCOUNTER
PATIENT CALLED STATING THAT SHE WAS SEEN TODAY 02/01/22 BY DR. DIAS AND HE WAS SUPPOSED TO RENEW AND CALL IN ALL HER MEDICATIONS INCLUDING AN ANTIBIOTIC BUT THE PHARMACY HAS NOT RECEIVED THE REQUEST YET    PLEASE ADVISE AND CALL PATIENT [phone]    [facility] – [facility]saAYLEEN [address] [place] [address] [phone] [facility] [phone]

## 2022-02-01 NOTE — PROGRESS NOTES
"Chief Complaint  review medication and Headache    Subjective          Yessica Galvin presents to [facility]  Patient is still having a cough which is less productive no fever chills sweats, she is having some discomfort in her left lower chest left upper abdomen area seems to worsen with a deep breath.  She denies hemoptysis.  She still has occasional headaches and has persistent nausea.    Cough  This is a recurrent (pain in left lower chest) problem. The current episode started in the past 7 days. The problem has been gradually worsening. The problem occurs every few minutes. The cough is productive of sputum. Associated symptoms include chest pain and shortness of breath. Pertinent negatives include no chills or fever. Associated symptoms comments: Persistent nausea. She has tried a beta-agonist inhaler and OTC cough suppressant for the symptoms. The treatment provided mild relief. Her past medical history is significant for asthma and bronchitis.       Objective   Vital Signs:   /88   Pulse 80   Temp 98.6 °F (37 °C)   Resp 10   Ht 157.5 cm (62\")   Wt 80.7 kg (178 lb)   SpO2 94%   BMI 32.56 kg/m²     Physical Exam  Vitals and nursing note reviewed.   Constitutional:       Appearance: Normal appearance.   HENT:      Head: Normocephalic and atraumatic.      Right Ear: Tympanic membrane normal.      Left Ear: Tympanic membrane normal.      Mouth/Throat:      Mouth: Mucous membranes are moist.      Pharynx: Oropharynx is clear.   Eyes:      Conjunctiva/sclera: Conjunctivae normal.   Cardiovascular:      Rate and Rhythm: Normal rate and regular rhythm.   Pulmonary:      Effort: Pulmonary effort is normal.      Breath sounds: Rhonchi and rales present.   Musculoskeletal:         General: Tenderness present.      Cervical back: Neck supple.      Comments: Tender chest wall left lower chest is no palpable masses there is upper airway rhonchi no rales a few scattered wheezes "   Neurological:      Mental Status: She is alert.        Result Review :                 Assessment and Plan    Diagnoses and all orders for this visit:    1. Bronchitis (Primary)  -     XR Chest 2 View; Future  -     CBC (No Diff); Future  -     azithromycin (Zithromax) 250 MG tablet; Take 2 tablets the first day, then 1 tablet daily for 4 days.  Dispense: 6 tablet; Refill: 0    2. Acquired hypothyroidism  -     TSH; Future  -     levothyroxine (SYNTHROID, LEVOTHROID) 137 MCG tablet; Take 1 tablet by mouth Daily.  Dispense: 90 tablet; Refill: 1    3. Elevated transaminase level  -     Comprehensive Metabolic Panel; Future    4. Lipid screening  -     Lipid Panel; Future    5. Lumbar disc disease  -     gabapentin (Neurontin) 600 MG tablet; Take 1 tablet by mouth 2 (Two) Times a Day.  Dispense: 60 tablet; Refill: 2  -     traMADol (ULTRAM) 50 MG tablet; Take 1 in the morning and 2 in the evening  Dispense: 90 tablet; Refill: 1    6. Moderate persistent asthmatic bronchitis without complication  -     levocetirizine (Xyzal) 5 MG tablet; Take 1 tablet by mouth Every Evening.  Dispense: 90 tablet; Refill: 1    7. Seasonal allergic rhinitis due to pollen  -     montelukast (Singulair) 10 MG tablet; Take 1 tablet by mouth Every Night.  Dispense: 90 tablet; Refill: 1    8. Acute intractable headache, unspecified headache type  -     promethazine (PHENERGAN) 25 MG tablet; Take 1 tablet by mouth Every 6 (Six) Hours As Needed for Nausea or Vomiting.  Dispense: 30 tablet; Refill: 0    9. Other chest pain  -     omeprazole (priLOSEC) 40 MG capsule; Take 1 capsule by mouth Daily.  Dispense: 90 capsule; Refill: 1  -     ECG 12 Lead      ECG 12 Lead    Date/Time: 2/1/2022 5:14 PM  Performed by: Kota Swain MD  Authorized by: Kota Swain MD   Comparison: compared with previous ECG   Similar to previous ECG  Rhythm: sinus rhythm  Rate: normal  BPM: 67  Conduction: conduction normal  ST Segments: ST segments normal  T  Waves: T waves normal  QRS axis: normal  Other: no other findings    Clinical impression: normal ECG          Further evaluation pending results of chest x-ray and lab, he needs follow-up with gastroenterology and pulmonology.    Follow Up   Return in about 3 months (around 5/1/2022), or if symptoms worsen or fail to improve.  Patient was given instructions and counseling regarding her condition or for health maintenance advice. Please see specific information pulled into the AVS if appropriate.

## 2022-02-02 LAB
ALBUMIN SERPL-MCNC: 4.1 G/DL (ref 3.5–5.2)
ALBUMIN/GLOB SERPL: 1.5 G/DL
ALP SERPL-CCNC: 85 U/L (ref 39–117)
ALT SERPL W P-5'-P-CCNC: 12 U/L (ref 1–33)
ANION GAP SERPL CALCULATED.3IONS-SCNC: 9.3 MMOL/L (ref 5–15)
AST SERPL-CCNC: 17 U/L (ref 1–32)
BILIRUB SERPL-MCNC: 0.4 MG/DL (ref 0–1.2)
BUN SERPL-MCNC: 8 MG/DL (ref 8–23)
BUN/CREAT SERPL: 13.6 (ref 7–25)
CALCIUM SPEC-SCNC: 9.4 MG/DL (ref 8.6–10.5)
CHLORIDE SERPL-SCNC: 98 MMOL/L (ref 98–107)
CHOLEST SERPL-MCNC: 234 MG/DL (ref 0–200)
CO2 SERPL-SCNC: 29.7 MMOL/L (ref 22–29)
CREAT SERPL-MCNC: 0.59 MG/DL (ref 0.57–1)
DEPRECATED RDW RBC AUTO: 47.7 FL (ref 37–54)
ERYTHROCYTE [DISTWIDTH] IN BLOOD BY AUTOMATED COUNT: 13.5 % (ref 12.3–15.4)
GFR SERPL CREATININE-BSD FRML MDRD: 103 ML/MIN/1.73
GLOBULIN UR ELPH-MCNC: 2.7 GM/DL
GLUCOSE SERPL-MCNC: 67 MG/DL (ref 65–99)
HCT VFR BLD AUTO: 37.8 % (ref 34–46.6)
HDLC SERPL-MCNC: 66 MG/DL (ref 40–60)
HGB BLD-MCNC: 12.4 G/DL (ref 12–15.9)
LDLC SERPL CALC-MCNC: 152 MG/DL (ref 0–100)
LDLC/HDLC SERPL: 2.26 {RATIO}
MCH RBC QN AUTO: 31.3 PG (ref 26.6–33)
MCHC RBC AUTO-ENTMCNC: 32.8 G/DL (ref 31.5–35.7)
MCV RBC AUTO: 95.5 FL (ref 79–97)
PLATELET # BLD AUTO: 337 10*3/MM3 (ref 140–450)
PMV BLD AUTO: 9.6 FL (ref 6–12)
POTASSIUM SERPL-SCNC: 4.2 MMOL/L (ref 3.5–5.2)
PROT SERPL-MCNC: 6.8 G/DL (ref 6–8.5)
RBC # BLD AUTO: 3.96 10*6/MM3 (ref 3.77–5.28)
SODIUM SERPL-SCNC: 137 MMOL/L (ref 136–145)
TRIGL SERPL-MCNC: 94 MG/DL (ref 0–150)
TSH SERPL DL<=0.05 MIU/L-ACNC: 1.97 UIU/ML (ref 0.27–4.2)
VLDLC SERPL-MCNC: 16 MG/DL (ref 5–40)
WBC NRBC COR # BLD: 8.24 10*3/MM3 (ref 3.4–10.8)

## 2022-02-27 DIAGNOSIS — R51.9 ACUTE INTRACTABLE HEADACHE, UNSPECIFIED HEADACHE TYPE: ICD-10-CM

## 2022-02-28 RX ORDER — PROMETHAZINE HYDROCHLORIDE 25 MG/1
25 TABLET ORAL EVERY 6 HOURS PRN
Qty: 25 TABLET | Refills: 0 | Status: SHIPPED | OUTPATIENT
Start: 2022-02-28 | End: 2022-03-22 | Stop reason: SDUPTHER

## 2022-03-11 DIAGNOSIS — S82.141A CLOSED FRACTURE OF RIGHT TIBIAL PLATEAU, INITIAL ENCOUNTER: ICD-10-CM

## 2022-03-11 DIAGNOSIS — M51.9 LUMBAR DISC DISEASE: ICD-10-CM

## 2022-03-11 RX ORDER — CYCLOBENZAPRINE HCL 10 MG
10 TABLET ORAL 2 TIMES DAILY PRN
Qty: 60 TABLET | Refills: 0 | Status: SHIPPED | OUTPATIENT
Start: 2022-03-11 | End: 2022-05-03

## 2022-03-11 RX ORDER — ALBUTEROL SULFATE 90 UG/1
AEROSOL, METERED RESPIRATORY (INHALATION)
Qty: 18 G | Refills: 2 | Status: SHIPPED | OUTPATIENT
Start: 2022-03-11 | End: 2022-08-04

## 2022-03-13 DIAGNOSIS — F41.1 GENERALIZED ANXIETY DISORDER: ICD-10-CM

## 2022-03-14 RX ORDER — VENLAFAXINE HYDROCHLORIDE 150 MG/1
150 CAPSULE, EXTENDED RELEASE ORAL DAILY
Qty: 90 CAPSULE | Refills: 1 | Status: SHIPPED | OUTPATIENT
Start: 2022-03-14 | End: 2022-09-12 | Stop reason: SDUPTHER

## 2022-03-22 DIAGNOSIS — R51.9 ACUTE INTRACTABLE HEADACHE, UNSPECIFIED HEADACHE TYPE: ICD-10-CM

## 2022-03-22 DIAGNOSIS — R07.89 OTHER CHEST PAIN: ICD-10-CM

## 2022-03-23 RX ORDER — OMEPRAZOLE 40 MG/1
40 CAPSULE, DELAYED RELEASE ORAL DAILY
Qty: 90 CAPSULE | Refills: 1 | Status: SHIPPED | OUTPATIENT
Start: 2022-03-23 | End: 2023-04-01 | Stop reason: SDUPTHER

## 2022-03-23 RX ORDER — PROMETHAZINE HYDROCHLORIDE 25 MG/1
25 TABLET ORAL EVERY 6 HOURS PRN
Qty: 25 TABLET | Refills: 0 | Status: SHIPPED | OUTPATIENT
Start: 2022-03-23 | End: 2022-04-18 | Stop reason: SDUPTHER

## 2022-03-30 DIAGNOSIS — M51.9 LUMBAR DISC DISEASE: ICD-10-CM

## 2022-03-31 ENCOUNTER — IMMUNIZATION (OUTPATIENT)
Dept: FAMILY MEDICINE CLINIC | Facility: CLINIC | Age: 62
End: 2022-03-31

## 2022-03-31 DIAGNOSIS — Z23 ENCOUNTER FOR IMMUNIZATION: Primary | ICD-10-CM

## 2022-03-31 PROCEDURE — 91305 COVID-19 (PFIZER) 12+ YRS: CPT | Performed by: FAMILY MEDICINE

## 2022-03-31 PROCEDURE — 0054A COVID-19 (PFIZER) 12+ YRS: CPT | Performed by: FAMILY MEDICINE

## 2022-04-01 RX ORDER — TRAMADOL HYDROCHLORIDE 50 MG/1
TABLET ORAL
Qty: 90 TABLET | Refills: 0 | Status: SHIPPED | OUTPATIENT
Start: 2022-04-01 | End: 2022-05-03 | Stop reason: SDUPTHER

## 2022-04-18 DIAGNOSIS — R51.9 ACUTE INTRACTABLE HEADACHE, UNSPECIFIED HEADACHE TYPE: ICD-10-CM

## 2022-04-18 RX ORDER — PROMETHAZINE HYDROCHLORIDE 25 MG/1
25 TABLET ORAL EVERY 6 HOURS PRN
Qty: 25 TABLET | Refills: 0 | Status: SHIPPED | OUTPATIENT
Start: 2022-04-18 | End: 2022-05-06 | Stop reason: SDUPTHER

## 2022-05-03 ENCOUNTER — OFFICE VISIT (OUTPATIENT)
Dept: FAMILY MEDICINE CLINIC | Facility: CLINIC | Age: 62
End: 2022-05-03

## 2022-05-03 VITALS
WEIGHT: 183.2 LBS | HEIGHT: 61 IN | OXYGEN SATURATION: 93 % | DIASTOLIC BLOOD PRESSURE: 78 MMHG | HEART RATE: 116 BPM | BODY MASS INDEX: 34.59 KG/M2 | TEMPERATURE: 97.7 F | SYSTOLIC BLOOD PRESSURE: 132 MMHG

## 2022-05-03 DIAGNOSIS — R05.3 CHRONIC COUGH: Primary | ICD-10-CM

## 2022-05-03 DIAGNOSIS — J45.40 MODERATE PERSISTENT ASTHMATIC BRONCHITIS WITHOUT COMPLICATION: ICD-10-CM

## 2022-05-03 DIAGNOSIS — M51.9 LUMBAR DISC DISEASE: ICD-10-CM

## 2022-05-03 DIAGNOSIS — G47.62 NOCTURNAL LEG CRAMPS: ICD-10-CM

## 2022-05-03 PROCEDURE — 99214 OFFICE O/P EST MOD 30 MIN: CPT | Performed by: FAMILY MEDICINE

## 2022-05-03 RX ORDER — GABAPENTIN 600 MG/1
600 TABLET ORAL 2 TIMES DAILY
Qty: 60 TABLET | Refills: 2 | Status: SHIPPED | OUTPATIENT
Start: 2022-05-03 | End: 2022-08-16

## 2022-05-03 RX ORDER — BUDESONIDE AND FORMOTEROL FUMARATE DIHYDRATE 160; 4.5 UG/1; UG/1
2 AEROSOL RESPIRATORY (INHALATION) 2 TIMES DAILY
Qty: 10.2 G | Refills: 5 | Status: SHIPPED | OUTPATIENT
Start: 2022-05-03 | End: 2022-08-01 | Stop reason: SDUPTHER

## 2022-05-03 RX ORDER — TIOTROPIUM BROMIDE INHALATION SPRAY 1.56 UG/1
2 SPRAY, METERED RESPIRATORY (INHALATION)
Qty: 4 G | Refills: 2 | Status: SHIPPED | OUTPATIENT
Start: 2022-05-03 | End: 2022-09-08

## 2022-05-03 RX ORDER — TRAMADOL HYDROCHLORIDE 50 MG/1
TABLET ORAL
Qty: 90 TABLET | Refills: 0 | Status: SHIPPED | OUTPATIENT
Start: 2022-05-03 | End: 2022-06-03 | Stop reason: SDUPTHER

## 2022-05-03 RX ORDER — PRAMIPEXOLE DIHYDROCHLORIDE 0.25 MG/1
0.25 TABLET ORAL
Qty: 30 TABLET | Refills: 2 | Status: SHIPPED | OUTPATIENT
Start: 2022-05-03 | End: 2022-08-04

## 2022-05-04 NOTE — PROGRESS NOTES
"Chief Complaint  review medication  and Cough    Subjective          Yessica Galvin presents to [facility]  Cough  This is a new problem. The current episode started more than 1 month ago. The problem has been unchanged. The problem occurs every few minutes. The cough is productive of sputum. Associated symptoms include shortness of breath and wheezing. Pertinent negatives include no chills. The symptoms are aggravated by exercise and stress. She has tried a beta-agonist inhaler and steroid inhaler for the symptoms. The treatment provided mild relief.   Back Pain  This is a chronic problem. The current episode started more than 1 month ago. The problem is unchanged. The pain is present in the lumbar spine. The quality of the pain is described as aching. The pain is moderate. The symptoms are aggravated by bending, standing and twisting. Associated symptoms include paresthesias. Pertinent negatives include no bladder incontinence or bowel incontinence. Risk factors include poor posture and sedentary lifestyle. She has tried analgesics and home exercises for the symptoms. The treatment provided moderate relief.       Objective   Vital Signs:   /78   Pulse 116   Temp 97.7 °F (36.5 °C)   Ht 154.9 cm (61\")   Wt 83.1 kg (183 lb 3.2 oz)   SpO2 93%   BMI 34.62 kg/m²     Physical Exam  Vitals and nursing note reviewed.   Constitutional:       Appearance: Normal appearance. She is well-developed.   HENT:      Head: Normocephalic and atraumatic.      Right Ear: Tympanic membrane normal.      Left Ear: Tympanic membrane normal.      Nose: Nose normal.      Mouth/Throat:      Mouth: Mucous membranes are moist.      Pharynx: Oropharynx is clear.   Eyes:      General: No scleral icterus.     Conjunctiva/sclera: Conjunctivae normal.      Pupils: Pupils are equal, round, and reactive to light.   Neck:      Thyroid: No thyromegaly.      Vascular: No carotid bruit.   Cardiovascular:      " Rate and Rhythm: Normal rate and regular rhythm.   Pulmonary:      Effort: Pulmonary effort is normal.      Breath sounds: Wheezing present.      Comments: Scattered fine rhonchi few scattered expiratory wheezes  Musculoskeletal:         General: Normal range of motion.      Cervical back: Neck supple.      Comments: Dorsal kyphosis   Skin:     General: Skin is warm and dry.      Findings: No rash.   Neurological:      General: No focal deficit present.      Mental Status: She is alert and oriented to person, place, and time.      Comments: No focal deficits no lateralizing signs   Psychiatric:         Behavior: Behavior is cooperative.        Result Review :                 Assessment and Plan    Diagnoses and all orders for this visit:    1. Chronic cough (Primary)  -     CT Chest Without Contrast; Future    2. Moderate persistent asthmatic bronchitis without complication  -     budesonide-formoterol (Symbicort) 160-4.5 MCG/ACT inhaler; Inhale 2 puffs 2 (Two) Times a Day.  Dispense: 10.2 g; Refill: 5  -     CT Chest Without Contrast; Future  -     Tiotropium Bromide Monohydrate (Spiriva Respimat) 1.25 MCG/ACT aerosol solution inhaler; Inhale 2 puffs Daily.  Dispense: 4 g; Refill: 2    3. Lumbar disc disease  -     traMADol (ULTRAM) 50 MG tablet; Take 1 in the morning and 2 in the evening  Dispense: 90 tablet; Refill: 0  -     gabapentin (Neurontin) 600 MG tablet; Take 1 tablet by mouth 2 (Two) Times a Day.  Dispense: 60 tablet; Refill: 2    4. Nocturnal leg cramps  -     pramipexole (Mirapex) 0.25 MG tablet; Take 1 tablet by mouth every night at bedtime.  Dispense: 30 tablet; Refill: 2               Follow Up   Return in about 3 months (around 8/3/2022) for Recheck.  Patient was given instructions and counseling regarding her condition or for health maintenance advice. Please see specific information pulled into the AVS if appropriate.

## 2022-05-06 DIAGNOSIS — R51.9 ACUTE INTRACTABLE HEADACHE, UNSPECIFIED HEADACHE TYPE: ICD-10-CM

## 2022-05-06 RX ORDER — PROMETHAZINE HYDROCHLORIDE 25 MG/1
25 TABLET ORAL EVERY 6 HOURS PRN
Qty: 25 TABLET | Refills: 0 | Status: SHIPPED | OUTPATIENT
Start: 2022-05-06 | End: 2022-05-20 | Stop reason: SDUPTHER

## 2022-05-10 ENCOUNTER — PRIOR AUTHORIZATION (OUTPATIENT)
Dept: FAMILY MEDICINE CLINIC | Facility: CLINIC | Age: 62
End: 2022-05-10

## 2022-05-16 ENCOUNTER — HOSPITAL ENCOUNTER (OUTPATIENT)
Dept: CT IMAGING | Facility: HOSPITAL | Age: 62
Discharge: HOME | End: 2022-05-16
Admitting: FAMILY MEDICINE

## 2022-05-16 DIAGNOSIS — R05.3 CHRONIC COUGH: ICD-10-CM

## 2022-05-16 DIAGNOSIS — J45.40 MODERATE PERSISTENT ASTHMATIC BRONCHITIS WITHOUT COMPLICATION: ICD-10-CM

## 2022-05-16 PROCEDURE — 71250 CT THORAX DX C-: CPT

## 2022-05-20 DIAGNOSIS — R51.9 ACUTE INTRACTABLE HEADACHE, UNSPECIFIED HEADACHE TYPE: ICD-10-CM

## 2022-05-23 RX ORDER — PROMETHAZINE HYDROCHLORIDE 25 MG/1
25 TABLET ORAL EVERY 6 HOURS PRN
Qty: 25 TABLET | Refills: 0 | Status: SHIPPED | OUTPATIENT
Start: 2022-05-23 | End: 2022-06-08 | Stop reason: SDUPTHER

## 2022-05-25 DIAGNOSIS — Z01.812 BLOOD TESTS PRIOR TO TREATMENT OR PROCEDURE: Primary | ICD-10-CM

## 2022-05-31 ENCOUNTER — CLINICAL SUPPORT NO REQUIREMENTS (OUTPATIENT)
Dept: PULMONOLOGY | Facility: CLINIC | Age: 62
End: 2022-05-31

## 2022-05-31 DIAGNOSIS — Z01.812 BLOOD TESTS PRIOR TO TREATMENT OR PROCEDURE: ICD-10-CM

## 2022-05-31 PROCEDURE — 99211 OFF/OP EST MAY X REQ PHY/QHP: CPT | Performed by: INTERNAL MEDICINE

## 2022-05-31 PROCEDURE — U0004 COV-19 TEST NON-CDC HGH THRU: HCPCS | Performed by: INTERNAL MEDICINE

## 2022-06-01 LAB — SARS-COV-2 RNA NOSE QL NAA+PROBE: NOT DETECTED

## 2022-06-02 ENCOUNTER — OFFICE VISIT (OUTPATIENT)
Dept: PULMONOLOGY | Facility: CLINIC | Age: 62
End: 2022-06-02

## 2022-06-02 VITALS
OXYGEN SATURATION: 96 % | DIASTOLIC BLOOD PRESSURE: 90 MMHG | HEART RATE: 76 BPM | HEIGHT: 61 IN | SYSTOLIC BLOOD PRESSURE: 146 MMHG | RESPIRATION RATE: 16 BRPM | WEIGHT: 181.13 LBS | TEMPERATURE: 97.8 F | BODY MASS INDEX: 34.2 KG/M2

## 2022-06-02 DIAGNOSIS — R06.02 SHORTNESS OF BREATH: Primary | ICD-10-CM

## 2022-06-02 DIAGNOSIS — J43.2 CENTRILOBULAR EMPHYSEMA: ICD-10-CM

## 2022-06-02 DIAGNOSIS — J20.9 CHRONIC BRONCHITIS WITH ACUTE EXACERBATION: ICD-10-CM

## 2022-06-02 DIAGNOSIS — J42 CHRONIC BRONCHITIS WITH ACUTE EXACERBATION: ICD-10-CM

## 2022-06-02 DIAGNOSIS — Z87.891 HISTORY OF TOBACCO USE, PRESENTING HAZARDS TO HEALTH: ICD-10-CM

## 2022-06-02 DIAGNOSIS — R91.1 NODULE OF UPPER LOBE OF RIGHT LUNG: ICD-10-CM

## 2022-06-02 PROCEDURE — 94726 PLETHYSMOGRAPHY LUNG VOLUMES: CPT | Performed by: INTERNAL MEDICINE

## 2022-06-02 PROCEDURE — 94060 EVALUATION OF WHEEZING: CPT | Performed by: INTERNAL MEDICINE

## 2022-06-02 PROCEDURE — 99215 OFFICE O/P EST HI 40 MIN: CPT | Performed by: INTERNAL MEDICINE

## 2022-06-02 PROCEDURE — 94729 DIFFUSING CAPACITY: CPT | Performed by: INTERNAL MEDICINE

## 2022-06-02 RX ORDER — ALBUTEROL SULFATE 90 UG/1
4 AEROSOL, METERED RESPIRATORY (INHALATION) ONCE
Status: COMPLETED | OUTPATIENT
Start: 2022-06-02 | End: 2022-06-02

## 2022-06-02 RX ORDER — PREDNISONE 20 MG/1
TABLET ORAL
Qty: 21 TABLET | Refills: 0 | Status: SHIPPED | OUTPATIENT
Start: 2022-06-02 | End: 2022-06-16

## 2022-06-02 RX ADMIN — ALBUTEROL SULFATE 4 PUFF: 90 AEROSOL, METERED RESPIRATORY (INHALATION) at 10:54

## 2022-06-02 NOTE — PROGRESS NOTES
Initial Pulmonary Consult Note    Subjective   Reason for consultation: bronchitis    Yessica Galvin is a 61 y.o. female is being seen for consultation today at the request of Kota Swain MD    History of Present Illness     61 y.o. former smoker of 1 PPD for 33 years who quit 1.5 months ago, with history of asthma since childhood on Symbicort and albuterol, recently started on Spiriva, hypothyroidism, allergic rhinitis, is here for evaluation of bronchitis symptoms.  She reports she had a respiratory illness in November including increased cough, unable to produce mucous, exertional dyspnea, and apparently tested negative for COVID at the time.  She reports ongoing cough.  She reports cough is worse when she gets hot.  She reports increased mucous that is difficult to expectorate.  She denies fevers or chills.  No hemoptysis.  No weight loss.  She eats late at night.      The following portions of the patient's history were reviewed and updated as appropriate: allergies, current medications, past family history, past medical history, past social history, past surgical history and problem list.    Active Ambulatory Problems     Diagnosis Date Noted   • Asthmatic bronchitis 05/16/2016   • Generalized anxiety disorder 05/16/2016   • Gout 05/16/2016   • Headache 05/16/2016   • Acquired hypothyroidism 05/16/2016   • Chronic midline low back pain without sciatica 05/16/2016   • Seasonal allergic rhinitis 05/16/2016   • Cervical disc disorder with radiculopathy 05/16/2016   • Lumbar disc disease 09/19/2017   • Left radial fracture 03/07/2020   • Asthma 03/07/2020   • Tobacco abuse 03/07/2020   • Elevated transaminase level 03/07/2020   • Chronic back pain 03/07/2020   • Closed fracture of right tibial plateau 03/07/2020     Resolved Ambulatory Problems     Diagnosis Date Noted   • Plantar fasciitis 05/16/2016   • Hypokalemia 03/10/2020     Past Medical History:   Diagnosis Date   • Acute bronchitis    • Acute  sinusitis    • Asthma with acute exacerbation    • Disc degeneration, lumbar    • Disc degeneration, lumbar    • History of mammogram    • Hypothyroidism    • Lower back pain    • Restless legs syndrome        Past Surgical History:   Procedure Laterality Date   • HYSTERECTOMY     • KNEE ARTHROSCOPY Right 3/10/2020    Procedure: KNEE ARTHROSCOPY RIGHT;  Surgeon: Guanaco Thakur MD;  Location:  [facility];  Service: Orthopedics;  Laterality: Right;   • NECK SURGERY     • OOPHORECTOMY     • ORIF WRIST FRACTURE Left 3/10/2020    Procedure: WRIST OPEN REDUCTION INTERNAL FIXATION LEFT;  Surgeon: Guanaco Thakur MD;  Location:  [facility];  Service: Orthopedics;  Laterality: Left;   • TIBIAL PLATEAU OPEN REDUCTION INTERNAL FIXATION Right 3/10/2020    Procedure: TIBIAL PLATEAU OPEN REDUCTION INTERNAL FIXATION RIGHT;  Surgeon: Guanaco Thakur MD;  Location:  [facility];  Service: Orthopedics;  Laterality: Right;       Family History   Problem Relation Age of Onset   • Heart disease Mother    • Diabetes Mother    • Alzheimer's disease Father    • Asthma Father    • Breast cancer Neg Hx    • Ovarian cancer Neg Hx        Social History     Socioeconomic History   • Marital status:    Tobacco Use   • Smoking status: Former Smoker     Packs/day: 1.00     Years: 10.00     Pack years: 10.00     Types: Cigarettes     Start date: 3/7/2000     Quit date:      Years since quittin.4   • Smokeless tobacco: Never Used   Vaping Use   • Vaping Use: Never used   Substance and Sexual Activity   • Alcohol use: No   • Drug use: No   • Sexual activity: Defer       Allergies   Allergen Reactions   • Codeine Nausea And Vomiting       Current Outpatient Medications   Medication Sig Dispense Refill   • acetaminophen (TYLENOL) 325 MG tablet Take 2 tablets by mouth Every 4 (Four) Hours As Needed for Mild Pain .     • albuterol (PROVENTIL) (2.5 MG/3ML) 0.083% nebulizer solution Take 2.5 mg by nebulization Every 6 (Six) Hours As  "Needed for Wheezing. 360 mL 1   • albuterol sulfate  (90 Base) MCG/ACT inhaler INHALE TWO PUFFS BY MOUTH EVERY 6 HOURS AS NEEDED FOR WHEEZING 18 g 2   • budesonide-formoterol (Symbicort) 160-4.5 MCG/ACT inhaler Inhale 2 puffs 2 (Two) Times a Day. 10.2 g 5   • diclofenac (VOLTAREN) 75 MG EC tablet Take 1 tablet by mouth 2 (Two) Times a Day. 60 tablet 3   • gabapentin (Neurontin) 600 MG tablet Take 1 tablet by mouth 2 (Two) Times a Day. 60 tablet 2   • levocetirizine (Xyzal) 5 MG tablet Take 1 tablet by mouth Every Evening. 90 tablet 1   • levothyroxine (SYNTHROID, LEVOTHROID) 137 MCG tablet Take 1 tablet by mouth Daily. 90 tablet 1   • montelukast (Singulair) 10 MG tablet Take 1 tablet by mouth Every Night. 90 tablet 1   • omeprazole (priLOSEC) 40 MG capsule Take 1 capsule by mouth Daily. 90 capsule 1   • pramipexole (Mirapex) 0.25 MG tablet Take 1 tablet by mouth every night at bedtime. 30 tablet 2   • promethazine (PHENERGAN) 25 MG tablet Take 1 tablet by mouth Every 6 (Six) Hours As Needed for Nausea or Vomiting. 25 tablet 0   • Tiotropium Bromide Monohydrate (Spiriva Respimat) 1.25 MCG/ACT aerosol solution inhaler Inhale 2 puffs Daily. 4 g 2   • traMADol (ULTRAM) 50 MG tablet Take 1 in the morning and 2 in the evening 90 tablet 0   • venlafaxine XR (Effexor XR) 150 MG 24 hr capsule Take 1 capsule by mouth Daily. 90 capsule 1   • Flublok Quadrivalent 0.5 ML solution prefilled syringe IM suspension      • saccharomyces boulardii (FLORASTOR) 250 MG capsule Take 1 capsule by mouth 2 (Two) Times a Day.       No current facility-administered medications for this visit.       Review of Systems  All other systems were reviewed and are negative.  Exceptions are included in the HPI or as otherwise noted above.     Objective   Blood pressure 146/90, pulse 76, temperature 97.8 °F (36.6 °C), resp. rate 16, height 154.9 cm (61\"), weight 82.2 kg (181 lb 2 oz), SpO2 96 %, not currently breastfeeding.  Physical " Exam    Physical Exam:     Constitutional:    Alert, cooperative, in no acute distress   Head:    Normocephalic, without obvious abnormality, atraumatic   Eyes:            Lids and lashes normal, conjunctivae and sclerae normal, no   icterus, no pallor, corneas clear, PER   ENMT:   Ears appear intact with no abnormalities noted         Neck:   No adenopathy, supple, trachea midline, no thyromegaly, no JVD       Lungs/Resp:     Normal effort, symmetric chest rise, no crepitus, diminished at apices, no chest wall tenderness                 Heart/CV:    Regular rhythm and normal rate, normal S1 and S2, no            murmur   Abdomen/GI:     Soft, non-tender, non-distended, normal bowel sounds   :     Deferred   Extremities/MSK:   No clubbing or cyanosis.  1+ right lower extremity and trace left lower extremity edema.  Normal tone.  MCP joint swelling bilaterally.    Pulses:   Pulses palpable and equal bilaterally   Skin:   No bleeding, bruising or rash   Heme/Lymph:   No cervical or supraclavicular adenopathy.    Neurologic:    Psychiatric:       Moves all extremities with no obvious focal motor deficit.  Cranial nerves 2 - 12 grossly intact   Oriented x 3, normal affect.          The above findings are documentation of my personal physical examination from today.  Electronically signed by:  Bobby Tom MD  06/02/22  11:12 EDT      PFTs:  Full pulmonary function testing was done on 6/2/2022.  Please see scanned PFT report for complete details.  FVC was 4.11 L or 143% predicted.  FEV1 was 1.67 L or 76% predicted.  FEV1 FVC ratio 41%.  Postbronchodilator FEV1 was 1.60 L or 73% predicted.  Maximal voluntary ventilation 40 L/min or 46% predicted.  Total lung capacity 7.27 L or 170% predicted.  Residual volume 3.16 L or 194% predicted.  DLCO 62% predicted.  DLCO/VA 77% predicted.    Interpretation: Moderate obstruction without significant response to bronchodilator.  Low maximal voluntary ventilation.  There is air  trapping and hyperinflation.  Low DLCO.  No prior study available for immediate comparison.      Imaging:  CT chest from 5/16/2022 was reviewed.  There is moderate centrilobular emphysema bilaterally along with a 6 mm right upper lobe noncalcified nodule.  No prior CT chest imaging is available.  Radiologist impression follows:    IMPRESSION:     1. 6 mm right upper lobe pulmonary nodule. Recommend 6 month follow-up chest CT.    Assessment & Plan   Diagnoses and all orders for this visit:    1. Shortness of breath (Primary)  -     albuterol sulfate HFA (PROVENTIL HFA;VENTOLIN HFA;PROAIR HFA) inhaler 4 puff  -     Pulmonary Function Test    2. Chronic bronchitis with acute exacerbation (HCC)  -     predniSONE (DELTASONE) 20 MG tablet; Take 2 tablets by mouth Daily for 7 days, THEN 1 tablet Daily for 7 days.  Dispense: 21 tablet; Refill: 0    3. Centrilobular emphysema (HCC) - Stage II COPD with FEV1 1.67 L or 76% predicted 6/2/22    4. History of tobacco use, presenting hazards to health    5. Nodule of upper lobe of right lung  -     CT Chest Without Contrast; Future        Discussion:    61 y.o. former smoker of 1 PPD for 33 years who quit 1.5 months ago, with history of asthma since childhood on Symbicort and albuterol, recently started on Spiriva, hypothyroidism, allergic rhinitis, is here for evaluation of bronchitis symptoms.      Findings today reveal moderate obstruction on pulmonary function testing with no response to bronchodilator consistent with stage II COPD.  She additionally has air trapping and hyperinflation.  Prior CT scan of the chest showed significant emphysema and a 6 mm noncalcified right upper lobe lung nodule.  This will need to be followed.    Thankfully, the patient quit smoking about a month and a half ago.  She was counseled on ongoing smoking cessation efforts.    She has evidence from history of acute bronchitis exacerbation and I will treat her with a 2-week course of  steroids.    Otherwise, I will follow her up in 6 months after a follow-up CT scan of the chest for the lung nodule with repeat spirometry.    Ultimately, patient will need yearly screening CT scans giving her smoking history until the age of 75 based on current guidelines, if she remains abstinent from smoking.    I recommended she continue her Symbicort, Spiriva, and rescue albuterol.  She requested that I order her a new nebulizer machine, which I will arrange.         I personally spent a total of 46 minutes on patient visit today including chart review, face to face with the patient obtaining the history and physical exam, review of pertinent images and tests, counseling and discussion and/or coordination of care as described above, and documentation.      Electronically signed by:  Bobby Tom MD  06/02/22  11:12 EDT    • Please note that portions of this note were completed with [employer] - a voice recognition program.

## 2022-06-03 DIAGNOSIS — M51.9 LUMBAR DISC DISEASE: ICD-10-CM

## 2022-06-03 RX ORDER — TRAMADOL HYDROCHLORIDE 50 MG/1
TABLET ORAL
Qty: 90 TABLET | Refills: 0 | Status: SHIPPED | OUTPATIENT
Start: 2022-06-03 | End: 2022-07-01

## 2022-06-08 DIAGNOSIS — R51.9 ACUTE INTRACTABLE HEADACHE, UNSPECIFIED HEADACHE TYPE: ICD-10-CM

## 2022-06-08 NOTE — TELEPHONE ENCOUNTER
Rx Refill Note  Requested Prescriptions     Pending Prescriptions Disp Refills   • promethazine (PHENERGAN) 25 MG tablet 25 tablet 0     Sig: Take 1 tablet by mouth Every 6 (Six) Hours As Needed for Nausea or Vomiting.      Last office visit with prescribing clinician: 5/3/2022      Next office visit with prescribing clinician: 8/30/2022   }  Omid Daniels MA  06/08/22, 18:22 EDT

## 2022-06-09 ENCOUNTER — TELEPHONE (OUTPATIENT)
Dept: FAMILY MEDICINE CLINIC | Facility: CLINIC | Age: 62
End: 2022-06-09

## 2022-06-09 RX ORDER — PROMETHAZINE HYDROCHLORIDE 25 MG/1
25 TABLET ORAL EVERY 6 HOURS PRN
Qty: 25 TABLET | Refills: 0 | Status: SHIPPED | OUTPATIENT
Start: 2022-06-09 | End: 2022-06-22 | Stop reason: SDUPTHER

## 2022-06-09 NOTE — TELEPHONE ENCOUNTER
Caller: Yessica Galvin    Relationship: Self    Best call back number: [phone]     What medication are you requesting: SOMETHING TO HELP WITH TH E SYMPTOMS    What are your current symptoms: DIARRHEA, NAUSEA    How long have you been experiencing symptoms: 6-8-22    Have you had these symptoms before:    [] Yes  [x] No    Have you been treated for these symptoms before:   [] Yes  [x] No    If a prescription is needed, what is your preferred pharmacy and phone number: [facility] [address] [place] [address] [phone] [facility] [phone]      Additional notes:

## 2022-06-22 DIAGNOSIS — R51.9 ACUTE INTRACTABLE HEADACHE, UNSPECIFIED HEADACHE TYPE: ICD-10-CM

## 2022-06-22 RX ORDER — PROMETHAZINE HYDROCHLORIDE 25 MG/1
25 TABLET ORAL EVERY 6 HOURS PRN
Qty: 25 TABLET | Refills: 0 | Status: SHIPPED | OUTPATIENT
Start: 2022-06-22 | End: 2022-07-13

## 2022-07-01 DIAGNOSIS — M51.9 LUMBAR DISC DISEASE: ICD-10-CM

## 2022-07-01 RX ORDER — TRAMADOL HYDROCHLORIDE 50 MG/1
TABLET ORAL
Qty: 90 TABLET | Refills: 0 | Status: SHIPPED | OUTPATIENT
Start: 2022-07-01 | End: 2022-08-01 | Stop reason: SDUPTHER

## 2022-07-13 DIAGNOSIS — R51.9 ACUTE INTRACTABLE HEADACHE, UNSPECIFIED HEADACHE TYPE: ICD-10-CM

## 2022-07-13 RX ORDER — PROMETHAZINE HYDROCHLORIDE 25 MG/1
TABLET ORAL
Qty: 25 TABLET | Refills: 0 | Status: SHIPPED | OUTPATIENT
Start: 2022-07-13 | End: 2022-08-01 | Stop reason: SDUPTHER

## 2022-08-01 DIAGNOSIS — J45.40 MODERATE PERSISTENT ASTHMATIC BRONCHITIS WITHOUT COMPLICATION: ICD-10-CM

## 2022-08-01 DIAGNOSIS — M51.9 LUMBAR DISC DISEASE: ICD-10-CM

## 2022-08-01 DIAGNOSIS — R51.9 ACUTE INTRACTABLE HEADACHE, UNSPECIFIED HEADACHE TYPE: ICD-10-CM

## 2022-08-01 RX ORDER — BUDESONIDE AND FORMOTEROL FUMARATE DIHYDRATE 160; 4.5 UG/1; UG/1
2 AEROSOL RESPIRATORY (INHALATION) 2 TIMES DAILY
Qty: 10.2 G | Refills: 5 | Status: SHIPPED | OUTPATIENT
Start: 2022-08-01 | End: 2023-01-23 | Stop reason: SDUPTHER

## 2022-08-01 RX ORDER — PROMETHAZINE HYDROCHLORIDE 25 MG/1
25 TABLET ORAL EVERY 6 HOURS PRN
Qty: 25 TABLET | Refills: 0 | Status: SHIPPED | OUTPATIENT
Start: 2022-08-01 | End: 2022-08-19

## 2022-08-01 RX ORDER — TRAMADOL HYDROCHLORIDE 50 MG/1
TABLET ORAL
Qty: 90 TABLET | Refills: 0 | Status: SHIPPED | OUTPATIENT
Start: 2022-08-01 | End: 2022-09-02 | Stop reason: SDUPTHER

## 2022-08-01 NOTE — TELEPHONE ENCOUNTER
Rx Refill Note  Requested Prescriptions     Pending Prescriptions Disp Refills   • promethazine (PHENERGAN) 25 MG tablet 25 tablet 0     Sig: Take 1 tablet by mouth Every 6 (Six) Hours As Needed for Nausea or Vomiting.      Last office visit with prescribing clinician: 5/3/2022      Next office visit with prescribing clinician: 8/30/2022            Libia Sawyer  08/01/22, 09:24 EDT

## 2022-08-04 DIAGNOSIS — G47.62 NOCTURNAL LEG CRAMPS: ICD-10-CM

## 2022-08-04 RX ORDER — PRAMIPEXOLE DIHYDROCHLORIDE 0.25 MG/1
TABLET ORAL
Qty: 30 TABLET | Refills: 2 | Status: SHIPPED | OUTPATIENT
Start: 2022-08-04 | End: 2022-11-04

## 2022-08-04 RX ORDER — ALBUTEROL SULFATE 90 UG/1
AEROSOL, METERED RESPIRATORY (INHALATION)
Qty: 18 G | Refills: 2 | Status: SHIPPED | OUTPATIENT
Start: 2022-08-04 | End: 2022-11-16

## 2022-08-12 DIAGNOSIS — E03.9 ACQUIRED HYPOTHYROIDISM: ICD-10-CM

## 2022-08-12 RX ORDER — LEVOTHYROXINE SODIUM 137 UG/1
137 TABLET ORAL DAILY
Qty: 90 TABLET | Refills: 1 | Status: SHIPPED | OUTPATIENT
Start: 2022-08-12 | End: 2023-01-23 | Stop reason: SDUPTHER

## 2022-08-15 DIAGNOSIS — M51.9 LUMBAR DISC DISEASE: ICD-10-CM

## 2022-08-16 RX ORDER — GABAPENTIN 600 MG/1
TABLET ORAL
Qty: 60 TABLET | Refills: 0 | Status: SHIPPED | OUTPATIENT
Start: 2022-08-16 | End: 2022-09-12

## 2022-08-19 DIAGNOSIS — R51.9 ACUTE INTRACTABLE HEADACHE, UNSPECIFIED HEADACHE TYPE: ICD-10-CM

## 2022-08-19 RX ORDER — PROMETHAZINE HYDROCHLORIDE 25 MG/1
TABLET ORAL
Qty: 25 TABLET | Refills: 0 | Status: SHIPPED | OUTPATIENT
Start: 2022-08-19 | End: 2022-09-02 | Stop reason: SDUPTHER

## 2022-08-19 NOTE — TELEPHONE ENCOUNTER
Rx Refill Note  Requested Prescriptions     Pending Prescriptions Disp Refills   • promethazine (PHENERGAN) 25 MG tablet [Pharmacy Med Name: PROMETHAZINE 25 MG TABLET] 25 tablet 0     Sig: TAKE ONE TABLET BY MOUTH EVERY 6 HOURS AS NEEDED FOR NAUSEA OR VOMITING      Last office visit with prescribing clinician: 5/3/2022      Next office visit with prescribing clinician: 8/30/2022            Kristy Anderson MA  08/19/22, 10:13 EDT

## 2022-08-30 ENCOUNTER — OFFICE VISIT (OUTPATIENT)
Dept: FAMILY MEDICINE CLINIC | Facility: CLINIC | Age: 62
End: 2022-08-30

## 2022-08-30 VITALS
DIASTOLIC BLOOD PRESSURE: 80 MMHG | BODY MASS INDEX: 32.74 KG/M2 | HEART RATE: 76 BPM | HEIGHT: 61 IN | TEMPERATURE: 97.3 F | SYSTOLIC BLOOD PRESSURE: 134 MMHG | WEIGHT: 173.4 LBS | OXYGEN SATURATION: 95 %

## 2022-08-30 DIAGNOSIS — E55.9 VITAMIN D DEFICIENCY: ICD-10-CM

## 2022-08-30 DIAGNOSIS — K21.00 GASTROESOPHAGEAL REFLUX DISEASE WITH ESOPHAGITIS WITHOUT HEMORRHAGE: ICD-10-CM

## 2022-08-30 DIAGNOSIS — F41.1 GENERALIZED ANXIETY DISORDER: ICD-10-CM

## 2022-08-30 DIAGNOSIS — Z00.00 MEDICARE ANNUAL WELLNESS VISIT, SUBSEQUENT: Primary | ICD-10-CM

## 2022-08-30 DIAGNOSIS — E03.9 ACQUIRED HYPOTHYROIDISM: ICD-10-CM

## 2022-08-30 DIAGNOSIS — Z13.220 LIPID SCREENING: ICD-10-CM

## 2022-08-30 DIAGNOSIS — M51.9 LUMBAR DISC DISEASE: ICD-10-CM

## 2022-08-30 DIAGNOSIS — M10.00 IDIOPATHIC GOUT, UNSPECIFIED CHRONICITY, UNSPECIFIED SITE: ICD-10-CM

## 2022-08-30 PROCEDURE — 1170F FXNL STATUS ASSESSED: CPT | Performed by: FAMILY MEDICINE

## 2022-08-30 PROCEDURE — 93000 ELECTROCARDIOGRAM COMPLETE: CPT | Performed by: FAMILY MEDICINE

## 2022-08-30 PROCEDURE — G0439 PPPS, SUBSEQ VISIT: HCPCS | Performed by: FAMILY MEDICINE

## 2022-08-30 PROCEDURE — 1126F AMNT PAIN NOTED NONE PRSNT: CPT | Performed by: FAMILY MEDICINE

## 2022-08-30 PROCEDURE — 1159F MED LIST DOCD IN RCRD: CPT | Performed by: FAMILY MEDICINE

## 2022-08-30 NOTE — PROGRESS NOTES
The ABCs of the Annual Wellness Visit  Subsequent Medicare Wellness Visit    Chief Complaint   Patient presents with   • Medicare Wellness-subsequent   • Anxiety     Stressed       Subjective    History of Present Illness:  Yessica Galvin is a 61 y.o. female who presents for a Subsequent Medicare Wellness Visit.      The following portions of the patient's history were reviewed and   updated as appropriate: allergies, current medications, past family history, past medical history, past social history, past surgical history and problem list.     Compared to one year ago, the patient feels her physical   health is the same.    Compared to one year ago, the patient feels her mental   health is the same.    Recent Hospitalizations:  She was not admitted to the hospital during the last year.       Current Medical Providers:  Patient Care Team:  Kota Swain MD as PCP - General  Atul Tavarez MD as Consulting Physician (Gastroenterology)  Bobby Tom MD as Consulting Physician (Pulmonary Disease)    Outpatient Medications Prior to Visit   Medication Sig Dispense Refill   • acetaminophen (TYLENOL) 325 MG tablet Take 2 tablets by mouth Every 4 (Four) Hours As Needed for Mild Pain .     • albuterol (PROVENTIL) (2.5 MG/3ML) 0.083% nebulizer solution Take 2.5 mg by nebulization Every 6 (Six) Hours As Needed for Wheezing. 360 mL 1   • albuterol sulfate  (90 Base) MCG/ACT inhaler INHALE TWO PUFFS BY MOUTH EVERY 6 HOURS AS NEEDED FOR WHEEZING 18 g 2   • budesonide-formoterol (Symbicort) 160-4.5 MCG/ACT inhaler Inhale 2 puffs 2 (Two) Times a Day. 10.2 g 5   • diclofenac (VOLTAREN) 75 MG EC tablet Take 1 tablet by mouth 2 (Two) Times a Day. 60 tablet 3   • Flublok Quadrivalent 0.5 ML solution prefilled syringe IM suspension      • gabapentin (NEURONTIN) 600 MG tablet TAKE ONE TABLET BY MOUTH TWICE A DAY 60 tablet 0   • levocetirizine (Xyzal) 5 MG tablet Take 1 tablet by mouth Every Evening. 90 tablet 1   •  levothyroxine (SYNTHROID, LEVOTHROID) 137 MCG tablet Take 1 tablet by mouth Daily. 90 tablet 1   • montelukast (Singulair) 10 MG tablet Take 1 tablet by mouth Every Night. 90 tablet 1   • omeprazole (priLOSEC) 40 MG capsule Take 1 capsule by mouth Daily. 90 capsule 1   • pramipexole (MIRAPEX) 0.25 MG tablet TAKE ONE TABLET BY MOUTH EVERY NIGHT AT BEDTIME 30 tablet 2   • promethazine (PHENERGAN) 25 MG tablet TAKE ONE TABLET BY MOUTH EVERY 6 HOURS AS NEEDED FOR NAUSEA OR VOMITING 25 tablet 0   • saccharomyces boulardii (FLORASTOR) 250 MG capsule Take 1 capsule by mouth 2 (Two) Times a Day.     • Tiotropium Bromide Monohydrate (Spiriva Respimat) 1.25 MCG/ACT aerosol solution inhaler Inhale 2 puffs Daily. 4 g 2   • traMADol (ULTRAM) 50 MG tablet TAKE ONE TABLET BY MOUTH EVERY MORNING AND TAKE TWO TABLETS BY MOUTH EVERY EVENING 90 tablet 0   • venlafaxine XR (Effexor XR) 150 MG 24 hr capsule Take 1 capsule by mouth Daily. 90 capsule 1     No facility-administered medications prior to visit.       Opioid medication/s are on active medication list.  and I have evaluated her active treatment plan and pain score trends (see table).  Vitals:    08/30/22 1304   PainSc: 0-No pain     I have reviewed the chart for potential of high risk medication and harmful drug interactions in the elderly.            Aspirin is not on active medication list.  Aspirin use is not indicated based on review of current medical condition/s. Risk of harm outweighs potential benefits.  .    Patient Active Problem List   Diagnosis   • Asthmatic bronchitis   • Generalized anxiety disorder   • Gout   • Headache   • Acquired hypothyroidism   • Chronic midline low back pain without sciatica   • Seasonal allergic rhinitis   • Cervical disc disorder with radiculopathy   • Lumbar disc disease   • Left radial fracture   • Asthma   • Tobacco abuse   • Elevated transaminase level   • Chronic back pain   • Closed fracture of right tibial plateau   • Chronic  "bronchitis with acute exacerbation (HCC)   • Centrilobular emphysema (HCC) - Stage II COPD with FEV1 1.67 L or 76% predicted 6/2/22   • History of tobacco use, presenting hazards to health   • Nodule of upper lobe of right lung     Advance Care Planning   Advance Directive is not on file.  ACP discussion was held with the patient during this visit. Patient does not have an advance directive, information provided.    Review of Systems   Constitutional: Negative for activity change and unexpected weight change.   HENT: Positive for postnasal drip. Negative for congestion and sore throat.    Eyes: Negative for pain and visual disturbance.   Respiratory: Positive for cough. Negative for shortness of breath.    Cardiovascular: Positive for palpitations. Negative for chest pain and leg swelling.   Gastrointestinal: Negative for diarrhea, nausea and vomiting.        Lots of issues with reflux heartburn general discomfort   Endocrine: Negative for cold intolerance and heat intolerance.   Genitourinary: Negative for dysuria and hematuria.   Musculoskeletal: Negative for arthralgias and joint swelling.   Skin: Negative for color change and rash.        Open sore on her right elbow which is slowly healing   Allergic/Immunologic: Negative for environmental allergies and food allergies.   Neurological: Negative for syncope and headaches.   Hematological: Negative for adenopathy. Does not bruise/bleed easily.   Psychiatric/Behavioral: Negative for dysphoric mood and sleep disturbance. The patient is not nervous/anxious.         Increased stress level caring for her  who was injured in an MVA         Objective       Vitals:    08/30/22 1304   BP: 134/80   Pulse: 76   Temp: 97.3 °F (36.3 °C)   SpO2: 95%   Weight: 78.7 kg (173 lb 6.4 oz)   Height: 154.9 cm (61\")   PainSc: 0-No pain     BMI Readings from Last 1 Encounters:   08/30/22 32.76 kg/m²   BMI is above normal parameters. Recommendations include: educational " material    Does the patient have evidence of cognitive impairment? No    Physical Exam  Vitals and nursing note reviewed.   Constitutional:       Appearance: Normal appearance. She is well-developed.   HENT:      Head: Normocephalic and atraumatic.      Right Ear: Tympanic membrane and external ear normal.      Left Ear: Tympanic membrane normal.      Nose: Nose normal.   Eyes:      General: No scleral icterus.     Conjunctiva/sclera: Conjunctivae normal.      Pupils: Pupils are equal, round, and reactive to light.   Neck:      Thyroid: No thyromegaly.      Vascular: No carotid bruit.   Cardiovascular:      Rate and Rhythm: Normal rate and regular rhythm.      Heart sounds: Normal heart sounds.   Pulmonary:      Effort: Pulmonary effort is normal.      Comments: Fine crackles a few scattered expiratory wheezes  Abdominal:      General: Bowel sounds are normal.      Palpations: Abdomen is soft.      Tenderness: There is abdominal tenderness.      Comments: Mildly tender in the epigastrium   Musculoskeletal:         General: Normal range of motion.      Cervical back: Neck supple.      Right lower leg: No edema.      Left lower leg: No edema.   Skin:     General: Skin is warm and dry.      Findings: No rash.   Neurological:      General: No focal deficit present.      Mental Status: She is alert and oriented to person, place, and time.      Comments: No focal deficits no lateralizing signs   Psychiatric:         Mood and Affect: Mood normal.         Behavior: Behavior is cooperative.                   HEALTH RISK ASSESSMENT    Smoking Status:  Social History     Tobacco Use   Smoking Status Former Smoker   • Packs/day: 1.00   • Years: 10.00   • Pack years: 10.00   • Types: Cigarettes   • Start date: 3/7/2000   • Quit date:    • Years since quittin.6   Smokeless Tobacco Never Used     Alcohol Consumption:  Social History     Substance and Sexual Activity   Alcohol Use No     Fall Risk Screen:    STEADI Fall  Risk Assessment was completed, and patient is at LOW risk for falls.Assessment completed on:8/30/2022    Depression Screening:  PHQ-2/PHQ-9 Depression Screening 8/30/2022   Retired PHQ-9 Total Score -   Retired Total Score -   Little Interest or Pleasure in Doing Things 2-->more than half the days   Feeling Down, Depressed or Hopeless 1-->several days   Trouble Falling or Staying Asleep, or Sleeping Too Much 1-->several days   Feeling Tired or Having Little Energy 3-->nearly every day   Poor Appetite or Overeating 3-->nearly every day   Feeling Bad about Yourself - or that You are a Failure or Have Let Yourself or Your Family Down 1-->several days   Trouble Concentrating on Things, Such as Reading the Newspaper or Watching Television 3-->nearly every day   Moving or Speaking So Slowly that Other People Could Have Noticed? Or the Opposite - Being So Fidgety 1-->several days   Thoughts that You Would be Better Off Dead or of Hurting Yourself in Some Way 0-->not at all   PHQ-9: Brief Depression Severity Measure Score 15       Health Habits and Functional and Cognitive Screening:  Functional & Cognitive Status 8/30/2022   Do you have difficulty preparing food and eating? No   Do you have difficulty bathing yourself, getting dressed or grooming yourself? No   Do you have difficulty using the toilet? No   Do you have difficulty moving around from place to place? -   Do you have trouble with steps or getting out of a bed or a chair? No   Current Diet Well Balanced Diet   Dental Exam Not up to date   Eye Exam Up to date   Exercise (times per week) Other   Current Exercises Include Other   Do you need help using the phone?  No   Are you deaf or do you have serious difficulty hearing?  No   Do you need help with transportation? No   Do you need help shopping? No   Do you need help preparing meals?  No   Do you need help with housework?  No   Do you need help with laundry? No   Do you need help taking your medications? No   Do  you need help managing money? No   Do you ever drive or ride in a car without wearing a seat belt? No       Age-appropriate Screening Schedule:  Refer to the list below for future screening recommendations based on patient's age, sex and/or medical conditions. Orders for these recommended tests are listed in the plan section. The patient has been provided with a written plan.    Health Maintenance   Topic Date Due   • MAMMOGRAM  06/23/2022   • INFLUENZA VACCINE  10/01/2022   • TDAP/TD VACCINES (3 - Td or Tdap) 04/30/2028   • ZOSTER VACCINE  Completed   • PAP SMEAR  Discontinued              Assessment & Plan     CMS Preventative Services Quick Reference  Risk Factors Identified During Encounter  Cardiovascular Disease  Depression/Dysphoria  mammogram  The above risks/problems have been discussed with the patient.  Follow up actions/plans if indicated are seen below in the Assessment/Plan Section.  Pertinent information has been shared with the patient in the After Visit Summary.    Diagnoses and all orders for this visit:    1. Acquired hypothyroidism (Primary)  -     TSH; Future    2. Medicare annual wellness visit, subsequent  -     Comprehensive Metabolic Panel; Future  -     Lipid Panel; Future  -     TSH; Future  -     CBC (No Diff); Future  -     Urinalysis With Culture If Indicated -; Future  -     ECG 12 Lead  -     Uric Acid; Future    3. Generalized anxiety disorder  -     Comprehensive Metabolic Panel; Future    4. Lumbar disc disease    5. Gastroesophageal reflux disease with esophagitis without hemorrhage  -     CBC (No Diff); Future  -     ECG 12 Lead    6. Vitamin D deficiency  -     Vitamin D 25 Hydroxy; Future    7. Idiopathic gout, unspecified chronicity, unspecified site  -     Uric Acid; Future    8. Lipid screening  -     Lipid Panel; Future      See form    ECG 12 Lead    Date/Time: 8/30/2022 4:38 PM  Performed by: Kota Swain MD  Authorized by: Kota Swain MD   Comparison: compared  with previous ECG   Similar to previous ECG  Rhythm: sinus rhythm  Rate: normal  BPM: 64  Conduction: conduction normal  ST Segments: ST segments normal  T Waves: T waves normal  QRS axis: normal  Other: no other findings    Clinical impression: normal ECG          Follow Up:   Return in about 3 months (around 11/30/2022).     An After Visit Summary and PPPS were given to the patient.

## 2022-09-02 DIAGNOSIS — M51.9 LUMBAR DISC DISEASE: ICD-10-CM

## 2022-09-02 DIAGNOSIS — R51.9 ACUTE INTRACTABLE HEADACHE, UNSPECIFIED HEADACHE TYPE: ICD-10-CM

## 2022-09-02 RX ORDER — TRAMADOL HYDROCHLORIDE 50 MG/1
TABLET ORAL
Qty: 90 TABLET | Refills: 0 | Status: SHIPPED | OUTPATIENT
Start: 2022-09-02 | End: 2022-10-13 | Stop reason: SDUPTHER

## 2022-09-02 RX ORDER — PROMETHAZINE HYDROCHLORIDE 25 MG/1
25 TABLET ORAL EVERY 6 HOURS PRN
Qty: 25 TABLET | Refills: 0 | Status: SHIPPED | OUTPATIENT
Start: 2022-09-02 | End: 2022-09-20 | Stop reason: SDUPTHER

## 2022-09-08 ENCOUNTER — LAB (OUTPATIENT)
Dept: LAB | Facility: HOSPITAL | Age: 62
End: 2022-09-08

## 2022-09-08 DIAGNOSIS — K21.00 GASTROESOPHAGEAL REFLUX DISEASE WITH ESOPHAGITIS WITHOUT HEMORRHAGE: ICD-10-CM

## 2022-09-08 DIAGNOSIS — J45.40 MODERATE PERSISTENT ASTHMATIC BRONCHITIS WITHOUT COMPLICATION: ICD-10-CM

## 2022-09-08 DIAGNOSIS — F41.1 GENERALIZED ANXIETY DISORDER: ICD-10-CM

## 2022-09-08 DIAGNOSIS — E03.9 ACQUIRED HYPOTHYROIDISM: ICD-10-CM

## 2022-09-08 DIAGNOSIS — M10.00 IDIOPATHIC GOUT, UNSPECIFIED CHRONICITY, UNSPECIFIED SITE: ICD-10-CM

## 2022-09-08 DIAGNOSIS — Z00.00 MEDICARE ANNUAL WELLNESS VISIT, SUBSEQUENT: ICD-10-CM

## 2022-09-08 DIAGNOSIS — E55.9 VITAMIN D DEFICIENCY: ICD-10-CM

## 2022-09-08 DIAGNOSIS — Z13.220 LIPID SCREENING: ICD-10-CM

## 2022-09-08 LAB
BILIRUB UR QL STRIP: NEGATIVE
CLARITY UR: CLEAR
COLOR UR: YELLOW
GLUCOSE UR STRIP-MCNC: NEGATIVE MG/DL
HGB UR QL STRIP.AUTO: NEGATIVE
KETONES UR QL STRIP: NEGATIVE
LEUKOCYTE ESTERASE UR QL STRIP.AUTO: NEGATIVE
NITRITE UR QL STRIP: NEGATIVE
PH UR STRIP.AUTO: 7 [PH] (ref 5–8)
PROT UR QL STRIP: NEGATIVE
SP GR UR STRIP: 1.01 (ref 1–1.03)
UROBILINOGEN UR QL STRIP: NORMAL

## 2022-09-08 PROCEDURE — 85027 COMPLETE CBC AUTOMATED: CPT

## 2022-09-08 PROCEDURE — 84550 ASSAY OF BLOOD/URIC ACID: CPT

## 2022-09-08 PROCEDURE — 82306 VITAMIN D 25 HYDROXY: CPT

## 2022-09-08 PROCEDURE — 84443 ASSAY THYROID STIM HORMONE: CPT

## 2022-09-08 PROCEDURE — 81003 URINALYSIS AUTO W/O SCOPE: CPT

## 2022-09-08 PROCEDURE — 80061 LIPID PANEL: CPT

## 2022-09-08 PROCEDURE — 80053 COMPREHEN METABOLIC PANEL: CPT

## 2022-09-08 RX ORDER — TIOTROPIUM BROMIDE INHALATION SPRAY 1.56 UG/1
SPRAY, METERED RESPIRATORY (INHALATION)
Qty: 4 G | Refills: 2 | Status: SHIPPED | OUTPATIENT
Start: 2022-09-08 | End: 2022-12-12

## 2022-09-09 LAB
25(OH)D3 SERPL-MCNC: 17.6 NG/ML (ref 30–100)
ALBUMIN SERPL-MCNC: 4.3 G/DL (ref 3.5–5.2)
ALBUMIN/GLOB SERPL: 1.9 G/DL
ALP SERPL-CCNC: 88 U/L (ref 39–117)
ALT SERPL W P-5'-P-CCNC: 17 U/L (ref 1–33)
ANION GAP SERPL CALCULATED.3IONS-SCNC: 9.5 MMOL/L (ref 5–15)
AST SERPL-CCNC: 25 U/L (ref 1–32)
BILIRUB SERPL-MCNC: 0.3 MG/DL (ref 0–1.2)
BUN SERPL-MCNC: 8 MG/DL (ref 8–23)
BUN/CREAT SERPL: 15.4 (ref 7–25)
CALCIUM SPEC-SCNC: 9.3 MG/DL (ref 8.6–10.5)
CHLORIDE SERPL-SCNC: 102 MMOL/L (ref 98–107)
CHOLEST SERPL-MCNC: 217 MG/DL (ref 0–200)
CO2 SERPL-SCNC: 25.5 MMOL/L (ref 22–29)
CREAT SERPL-MCNC: 0.52 MG/DL (ref 0.57–1)
DEPRECATED RDW RBC AUTO: 43.6 FL (ref 37–54)
EGFRCR SERPLBLD CKD-EPI 2021: 104.5 ML/MIN/1.73
ERYTHROCYTE [DISTWIDTH] IN BLOOD BY AUTOMATED COUNT: 12.7 % (ref 12.3–15.4)
GLOBULIN UR ELPH-MCNC: 2.3 GM/DL
GLUCOSE SERPL-MCNC: 75 MG/DL (ref 65–99)
HCT VFR BLD AUTO: 37.1 % (ref 34–46.6)
HDLC SERPL-MCNC: 79 MG/DL (ref 40–60)
HGB BLD-MCNC: 12.5 G/DL (ref 12–15.9)
LDLC SERPL CALC-MCNC: 127 MG/DL (ref 0–100)
LDLC/HDLC SERPL: 1.58 {RATIO}
MCH RBC QN AUTO: 31.1 PG (ref 26.6–33)
MCHC RBC AUTO-ENTMCNC: 33.7 G/DL (ref 31.5–35.7)
MCV RBC AUTO: 92.3 FL (ref 79–97)
PLATELET # BLD AUTO: 365 10*3/MM3 (ref 140–450)
PMV BLD AUTO: 9.4 FL (ref 6–12)
POTASSIUM SERPL-SCNC: 3.9 MMOL/L (ref 3.5–5.2)
PROT SERPL-MCNC: 6.6 G/DL (ref 6–8.5)
RBC # BLD AUTO: 4.02 10*6/MM3 (ref 3.77–5.28)
SODIUM SERPL-SCNC: 137 MMOL/L (ref 136–145)
TRIGL SERPL-MCNC: 65 MG/DL (ref 0–150)
TSH SERPL DL<=0.05 MIU/L-ACNC: 0.4 UIU/ML (ref 0.27–4.2)
URATE SERPL-MCNC: 3.9 MG/DL (ref 2.4–5.7)
VLDLC SERPL-MCNC: 11 MG/DL (ref 5–40)
WBC NRBC COR # BLD: 6.64 10*3/MM3 (ref 3.4–10.8)

## 2022-09-12 DIAGNOSIS — M51.9 LUMBAR DISC DISEASE: ICD-10-CM

## 2022-09-12 DIAGNOSIS — F41.1 GENERALIZED ANXIETY DISORDER: ICD-10-CM

## 2022-09-12 RX ORDER — GABAPENTIN 600 MG/1
TABLET ORAL
Qty: 60 TABLET | Refills: 1 | Status: SHIPPED | OUTPATIENT
Start: 2022-09-12 | End: 2022-11-14 | Stop reason: SDUPTHER

## 2022-09-12 RX ORDER — VENLAFAXINE HYDROCHLORIDE 150 MG/1
CAPSULE, EXTENDED RELEASE ORAL
Qty: 90 CAPSULE | Refills: 0 | Status: SHIPPED | OUTPATIENT
Start: 2022-09-12 | End: 2023-01-23

## 2022-09-12 NOTE — TELEPHONE ENCOUNTER
Rx Refill Note  Requested Prescriptions     Pending Prescriptions Disp Refills   • venlafaxine XR (Effexor XR) 150 MG 24 hr capsule 90 capsule 1     Sig: Take 1 capsule by mouth Daily.   • gabapentin (NEURONTIN) 600 MG tablet 60 tablet 0     Sig: Take 1 tablet by mouth 2 (Two) Times a Day.      Last office visit with prescribing clinician: 8/30/2022      Next office visit with prescribing clinician: 11/30/2022            Libia Sawyer  09/12/22, 10:29 EDT

## 2022-09-16 RX ORDER — VENLAFAXINE HYDROCHLORIDE 150 MG/1
150 CAPSULE, EXTENDED RELEASE ORAL DAILY
Qty: 90 CAPSULE | Refills: 1 | Status: SHIPPED | OUTPATIENT
Start: 2022-09-16

## 2022-09-16 RX ORDER — ERGOCALCIFEROL 1.25 MG/1
50000 CAPSULE ORAL WEEKLY
Qty: 12 CAPSULE | Refills: 0 | Status: SHIPPED | OUTPATIENT
Start: 2022-09-16 | End: 2022-12-15

## 2022-09-16 RX ORDER — GABAPENTIN 600 MG/1
600 TABLET ORAL 2 TIMES DAILY
Qty: 60 TABLET | Refills: 0 | OUTPATIENT
Start: 2022-09-16

## 2022-09-20 DIAGNOSIS — R51.9 ACUTE INTRACTABLE HEADACHE, UNSPECIFIED HEADACHE TYPE: ICD-10-CM

## 2022-09-20 RX ORDER — PROMETHAZINE HYDROCHLORIDE 25 MG/1
25 TABLET ORAL EVERY 6 HOURS PRN
Qty: 25 TABLET | Refills: 0 | Status: SHIPPED | OUTPATIENT
Start: 2022-09-20 | End: 2022-10-10 | Stop reason: SDUPTHER

## 2022-09-20 NOTE — TELEPHONE ENCOUNTER
Rx Refill Note  Requested Prescriptions     Pending Prescriptions Disp Refills   • promethazine (PHENERGAN) 25 MG tablet 25 tablet 0     Sig: Take 1 tablet by mouth Every 6 (Six) Hours As Needed for Nausea or Vomiting.      Last office visit with prescribing clinician: 8/30/2022      Next office visit with prescribing clinician: 11/30/2022            Lexie Olivo LPN  09/20/22, 14:17 EDT

## 2022-09-24 DIAGNOSIS — J30.1 SEASONAL ALLERGIC RHINITIS DUE TO POLLEN: ICD-10-CM

## 2022-09-24 RX ORDER — MONTELUKAST SODIUM 10 MG/1
10 TABLET ORAL NIGHTLY
Qty: 90 TABLET | Refills: 1 | Status: SHIPPED | OUTPATIENT
Start: 2022-09-24 | End: 2023-03-28

## 2022-10-05 ENCOUNTER — OUTSIDE FACILITY SERVICE (OUTPATIENT)
Dept: GASTROENTEROLOGY | Facility: CLINIC | Age: 62
End: 2022-10-05

## 2022-10-05 PROCEDURE — 43239 EGD BIOPSY SINGLE/MULTIPLE: CPT | Performed by: INTERNAL MEDICINE

## 2022-10-05 PROCEDURE — 88305 TISSUE EXAM BY PATHOLOGIST: CPT | Performed by: INTERNAL MEDICINE

## 2022-10-06 ENCOUNTER — LAB REQUISITION (OUTPATIENT)
Dept: LAB | Facility: HOSPITAL | Age: 62
End: 2022-10-06

## 2022-10-06 DIAGNOSIS — K44.9 DIAPHRAGMATIC HERNIA WITHOUT OBSTRUCTION OR GANGRENE: ICD-10-CM

## 2022-10-06 DIAGNOSIS — K21.00 GASTRO-ESOPHAGEAL REFLUX DISEASE WITH ESOPHAGITIS, WITHOUT BLEEDING: ICD-10-CM

## 2022-10-07 DIAGNOSIS — M51.9 LUMBAR DISC DISEASE: ICD-10-CM

## 2022-10-07 DIAGNOSIS — M15.0 PRIMARY GENERALIZED (OSTEO)ARTHRITIS: ICD-10-CM

## 2022-10-07 RX ORDER — DICLOFENAC SODIUM 75 MG/1
75 TABLET, DELAYED RELEASE ORAL 2 TIMES DAILY
Qty: 60 TABLET | Refills: 2 | Status: SHIPPED | OUTPATIENT
Start: 2022-10-07 | End: 2023-01-23 | Stop reason: SDUPTHER

## 2022-10-10 ENCOUNTER — TELEPHONE (OUTPATIENT)
Dept: GASTROENTEROLOGY | Facility: CLINIC | Age: 62
End: 2022-10-10

## 2022-10-10 DIAGNOSIS — R51.9 ACUTE INTRACTABLE HEADACHE, UNSPECIFIED HEADACHE TYPE: ICD-10-CM

## 2022-10-10 RX ORDER — PROMETHAZINE HYDROCHLORIDE 25 MG/1
25 TABLET ORAL EVERY 6 HOURS PRN
Qty: 25 TABLET | Refills: 0 | Status: SHIPPED | OUTPATIENT
Start: 2022-10-10 | End: 2022-10-31 | Stop reason: SDUPTHER

## 2022-10-10 NOTE — TELEPHONE ENCOUNTER
----- Message from Atul Tavarez MD sent at 10/7/2022  3:22 PM EDT -----  Let Ms. Galvin know there was some evidence of reflux on esophageal biopsy.  She should continue the medication with antireflux precautions.  Please send her a GERD handout

## 2022-10-13 ENCOUNTER — CLINICAL SUPPORT (OUTPATIENT)
Dept: FAMILY MEDICINE CLINIC | Facility: CLINIC | Age: 62
End: 2022-10-13

## 2022-10-13 DIAGNOSIS — M51.9 LUMBAR DISC DISEASE: ICD-10-CM

## 2022-10-13 PROCEDURE — 90686 IIV4 VACC NO PRSV 0.5 ML IM: CPT | Performed by: FAMILY MEDICINE

## 2022-10-13 PROCEDURE — G0008 ADMIN INFLUENZA VIRUS VAC: HCPCS | Performed by: FAMILY MEDICINE

## 2022-10-13 RX ORDER — TRAMADOL HYDROCHLORIDE 50 MG/1
TABLET ORAL
Qty: 90 TABLET | Refills: 0 | Status: SHIPPED | OUTPATIENT
Start: 2022-10-13 | End: 2022-11-14 | Stop reason: SDUPTHER

## 2022-10-31 DIAGNOSIS — R51.9 ACUTE INTRACTABLE HEADACHE, UNSPECIFIED HEADACHE TYPE: ICD-10-CM

## 2022-11-02 RX ORDER — PROMETHAZINE HYDROCHLORIDE 25 MG/1
25 TABLET ORAL EVERY 6 HOURS PRN
Qty: 50 TABLET | Refills: 0 | Status: SHIPPED | OUTPATIENT
Start: 2022-11-02 | End: 2023-01-14 | Stop reason: SDUPTHER

## 2022-11-04 DIAGNOSIS — G47.62 NOCTURNAL LEG CRAMPS: ICD-10-CM

## 2022-11-04 RX ORDER — PRAMIPEXOLE DIHYDROCHLORIDE 0.25 MG/1
TABLET ORAL
Qty: 30 TABLET | Refills: 2 | Status: SHIPPED | OUTPATIENT
Start: 2022-11-04 | End: 2023-01-23 | Stop reason: SDUPTHER

## 2022-11-14 DIAGNOSIS — M51.9 LUMBAR DISC DISEASE: ICD-10-CM

## 2022-11-14 RX ORDER — TRAMADOL HYDROCHLORIDE 50 MG/1
TABLET ORAL
Qty: 90 TABLET | Refills: 0 | Status: SHIPPED | OUTPATIENT
Start: 2022-11-14 | End: 2022-12-14 | Stop reason: SDUPTHER

## 2022-11-14 RX ORDER — GABAPENTIN 600 MG/1
600 TABLET ORAL 2 TIMES DAILY
Qty: 60 TABLET | Refills: 0 | Status: SHIPPED | OUTPATIENT
Start: 2022-11-14 | End: 2022-12-14 | Stop reason: SDUPTHER

## 2022-11-14 NOTE — TELEPHONE ENCOUNTER
Rx Refill Note  Requested Prescriptions     Pending Prescriptions Disp Refills   • gabapentin (NEURONTIN) 600 MG tablet 60 tablet 1     Sig: Take 1 tablet by mouth 2 (Two) Times a Day.   • traMADol (ULTRAM) 50 MG tablet 90 tablet 0     Sig: TAKE ONE TABLET BY MOUTH EVERY MORNING AND TAKE TWO TABLETS BY MOUTH EVERY EVENING As needed for pain      Last office visit with prescribing clinician: 8/30/2022      Next office visit with prescribing clinician: 11/17/2022            Herman Stanton MA  11/14/22, 16:42 EST

## 2022-11-16 ENCOUNTER — HOSPITAL ENCOUNTER (OUTPATIENT)
Dept: CT IMAGING | Facility: HOSPITAL | Age: 62
Discharge: HOME | End: 2022-11-16
Admitting: INTERNAL MEDICINE

## 2022-11-16 DIAGNOSIS — R91.1 NODULE OF UPPER LOBE OF RIGHT LUNG: ICD-10-CM

## 2022-11-16 PROCEDURE — 71250 CT THORAX DX C-: CPT

## 2022-11-16 RX ORDER — ALBUTEROL SULFATE 90 UG/1
AEROSOL, METERED RESPIRATORY (INHALATION)
Qty: 18 G | Refills: 2 | Status: SHIPPED | OUTPATIENT
Start: 2022-11-16 | End: 2023-01-23 | Stop reason: SDUPTHER

## 2022-11-17 ENCOUNTER — TRANSCRIBE ORDERS (OUTPATIENT)
Dept: FAMILY MEDICINE CLINIC | Facility: CLINIC | Age: 62
End: 2022-11-17

## 2022-11-17 ENCOUNTER — OFFICE VISIT (OUTPATIENT)
Dept: FAMILY MEDICINE CLINIC | Facility: CLINIC | Age: 62
End: 2022-11-17

## 2022-11-17 VITALS
OXYGEN SATURATION: 95 % | HEIGHT: 61 IN | BODY MASS INDEX: 32.28 KG/M2 | HEART RATE: 89 BPM | DIASTOLIC BLOOD PRESSURE: 68 MMHG | TEMPERATURE: 98.6 F | SYSTOLIC BLOOD PRESSURE: 128 MMHG | WEIGHT: 171 LBS

## 2022-11-17 DIAGNOSIS — M51.9 LUMBAR DISC DISEASE: Primary | ICD-10-CM

## 2022-11-17 DIAGNOSIS — M79.603 PAIN OF UPPER EXTREMITY, UNSPECIFIED LATERALITY: ICD-10-CM

## 2022-11-17 DIAGNOSIS — Z12.31 ENCOUNTER FOR SCREENING MAMMOGRAM FOR BREAST CANCER: Primary | ICD-10-CM

## 2022-11-17 PROCEDURE — 99213 OFFICE O/P EST LOW 20 MIN: CPT | Performed by: FAMILY MEDICINE

## 2022-11-21 NOTE — PROGRESS NOTES
"Chief Complaint  Arm Pain    Subjective        Yessica June Galvin presents to [facility]  Back Pain  This is a chronic problem. The current episode started more than 1 month ago. The problem is unchanged. The pain is present in the lumbar spine. The quality of the pain is described as aching. The pain is moderate. The symptoms are aggravated by bending, standing and twisting. Associated symptoms include paresthesias. Pertinent negatives include no bladder incontinence, bowel incontinence, numbness or tingling. Risk factors include poor posture and sedentary lifestyle. She has tried analgesics and home exercises for the symptoms. The treatment provided moderate relief.   Shoulder Injury   There was no injury mechanism. The quality of the pain is described as aching. Pertinent negatives include no numbness or tingling. The symptoms are aggravated by movement and overhead lifting. She has tried rest and acetaminophen for the symptoms. The treatment provided mild relief.       Objective   Vital Signs:  /68   Pulse 89   Temp 98.6 °F (37 °C)   Ht 154.9 cm (61\")   Wt 77.6 kg (171 lb)   SpO2 95%   BMI 32.31 kg/m²   Estimated body mass index is 32.31 kg/m² as calculated from the following:    Height as of this encounter: 154.9 cm (61\").    Weight as of this encounter: 77.6 kg (171 lb).          Physical Exam  Vitals and nursing note reviewed.   Constitutional:       Appearance: Normal appearance. She is well-developed.   HENT:      Head: Normocephalic and atraumatic.      Nose: Nose normal.   Eyes:      General: No scleral icterus.     Conjunctiva/sclera: Conjunctivae normal.      Pupils: Pupils are equal, round, and reactive to light.   Neck:      Thyroid: No thyromegaly.      Vascular: No carotid bruit.   Cardiovascular:      Rate and Rhythm: Normal rate and regular rhythm.   Pulmonary:      Effort: Pulmonary effort is normal.      Comments: Scattered rhonchi  Musculoskeletal:  "        General: Tenderness present. Normal range of motion.      Cervical back: Neck supple.      Comments: Tender shoulder on rom pulses and neuro intact   Skin:     General: Skin is warm and dry.      Findings: No rash.   Neurological:      General: No focal deficit present.      Mental Status: She is alert and oriented to person, place, and time.      Comments: No focal deficits no lateralizing signs   Psychiatric:         Behavior: Behavior is cooperative.        Result Review :                Assessment and Plan   Diagnoses and all orders for this visit:    1. Lumbar disc disease (Primary)    2. Pain of upper extremity, unspecified laterality      Consider ortho evaluation recently refilled tramadol and gabapentin       Follow Up   Return in about 3 months (around 2/17/2023) for Recheck, fasting appt.  Patient was given instructions and counseling regarding her condition or for health maintenance advice. Please see specific information pulled into the AVS if appropriate.

## 2022-12-07 ENCOUNTER — OFFICE VISIT (OUTPATIENT)
Dept: PULMONOLOGY | Facility: CLINIC | Age: 62
End: 2022-12-07

## 2022-12-07 VITALS
SYSTOLIC BLOOD PRESSURE: 142 MMHG | HEART RATE: 78 BPM | TEMPERATURE: 97.7 F | DIASTOLIC BLOOD PRESSURE: 78 MMHG | HEIGHT: 61 IN | OXYGEN SATURATION: 91 % | BODY MASS INDEX: 32.06 KG/M2 | WEIGHT: 169.8 LBS

## 2022-12-07 DIAGNOSIS — Z87.891 HISTORY OF TOBACCO USE, PRESENTING HAZARDS TO HEALTH: ICD-10-CM

## 2022-12-07 DIAGNOSIS — J45.40 MODERATE PERSISTENT ASTHMATIC BRONCHITIS WITHOUT COMPLICATION: ICD-10-CM

## 2022-12-07 DIAGNOSIS — Z72.0 TOBACCO ABUSE: ICD-10-CM

## 2022-12-07 DIAGNOSIS — R91.1 NODULE OF UPPER LOBE OF RIGHT LUNG: ICD-10-CM

## 2022-12-07 DIAGNOSIS — Z23 IMMUNIZATION DUE: ICD-10-CM

## 2022-12-07 DIAGNOSIS — J43.2 CENTRILOBULAR EMPHYSEMA: ICD-10-CM

## 2022-12-07 DIAGNOSIS — J30.1 SEASONAL ALLERGIC RHINITIS DUE TO POLLEN: ICD-10-CM

## 2022-12-07 DIAGNOSIS — J45.20 MILD INTERMITTENT ASTHMA, UNSPECIFIED WHETHER COMPLICATED: Primary | ICD-10-CM

## 2022-12-07 PROCEDURE — 94010 BREATHING CAPACITY TEST: CPT | Performed by: INTERNAL MEDICINE

## 2022-12-07 PROCEDURE — G0009 ADMIN PNEUMOCOCCAL VACCINE: HCPCS | Performed by: INTERNAL MEDICINE

## 2022-12-07 PROCEDURE — 90677 PCV20 VACCINE IM: CPT | Performed by: INTERNAL MEDICINE

## 2022-12-07 PROCEDURE — 99215 OFFICE O/P EST HI 40 MIN: CPT | Performed by: INTERNAL MEDICINE

## 2022-12-07 NOTE — PROGRESS NOTES
Pulmonary Follow-Up Note    Subjective   Reason for consultation: bronchitis    Yessica Galvin is a 62 y.o. female is being seen for consultation today at the request of No ref. provider found    History of Present Illness     62 y.o. former smoker of 1 PPD for 33 years who quit 1.5 months ago, with history of asthma since childhood on Symbicort and albuterol, recently started on Spiriva, hypothyroidism, allergic rhinitis, is here for evaluation of bronchitis symptoms.  She reports she had a respiratory illness in November including increased cough, unable to produce mucous, exertional dyspnea, and apparently tested negative for COVID at the time.  She reports ongoing cough.  She reports cough is worse when she gets hot.  She reports increased mucous that is difficult to expectorate.  She denies fevers or chills.  No hemoptysis.  No weight loss.  She eats late at night.      Interval history:  Patient is here for follow up.  She reports she occasionally will still smoke.  She had flu symptoms last week.  She feels better now.  Was not tested.  She remains on Symbicort and Spiriva.  She uses her rescue inhaler 4 to 6 times daily.  She did have a flu shot.     The following portions of the patient's history were reviewed and updated as appropriate: allergies, current medications, past family history, past medical history, past social history, past surgical history and problem list.    Active Ambulatory Problems     Diagnosis Date Noted   • Asthmatic bronchitis 05/16/2016   • Generalized anxiety disorder 05/16/2016   • Gout 05/16/2016   • Headache 05/16/2016   • Acquired hypothyroidism 05/16/2016   • Chronic midline low back pain without sciatica 05/16/2016   • Seasonal allergic rhinitis 05/16/2016   • Cervical disc disorder with radiculopathy 05/16/2016   • Lumbar disc disease 09/19/2017   • Left radial fracture 03/07/2020   • Asthma 03/07/2020   • Tobacco abuse 03/07/2020   • Elevated transaminase level  03/07/2020   • Chronic back pain 03/07/2020   • Closed fracture of right tibial plateau 03/07/2020   • Chronic bronchitis with acute exacerbation (HCC) 06/02/2022   • Centrilobular emphysema (HCC) - Stage II COPD with FEV1 1.67 L or 76% predicted 6/2/22 06/02/2022   • History of tobacco use, presenting hazards to health 06/02/2022   • Nodule of upper lobe of right lung 06/02/2022     Resolved Ambulatory Problems     Diagnosis Date Noted   • Plantar fasciitis 05/16/2016   • Hypokalemia 03/10/2020     Past Medical History:   Diagnosis Date   • Acute bronchitis    • Acute sinusitis    • Asthma with acute exacerbation    • Asthma, extrinsic ,1970   • Bronchiectasis (HCC)    • Disc degeneration, lumbar    • Disc degeneration, lumbar    • GERD (gastroesophageal reflux disease)    • History of mammogram    • Hypothyroidism    • Lower back pain    • Restless legs syndrome        Past Surgical History:   Procedure Laterality Date   • HYSTERECTOMY     • KNEE ARTHROSCOPY Right 03/10/2020    Procedure: KNEE ARTHROSCOPY RIGHT;  Surgeon: Guanaco Thakur MD;  Location:  [facility] OR;  Service: Orthopedics;  Laterality: Right;   • NECK SURGERY     • OOPHORECTOMY     • ORIF WRIST FRACTURE Left 03/10/2020    Procedure: WRIST OPEN REDUCTION INTERNAL FIXATION LEFT;  Surgeon: Guanaco Thakur MD;  Location: [facility] OR;  Service: Orthopedics;  Laterality: Left;   • TIBIAL PLATEAU OPEN REDUCTION INTERNAL FIXATION Right 03/10/2020    Procedure: TIBIAL PLATEAU OPEN REDUCTION INTERNAL FIXATION RIGHT;  Surgeon: Guanaco Thakur MD;  Location:  [facility] OR;  Service: Orthopedics;  Laterality: Right;       Family History   Problem Relation Age of Onset   • Heart disease Mother    • Diabetes Mother    • Alzheimer's disease Father    • Asthma Father    • Breast cancer Neg Hx    • Ovarian cancer Neg Hx        Social History     Socioeconomic History   • Marital status:    Tobacco Use   • Smoking status: Former     Packs/day: 0.25     Years:  15.00     Pack years: 3.75     Types: Cigarettes     Start date: 3/7/2000     Quit date: 2020     Years since quittin.9   • Smokeless tobacco: Never   • Tobacco comments:     I started when i was 17   Vaping Use   • Vaping Use: Never used   Substance and Sexual Activity   • Alcohol use: No   • Drug use: No   • Sexual activity: Not Currently     Partners: Female     Birth control/protection: None       Allergies   Allergen Reactions   • Codeine Nausea And Vomiting       Current Outpatient Medications   Medication Sig Dispense Refill   • acetaminophen (TYLENOL) 325 MG tablet Take 2 tablets by mouth Every 4 (Four) Hours As Needed for Mild Pain .     • albuterol (PROVENTIL) (2.5 MG/3ML) 0.083% nebulizer solution Take 2.5 mg by nebulization Every 6 (Six) Hours As Needed for Wheezing. 360 mL 1   • albuterol sulfate  (90 Base) MCG/ACT inhaler INHALE TWO PUFFS BY MOUTH EVERY 6 HOURS AS NEEDED FOR WHEEZING 18 g 2   • budesonide-formoterol (Symbicort) 160-4.5 MCG/ACT inhaler Inhale 2 puffs 2 (Two) Times a Day. 10.2 g 5   • diclofenac (VOLTAREN) 75 MG EC tablet Take 1 tablet by mouth 2 (Two) Times a Day. 60 tablet 2   • ergocalciferol (Drisdol) 1.25 MG (27922 UT) capsule Take 1 capsule by mouth 1 (One) Time Per Week for 90 days. 12 capsule 0   • Flublok Quadrivalent 0.5 ML solution prefilled syringe IM suspension      • gabapentin (NEURONTIN) 600 MG tablet Take 1 tablet by mouth 2 (Two) Times a Day. 60 tablet 0   • levocetirizine (Xyzal) 5 MG tablet Take 1 tablet by mouth Every Evening. 90 tablet 1   • levothyroxine (SYNTHROID, LEVOTHROID) 137 MCG tablet Take 1 tablet by mouth Daily. 90 tablet 1   • montelukast (Singulair) 10 MG tablet Take 1 tablet by mouth Every Night. 90 tablet 1   • omeprazole (priLOSEC) 40 MG capsule Take 1 capsule by mouth Daily. 90 capsule 1   • pramipexole (MIRAPEX) 0.25 MG tablet TAKE ONE TABLET BY MOUTH EVERY NIGHT AT BEDTIME 30 tablet 2   • promethazine (PHENERGAN) 25 MG tablet Take 1  "tablet by mouth Every 6 (Six) Hours As Needed for Nausea or Vomiting. 50 tablet 0   • saccharomyces boulardii (FLORASTOR) 250 MG capsule Take 1 capsule by mouth 2 (Two) Times a Day.     • Spiriva Respimat 1.25 MCG/ACT aerosol solution inhaler INHALE TWO PUFFS BY MOUTH DAILY 4 g 2   • traMADol (ULTRAM) 50 MG tablet TAKE ONE TABLET BY MOUTH EVERY MORNING AND TAKE TWO TABLETS BY MOUTH EVERY EVENING As needed for pain 90 tablet 0   • venlafaxine XR (Effexor XR) 150 MG 24 hr capsule Take 1 capsule by mouth Daily. 90 capsule 1   • venlafaxine XR (EFFEXOR-XR) 150 MG 24 hr capsule TAKE ONE CAPSULE BY MOUTH DAILY 90 capsule 0     No current facility-administered medications for this visit.       Review of Systems   Respiratory: Positive for shortness of breath.      All other systems were reviewed and are negative.  Exceptions are included in the HPI or as otherwise noted above.     Objective   Blood pressure 142/78, pulse 78, temperature 97.7 °F (36.5 °C), temperature source Infrared, height 154.9 cm (60.98\"), weight 77 kg (169 lb 12.8 oz), SpO2 91 %, not currently breastfeeding.  Physical Exam    Physical Exam:     Constitutional:    Alert, cooperative, in no acute distress   Head:    Normocephalic, without obvious abnormality, atraumatic   Eyes:            Lids and lashes normal, conjunctivae and sclerae normal, no   icterus, no pallor, corneas clear, PER   ENMT:   Ears appear intact with no abnormalities noted         Neck:   No adenopathy, supple, trachea midline, no thyromegaly, no JVD       Lungs/Resp:     Normal effort, symmetric chest rise, no crepitus, diminished at apices, no chest wall tenderness                 Heart/CV:    Regular rhythm and normal rate, normal S1 and S2, no            murmur   Abdomen/GI:     Soft, non-tender, non-distended, normal bowel sounds   :     Deferred   Extremities/MSK:   No clubbing or cyanosis.  1+ right lower extremity and trace left lower extremity edema.  Normal tone.  MCP " joint swelling bilaterally.    Pulses:   Pulses palpable and equal bilaterally   Skin:   No bleeding, bruising or rash   Heme/Lymph:   No cervical or supraclavicular adenopathy.    Neurologic:    Psychiatric:       Moves all extremities with no obvious focal motor deficit.  Cranial nerves 2 - 12 grossly intact   Oriented x 3, normal affect.          The above findings are documentation of my personal physical examination from today.  Electronically signed by:  Bobby Tom MD  12/07/22  20:08 EST      PFTs:  Spirometry was done on 12/7/2022.  Please see scanned PFT report for complete details.  FVC was 3.56 L or 110% predicted.  FEV1 was 1.45 L or 61% predicted.  FEV1 to FVC ratio 41%.  Maximal voluntary ventilation 55 L/min or 60% predicted.    Interpretation: Moderate obstruction.  Bronchodilator study not completed secondary to patient using rescue inhaler within 2 hours of testing.  Compared to prior study on 6/2/2022, FEV1 is decreased by 220 mL on today's study.    Imaging:  CT chest from 11/16/2020 was reviewed.  I reviewed both the images and the radiologist's report.  Patient has evidence of emphysema.  She has a stable right upper lobe nodule.  She also has some scarring in the right middle lobe.  There is some mild bronchiectasis.  Radiologist impression follows:    IMPRESSION:  Stable size and appearance of a previously noted 6 mm right upper lobe  pulmonary nodule. Consider 1 year follow-up to document continued  stability. Alternatively, patient may be a candidate for yearly low-dose  CT screening program. There is moderate upper lobe predominant  centrilobular emphysema. No acute findings are present otherwise.    CT chest from 5/16/2022 was reviewed.  There is moderate centrilobular emphysema bilaterally along with a 6 mm right upper lobe noncalcified nodule.  No prior CT chest imaging is available.  Radiologist impression follows:    IMPRESSION:     1. 6 mm right upper lobe pulmonary nodule.  Recommend 6 month follow-up chest CT.    Assessment & Plan   Diagnoses and all orders for this visit:    1. Mild intermittent asthma, unspecified whether complicated (Primary)  -     Pulmonary Function Test    2. History of tobacco use, presenting hazards to health  -     CT chest low dose wo; Future    3. Immunization due  -     Pneumococcal Conjugate Vaccine 20-Valent (PCV20)    4. Seasonal allergic rhinitis due to pollen    5. Moderate persistent asthmatic bronchitis without complication    6. Centrilobular emphysema (HCC) - Stage II COPD with FEV1 1.67 L or 76% predicted 6/2/22    7. Nodule of upper lobe of right lung    8. Tobacco abuse        Discussion:    62 y.o. former smoker of 1 PPD for 33 years who still is a light smoker, with history of asthma since childhood on Symbicort and albuterol, recently started on Spiriva, hypothyroidism, allergic rhinitis, is here for evaluation of bronchitis symptoms.      Findings today reveal moderate obstruction on pulmonary function testing with no response to bronchodilator consistent with stage II COPD.  She additionally previously had air trapping and hyperinflation on her last full pulmonary function test.  Prior CT scan of the chest showed significant emphysema and a 6 mm noncalcified right upper lobe lung nodule.  Repeat 6-month CT scan on November 16, 2022 showed the nodule was stable.  I am going to switch to screening CT scans yearly at this point.    Patient was again counseled counseled on ongoing smoking cessation efforts.    I will follow-up the patient in 6 months with spirometry.    Prevnar 20 today.    I recommended she continue her Symbicort, Spiriva, and rescue albuterol.      Follow-up in 6 months.         I personally spent a total of 42 minutes on patient visit today including chart review, face to face with the patient obtaining the history and physical exam, review of pertinent images and tests, counseling and discussion and/or coordination of care  as described above, and documentation.      Electronically signed by:  Bobby Tom MD  12/07/22  20:08 EST    • Please note that portions of this note were completed with [employer] - a voice recognition program.

## 2022-12-10 DIAGNOSIS — J45.40 MODERATE PERSISTENT ASTHMATIC BRONCHITIS WITHOUT COMPLICATION: ICD-10-CM

## 2022-12-12 RX ORDER — TIOTROPIUM BROMIDE INHALATION SPRAY 1.56 UG/1
SPRAY, METERED RESPIRATORY (INHALATION)
Qty: 4 G | Refills: 2 | Status: SHIPPED | OUTPATIENT
Start: 2022-12-12

## 2022-12-14 DIAGNOSIS — M51.9 LUMBAR DISC DISEASE: ICD-10-CM

## 2022-12-14 RX ORDER — GABAPENTIN 600 MG/1
600 TABLET ORAL 2 TIMES DAILY
Qty: 60 TABLET | Refills: 0 | Status: SHIPPED | OUTPATIENT
Start: 2022-12-14 | End: 2023-01-17 | Stop reason: SDUPTHER

## 2022-12-14 RX ORDER — TRAMADOL HYDROCHLORIDE 50 MG/1
TABLET ORAL
Qty: 90 TABLET | Refills: 0 | Status: SHIPPED | OUTPATIENT
Start: 2022-12-14 | End: 2023-01-17 | Stop reason: SDUPTHER

## 2022-12-14 NOTE — TELEPHONE ENCOUNTER
Rx Refill Note  Requested Prescriptions     Pending Prescriptions Disp Refills   • gabapentin (NEURONTIN) 600 MG tablet 60 tablet 0     Sig: Take 1 tablet by mouth 2 (Two) Times a Day.   • traMADol (ULTRAM) 50 MG tablet 90 tablet 0     Sig: TAKE ONE TABLET BY MOUTH EVERY MORNING AND TAKE TWO TABLETS BY MOUTH EVERY EVENING As needed for pain      Last office visit with prescribing clinician: 11/17/2022   Last telemedicine visit with prescribing clinician: 2/14/2023   Next office visit with prescribing clinician: 2/14/2023                         Would you like a call back once the refill request has been completed: [] Yes [] No    If the office needs to give you a call back, can they leave a voicemail: [] Yes [] No    Dia Jama MA  12/14/22, 11:28 EST

## 2023-01-03 ENCOUNTER — HOSPITAL ENCOUNTER (OUTPATIENT)
Dept: MAMMOGRAPHY | Facility: HOSPITAL | Age: 63
Discharge: HOME | End: 2023-01-03
Admitting: FAMILY MEDICINE
Payer: MEDICARE

## 2023-01-03 DIAGNOSIS — Z12.31 ENCOUNTER FOR SCREENING MAMMOGRAM FOR BREAST CANCER: ICD-10-CM

## 2023-01-03 PROCEDURE — 77063 BREAST TOMOSYNTHESIS BI: CPT

## 2023-01-03 PROCEDURE — 77063 BREAST TOMOSYNTHESIS BI: CPT | Performed by: RADIOLOGY

## 2023-01-03 PROCEDURE — 77067 SCR MAMMO BI INCL CAD: CPT

## 2023-01-03 PROCEDURE — 77067 SCR MAMMO BI INCL CAD: CPT | Performed by: RADIOLOGY

## 2023-01-12 ENCOUNTER — TELEPHONE (OUTPATIENT)
Dept: FAMILY MEDICINE CLINIC | Facility: CLINIC | Age: 63
End: 2023-01-12

## 2023-01-12 NOTE — TELEPHONE ENCOUNTER
Caller: Yessica Galvin    Relationship: Self    Best call back number: [phone]    What form or medical record are you requesting: VERIFICATION OF DIAGNOSIS    Who is requesting this form or medical record from you: [facility]    Timeframe paperwork needed: ASAP    Additional notes: PATIENT CALLING ABOUT [facility] FAXING A PAPERWORK TO US.     [facility] WANTS TO VERIFY THAT PATIENT HAD COPD

## 2023-01-14 DIAGNOSIS — R51.9 ACUTE INTRACTABLE HEADACHE, UNSPECIFIED HEADACHE TYPE: ICD-10-CM

## 2023-01-16 RX ORDER — PROMETHAZINE HYDROCHLORIDE 25 MG/1
25 TABLET ORAL EVERY 6 HOURS PRN
Qty: 50 TABLET | Refills: 0 | Status: SHIPPED | OUTPATIENT
Start: 2023-01-16 | End: 2023-04-01 | Stop reason: SDUPTHER

## 2023-01-16 NOTE — TELEPHONE ENCOUNTER
Rx Refill Note  Requested Prescriptions     Pending Prescriptions Disp Refills   • promethazine (PHENERGAN) 25 MG tablet 50 tablet 0     Sig: Take 1 tablet by mouth Every 6 (Six) Hours As Needed for Nausea or Vomiting.      Last office visit with prescribing clinician: 11/17/2022   Last telemedicine visit with prescribing clinician: 2/14/2023   Next office visit with prescribing clinician: 2/14/2023                         Would you like a call back once the refill request has been completed: [] Yes [] No    If the office needs to give you a call back, can they leave a voicemail: [] Yes [] No    Lexie Olivo LPN  01/16/23, 08:25 EST

## 2023-01-17 DIAGNOSIS — M51.9 LUMBAR DISC DISEASE: ICD-10-CM

## 2023-01-17 RX ORDER — TRAMADOL HYDROCHLORIDE 50 MG/1
TABLET ORAL
Qty: 30 TABLET | Refills: 0 | Status: SHIPPED | OUTPATIENT
Start: 2023-01-17 | End: 2023-01-23 | Stop reason: SDUPTHER

## 2023-01-17 RX ORDER — GABAPENTIN 600 MG/1
600 TABLET ORAL 2 TIMES DAILY
Qty: 60 TABLET | Refills: 0 | Status: SHIPPED | OUTPATIENT
Start: 2023-01-17 | End: 2023-02-09

## 2023-01-17 NOTE — TELEPHONE ENCOUNTER
Rx Refill Note  Requested Prescriptions     Pending Prescriptions Disp Refills   • gabapentin (NEURONTIN) 600 MG tablet 60 tablet 0     Sig: Take 1 tablet by mouth 2 (Two) Times a Day.   • traMADol (ULTRAM) 50 MG tablet 90 tablet 0     Sig: TAKE ONE TABLET BY MOUTH EVERY MORNING AND TAKE TWO TABLETS BY MOUTH EVERY EVENING As needed for pain      Last office visit with prescribing clinician: 11/17/2022   Last telemedicine visit with prescribing clinician: 1/23/2023   Next office visit with prescribing clinician: 1/23/2023                         Would you like a call back once the refill request has been completed: [] Yes [] No    If the office needs to give you a call back, can they leave a voicemail: [] Yes [] No    Libia Sawyer  01/17/23, 11:50 EST

## 2023-01-23 ENCOUNTER — OFFICE VISIT (OUTPATIENT)
Dept: FAMILY MEDICINE CLINIC | Facility: CLINIC | Age: 63
End: 2023-01-23
Payer: MEDICARE

## 2023-01-23 VITALS
TEMPERATURE: 98.2 F | OXYGEN SATURATION: 98 % | HEIGHT: 62 IN | WEIGHT: 167.2 LBS | HEART RATE: 87 BPM | SYSTOLIC BLOOD PRESSURE: 146 MMHG | DIASTOLIC BLOOD PRESSURE: 86 MMHG | BODY MASS INDEX: 30.77 KG/M2

## 2023-01-23 DIAGNOSIS — J45.40 MODERATE PERSISTENT ASTHMATIC BRONCHITIS WITHOUT COMPLICATION: ICD-10-CM

## 2023-01-23 DIAGNOSIS — G47.62 NOCTURNAL LEG CRAMPS: ICD-10-CM

## 2023-01-23 DIAGNOSIS — M15.0 PRIMARY GENERALIZED (OSTEO)ARTHRITIS: ICD-10-CM

## 2023-01-23 DIAGNOSIS — J43.2 CENTRILOBULAR EMPHYSEMA: ICD-10-CM

## 2023-01-23 DIAGNOSIS — F41.1 GENERALIZED ANXIETY DISORDER: ICD-10-CM

## 2023-01-23 DIAGNOSIS — M51.9 LUMBAR DISC DISEASE: Primary | ICD-10-CM

## 2023-01-23 DIAGNOSIS — E03.9 ACQUIRED HYPOTHYROIDISM: ICD-10-CM

## 2023-01-23 PROCEDURE — 0124A COVID-19 (PFIZER) BIVALENT BOOSTER 12+YRS: CPT | Performed by: FAMILY MEDICINE

## 2023-01-23 PROCEDURE — 91312 COVID-19 (PFIZER) BIVALENT BOOSTER 12+YRS: CPT | Performed by: FAMILY MEDICINE

## 2023-01-23 PROCEDURE — 99214 OFFICE O/P EST MOD 30 MIN: CPT | Performed by: FAMILY MEDICINE

## 2023-01-23 RX ORDER — PROMETHAZINE HYDROCHLORIDE 25 MG/1
25 TABLET ORAL EVERY 6 HOURS PRN
Qty: 50 TABLET | Refills: 0 | Status: CANCELLED | OUTPATIENT
Start: 2023-01-23

## 2023-01-23 RX ORDER — DICLOFENAC SODIUM 75 MG/1
75 TABLET, DELAYED RELEASE ORAL 2 TIMES DAILY
Qty: 60 TABLET | Refills: 2 | Status: CANCELLED | OUTPATIENT
Start: 2023-01-23

## 2023-01-23 RX ORDER — SACCHAROMYCES BOULARDII 250 MG
250 CAPSULE ORAL 2 TIMES DAILY
Status: CANCELLED
Start: 2023-01-23

## 2023-01-23 RX ORDER — LEVOTHYROXINE SODIUM 137 UG/1
137 TABLET ORAL DAILY
Qty: 90 TABLET | Refills: 1 | Status: SHIPPED | OUTPATIENT
Start: 2023-01-23

## 2023-01-23 RX ORDER — VENLAFAXINE HYDROCHLORIDE 150 MG/1
150 CAPSULE, EXTENDED RELEASE ORAL DAILY
Qty: 90 CAPSULE | Refills: 1 | Status: CANCELLED | OUTPATIENT
Start: 2023-01-23

## 2023-01-23 RX ORDER — TRAMADOL HYDROCHLORIDE 50 MG/1
TABLET ORAL
Qty: 90 TABLET | Refills: 2 | Status: SHIPPED | OUTPATIENT
Start: 2023-01-23

## 2023-01-23 RX ORDER — ALBUTEROL SULFATE 90 UG/1
2 AEROSOL, METERED RESPIRATORY (INHALATION) EVERY 6 HOURS PRN
Qty: 18 G | Refills: 2 | Status: SHIPPED | OUTPATIENT
Start: 2023-01-23

## 2023-01-23 RX ORDER — BUDESONIDE AND FORMOTEROL FUMARATE DIHYDRATE 160; 4.5 UG/1; UG/1
2 AEROSOL RESPIRATORY (INHALATION) 2 TIMES DAILY
Qty: 10.2 G | Refills: 5 | Status: SHIPPED | OUTPATIENT
Start: 2023-01-23

## 2023-01-23 RX ORDER — PRAMIPEXOLE DIHYDROCHLORIDE 0.25 MG/1
0.25 TABLET ORAL
Qty: 30 TABLET | Refills: 2 | Status: SHIPPED | OUTPATIENT
Start: 2023-01-23

## 2023-01-23 RX ORDER — DICLOFENAC SODIUM 75 MG/1
75 TABLET, DELAYED RELEASE ORAL 2 TIMES DAILY
Qty: 60 TABLET | Refills: 2 | Status: SHIPPED | OUTPATIENT
Start: 2023-01-23

## 2023-01-23 RX ORDER — LEVOTHYROXINE SODIUM 137 UG/1
137 TABLET ORAL DAILY
Qty: 90 TABLET | Refills: 1 | Status: CANCELLED | OUTPATIENT
Start: 2023-01-23

## 2023-01-23 RX ORDER — TIOTROPIUM BROMIDE INHALATION SPRAY 1.56 UG/1
2 SPRAY, METERED RESPIRATORY (INHALATION) DAILY
Qty: 4 G | Refills: 2 | Status: CANCELLED | OUTPATIENT
Start: 2023-01-23

## 2023-01-23 RX ORDER — TRAMADOL HYDROCHLORIDE 50 MG/1
TABLET ORAL
Qty: 30 TABLET | Refills: 0 | Status: CANCELLED | OUTPATIENT
Start: 2023-01-23

## 2023-01-23 RX ORDER — OMEPRAZOLE 40 MG/1
40 CAPSULE, DELAYED RELEASE ORAL DAILY
Qty: 90 CAPSULE | Refills: 1 | Status: CANCELLED | OUTPATIENT
Start: 2023-01-23

## 2023-01-23 RX ORDER — MONTELUKAST SODIUM 10 MG/1
10 TABLET ORAL NIGHTLY
Qty: 90 TABLET | Refills: 1 | Status: CANCELLED | OUTPATIENT
Start: 2023-01-23

## 2023-01-23 RX ORDER — LEVOCETIRIZINE DIHYDROCHLORIDE 5 MG/1
5 TABLET, FILM COATED ORAL EVERY EVENING
Qty: 90 TABLET | Refills: 1 | Status: CANCELLED | OUTPATIENT
Start: 2023-01-23

## 2023-01-24 NOTE — PROGRESS NOTES
"Chief Complaint  lumbar disc disease  (F/u)    Subjective        Yessica June Galvin presents to [facility]  Back Pain  This is a chronic problem. The current episode started more than 1 month ago. The problem is unchanged. The pain is present in the lumbar spine. The quality of the pain is described as aching. The pain is moderate. The symptoms are aggravated by bending, standing and twisting. Associated symptoms include paresthesias. Pertinent negatives include no bladder incontinence, bowel incontinence or fever. Risk factors include poor posture and sedentary lifestyle. She has tried analgesics and home exercises for the symptoms. The treatment provided moderate relief.   Asthma  She complains of cough, shortness of breath and wheezing. This is a chronic problem. The current episode started more than 1 year ago. The problem has been unchanged. The cough is non-productive and hacking. Associated symptoms include malaise/fatigue. Pertinent negatives include no fever. Her symptoms are aggravated by any activity. Her symptoms are alleviated by nothing. She reports moderate improvement on treatment. Her symptoms are not alleviated by beta-agonist and steroid inhaler. Risk factors for lung disease include smoking/tobacco exposure. Her past medical history is significant for asthma and COPD.   Hypothyroidism  This is a chronic problem. The problem occurs constantly. The problem has been unchanged. Associated symptoms include coughing. Pertinent negatives include no fever. The treatment provided significant relief.       Objective   Vital Signs:  /86   Pulse 87   Temp 98.2 °F (36.8 °C)   Ht 157.5 cm (62\")   Wt 75.8 kg (167 lb 3.2 oz)   SpO2 98%   BMI 30.58 kg/m²   Estimated body mass index is 30.58 kg/m² as calculated from the following:    Height as of this encounter: 157.5 cm (62\").    Weight as of this encounter: 75.8 kg (167 lb 3.2 oz).             Physical Exam  Vitals " and nursing note reviewed.   Constitutional:       Appearance: Normal appearance. She is well-developed.   HENT:      Head: Normocephalic.      Right Ear: External ear normal.      Left Ear: External ear normal.      Nose: Nose normal.   Eyes:      General: No scleral icterus.     Conjunctiva/sclera: Conjunctivae normal.      Pupils: Pupils are equal, round, and reactive to light.   Neck:      Thyroid: No thyromegaly.      Vascular: No carotid bruit.   Cardiovascular:      Rate and Rhythm: Normal rate and regular rhythm.   Pulmonary:      Effort: Pulmonary effort is normal.      Breath sounds: No rales.      Comments: Distant BS with scattered rhonchi  Chest:      Chest wall: No tenderness.   Musculoskeletal:         General: Tenderness present. Normal range of motion.      Cervical back: Neck supple.   Skin:     General: Skin is warm and dry.      Findings: No rash.   Neurological:      Mental Status: She is alert and oriented to person, place, and time.      Comments: Radicular pain left sciatic area   Psychiatric:         Behavior: Behavior is cooperative.        Result Review :                   Assessment and Plan   Diagnoses and all orders for this visit:    1. Acquired hypothyroidism    2. Seasonal allergic rhinitis due to pollen    3. Other chest pain    4. Acute intractable headache, unspecified headache type    5. Moderate persistent asthmatic bronchitis without complication    6. Lumbar disc disease  -     XR Spine Lumbar 2 or 3 View; Future    7. Primary generalized (osteo)arthritis    8. Generalized anxiety disorder    Other orders  -     COVID-19 Bivalent Booster (Pfizer) 12+yrs    will need further evaluation of her chronic LBP         Follow Up   No follow-ups on file.  Patient was given instructions and counseling regarding her condition or for health maintenance advice. Please see specific information pulled into the AVS if appropriate.

## 2023-01-25 NOTE — TELEPHONE ENCOUNTER
PATIENT STATES THAT THE INSURANCE WAS TO SEND THE SSID FORM TO THE OFFICE TO HAVE DR DIAS FILL OUT. SHE IS LOOKING TO SEE IF THIS HAS BEEN DONE YET.

## 2023-02-09 DIAGNOSIS — M51.9 LUMBAR DISC DISEASE: ICD-10-CM

## 2023-02-15 RX ORDER — GABAPENTIN 600 MG/1
TABLET ORAL
Qty: 60 TABLET | Refills: 1 | Status: SHIPPED | OUTPATIENT
Start: 2023-02-15

## 2023-02-27 ENCOUNTER — TELEPHONE (OUTPATIENT)
Dept: FAMILY MEDICINE CLINIC | Facility: CLINIC | Age: 63
End: 2023-02-27

## 2023-02-27 NOTE — TELEPHONE ENCOUNTER
Caller: GalvinYessica    Relationship: Self    Best call back number: [phone]    Requested Prescriptions:   traMADol (ULTRAM) 50 MG tablet    Additional details provided by patient: PATIENT STATES THIS NEEDS AN AUTHORIZATION. PLEASE CALL [phone]    Does the patient have less than a 3 day supply:  [x] Yes  [] No    Would you like a call back once the refill request has been completed: [] Yes [] No    If the office needs to give you a call back, can they leave a voicemail: [] Yes [] No    Monserrat Marsh Rep   02/27/23 14:05 EST

## 2023-03-07 ENCOUNTER — PRIOR AUTHORIZATION (OUTPATIENT)
Dept: FAMILY MEDICINE CLINIC | Facility: CLINIC | Age: 63
End: 2023-03-07
Payer: MEDICARE

## 2023-03-07 NOTE — TELEPHONE ENCOUNTER
PRIOR AUTH STARTED   03/07/2023     Key: [id number] -   PA Case ID: [id number] -   Rx #: [id number]    Drug  traMADol HCl 50MG tablets

## 2023-03-17 ENCOUNTER — OFFICE VISIT (OUTPATIENT)
Dept: FAMILY MEDICINE CLINIC | Facility: CLINIC | Age: 63
End: 2023-03-17
Payer: MEDICARE

## 2023-03-17 VITALS
DIASTOLIC BLOOD PRESSURE: 84 MMHG | SYSTOLIC BLOOD PRESSURE: 160 MMHG | WEIGHT: 169 LBS | HEIGHT: 62 IN | BODY MASS INDEX: 31.1 KG/M2 | OXYGEN SATURATION: 96 % | RESPIRATION RATE: 19 BRPM | HEART RATE: 82 BPM | TEMPERATURE: 98.6 F

## 2023-03-17 DIAGNOSIS — M54.50 ACUTE LOW BACK PAIN WITHOUT SCIATICA, UNSPECIFIED BACK PAIN LATERALITY: Primary | ICD-10-CM

## 2023-03-17 PROCEDURE — 99213 OFFICE O/P EST LOW 20 MIN: CPT | Performed by: NURSE PRACTITIONER

## 2023-03-17 PROCEDURE — 1160F RVW MEDS BY RX/DR IN RCRD: CPT | Performed by: NURSE PRACTITIONER

## 2023-03-17 PROCEDURE — 1159F MED LIST DOCD IN RCRD: CPT | Performed by: NURSE PRACTITIONER

## 2023-03-17 PROCEDURE — 81003 URINALYSIS AUTO W/O SCOPE: CPT | Performed by: NURSE PRACTITIONER

## 2023-03-17 RX ORDER — METHYLPREDNISOLONE 4 MG/1
TABLET ORAL
Qty: 21 TABLET | Refills: 0 | Status: SHIPPED | OUTPATIENT
Start: 2023-03-17 | End: 2023-03-23

## 2023-03-17 RX ORDER — CYCLOBENZAPRINE HCL 5 MG
5 TABLET ORAL NIGHTLY PRN
Qty: 15 TABLET | Refills: 0 | Status: SHIPPED | OUTPATIENT
Start: 2023-03-17

## 2023-03-17 NOTE — PROGRESS NOTES
"Chief Complaint  Back Pain (Pt started having back pain yesterday that has been hurting from the middle back all the way to her ankle. Pt states she was recently moving furniture around. ) and Head Injury (Pt hit her head on the latch of her screen door Saturday and has been having headaches ever since. )    Subjective          Yessica June Galvin presents to [facility]  History of Present Illness  Patient is a 62-year-old female.  She is here for complaint of back pain.  She states it started several days ago.  After moving a dresser.  She is here with her spouse.  Denies falling.  She does complain of her left arm having some soreness to her left arm from pushing and pulling.   She is currently taking diclofenac twice a day, gabapentin twice a day, Ultram as needed.   She does have pain from her left lower back down her left hip left thigh to her knee.      She does have a faint bruise to her right forehead.  Which she states she hit her head on the screen door.  She denied any loss of consciousness.      Her blood pressure is elevated today. She has no hx of HTN. Will need to recheck when out of pain.            The following portions of the patient's history were reviewed and updated as appropriate: allergies, current medications, past family history, past medical history, past social history, past surgical history and problem list.    Review of Systems   Constitutional: Negative.         Bp 160/84 -    HENT: Negative.    Respiratory: Negative.    Cardiovascular: Negative.    Gastrointestinal: Negative.    Genitourinary: Negative.    Musculoskeletal: Positive for arthralgias, back pain and myalgias.   Skin: Negative.    Neurological: Negative.    Hematological: Negative.    Psychiatric/Behavioral: Negative.          Objective   Vital Signs:   /84   Pulse 82   Temp 98.6 °F (37 °C) (Temporal)   Resp 19   Ht 157.5 cm (62.01\")   Wt 76.7 kg (169 lb)   SpO2 96%   BMI 30.90 " kg/m²    BMI is >= 30 and <35. (Class 1 Obesity). The following options were offered after discussion;: exercise counseling/recommendations and nutrition counseling/recommendations      PHQ-2/9 Depression Screening  PHQ-9 Total Score: 1           Physical Exam  Vitals reviewed.   Constitutional:       Appearance: She is well-developed. She is obese. She is not ill-appearing.   HENT:      Head: Normocephalic.   Eyes:      Conjunctiva/sclera: Conjunctivae normal.      Pupils: Pupils are equal, round, and reactive to light.   Cardiovascular:      Rate and Rhythm: Normal rate and regular rhythm.      Heart sounds: Normal heart sounds.   Pulmonary:      Effort: Pulmonary effort is normal.      Breath sounds: Normal breath sounds.   Abdominal:      General: Bowel sounds are normal.      Palpations: Abdomen is soft.   Musculoskeletal:      Cervical back: Normal range of motion.      Lumbar back: Spasms and tenderness present. Decreased range of motion. Negative right straight leg raise test and negative left straight leg raise test.   Lymphadenopathy:      Cervical: No cervical adenopathy.   Skin:     General: Skin is warm and dry.      Capillary Refill: Capillary refill takes less than 2 seconds.   Neurological:      Mental Status: She is alert and oriented to person, place, and time.   Psychiatric:         Mood and Affect: Mood normal.         Speech: Speech normal.         Behavior: Behavior normal. Behavior is cooperative.         Thought Content: Thought content normal.         Judgment: Judgment normal.        Result Review :                 Assessment and Plan    Diagnoses and all orders for this visit:    1. Acute low back pain without sciatica, unspecified back pain laterality (Primary)  -     POC Urinalysis Dipstick, Automated  -     methylPREDNISolone (MEDROL) 4 MG dose pack; Take as directed on package instructions.  Dispense: 21 tablet; Refill: 0  -     cyclobenzaprine (FLEXERIL) 5 MG tablet; Take 1 tablet by  mouth At Night As Needed for Muscle Spasms.  Dispense: 15 tablet; Refill: 0    POCT urinalysis: Negative for leukocytes, negative for nitrites, negative for blood.    Discussed physical therapy.  Patient declined.  Discussed x-rays patient declined.  She recently had x-rays in January 2023.  Worsening degenerative changes and arthritis.    Discussed steroid Dosepak and Flexeril at night as needed she is agreeable.  Declines injection.   Tylenol in addition to her Ultram.  She is not to go over 3000 mg in 24 hours she is agreeable.     Follow up with PCP if no improvement.           Follow Up   Return if symptoms worsen or fail to improve.  Patient was given instructions and counseling regarding her condition or for health maintenance advice. Please see specific information pulled into the AVS if appropriate.

## 2023-03-27 DIAGNOSIS — J30.1 SEASONAL ALLERGIC RHINITIS DUE TO POLLEN: ICD-10-CM

## 2023-03-27 LAB
BILIRUB BLD-MCNC: ABNORMAL MG/DL
CLARITY, POC: CLEAR
COLOR UR: YELLOW
EXPIRATION DATE: ABNORMAL
GLUCOSE UR STRIP-MCNC: NEGATIVE MG/DL
KETONES UR QL: NEGATIVE
LEUKOCYTE EST, POC: NEGATIVE
Lab: ABNORMAL
NITRITE UR-MCNC: NEGATIVE MG/ML
PH UR: 6 [PH] (ref 5–8)
PROT UR STRIP-MCNC: NEGATIVE MG/DL
RBC # UR STRIP: NEGATIVE /UL
SP GR UR: 1.02 (ref 1–1.03)
UROBILINOGEN UR QL: NORMAL

## 2023-03-28 RX ORDER — MONTELUKAST SODIUM 10 MG/1
TABLET ORAL
Qty: 90 TABLET | Refills: 1 | Status: SHIPPED | OUTPATIENT
Start: 2023-03-28

## 2023-04-01 DIAGNOSIS — R07.89 OTHER CHEST PAIN: ICD-10-CM

## 2023-04-01 DIAGNOSIS — R51.9 ACUTE INTRACTABLE HEADACHE, UNSPECIFIED HEADACHE TYPE: ICD-10-CM

## 2023-04-03 RX ORDER — OMEPRAZOLE 40 MG/1
40 CAPSULE, DELAYED RELEASE ORAL DAILY
Qty: 90 CAPSULE | Refills: 1 | Status: SHIPPED | OUTPATIENT
Start: 2023-04-03

## 2023-04-03 NOTE — TELEPHONE ENCOUNTER
Rx Refill Note  Requested Prescriptions     Pending Prescriptions Disp Refills   • promethazine (PHENERGAN) 25 MG tablet 50 tablet 0     Sig: Take 1 tablet by mouth Every 6 (Six) Hours As Needed for Nausea or Vomiting.      Last office visit with prescribing clinician: 1/23/2023   Last telemedicine visit with prescribing clinician: 4/1/2023   Next office visit with prescribing clinician: 4/1/2023                         Would you like a call back once the refill request has been completed: [] Yes [] No    If the office needs to give you a call back, can they leave a voicemail: [] Yes [] No    Kristy Anderson MA  04/03/23, 08:14 EDT   1 pair

## 2023-04-04 ENCOUNTER — OFFICE VISIT (OUTPATIENT)
Dept: FAMILY MEDICINE CLINIC | Facility: CLINIC | Age: 63
End: 2023-04-04
Payer: MEDICARE

## 2023-04-04 VITALS
DIASTOLIC BLOOD PRESSURE: 100 MMHG | WEIGHT: 167.2 LBS | SYSTOLIC BLOOD PRESSURE: 179 MMHG | TEMPERATURE: 98 F | HEART RATE: 87 BPM | HEIGHT: 62 IN | BODY MASS INDEX: 30.77 KG/M2 | OXYGEN SATURATION: 96 %

## 2023-04-04 DIAGNOSIS — K59.00 CONSTIPATION, UNSPECIFIED CONSTIPATION TYPE: Primary | ICD-10-CM

## 2023-04-04 PROCEDURE — 1159F MED LIST DOCD IN RCRD: CPT | Performed by: PHYSICIAN ASSISTANT

## 2023-04-04 PROCEDURE — 1160F RVW MEDS BY RX/DR IN RCRD: CPT | Performed by: PHYSICIAN ASSISTANT

## 2023-04-04 PROCEDURE — 99213 OFFICE O/P EST LOW 20 MIN: CPT | Performed by: PHYSICIAN ASSISTANT

## 2023-04-04 RX ORDER — PROMETHAZINE HYDROCHLORIDE 25 MG/1
25 TABLET ORAL EVERY 6 HOURS PRN
Qty: 50 TABLET | Refills: 0 | Status: SHIPPED | OUTPATIENT
Start: 2023-04-04

## 2023-04-04 RX ORDER — LACTULOSE 10 G/15ML
20 SOLUTION ORAL 2 TIMES DAILY PRN
Qty: 473 ML | Refills: 1 | Status: SHIPPED | OUTPATIENT
Start: 2023-04-04

## 2023-04-04 NOTE — PROGRESS NOTES
Follow Up Office Visit      Date: 2023   Patient Name: Yessica Galvin  : 1960   MRN: [id number]     Chief Complaint:    Chief Complaint   Patient presents with   • Constipation     Per patient she has been constipated for about two weeks; gas pain in stomach       History of Present Illness: Yessica Galvin is a 62 y.o. female who is here today to follow up with constipation for 2 weeks. Drank prune juice and got a bit of bowel movement.  She denies any rectal bleeding.      Subjective      Review of systems:  Review of Systems     I have reviewed and the following portions of the patient's history were updated as appropriate: past family history, past medical history, past social history, past surgical history and problem list.    Medications:     Current Outpatient Medications:   •  acetaminophen (TYLENOL) 325 MG tablet, Take 2 tablets by mouth Every 4 (Four) Hours As Needed for Mild Pain ., Disp: , Rfl:   •  albuterol (PROVENTIL) (2.5 MG/3ML) 0.083% nebulizer solution, Take 2.5 mg by nebulization Every 6 (Six) Hours As Needed for Wheezing., Disp: 360 mL, Rfl: 1  •  albuterol sulfate  (90 Base) MCG/ACT inhaler, Inhale 2 puffs Every 6 (Six) Hours As Needed for Wheezing., Disp: 18 g, Rfl: 2  •  budesonide-formoterol (Symbicort) 160-4.5 MCG/ACT inhaler, Inhale 2 puffs 2 (Two) Times a Day., Disp: 10.2 g, Rfl: 5  •  cyclobenzaprine (FLEXERIL) 5 MG tablet, Take 1 tablet by mouth At Night As Needed for Muscle Spasms., Disp: 15 tablet, Rfl: 0  •  diclofenac (VOLTAREN) 75 MG EC tablet, Take 1 tablet by mouth 2 (Two) Times a Day., Disp: 60 tablet, Rfl: 2  •  levocetirizine (Xyzal) 5 MG tablet, Take 1 tablet by mouth Every Evening., Disp: 90 tablet, Rfl: 1  •  levothyroxine (SYNTHROID, LEVOTHROID) 137 MCG tablet, Take 1 tablet by mouth Daily., Disp: 90 tablet, Rfl: 1  •  montelukast (SINGULAIR) 10 MG tablet, TAKE ONE TABLET BY MOUTH ONCE NIGHTLY, Disp: 90 tablet, Rfl: 1  •  omeprazole  "(priLOSEC) 40 MG capsule, Take 1 capsule by mouth Daily., Disp: 90 capsule, Rfl: 1  •  saccharomyces boulardii (FLORASTOR) 250 MG capsule, Take 1 capsule by mouth 2 (Two) Times a Day., Disp: , Rfl:   •  Spiriva Respimat 1.25 MCG/ACT aerosol solution inhaler, INHALE TWO PUFFS BY MOUTH DAILY, Disp: 4 g, Rfl: 2  •  gabapentin (NEURONTIN) 600 MG tablet, Take 1 tablet by mouth 2 (Two) Times a Day., Disp: 60 tablet, Rfl: 3  •  lactulose (CHRONULAC) 10 GM/15ML solution, Take 30 mL by mouth 2 (Two) Times a Day As Needed (constipation)., Disp: 473 mL, Rfl: 1  •  polyethylene glycol (MIRALAX) 17 g packet, Take 17 g by mouth Daily., Disp: 30 each, Rfl: 0  •  pramipexole (MIRAPEX) 0.25 MG tablet, Take 1 tablet by mouth every night at bedtime., Disp: 90 tablet, Rfl: 1  •  promethazine (PHENERGAN) 25 MG tablet, Take 1 tablet by mouth Every 6 (Six) Hours As Needed for Nausea or Vomiting., Disp: 50 tablet, Rfl: 0  •  traMADol (ULTRAM) 50 MG tablet, TAKE ONE TABLET BY MOUTH EVERY MORNING AND TAKE TWO TABLETS BY MOUTH EVERY EVENING As needed for pain, Disp: 90 tablet, Rfl: 2  •  venlafaxine XR (Effexor XR) 150 MG 24 hr capsule, Take 1 capsule by mouth Daily., Disp: 90 capsule, Rfl: 1    Allergies:   Allergies   Allergen Reactions   • Codeine Nausea And Vomiting       Objective     Vital Signs:   Vitals:    04/04/23 1406   BP: 179/100  Comment: took bp twice   Pulse: 87   Temp: 98 °F (36.7 °C)   TempSrc: Infrared   SpO2: 96%   Weight: 75.8 kg (167 lb 3.2 oz)   Height: 157.5 cm (62.01\")   PainSc:   8   PainLoc: Abdomen     Body mass index is 30.57 kg/m².   BMI is >= 30 and <35. (Class 1 Obesity). The following options were offered after discussion;: weight loss educational material (shared in after visit summary)      Physical Exam:   Physical Exam  Vitals and nursing note reviewed.   Constitutional:       Appearance: Normal appearance.   HENT:      Head: Normocephalic and atraumatic.   Cardiovascular:      Rate and Rhythm: Normal rate " and regular rhythm.   Pulmonary:      Effort: Pulmonary effort is normal.      Breath sounds: Normal breath sounds. No decreased breath sounds, wheezing, rhonchi or rales.   Abdominal:      General: There is no distension.      Tenderness: There is no abdominal tenderness.   Musculoskeletal:      Cervical back: Neck supple.      Right lower leg: No edema.      Left lower leg: No edema.   Lymphadenopathy:      Cervical: No cervical adenopathy.   Neurological:      Mental Status: She is alert.          Procedures     Assessment / Plan      Assessment/Plan:   Diagnoses and all orders for this visit:    1. Constipation, unspecified constipation type (Primary)  -     lactulose (CHRONULAC) 10 GM/15ML solution; Take 30 mL by mouth 2 (Two) Times a Day As Needed (constipation).  Dispense: 473 mL; Refill: 1    Will start lactulose as directed.  She may uses as needed.  Push fluids notify me of any problems.    Follow Up:   No follow-ups on file.    Brittni Nicholson PA-C   [facility] [place]

## 2023-04-07 DIAGNOSIS — M51.9 LUMBAR DISC DISEASE: ICD-10-CM

## 2023-04-07 NOTE — TELEPHONE ENCOUNTER
Rx Refill Note  Requested Prescriptions     Pending Prescriptions Disp Refills   • traMADol (ULTRAM) 50 MG tablet 90 tablet 2     Sig: TAKE ONE TABLET BY MOUTH EVERY MORNING AND TAKE TWO TABLETS BY MOUTH EVERY EVENING As needed for pain      Last office visit with prescribing clinician: 1/23/2023   Last telemedicine visit with prescribing clinician: 4/11/2023   Next office visit with prescribing clinician: 4/11/2023                         Would you like a call back once the refill request has been completed: [] Yes [] No    If the office needs to give you a call back, can they leave a voicemail: [] Yes [] No    Kristy Anderson MA  04/07/23, 13:14 EDT

## 2023-04-07 NOTE — TELEPHONE ENCOUNTER
Caller: [facility]    Relationship: Network    Best call back number: [phone]    Requested Prescriptions:   Requested Prescriptions     Pending Prescriptions Disp Refills   • traMADol (ULTRAM) 50 MG tablet 90 tablet 2     Sig: TAKE ONE TABLET BY MOUTH EVERY MORNING AND TAKE TWO TABLETS BY MOUTH EVERY EVENING As needed for pain        Pharmacy where request should be sent: [facility] [id number] [address] [phone] [facility] [phone]      Last office visit with prescribing clinician: 1/23/2023   Last telemedicine visit with prescribing clinician: 4/11/2023   Next office visit with prescribing clinician: 4/11/2023     Additional details provided by patient: THIS MEDICATION REQUIRES MORE INFORMATION IN THE PRIOR AUTHORIZATION.     APPEALS [phone] PHONE  [phone] FAX     Does the patient have less than a 3 day supply:  [x] Yes  [] No    Would you like a call back once the refill request has been completed: [] Yes [x] No    If the office needs to give you a call back, can they leave a voicemail: [] Yes [x] No    Monserrat Saldana Rep   04/07/23 10:48 EDT

## 2023-04-09 RX ORDER — TRAMADOL HYDROCHLORIDE 50 MG/1
TABLET ORAL
Qty: 90 TABLET | Refills: 2 | Status: CANCELLED | OUTPATIENT
Start: 2023-04-09

## 2023-04-10 NOTE — TELEPHONE ENCOUNTER
Rx Refill Note  Requested Prescriptions     Pending Prescriptions Disp Refills   • traMADol (ULTRAM) 50 MG tablet 90 tablet 2     Sig: TAKE ONE TABLET BY MOUTH EVERY MORNING AND TAKE TWO TABLETS BY MOUTH EVERY EVENING As needed for pain      Last office visit with prescribing clinician: 1/23/2023   Last telemedicine visit with prescribing clinician: 4/11/2023   Next office visit with prescribing clinician: 4/11/2023                         Would you like a call back once the refill request has been completed: [] Yes [] No    If the office needs to give you a call back, can they leave a voicemail: [] Yes [] No    Kristy Anderson MA  04/10/23, 08:32 EDT

## 2023-04-11 ENCOUNTER — OFFICE VISIT (OUTPATIENT)
Dept: FAMILY MEDICINE CLINIC | Facility: CLINIC | Age: 63
End: 2023-04-11
Payer: MEDICARE

## 2023-04-11 VITALS
OXYGEN SATURATION: 98 % | DIASTOLIC BLOOD PRESSURE: 68 MMHG | BODY MASS INDEX: 31.25 KG/M2 | SYSTOLIC BLOOD PRESSURE: 140 MMHG | TEMPERATURE: 98.4 F | HEIGHT: 62 IN | HEART RATE: 113 BPM | WEIGHT: 169.8 LBS

## 2023-04-11 DIAGNOSIS — G47.62 NOCTURNAL LEG CRAMPS: ICD-10-CM

## 2023-04-11 DIAGNOSIS — M51.9 LUMBAR DISC DISEASE: ICD-10-CM

## 2023-04-11 DIAGNOSIS — F41.1 GENERALIZED ANXIETY DISORDER: ICD-10-CM

## 2023-04-11 PROCEDURE — 1159F MED LIST DOCD IN RCRD: CPT | Performed by: FAMILY MEDICINE

## 2023-04-11 PROCEDURE — 1160F RVW MEDS BY RX/DR IN RCRD: CPT | Performed by: FAMILY MEDICINE

## 2023-04-11 PROCEDURE — 99214 OFFICE O/P EST MOD 30 MIN: CPT | Performed by: FAMILY MEDICINE

## 2023-04-11 RX ORDER — GABAPENTIN 600 MG/1
600 TABLET ORAL 2 TIMES DAILY
Qty: 60 TABLET | Refills: 3 | Status: SHIPPED | OUTPATIENT
Start: 2023-04-11

## 2023-04-11 RX ORDER — PRAMIPEXOLE DIHYDROCHLORIDE 0.25 MG/1
0.25 TABLET ORAL
Qty: 90 TABLET | Refills: 1 | Status: SHIPPED | OUTPATIENT
Start: 2023-04-11

## 2023-04-11 RX ORDER — VENLAFAXINE HYDROCHLORIDE 150 MG/1
150 CAPSULE, EXTENDED RELEASE ORAL DAILY
Qty: 90 CAPSULE | Refills: 1 | Status: SHIPPED | OUTPATIENT
Start: 2023-04-11

## 2023-04-11 RX ORDER — TRAMADOL HYDROCHLORIDE 50 MG/1
TABLET ORAL
Qty: 90 TABLET | Refills: 2 | Status: SHIPPED | OUTPATIENT
Start: 2023-04-11

## 2023-04-11 RX ORDER — POLYETHYLENE GLYCOL 3350 17 G/17G
17 POWDER, FOR SOLUTION ORAL DAILY
Qty: 30 EACH | Refills: 0
Start: 2023-04-11

## 2023-04-12 NOTE — PROGRESS NOTES
"Chief Complaint  lumbar disc disease (F/u)    Subjective        Yessica June Galvin presents to [facility]  Back Pain  This is a chronic problem. The current episode started more than 1 month ago. The problem is unchanged. The pain is present in the lumbar spine. The quality of the pain is described as aching. The pain is moderate. The symptoms are aggravated by bending, standing and twisting. Associated symptoms include paresthesias. Pertinent negatives include no bladder incontinence or bowel incontinence. Risk factors include poor posture and sedentary lifestyle. She has tried analgesics and home exercises for the symptoms. The treatment provided moderate relief.   Depression  Visit Type: follow-up (stable needs refill)  Patient presents with the following symptoms: depressed mood and nervousness/anxiety.  Severity: moderate   Sleep quality: fair          Objective   Vital Signs:  /68   Pulse 113   Temp 98.4 °F (36.9 °C)   Ht 157.5 cm (62.01\")   Wt 77 kg (169 lb 12.8 oz)   SpO2 98%   BMI 31.05 kg/m²   Estimated body mass index is 31.05 kg/m² as calculated from the following:    Height as of this encounter: 157.5 cm (62.01\").    Weight as of this encounter: 77 kg (169 lb 12.8 oz).             Physical Exam  Vitals and nursing note reviewed.   Constitutional:       Appearance: Normal appearance. She is well-developed.   HENT:      Head: Normocephalic and atraumatic.      Nose: Nose normal.   Eyes:      General: No scleral icterus.     Conjunctiva/sclera: Conjunctivae normal.      Pupils: Pupils are equal, round, and reactive to light.   Neck:      Thyroid: No thyromegaly.      Vascular: No carotid bruit.   Cardiovascular:      Rate and Rhythm: Normal rate and regular rhythm.   Pulmonary:      Effort: Pulmonary effort is normal.      Breath sounds: Normal breath sounds.   Musculoskeletal:         General: Tenderness present.      Cervical back: Neck supple.      Comments: " Back exam unchanged   Skin:     General: Skin is warm and dry.      Findings: No rash.   Neurological:      Mental Status: She is alert and oriented to person, place, and time.      Comments: No focal deficits no lateralizing signs   Psychiatric:         Behavior: Behavior is cooperative.        Result Review :                   Assessment and Plan   Diagnoses and all orders for this visit:    1. Lumbar disc disease  -     gabapentin (NEURONTIN) 600 MG tablet; Take 1 tablet by mouth 2 (Two) Times a Day.  Dispense: 60 tablet; Refill: 3  -     traMADol (ULTRAM) 50 MG tablet; TAKE ONE TABLET BY MOUTH EVERY MORNING AND TAKE TWO TABLETS BY MOUTH EVERY EVENING As needed for pain  Dispense: 90 tablet; Refill: 2    2. Nocturnal leg cramps  -     pramipexole (MIRAPEX) 0.25 MG tablet; Take 1 tablet by mouth every night at bedtime.  Dispense: 90 tablet; Refill: 1    3. Generalized anxiety disorder  -     venlafaxine XR (Effexor XR) 150 MG 24 hr capsule; Take 1 capsule by mouth Daily.  Dispense: 90 capsule; Refill: 1    Other orders  -     polyethylene glycol (MIRALAX) 17 g packet; Take 17 g by mouth Daily.  Dispense: 30 each; Refill: 0             Follow Up   Return if symptoms worsen or fail to improve.  Patient was given instructions and counseling regarding her condition or for health maintenance advice. Please see specific information pulled into the AVS if appropriate.

## 2023-04-13 ENCOUNTER — TELEPHONE (OUTPATIENT)
Dept: FAMILY MEDICINE CLINIC | Facility: CLINIC | Age: 63
End: 2023-04-13

## 2023-04-13 NOTE — TELEPHONE ENCOUNTER
Caller: [facility]    Relationship: INSURANCE    Best call back number: [phone]    What is the best time to reach you: ANY    Who are you requesting to speak with (clinical staff, provider,  specific staff member): DR. DIAS OR HIS NURSE    What was the call regarding: PATIENT HAS HAD ISSUES WITH THE TRAMADOL. FIRST IT REJECTED HER AUTHORIZATION AND NOW IT IS JUST NEEDING A NEW PRESCRIPTION. [facility] CALLED THE PHARMACY AND THEY WERE TOLD THAT THE DOCTOR NEEDS TO SEND IT OVER WITH A PAPER. THEY COULD NOT GET THE SPECIFICS OF WHAT THE PAPER IS.     [facility] [id number] [address] [phone] [facility] [phone] FX     Do you require a callback: IF NEEDING TO CONTACT SOMEONE PLEASE CONTACT THE PATIENT.

## 2023-04-13 NOTE — TELEPHONE ENCOUNTER
Spoke with patient and pharmacy. Pharmacy stated pt picked up only a 7 day ruiz[pply last time and will be bale to pick script up tomorrow.

## 2023-05-14 DIAGNOSIS — R51.9 ACUTE INTRACTABLE HEADACHE, UNSPECIFIED HEADACHE TYPE: ICD-10-CM

## 2023-05-15 RX ORDER — PROMETHAZINE HYDROCHLORIDE 25 MG/1
25 TABLET ORAL EVERY 6 HOURS PRN
Qty: 50 TABLET | Refills: 0 | Status: SHIPPED | OUTPATIENT
Start: 2023-05-15

## 2023-05-15 NOTE — TELEPHONE ENCOUNTER
Rx Refill Note  Requested Prescriptions     Pending Prescriptions Disp Refills   • promethazine (PHENERGAN) 25 MG tablet 50 tablet 0     Sig: Take 1 tablet by mouth Every 6 (Six) Hours As Needed for Nausea or Vomiting.      Last office visit with prescribing clinician: 4/11/2023   Last telemedicine visit with prescribing clinician: 4/13/2023   Next office visit with prescribing clinician: 9/7/2023                         Would you like a call back once the refill request has been completed: [] Yes [] No    If the office needs to give you a call back, can they leave a voicemail: [] Yes [] No    Herman Stanton MA  05/15/23, 08:37 EDT

## 2023-05-27 DIAGNOSIS — J45.40 MODERATE PERSISTENT ASTHMATIC BRONCHITIS WITHOUT COMPLICATION: ICD-10-CM

## 2023-05-30 RX ORDER — TIOTROPIUM BROMIDE INHALATION SPRAY 1.56 UG/1
SPRAY, METERED RESPIRATORY (INHALATION)
Qty: 4 G | Refills: 2 | Status: SHIPPED | OUTPATIENT
Start: 2023-05-30

## 2023-05-31 ENCOUNTER — OFFICE VISIT (OUTPATIENT)
Dept: FAMILY MEDICINE CLINIC | Facility: CLINIC | Age: 63
End: 2023-05-31

## 2023-05-31 VITALS
WEIGHT: 169 LBS | DIASTOLIC BLOOD PRESSURE: 68 MMHG | SYSTOLIC BLOOD PRESSURE: 142 MMHG | HEIGHT: 62 IN | TEMPERATURE: 98.6 F | BODY MASS INDEX: 31.1 KG/M2 | HEART RATE: 83 BPM

## 2023-05-31 DIAGNOSIS — M54.32 SCIATICA OF LEFT SIDE: Primary | ICD-10-CM

## 2023-05-31 RX ORDER — METHYLPREDNISOLONE ACETATE 40 MG/ML
40 INJECTION, SUSPENSION INTRA-ARTICULAR; INTRALESIONAL; INTRAMUSCULAR; SOFT TISSUE ONCE
Status: COMPLETED | OUTPATIENT
Start: 2023-05-31 | End: 2023-05-31

## 2023-05-31 RX ORDER — HYDROCODONE BITARTRATE AND ACETAMINOPHEN 5; 325 MG/1; MG/1
1 TABLET ORAL EVERY 6 HOURS PRN
Qty: 12 TABLET | Refills: 0 | Status: SHIPPED | OUTPATIENT
Start: 2023-05-31

## 2023-05-31 RX ADMIN — METHYLPREDNISOLONE ACETATE 40 MG: 40 INJECTION, SUSPENSION INTRA-ARTICULAR; INTRALESIONAL; INTRAMUSCULAR; SOFT TISSUE at 14:53

## 2023-05-31 NOTE — PROGRESS NOTES
"Chief Complaint  Hip Pain (Pinched nerves, going to back )    Subjective        Yessica June Galvin presents to [facility]  Back Pain  This is a recurrent problem. The problem occurs constantly. The problem is unchanged. The pain is present in the lumbar spine. The pain radiates to the left thigh. The pain is moderate. The symptoms are aggravated by twisting. Pertinent negatives include no bladder incontinence or bowel incontinence. Risk factors: History of degenerative disc disease. She has tried analgesics for the symptoms. The treatment provided mild relief.       Objective   Vital Signs:  /68   Pulse 83   Temp 98.6 °F (37 °C)   Ht 157.5 cm (62.01\")   Wt 76.7 kg (169 lb)   BMI 30.90 kg/m²   Estimated body mass index is 30.9 kg/m² as calculated from the following:    Height as of this encounter: 157.5 cm (62.01\").    Weight as of this encounter: 76.7 kg (169 lb).             Physical Exam  Vitals and nursing note reviewed.   Constitutional:       Appearance: Normal appearance.   HENT:      Head: Normocephalic and atraumatic.      Mouth/Throat:      Mouth: Mucous membranes are moist.      Pharynx: Oropharynx is clear.   Eyes:      Conjunctiva/sclera: Conjunctivae normal.      Pupils: Pupils are equal, round, and reactive to light.   Cardiovascular:      Rate and Rhythm: Normal rate and regular rhythm.   Pulmonary:      Effort: Pulmonary effort is normal.      Breath sounds: Normal breath sounds.   Musculoskeletal:         General: Tenderness present.      Cervical back: Neck supple.   Neurological:      Mental Status: She is alert.      Comments: Paresthesia to the left hip hip thigh area        Result Review :                   Assessment and Plan   Diagnoses and all orders for this visit:    1. Sciatica of left side (Primary)  -     methylPREDNISolone acetate (DEPO-medrol) injection 40 mg  -     HYDROcodone-acetaminophen (NORCO) 5-325 MG per tablet; Take 1 tablet by " mouth Every 6 (Six) Hours As Needed for Severe Pain.  Dispense: 12 tablet; Refill: 0  -     MRI Lumbar Spine Without Contrast; Future             Follow Up   No follow-ups on file.  Patient was given instructions and counseling regarding her condition or for health maintenance advice. Please see specific information pulled into the AVS if appropriate.

## 2023-06-04 DIAGNOSIS — M54.32 SCIATICA OF LEFT SIDE: ICD-10-CM

## 2023-06-05 RX ORDER — HYDROCODONE BITARTRATE AND ACETAMINOPHEN 5; 325 MG/1; MG/1
1 TABLET ORAL EVERY 6 HOURS PRN
Qty: 12 TABLET | Refills: 0 | OUTPATIENT
Start: 2023-06-05

## 2023-06-05 NOTE — TELEPHONE ENCOUNTER
Rx Refill Note  Requested Prescriptions     Pending Prescriptions Disp Refills    HYDROcodone-acetaminophen (NORCO) 5-325 MG per tablet 12 tablet 0     Sig: Take 1 tablet by mouth Every 6 (Six) Hours As Needed for Severe Pain.      Last office visit with prescribing clinician: 5/31/2023   Last telemedicine visit with prescribing clinician: Visit date not found   Next office visit with prescribing clinician: Visit date not found                         Would you like a call back once the refill request has been completed: [] Yes [] No    If the office needs to give you a call back, can they leave a voicemail: [] Yes [] No    Dia Jama MA  06/05/23, 08:11 EDT

## 2023-06-06 DIAGNOSIS — M54.32 SCIATICA OF LEFT SIDE: ICD-10-CM

## 2023-06-07 DIAGNOSIS — M54.32 SCIATICA OF LEFT SIDE: ICD-10-CM

## 2023-06-07 RX ORDER — HYDROCODONE BITARTRATE AND ACETAMINOPHEN 5; 325 MG/1; MG/1
1 TABLET ORAL EVERY 6 HOURS PRN
Qty: 12 TABLET | Refills: 0 | Status: CANCELLED | OUTPATIENT
Start: 2023-06-07

## 2023-06-07 NOTE — TELEPHONE ENCOUNTER
Rx Refill Note  Requested Prescriptions     Pending Prescriptions Disp Refills    HYDROcodone-acetaminophen (NORCO) 5-325 MG per tablet 12 tablet 0     Sig: Take 1 tablet by mouth Every 6 (Six) Hours As Needed for Severe Pain.      Last office visit with prescribing clinician: 5/31/2023   Last telemedicine visit with prescribing clinician: Visit date not found   Next office visit with prescribing clinician: Visit date not found                         Would you like a call back once the refill request has been completed: [] Yes [] No    If the office needs to give you a call back, can they leave a voicemail: [] Yes [] No    Herman Stanton MA  06/07/23, 16:40 EDT

## 2023-06-07 NOTE — TELEPHONE ENCOUNTER
Rx Refill Note  Requested Prescriptions     Pending Prescriptions Disp Refills    HYDROcodone-acetaminophen (NORCO) 5-325 MG per tablet 12 tablet 0     Sig: Take 1 tablet by mouth Every 6 (Six) Hours As Needed for Severe Pain.      Last office visit with prescribing clinician: 5/31/2023   Last telemedicine visit with prescribing clinician: Visit date not found   Next office visit with prescribing clinician: Visit date not found                         Would you like a call back once the refill request has been completed: [] Yes [] No    If the office needs to give you a call back, can they leave a voicemail: [] Yes [] No    Kristy Anderson MA  06/07/23, 08:16 EDT

## 2023-06-08 DIAGNOSIS — F41.1 GENERALIZED ANXIETY DISORDER: ICD-10-CM

## 2023-06-08 RX ORDER — HYDROCODONE BITARTRATE AND ACETAMINOPHEN 5; 325 MG/1; MG/1
1 TABLET ORAL EVERY 6 HOURS PRN
Qty: 12 TABLET | Refills: 0 | OUTPATIENT
Start: 2023-06-08

## 2023-06-08 RX ORDER — VENLAFAXINE HYDROCHLORIDE 150 MG/1
150 CAPSULE, EXTENDED RELEASE ORAL DAILY
Qty: 90 CAPSULE | Refills: 1 | Status: SHIPPED | OUTPATIENT
Start: 2023-06-08

## 2023-06-11 ENCOUNTER — HOSPITAL ENCOUNTER (EMERGENCY)
Facility: HOSPITAL | Age: 63
Discharge: HOME | End: 2023-06-11
Attending: EMERGENCY MEDICINE | Admitting: EMERGENCY MEDICINE
Payer: MEDICARE

## 2023-06-11 ENCOUNTER — APPOINTMENT (OUTPATIENT)
Dept: GENERAL RADIOLOGY | Facility: HOSPITAL | Age: 63
End: 2023-06-11
Payer: MEDICARE

## 2023-06-11 VITALS
BODY MASS INDEX: 32.39 KG/M2 | TEMPERATURE: 98.4 F | OXYGEN SATURATION: 95 % | HEIGHT: 62 IN | WEIGHT: 176 LBS | HEART RATE: 76 BPM | DIASTOLIC BLOOD PRESSURE: 106 MMHG | SYSTOLIC BLOOD PRESSURE: 183 MMHG | RESPIRATION RATE: 20 BRPM

## 2023-06-11 DIAGNOSIS — M54.16 LUMBAR RADICULOPATHY: Primary | ICD-10-CM

## 2023-06-11 PROCEDURE — 72100 X-RAY EXAM L-S SPINE 2/3 VWS: CPT

## 2023-06-11 PROCEDURE — 99283 EMERGENCY DEPT VISIT LOW MDM: CPT

## 2023-06-11 RX ORDER — OXYCODONE HYDROCHLORIDE AND ACETAMINOPHEN 5; 325 MG/1; MG/1
1 TABLET ORAL ONCE
Status: COMPLETED | OUTPATIENT
Start: 2023-06-11 | End: 2023-06-11

## 2023-06-11 RX ORDER — METHYLPREDNISOLONE 4 MG/1
TABLET ORAL
Qty: 21 TABLET | Refills: 0 | Status: SHIPPED | OUTPATIENT
Start: 2023-06-11

## 2023-06-11 RX ADMIN — OXYCODONE HYDROCHLORIDE AND ACETAMINOPHEN 1 TABLET: 5; 325 TABLET ORAL at 12:13

## 2023-06-11 NOTE — ED PROVIDER NOTES
Subjective   History of Present Illness  62-year-old female presents emergency department today with back and left hip pain rating down to the left leg.  States that she had this off and on for months but is gotten worse over the past couple days.  She had no new falls no new trauma.  She has a known L2 previous fracture from several years ago.  She denies loss of bowel or bladder control.  She has had no saddle anesthesia.  She has no fevers no chills.  Denies dysuria frequency urgency or hematuria.  No other complaints.    History provided by:  Patient   used: No    Back Pain  Location:  Lumbar spine  Quality:  Burning and shooting  Radiates to:  L posterior upper leg and L thigh  Pain severity:  Severe  Pain is:  Same all the time  Onset quality:  Gradual  Duration:  4 months  Timing:  Intermittent  Progression:  Waxing and waning  Chronicity:  Chronic  Context: not emotional stress, not occupational injury, not pedestrian accident, not recent illness and not recent injury    Relieved by:  Narcotics  Worsened by:  Bending, movement and twisting  Ineffective treatments:  None tried  Associated symptoms: no bladder incontinence, no bowel incontinence, no dysuria, no fever, no numbness, no paresthesias, no perianal numbness, no weakness and no weight loss    Risk factors: no hx of osteoporosis, not obese and no steroid use      Review of Systems   Constitutional:  Negative for chills, fever and weight loss.   Respiratory:  Negative for chest tightness, shortness of breath and wheezing.    Gastrointestinal:  Negative for bowel incontinence.   Genitourinary:  Negative for bladder incontinence, dysuria, frequency and urgency.   Musculoskeletal:  Positive for back pain.   Neurological:  Negative for weakness, numbness and paresthesias.   Psychiatric/Behavioral: Negative.     All other systems reviewed and are negative.    Past Medical History:   Diagnosis Date    Acute bronchitis     Acute sinusitis      Asthma     Asthma with acute exacerbation     Asthma, extrinsic ,1970    I have had it since I was 5 yrs. Old    Asthmatic bronchitis     Bronchiectasis     Always catch it    Disc degeneration, lumbar     Disc degeneration, lumbar     GERD (gastroesophageal reflux disease)     History of mammogram     Hypothyroidism     Lower back pain     Plantar fasciitis     Restless legs syndrome        Allergies   Allergen Reactions    Codeine Nausea And Vomiting       Past Surgical History:   Procedure Laterality Date    HYSTERECTOMY      KNEE ARTHROSCOPY Right 03/10/2020    Procedure: KNEE ARTHROSCOPY RIGHT;  Surgeon: Guanaco Thakur MD;  Location:  [facility];  Service: Orthopedics;  Laterality: Right;    NECK SURGERY      OOPHORECTOMY      ORIF WRIST FRACTURE Left 03/10/2020    Procedure: WRIST OPEN REDUCTION INTERNAL FIXATION LEFT;  Surgeon: Guanaco Thakur MD;  Location: [facility];  Service: Orthopedics;  Laterality: Left;    TIBIAL PLATEAU OPEN REDUCTION INTERNAL FIXATION Right 03/10/2020    Procedure: TIBIAL PLATEAU OPEN REDUCTION INTERNAL FIXATION RIGHT;  Surgeon: Guanaco Thakur MD;  Location:  [facility];  Service: Orthopedics;  Laterality: Right;       Family History   Problem Relation Age of Onset    Heart disease Mother     Diabetes Mother     Alzheimer's disease Father     Asthma Father     Breast cancer Neg Hx     Ovarian cancer Neg Hx        Social History     Socioeconomic History    Marital status:    Tobacco Use    Smoking status: Former     Packs/day: 0.25     Years: 15.00     Pack years: 3.75     Types: Cigarettes     Start date: 3/7/2000     Quit date: 1/1/2020     Years since quitting: 3.4    Smokeless tobacco: Never    Tobacco comments:     I started when i was 17   Vaping Use    Vaping Use: Never used   Substance and Sexual Activity    Alcohol use: No    Drug use: No    Sexual activity: Not Currently     Partners: Female     Birth control/protection: None           Objective   Physical  Exam  Vitals and nursing note reviewed.   Constitutional:       General: She is not in acute distress.     Appearance: She is well-developed. She is not diaphoretic.   HENT:      Head: Normocephalic and atraumatic.      Nose: Nose normal.   Eyes:      General: No scleral icterus.     Conjunctiva/sclera: Conjunctivae normal.   Cardiovascular:      Rate and Rhythm: Normal rate and regular rhythm.      Heart sounds: Normal heart sounds. No murmur heard.  Pulmonary:      Effort: Pulmonary effort is normal. No respiratory distress.      Breath sounds: Normal breath sounds.   Abdominal:      General: Bowel sounds are normal.      Palpations: Abdomen is soft.      Tenderness: There is no abdominal tenderness.   Musculoskeletal:         General: Normal range of motion.      Cervical back: Normal range of motion and neck supple.      Comments: Diffuse tenderness in the lumbar spine down into the left paraspinous muscles and into the sacroiliac joint.  There is no rash no lesions.  Strength 5/5 bilateral lower extremities.  Deep reflexes plus one of the knees and ankles.   Skin:     General: Skin is warm and dry.   Neurological:      Mental Status: She is alert and oriented to person, place, and time.   Psychiatric:         Behavior: Behavior normal.       Procedures           ED Course           No results found for this or any previous visit (from the past 24 hour(s)).  Note: In addition to lab results from this visit, the labs listed above may include labs taken at another facility or during a different encounter within the last 24 hours. Please correlate lab times with ED admission and discharge times for further clarification of the services performed during this visit.    XR Spine Lumbar 2 or 3 View   Final Result   Impression:      1. No acute abnormality   2. Stable L2 compression fracture   3. Scoliosis with severe degenerative disease         Electronically Signed: Jamie Vital     6/11/2023 12:25 PM EDT      "Workstation ID: [id number]        Vitals:    06/11/23 1102 06/11/23 1117   BP:  (!) 183/106   Pulse: 76    Resp: 20    Temp: 98.4 °F (36.9 °C)    TempSrc: Oral    SpO2: 95%    Weight: 79.8 kg (176 lb)    Height: 157.5 cm (62\")      Medications   oxyCODONE-acetaminophen (PERCOCET) 5-325 MG per tablet 1 tablet (1 tablet Oral Given 6/11/23 1213)     ECG/EMG Results (last 24 hours)       ** No results found for the last 24 hours. **          No orders to display                                       Medical Decision Making  Chronic back pain for months, but worse over the past several days.  She admittedly has been taking more of her Ultram and Norco than normal.  No bowel or bladder dysfunction.  No dysuria frequency urgency or hematuria.    Problems Addressed:  Lumbar radiculopathy: complicated acute illness or injury    Amount and/or Complexity of Data Reviewed  Radiology: ordered and independent interpretation performed. Decision-making details documented in ED Course.    Risk  Prescription drug management.        Final diagnoses:   Lumbar radiculopathy       ED Disposition  ED Disposition       ED Disposition   Discharge    Condition   Stable    Comment   --               Kota Swain MD  [address] [address]  [phone]               Medication List        New Prescriptions      methylPREDNISolone 4 MG dose pack  Commonly known as: MEDROL  Take as directed on package instructions.               Where to Get Your Medications        These medications were sent to [facility] [id number] - [place], [place] - [address] [place] [address] - [phone]  - [phone]   [address] [place] [address] [address], [place] [id number]      Phone: [phone]   methylPREDNISolone 4 MG dose pack            Gorge Bal PA  06/13/23 1201    "

## 2023-06-12 ENCOUNTER — TELEPHONE (OUTPATIENT)
Dept: FAMILY MEDICINE CLINIC | Facility: CLINIC | Age: 63
End: 2023-06-12

## 2023-06-12 NOTE — TELEPHONE ENCOUNTER
Caller: Yessica Galvin    Relationship: Self    Best call back number: [phone]     What medication are you requesting: PAIN MEDICATION    What are your current symptoms: PAIN IN LEFT SIDE FROM BACK TO ANKLES    How long have you been experiencing symptoms: ABOUT A MONTH    Have you had these symptoms before:    [x] Yes  [] No    Have you been treated for these symptoms before:   [x] Yes  [] No    If a prescription is needed, what is your preferred pharmacy and phone number: [facility] [id number] [address] [phone] [facility] [phone]      Additional notes: PATIENT STATES SHE WAS IN THE ER YESTERDAY AND THEY GAVE HER STEROIDS BUT THEY AREN'T HELPING MUCH.

## 2023-06-14 ENCOUNTER — OFFICE VISIT (OUTPATIENT)
Dept: FAMILY MEDICINE CLINIC | Facility: CLINIC | Age: 63
End: 2023-06-14
Payer: MEDICARE

## 2023-06-14 VITALS
HEIGHT: 62 IN | SYSTOLIC BLOOD PRESSURE: 152 MMHG | OXYGEN SATURATION: 97 % | HEART RATE: 66 BPM | WEIGHT: 164.2 LBS | BODY MASS INDEX: 30.22 KG/M2 | TEMPERATURE: 98 F | DIASTOLIC BLOOD PRESSURE: 92 MMHG

## 2023-06-14 DIAGNOSIS — M54.32 SCIATICA OF LEFT SIDE: ICD-10-CM

## 2023-06-14 PROCEDURE — 1160F RVW MEDS BY RX/DR IN RCRD: CPT | Performed by: FAMILY MEDICINE

## 2023-06-14 PROCEDURE — 99213 OFFICE O/P EST LOW 20 MIN: CPT | Performed by: FAMILY MEDICINE

## 2023-06-14 PROCEDURE — 1159F MED LIST DOCD IN RCRD: CPT | Performed by: FAMILY MEDICINE

## 2023-06-14 RX ORDER — HYDROCODONE BITARTRATE AND ACETAMINOPHEN 5; 325 MG/1; MG/1
1 TABLET ORAL EVERY 6 HOURS PRN
Qty: 12 TABLET | Refills: 0 | Status: SHIPPED | OUTPATIENT
Start: 2023-06-14

## 2023-06-20 ENCOUNTER — TELEPHONE (OUTPATIENT)
Dept: FAMILY MEDICINE CLINIC | Facility: CLINIC | Age: 63
End: 2023-06-20

## 2023-06-20 NOTE — TELEPHONE ENCOUNTER
Caller: Yessica Galvin    Relationship: Self    Best call back number: [phone]       What was the call regarding: PATIENT WOULD A LIKE A PRESCRIPTION FOR PAIN IN LEGS AND BACK. LEVEL OF PAIN IS OVER 8.     Is it okay if the provider responds through MyChart: CALL        [id number] [address] [place] [address] - [phone] [facility] [phone]  [phone]

## 2023-07-19 ENCOUNTER — PROCEDURE VISIT (OUTPATIENT)
Dept: PULMONOLOGY | Facility: CLINIC | Age: 63
End: 2023-07-19
Payer: MEDICARE

## 2023-07-19 VITALS
DIASTOLIC BLOOD PRESSURE: 68 MMHG | WEIGHT: 165 LBS | BODY MASS INDEX: 30.36 KG/M2 | HEIGHT: 62 IN | OXYGEN SATURATION: 93 % | HEART RATE: 68 BPM | SYSTOLIC BLOOD PRESSURE: 132 MMHG | TEMPERATURE: 96.8 F

## 2023-07-19 DIAGNOSIS — J45.20 MILD INTERMITTENT ASTHMA, UNSPECIFIED WHETHER COMPLICATED: Primary | ICD-10-CM

## 2023-07-26 DIAGNOSIS — R51.9 ACUTE INTRACTABLE HEADACHE, UNSPECIFIED HEADACHE TYPE: ICD-10-CM

## 2023-07-26 NOTE — TELEPHONE ENCOUNTER
Caller: Yessica Galvin    Relationship: Self    Best call back number: [phone]     Requested Prescriptions:   Requested Prescriptions     Pending Prescriptions Disp Refills    promethazine (PHENERGAN) 25 MG tablet 50 tablet 0     Sig: Take 1 tablet by mouth Every 6 (Six) Hours As Needed for Nausea or Vomiting.        Pharmacy where request should be sent: [facility] [id number] [address] [phone] [facility] [phone]      Last office visit with prescribing clinician: 6/14/2023   Last telemedicine visit with prescribing clinician: Visit date not found   Next office visit with prescribing clinician: Visit date not found    Does the patient have less than a 3 day supply:  [] Yes  [x] No    Would you like a call back once the refill request has been completed: [] Yes [x] No    If the office needs to give you a call back, can they leave a voicemail: [] Yes [x] No    Monserrat Simpson Rep   07/26/23 17:29 EDT

## 2023-07-27 RX ORDER — PROMETHAZINE HYDROCHLORIDE 25 MG/1
25 TABLET ORAL EVERY 6 HOURS PRN
Qty: 50 TABLET | Refills: 0 | OUTPATIENT
Start: 2023-07-27

## 2023-07-27 NOTE — TELEPHONE ENCOUNTER
Rx Refill Note  Requested Prescriptions     Pending Prescriptions Disp Refills    promethazine (PHENERGAN) 25 MG tablet 50 tablet 0     Sig: Take 1 tablet by mouth Every 6 (Six) Hours As Needed for Nausea or Vomiting.      Last office visit with prescribing clinician: 6/14/2023   Last telemedicine visit with prescribing clinician: Visit date not found   Next office visit with prescribing clinician: Visit date not found                         Would you like a call back once the refill request has been completed: [] Yes [] No    If the office needs to give you a call back, can they leave a voicemail: [] Yes [] No    Dia Jama MA  07/27/23, 08:42 EDT

## 2023-07-28 ENCOUNTER — OFFICE VISIT (OUTPATIENT)
Dept: FAMILY MEDICINE CLINIC | Facility: CLINIC | Age: 63
End: 2023-07-28
Payer: MEDICARE

## 2023-07-28 VITALS
SYSTOLIC BLOOD PRESSURE: 148 MMHG | HEART RATE: 105 BPM | BODY MASS INDEX: 29.33 KG/M2 | OXYGEN SATURATION: 93 % | DIASTOLIC BLOOD PRESSURE: 86 MMHG | HEIGHT: 62 IN | TEMPERATURE: 97.5 F | WEIGHT: 159.4 LBS

## 2023-07-28 DIAGNOSIS — J44.1 COPD WITH ACUTE EXACERBATION: Primary | ICD-10-CM

## 2023-07-28 DIAGNOSIS — J06.9 VIRAL UPPER RESPIRATORY INFECTION: ICD-10-CM

## 2023-07-28 LAB
EXPIRATION DATE: ABNORMAL
EXPIRATION DATE: NORMAL
EXPIRATION DATE: NORMAL
FLUAV AG NPH QL: NEGATIVE
FLUBV AG NPH QL: NEGATIVE
INTERNAL CONTROL: ABNORMAL
INTERNAL CONTROL: NORMAL
INTERNAL CONTROL: NORMAL
Lab: ABNORMAL
Lab: NORMAL
Lab: NORMAL
S PYO AG THROAT QL: NEGATIVE
SARS-COV-2 AG UPPER RESP QL IA.RAPID: NOT DETECTED

## 2023-07-28 PROCEDURE — 87880 STREP A ASSAY W/OPTIC: CPT | Performed by: STUDENT IN AN ORGANIZED HEALTH CARE EDUCATION/TRAINING PROGRAM

## 2023-07-28 PROCEDURE — 87804 INFLUENZA ASSAY W/OPTIC: CPT | Performed by: STUDENT IN AN ORGANIZED HEALTH CARE EDUCATION/TRAINING PROGRAM

## 2023-07-28 PROCEDURE — 1159F MED LIST DOCD IN RCRD: CPT | Performed by: STUDENT IN AN ORGANIZED HEALTH CARE EDUCATION/TRAINING PROGRAM

## 2023-07-28 PROCEDURE — 1160F RVW MEDS BY RX/DR IN RCRD: CPT | Performed by: STUDENT IN AN ORGANIZED HEALTH CARE EDUCATION/TRAINING PROGRAM

## 2023-07-28 PROCEDURE — 99214 OFFICE O/P EST MOD 30 MIN: CPT | Performed by: STUDENT IN AN ORGANIZED HEALTH CARE EDUCATION/TRAINING PROGRAM

## 2023-07-28 PROCEDURE — 87426 SARSCOV CORONAVIRUS AG IA: CPT | Performed by: STUDENT IN AN ORGANIZED HEALTH CARE EDUCATION/TRAINING PROGRAM

## 2023-07-28 RX ORDER — DEXTROMETHORPHAN HYDROBROMIDE AND PROMETHAZINE HYDROCHLORIDE 15; 6.25 MG/5ML; MG/5ML
5 SYRUP ORAL 4 TIMES DAILY PRN
Qty: 118 ML | Refills: 0 | Status: SHIPPED | OUTPATIENT
Start: 2023-07-28

## 2023-07-28 RX ORDER — PREDNISONE 20 MG/1
40 TABLET ORAL DAILY
Qty: 10 TABLET | Refills: 0 | Status: SHIPPED | OUTPATIENT
Start: 2023-07-28 | End: 2023-08-02

## 2023-07-28 RX ORDER — AZITHROMYCIN 500 MG/1
500 TABLET, FILM COATED ORAL DAILY
Qty: 3 TABLET | Refills: 0 | Status: SHIPPED | OUTPATIENT
Start: 2023-07-28 | End: 2023-07-31

## 2023-07-28 RX ORDER — PROMETHAZINE HYDROCHLORIDE 25 MG/1
25 TABLET ORAL EVERY 6 HOURS PRN
Qty: 30 TABLET | Refills: 0 | Status: SHIPPED | OUTPATIENT
Start: 2023-07-28

## 2023-07-28 NOTE — ASSESSMENT & PLAN NOTE
- Patient's symptoms consistent with a viral upper respiratory infection despite point of care flu, COVID, and strep negative  - Recommend Mucinex to help with congestion  - We will start Promethazine DM

## 2023-07-28 NOTE — PROGRESS NOTES
Office Note     Name: Yessica Galvin    : 1960     MRN: [id number]     Chief Complaint  Sore Throat (Raw feeling, all symptoms started 2 days ago ), Cough, Nasal Congestion, Shortness of Breath, and Nausea    Subjective     History of Present Illness:  Yessica Galvin is a 62 y.o. female who presents today for sore throat, cough, nasal congestion, shortness of breath and nausea.  She reports her symptoms have been going on for approximately 2 to 3 days.  Patient reports that her ear has been hurting.  She has had an increase in sputum production.  She is currently using her Breztri inhaler and albuterol more often than she normally does.  Her last cigarette was 4 days ago.  She has been using some Mucinex at home.  She is not sure that it is helping.            Objective     Past Medical History:   Diagnosis Date    Acute bronchitis     Acute sinusitis     Asthma     Asthma with acute exacerbation     Asthma, extrinsic ,1970    I have had it since I was 5 yrs. Old    Asthmatic bronchitis     Bronchiectasis     Always catch it    Disc degeneration, lumbar     Disc degeneration, lumbar     GERD (gastroesophageal reflux disease)     History of mammogram     Hypothyroidism     Lower back pain     Plantar fasciitis     Restless legs syndrome      Past Surgical History:   Procedure Laterality Date    HYSTERECTOMY      KNEE ARTHROSCOPY Right 03/10/2020    Procedure: KNEE ARTHROSCOPY RIGHT;  Surgeon: Guanaco Thakur MD;  Location:  [facility];  Service: Orthopedics;  Laterality: Right;    NECK SURGERY      OOPHORECTOMY      ORIF WRIST FRACTURE Left 03/10/2020    Procedure: WRIST OPEN REDUCTION INTERNAL FIXATION LEFT;  Surgeon: Guanaco Thakur MD;  Location:  [facility];  Service: Orthopedics;  Laterality: Left;    TIBIAL PLATEAU OPEN REDUCTION INTERNAL FIXATION Right 03/10/2020    Procedure: TIBIAL PLATEAU OPEN REDUCTION INTERNAL FIXATION RIGHT;  Surgeon: Guanaco Thakur MD;  Location:  [facility];   "Service: Orthopedics;  Laterality: Right;     Family History   Problem Relation Age of Onset    Hypertension Mother     Cancer Mother     Asthma Mother     Heart disease Mother     Diabetes Mother     Hypertension Father     Alzheimer's disease Father     Asthma Father     Breast cancer Neg Hx     Ovarian cancer Neg Hx        Vital Signs  /86   Pulse 105   Temp 97.5 °F (36.4 °C) (Infrared)   Ht 157.5 cm (62\")   Wt 72.3 kg (159 lb 6.4 oz)   SpO2 93%   BMI 29.15 kg/m²   Estimated body mass index is 29.15 kg/m² as calculated from the following:    Height as of this encounter: 157.5 cm (62\").    Weight as of this encounter: 72.3 kg (159 lb 6.4 oz).    Physical Exam  Vitals reviewed.   Constitutional:       Appearance: Normal appearance.   HENT:      Head: Normocephalic and atraumatic.      Right Ear: Tympanic membrane normal.      Left Ear: Tympanic membrane normal.      Nose: Nose normal.      Mouth/Throat:      Mouth: Mucous membranes are moist.   Eyes:      Conjunctiva/sclera: Conjunctivae normal.   Cardiovascular:      Rate and Rhythm: Tachycardia present.   Pulmonary:      Effort: Pulmonary effort is normal. No respiratory distress.      Breath sounds: Wheezing present.   Abdominal:      General: Abdomen is flat.   Neurological:      Mental Status: She is alert.             Assessment and Plan     Diagnoses and all orders for this visit:    1. COPD with acute exacerbation (Primary)  Assessment & Plan:  - Patient with COPD exacerbation, likely secondary to a viral upper respiratory infection  - POC strep, flu, COVID were all negative  - We will start prednisone 40 mg daily for 5 days  - We will start azithromycin once daily for 3 days  - We will start Promethazine DM to help with cough  - Phenergan prescribed for nausea/vomiting -discussed with patient risk of arrhythmias with chronic use of QTc prolonging agents    Orders:  -     promethazine (PHENERGAN) 25 MG tablet; Take 1 tablet by mouth Every 6 " (Six) Hours As Needed for Nausea or Vomiting.  Dispense: 30 tablet; Refill: 0  -     POC Rapid Strep A  -     POC Influenza A / B  -     POCT VERITOR SARS-CoV-2 Antigen  -     predniSONE (DELTASONE) 20 MG tablet; Take 2 tablets by mouth Daily for 5 days.  Dispense: 10 tablet; Refill: 0  -     azithromycin (Zithromax) 500 MG tablet; Take 1 tablet by mouth Daily for 3 days.  Dispense: 3 tablet; Refill: 0  -     promethazine-dextromethorphan (PROMETHAZINE-DM) 6.25-15 MG/5ML syrup; Take 5 mL by mouth 4 (Four) Times a Day As Needed for Cough.  Dispense: 118 mL; Refill: 0    2. Viral upper respiratory infection  Assessment & Plan:  - Patient's symptoms consistent with a viral upper respiratory infection despite point of care flu, COVID, and strep negative  - Recommend Mucinex to help with congestion  - We will start Promethazine DM    Orders:  -     POC Rapid Strep A  -     POC Influenza A / B  -     POCT VERITOR SARS-CoV-2 Antigen         Follow Up  No follow-ups on file.    Betty Jasmine MD

## 2023-07-28 NOTE — ASSESSMENT & PLAN NOTE
- Patient with COPD exacerbation, likely secondary to a viral upper respiratory infection  - POC strep, flu, COVID were all negative  - We will start prednisone 40 mg daily for 5 days  - We will start azithromycin once daily for 3 days  - We will start Promethazine DM to help with cough  - Phenergan prescribed for nausea/vomiting -discussed with patient risk of arrhythmias with chronic use of QTc prolonging agents

## 2023-08-11 ENCOUNTER — OFFICE VISIT (OUTPATIENT)
Dept: FAMILY MEDICINE CLINIC | Facility: CLINIC | Age: 63
End: 2023-08-11
Payer: MEDICARE

## 2023-08-11 VITALS
BODY MASS INDEX: 30.74 KG/M2 | HEIGHT: 60 IN | SYSTOLIC BLOOD PRESSURE: 122 MMHG | HEART RATE: 86 BPM | OXYGEN SATURATION: 94 % | WEIGHT: 156.6 LBS | TEMPERATURE: 97.8 F | DIASTOLIC BLOOD PRESSURE: 76 MMHG

## 2023-08-11 DIAGNOSIS — F41.1 GENERALIZED ANXIETY DISORDER: ICD-10-CM

## 2023-08-11 DIAGNOSIS — K59.00 CONSTIPATION, UNSPECIFIED CONSTIPATION TYPE: ICD-10-CM

## 2023-08-11 DIAGNOSIS — G47.62 NOCTURNAL LEG CRAMPS: ICD-10-CM

## 2023-08-11 DIAGNOSIS — E03.9 ACQUIRED HYPOTHYROIDISM: ICD-10-CM

## 2023-08-11 DIAGNOSIS — J30.1 SEASONAL ALLERGIC RHINITIS DUE TO POLLEN: ICD-10-CM

## 2023-08-11 DIAGNOSIS — J43.2 CENTRILOBULAR EMPHYSEMA: ICD-10-CM

## 2023-08-11 DIAGNOSIS — L98.9 SKIN LESIONS: ICD-10-CM

## 2023-08-11 DIAGNOSIS — K21.00 GASTROESOPHAGEAL REFLUX DISEASE WITH ESOPHAGITIS WITHOUT HEMORRHAGE: ICD-10-CM

## 2023-08-11 DIAGNOSIS — M51.9 LUMBAR DISC DISEASE: ICD-10-CM

## 2023-08-11 DIAGNOSIS — M25.552 LEFT HIP PAIN: Primary | ICD-10-CM

## 2023-08-11 PROBLEM — J06.9 VIRAL UPPER RESPIRATORY INFECTION: Status: RESOLVED | Noted: 2023-07-28 | Resolved: 2023-08-11

## 2023-08-11 RX ORDER — TRAMADOL HYDROCHLORIDE 50 MG/1
100 TABLET ORAL EVERY 8 HOURS PRN
Qty: 150 TABLET | Refills: 0 | Status: SHIPPED | OUTPATIENT
Start: 2023-08-11

## 2023-08-11 RX ORDER — KETOROLAC TROMETHAMINE 15 MG/ML
15 INJECTION, SOLUTION INTRAMUSCULAR; INTRAVENOUS ONCE
Status: COMPLETED | OUTPATIENT
Start: 2023-08-11 | End: 2023-08-11

## 2023-08-11 RX ORDER — PRAMIPEXOLE DIHYDROCHLORIDE 0.5 MG/1
0.5 TABLET ORAL
Qty: 30 TABLET | Refills: 0 | Status: SHIPPED | OUTPATIENT
Start: 2023-08-11

## 2023-08-11 RX ORDER — GABAPENTIN 600 MG/1
600 TABLET ORAL 3 TIMES DAILY
Qty: 90 TABLET | Refills: 0 | Status: SHIPPED | OUTPATIENT
Start: 2023-08-11

## 2023-08-11 RX ORDER — DOCUSATE SODIUM 100 MG/1
100 CAPSULE, LIQUID FILLED ORAL 2 TIMES DAILY
Qty: 60 CAPSULE | Refills: 0 | Status: SHIPPED | OUTPATIENT
Start: 2023-08-11

## 2023-08-11 RX ADMIN — KETOROLAC TROMETHAMINE 15 MG: 15 INJECTION, SOLUTION INTRAMUSCULAR; INTRAVENOUS at 14:04

## 2023-08-11 NOTE — ASSESSMENT & PLAN NOTE
- Patient has an extensive history of degenerative disease of her back with scoliosis and a chronic compression fracture of L2  - She is following with neurosurgery and is supposed to get physical therapy but has not received a call yet  - She does have an appointment coming up with neurosurgery next week  - We will continue tramadol 100 mg every 8 hours as needed  - Can continue diclofenac 75 mg twice daily and Flexeril as needed  - Increasing gabapentin to 600 mg 3 times a day  - Patient will need a medication contract and UDS at follow-up

## 2023-08-11 NOTE — ASSESSMENT & PLAN NOTE
- Patient with excoriated skin lesions on her arms and legs, possibly secondary to bug bites that are now locally infected around the sites where she is scratching  - We will start triamcinolone ointment to the affected areas and mupirocin ointment  - We will follow-up at next visit

## 2023-08-11 NOTE — PROGRESS NOTES
Office Note     Name: Yessica Galvin    : 1960     MRN: [id number]     Chief Complaint  Hip Pain    Subjective     History of Present Illness:  Yessica Galvin is a 62 y.o. female who presents today for hip pain and to follow-up on chronic medical conditions.    Left hip pain: Patient has chronic left-sided back pain and reports that she has had left hip pain for a long time.  She has not been able to get on physical therapy that her neurosurgeon had ordered.  She thinks that she may have a pinched nerve.  Patient reports that she has intermittent numbness in her left leg.  She has decreased range of motion due to the pain.  Patient is already on gabapentin for her back pain.    Constipation: Patient has been on lactulose and MiraLAX to be used as needed and reports that her symptoms are not well controlled and she does not think that the medications are working.  She reports that she gets intermittent left-sided pain into her hip when she is constipated.    Hypothyroidism: Patient is currently on levothyroxine 137 mcg daily.  She reports good symptom control on her current medication.    Allergic rhinitis: Patient is currently on Xyzal 5 mg daily.  She is also on Singulair 10 mg daily.  Symptoms are well controlled.    Restless legs: Patient is currently on pramipexole 0.25 mg nightly.  She does not think that it controls her symptoms well.  She would like to increase the dose.    GERD: Patient is currently on Prilosec 40 mg daily.  Symptoms are well controlled.    Chronic back pain: Patient is currently on gabapentin 600 mg twice daily and tramadol 100 mg every 8 hours.  She also has diclofenac 75 mg twice daily and Flexeril 5 mg nightly as needed.  She reports overall, her symptoms are not well controlled.  Her left hip is hurting as above.  She has been on the gabapentin and tramadol for quite some time.    Anxiety: Patient is currently on venlafaxine 150 mg daily.  Overall okay controlled.   "Has good days and bad days.    COPD: Patient is currently on breztri daily and takes albuterol as needed.  She follows with pulmonary for this.  She reports that her breathing is fine after she had the COPD exacerbation a couple weeks ago.        Objective     Past Medical History:   Diagnosis Date    Acute bronchitis     Acute sinusitis     Asthma     Asthma with acute exacerbation     Asthma, extrinsic ,1970    I have had it since I was 5 yrs. Old    Asthmatic bronchitis     Bronchiectasis     Always catch it    Disc degeneration, lumbar     Disc degeneration, lumbar     GERD (gastroesophageal reflux disease)     History of mammogram     Hypothyroidism     Lower back pain     Plantar fasciitis     Restless legs syndrome      Past Surgical History:   Procedure Laterality Date    HYSTERECTOMY      KNEE ARTHROSCOPY Right 03/10/2020    Procedure: KNEE ARTHROSCOPY RIGHT;  Surgeon: Guanaco Thakur MD;  Location:  [facility];  Service: Orthopedics;  Laterality: Right;    NECK SURGERY      OOPHORECTOMY      ORIF WRIST FRACTURE Left 03/10/2020    Procedure: WRIST OPEN REDUCTION INTERNAL FIXATION LEFT;  Surgeon: Guanaco Thakur MD;  Location:  [facility];  Service: Orthopedics;  Laterality: Left;    TIBIAL PLATEAU OPEN REDUCTION INTERNAL FIXATION Right 03/10/2020    Procedure: TIBIAL PLATEAU OPEN REDUCTION INTERNAL FIXATION RIGHT;  Surgeon: Guanaco Thakur MD;  Location:  [facility];  Service: Orthopedics;  Laterality: Right;     Family History   Problem Relation Age of Onset    Hypertension Mother     Cancer Mother     Asthma Mother     Heart disease Mother     Diabetes Mother     Hypertension Father     Alzheimer's disease Father     Asthma Father     Breast cancer Neg Hx     Ovarian cancer Neg Hx        Vital Signs  /76   Pulse 86   Temp 97.8 øF (36.6 øC) (Infrared)   Ht 152.4 cm (60\")   Wt 71 kg (156 lb 9.6 oz)   SpO2 94%   BMI 30.58 kg/mý   Estimated body mass index is 30.58 kg/mý as calculated from the " "following:    Height as of this encounter: 152.4 cm (60\").    Weight as of this encounter: 71 kg (156 lb 9.6 oz).    Physical Exam  Vitals reviewed.   Constitutional:       Appearance: Normal appearance.   HENT:      Head: Normocephalic and atraumatic.      Right Ear: Tympanic membrane normal.      Left Ear: Tympanic membrane normal.      Nose: Nose normal.      Mouth/Throat:      Mouth: Mucous membranes are moist.   Eyes:      Conjunctiva/sclera: Conjunctivae normal.   Cardiovascular:      Rate and Rhythm: Normal rate and regular rhythm.   Pulmonary:      Effort: Pulmonary effort is normal.      Breath sounds: Normal breath sounds.   Abdominal:      General: Abdomen is flat.      Palpations: Abdomen is soft.   Musculoskeletal:      Comments: Tender to palpation over left hip   Skin:     Comments: Multiple excoriated skin lesions on arms and legs   Neurological:      Mental Status: She is alert.             Assessment and Plan     Diagnoses and all orders for this visit:    1. Left hip pain (Primary)  Assessment & Plan:  - Patient with left hip pain and tenderness to palpation over the left hip  - Toradol shot given in clinic today  - Can continue diclofenac twice daily as needed  - Increasing gabapentin to 600 mg 3 times a day, given that she describes the pain as numbness and tingling      2. Lumbar disc disease  Assessment & Plan:  - Patient has an extensive history of degenerative disease of her back with scoliosis and a chronic compression fracture of L2  - She is following with neurosurgery and is supposed to get physical therapy but has not received a call yet  - She does have an appointment coming up with neurosurgery next week  - We will continue tramadol 100 mg every 8 hours as needed  - Can continue diclofenac 75 mg twice daily and Flexeril as needed  - Increasing gabapentin to 600 mg 3 times a day  - Patient will need a medication contract and UDS at follow-up    Orders:  -     traMADol (ULTRAM) 50 MG " tablet; Take 2 tablets by mouth Every 8 (Eight) Hours As Needed for Moderate Pain.  Dispense: 150 tablet; Refill: 0  -     gabapentin (NEURONTIN) 600 MG tablet; Take 1 tablet by mouth 3 (Three) Times a Day.  Dispense: 90 tablet; Refill: 0  -     ketorolac (TORADOL) injection 15 mg    3. Nocturnal leg cramps  Assessment & Plan:  - Patient reports that her symptoms are not well controlled, previously they were  - We will increase pramipexole 2.5 mg nightly and follow-up in 2 weeks    Orders:  -     pramipexole (MIRAPEX) 0.5 MG tablet; Take 1 tablet by mouth every night at bedtime.  Dispense: 30 tablet; Refill: 0    4. Constipation, unspecified constipation type  Assessment & Plan:  - Uncontrolled  - We will stop lactulose  - We will start Colace daily and continue MiraLAX daily    Orders:  -     docusate sodium (Colace) 100 MG capsule; Take 1 capsule by mouth 2 (Two) Times a Day.  Dispense: 60 capsule; Refill: 0    5. Generalized anxiety disorder  Assessment & Plan:  - Stable, per patient -has both good and bad days  - Continue venlafaxine 150 mg daily      6. Seasonal allergic rhinitis due to pollen  Assessment & Plan:  - Controlled  - Continue Xyzal 5 mg daily and Singulair 10 mg daily      7. Gastroesophageal reflux disease with esophagitis without hemorrhage  Assessment & Plan:  - Controlled  - Continue Prilosec 40 mg daily      8. Acquired hypothyroidism  Assessment & Plan:  - Patient will be due for TSH at next visit  - Continue levothyroxine 137 mcg daily for now      9. Centrilobular emphysema (HCC) - Stage II COPD with FEV1 1.67 L or 76% predicted 6/2/22  Assessment & Plan:  - Patient follows with pulmonary  - Continue breztri daily and takes albuterol as needed      10. Skin lesions  Assessment & Plan:  - Patient with excoriated skin lesions on her arms and legs, possibly secondary to bug bites that are now locally infected around the sites where she is scratching  - We will start triamcinolone ointment to  the affected areas and mupirocin ointment  - We will follow-up at next visit    Orders:  -     triamcinolone (KENALOG) 0.1 % ointment; Apply 1 application  topically to the appropriate area as directed 2 (Two) Times a Day. For 7-14 days  Dispense: 80 g; Refill: 0  -     mupirocin (BACTROBAN) 2 % ointment; Apply 1 application  topically to the appropriate area as directed 3 (Three) Times a Day. For 7 days  Dispense: 30 g; Refill: 0        Follow Up  Return in about 2 weeks (around 8/25/2023) for follow up . -We will need lab work, occasion contract and UDS, hip x-ray and pain control follow-up    Betty Jasmine MD

## 2023-08-11 NOTE — ASSESSMENT & PLAN NOTE
- Patient with left hip pain and tenderness to palpation over the left hip  - Toradol shot given in clinic today  - Can continue diclofenac twice daily as needed  - Increasing gabapentin to 600 mg 3 times a day, given that she describes the pain as numbness and tingling

## 2023-08-11 NOTE — ASSESSMENT & PLAN NOTE
- Patient reports that her symptoms are not well controlled, previously they were  - We will increase pramipexole 2.5 mg nightly and follow-up in 2 weeks

## 2023-08-23 ENCOUNTER — TELEPHONE (OUTPATIENT)
Dept: NEUROSURGERY | Facility: CLINIC | Age: 63
End: 2023-08-23

## 2023-08-23 DIAGNOSIS — M51.36 DDD (DEGENERATIVE DISC DISEASE), LUMBAR: ICD-10-CM

## 2023-08-23 DIAGNOSIS — M48.062 SPINAL STENOSIS OF LUMBAR REGION WITH NEUROGENIC CLAUDICATION: Primary | ICD-10-CM

## 2023-08-29 DIAGNOSIS — J44.1 COPD WITH ACUTE EXACERBATION: ICD-10-CM

## 2023-08-29 RX ORDER — PROMETHAZINE HYDROCHLORIDE 25 MG/1
25 TABLET ORAL EVERY 6 HOURS PRN
Qty: 30 TABLET | Refills: 0 | Status: SHIPPED | OUTPATIENT
Start: 2023-08-29

## 2023-09-05 DIAGNOSIS — E03.9 ACQUIRED HYPOTHYROIDISM: ICD-10-CM

## 2023-09-05 RX ORDER — LEVOTHYROXINE SODIUM 137 UG/1
137 TABLET ORAL DAILY
Qty: 90 TABLET | Refills: 1 | Status: SHIPPED | OUTPATIENT
Start: 2023-09-05 | End: 2023-09-06 | Stop reason: SDUPTHER

## 2023-09-06 ENCOUNTER — OFFICE VISIT (OUTPATIENT)
Dept: FAMILY MEDICINE CLINIC | Facility: CLINIC | Age: 63
End: 2023-09-06
Payer: MEDICARE

## 2023-09-06 VITALS
WEIGHT: 164 LBS | TEMPERATURE: 98.6 F | BODY MASS INDEX: 32.2 KG/M2 | SYSTOLIC BLOOD PRESSURE: 148 MMHG | HEART RATE: 80 BPM | DIASTOLIC BLOOD PRESSURE: 94 MMHG | OXYGEN SATURATION: 93 % | HEIGHT: 60 IN

## 2023-09-06 DIAGNOSIS — G47.62 NOCTURNAL LEG CRAMPS: ICD-10-CM

## 2023-09-06 DIAGNOSIS — M51.9 LUMBAR DISC DISEASE: ICD-10-CM

## 2023-09-06 DIAGNOSIS — Z51.81 THERAPEUTIC DRUG MONITORING: ICD-10-CM

## 2023-09-06 DIAGNOSIS — T14.8XXA BRUISING: ICD-10-CM

## 2023-09-06 DIAGNOSIS — K21.9 GASTROESOPHAGEAL REFLUX DISEASE, UNSPECIFIED WHETHER ESOPHAGITIS PRESENT: ICD-10-CM

## 2023-09-06 DIAGNOSIS — M25.552 LEFT HIP PAIN: ICD-10-CM

## 2023-09-06 DIAGNOSIS — R11.0 NAUSEA: ICD-10-CM

## 2023-09-06 DIAGNOSIS — J30.9 ALLERGIC RHINITIS, UNSPECIFIED SEASONALITY, UNSPECIFIED TRIGGER: ICD-10-CM

## 2023-09-06 DIAGNOSIS — E03.9 ACQUIRED HYPOTHYROIDISM: Primary | ICD-10-CM

## 2023-09-06 DIAGNOSIS — F41.1 GENERALIZED ANXIETY DISORDER: ICD-10-CM

## 2023-09-06 RX ORDER — CELECOXIB 200 MG/1
200 CAPSULE ORAL 2 TIMES DAILY
Qty: 60 CAPSULE | Refills: 0 | Status: SHIPPED | OUTPATIENT
Start: 2023-09-06

## 2023-09-06 RX ORDER — CYCLOBENZAPRINE HCL 5 MG
5 TABLET ORAL NIGHTLY PRN
Qty: 30 TABLET | Refills: 0 | Status: SHIPPED | OUTPATIENT
Start: 2023-09-06

## 2023-09-06 RX ORDER — LEVOCETIRIZINE DIHYDROCHLORIDE 5 MG/1
5 TABLET, FILM COATED ORAL EVERY EVENING
Qty: 90 TABLET | Refills: 1 | Status: SHIPPED | OUTPATIENT
Start: 2023-09-06

## 2023-09-06 RX ORDER — DOCUSATE SODIUM 100 MG/1
100 CAPSULE, LIQUID FILLED ORAL 2 TIMES DAILY
Qty: 60 CAPSULE | Refills: 0 | Status: CANCELLED | OUTPATIENT
Start: 2023-09-06

## 2023-09-06 RX ORDER — PROMETHAZINE HYDROCHLORIDE 25 MG/1
25 TABLET ORAL EVERY 6 HOURS PRN
Qty: 30 TABLET | Refills: 0 | Status: SHIPPED | OUTPATIENT
Start: 2023-09-06

## 2023-09-06 RX ORDER — VENLAFAXINE HYDROCHLORIDE 150 MG/1
150 CAPSULE, EXTENDED RELEASE ORAL DAILY
Qty: 90 CAPSULE | Refills: 1 | Status: CANCELLED | OUTPATIENT
Start: 2023-09-06

## 2023-09-06 RX ORDER — PRAMIPEXOLE DIHYDROCHLORIDE 0.5 MG/1
0.5 TABLET ORAL
Qty: 30 TABLET | Refills: 0 | Status: SHIPPED | OUTPATIENT
Start: 2023-09-06

## 2023-09-06 RX ORDER — VENLAFAXINE HYDROCHLORIDE 225 MG/1
225 TABLET, EXTENDED RELEASE ORAL
Qty: 30 TABLET | Refills: 2 | Status: SHIPPED | OUTPATIENT
Start: 2023-09-06

## 2023-09-06 RX ORDER — ALBUTEROL SULFATE 90 UG/1
2 AEROSOL, METERED RESPIRATORY (INHALATION) EVERY 6 HOURS PRN
Qty: 54 G | Refills: 3 | Status: CANCELLED | OUTPATIENT
Start: 2023-09-06

## 2023-09-06 RX ORDER — BUDESONIDE, GLYCOPYRROLATE, AND FORMOTEROL FUMARATE 160; 9; 4.8 UG/1; UG/1; UG/1
2 AEROSOL, METERED RESPIRATORY (INHALATION) 2 TIMES DAILY
Qty: 3 EACH | Refills: 3 | Status: CANCELLED | OUTPATIENT
Start: 2023-09-06

## 2023-09-06 RX ORDER — GABAPENTIN 600 MG/1
600 TABLET ORAL 3 TIMES DAILY
Qty: 90 TABLET | Refills: 0 | Status: SHIPPED | OUTPATIENT
Start: 2023-09-06

## 2023-09-06 RX ORDER — DICLOFENAC SODIUM 75 MG/1
75 TABLET, DELAYED RELEASE ORAL 2 TIMES DAILY
Qty: 60 TABLET | Refills: 2 | Status: CANCELLED | OUTPATIENT
Start: 2023-09-06

## 2023-09-06 RX ORDER — TRAMADOL HYDROCHLORIDE 50 MG/1
100 TABLET ORAL EVERY 8 HOURS PRN
Qty: 150 TABLET | Refills: 0 | Status: SHIPPED | OUTPATIENT
Start: 2023-09-06

## 2023-09-06 RX ORDER — OMEPRAZOLE 40 MG/1
40 CAPSULE, DELAYED RELEASE ORAL DAILY
Qty: 90 CAPSULE | Refills: 1 | Status: SHIPPED | OUTPATIENT
Start: 2023-09-06

## 2023-09-06 RX ORDER — PRAMIPEXOLE DIHYDROCHLORIDE 0.5 MG/1
TABLET ORAL
Qty: 30 TABLET | Refills: 0 | Status: SHIPPED | OUTPATIENT
Start: 2023-09-06 | End: 2023-09-06 | Stop reason: SDUPTHER

## 2023-09-06 RX ORDER — LEVOTHYROXINE SODIUM 137 UG/1
137 TABLET ORAL DAILY
Qty: 30 TABLET | Refills: 0 | Status: SHIPPED | OUTPATIENT
Start: 2023-09-06

## 2023-09-06 NOTE — ASSESSMENT & PLAN NOTE
- Patient has an extensive history of degenerative disease of her back with scoliosis and a chronic compression fracture of L2  - She is following with neurosurgery and has physical therapy scheduled for later this month  - We will continue tramadol 100 mg every 8 hours as needed  - Can continue Flexeril as needed  - Continue gabapentin to 600 mg 3 times a day  - Baltazar reviewed and appropriate, obtain medication contract and UDS today  - Obtaining back x-ray today

## 2023-09-06 NOTE — ASSESSMENT & PLAN NOTE
- Continue Phenergan as needed, patient has had an EGD in the past which showed some esophagitis, she does not want any further interventions done for her nausea

## 2023-09-06 NOTE — ASSESSMENT & PLAN NOTE
- Patient with left hip pain and tenderness to palpation over the left hip  - We will stop diclofenac and start Celebrex 200 mg twice daily  - Continue increased dose of gabapentin 600 mg 3 times a day  - Baltazar reviewed and appropriate, obtain medication contract and UDS today  - Obtaining hip x-ray today

## 2023-09-06 NOTE — ASSESSMENT & PLAN NOTE
- Patient reports improvement on the pramipexole 0.5 mg nightly but continues to have some leg cramps  - Obtaining labs for further evaluation and management

## 2023-09-06 NOTE — PROGRESS NOTES
Office Note     Name: Yessica Galvin    : 1960     MRN: [id number]     Chief Complaint  Hip Pain (5 of of a 10 pain scale) and Back Pain (6 out of 10 on a pain scale )    Subjective     History of Present Illness:  Yessica Galvin is a 62 y.o. female who presents today for multiple concerns and to get medication refills.     Low back and hip pain: Patient continues to have pain in both her hip and her back.  She is going to see physical therapy at the end of this month.  She feels that she has a crushing pain in her hip joint down to her knee.  When she had the Toradol injection at her last visit, it did help her symptoms for that day.  She has been taking the diclofenac for a long time and is willing to try different nonsteroidal anti-inflammatory medicine.  The increased dose and gabapentin did not make much of a difference.  She even took an extra dose of the tramadol and did not feel that that made a difference.  She does take Flexeril daily at bedtime.  Patient is okay with getting hip and back x-rays today.    Hypothyroidism: Patient is currently on levothyroxine 137 mcg daily.  She reports good symptom control on her current medication.     Allergic rhinitis: Patient is currently on Xyzal 5 mg daily.  She is also on Singulair 10 mg daily.  Symptoms are well controlled.     GERD: Patient is currently on Prilosec 40 mg daily.  Symptoms are well controlled.     Anxiety: Patient is currently on venlafaxine 150 mg daily.  She does not feel that her symptoms are well controlled and would like to go up on her medication.    Restless legs: We went up on her pramipexole at her last visit and she has had some improvement in her symptoms.  She does continue to have leg cramps.    Nausea and upset stomach: Patient has a history of upset stomach and nausea.  She takes Phenergan as needed.  She has had an EGD and reports that she will not get any further scopes done.  Except the upset stomach started  "after she had her colonoscopy.          Objective     Past Medical History:   Diagnosis Date    Acute bronchitis     Acute sinusitis     Asthma     Asthma with acute exacerbation     Asthma, extrinsic ,1970    I have had it since I was 5 yrs. Old    Asthmatic bronchitis     Bronchiectasis     Always catch it    Disc degeneration, lumbar     Disc degeneration, lumbar     GERD (gastroesophageal reflux disease)     History of mammogram     Hypothyroidism     Lower back pain     Plantar fasciitis     Restless legs syndrome      Past Surgical History:   Procedure Laterality Date    HYSTERECTOMY      KNEE ARTHROSCOPY Right 03/10/2020    Procedure: KNEE ARTHROSCOPY RIGHT;  Surgeon: Guanaco Thakur MD;  Location:  [facility];  Service: Orthopedics;  Laterality: Right;    NECK SURGERY      OOPHORECTOMY      ORIF WRIST FRACTURE Left 03/10/2020    Procedure: WRIST OPEN REDUCTION INTERNAL FIXATION LEFT;  Surgeon: Guanaco Thakur MD;  Location:  [facility];  Service: Orthopedics;  Laterality: Left;    TIBIAL PLATEAU OPEN REDUCTION INTERNAL FIXATION Right 03/10/2020    Procedure: TIBIAL PLATEAU OPEN REDUCTION INTERNAL FIXATION RIGHT;  Surgeon: Guanaco Thakur MD;  Location:  [facility];  Service: Orthopedics;  Laterality: Right;     Family History   Problem Relation Age of Onset    Hypertension Mother     Cancer Mother     Asthma Mother     Heart disease Mother     Diabetes Mother     Hypertension Father     Alzheimer's disease Father     Asthma Father     Breast cancer Neg Hx     Ovarian cancer Neg Hx        Vital Signs  /94   Pulse 80   Temp 98.6 °F (37 °C) (Infrared)   Ht 152.4 cm (60\")   Wt 74.4 kg (164 lb)   SpO2 93%   BMI 32.03 kg/m²   Estimated body mass index is 32.03 kg/m² as calculated from the following:    Height as of this encounter: 152.4 cm (60\").    Weight as of this encounter: 74.4 kg (164 lb).    Physical Exam  Vitals reviewed.   Constitutional:       Comments: Chronically ill-appearing   HENT:    "   Head: Normocephalic.   Cardiovascular:      Rate and Rhythm: Normal rate and regular rhythm.   Pulmonary:      Effort: Pulmonary effort is normal.      Breath sounds: Normal breath sounds.   Abdominal:      General: Abdomen is flat.      Palpations: Abdomen is soft.   Musculoskeletal:      Comments: Tender to palpation along lumbar spine and left hip   Skin:     General: Skin is warm.      Comments: Multiple bruises on skin, including neck, legs and upper extremities   Neurological:      Mental Status: She is alert.   Psychiatric:         Mood and Affect: Mood normal.         Behavior: Behavior normal.             Assessment and Plan     Diagnoses and all orders for this visit:    1. Acquired hypothyroidism (Primary)  Assessment & Plan:  - Obtaining TSH today  - Continue levothyroxine 137 mcg daily for now pending labs    Orders:  -     levothyroxine (SYNTHROID, LEVOTHROID) 137 MCG tablet; Take 1 tablet by mouth Daily.  Dispense: 30 tablet; Refill: 0  -     TSH Rfx On Abnormal To Free T4; Future    2. Lumbar disc disease  Assessment & Plan:  - Patient has an extensive history of degenerative disease of her back with scoliosis and a chronic compression fracture of L2  - She is following with neurosurgery and has physical therapy scheduled for later this month  - We will continue tramadol 100 mg every 8 hours as needed  - Can continue Flexeril as needed  - Continue gabapentin to 600 mg 3 times a day  - Baltazar reviewed and appropriate, obtain medication contract and UDS today  - Obtaining back x-ray today    Orders:  -     cyclobenzaprine (FLEXERIL) 5 MG tablet; Take 1 tablet by mouth At Night As Needed for Muscle Spasms.  Dispense: 30 tablet; Refill: 0  -     gabapentin (NEURONTIN) 600 MG tablet; Take 1 tablet by mouth 3 (Three) Times a Day.  Dispense: 90 tablet; Refill: 0  -     traMADol (ULTRAM) 50 MG tablet; Take 2 tablets by mouth Every 8 (Eight) Hours As Needed for Moderate Pain.  Dispense: 150 tablet; Refill:  0  -     XR Spine Lumbar Complete 4+VW; Future  -     celecoxib (CeleBREX) 200 MG capsule; Take 1 capsule by mouth 2 (Two) Times a Day.  Dispense: 60 capsule; Refill: 0    3. Generalized anxiety disorder  Assessment & Plan:   - Uncontrolled  - Increasing venlafaxine to 225 mg daily    Orders:  -     venlafaxine 225 MG tablet sustained-release 24 hour 24 hr tablet; Take 1 tablet by mouth Daily With Breakfast.  Dispense: 30 tablet; Refill: 2    4. Nocturnal leg cramps  Assessment & Plan:  - Patient reports improvement on the pramipexole 0.5 mg nightly but continues to have some leg cramps  - Obtaining labs for further evaluation and management    Orders:  -     pramipexole (MIRAPEX) 0.5 MG tablet; Take 1 tablet by mouth every night at bedtime.  Dispense: 30 tablet; Refill: 0  -     Comprehensive Metabolic Panel; Future  -     CBC & Differential; Future  -     Magnesium; Future  -     Folate; Future  -     Vitamin B12; Future    5. Left hip pain  Assessment & Plan:  - Patient with left hip pain and tenderness to palpation over the left hip  - We will stop diclofenac and start Celebrex 200 mg twice daily  - Continue increased dose of gabapentin 600 mg 3 times a day  - Baltazar reviewed and appropriate, obtain medication contract and UDS today  - Obtaining hip x-ray today    Orders:  -     XR Hip With or Without Pelvis 2 - 3 View Left; Future  -     celecoxib (CeleBREX) 200 MG capsule; Take 1 capsule by mouth 2 (Two) Times a Day.  Dispense: 60 capsule; Refill: 0    6. Bruising  Assessment & Plan:  - Patient with multiple bruises on her body  - Obtaining CBC and INR today    Orders:  -     CBC & Differential; Future  -     Protime-INR; Future    7. Therapeutic drug monitoring  Assessment & Plan:  - Obtained UDS today given that patient is on tramadol and gabapentin chronically    Orders:  -     Cancel: [facility]iance Drug Analysis, Ur - Urine, Clean Catch; Future  -     Cancel: Compliance Drug Analysis, Ur - Urine, Clean  Catch  -     Compliance Drug Analysis, Ur - Urine, Clean Catch; Future  -     Compliance Drug Analysis, Ur - Urine, Clean Catch    8. Nausea  Assessment & Plan:  - Continue Phenergan as needed, patient has had an EGD in the past which showed some esophagitis, she does not want any further interventions done for her nausea    Orders:  -     promethazine (PHENERGAN) 25 MG tablet; Take 1 tablet by mouth Every 6 (Six) Hours As Needed for Nausea or Vomiting.  Dispense: 30 tablet; Refill: 0    9. Allergic rhinitis, unspecified seasonality, unspecified trigger  Assessment & Plan:  - Controlled  - Continue Xyzal 5 mg daily and Singulair 10 mg daily    Orders:  -     levocetirizine (Xyzal) 5 MG tablet; Take 1 tablet by mouth Every Evening.  Dispense: 90 tablet; Refill: 1    10. Gastroesophageal reflux disease, unspecified whether esophagitis present  Assessment & Plan:  - Controlled  - Continue Prilosec 40 mg daily  -Obtaining B12 and magnesium levels    Orders:  -     omeprazole (priLOSEC) 40 MG capsule; Take 1 capsule by mouth Daily.  Dispense: 90 capsule; Refill: 1         Follow Up  Return in about 2 weeks (around 9/20/2023) for Annual, Medicare Wellness.    Betty Jasmine MD

## 2023-09-12 LAB — DRUGS UR: NORMAL

## 2023-09-25 DIAGNOSIS — J30.1 SEASONAL ALLERGIC RHINITIS DUE TO POLLEN: ICD-10-CM

## 2023-09-25 RX ORDER — MONTELUKAST SODIUM 10 MG/1
10 TABLET ORAL NIGHTLY
Qty: 90 TABLET | Refills: 1 | Status: SHIPPED | OUTPATIENT
Start: 2023-09-25

## 2023-09-26 ENCOUNTER — TELEPHONE (OUTPATIENT)
Dept: PHYSICAL THERAPY | Facility: CLINIC | Age: 63
End: 2023-09-26

## 2023-10-03 DIAGNOSIS — M51.9 LUMBAR DISC DISEASE: ICD-10-CM

## 2023-10-03 DIAGNOSIS — M25.552 LEFT HIP PAIN: ICD-10-CM

## 2023-10-03 RX ORDER — CELECOXIB 200 MG/1
CAPSULE ORAL
Qty: 60 CAPSULE | Refills: 0 | Status: SHIPPED | OUTPATIENT
Start: 2023-10-03

## 2023-10-03 NOTE — TELEPHONE ENCOUNTER
Rx Refill Note  Requested Prescriptions     Pending Prescriptions Disp Refills    celecoxib (CeleBREX) 200 MG capsule [Pharmacy Med Name: CELECOXIB 200 MG CAPSULE] 60 capsule 0     Sig: TAKE 1 CAPSULE BY MOUTH TWICE A DAY      Last office visit with prescribing clinician: 9/6/2023   Last telemedicine visit with prescribing clinician: Visit date not found   Next office visit with prescribing clinician: 10/5/2023                         Would you like a call back once the refill request has been completed: [] Yes [] No    If the office needs to give you a call back, can they leave a voicemail: [] Yes [] No    Lexie Olivo LPN  10/03/23, 08:35 EDT

## 2023-10-05 ENCOUNTER — OFFICE VISIT (OUTPATIENT)
Dept: FAMILY MEDICINE CLINIC | Facility: CLINIC | Age: 63
End: 2023-10-05
Payer: MEDICARE

## 2023-10-05 ENCOUNTER — LAB (OUTPATIENT)
Dept: LAB | Facility: HOSPITAL | Age: 63
End: 2023-10-05
Payer: MEDICARE

## 2023-10-05 VITALS
OXYGEN SATURATION: 96 % | TEMPERATURE: 98.6 F | HEART RATE: 98 BPM | WEIGHT: 159.2 LBS | BODY MASS INDEX: 31.25 KG/M2 | DIASTOLIC BLOOD PRESSURE: 102 MMHG | SYSTOLIC BLOOD PRESSURE: 160 MMHG | HEIGHT: 60 IN

## 2023-10-05 DIAGNOSIS — R53.83 FATIGUE, UNSPECIFIED TYPE: ICD-10-CM

## 2023-10-05 DIAGNOSIS — R06.00 DYSPNEA, UNSPECIFIED TYPE: ICD-10-CM

## 2023-10-05 DIAGNOSIS — E03.9 ACQUIRED HYPOTHYROIDISM: ICD-10-CM

## 2023-10-05 DIAGNOSIS — G47.62 NOCTURNAL LEG CRAMPS: ICD-10-CM

## 2023-10-05 DIAGNOSIS — I10 HYPERTENSION, UNSPECIFIED TYPE: ICD-10-CM

## 2023-10-05 DIAGNOSIS — F41.9 ANXIETY AND DEPRESSION: ICD-10-CM

## 2023-10-05 DIAGNOSIS — K59.00 CONSTIPATION, UNSPECIFIED CONSTIPATION TYPE: ICD-10-CM

## 2023-10-05 DIAGNOSIS — Z00.00 MEDICARE ANNUAL WELLNESS VISIT, SUBSEQUENT: Primary | ICD-10-CM

## 2023-10-05 DIAGNOSIS — F17.200 TOBACCO DEPENDENCE: ICD-10-CM

## 2023-10-05 DIAGNOSIS — T14.8XXA BRUISING: ICD-10-CM

## 2023-10-05 DIAGNOSIS — F32.A ANXIETY AND DEPRESSION: ICD-10-CM

## 2023-10-05 DIAGNOSIS — R10.84 ABDOMINAL PAIN, DIFFUSE: ICD-10-CM

## 2023-10-05 DIAGNOSIS — R11.0 NAUSEA: ICD-10-CM

## 2023-10-05 LAB
BASOPHILS # BLD AUTO: 0.03 10*3/MM3 (ref 0–0.2)
BASOPHILS NFR BLD AUTO: 0.4 % (ref 0–1.5)
DEPRECATED RDW RBC AUTO: 43.5 FL (ref 37–54)
EOSINOPHIL # BLD AUTO: 0.23 10*3/MM3 (ref 0–0.4)
EOSINOPHIL NFR BLD AUTO: 2.8 % (ref 0.3–6.2)
ERYTHROCYTE [DISTWIDTH] IN BLOOD BY AUTOMATED COUNT: 12.9 % (ref 12.3–15.4)
HCT VFR BLD AUTO: 36.6 % (ref 34–46.6)
HGB BLD-MCNC: 12.5 G/DL (ref 12–15.9)
IMM GRANULOCYTES # BLD AUTO: 0.03 10*3/MM3 (ref 0–0.05)
IMM GRANULOCYTES NFR BLD AUTO: 0.4 % (ref 0–0.5)
INR PPP: 0.92 (ref 0.89–1.12)
LYMPHOCYTES # BLD AUTO: 2.36 10*3/MM3 (ref 0.7–3.1)
LYMPHOCYTES NFR BLD AUTO: 28.3 % (ref 19.6–45.3)
MCH RBC QN AUTO: 31.6 PG (ref 26.6–33)
MCHC RBC AUTO-ENTMCNC: 34.2 G/DL (ref 31.5–35.7)
MCV RBC AUTO: 92.7 FL (ref 79–97)
MONOCYTES # BLD AUTO: 0.71 10*3/MM3 (ref 0.1–0.9)
MONOCYTES NFR BLD AUTO: 8.5 % (ref 5–12)
NEUTROPHILS NFR BLD AUTO: 4.97 10*3/MM3 (ref 1.7–7)
NEUTROPHILS NFR BLD AUTO: 59.6 % (ref 42.7–76)
NRBC BLD AUTO-RTO: 0 /100 WBC (ref 0–0.2)
PLATELET # BLD AUTO: 380 10*3/MM3 (ref 140–450)
PMV BLD AUTO: 9.2 FL (ref 6–12)
PROTHROMBIN TIME: 12.5 SECONDS (ref 12.2–14.5)
RBC # BLD AUTO: 3.95 10*6/MM3 (ref 3.77–5.28)
TSH SERPL DL<=0.05 MIU/L-ACNC: 1.68 UIU/ML (ref 0.27–4.2)
WBC NRBC COR # BLD: 8.33 10*3/MM3 (ref 3.4–10.8)

## 2023-10-05 PROCEDURE — 82746 ASSAY OF FOLIC ACID SERUM: CPT

## 2023-10-05 PROCEDURE — 82607 VITAMIN B-12: CPT

## 2023-10-05 PROCEDURE — 85610 PROTHROMBIN TIME: CPT

## 2023-10-05 PROCEDURE — 83735 ASSAY OF MAGNESIUM: CPT

## 2023-10-05 PROCEDURE — 80053 COMPREHEN METABOLIC PANEL: CPT

## 2023-10-05 PROCEDURE — 36415 COLL VENOUS BLD VENIPUNCTURE: CPT

## 2023-10-05 PROCEDURE — 85025 COMPLETE CBC W/AUTO DIFF WBC: CPT

## 2023-10-05 PROCEDURE — 84443 ASSAY THYROID STIM HORMONE: CPT

## 2023-10-05 RX ORDER — LISINOPRIL 10 MG/1
10 TABLET ORAL DAILY
Qty: 30 TABLET | Refills: 0 | Status: SHIPPED | OUTPATIENT
Start: 2023-10-05

## 2023-10-05 RX ORDER — BUPROPION HYDROCHLORIDE 150 MG/1
150 TABLET, EXTENDED RELEASE ORAL 2 TIMES DAILY
Qty: 60 TABLET | Refills: 0 | Status: SHIPPED | OUTPATIENT
Start: 2023-10-05

## 2023-10-05 NOTE — ASSESSMENT & PLAN NOTE
- Patient has been short of breath recently, regularly on exertion but denies any chest pain  - She did restart smoking, so this could be why she is more short of breath  - She is using her Breztri and albuterol inhaler very often  - She is following with pulmonary and I did recommend that she reach out to them to see if her inhaler might need to be changed  - I also recommended getting checked out by cardiology given the dyspnea on exertion but she declines cardiology referral at this time

## 2023-10-05 NOTE — ASSESSMENT & PLAN NOTE
- Patient reports that she has a lot of fatigue despite getting 8 to 9 hours of sleep  - She does snore at night and does have elevated blood pressure  - I recommended a sleep study but patient declines

## 2023-10-05 NOTE — ASSESSMENT & PLAN NOTE
- Blood pressure is uncontrolled for the second time today, patient thinks is due to pain but it is very uncontrolled so decided to go ahead and start lisinopril 10 mg daily  - We will have patient follow-up in 2 weeks for reevaluation  - Advised patient that if blood pressure is not decreasing, to follow-up with us sooner

## 2023-10-05 NOTE — PROGRESS NOTES
"The ABCs of the Annual Wellness Visit  Welcome to Medicare Visit    Subjective     Yessica Galvin is a 62 y.o. female who presents for a  Welcome to Medicare Visit.    Dyspnea: Patient has been out of breath a lot when she walks.  She denies chest pain.  She is currently on Breztri daily inhaler and uses albuterol very often.  She does follow with pulmonary.  She does have an appointment with them in the summer.  Denies any congestion or cough.    Anxiety and depression: Patient reports that her symptoms are uncontrolled.  She is on venlafaxine 225 mg daily and has not noticed much improvement in her symptoms on this higher dose.  She is willing to try Wellbutrin to see if that will help with her symptoms.  She did start smoking approximately 2 weeks ago and thinks that the Wellbutrin might help.  She has no active SI.  PHQ-9 of 18 and JAVIER-7 of 20 today.    Hypertension: Patient has had 2 very elevated blood pressure readings today and has had an elevated blood pressure reading at her last visit.  She denies any chest pain, but does report intermittent headaches.  She is okay with starting a blood pressure medication.    Fatigue: Patient reports that she feels tired all the time.  She gets 8 to 9 hours of sleep with 1 nighttime awakening.  She denies any apneic events but does snore at night.  She is not interested in a sleep referral at this time.    Constipation: She has been using MiraLAX and docusate without improvement in symptoms.  Her last bowel movement was 2 days ago and it was soft.    Nausea: Patient is persistently nauseous and reports that she has felt this way for \"a long time.\"  She takes Phenergan daily.  She is okay with a CT scan and GI referral.      The following portions of the patient's history were reviewed and   updated as appropriate: allergies, current medications, past family history, past medical history, past social history, past surgical history, and problem list.     Compared to " one year ago, the patient feels her physical   health is worse.    Compared to one year ago, the patient feels her mental   health is worse.    Recent Hospitalizations:  She was not admitted to the hospital during the last year.       Current Medical Providers:  Patient Care Team:  Betty Jasmine MD as PCP - General (Family Medicine)  Atul Tavarez MD as Consulting Physician (Gastroenterology)  Bobby Tom MD as Consulting Physician (Pulmonary Disease)  Bobby Tom MD as Consulting Physician (Pulmonary Disease)    Outpatient Medications Prior to Visit   Medication Sig Dispense Refill    acetaminophen (TYLENOL) 325 MG tablet Take 2 tablets by mouth Every 4 (Four) Hours As Needed for Mild Pain .      albuterol sulfate  (90 Base) MCG/ACT inhaler Inhale 2 puffs Every 6 (Six) Hours As Needed for Wheezing. 54 g 3    Budeson-Glycopyrrol-Formoterol (Breztri Aerosphere) 160-9-4.8 MCG/ACT aerosol inhaler Inhale 2 puffs 2 (Two) Times a Day. 3 each 3    celecoxib (CeleBREX) 200 MG capsule TAKE 1 CAPSULE BY MOUTH TWICE A DAY 60 capsule 0    cyclobenzaprine (FLEXERIL) 5 MG tablet Take 1 tablet by mouth At Night As Needed for Muscle Spasms. 30 tablet 0    docusate sodium (Colace) 100 MG capsule Take 1 capsule by mouth 2 (Two) Times a Day. 60 capsule 0    gabapentin (NEURONTIN) 600 MG tablet Take 1 tablet by mouth 3 (Three) Times a Day. 90 tablet 0    levocetirizine (Xyzal) 5 MG tablet Take 1 tablet by mouth Every Evening. 90 tablet 1    levothyroxine (SYNTHROID, LEVOTHROID) 137 MCG tablet Take 1 tablet by mouth Daily. 30 tablet 0    montelukast (SINGULAIR) 10 MG tablet Take 1 tablet by mouth Every Night. 90 tablet 1    mupirocin (BACTROBAN) 2 % ointment Apply 1 application  topically to the appropriate area as directed 3 (Three) Times a Day. For 7 days 30 g 0    omeprazole (priLOSEC) 40 MG capsule Take 1 capsule by mouth Daily. 90 capsule 1    polyethylene glycol (MIRALAX) 17 g packet Take 17 g by  mouth Daily. 30 each 0    pramipexole (MIRAPEX) 0.5 MG tablet Take 1 tablet by mouth every night at bedtime. 30 tablet 0    promethazine (PHENERGAN) 25 MG tablet Take 1 tablet by mouth Every 6 (Six) Hours As Needed for Nausea or Vomiting. 30 tablet 0    traMADol (ULTRAM) 50 MG tablet Take 2 tablets by mouth Every 8 (Eight) Hours As Needed for Moderate Pain. 150 tablet 0    triamcinolone (KENALOG) 0.1 % ointment Apply 1 application  topically to the appropriate area as directed 2 (Two) Times a Day. For 7-14 days 80 g 0    venlafaxine 225 MG tablet sustained-release 24 hour 24 hr tablet Take 1 tablet by mouth Daily With Breakfast. 30 tablet 2     No facility-administered medications prior to visit.       Opioid medication/s are on active medication list.  and I have evaluated her active treatment plan and pain score trends (see table).  Vitals:    10/05/23 1335   PainSc:   2     I have reviewed the chart for potential of high risk medication and harmful drug interactions in the elderly.          Aspirin is not on active medication list.  Aspirin use is not indicated based on review of current medical condition/s. Risk of harm outweighs potential benefits.  .    Patient Active Problem List   Diagnosis    Asthmatic bronchitis    Generalized anxiety disorder    Gout    Headache    Acquired hypothyroidism    Chronic midline low back pain without sciatica    Cervical disc disorder with radiculopathy    Lumbar disc disease    Left radial fracture    Asthma    Tobacco abuse    Elevated transaminase level    Chronic back pain    Closed fracture of right tibial plateau    Chronic bronchitis with acute exacerbation    Centrilobular emphysema    History of tobacco use, presenting hazards to health    Nodule of upper lobe of right lung    Nocturnal leg cramps    Left hip pain    Constipation    Skin lesions    Gastroesophageal reflux disease with esophagitis without hemorrhage    Bruising    Therapeutic drug monitoring    Nausea  "   Allergic rhinitis    Gastroesophageal reflux disease     Advance Care Planning   Advance Care Planning     Advance Directive is not on file.  ACP discussion was held with the patient during this visit. Patient does not have an advance directive, information provided.       Objective   Vitals:    10/05/23 1335   BP: (!) 160/102   Pulse: 98   Temp: 98.6 °F (37 °C)   TempSrc: Infrared   SpO2: 96%   Weight: 72.2 kg (159 lb 3.2 oz)   Height: 152.4 cm (60\")   PainSc:   2     Estimated body mass index is 31.09 kg/m² as calculated from the following:    Height as of this encounter: 152.4 cm (60\").    Weight as of this encounter: 72.2 kg (159 lb 3.2 oz).           Does the patient have evidence of cognitive impairment?   No              HEALTH RISK ASSESSMENT    Smoking Status:  Social History     Tobacco Use   Smoking Status Some Days    Packs/day: 0.25    Years: 15.00    Pack years: 3.75    Types: Cigarettes    Start date: 3/7/2000    Last attempt to quit: 2023    Years since quittin.1   Smokeless Tobacco Never   Tobacco Comments    I started when i was 17     Alcohol Consumption:  Social History     Substance and Sexual Activity   Alcohol Use No       Fall Risk Screen:    BRIDGETTE Fall Risk Assessment was completed, and patient is at LOW risk for falls.Assessment completed on:10/5/2023    Depression Screen:       10/5/2023     1:37 PM   PHQ-2/PHQ-9 Depression Screening   Little Interest or Pleasure in Doing Things 0-->not at all   Feeling Down, Depressed or Hopeless 0-->not at all   PHQ-9: Brief Depression Severity Measure Score 0       Health Habits and Functional and Cognitive Screening:      10/5/2023     1:36 PM   Functional & Cognitive Status   Do you have difficulty preparing food and eating? No   Do you have difficulty bathing yourself, getting dressed or grooming yourself? No   Do you have difficulty using the toilet? No   Do you have difficulty moving around from place to place? No   Do you have trouble " with steps or getting out of a bed or a chair? No   Current Diet Well Balanced Diet   Dental Exam Up to date   Eye Exam Up to date   Exercise (times per week) 0 times per week   Current Exercises Include No Regular Exercise   Do you need help using the phone?  No   Are you deaf or do you have serious difficulty hearing?  No   Do you need help to go to places out of walking distance? No   Do you need help shopping? No   Do you need help preparing meals?  No   Do you need help with housework?  No   Do you need help with laundry? No   Do you need help taking your medications? No   Do you need help managing money? No   Do you ever drive or ride in a car without wearing a seat belt? No   Have you felt unusual stress, anger or loneliness in the last month? Yes   Who do you live with? Spouse   If you need help, do you have trouble finding someone available to you? No   Have you been bothered in the last four weeks by sexual problems? No   Do you have difficulty concentrating, remembering or making decisions? No       Visual Acuity:    No results found.    Age-appropriate Screening Schedule:  Refer to the list below for future screening recommendations based on patient's age, sex and/or medical conditions. Orders for these recommended tests are listed in the plan section. The patient has been provided with a written plan.    Health Maintenance   Topic Date Due    ANNUAL WELLNESS VISIT  08/30/2023    COVID-19 Vaccine (5 - 2023-24 season) 12/25/2023 (Originally 9/1/2023)    BMI FOLLOWUP  08/23/2024    MAMMOGRAM  01/03/2025    TDAP/TD VACCINES (3 - Td or Tdap) 04/30/2028    COLORECTAL CANCER SCREENING  06/21/2028    HEPATITIS C SCREENING  Completed    INFLUENZA VACCINE  Completed    ZOSTER VACCINE  Completed    Pneumococcal Vaccine 0-64  Discontinued    PAP SMEAR  Discontinued        CMS Preventative Services Quick Reference  Risk Factors Identified During Encounter    Depression/Dysphoria: Prescribed new antidepressant  medication treatment. Follow up visit planned.  Tobacco Use/Dependance Risk Yessica Galvin  reports that she has been smoking cigarettes. She started smoking about 23 years ago. She has a 3.75 pack-year smoking history. She has never used smokeless tobacco.. I have educated her on the risk of diseases from using tobacco products such as cancer, COPD, and heart disease.     I advised her to quit and she is willing to quit. We have discussed the following method/s for tobacco cessation:  Counseling Prescription Medicaiton.  She will follow up with me in 2 weeks or sooner to check on her progress.    I spent 5 minutes counseling the patient.         The above risks/problems have been discussed with the patient.  Pertinent information has been shared with the patient in the After Visit Summary.    Follow Up:   Initial Medicare Visit in one year    An After Visit Summary and PPPS were made available to the patient.      Additional E&M Note during same encounter follows:  Patient has multiple medical problems which are significant and separately identifiable that require additional work above and beyond the Medicare Wellness Visit.      Chief Complaint  Medicare Wellness-subsequent    Subjective        HPI  Yessica Galvin is also being seen today for Medicare wellness visit.      Dyspnea: Patient has been out of breath a lot when she walks.  She denies chest pain.  She is currently on breztri daily inhaler and uses albuterol very often.  She does follow with pulmonary.  She does have an appointment with them in the summer.  Denies any congestion or cough.    Anxiety and depression: Patient reports that her symptoms are uncontrolled.  She is on venlafaxine 225 mg daily and has not noticed much improvement in her symptoms on this higher dose.  She is willing to try Wellbutrin to see if that will help with her symptoms.  She did start smoking approximately 2 weeks ago and thinks that the Wellbutrin might help.  She  "has no active SI.  PHQ-9 of 18 and JAVIER-7 of 20 today.    Hypertension: Patient has had 2 very elevated blood pressure readings today and has had an elevated blood pressure reading at her last visit.  She denies any chest pain, but does report intermittent headaches.  She is okay with starting a blood pressure medication.    Fatigue: Patient reports that she feels tired all the time.  She gets 8 to 9 hours of sleep with 1 nighttime awakening.  She denies any apneic events but does snore at night.  She is not interested in a sleep referral at this time.    Constipation: She has been using MiraLAX and docusate without improvement in symptoms.  Her last bowel movement was 2 days ago and it was soft.    Nausea: Patient is persistently nauseous and reports that she has felt this way for \"a long time.\"  She takes Phenergan daily.  She is okay with a CT scan and GI referral.         Objective   Vital Signs:  BP (!) 160/102   Pulse 98   Temp 98.6 °F (37 °C) (Infrared)   Ht 152.4 cm (60\")   Wt 72.2 kg (159 lb 3.2 oz)   SpO2 96%   BMI 31.09 kg/m²     Physical Exam  Vitals reviewed.   Constitutional:       Appearance: Normal appearance.   HENT:      Head: Normocephalic and atraumatic.      Right Ear: Tympanic membrane normal.      Left Ear: Tympanic membrane normal.      Nose: Nose normal.      Mouth/Throat:      Mouth: Mucous membranes are moist.   Eyes:      Conjunctiva/sclera: Conjunctivae normal.   Cardiovascular:      Rate and Rhythm: Normal rate and regular rhythm.   Pulmonary:      Effort: Pulmonary effort is normal.      Breath sounds: Normal breath sounds.   Abdominal:      General: Abdomen is flat.      Palpations: Abdomen is soft.      Tenderness: There is abdominal tenderness.      Comments: Diffuse tenderness to palpation of entire abdomen   Musculoskeletal:      Comments: Significant kyphosis of spine   Skin:     General: Skin is warm.   Neurological:      Mental Status: She is alert.           Assessment " and Plan   Diagnoses and all orders for this visit:    1. Medicare annual wellness visit, subsequent (Primary)  Assessment & Plan:  - Patient does have risk factors of uncontrolled anxiety and depression as well as tobacco dependence  - We are adding Wellbutrin to her regimen to help with the depression and potentially with her tobacco dependence  - She is up-to-date on all recommended vaccines  - She is up-to-date on her colorectal cancer screening and up-to-date on mammogram  -I advised patient to stop by the lab to get the labs that I ordered from her previous visit      2. Dyspnea, unspecified type  Assessment & Plan:  - Patient has been short of breath recently, regularly on exertion but denies any chest pain  - She did restart smoking, so this could be why she is more short of breath  - She is using her Breztri and albuterol inhaler very often  - She is following with pulmonary and I did recommend that she reach out to them to see if her inhaler might need to be changed  - I also recommended getting checked out by cardiology given the dyspnea on exertion but she declines cardiology referral at this time      3. Anxiety and depression  Assessment & Plan:   - Uncontrolled  - PHQ-9 of 18 and JAVIER-7 of 20 today  - Continue venlafaxine to 25 mg daily  -We will start Wellbutrin 150 mg twice daily    Orders:  -     buPROPion SR (Wellbutrin SR) 150 MG 12 hr tablet; Take 1 tablet by mouth 2 (Two) Times a Day. Take once daily for the first 3 day and then twice daily therafter  Dispense: 60 tablet; Refill: 0    4. Hypertension, unspecified type  Assessment & Plan:   - Blood pressure is uncontrolled for the second time today, patient thinks is due to pain but it is very uncontrolled so decided to go ahead and start lisinopril 10 mg daily  - We will have patient follow-up in 2 weeks for reevaluation  - Advised patient that if blood pressure is not decreasing, to follow-up with us sooner    Orders:  -     lisinopril  (PRINIVIL,ZESTRIL) 10 MG tablet; Take 1 tablet by mouth Daily.  Dispense: 30 tablet; Refill: 0    5. Fatigue, unspecified type  Assessment & Plan:  - Patient reports that she has a lot of fatigue despite getting 8 to 9 hours of sleep  - She does snore at night and does have elevated blood pressure  - I recommended a sleep study but patient declines      6. Constipation, unspecified constipation type  Assessment & Plan:  - Patient reports constipation but she said that she had a soft bowel movement 2 days ago, it goes irregularly  - She does not think that the MiraLAX or Colace has helped with her constipation and will continue working on diet and fluid intake      7. Nausea  Assessment & Plan:  - Patient is persistently nauseous and reports that she was worked up for this in the past and nothing was revealed  - She feels that her current dose of acid reducing medicine is not enough  - She is currently on Phenergan and takes it regularly  - Referral placed to GI for further evaluation and management    Orders:  -     CT Abdomen Pelvis With & Without Contrast; Future    8. Tobacco dependence  Assessment & Plan:   - Patient will be due for a CT lung cancer screen in November, will order this at next appointment  - We will start Wellbutrin 150 mg twice daily to help with smoking cessation    Orders:  -     buPROPion SR (Wellbutrin SR) 150 MG 12 hr tablet; Take 1 tablet by mouth 2 (Two) Times a Day. Take once daily for the first 3 day and then twice daily therafter  Dispense: 60 tablet; Refill: 0    9. Abdominal pain, diffuse  Assessment & Plan:  - Patient with diffuse tenderness to palpation, I kept her very gently and she reports a lot of pain  - We will obtain CT abdomen pelvis for further evaluation, etiology of pain is unclear, could be secondary to liver dysfunction, gallbladder dysfunction, etc.     Orders:  -     CT Abdomen Pelvis With & Without Contrast; Future      *Discussed all other medications and  everything is stable from most recent visit         Follow Up   Return in about 2 weeks (around 10/19/2023) for Follow up =depression and HTN.,  Need to order CT lung cancer screening  Patient was given instructions and counseling regarding her condition or for health maintenance advice. Please see specific information pulled into the AVS if appropriate.

## 2023-10-05 NOTE — ASSESSMENT & PLAN NOTE
- Patient is persistently nauseous and reports that she was worked up for this in the past and nothing was revealed  - She feels that her current dose of acid reducing medicine is not enough  - She is currently on Phenergan and takes it regularly  - Referral placed to GI for further evaluation and management   No

## 2023-10-05 NOTE — ASSESSMENT & PLAN NOTE
- Patient with diffuse tenderness to palpation, I kept her very gently and she reports a lot of pain  - We will obtain CT abdomen pelvis for further evaluation, etiology of pain is unclear, could be secondary to liver dysfunction, gallbladder dysfunction, etc.

## 2023-10-05 NOTE — PATIENT INSTRUCTIONS
Health Maintenance, Female  Adopting a healthy lifestyle and getting preventive care can go a long way to promote health and wellness. Talk with your health care provider about what schedule of regular examinations is right for you. This is a good chance for you to check in with your provider about disease prevention and staying healthy.  In between checkups, there are plenty of things you can do on your own. Experts have done a lot of research about which lifestyle changes and preventive measures are most likely to keep you healthy. Ask your health care provider for more information.  Weight and diet  Eat a healthy diet  Be sure to include plenty of vegetables, fruits, low-fat dairy products, and lean protein.  Do not eat a lot of foods high in solid fats, added sugars, or salt.  Get regular exercise. This is one of the most important things you can do for your health.  Most adults should exercise for at least 150 minutes each week. The exercise should increase your heart rate and make you sweat (moderate-intensity exercise).  Most adults should also do strengthening exercises at least twice a week. This is in addition to the moderate-intensity exercise.     Maintain a healthy weight  Body mass index (BMI) is a measurement that can be used to identify possible weight problems. It estimates body fat based on height and weight. Your health care provider can help determine your BMI and help you achieve or maintain a healthy weight.  For females 20 years of age and older:  A BMI below 18.5 is considered underweight.  A BMI of 18.5 to 24.9 is normal.  A BMI of 25 to 29.9 is considered overweight.  A BMI of 30 and above is considered obese.     Watch levels of cholesterol and blood lipids  You should start having your blood tested for lipids and cholesterol at 20 years of age, then have this test every 5 years.  You may need to have your cholesterol levels checked more often if:  Your lipid or cholesterol levels are  high.  You are older than 50 years of age.  You are at high risk for heart disease.     Cancer screening  Lung Cancer  Lung cancer screening is recommended for adults 55-80 years old who are at high risk for lung cancer because of a history of smoking.  A yearly low-dose CT scan of the lungs is recommended for people who:  Currently smoke.  Have quit within the past 15 years.  Have at least a 30-pack-year history of smoking. A pack year is smoking an average of one pack of cigarettes a day for 1 year.  Yearly screening should continue until it has been 15 years since you quit.  Yearly screening should stop if you develop a health problem that would prevent you from having lung cancer treatment.     Breast Cancer  Practice breast self-awareness. This means understanding how your breasts normally appear and feel.  It also means doing regular breast self-exams. Let your health care provider know about any changes, no matter how small.  If you are in your 20s or 30s, you should have a clinical breast exam (CBE) by a health care provider every 1-3 years as part of a regular health exam.  If you are 40 or older, have a CBE every year. Also consider having a breast X-ray (mammogram) every year.  If you have a family history of breast cancer, talk to your health care provider about genetic screening.  If you are at high risk for breast cancer, talk to your health care provider about having an MRI and a mammogram every year.  Breast cancer gene (BRCA) assessment is recommended for women who have family members with BRCA-related cancers. BRCA-related cancers include:  Breast.  Ovarian.  Tubal.  Peritoneal cancers.  Results of the assessment will determine the need for genetic counseling and BRCA1 and BRCA2 testing.     Cervical Cancer  Your health care provider may recommend that you be screened regularly for cancer of the pelvic organs (ovaries, uterus, and vagina). This screening involves a pelvic examination, including  checking for microscopic changes to the surface of your cervix (Pap test). You may be encouraged to have this screening done every 3 years, beginning at age 21.  For women ages 30-65, health care providers may recommend pelvic exams and Pap testing every 3 years, or they may recommend the Pap and pelvic exam, combined with testing for human papilloma virus (HPV), every 5 years. Some types of HPV increase your risk of cervical cancer. Testing for HPV may also be done on women of any age with unclear Pap test results.  Other health care providers may not recommend any screening for nonpregnant women who are considered low risk for pelvic cancer and who do not have symptoms. Ask your health care provider if a screening pelvic exam is right for you.  If you have had past treatment for cervical cancer or a condition that could lead to cancer, you need Pap tests and screening for cancer for at least 20 years after your treatment. If Pap tests have been discontinued, your risk factors (such as having a new sexual partner) need to be reassessed to determine if screening should resume. Some women have medical problems that increase the chance of getting cervical cancer. In these cases, your health care provider may recommend more frequent screening and Pap tests.     Colorectal Cancer  This type of cancer can be detected and often prevented.  Routine colorectal cancer screening usually begins at 50 years of age and continues through 75 years of age.  Your health care provider may recommend screening at an earlier age if you have risk factors for colon cancer.  Your health care provider may also recommend using home test kits to check for hidden blood in the stool.  A small camera at the end of a tube can be used to examine your colon directly (sigmoidoscopy or colonoscopy). This is done to check for the earliest forms of colorectal cancer.  Routine screening usually begins at age 50.  Direct examination of the colon should  be repeated every 5-10 years through 75 years of age. However, you may need to be screened more often if early forms of precancerous polyps or small growths are found.     Skin Cancer  Check your skin from head to toe regularly.  Tell your health care provider about any new moles or changes in moles, especially if there is a change in a mole's shape or color.  Also tell your health care provider if you have a mole that is larger than the size of a pencil eraser.  Always use sunscreen. Apply sunscreen liberally and repeatedly throughout the day.  Protect yourself by wearing long sleeves, pants, a wide-brimmed hat, and sunglasses whenever you are outside.     Heart disease, diabetes, and high blood pressure  High blood pressure causes heart disease and increases the risk of stroke. High blood pressure is more likely to develop in:  People who have blood pressure in the high end of the normal range (130-139/85-89 mm Hg).  People who are overweight or obese.  People who are .  If you are 18-39 years of age, have your blood pressure checked every 3-5 years. If you are 40 years of age or older, have your blood pressure checked every year. You should have your blood pressure measured twice--once when you are at a hospital or clinic, and once when you are not at a hospital or clinic. Record the average of the two measurements. To check your blood pressure when you are not at a hospital or clinic, you can use:  An automated blood pressure machine at a pharmacy.  A home blood pressure monitor.  If you are between 55 years and 79 years old, ask your health care provider if you should take aspirin to prevent strokes.  Have regular diabetes screenings. This involves taking a blood sample to check your fasting blood sugar level.  If you are at a normal weight and have a low risk for diabetes, have this test once every three years after 45 years of age.  If you are overweight and have a high risk for diabetes,  consider being tested at a younger age or more often.  Preventing infection  Hepatitis B  If you have a higher risk for hepatitis B, you should be screened for this virus. You are considered at high risk for hepatitis B if:  You were born in a country where hepatitis B is common. Ask your health care provider which countries are considered high risk.  Your parents were born in a high-risk country, and you have not been immunized against hepatitis B (hepatitis B vaccine).  You have HIV or AIDS.  You use needles to inject street drugs.  You live with someone who has hepatitis B.  You have had sex with someone who has hepatitis B.  You get hemodialysis treatment.  You take certain medicines for conditions, including cancer, organ transplantation, and autoimmune conditions.     Hepatitis C  Blood testing is recommended for:  Everyone born from 1945 through 1965.  Anyone with known risk factors for hepatitis C.     Sexually transmitted infections (STIs)  You should be screened for sexually transmitted infections (STIs) including gonorrhea and chlamydia if:  You are sexually active and are younger than 24 years of age.  You are older than 24 years of age and your health care provider tells you that you are at risk for this type of infection.  Your sexual activity has changed since you were last screened and you are at an increased risk for chlamydia or gonorrhea. Ask your health care provider if you are at risk.  If you do not have HIV, but are at risk, it may be recommended that you take a prescription medicine daily to prevent HIV infection. This is called pre-exposure prophylaxis (PrEP). You are considered at risk if:  You are sexually active and do not regularly use condoms or know the HIV status of your partner(s).  You take drugs by injection.  You are sexually active with a partner who has HIV.     Talk with your health care provider about whether you are at high risk of being infected with HIV. If you choose to  begin PrEP, you should first be tested for HIV. You should then be tested every 3 months for as long as you are taking PrEP.  Pregnancy  If you are premenopausal and you may become pregnant, ask your health care provider about preconception counseling.  If you may become pregnant, take 400 to 800 micrograms (mcg) of folic acid every day.  If you want to prevent pregnancy, talk to your health care provider about birth control (contraception).  Osteoporosis and menopause  Osteoporosis is a disease in which the bones lose minerals and strength with aging. This can result in serious bone fractures. Your risk for osteoporosis can be identified using a bone density scan.  If you are 65 years of age or older, or if you are at risk for osteoporosis and fractures, ask your health care provider if you should be screened.  Ask your health care provider whether you should take a calcium or vitamin D supplement to lower your risk for osteoporosis.  Menopause may have certain physical symptoms and risks.  Hormone replacement therapy may reduce some of these symptoms and risks.  Talk to your health care provider about whether hormone replacement therapy is right for you.  Follow these instructions at home:  Schedule regular health, dental, and eye exams.  Stay current with your immunizations.  Do not use any tobacco products including cigarettes, chewing tobacco, or electronic cigarettes.  If you are pregnant, do not drink alcohol.  If you are breastfeeding, limit how much and how often you drink alcohol.  Limit alcohol intake to no more than 1 drink per day for nonpregnant women. One drink equals 12 ounces of beer, 5 ounces of wine, or 1½ ounces of hard liquor.  Do not use street drugs.  Do not share needles.  Ask your health care provider for help if you need support or information about quitting drugs.  Tell your health care provider if you often feel depressed.  Tell your health care provider if you have ever been abused or do  not feel safe at home.  This information is not intended to replace advice given to you by your health care provider. Make sure you discuss any questions you have with your health care provider.  Document Released: 07/02/2012 Document Revised: 05/25/2017 Document Reviewed: 09/20/2016  [employer] Interactive Patient Education © 2018 [employer]

## 2023-10-05 NOTE — ASSESSMENT & PLAN NOTE
- Patient does have risk factors of uncontrolled anxiety and depression as well as tobacco dependence  - We are adding Wellbutrin to her regimen to help with the depression and potentially with her tobacco dependence  - She is up-to-date on all recommended vaccines  - She is up-to-date on her colorectal cancer screening and up-to-date on mammogram  -I advised patient to stop by the lab to get the labs that I ordered from her previous visit

## 2023-10-05 NOTE — ASSESSMENT & PLAN NOTE
- Patient will be due for a CT lung cancer screen in November, will order this at next appointment  - We will start Wellbutrin 150 mg twice daily to help with smoking cessation

## 2023-10-05 NOTE — ASSESSMENT & PLAN NOTE
- Uncontrolled  - PHQ-9 of 18 and JAVIER-7 of 20 today  - Continue venlafaxine to 25 mg daily  -We will start Wellbutrin 150 mg twice daily

## 2023-10-05 NOTE — ASSESSMENT & PLAN NOTE
- Patient reports constipation but she said that she had a soft bowel movement 2 days ago, it goes irregularly  - She does not think that the MiraLAX or Colace has helped with her constipation and will continue working on diet and fluid intake

## 2023-10-06 ENCOUNTER — TELEPHONE (OUTPATIENT)
Dept: FAMILY MEDICINE CLINIC | Facility: CLINIC | Age: 63
End: 2023-10-06

## 2023-10-06 ENCOUNTER — OFFICE VISIT (OUTPATIENT)
Dept: NEUROSURGERY | Facility: CLINIC | Age: 63
End: 2023-10-06
Payer: MEDICARE

## 2023-10-06 VITALS — WEIGHT: 159.2 LBS | HEIGHT: 60 IN | TEMPERATURE: 97.3 F | BODY MASS INDEX: 31.25 KG/M2

## 2023-10-06 DIAGNOSIS — M48.062 LUMBAR STENOSIS WITH NEUROGENIC CLAUDICATION: Primary | ICD-10-CM

## 2023-10-06 LAB
ALBUMIN SERPL-MCNC: 4.3 G/DL (ref 3.5–5.2)
ALBUMIN/GLOB SERPL: 1.5 G/DL
ALP SERPL-CCNC: 81 U/L (ref 39–117)
ALT SERPL W P-5'-P-CCNC: 20 U/L (ref 1–33)
ANION GAP SERPL CALCULATED.3IONS-SCNC: 13.6 MMOL/L (ref 5–15)
AST SERPL-CCNC: 25 U/L (ref 1–32)
BILIRUB SERPL-MCNC: 0.3 MG/DL (ref 0–1.2)
BUN SERPL-MCNC: 14 MG/DL (ref 8–23)
BUN/CREAT SERPL: 22.2 (ref 7–25)
CALCIUM SPEC-SCNC: 9.4 MG/DL (ref 8.6–10.5)
CHLORIDE SERPL-SCNC: 98 MMOL/L (ref 98–107)
CO2 SERPL-SCNC: 24.4 MMOL/L (ref 22–29)
CREAT SERPL-MCNC: 0.63 MG/DL (ref 0.57–1)
EGFRCR SERPLBLD CKD-EPI 2021: 99.2 ML/MIN/1.73
FOLATE SERPL-MCNC: 8.88 NG/ML (ref 4.78–24.2)
GLOBULIN UR ELPH-MCNC: 2.8 GM/DL
GLUCOSE SERPL-MCNC: 60 MG/DL (ref 65–99)
MAGNESIUM SERPL-MCNC: 2.2 MG/DL (ref 1.6–2.4)
POTASSIUM SERPL-SCNC: 4 MMOL/L (ref 3.5–5.2)
PROT SERPL-MCNC: 7.1 G/DL (ref 6–8.5)
SODIUM SERPL-SCNC: 136 MMOL/L (ref 136–145)
VIT B12 BLD-MCNC: 645 PG/ML (ref 211–946)

## 2023-10-06 PROCEDURE — 99213 OFFICE O/P EST LOW 20 MIN: CPT | Performed by: NEUROLOGICAL SURGERY

## 2023-10-06 PROCEDURE — 1160F RVW MEDS BY RX/DR IN RCRD: CPT | Performed by: NEUROLOGICAL SURGERY

## 2023-10-06 PROCEDURE — 1159F MED LIST DOCD IN RCRD: CPT | Performed by: NEUROLOGICAL SURGERY

## 2023-10-06 NOTE — TELEPHONE ENCOUNTER
"Caller: YANETH    Relationship:     CALLER FROM [facility] FINANCIAL CLEARANCE    Best call back number:     [phone]     What orders are you requesting (i.e. lab or imaging):     CALLER REQUESTED AN ORDER FOR PATIENT TO COMPLETE:    STAT CTAB AND PELVIS PROCEDURE    TO TAKE PLACE TOMORROW, 10/7    CALLER STATED SINCE THIS IS A \"STAT\" ORDER, PATIENT WILL RECEIVE THE PROCEDURE ON TIME TOMORROW    CALLER REQUESTED THE ORDER BE FAXED AS SOON AS POSSIBLE TO:    [phone]  "

## 2023-10-06 NOTE — PROGRESS NOTES
"Patient: Yessica Galvin  : 1960    Primary Care Provider: Betty Jasmine MD    Requesting Provider: As above        History    Chief Complaint: Low back and bilateral lower extremity pain.    History of Present Illness: Ms. Galvin is a 62-year-old unemployed woman with a many year history of low back pain. Over the last several months that has become much more pronounced. Pain extends down into the side of her left leg to the ankle. She has some pain more proximally in the right lower extremity. Symptoms are worse with standing, walking, or lying on her left side. Sitting is better tolerated. She denies any bowel or bladder dysfunction. She smokes about 1 pack of tobacco per month and has a plan for quitting. She has a quit date established. She has not undergone formal treatment for her back difficulties. She does take diclofenac. She has significant COPD and is followed by pulmonology.  She is not on home oxygen.  She has not been able to get to physical therapy due to some scheduling issues.    Review of Systems    The patient's past medical history, past surgical history, family history, and social history have been reviewed at length in the electronic medical record.      Physical Exam:   Temp 97.3 °F (36.3 °C) (Temporal)   Ht 152.4 cm (60\")   Wt 72.2 kg (159 lb 3.2 oz)   BMI 31.09 kg/m²   Deferred    Medical Decision Making    Data Review:   (All imaging studies were personally reviewed unless stated otherwise)  MRI of the lumbar spine dated 2023 demonstrates diffuse degenerative disc disease.  There is some degenerative wedging of the upper endplate of L2.  High-grade spinal stenosis is noted at L2-3, L3-4, and L4-5.       Diagnosis:   1.  Lumbar stenosis with neurogenic claudication.  2.  Mechanical low back pain.  3.  Severe COPD.    Treatment Options:   The patient has therapy scheduled for about 10 days from now.  She will follow-up in our clinic in approximately 5-6 weeks.  If " she is better then we will continue in that vein.  If not then we will need to consider whether to pursue multilevel decompressive laminectomies or spinal injections.  Should she wish to undergo surgical intervention then formal pulmonary clearance will be necessary.          I, Dr. Elias, personally performed the services described in the documentation, as scribed in my presence, and it is both accurate and complete.

## 2023-10-07 DIAGNOSIS — M51.9 LUMBAR DISC DISEASE: ICD-10-CM

## 2023-10-07 RX ORDER — CYCLOBENZAPRINE HCL 5 MG
5 TABLET ORAL NIGHTLY PRN
Qty: 30 TABLET | Refills: 0 | Status: SHIPPED | OUTPATIENT
Start: 2023-10-07

## 2023-10-09 DIAGNOSIS — R11.0 NAUSEA: ICD-10-CM

## 2023-10-09 RX ORDER — PROMETHAZINE HYDROCHLORIDE 25 MG/1
25 TABLET ORAL EVERY 6 HOURS PRN
Qty: 30 TABLET | Refills: 0 | Status: SHIPPED | OUTPATIENT
Start: 2023-10-09

## 2023-10-09 NOTE — TELEPHONE ENCOUNTER
Financial clearance was obtaining the auth, but the patient called and cancelled the Saturday test.

## 2023-10-16 DIAGNOSIS — M51.9 LUMBAR DISC DISEASE: ICD-10-CM

## 2023-10-17 ENCOUNTER — TREATMENT (OUTPATIENT)
Dept: PHYSICAL THERAPY | Facility: CLINIC | Age: 63
End: 2023-10-17
Payer: MEDICARE

## 2023-10-17 DIAGNOSIS — M54.42 CHRONIC MIDLINE LOW BACK PAIN WITH LEFT-SIDED SCIATICA: Primary | ICD-10-CM

## 2023-10-17 DIAGNOSIS — G89.29 CHRONIC MIDLINE LOW BACK PAIN WITH LEFT-SIDED SCIATICA: Primary | ICD-10-CM

## 2023-10-17 PROCEDURE — 97110 THERAPEUTIC EXERCISES: CPT | Performed by: PHYSICAL THERAPIST

## 2023-10-17 PROCEDURE — 97162 PT EVAL MOD COMPLEX 30 MIN: CPT | Performed by: PHYSICAL THERAPIST

## 2023-10-17 RX ORDER — TRAMADOL HYDROCHLORIDE 50 MG/1
100 TABLET ORAL EVERY 8 HOURS PRN
Qty: 150 TABLET | Refills: 0 | Status: SHIPPED | OUTPATIENT
Start: 2023-10-17

## 2023-10-17 RX ORDER — GABAPENTIN 600 MG/1
600 TABLET ORAL 3 TIMES DAILY
Qty: 90 TABLET | Refills: 0 | Status: SHIPPED | OUTPATIENT
Start: 2023-10-17

## 2023-10-17 NOTE — PROGRESS NOTES
Physical Therapy Initial Evaluation and Plan of Care  [address]  [place] [address]    Patient: Yessica Galvin   : 1960  Diagnosis/ICD-10 Code:  No primary diagnosis found.  Referring practitioner: Ulises Elias MD  Date of Initial Visit: 10/17/2023  Today's Date: 10/17/2023  Patient seen for Visit count could not be calculated. Make sure you are using a visit which is associated with an episode. session         Visit Diagnoses:  No diagnosis found.      Subjective Questionnaire: Oswestry: 70%      Subjective Evaluation    History of Present Illness  Mechanism of injury: Pt is a 63 year old female presenting to the clinic with chronic low back pain.  She has had pain for years. In the last 5 months she has started getting pain down her left leg all the way to her foot. When she goes to stand she gets tingling down the leg. The more she is on her feet the more forward her trunk leans. She wears a back brace when she works in the yard. She reports not position is comfortable. Standing and walking are particularly painful. She takes tramadol and gabapentin for her back pain.       Patient Occupation: Disability Quality of life: excellent    Pain  Current pain ratin  At worst pain ratin  Quality: sharp, burning and needle-like  Relieving factors: medications  Aggravating factors: ambulation, repetitive movement, standing and lifting  Progression: worsening    Diagnostic Tests  X-ray: abnormal  MRI studies: abnormal    Patient Goals  Patient goals for therapy: increased strength, decreased pain, increased motion, independence with ADLs/IADLs and return to sport/leisure activities             Objective          Palpation   Left   Tenderness of the erector spinae, lumbar paraspinals and quadratus lumborum.     Tenderness     Left Hip   Tenderness in the PSIS.     Right Hip   Tenderness in the PSIS.     Neurological Testing     Sensation     Lumbar   Left   Intact:  light touch    Right   Diminished: light touch    Comments   Right light touch: L2, L4, L5    Active Range of Motion     Lumbar   Flexion: 80 degrees   Extension: 3 degrees with pain  Left lateral flexion: 10 degrees   Right lateral flexion: 5 degrees with pain    Strength/Myotome Testing     Left Hip   Planes of Motion   Flexion: 4  Abduction: 4-    Right Hip   Planes of Motion   Flexion: 4  Abduction: 4-    Left Knee   Flexion: 4  Extension: 4    Right Knee   Flexion: 4  Extension: 4    Left Ankle/Foot   Dorsiflexion: 4    Right Ankle/Foot   Dorsiflexion: 4    Tests       Thoracic   Positive slump.     Lumbar     Left   Positive passive SLR.     Right   Negative passive SLR.           Assessment & Plan       Assessment  Impairments: abnormal gait, abnormal muscle tone, abnormal or restricted ROM, activity intolerance, impaired physical strength, lacks appropriate home exercise program and pain with function   Functional limitations: lifting, sleeping, walking, uncomfortable because of pain, standing and unable to perform repetitive tasks   Assessment details: Pt is a 63 year old female presenting to the clinic with chronic low back pain a radicular symptoms effecting the left leg. She demonstrates decreased lumbar AROM, decreased LE strength, and positive slump and SLR on the left. Her impairments are limiting her ability to stand and sleep comfortably. Pt would benefit from skilled PT to address her impairments so she can reach her long term goals.  Prognosis: fair  Prognosis details: Pt has had back pain for several years    Goals  Plan Goals: SHORT TERM GOALS:     2 weeks  1. Pt independent with HEP  2. Pt to demonstrate trunk AROM 50-75% of expected norms to allow for improved ability to perform ADL's  3. Pt to demonstrate bilateral hip strength 4/5 in all planes to improved stability of the core/trunk     LONG TERM GOALS:   6 weeks  1. Pt to demonstrate trunk AROM % of expected norms to allow for  improved ability to perform functional activities  2. Pt to demonstrate ability to perform full functional squat with good form and without increased pain in the low back   3. Pt to report cessation of pain/numbness/tingling into the left leg to show decreased nerve compression     Plan  Therapy options: will be seen for skilled therapy services  Planned modality interventions: cryotherapy, dry needling, electrical stimulation/Russian stimulation, high voltage pulsed current (pain management), TENS and thermotherapy (hydrocollator packs)  Planned therapy interventions: abdominal trunk stabilization, manual therapy, neuromuscular re-education, postural training, soft tissue mobilization, spinal/joint mobilization, stretching, strengthening, therapeutic activities, home exercise program, gait training, functional ROM exercises and body mechanics training  Duration in visits: 1  Duration in weeks: 12  Treatment plan discussed with: patient        History # of Personal Factors and/or Comorbidities: MODERATE (1-2)  Examination of Body System(s): # of elements: MODERATE (3)  Clinical Presentation: STABLE   Clinical Decision Making: MODERATE      Timed:         Manual Therapy:         mins  20905;     Therapeutic Exercise:    14     mins  18135;     Neuromuscular Kathy:        mins  29440;    Therapeutic Activity:          mins  01827;     Gait Training:           mins  21875;     Ultrasound:          mins  73002;    Ionto                                   mins   64552  Self Care                            mins   20541  Canalith Repos         mins 14231      Un-Timed:  Electrical Stimulation:         mins  51939 ( );  Dry Needling          mins self-pay  Traction          mins 66179  Low Eval          Mins  93420  Mod Eval     30     Mins  49465  High Eval                            Mins  94019        Timed Treatment:   14   mins   Total Treatment:     44   mins          PT: Monet Spears, PT   License  Number: [id number]  Electronically signed by Monet Spears, PT, 10/17/23, 11:14 AM EDT    Certification Period: 10/17/2023 thru 1/14/2024  I certify that the therapy services are furnished while this patient is under my care.  The services outlined above are required by this patient, and will be reviewed every 90 days.         Physician Signature:__________________________________________________    PHYSICIAN: Ulises Elias MD  NPI: [id number]                                      DATE:      Please sign and return via fax to [email or web address] . Thank you, [facility].

## 2023-10-20 ENCOUNTER — OFFICE VISIT (OUTPATIENT)
Dept: FAMILY MEDICINE CLINIC | Facility: CLINIC | Age: 63
End: 2023-10-20
Payer: MEDICARE

## 2023-10-20 VITALS
TEMPERATURE: 98 F | OXYGEN SATURATION: 95 % | HEIGHT: 60 IN | BODY MASS INDEX: 31.77 KG/M2 | WEIGHT: 161.8 LBS | DIASTOLIC BLOOD PRESSURE: 78 MMHG | SYSTOLIC BLOOD PRESSURE: 142 MMHG | HEART RATE: 73 BPM

## 2023-10-20 DIAGNOSIS — R06.00 DYSPNEA, UNSPECIFIED TYPE: ICD-10-CM

## 2023-10-20 DIAGNOSIS — I10 HYPERTENSION, UNSPECIFIED TYPE: Primary | ICD-10-CM

## 2023-10-20 DIAGNOSIS — F32.A ANXIETY AND DEPRESSION: ICD-10-CM

## 2023-10-20 DIAGNOSIS — F41.9 ANXIETY AND DEPRESSION: ICD-10-CM

## 2023-10-20 DIAGNOSIS — R11.0 NAUSEA: ICD-10-CM

## 2023-10-20 PROCEDURE — 3078F DIAST BP <80 MM HG: CPT | Performed by: STUDENT IN AN ORGANIZED HEALTH CARE EDUCATION/TRAINING PROGRAM

## 2023-10-20 PROCEDURE — 3077F SYST BP >= 140 MM HG: CPT | Performed by: STUDENT IN AN ORGANIZED HEALTH CARE EDUCATION/TRAINING PROGRAM

## 2023-10-20 PROCEDURE — 99214 OFFICE O/P EST MOD 30 MIN: CPT | Performed by: STUDENT IN AN ORGANIZED HEALTH CARE EDUCATION/TRAINING PROGRAM

## 2023-10-20 PROCEDURE — 1159F MED LIST DOCD IN RCRD: CPT | Performed by: STUDENT IN AN ORGANIZED HEALTH CARE EDUCATION/TRAINING PROGRAM

## 2023-10-20 PROCEDURE — 1160F RVW MEDS BY RX/DR IN RCRD: CPT | Performed by: STUDENT IN AN ORGANIZED HEALTH CARE EDUCATION/TRAINING PROGRAM

## 2023-10-20 RX ORDER — BUSPIRONE HYDROCHLORIDE 15 MG/1
15 TABLET ORAL 2 TIMES DAILY
Qty: 60 TABLET | Refills: 0 | Status: SHIPPED | OUTPATIENT
Start: 2023-10-20

## 2023-10-20 RX ORDER — LISINOPRIL 10 MG/1
10 TABLET ORAL DAILY
Qty: 90 TABLET | Refills: 1 | Status: SHIPPED | OUTPATIENT
Start: 2023-10-20

## 2023-10-20 NOTE — ASSESSMENT & PLAN NOTE
- Patient is persistently nauseous and reports that she was worked up for this in the past and nothing was revealed  - She feels that her current dose of acid reducing medicine is not enough  - She is currently on Phenergan and takes it regularly  - Referral placed to GI for further evaluation and management

## 2023-10-20 NOTE — ASSESSMENT & PLAN NOTE
- Uncontrolled  - PHQ-9 of 12 and JAVIER-7 of 21 today  - Patient has failed Wellbutrin  - Continue venlafaxine 225 mg daily  - We will start BuSpar 15 mg twice daily  - If no improvement in symptoms, will wean off of venlafaxine at next visit

## 2023-10-20 NOTE — ASSESSMENT & PLAN NOTE
- Patient has been short of breath recently, regularly on exertion and does have intermittent chest pain  - Etiology of chest pain and dyspnea is unclear, could be secondary to worsening COPD versus cardiac abnormality  - She is using her Breztri and albuterol inhaler very often and does not report that she has much improvement in symptoms for her dyspnea when she takes her albuterol  - She is following with pulmonary and I did recommend that she reach out to them to see if her inhaler might need to be changed  - Referral placed to heart and valve clinic for possible stress test

## 2023-10-20 NOTE — PROGRESS NOTES
Office Note     Name: Yessica Galvin    : 1960     MRN: [id number]     Chief Complaint  Hypertension and Depression    Subjective     History of Present Illness:  Yessica Galvin is a 63 y.o. female who presents today for hypertension, depression, dyspnea, and nausea.    Hypertension: At her last visit, we started the patient on lisinopril 10 mg daily.  Her blood pressure has been well controlled.  She does report intermittent chest pain that is exertional.  Denies any dizziness or blurry vision.    Dyspnea: Patient follows with pulmonology.  She is currently on Breztri and albuterol as needed.  She does use albuterol inhaler often.  She is not sure that the albuterol always helps her dyspnea.  She feels that the dyspnea is mostly exertional.  She is continuing to smoke.  She did not like how the Wellbutrin made her feel so she stopped taking that medication.    Depression and anxiety: Patient is currently on venlafaxine to 225 mg daily.  She did not like how the Wellbutrin made her feel so she stopped the medication.  She is okay with trying BuSpar.  She feels that both the anxiety and depression are equally bothersome.  No thoughts to hurt herself or anyone else.    Nausea: Patient reports that she continues to have nausea and upset stomach.  She is okay with the GI referral.        Objective     Past Medical History:   Diagnosis Date    Acute bronchitis     Acute sinusitis     Allergic rhinitis 2023    Asthma     Asthma with acute exacerbation     Asthma, extrinsic ,1970    I have had it since I was 5 yrs. Old    Asthmatic bronchitis     Bronchiectasis     Always catch it    Disc degeneration, lumbar     Disc degeneration, lumbar     GERD (gastroesophageal reflux disease)     History of mammogram     Hypertension 10/5/2023    Hypothyroidism     Lower back pain     Plantar fasciitis     Restless legs syndrome      Past Surgical History:   Procedure Laterality Date    HYSTERECTOMY      KNEE  "ARTHROSCOPY Right 03/10/2020    Procedure: KNEE ARTHROSCOPY RIGHT;  Surgeon: Guanaco Thakur MD;  Location:  [facility];  Service: Orthopedics;  Laterality: Right;    NECK SURGERY      OOPHORECTOMY      ORIF WRIST FRACTURE Left 03/10/2020    Procedure: WRIST OPEN REDUCTION INTERNAL FIXATION LEFT;  Surgeon: Guanaco Thakur MD;  Location:  [facility];  Service: Orthopedics;  Laterality: Left;    TIBIAL PLATEAU OPEN REDUCTION INTERNAL FIXATION Right 03/10/2020    Procedure: TIBIAL PLATEAU OPEN REDUCTION INTERNAL FIXATION RIGHT;  Surgeon: Guanaco Thakur MD;  Location:  [facility];  Service: Orthopedics;  Laterality: Right;     Family History   Problem Relation Age of Onset    Hypertension Mother     Cancer Mother     Asthma Mother     Heart disease Mother     Diabetes Mother     Hypertension Father     Alzheimer's disease Father     Asthma Father     Breast cancer Neg Hx     Ovarian cancer Neg Hx        Vital Signs  /78   Pulse 73   Temp 98 °F (36.7 °C) (Infrared)   Ht 152.4 cm (60\")   Wt 73.4 kg (161 lb 12.8 oz)   SpO2 95%   BMI 31.60 kg/m²   Estimated body mass index is 31.6 kg/m² as calculated from the following:    Height as of this encounter: 152.4 cm (60\").    Weight as of this encounter: 73.4 kg (161 lb 12.8 oz).    Physical Exam  Vitals reviewed.   Constitutional:       Comments: Chronically ill-appearing   Cardiovascular:      Rate and Rhythm: Normal rate and regular rhythm.   Pulmonary:      Effort: Pulmonary effort is normal.   Abdominal:      General: Abdomen is flat.      Palpations: Abdomen is soft.   Musculoskeletal:      Comments: Moving all extremities spontaneously   Skin:     General: Skin is warm and dry.   Neurological:      Mental Status: She is alert.      Comments: Alert and oriented   Psychiatric:         Mood and Affect: Mood normal.         Behavior: Behavior normal.               Assessment and Plan     Diagnoses and all orders for this visit:    1. Hypertension, unspecified " type (Primary)  Assessment & Plan:   - Controlled for age, goal is <150/90  - Continue lisinopril 10 mg daily  - Obtaining CMP today     Orders:  -     Comprehensive Metabolic Panel; Future  -     lisinopril (PRINIVIL,ZESTRIL) 10 MG tablet; Take 1 tablet by mouth Daily.  Dispense: 90 tablet; Refill: 1    2. Dyspnea, unspecified type  Assessment & Plan:  - Patient has been short of breath recently, regularly on exertion and does have intermittent chest pain  - Etiology of chest pain and dyspnea is unclear, could be secondary to worsening COPD versus cardiac abnormality  - She is using her Breztri and albuterol inhaler very often and does not report that she has much improvement in symptoms for her dyspnea when she takes her albuterol  - She is following with pulmonary and I did recommend that she reach out to them to see if her inhaler might need to be changed  - Referral placed to heart and valve clinic for possible stress test    Orders:  -     Ambulatory Referral to [facility]    3. Nausea  Assessment & Plan:  - Patient is persistently nauseous and reports that she was worked up for this in the past and nothing was revealed  - She feels that her current dose of acid reducing medicine is not enough  - She is currently on Phenergan and takes it regularly  - Referral placed to GI for further evaluation and management    Orders:  -     Ambulatory Referral to Gastroenterology    4. Anxiety and depression  Assessment & Plan:   - Uncontrolled  - PHQ-9 of 12 and JAVIER-7 of 21 today  - Patient has failed Wellbutrin  - Continue venlafaxine 225 mg daily  - We will start BuSpar 15 mg twice daily  - If no improvement in symptoms, will wean off of venlafaxine at next visit    Orders:  -     busPIRone (BUSPAR) 15 MG tablet; Take 1 tablet by mouth 2 (Two) Times a Day.  Dispense: 60 tablet; Refill: 0           Follow Up  Return in about 25 days (around 11/14/2023) for follow up .    Betty Jasmine  MD

## 2023-10-25 ENCOUNTER — TREATMENT (OUTPATIENT)
Dept: PHYSICAL THERAPY | Facility: CLINIC | Age: 63
End: 2023-10-25
Payer: MEDICARE

## 2023-10-25 ENCOUNTER — OFFICE VISIT (OUTPATIENT)
Dept: CARDIOLOGY | Facility: HOSPITAL | Age: 63
End: 2023-10-25
Payer: MEDICARE

## 2023-10-25 ENCOUNTER — HOSPITAL ENCOUNTER (OUTPATIENT)
Dept: CARDIOLOGY | Facility: HOSPITAL | Age: 63
Discharge: HOME | End: 2023-10-25
Payer: MEDICARE

## 2023-10-25 VITALS
WEIGHT: 159.6 LBS | RESPIRATION RATE: 18 BRPM | HEIGHT: 60 IN | OXYGEN SATURATION: 95 % | TEMPERATURE: 97.9 F | BODY MASS INDEX: 31.33 KG/M2 | HEART RATE: 61 BPM | DIASTOLIC BLOOD PRESSURE: 68 MMHG | SYSTOLIC BLOOD PRESSURE: 124 MMHG

## 2023-10-25 DIAGNOSIS — M54.42 CHRONIC MIDLINE LOW BACK PAIN WITH LEFT-SIDED SCIATICA: Primary | ICD-10-CM

## 2023-10-25 DIAGNOSIS — R00.2 PALPITATIONS: ICD-10-CM

## 2023-10-25 DIAGNOSIS — R07.9 CHEST PAIN, UNSPECIFIED TYPE: Primary | ICD-10-CM

## 2023-10-25 DIAGNOSIS — I10 PRIMARY HYPERTENSION: ICD-10-CM

## 2023-10-25 DIAGNOSIS — R06.09 DYSPNEA ON EXERTION: ICD-10-CM

## 2023-10-25 DIAGNOSIS — E78.5 HYPERLIPIDEMIA, UNSPECIFIED HYPERLIPIDEMIA TYPE: ICD-10-CM

## 2023-10-25 DIAGNOSIS — G89.29 CHRONIC MIDLINE LOW BACK PAIN WITH LEFT-SIDED SCIATICA: Primary | ICD-10-CM

## 2023-10-25 DIAGNOSIS — R07.9 CHEST PAIN, UNSPECIFIED TYPE: ICD-10-CM

## 2023-10-25 DIAGNOSIS — R00.1 BRADYCARDIA, SINUS: ICD-10-CM

## 2023-10-25 PROCEDURE — 93005 ELECTROCARDIOGRAM TRACING: CPT | Performed by: NURSE PRACTITIONER

## 2023-10-25 PROCEDURE — 97112 NEUROMUSCULAR REEDUCATION: CPT | Performed by: PHYSICAL THERAPIST

## 2023-10-25 PROCEDURE — 97110 THERAPEUTIC EXERCISES: CPT | Performed by: PHYSICAL THERAPIST

## 2023-10-25 NOTE — PATIENT INSTRUCTIONS
You will be called to schedule your stress test and you echocardiogram    Please have fasting lipids completed within the next.

## 2023-10-25 NOTE — H&P (VIEW-ONLY)
"[facility], [facility]    Chief Complaint  Shortness of Breath    Subjective    History of Present Illness {CC  Problem List  Visit  Diagnosis   Encounters  Notes  Medications  Labs  Result Review Imaging  Media :23}     Yessica Galvin presents to [facility] CARDIOLOGY for   History of Present Illness     63-year-old female with history of hypertension, depressionasthma, GERD, gout, chronic back pain, asthma, tobacco use.    Patient complaining of increased dyspnea and nausea.  Chest pain with exertion.  Denies dizziness, near-syncope, syncope.  Followed by pulmonary for asthma.  Does not feel that albuterol helps her dyspnea.  Occasional palpitation. Occasional dizziness, and near syncope.     GERD is better controlled on PPI.         Objective     Vital Signs:   Vitals:    10/25/23 1355 10/25/23 1356 10/25/23 1357 10/25/23 1441   BP: 155/83 146/81 161/90 124/68   BP Location: Right arm Left arm Left arm    Patient Position: Sitting Standing Sitting    Cuff Size: Adult Adult Adult    Pulse: 60 65 61    Resp:   18    Temp: 97.9 °F (36.6 °C) 97.9 °F (36.6 °C) 97.9 °F (36.6 °C)    TempSrc: Temporal Temporal Temporal    SpO2: 95% 96% 95%    Weight:   72.4 kg (159 lb 9.6 oz)    Height:   152.4 cm (60\")      Body mass index is 31.17 kg/m².  Physical Exam  Vitals reviewed.   Constitutional:       General: She is not in acute distress.     Appearance: Normal appearance.   Neck:      Vascular: No carotid bruit.   Cardiovascular:      Rate and Rhythm: Normal rate and regular rhythm.      Pulses:           Radial pulses are 2+ on the right side and 2+ on the left side.        Dorsalis pedis pulses are 2+ on the right side and 2+ on the left side.        Posterior tibial pulses are 2+ on the right side and 2+ on the left side.      Heart sounds: Normal heart sounds.   Pulmonary:      Effort: Pulmonary effort is normal.      Breath sounds: Decreased " breath sounds present.   Musculoskeletal:      Right lower leg: No edema.      Left lower leg: No edema.   Skin:     General: Skin is warm and dry.      Findings: Bruising (generalized bruising arms and legs.  Fragel skin) present.   Neurological:      Mental Status: She is alert.   Psychiatric:         Mood and Affect: Mood normal.         Behavior: Behavior is cooperative.              Result Review  Data Reviewed:{ Labs  Result Review  Imaging  Med Tab  Media :23}   Lab Results   Component Value Date    WBC 8.33 10/05/2023    HGB 12.5 10/05/2023    HCT 36.6 10/05/2023    MCV 92.7 10/05/2023     10/05/2023     Lab Results   Component Value Date    GLUCOSE 60 (L) 10/05/2023    BUN 14 10/05/2023    CREATININE 0.63 10/05/2023    EGFR 99.2 10/05/2023    BCR 22.2 10/05/2023    K 4.0 10/05/2023    CO2 24.4 10/05/2023    CALCIUM 9.4 10/05/2023    PROTENTOTREF 6.3 08/26/2021    ALBUMIN 4.3 10/05/2023    BILITOT 0.3 10/05/2023    AST 25 10/05/2023    ALT 20 10/05/2023     Lab Results   Component Value Date    TSH 1.680 10/05/2023     Lab Results   Component Value Date    CHOL 217 (H) 09/08/2022    CHOL 234 (H) 02/01/2022    CHOL 258 (H) 02/23/2021    CHLPL 222 (H) 08/26/2021    CHLPL 239 (H) 01/30/2020    CHLPL 201 (H) 03/06/2014     Lab Results   Component Value Date    TRIG 65 09/08/2022    TRIG 94 02/01/2022    TRIG 69 08/26/2021     Lab Results   Component Value Date    HDL 79 (H) 09/08/2022    HDL 66 (H) 02/01/2022    HDL 72 (H) 08/26/2021     Lab Results   Component Value Date     (H) 09/08/2022     (H) 02/01/2022     (H) 08/26/2021                   Assessment and Plan {CC Problem List  Visit Diagnosis  ROS  Review (Popup)  Health Maintenance  Quality  BestPractice  Medications  SmartSets  SnapShot Encounters  Media :23}   1. Chest pain, unspecified type    - ECG 12 Lead; SR 55 bpm  - Stress Test With Myocardial Perfusion (1 Day); Future    2. Dyspnea on exertion    - ECG  12 Lead; Future  - Stress Test With Myocardial Perfusion (1 Day); Future  - Adult Transthoracic Echo Complete W/ Cont if Necessary Per Protocol; Future    3. Primary hypertension  Repeat improved  Continue lisinopril  - ECG 12 Lead; Future    Keep BP log  4. Hyperlipidemia, unspecified hyperlipidemia type    - Lipid Panel; Future    5. Palpitations  Continue to monitor.  May consider holter next visi  - Adult Transthoracic Echo Complete W/ Cont if Necessary Per Protocol; Future    6. SB  Per EKG  Continue to monitor  No syncope.       Follow Up {Instructions Charge Capture  Follow-up Communications :23}   Return in about 6 weeks (around 12/6/2023), or if symptoms worsen or fail to improve, for palpitations, Chest pain, Office visit.    Patient was given instructions and counseling regarding her condition or for health maintenance advice. Please see specific information pulled into the AVS if appropriate.  Patient was instructed to call the [facility] with any questions, concerns, or worsening symptoms.

## 2023-10-25 NOTE — PROGRESS NOTES
Physical Therapy Daily Progress Note    Subjective   Yessica Galvin reports: pelvic tilts hurt and cause her pain to get worse. She is doing her HEP in the bed.      Objective   See Exercise, Manual, and Modality Logs for complete treatment.       Assessment/Plan  Pt tolerated treatment well. She was instructed to discontinue pelvic tilts for now. Treatment focused on lumbar mobility and core strength. Pt would benefit from continued skilled PT.    Progress per Plan of Care           Manual Therapy:         mins  48708;  Therapeutic Exercise:    15     mins  12444;     Neuromuscular Kathy:    10    mins  70936;    Therapeutic Activity:          mins  68930;     Gait Training:           mins  74450;     Ultrasound:          mins  68399;    Electrical Stimulation:         mins  38881 ( );  E-Stim Attended:        mins  63010  Iontophoresis          mins 22269   Traction         mins  81646  Fluidotherapy          mins  92529  Dry Needling          mins self-pay - No Charge  Paraffin          mins  56003    Timed Treatment:   25   mins   Total Treatment:     48   mins    Monet Spears, PT, DPT  Physical Therapist

## 2023-10-25 NOTE — PROGRESS NOTES
"[facility], [facility] Vascular    Chief Complaint  Shortness of Breath    Subjective    History of Present Illness {CC  Problem List  Visit  Diagnosis   Encounters  Notes  Medications  Labs  Result Review Imaging  Media :23}     Yessica Galvin presents to [facility] CARDIOLOGY for   History of Present Illness     63-year-old female with history of hypertension, depressionasthma, GERD, gout, chronic back pain, asthma, tobacco use.    Patient complaining of increased dyspnea and nausea.  Chest pain with exertion.  Denies dizziness, near-syncope, syncope.  Followed by pulmonary for asthma.  Does not feel that albuterol helps her dyspnea.  Occasional palpitation. Occasional dizziness, and near syncope.     GERD is better controlled on PPI.         Objective     Vital Signs:   Vitals:    10/25/23 1355 10/25/23 1356 10/25/23 1357 10/25/23 1441   BP: 155/83 146/81 161/90 124/68   BP Location: Right arm Left arm Left arm    Patient Position: Sitting Standing Sitting    Cuff Size: Adult Adult Adult    Pulse: 60 65 61    Resp:   18    Temp: 97.9 °F (36.6 °C) 97.9 °F (36.6 °C) 97.9 °F (36.6 °C)    TempSrc: Temporal Temporal Temporal    SpO2: 95% 96% 95%    Weight:   72.4 kg (159 lb 9.6 oz)    Height:   152.4 cm (60\")      Body mass index is 31.17 kg/m².  Physical Exam  Vitals reviewed.   Constitutional:       General: She is not in acute distress.     Appearance: Normal appearance.   Neck:      Vascular: No carotid bruit.   Cardiovascular:      Rate and Rhythm: Normal rate and regular rhythm.      Pulses:           Radial pulses are 2+ on the right side and 2+ on the left side.        Dorsalis pedis pulses are 2+ on the right side and 2+ on the left side.        Posterior tibial pulses are 2+ on the right side and 2+ on the left side.      Heart sounds: Normal heart sounds.   Pulmonary:      Effort: Pulmonary effort is normal.      Breath sounds: Decreased " breath sounds present.   Musculoskeletal:      Right lower leg: No edema.      Left lower leg: No edema.   Skin:     General: Skin is warm and dry.      Findings: Bruising (generalized bruising arms and legs.  Fragel skin) present.   Neurological:      Mental Status: She is alert.   Psychiatric:         Mood and Affect: Mood normal.         Behavior: Behavior is cooperative.              Result Review  Data Reviewed:{ Labs  Result Review  Imaging  Med Tab  Media :23}   Lab Results   Component Value Date    WBC 8.33 10/05/2023    HGB 12.5 10/05/2023    HCT 36.6 10/05/2023    MCV 92.7 10/05/2023     10/05/2023     Lab Results   Component Value Date    GLUCOSE 60 (L) 10/05/2023    BUN 14 10/05/2023    CREATININE 0.63 10/05/2023    EGFR 99.2 10/05/2023    BCR 22.2 10/05/2023    K 4.0 10/05/2023    CO2 24.4 10/05/2023    CALCIUM 9.4 10/05/2023    PROTENTOTREF 6.3 08/26/2021    ALBUMIN 4.3 10/05/2023    BILITOT 0.3 10/05/2023    AST 25 10/05/2023    ALT 20 10/05/2023     Lab Results   Component Value Date    TSH 1.680 10/05/2023     Lab Results   Component Value Date    CHOL 217 (H) 09/08/2022    CHOL 234 (H) 02/01/2022    CHOL 258 (H) 02/23/2021    CHLPL 222 (H) 08/26/2021    CHLPL 239 (H) 01/30/2020    CHLPL 201 (H) 03/06/2014     Lab Results   Component Value Date    TRIG 65 09/08/2022    TRIG 94 02/01/2022    TRIG 69 08/26/2021     Lab Results   Component Value Date    HDL 79 (H) 09/08/2022    HDL 66 (H) 02/01/2022    HDL 72 (H) 08/26/2021     Lab Results   Component Value Date     (H) 09/08/2022     (H) 02/01/2022     (H) 08/26/2021                   Assessment and Plan {CC Problem List  Visit Diagnosis  ROS  Review (Popup)  Health Maintenance  Quality  BestPractice  Medications  SmartSets  SnapShot Encounters  Media :23}   1. Chest pain, unspecified type    - ECG 12 Lead; SR 55 bpm  - Stress Test With Myocardial Perfusion (1 Day); Future    2. Dyspnea on exertion    - ECG  12 Lead; Future  - Stress Test With Myocardial Perfusion (1 Day); Future  - Adult Transthoracic Echo Complete W/ Cont if Necessary Per Protocol; Future    3. Primary hypertension  Repeat improved  Continue lisinopril  - ECG 12 Lead; Future    Keep BP log  4. Hyperlipidemia, unspecified hyperlipidemia type    - Lipid Panel; Future    5. Palpitations  Continue to monitor.  May consider holter next visi  - Adult Transthoracic Echo Complete W/ Cont if Necessary Per Protocol; Future    6. SB  Per EKG  Continue to monitor  No syncope.       Follow Up {Instructions Charge Capture  Follow-up Communications :23}   Return in about 6 weeks (around 12/6/2023), or if symptoms worsen or fail to improve, for palpitations, Chest pain, Office visit.    Patient was given instructions and counseling regarding her condition or for health maintenance advice. Please see specific information pulled into the AVS if appropriate.  Patient was instructed to call the [facility] with any questions, concerns, or worsening symptoms.

## 2023-10-26 DIAGNOSIS — R11.0 NAUSEA: ICD-10-CM

## 2023-10-26 LAB
QT INTERVAL: 448 MS
QTC INTERVAL: 428 MS

## 2023-10-26 RX ORDER — PROMETHAZINE HYDROCHLORIDE 25 MG/1
25 TABLET ORAL EVERY 6 HOURS PRN
Qty: 30 TABLET | Refills: 0 | Status: SHIPPED | OUTPATIENT
Start: 2023-10-26

## 2023-10-30 DIAGNOSIS — G47.62 NOCTURNAL LEG CRAMPS: ICD-10-CM

## 2023-10-30 DIAGNOSIS — M51.9 LUMBAR DISC DISEASE: ICD-10-CM

## 2023-10-30 DIAGNOSIS — M25.552 LEFT HIP PAIN: ICD-10-CM

## 2023-10-30 RX ORDER — CELECOXIB 200 MG/1
CAPSULE ORAL
Qty: 60 CAPSULE | Refills: 0 | Status: SHIPPED | OUTPATIENT
Start: 2023-10-30

## 2023-10-30 RX ORDER — PRAMIPEXOLE DIHYDROCHLORIDE 0.5 MG/1
0.5 TABLET ORAL
Qty: 30 TABLET | Refills: 0 | Status: SHIPPED | OUTPATIENT
Start: 2023-10-30

## 2023-10-31 DIAGNOSIS — F41.1 GENERALIZED ANXIETY DISORDER: ICD-10-CM

## 2023-10-31 RX ORDER — VENLAFAXINE HYDROCHLORIDE 225 MG/1
225 TABLET, EXTENDED RELEASE ORAL
Qty: 30 TABLET | Refills: 2 | Status: SHIPPED | OUTPATIENT
Start: 2023-10-31

## 2023-11-01 ENCOUNTER — TREATMENT (OUTPATIENT)
Dept: PHYSICAL THERAPY | Facility: CLINIC | Age: 63
End: 2023-11-01
Payer: MEDICARE

## 2023-11-01 DIAGNOSIS — F41.9 ANXIETY AND DEPRESSION: ICD-10-CM

## 2023-11-01 DIAGNOSIS — M54.42 CHRONIC MIDLINE LOW BACK PAIN WITH LEFT-SIDED SCIATICA: Primary | ICD-10-CM

## 2023-11-01 DIAGNOSIS — F32.A ANXIETY AND DEPRESSION: ICD-10-CM

## 2023-11-01 DIAGNOSIS — G89.29 CHRONIC MIDLINE LOW BACK PAIN WITH LEFT-SIDED SCIATICA: Primary | ICD-10-CM

## 2023-11-01 DIAGNOSIS — F17.200 TOBACCO DEPENDENCE: ICD-10-CM

## 2023-11-01 PROCEDURE — 97112 NEUROMUSCULAR REEDUCATION: CPT | Performed by: PHYSICAL THERAPIST

## 2023-11-01 PROCEDURE — 97110 THERAPEUTIC EXERCISES: CPT | Performed by: PHYSICAL THERAPIST

## 2023-11-01 NOTE — PROGRESS NOTES
Physical Therapy Daily Progress Note    Subjective   Yessica Galvin reports: she was pretty sore after her last visit. She does not want to ride the bike today.    Objective   See Exercise, Manual, and Modality Logs for complete treatment.       Assessment/Plan  Pt tolerated treatment well. Discussed that soreness is normal when starting to increase activity. She was able to perform her exercises with min complaints of pain. Pt would benefit from continued skilled PT.    Progress per Plan of Care           Manual Therapy:         mins  07440;  Therapeutic Exercise:    15     mins  03228;     Neuromuscular Kathy:    10    mins  85176;    Therapeutic Activity:          mins  94051;     Gait Training:           mins  02535;     Ultrasound:          mins  57937;    Electrical Stimulation:         mins  03639 ( );  E-Stim Attended:         mins  92686  Iontophoresis          mins 63573   Traction          mins  15731  Fluidotherapy          mins  04227  Dry Needling          mins self-pay - No Charge  Paraffin          mins  15854    Timed Treatment:   25   mins   Total Treatment:     46   mins    Monet Spears, PT, DPT  Physical Therapist

## 2023-11-02 ENCOUNTER — DOCUMENTATION (OUTPATIENT)
Dept: CARDIOLOGY | Facility: HOSPITAL | Age: 63
End: 2023-11-02
Payer: MEDICARE

## 2023-11-02 RX ORDER — BUPROPION HYDROCHLORIDE 150 MG/1
TABLET, EXTENDED RELEASE ORAL
Qty: 60 TABLET | Refills: 0 | OUTPATIENT
Start: 2023-11-02

## 2023-11-02 NOTE — PROGRESS NOTES
Echocardiogram results have been received.  Echocardiogram was completed on 10/31/2023 with [place] heart specialist.  EF 60%, LA dilated, no significant valvular disease.  Mild concentric LVH noted.    Results have been relayed through MyChart to patient.  Follow-up as scheduled.

## 2023-11-03 DIAGNOSIS — G47.62 NOCTURNAL LEG CRAMPS: ICD-10-CM

## 2023-11-03 DIAGNOSIS — M51.9 LUMBAR DISC DISEASE: ICD-10-CM

## 2023-11-03 RX ORDER — PRAMIPEXOLE DIHYDROCHLORIDE 0.5 MG/1
0.5 TABLET ORAL
Qty: 30 TABLET | Refills: 0 | OUTPATIENT
Start: 2023-11-03

## 2023-11-03 RX ORDER — CYCLOBENZAPRINE HCL 5 MG
5 TABLET ORAL NIGHTLY PRN
Qty: 30 TABLET | Refills: 0 | Status: SHIPPED | OUTPATIENT
Start: 2023-11-03

## 2023-11-07 ENCOUNTER — TELEPHONE (OUTPATIENT)
Dept: CARDIOLOGY | Facility: HOSPITAL | Age: 63
End: 2023-11-07
Payer: MEDICARE

## 2023-11-07 ENCOUNTER — HOSPITAL ENCOUNTER (OUTPATIENT)
Dept: CT IMAGING | Facility: HOSPITAL | Age: 63
Discharge: HOME | End: 2023-11-07
Admitting: INTERNAL MEDICINE
Payer: MEDICARE

## 2023-11-07 DIAGNOSIS — Z87.891 HISTORY OF TOBACCO USE, PRESENTING HAZARDS TO HEALTH: ICD-10-CM

## 2023-11-07 DIAGNOSIS — R10.84 ABDOMINAL PAIN, DIFFUSE: ICD-10-CM

## 2023-11-07 DIAGNOSIS — R11.0 NAUSEA: ICD-10-CM

## 2023-11-07 LAB — CREAT BLDA-MCNC: 0.5 MG/DL (ref 0.6–1.3)

## 2023-11-07 PROCEDURE — 25510000001 IOPAMIDOL 61 % SOLUTION: Performed by: STUDENT IN AN ORGANIZED HEALTH CARE EDUCATION/TRAINING PROGRAM

## 2023-11-07 PROCEDURE — 82565 ASSAY OF CREATININE: CPT

## 2023-11-07 PROCEDURE — 74178 CT ABD&PLV WO CNTR FLWD CNTR: CPT

## 2023-11-07 PROCEDURE — 71271 CT THORAX LUNG CANCER SCR C-: CPT

## 2023-11-07 RX ADMIN — IOPAMIDOL 90 ML: 612 INJECTION, SOLUTION INTRAVENOUS at 15:52

## 2023-11-07 NOTE — TELEPHONE ENCOUNTER
Received notification from [employer] that patient had not read her message regarding echocardiogram results. Called patient and discussed results. Informed patient that her echocardiogram results have been reviewed.  Echocardiogram showed that heart muscle function is normal.  No concerning valvular disease was identified.  There were some structural changes that can be related to long-term hypertension.  We will focus on controlling her blood pressure. Patient verbalized understanding and had no further questions.

## 2023-11-08 ENCOUNTER — TREATMENT (OUTPATIENT)
Dept: PHYSICAL THERAPY | Facility: CLINIC | Age: 63
End: 2023-11-08
Payer: MEDICARE

## 2023-11-08 DIAGNOSIS — G89.29 CHRONIC MIDLINE LOW BACK PAIN WITH LEFT-SIDED SCIATICA: Primary | ICD-10-CM

## 2023-11-08 DIAGNOSIS — M54.42 CHRONIC MIDLINE LOW BACK PAIN WITH LEFT-SIDED SCIATICA: Primary | ICD-10-CM

## 2023-11-08 PROCEDURE — 97140 MANUAL THERAPY 1/> REGIONS: CPT | Performed by: PHYSICAL THERAPIST

## 2023-11-08 PROCEDURE — 97112 NEUROMUSCULAR REEDUCATION: CPT | Performed by: PHYSICAL THERAPIST

## 2023-11-08 PROCEDURE — 97110 THERAPEUTIC EXERCISES: CPT | Performed by: PHYSICAL THERAPIST

## 2023-11-08 NOTE — PROGRESS NOTES
Physical Therapy Daily Progress Note    Subjective   Yessica Galvin reports: she is still really hurting. She has not really been doing her HEP because it hurts.       Objective   See Exercise, Manual, and Modality Logs for complete treatment.       Assessment/Plan  Pt tolerated treatment well. Some exercises were regressed due to pain. She responded well to manual therapy interventions. Pt would benefit from continued skilled PT.     Progress per Plan of Care           Manual Therapy:    14     mins  75636;  Therapeutic Exercise:    18     mins  96263;     Neuromuscular Kathy:    12    mins  08141;    Therapeutic Activity:          mins  28177;     Gait Training:           mins  65480;     Ultrasound:          mins  22340;    Electrical Stimulation:         mins  56203 ( );  E-Stim Attended:         mins  06948  Iontophoresis          mins 26752   Traction          mins  32256  Fluidotherapy          mins  74654  Dry Needling          mins self-pay - No Charge  Paraffin          mins  06196    Timed Treatment:   44   mins   Total Treatment:     44   mins    Monet Spears, PT, DPT  Physical Therapist

## 2023-11-10 ENCOUNTER — TELEPHONE (OUTPATIENT)
Dept: CARDIOLOGY | Facility: HOSPITAL | Age: 63
End: 2023-11-10
Payer: MEDICARE

## 2023-11-10 ENCOUNTER — OFFICE VISIT (OUTPATIENT)
Dept: NEUROSURGERY | Facility: CLINIC | Age: 63
End: 2023-11-10
Payer: MEDICARE

## 2023-11-10 ENCOUNTER — PREP FOR SURGERY (OUTPATIENT)
Dept: OTHER | Facility: HOSPITAL | Age: 63
End: 2023-11-10
Payer: MEDICARE

## 2023-11-10 VITALS — TEMPERATURE: 97.5 F | HEIGHT: 62 IN | WEIGHT: 163 LBS | BODY MASS INDEX: 30 KG/M2

## 2023-11-10 DIAGNOSIS — R07.9 CHEST PAIN, UNSPECIFIED TYPE: Primary | ICD-10-CM

## 2023-11-10 DIAGNOSIS — R06.09 DYSPNEA ON EXERTION: ICD-10-CM

## 2023-11-10 DIAGNOSIS — R94.39 ABNORMAL NUCLEAR STRESS TEST: ICD-10-CM

## 2023-11-10 DIAGNOSIS — J44.9 CHRONIC OBSTRUCTIVE PULMONARY DISEASE, UNSPECIFIED COPD TYPE: ICD-10-CM

## 2023-11-10 DIAGNOSIS — M54.9 MECHANICAL BACK PAIN: ICD-10-CM

## 2023-11-10 DIAGNOSIS — M48.062 LUMBAR STENOSIS WITH NEUROGENIC CLAUDICATION: Primary | ICD-10-CM

## 2023-11-10 PROCEDURE — 1159F MED LIST DOCD IN RCRD: CPT | Performed by: NEUROLOGICAL SURGERY

## 2023-11-10 PROCEDURE — 99213 OFFICE O/P EST LOW 20 MIN: CPT | Performed by: NEUROLOGICAL SURGERY

## 2023-11-10 PROCEDURE — 1160F RVW MEDS BY RX/DR IN RCRD: CPT | Performed by: NEUROLOGICAL SURGERY

## 2023-11-10 RX ORDER — ASPIRIN 81 MG/1
81 TABLET ORAL DAILY
OUTPATIENT
Start: 2023-11-11

## 2023-11-10 RX ORDER — ASPIRIN 81 MG/1
324 TABLET, CHEWABLE ORAL ONCE
OUTPATIENT
Start: 2023-11-10 | End: 2023-11-10

## 2023-11-10 RX ORDER — ASPIRIN 81 MG/1
81 TABLET ORAL DAILY
Qty: 30 TABLET | Refills: 3 | Status: SHIPPED | OUTPATIENT
Start: 2023-11-10

## 2023-11-10 NOTE — PROGRESS NOTES
Patient: Yessica Galvin  : 1960    Primary Care Provider: Betty Jasmine MD    Requesting Provider: As above        History    Chief Complaint: Low back and bilateral extremity pain.    History of Present Illness: Ms. Galvin is a 63-year-old unemployed woman with a many year history of low back pain. Over the last several months that has become much more pronounced. Pain extends down into the side of her left leg to the ankle. She has some pain more proximally in the right lower extremity. Symptoms are worse with standing, walking, or lying on her left side. Sitting is better tolerated. She denies any bowel or bladder dysfunction. She smokes about 1 pack of tobacco per month and has a plan for quitting. She has a quit date established. She has not undergone formal treatment for her back difficulties. She does take diclofenac. She has significant COPD and is followed by pulmonology.  She is not on home oxygen.  She has attended therapy and that seems to have worsened her symptoms.    Review of Systems   Constitutional:  Negative for activity change, appetite change, chills, diaphoresis, fatigue, fever and unexpected weight change.   HENT:  Positive for sinus pressure. Negative for congestion, dental problem, drooling, ear discharge, ear pain, facial swelling, hearing loss, mouth sores, nosebleeds, postnasal drip, rhinorrhea, sinus pain, sneezing, sore throat, tinnitus, trouble swallowing and voice change.    Eyes:  Negative for photophobia, pain, discharge, redness, itching and visual disturbance.   Respiratory:  Positive for cough and shortness of breath. Negative for apnea, choking, chest tightness, wheezing and stridor.    Cardiovascular:  Negative for chest pain, palpitations and leg swelling.   Gastrointestinal:  Positive for nausea. Negative for abdominal distention, abdominal pain, anal bleeding, blood in stool, constipation, diarrhea, rectal pain and vomiting.   Endocrine: Negative for cold  "intolerance, heat intolerance, polydipsia, polyphagia and polyuria.   Genitourinary:  Positive for flank pain. Negative for decreased urine volume, difficulty urinating, dysuria, enuresis, frequency, genital sores, hematuria and urgency.   Musculoskeletal:  Positive for arthralgias, back pain, joint swelling and myalgias. Negative for gait problem, neck pain and neck stiffness.   Skin:  Negative for color change, pallor, rash and wound.   Allergic/Immunologic: Negative for environmental allergies, food allergies and immunocompromised state.   Neurological:  Positive for headaches. Negative for dizziness, tremors, seizures, syncope, facial asymmetry, speech difficulty, weakness, light-headedness and numbness.   Hematological:  Negative for adenopathy. Bruises/bleeds easily.   Psychiatric/Behavioral:  Positive for dysphoric mood. Negative for agitation, behavioral problems, confusion, decreased concentration, hallucinations, self-injury, sleep disturbance and suicidal ideas. The patient is nervous/anxious. The patient is not hyperactive.      The patient's past medical history, past surgical history, family history, and social history have been reviewed at length in the electronic medical record.      Physical Exam:   Temp 97.5 °F (36.4 °C) (Infrared)   Ht 157.5 cm (62\")   Wt 73.9 kg (163 lb)   BMI 29.81 kg/m²   She harbors a fairly substantial kyphoscoliosis.    Medical Decision Making    Data Review:   (All imaging studies were personally reviewed unless stated otherwise)  MRI of the lumbar spine dated 6/20/2023 demonstrates diffuse degenerative disc disease.  There is some degenerative wedging of the upper endplate of L2.  High-grade spinal stenosis is noted at L2-3, L3-4, and L4-5.    Plain films of the lumbar spine dated 9/3/2023 demonstrate scoliosis and extensive spondylosis.  There is wedging of L2.       Diagnosis:   1.  Lumbar stenosis with neurogenic claudication.  2.  Mechanical low back pain.  3.  " Spinal deformity.  4.  Severe COPD.    Treatment Options:   I have referred the patient for some lumbar injections.  She will follow-up thereafter.  If she is not doing better then we may need to consider laminectomies.  The goal of surgery would be to improve her claudication symptoms.  It would not address her mechanical back pain.  Formal pulmonary clearance would be necessary should she require surgical intervention.      Scribed for Ulises Elias MD by Myriam Goodwin CMA on 11/10/2023 10:58 EST       I, Dr. Elias, personally performed the services described in the documentation, as scribed in my presence, and it is both accurate and complete.

## 2023-11-10 NOTE — TELEPHONE ENCOUNTER
Patient notified that myocardial perfusion stress test results were considered abnormal. She has been scheduled to have a left heart cath on Monday. She states that she is currently already taking aspirin 81 mg daily but will continue.

## 2023-11-10 NOTE — TELEPHONE ENCOUNTER
Please call patient.  I received myocardial perfusion stress test completed on 10/31/2023 with [facility] on for review.  Her stress test results are considered abnormal.  I recommend we schedule her for a left heart catheterization to check to see if there are any heart blockages.    I have placed an order with [facility].  She will be contacted by their scheduling to schedule the heart catheterization.    I would also like for her to start aspirin enteric-coated 81 mg 1 tablet daily.  I will send in the prescription to her pharmacy.

## 2023-11-14 ENCOUNTER — OFFICE VISIT (OUTPATIENT)
Dept: FAMILY MEDICINE CLINIC | Facility: CLINIC | Age: 63
End: 2023-11-14
Payer: MEDICARE

## 2023-11-14 ENCOUNTER — LAB (OUTPATIENT)
Dept: LAB | Facility: HOSPITAL | Age: 63
End: 2023-11-14
Payer: MEDICARE

## 2023-11-14 VITALS
DIASTOLIC BLOOD PRESSURE: 76 MMHG | HEART RATE: 70 BPM | BODY MASS INDEX: 30.18 KG/M2 | TEMPERATURE: 98.2 F | OXYGEN SATURATION: 98 % | HEIGHT: 62 IN | WEIGHT: 164 LBS | SYSTOLIC BLOOD PRESSURE: 138 MMHG

## 2023-11-14 DIAGNOSIS — M51.9 LUMBAR DISC DISEASE: ICD-10-CM

## 2023-11-14 DIAGNOSIS — Z78.0 POST-MENOPAUSE: ICD-10-CM

## 2023-11-14 DIAGNOSIS — I10 HYPERTENSION, UNSPECIFIED TYPE: ICD-10-CM

## 2023-11-14 DIAGNOSIS — F41.9 ANXIETY AND DEPRESSION: Primary | ICD-10-CM

## 2023-11-14 DIAGNOSIS — F32.A ANXIETY AND DEPRESSION: Primary | ICD-10-CM

## 2023-11-14 LAB
ALBUMIN SERPL-MCNC: 4.3 G/DL (ref 3.5–5.2)
ALBUMIN/GLOB SERPL: 2 G/DL
ALP SERPL-CCNC: 76 U/L (ref 39–117)
ALT SERPL W P-5'-P-CCNC: 27 U/L (ref 1–33)
ANION GAP SERPL CALCULATED.3IONS-SCNC: 7 MMOL/L (ref 5–15)
AST SERPL-CCNC: 28 U/L (ref 1–32)
BILIRUB SERPL-MCNC: 0.2 MG/DL (ref 0–1.2)
BUN SERPL-MCNC: 14 MG/DL (ref 8–23)
BUN/CREAT SERPL: 20 (ref 7–25)
CALCIUM SPEC-SCNC: 9.5 MG/DL (ref 8.6–10.5)
CHLORIDE SERPL-SCNC: 98 MMOL/L (ref 98–107)
CO2 SERPL-SCNC: 28 MMOL/L (ref 22–29)
CREAT SERPL-MCNC: 0.7 MG/DL (ref 0.57–1)
EGFRCR SERPLBLD CKD-EPI 2021: 96.1 ML/MIN/1.73
GLOBULIN UR ELPH-MCNC: 2.1 GM/DL
GLUCOSE SERPL-MCNC: 79 MG/DL (ref 65–99)
POTASSIUM SERPL-SCNC: 4.5 MMOL/L (ref 3.5–5.2)
PROT SERPL-MCNC: 6.4 G/DL (ref 6–8.5)
SODIUM SERPL-SCNC: 133 MMOL/L (ref 136–145)

## 2023-11-14 PROCEDURE — 3075F SYST BP GE 130 - 139MM HG: CPT | Performed by: STUDENT IN AN ORGANIZED HEALTH CARE EDUCATION/TRAINING PROGRAM

## 2023-11-14 PROCEDURE — 99214 OFFICE O/P EST MOD 30 MIN: CPT | Performed by: STUDENT IN AN ORGANIZED HEALTH CARE EDUCATION/TRAINING PROGRAM

## 2023-11-14 PROCEDURE — 1160F RVW MEDS BY RX/DR IN RCRD: CPT | Performed by: STUDENT IN AN ORGANIZED HEALTH CARE EDUCATION/TRAINING PROGRAM

## 2023-11-14 PROCEDURE — 80053 COMPREHEN METABOLIC PANEL: CPT

## 2023-11-14 PROCEDURE — 1159F MED LIST DOCD IN RCRD: CPT | Performed by: STUDENT IN AN ORGANIZED HEALTH CARE EDUCATION/TRAINING PROGRAM

## 2023-11-14 PROCEDURE — 3078F DIAST BP <80 MM HG: CPT | Performed by: STUDENT IN AN ORGANIZED HEALTH CARE EDUCATION/TRAINING PROGRAM

## 2023-11-14 RX ORDER — TIZANIDINE 4 MG/1
4 TABLET ORAL NIGHTLY PRN
Qty: 30 TABLET | Refills: 0 | Status: SHIPPED | OUTPATIENT
Start: 2023-11-14

## 2023-11-14 RX ORDER — BUSPIRONE HYDROCHLORIDE 15 MG/1
15 TABLET ORAL 2 TIMES DAILY
Qty: 60 TABLET | Refills: 2 | Status: SHIPPED | OUTPATIENT
Start: 2023-11-14

## 2023-11-14 NOTE — ASSESSMENT & PLAN NOTE
- Uncontrolled but improving  - PHQ-9 of 15 and JAVIER-7 of 12 today -depression is stable but anxiety much improved  - Patient has failed Wellbutrin  - Continue venlafaxine 225 mg daily  - Will continue BuSpar 15 mg twice daily  - Patient desires to keep current medication regimen and does not wish to switch to a different SSRI/SNRI

## 2023-11-14 NOTE — PROGRESS NOTES
Office Note     Name: Yessica Galvin    : 1960     MRN: [id number]     Chief Complaint  Back Pain    Subjective     History of Present Illness:  Yessica Galvin is a 63 y.o. female who presents today for back pain, anxiety and depression follow-up.      Anxiety and depression: Patient is currently on [id number] mg daily and BuSpar 15 mg twice daily.  She does report that her anxiety has improved since starting the BuSpar.  She does not desire to switch her venlafaxine to a different agent.  She does feel that her mood is fairly well controlled right now.    Back pain: Patient is following with neurosurgery who recommended lumbar injections.  She is currently on celebrex, tramadol and gabapentin for pain control. 40% better when she takes her medicine. Also on flexeril, but was told by the pharmacy that her insurance does not cover it and requires a PA.  Willing to start a different muscle relaxer.          Objective     Past Medical History:   Diagnosis Date    Acute bronchitis     Acute sinusitis     Allergic rhinitis 2023    Asthma     Asthma 2020    Asthma with acute exacerbation     Asthma, extrinsic ,1970    I have had it since I was 5 yrs. Old    Asthmatic bronchitis     Bronchiectasis     Always catch it    Centrilobular emphysema 2022    Chronic bronchitis with acute exacerbation 2022    Disc degeneration, lumbar     Disc degeneration, lumbar     GERD (gastroesophageal reflux disease)     History of mammogram     Hypertension 10/05/2023    Hypothyroidism     Lower back pain     Medicare annual wellness visit, subsequent 10/05/2023    Nodule of upper lobe of right lung 2022    Plantar fasciitis     Restless legs syndrome      Past Surgical History:   Procedure Laterality Date    HYSTERECTOMY      KNEE ARTHROSCOPY Right 03/10/2020    Procedure: KNEE ARTHROSCOPY RIGHT;  Surgeon: Guanaco Thakur MD;  Location: [facility];  Service: Orthopedics;  Laterality:  "Right;    NECK SURGERY      ORIF WRIST FRACTURE Left 03/10/2020    Procedure: WRIST OPEN REDUCTION INTERNAL FIXATION LEFT;  Surgeon: Guanaco Thakur MD;  Location:  [facility];  Service: Orthopedics;  Laterality: Left;    TIBIAL PLATEAU OPEN REDUCTION INTERNAL FIXATION Right 03/10/2020    Procedure: TIBIAL PLATEAU OPEN REDUCTION INTERNAL FIXATION RIGHT;  Surgeon: Guanaco Thakur MD;  Location:  [facility];  Service: Orthopedics;  Laterality: Right;     Family History   Problem Relation Age of Onset    Aneurysm Mother     Hypertension Mother     Cancer Mother     Asthma Mother     Heart disease Mother     Diabetes Mother     Hypertension Father     Alzheimer's disease Father     Asthma Father     No Known Problems Sister     No Known Problems Brother     COPD Maternal Grandmother     Aneurysm Maternal Grandmother     No Known Problems Maternal Grandfather     Alzheimer's disease Paternal Grandmother     Dementia Paternal Grandmother     No Known Problems Paternal Grandfather     Breast cancer Neg Hx     Ovarian cancer Neg Hx        Vital Signs  /76   Pulse 70   Temp 98.2 °F (36.8 °C)   Ht 157.5 cm (62\")   Wt 74.4 kg (164 lb)   SpO2 98%   BMI 30.00 kg/m²   Estimated body mass index is 30 kg/m² as calculated from the following:    Height as of this encounter: 157.5 cm (62\").    Weight as of this encounter: 74.4 kg (164 lb).    Physical Exam  Vitals reviewed.   Constitutional:       Appearance: Normal appearance.   Cardiovascular:      Rate and Rhythm: Normal rate and regular rhythm.   Pulmonary:      Effort: Pulmonary effort is normal.   Abdominal:      General: Abdomen is flat.      Palpations: Abdomen is soft.   Musculoskeletal:      Comments: Moving all extremities spontaneously   Skin:     General: Skin is warm and dry.   Neurological:      Mental Status: She is alert.      Comments: Alert and oriented   Psychiatric:         Mood and Affect: Mood normal.         Behavior: Behavior normal.           "     Assessment and Plan     Diagnoses and all orders for this visit:    1. Anxiety and depression (Primary)  Assessment & Plan:  - Uncontrolled but improving  - PHQ-9 of 15 and JAVIER-7 of 12 today -depression is stable but anxiety much improved  - Patient has failed Wellbutrin  - Continue venlafaxine 225 mg daily  - Will continue BuSpar 15 mg twice daily  - Patient desires to keep current medication regimen and does not wish to switch to a different SSRI/SNRI    Orders:  -     busPIRone (BUSPAR) 15 MG tablet; Take 1 tablet by mouth 2 (Two) Times a Day.  Dispense: 60 tablet; Refill: 2    2. Lumbar disc disease  Assessment & Plan:  - Patient has an extensive history of degenerative disease of her back with scoliosis and a chronic compression fracture of L2  - She is following with neurosurgery and physical therapy, is referred for injections in her back, will schedule that appointment soon  - We will continue tramadol 100 mg every 8 hours as needed  - Will start tizanidine given that insurance does not cover her Flexeril  - Continue gabapentin to 600 mg 3 times a day  - Baltazar reviewed and appropriate  -UDS and medication contract up-to-date      Orders:  -     tiZANidine (ZANAFLEX) 4 MG tablet; Take 1 tablet by mouth At Night As Needed for Muscle Spasms.  Dispense: 30 tablet; Refill: 0    3. Post-menopause  Assessment & Plan:  - DEXA scan ordered, will follow-up on results and advise patient further    Orders:  -     DEXA Bone Density Axial         Follow Up  Return in about 3 months (around 2/14/2024) for follow up .    Betty Jasmine MD

## 2023-11-14 NOTE — ASSESSMENT & PLAN NOTE
- Patient has an extensive history of degenerative disease of her back with scoliosis and a chronic compression fracture of L2  - She is following with neurosurgery and physical therapy, is referred for injections in her back, will schedule that appointment soon  - We will continue tramadol 100 mg every 8 hours as needed  - Will start tizanidine given that insurance does not cover her Flexeril  - Continue gabapentin to 600 mg 3 times a day  - Baltazar reviewed and appropriate  -UDS and medication contract up-to-date

## 2023-11-15 ENCOUNTER — TELEPHONE (OUTPATIENT)
Dept: CARDIOLOGY | Facility: HOSPITAL | Age: 63
End: 2023-11-15
Payer: MEDICARE

## 2023-11-15 DIAGNOSIS — I10 PRIMARY HYPERTENSION: ICD-10-CM

## 2023-11-15 DIAGNOSIS — R07.9 CHEST PAIN, UNSPECIFIED TYPE: Primary | ICD-10-CM

## 2023-11-15 RX ORDER — AMLODIPINE BESYLATE 5 MG/1
5 TABLET ORAL DAILY
Qty: 30 TABLET | Refills: 1 | Status: SHIPPED | OUTPATIENT
Start: 2023-11-15

## 2023-11-15 NOTE — TELEPHONE ENCOUNTER
Let patient know we are working to get her heart cath approved.      We need to start norvasc 5 mg daily for help with blood pressure and chest pain. RX sent to her pharmacy.

## 2023-11-15 NOTE — TELEPHONE ENCOUNTER
Patient notified that Tanisha is still working on getting approval on heart cath and will start Norvasc 5mg.

## 2023-11-16 ENCOUNTER — TRANSCRIBE ORDERS (OUTPATIENT)
Dept: CARDIOLOGY | Facility: HOSPITAL | Age: 63
End: 2023-11-16
Payer: MEDICARE

## 2023-11-16 ENCOUNTER — TELEPHONE (OUTPATIENT)
Dept: CARDIOLOGY | Facility: HOSPITAL | Age: 63
End: 2023-11-16
Payer: MEDICARE

## 2023-11-16 DIAGNOSIS — R07.9 CHEST PAIN, UNSPECIFIED TYPE: Primary | ICD-10-CM

## 2023-11-16 DIAGNOSIS — R06.09 DYSPNEA ON EXERTION: ICD-10-CM

## 2023-11-16 DIAGNOSIS — R94.39 ABNORMAL NUCLEAR STRESS TEST: Primary | ICD-10-CM

## 2023-11-16 DIAGNOSIS — R94.39 ABNORMAL NUCLEAR STRESS TEST: ICD-10-CM

## 2023-11-16 DIAGNOSIS — R07.9 CHEST PAIN, UNSPECIFIED TYPE: ICD-10-CM

## 2023-11-16 NOTE — TELEPHONE ENCOUNTER
P2P completed today for [facility] due to abnormal stress.  Auth #: [id number].    Scheduled for [facility] tomorrow.

## 2023-11-17 ENCOUNTER — HOSPITAL ENCOUNTER (OUTPATIENT)
Facility: HOSPITAL | Age: 63
Setting detail: HOSPITAL OUTPATIENT SURGERY
Discharge: HOME | End: 2023-11-17
Attending: INTERNAL MEDICINE | Admitting: INTERNAL MEDICINE
Payer: MEDICARE

## 2023-11-17 VITALS
DIASTOLIC BLOOD PRESSURE: 77 MMHG | HEART RATE: 55 BPM | SYSTOLIC BLOOD PRESSURE: 126 MMHG | TEMPERATURE: 97.7 F | BODY MASS INDEX: 29.85 KG/M2 | RESPIRATION RATE: 16 BRPM | HEIGHT: 62 IN | WEIGHT: 162.2 LBS | OXYGEN SATURATION: 94 %

## 2023-11-17 DIAGNOSIS — R94.39 ABNORMAL NUCLEAR STRESS TEST: ICD-10-CM

## 2023-11-17 DIAGNOSIS — R06.09 DYSPNEA ON EXERTION: ICD-10-CM

## 2023-11-17 DIAGNOSIS — R07.9 CHEST PAIN, UNSPECIFIED TYPE: ICD-10-CM

## 2023-11-17 DIAGNOSIS — M51.9 LUMBAR DISC DISEASE: ICD-10-CM

## 2023-11-17 LAB
DEPRECATED RDW RBC AUTO: 54.5 FL (ref 37–54)
ERYTHROCYTE [DISTWIDTH] IN BLOOD BY AUTOMATED COUNT: 14.9 % (ref 12.3–15.4)
HCT VFR BLD AUTO: 37.7 % (ref 34–46.6)
HGB BLD-MCNC: 11.8 G/DL (ref 12–15.9)
MCH RBC QN AUTO: 31.1 PG (ref 26.6–33)
MCHC RBC AUTO-ENTMCNC: 31.3 G/DL (ref 31.5–35.7)
MCV RBC AUTO: 99.2 FL (ref 79–97)
PLATELET # BLD AUTO: 350 10*3/MM3 (ref 140–450)
PMV BLD AUTO: 8.7 FL (ref 6–12)
RBC # BLD AUTO: 3.8 10*6/MM3 (ref 3.77–5.28)
WBC NRBC COR # BLD AUTO: 6.26 10*3/MM3 (ref 3.4–10.8)

## 2023-11-17 PROCEDURE — 25010000002 HEPARIN (PORCINE) PER 1000 UNITS: Performed by: INTERNAL MEDICINE

## 2023-11-17 PROCEDURE — 25010000002 FENTANYL CITRATE (PF) 50 MCG/ML SOLUTION: Performed by: INTERNAL MEDICINE

## 2023-11-17 PROCEDURE — 25810000003 SODIUM CHLORIDE 0.9 % SOLUTION: Performed by: INTERNAL MEDICINE

## 2023-11-17 PROCEDURE — C1894 INTRO/SHEATH, NON-LASER: HCPCS | Performed by: INTERNAL MEDICINE

## 2023-11-17 PROCEDURE — 93454 CORONARY ARTERY ANGIO S&I: CPT | Performed by: INTERNAL MEDICINE

## 2023-11-17 PROCEDURE — 25010000002 MIDAZOLAM PER 1 MG: Performed by: INTERNAL MEDICINE

## 2023-11-17 PROCEDURE — C1769 GUIDE WIRE: HCPCS | Performed by: INTERNAL MEDICINE

## 2023-11-17 PROCEDURE — 85027 COMPLETE CBC AUTOMATED: CPT | Performed by: INTERNAL MEDICINE

## 2023-11-17 PROCEDURE — 25510000001 IOPAMIDOL PER 1 ML: Performed by: INTERNAL MEDICINE

## 2023-11-17 RX ORDER — NITROGLYCERIN 0.4 MG/1
0.4 TABLET SUBLINGUAL
Status: DISCONTINUED | OUTPATIENT
Start: 2023-11-17 | End: 2023-11-17 | Stop reason: HOSPADM

## 2023-11-17 RX ORDER — LIDOCAINE HYDROCHLORIDE 10 MG/ML
INJECTION, SOLUTION EPIDURAL; INFILTRATION; INTRACAUDAL; PERINEURAL
Status: DISCONTINUED | OUTPATIENT
Start: 2023-11-17 | End: 2023-11-17 | Stop reason: HOSPADM

## 2023-11-17 RX ORDER — ACETAMINOPHEN 325 MG/1
650 TABLET ORAL EVERY 4 HOURS PRN
Status: DISCONTINUED | OUTPATIENT
Start: 2023-11-17 | End: 2023-11-17 | Stop reason: HOSPADM

## 2023-11-17 RX ORDER — MIDAZOLAM HYDROCHLORIDE 1 MG/ML
INJECTION INTRAMUSCULAR; INTRAVENOUS
Status: DISCONTINUED | OUTPATIENT
Start: 2023-11-17 | End: 2023-11-17 | Stop reason: HOSPADM

## 2023-11-17 RX ORDER — SODIUM CHLORIDE 9 MG/ML
75 INJECTION, SOLUTION INTRAVENOUS CONTINUOUS
Status: ACTIVE | OUTPATIENT
Start: 2023-11-17 | End: 2023-11-17

## 2023-11-17 RX ORDER — FENTANYL CITRATE 50 UG/ML
INJECTION, SOLUTION INTRAMUSCULAR; INTRAVENOUS
Status: DISCONTINUED | OUTPATIENT
Start: 2023-11-17 | End: 2023-11-17 | Stop reason: HOSPADM

## 2023-11-17 RX ORDER — HEPARIN SODIUM 1000 [USP'U]/ML
INJECTION, SOLUTION INTRAVENOUS; SUBCUTANEOUS
Status: DISCONTINUED | OUTPATIENT
Start: 2023-11-17 | End: 2023-11-17 | Stop reason: HOSPADM

## 2023-11-17 RX ORDER — NICARDIPINE HCL-0.9% SOD CHLOR 1 MG/10 ML
SYRINGE (ML) INTRAVENOUS
Status: DISCONTINUED | OUTPATIENT
Start: 2023-11-17 | End: 2023-11-17 | Stop reason: HOSPADM

## 2023-11-17 NOTE — INTERVAL H&P NOTE
H&P updated. The patient was examined and the following changes are noted:        Patient is a 63-year-old history of hypertension, hyperlipidemia, and asthma referred to us for left heart catheterization today for abnormal stress test.  Patient states that she has had ongoing shortness of breath for many years but she thought it was secondary to her lung disease.  She states she has had progressive shortness of breath and chest pain recently.  She went to the heart valve clinic and a stress test was performed showing anterior defect.  Patient was started on amlodipine to help with her uncontrolled blood pressure.  Patient is not currently on any cholesterol medication.  Last was checked the last year and her LDL is not to goal so patient will need statin medication upon discharge.  Risk and benefits were discussed with patient she agrees to proceed.  Will proceed with right radial heart catheterization approach.    Electronically signed by Nusrat Reyes PA-C, 11/17/23, 9:43 AM EST.     STAFF CARDIOLOGIST:      SUMMARY:    63 years old with history of shortness of breath admitted for evaluation of coronary arteries due to positive stress test      PERTINENT:    Positive stress test      IMPRESSION:    COPD  Coronary artery disease      RECOMMENDATIONS:    Patient has left heart catheterization performed which did not reveal any significant coronary artery disease  Continue evaluation for shortness of breath which most likely is her COPD        I have seen and examined the patient, reviewed the above note, necessary changes were made and I agree with the final note.   Arben Pittman MD

## 2023-11-18 RX ORDER — TRAMADOL HYDROCHLORIDE 50 MG/1
100 TABLET ORAL EVERY 8 HOURS PRN
Qty: 150 TABLET | Refills: 2 | Status: SHIPPED | OUTPATIENT
Start: 2023-11-18

## 2023-11-18 RX ORDER — GABAPENTIN 600 MG/1
600 TABLET ORAL 3 TIMES DAILY
Qty: 90 TABLET | Refills: 2 | Status: SHIPPED | OUTPATIENT
Start: 2023-11-18

## 2023-11-18 NOTE — TELEPHONE ENCOUNTER
Rx Refill Note  Requested Prescriptions     Pending Prescriptions Disp Refills    traMADol (ULTRAM) 50 MG tablet [Pharmacy Med Name: traMADol HCL 50MG TABLET] 150 tablet      Sig: TAKE TWO TABLETS BY MOUTH EVERY 8 HOURS AS NEEDED FOR MODERATE PAIN    gabapentin (NEURONTIN) 600 MG tablet [Pharmacy Med Name: GABAPENTIN 600 MG TABLET] 90 tablet      Sig: TAKE ONE TABLET BY MOUTH THREE TIMES A DAY      Last office visit with prescribing clinician: 11/14/2023   Last telemedicine visit with prescribing clinician: Visit date not found   Next office visit with prescribing clinician: 2/14/2024                         Would you like a call back once the refill request has been completed: [] Yes [] No    If the office needs to give you a call back, can they leave a voicemail: [] Yes [] No    Dia Jama MA  11/18/23, 12:22 EST

## 2023-11-19 DIAGNOSIS — R11.0 NAUSEA: ICD-10-CM

## 2023-11-20 RX ORDER — PROMETHAZINE HYDROCHLORIDE 25 MG/1
25 TABLET ORAL EVERY 6 HOURS PRN
Qty: 30 TABLET | Refills: 0 | Status: SHIPPED | OUTPATIENT
Start: 2023-11-20

## 2023-11-26 DIAGNOSIS — M25.552 LEFT HIP PAIN: ICD-10-CM

## 2023-11-26 DIAGNOSIS — G47.62 NOCTURNAL LEG CRAMPS: ICD-10-CM

## 2023-11-26 DIAGNOSIS — M51.9 LUMBAR DISC DISEASE: ICD-10-CM

## 2023-11-27 RX ORDER — PRAMIPEXOLE DIHYDROCHLORIDE 0.5 MG/1
0.5 TABLET ORAL
Qty: 30 TABLET | Refills: 0 | Status: SHIPPED | OUTPATIENT
Start: 2023-11-27

## 2023-11-27 RX ORDER — CELECOXIB 200 MG/1
CAPSULE ORAL
Qty: 60 CAPSULE | Refills: 0 | Status: SHIPPED | OUTPATIENT
Start: 2023-11-27

## 2023-12-04 DIAGNOSIS — R11.0 NAUSEA: ICD-10-CM

## 2023-12-05 RX ORDER — PROMETHAZINE HYDROCHLORIDE 25 MG/1
25 TABLET ORAL EVERY 6 HOURS PRN
Qty: 30 TABLET | Refills: 0 | Status: SHIPPED | OUTPATIENT
Start: 2023-12-05

## 2023-12-05 NOTE — TELEPHONE ENCOUNTER
Rx Refill Note  Requested Prescriptions     Pending Prescriptions Disp Refills    promethazine (PHENERGAN) 25 MG tablet 30 tablet 0     Sig: Take 1 tablet by mouth Every 6 (Six) Hours As Needed for Nausea or Vomiting.      Last office visit with prescribing clinician: 11/14/2023   Last telemedicine visit with prescribing clinician: Visit date not found   Next office visit with prescribing clinician: 2/14/2024                         Would you like a call back once the refill request has been completed: [] Yes [] No    If the office needs to give you a call back, can they leave a voicemail: [] Yes [] No    Dia Jama MA  12/05/23, 11:17 EST

## 2023-12-06 ENCOUNTER — OFFICE VISIT (OUTPATIENT)
Dept: CARDIOLOGY | Facility: HOSPITAL | Age: 63
End: 2023-12-06
Payer: MEDICARE

## 2023-12-06 VITALS
HEIGHT: 62 IN | WEIGHT: 160.8 LBS | BODY MASS INDEX: 29.59 KG/M2 | TEMPERATURE: 98.3 F | HEART RATE: 95 BPM | RESPIRATION RATE: 18 BRPM | DIASTOLIC BLOOD PRESSURE: 76 MMHG | SYSTOLIC BLOOD PRESSURE: 139 MMHG | OXYGEN SATURATION: 97 %

## 2023-12-06 DIAGNOSIS — Z72.0 TOBACCO USE: ICD-10-CM

## 2023-12-06 DIAGNOSIS — I25.10 CORONARY ARTERY DISEASE INVOLVING NATIVE CORONARY ARTERY OF NATIVE HEART WITHOUT ANGINA PECTORIS: Primary | ICD-10-CM

## 2023-12-06 DIAGNOSIS — I10 PRIMARY HYPERTENSION: ICD-10-CM

## 2023-12-06 RX ORDER — ATORVASTATIN CALCIUM 20 MG/1
20 TABLET, FILM COATED ORAL NIGHTLY
Qty: 90 TABLET | Refills: 3 | Status: SHIPPED | OUTPATIENT
Start: 2023-12-06

## 2023-12-06 NOTE — PROGRESS NOTES
"[facility]    Chief Complaint  Chest Pain    Subjective    History of Present Illness {CC  Problem List  Visit  Diagnosis   Encounters  Notes  Medications  Labs  Result Review Imaging  Media :23}     Yessica Galvin presents to [facility] for   History of Present Illness     63-year-old female with history of hypertension, depressionasthma, GERD, gout, chronic back pain, asthma, tobacco use.       Patient follow-up on chest pain    Left heart catheterization completed 11/17/2023: Mild coronary artery disease.    No CP or pressure.  Continue dyspnea, baseline.  No dizziness, near syncope, syncope.  BP has improved.  Feels that asthma is well controlled.  No palpitations.     Continued tobacco use.     Objective     Vital Signs:.   Vitals:    12/06/23 1325   BP: 139/76   BP Location: Left arm   Patient Position: Sitting   Cuff Size: Adult   Pulse: 95   Resp: 18   Temp: 98.3 °F (36.8 °C)   TempSrc: Temporal   SpO2: 97%   Weight: 72.9 kg (160 lb 12.8 oz)   Height: 157.5 cm (62\")     Body mass index is 29.41 kg/m².  Physical Exam  Vitals reviewed.   Constitutional:       General: She is not in acute distress.  Cardiovascular:      Rate and Rhythm: Normal rate and regular rhythm.   Pulmonary:      Effort: Pulmonary effort is normal.      Breath sounds: Normal breath sounds.   Musculoskeletal:      Right lower leg: No edema.      Left lower leg: No edema.   Skin:     Coloration: Skin is not pale.   Neurological:      Mental Status: She is alert.   Psychiatric:         Mood and Affect: Mood normal.         Behavior: Behavior normal. Behavior is cooperative.              Result Review  Data Reviewed:{ Labs  Result Review  Imaging  Med Tab  Media :23}   Left heart catheterization completed 11/17/2023: Mild coronary artery disease.    Echocardiogram 10/31/2023:              Assessment and Plan {CC Problem List  Visit " Diagnosis  ROS  Review (Popup)  Health Maintenance  Quality  BestPractice  Medications  SmartSets  SnapShot Encounters  Media :23}   1. Coronary artery disease involving native coronary artery of native heart without angina pectoris  Mild non-obstructive CAD    Continue asa    Initiate:  - atorvastatin (LIPITOR) 20 MG tablet; Take 1 tablet by mouth Every Night.  Dispense: 90 tablet; Refill: 3    2. Primary hypertension  Continue norvasc, lisinopril    3. Tobacco use  Encouraged cessation  No plan at this time.     F/u with PCP regularly.       Follow Up {Instructions Charge Capture  Follow-up Communications :23}   Return if symptoms worsen or fail to improve.    Patient was given instructions and counseling regarding her condition or for health maintenance advice. Please see specific information pulled into the AVS if appropriate.  Patient was instructed to call the [facility] with any questions, concerns, or worsening symptoms.

## 2023-12-15 ENCOUNTER — OFFICE VISIT (OUTPATIENT)
Dept: FAMILY MEDICINE CLINIC | Facility: CLINIC | Age: 63
End: 2023-12-15
Payer: MEDICARE

## 2023-12-15 VITALS
OXYGEN SATURATION: 97 % | HEART RATE: 101 BPM | DIASTOLIC BLOOD PRESSURE: 68 MMHG | BODY MASS INDEX: 28.9 KG/M2 | SYSTOLIC BLOOD PRESSURE: 96 MMHG | TEMPERATURE: 99.8 F | WEIGHT: 158 LBS

## 2023-12-15 DIAGNOSIS — R68.89 FLU-LIKE SYMPTOMS: ICD-10-CM

## 2023-12-15 DIAGNOSIS — J44.1 COPD WITH ACUTE EXACERBATION: ICD-10-CM

## 2023-12-15 DIAGNOSIS — J02.9 SORE THROAT: Primary | ICD-10-CM

## 2023-12-15 DIAGNOSIS — R11.0 NAUSEA: ICD-10-CM

## 2023-12-15 LAB
EXPIRATION DATE: NORMAL
EXPIRATION DATE: NORMAL
FLUAV AG NPH QL: NEGATIVE
FLUBV AG NPH QL: NEGATIVE
INTERNAL CONTROL: NORMAL
INTERNAL CONTROL: NORMAL
Lab: NORMAL
Lab: NORMAL
SARS-COV-2 AG UPPER RESP QL IA.RAPID: NOT DETECTED

## 2023-12-15 RX ORDER — METHYLPREDNISOLONE 4 MG/1
TABLET ORAL
Qty: 21 TABLET | Refills: 0 | Status: SHIPPED | OUTPATIENT
Start: 2023-12-15 | End: 2023-12-21

## 2023-12-15 RX ORDER — DEXTROMETHORPHAN HYDROBROMIDE AND PROMETHAZINE HYDROCHLORIDE 15; 6.25 MG/5ML; MG/5ML
5 SYRUP ORAL 2 TIMES DAILY PRN
Qty: 240 ML | Refills: 0 | Status: SHIPPED | OUTPATIENT
Start: 2023-12-15

## 2023-12-15 RX ORDER — ALBUTEROL SULFATE 90 UG/1
2 AEROSOL, METERED RESPIRATORY (INHALATION) EVERY 6 HOURS PRN
Qty: 54 G | Refills: 3 | Status: SHIPPED | OUTPATIENT
Start: 2023-12-15

## 2023-12-15 RX ORDER — AZITHROMYCIN 250 MG/1
TABLET, FILM COATED ORAL
Qty: 6 TABLET | Refills: 0 | Status: SHIPPED | OUTPATIENT
Start: 2023-12-15

## 2023-12-15 RX ORDER — ONDANSETRON 4 MG/1
4 TABLET, ORALLY DISINTEGRATING ORAL EVERY 8 HOURS PRN
Qty: 20 TABLET | Refills: 1 | Status: SHIPPED | OUTPATIENT
Start: 2023-12-15

## 2023-12-15 NOTE — PROGRESS NOTES
Chief Complaint  Flu Symptoms (Diarrhea, vomiting, body aches, chills, sore throat)    Subjective          Yessica June Galvin presents to [facility]  History of Present Illness  Patient is a 63-year-old female.  She is here for a same-day sick visit.  She is complaining of flu type symptoms, cough, wheezing, chest congestion, body aches, chills, sore throat, vomiting x 1 and occasional diarrhea.   She has COPD.   She has been using her inhalers.       Flu Symptoms  This is a new problem. The current episode started in the past 7 days. The problem occurs constantly. The problem has been waxing and waning. Associated symptoms include chills, congestion, coughing, headaches, myalgias, a sore throat and vomiting. Pertinent negatives include no fever. Nothing aggravates the symptoms. Treatments tried: inhalers.       The following portions of the patient's history were reviewed and updated as appropriate: allergies, current medications, past family history, past medical history, past social history, past surgical history and problem list.    Review of Systems   Constitutional:  Positive for activity change, appetite change and chills. Negative for fever.   HENT:  Positive for congestion and sore throat.    Respiratory:  Positive for cough.    Gastrointestinal:  Positive for vomiting.   Musculoskeletal:  Positive for myalgias.   Neurological:  Positive for headaches.         Objective   Vital Signs:   BP 96/68   Pulse 101   Temp 99.8 °F (37.7 °C)   Wt 71.7 kg (158 lb)   SpO2 97%   BMI 28.90 kg/m²           PHQ-2/9 Depression Screening  PHQ-9 Total Score:      JAVIER-7 Anxiety Screening  JAVIER-7             Physical Exam  Vitals reviewed.   Constitutional:       Appearance: She is well-developed. She is ill-appearing.   HENT:      Head: Normocephalic.      Right Ear: Tympanic membrane, ear canal and external ear normal.      Left Ear: Tympanic membrane, ear canal and external ear normal.       Nose: Congestion present.      Mouth/Throat:      Mouth: Mucous membranes are moist.      Pharynx: No oropharyngeal exudate or posterior oropharyngeal erythema.   Eyes:      Conjunctiva/sclera: Conjunctivae normal.      Pupils: Pupils are equal, round, and reactive to light.   Cardiovascular:      Rate and Rhythm: Regular rhythm. Tachycardia present.      Heart sounds: Normal heart sounds.   Pulmonary:      Effort: Pulmonary effort is normal.      Breath sounds: Examination of the right-upper field reveals wheezing. Examination of the left-upper field reveals wheezing. Wheezing present.   Abdominal:      General: Bowel sounds are normal.      Palpations: Abdomen is soft.   Musculoskeletal:         General: Normal range of motion.      Cervical back: Normal range of motion.   Lymphadenopathy:      Cervical: No cervical adenopathy.   Skin:     General: Skin is warm and dry.      Capillary Refill: Capillary refill takes less than 2 seconds.   Neurological:      Mental Status: She is alert and oriented to person, place, and time.   Psychiatric:         Mood and Affect: Mood normal.         Speech: Speech normal.         Behavior: Behavior normal. Behavior is cooperative.         Thought Content: Thought content normal.         Judgment: Judgment normal.        Result Review :                 Assessment and Plan    Diagnoses and all orders for this visit:    1. Sore throat (Primary)  -     POCT rapid strep A    2. Flu-like symptoms  -     POCT Influenza A/B  -     POCT SARS-CoV-2 Antigen    3. Nausea  -     ondansetron ODT (ZOFRAN-ODT) 4 MG disintegrating tablet; Place 1 tablet on the tongue Every 8 (Eight) Hours As Needed for Nausea or Vomiting.  Dispense: 20 tablet; Refill: 1    4. COPD with acute exacerbation  -     azithromycin (Zithromax Z-Toan) 250 MG tablet; Take 2 tablets by mouth on day 1, then 1 tablet daily on days 2-5  Dispense: 6 tablet; Refill: 0  -     methylPREDNISolone (MEDROL) 4 MG dose pack; Take  as directed on package instructions.  Dispense: 21 tablet; Refill: 0  -     albuterol sulfate  (90 Base) MCG/ACT inhaler; Inhale 2 puffs Every 6 (Six) Hours As Needed for Wheezing.  Dispense: 54 g; Refill: 3  -     promethazine-dextromethorphan (PROMETHAZINE-DM) 6.25-15 MG/5ML syrup; Take 5 mL by mouth 2 (Two) Times a Day As Needed for Cough.  Dispense: 240 mL; Refill: 0    POCT rapid strep A: negative  POCT Sars Cov2: not detected   POCT Influenza A/B: both negative   Treating for COPD exacerbation    GO to ER for any severe shortness of breath or high fever.   Follow up with PCP as needed.         Follow Up   Return if symptoms worsen or fail to improve.  Patient was given instructions and counseling regarding her condition or for health maintenance advice. Please see specific information pulled into the AVS if appropriate.

## 2023-12-19 DIAGNOSIS — M51.9 LUMBAR DISC DISEASE: ICD-10-CM

## 2023-12-19 DIAGNOSIS — R11.0 NAUSEA: ICD-10-CM

## 2023-12-20 RX ORDER — PROMETHAZINE HYDROCHLORIDE 25 MG/1
25 TABLET ORAL EVERY 6 HOURS PRN
Qty: 30 TABLET | Refills: 0 | Status: SHIPPED | OUTPATIENT
Start: 2023-12-20

## 2023-12-20 RX ORDER — GABAPENTIN 600 MG/1
600 TABLET ORAL 3 TIMES DAILY
Qty: 90 TABLET | Refills: 2 | Status: SHIPPED | OUTPATIENT
Start: 2023-12-20

## 2023-12-20 RX ORDER — TRAMADOL HYDROCHLORIDE 50 MG/1
100 TABLET ORAL EVERY 8 HOURS PRN
Qty: 150 TABLET | Refills: 2 | Status: SHIPPED | OUTPATIENT
Start: 2023-12-20

## 2023-12-24 DIAGNOSIS — G47.62 NOCTURNAL LEG CRAMPS: ICD-10-CM

## 2023-12-24 DIAGNOSIS — M25.552 LEFT HIP PAIN: ICD-10-CM

## 2023-12-24 DIAGNOSIS — M51.9 LUMBAR DISC DISEASE: ICD-10-CM

## 2023-12-26 RX ORDER — PRAMIPEXOLE DIHYDROCHLORIDE 0.5 MG/1
0.5 TABLET ORAL
Qty: 30 TABLET | Refills: 0 | Status: SHIPPED | OUTPATIENT
Start: 2023-12-26

## 2023-12-26 RX ORDER — CELECOXIB 200 MG/1
CAPSULE ORAL
Qty: 60 CAPSULE | Refills: 0 | Status: SHIPPED | OUTPATIENT
Start: 2023-12-26

## 2023-12-27 ENCOUNTER — TELEPHONE (OUTPATIENT)
Dept: CARDIOLOGY | Facility: HOSPITAL | Age: 63
End: 2023-12-27
Payer: MEDICARE

## 2023-12-27 ENCOUNTER — HOSPITAL ENCOUNTER (OUTPATIENT)
Dept: BONE DENSITY | Facility: HOSPITAL | Age: 63
Discharge: HOME | End: 2023-12-27
Admitting: STUDENT IN AN ORGANIZED HEALTH CARE EDUCATION/TRAINING PROGRAM
Payer: MEDICARE

## 2023-12-27 PROCEDURE — 77080 DXA BONE DENSITY AXIAL: CPT

## 2023-12-27 NOTE — TELEPHONE ENCOUNTER
Overdue test results notification received for patient's  Lipid panel  ordered on 10/25/2023. Spoke to patient and instructed her to get labs completed at a [facility] lab. Patient will go sometime this week to have labs completed. Advised patient to be fasting for these labs.

## 2023-12-28 ENCOUNTER — LAB (OUTPATIENT)
Dept: LAB | Facility: HOSPITAL | Age: 63
End: 2023-12-28
Payer: MEDICARE

## 2023-12-28 DIAGNOSIS — E78.5 HYPERLIPIDEMIA, UNSPECIFIED HYPERLIPIDEMIA TYPE: ICD-10-CM

## 2023-12-28 LAB
CHOLEST SERPL-MCNC: 159 MG/DL (ref 0–200)
HDLC SERPL-MCNC: 71 MG/DL (ref 40–60)
LDLC SERPL CALC-MCNC: 77 MG/DL (ref 0–100)
LDLC/HDLC SERPL: 1.08 {RATIO}
TRIGL SERPL-MCNC: 55 MG/DL (ref 0–150)
VLDLC SERPL-MCNC: 11 MG/DL (ref 5–40)

## 2023-12-28 PROCEDURE — 80061 LIPID PANEL: CPT

## 2023-12-28 PROCEDURE — 36415 COLL VENOUS BLD VENIPUNCTURE: CPT

## 2023-12-28 NOTE — PROGRESS NOTES
We have received the results of your recent cholesterol panel.  Your values were within acceptable limits.  At this time, we recommend that you continue your atorvastatin as prescribed.  Please let us know if you have any additional questions.

## 2023-12-29 LAB
EXPIRATION DATE: NORMAL
INTERNAL CONTROL: NORMAL
Lab: NORMAL
S PYO AG THROAT QL: NEGATIVE

## 2024-01-03 ENCOUNTER — PATIENT ROUNDING (BHMG ONLY) (OUTPATIENT)
Dept: PAIN MEDICINE | Facility: CLINIC | Age: 64
End: 2024-01-03
Payer: MEDICARE

## 2024-01-03 ENCOUNTER — OFFICE VISIT (OUTPATIENT)
Dept: PAIN MEDICINE | Facility: CLINIC | Age: 64
End: 2024-01-03
Payer: MEDICARE

## 2024-01-03 VITALS — HEIGHT: 62 IN | WEIGHT: 162 LBS | BODY MASS INDEX: 29.81 KG/M2

## 2024-01-03 DIAGNOSIS — M48.062 SPINAL STENOSIS OF LUMBAR REGION WITH NEUROGENIC CLAUDICATION: Primary | ICD-10-CM

## 2024-01-03 DIAGNOSIS — M54.16 LUMBAR RADICULOPATHY: ICD-10-CM

## 2024-01-03 PROCEDURE — 1160F RVW MEDS BY RX/DR IN RCRD: CPT | Performed by: STUDENT IN AN ORGANIZED HEALTH CARE EDUCATION/TRAINING PROGRAM

## 2024-01-03 PROCEDURE — 1125F AMNT PAIN NOTED PAIN PRSNT: CPT | Performed by: STUDENT IN AN ORGANIZED HEALTH CARE EDUCATION/TRAINING PROGRAM

## 2024-01-03 PROCEDURE — 1159F MED LIST DOCD IN RCRD: CPT | Performed by: STUDENT IN AN ORGANIZED HEALTH CARE EDUCATION/TRAINING PROGRAM

## 2024-01-03 PROCEDURE — 99204 OFFICE O/P NEW MOD 45 MIN: CPT | Performed by: STUDENT IN AN ORGANIZED HEALTH CARE EDUCATION/TRAINING PROGRAM

## 2024-01-03 NOTE — PROGRESS NOTES
A MY-CHART MESSAGE HAS BEEN SENT TO THE PATIENT FOR PATIENT ROUNDING WITH [facility].
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
## 2024-01-10 DIAGNOSIS — R11.0 NAUSEA: ICD-10-CM

## 2024-01-10 RX ORDER — PROMETHAZINE HYDROCHLORIDE 25 MG/1
25 TABLET ORAL EVERY 6 HOURS PRN
Qty: 30 TABLET | Refills: 0 | Status: SHIPPED | OUTPATIENT
Start: 2024-01-10

## 2024-01-11 DIAGNOSIS — I10 PRIMARY HYPERTENSION: ICD-10-CM

## 2024-01-11 DIAGNOSIS — R07.9 CHEST PAIN, UNSPECIFIED TYPE: ICD-10-CM

## 2024-01-11 RX ORDER — AMLODIPINE BESYLATE 5 MG/1
5 TABLET ORAL DAILY
Qty: 90 TABLET | Refills: 1 | Status: SHIPPED | OUTPATIENT
Start: 2024-01-11

## 2024-01-11 NOTE — TELEPHONE ENCOUNTER
Last seen on 12/6/23; refill sent to [facility].     Gisele Whiteside, [place]

## 2024-01-16 ENCOUNTER — OFFICE VISIT (OUTPATIENT)
Dept: GASTROENTEROLOGY | Facility: CLINIC | Age: 64
End: 2024-01-16
Payer: MEDICARE

## 2024-01-16 VITALS
OXYGEN SATURATION: 96 % | BODY MASS INDEX: 29.5 KG/M2 | TEMPERATURE: 97.6 F | WEIGHT: 160.3 LBS | HEIGHT: 62 IN | DIASTOLIC BLOOD PRESSURE: 88 MMHG | SYSTOLIC BLOOD PRESSURE: 142 MMHG | HEART RATE: 106 BPM

## 2024-01-16 DIAGNOSIS — R11.0 CHRONIC NAUSEA: Primary | ICD-10-CM

## 2024-01-16 DIAGNOSIS — K59.09 CHRONIC CONSTIPATION: ICD-10-CM

## 2024-01-16 DIAGNOSIS — K21.9 GASTROESOPHAGEAL REFLUX DISEASE, UNSPECIFIED WHETHER ESOPHAGITIS PRESENT: ICD-10-CM

## 2024-01-16 DIAGNOSIS — D12.6 TUBULAR ADENOMA OF COLON: ICD-10-CM

## 2024-01-16 PROCEDURE — 3077F SYST BP >= 140 MM HG: CPT | Performed by: PHYSICIAN ASSISTANT

## 2024-01-16 PROCEDURE — 99214 OFFICE O/P EST MOD 30 MIN: CPT | Performed by: PHYSICIAN ASSISTANT

## 2024-01-16 PROCEDURE — 3079F DIAST BP 80-89 MM HG: CPT | Performed by: PHYSICIAN ASSISTANT

## 2024-01-16 PROCEDURE — 1159F MED LIST DOCD IN RCRD: CPT | Performed by: PHYSICIAN ASSISTANT

## 2024-01-16 PROCEDURE — 1160F RVW MEDS BY RX/DR IN RCRD: CPT | Performed by: PHYSICIAN ASSISTANT

## 2024-01-16 NOTE — PROGRESS NOTES
Follow Up      Patient Name: Yessica Galvin  : 1960   MRN: [id number]     Chief Complaint:    Chief Complaint   Patient presents with    Nausea       History of Present Illness: Yessica Galvin is a 63 y.o. female, PMH includes asthma, COPD, GERD, HTN, thyroid dissease, RLS, who is here today for follow up on nausea.  is with patient for visit today.    Pt notes chronic nausea, intermittent and worse postprandially. She denies specific aggravating foods or factors. She has used zofran and phenergan intermittently with some relief. She endorses associated postprandial bloating.    She admittedly does not drink much water or consume adequate amount of natural fiber. She generally has a small, incomplete BM daily.     Patient denies associated fever, chills, abdominal pain, indigestion, vomiting, diarrhea, hematemesis, dysphagia, hematochezia, melena, weight loss or gain, dysuria, jaundice or bruising.    She is s/p CCY, GREY. She takes Celebrex daily.     CT A/P w/wo contrast 2023: No acute process in the abdomen or pelvis.     EGD 10/2022 with Dr. Tavarez. Normal upper / middle third of esophagus. Small hiatal hernia. LA Grade A esophagitis. Bx negative for EoE, intestinal metaplasia or dysplasia. Normal stomach and duodenum.     CSY  with Dr. Tavarez. Adequate bowel prep conditions. Internal hemorrhoids. 2mm polyp (tubular adenoma) in sigmoid colon, resected and retrieved. 6mm polyp (tubular adenoma) in transverse colon, resected and retrieved. Recommend repeat CSY in 3 years.     Subjective      Review of Systems:   Review of Systems   Constitutional:  Negative for appetite change, chills, diaphoresis, fatigue, fever, unexpected weight gain and unexpected weight loss.   HENT:  Negative for drooling, facial swelling, mouth sores, rhinorrhea, sore throat, tinnitus, trouble swallowing and voice change.    Eyes: Negative.    Respiratory:  Negative for cough, chest tightness and  shortness of breath.    Cardiovascular:  Negative for chest pain.   Gastrointestinal:  Positive for abdominal distention, constipation and nausea. Negative for abdominal pain, blood in stool, diarrhea, vomiting, GERD and indigestion.   Genitourinary:  Negative for dysuria, flank pain, hematuria and pelvic pain.   Musculoskeletal:  Negative for arthralgias and myalgias.   Skin:  Negative for color change, pallor and rash.   Neurological:  Negative for dizziness, tremors, syncope, weakness and numbness.   Psychiatric/Behavioral:  Negative for hallucinations and sleep disturbance. The patient is not nervous/anxious.    All other systems reviewed and are negative.      Medications:     Current Outpatient Medications:     acetaminophen (TYLENOL) 325 MG tablet, Take 2 tablets by mouth Every 4 (Four) Hours As Needed for Mild Pain ., Disp: , Rfl:     albuterol sulfate  (90 Base) MCG/ACT inhaler, Inhale 2 puffs Every 6 (Six) Hours As Needed for Wheezing., Disp: 54 g, Rfl: 3    amLODIPine (NORVASC) 5 MG tablet, TAKE 1 TABLET BY MOUTH DAILY, Disp: 90 tablet, Rfl: 1    aspirin 81 MG EC tablet, Take 1 tablet by mouth Daily., Disp: 30 tablet, Rfl: 3    atorvastatin (LIPITOR) 20 MG tablet, Take 1 tablet by mouth Every Night., Disp: 90 tablet, Rfl: 3    azithromycin (Zithromax Z-Toan) 250 MG tablet, Take 2 tablets by mouth on day 1, then 1 tablet daily on days 2-5 (Patient not taking: Reported on 1/3/2024), Disp: 6 tablet, Rfl: 0    Budeson-Glycopyrrol-Formoterol (Breztri Aerosphere) 160-9-4.8 MCG/ACT aerosol inhaler, Inhale 2 puffs 2 (Two) Times a Day., Disp: 3 each, Rfl: 3    busPIRone (BUSPAR) 15 MG tablet, Take 1 tablet by mouth 2 (Two) Times a Day., Disp: 60 tablet, Rfl: 2    celecoxib (CeleBREX) 200 MG capsule, TAKE 1 CAPSULE BY MOUTH TWICE A DAY, Disp: 60 capsule, Rfl: 0    gabapentin (NEURONTIN) 600 MG tablet, Take 1 tablet by mouth 3 (Three) Times a Day., Disp: 90 tablet, Rfl: 2    levocetirizine (Xyzal) 5 MG tablet,  Take 1 tablet by mouth Every Evening., Disp: 90 tablet, Rfl: 1    levothyroxine (SYNTHROID, LEVOTHROID) 137 MCG tablet, Take 1 tablet by mouth Daily., Disp: 30 tablet, Rfl: 0    lisinopril (PRINIVIL,ZESTRIL) 10 MG tablet, Take 1 tablet by mouth Daily., Disp: 90 tablet, Rfl: 1    montelukast (SINGULAIR) 10 MG tablet, Take 1 tablet by mouth Every Night., Disp: 90 tablet, Rfl: 1    omeprazole (priLOSEC) 40 MG capsule, Take 1 capsule by mouth Daily., Disp: 90 capsule, Rfl: 1    ondansetron ODT (ZOFRAN-ODT) 4 MG disintegrating tablet, Place 1 tablet on the tongue Every 8 (Eight) Hours As Needed for Nausea or Vomiting., Disp: 20 tablet, Rfl: 1    pramipexole (MIRAPEX) 0.5 MG tablet, TAKE ONE TABLET BY MOUTH EVERY NIGHT AT BEDTIME, Disp: 30 tablet, Rfl: 0    promethazine (PHENERGAN) 25 MG tablet, Take 1 tablet by mouth Every 6 (Six) Hours As Needed for Nausea or Vomiting., Disp: 30 tablet, Rfl: 0    promethazine-dextromethorphan (PROMETHAZINE-DM) 6.25-15 MG/5ML syrup, Take 5 mL by mouth 2 (Two) Times a Day As Needed for Cough., Disp: 240 mL, Rfl: 0    tiZANidine (ZANAFLEX) 4 MG tablet, Take 1 tablet by mouth At Night As Needed for Muscle Spasms., Disp: 30 tablet, Rfl: 0    traMADol (ULTRAM) 50 MG tablet, Take 2 tablets by mouth Every 8 (Eight) Hours As Needed for Moderate Pain., Disp: 150 tablet, Rfl: 2    triamcinolone (KENALOG) 0.1 % ointment, Apply 1 application  topically to the appropriate area as directed 2 (Two) Times a Day. For 7-14 days, Disp: 80 g, Rfl: 0    venlafaxine 225 MG tablet sustained-release 24 hour 24 hr tablet, Take 1 tablet by mouth Daily With Breakfast., Disp: 30 tablet, Rfl: 2    Allergies:   Allergies   Allergen Reactions    Codeine Nausea And Vomiting    Shrimp Hives       Social History:   Social History     Socioeconomic History    Marital status:    Tobacco Use    Smoking status: Some Days     Packs/day: 0.25     Years: 15.00     Additional pack years: 0.00     Total pack years: 3.75      Types: Cigarettes     Start date: 3/7/2000     Last attempt to quit: 2023     Years since quittin.4    Smokeless tobacco: Never    Tobacco comments:     I started when i was 17   Vaping Use    Vaping Use: Never used   Substance and Sexual Activity    Alcohol use: No    Drug use: No    Sexual activity: Not Currently     Partners: Female     Birth control/protection: None        Surgical History:   Past Surgical History:   Procedure Laterality Date    CARDIAC CATHETERIZATION N/A 2023    Procedure: Left Heart Cath;  Surgeon: Arben Pittman MD;  Location:  [facility] [facility];  Service: Cardiology;  Laterality: N/A;    HYSTERECTOMY      KNEE ARTHROSCOPY Right 03/10/2020    Procedure: KNEE ARTHROSCOPY RIGHT;  Surgeon: Guanaco Thakur MD;  Location: "[facility]" OR;  Service: Orthopedics;  Laterality: Right;    NECK SURGERY      ORIF WRIST FRACTURE Left 03/10/2020    Procedure: WRIST OPEN REDUCTION INTERNAL FIXATION LEFT;  Surgeon: Guanaco Thakur MD;  Location: "[facility] OR;  Service: Orthopedics;  Laterality: Left;    TIBIAL PLATEAU OPEN REDUCTION INTERNAL FIXATION Right 03/10/2020    Procedure: TIBIAL PLATEAU OPEN REDUCTION INTERNAL FIXATION RIGHT;  Surgeon: Guanaco Tahkur MD;  Location: "[facility] OR;  Service: Orthopedics;  Laterality: Right;        Medical History:   Past Medical History:   Diagnosis Date    Acute bronchitis     Acute sinusitis     Allergic rhinitis 2023    Asthma     Asthma 2020    Asthma with acute exacerbation     Asthma, extrinsic ,1970    I have had it since I was 5 yrs. Old    Asthmatic bronchitis     Bronchiectasis     Always catch it    Centrilobular emphysema 2022    Chronic bronchitis with acute exacerbation 2022    Disc degeneration, lumbar     Disc degeneration, lumbar     GERD (gastroesophageal reflux disease)     History of mammogram     Hypertension 10/05/2023    Hypothyroidism     Lower back pain     Medicare annual wellness visit,  "subsequent 10/05/2023    Nodule of upper lobe of right lung 06/02/2022    Plantar fasciitis     Restless legs syndrome         Objective     Physical Exam:  Vital Signs:   Vitals:    01/16/24 1113   BP: 142/88   Pulse: 106   Temp: 97.6 °F (36.4 °C)   TempSrc: Temporal   SpO2: 96%   Weight: 72.7 kg (160 lb 4.8 oz)   Height: 157.5 cm (62.01\")     Body mass index is 29.31 kg/m².     Physical Exam  Vitals and nursing note reviewed.   Constitutional:       Appearance: Normal appearance. She is normal weight. She is not ill-appearing or diaphoretic.   HENT:      Head: Normocephalic and atraumatic.      Right Ear: External ear normal.      Left Ear: External ear normal.      Nose: Nose normal.      Mouth/Throat:      Mouth: Mucous membranes are moist.      Pharynx: Oropharynx is clear.   Eyes:      Conjunctiva/sclera: Conjunctivae normal.      Pupils: Pupils are equal, round, and reactive to light.   Neck:      Thyroid: No thyromegaly.   Cardiovascular:      Rate and Rhythm: Normal rate and regular rhythm.      Pulses: Normal pulses.      Heart sounds: Normal heart sounds.   Pulmonary:      Effort: Pulmonary effort is normal.      Breath sounds: Normal breath sounds.   Abdominal:      General: Abdomen is flat. Bowel sounds are normal. There is no distension.      Tenderness: There is no abdominal tenderness.      Comments: Large stool burden throughout colon   Musculoskeletal:      Cervical back: Normal range of motion and neck supple.   Skin:     General: Skin is warm and dry.   Neurological:      General: No focal deficit present.      Mental Status: She is oriented to person, place, and time.   Psychiatric:         Mood and Affect: Mood normal.         Assessment / Plan      Assessment/Plan:   There are no diagnoses linked to this encounter.     Chronic nausea  GERD  Chronic constipation  Tubular adenoma of colon   - continue omeprazole 40mg daily    - pt advised to start alva root 500mg TID    - pt encouraged to " increase oral hydration, fiber intake, daily activity as tolerated   - pt given GERD diet instructions, advised to avoid GI irritants such as caffeine, carbonation, EtOH, tobacco, chocolate, peppermint, acid-based foods   - previous endoscopy and pathology reports reviewed   - schedule for CSY   - follow up in clinic after completion of above studies   - call clinic at any time for questions or new / worsened sx    Follow Up:   Return in about 6 weeks (around 2/27/2024).    Plan of care reviewed with the patient at the conclusion of today's visit.  Education was provided regarding diagnosis, management, and any prescribed or recommended OTC medications.  Patient verbalized understanding of and agreement with management plan.     NOTE TO PATIENT: The 21st Century Cures Act makes medical notes like these available to patients in the interest of transparency. However, be advised this is a medical document. It is intended as peer to peer communication. It is written in medical language and may contain abbreviations or verbiage that are unfamiliar. It may appear blunt or direct. Medical documents are intended to carry relevant information, facts as evident, and the clinical opinion of the practitioner.     Time Statement:   Discussed plan of care in detail with patient today. Patient verbally understands and agrees. I have spent 30 minutes reviewing available diagnostics, obtaining history, examining the patient, developing a treatment plan, and educating the patient on disease process and plan of care.     Dorene Gamboa PA-C   [facility]

## 2024-01-21 DIAGNOSIS — M51.9 LUMBAR DISC DISEASE: ICD-10-CM

## 2024-01-21 DIAGNOSIS — M25.552 LEFT HIP PAIN: ICD-10-CM

## 2024-01-21 DIAGNOSIS — G47.62 NOCTURNAL LEG CRAMPS: ICD-10-CM

## 2024-01-22 RX ORDER — PRAMIPEXOLE DIHYDROCHLORIDE 0.5 MG/1
0.5 TABLET ORAL
Qty: 30 TABLET | Refills: 0 | Status: SHIPPED | OUTPATIENT
Start: 2024-01-22

## 2024-01-22 RX ORDER — CELECOXIB 200 MG/1
CAPSULE ORAL
Qty: 60 CAPSULE | Refills: 0 | Status: SHIPPED | OUTPATIENT
Start: 2024-01-22

## 2024-01-25 ENCOUNTER — OUTSIDE FACILITY SERVICE (OUTPATIENT)
Dept: PAIN MEDICINE | Facility: CLINIC | Age: 64
End: 2024-01-25
Payer: MEDICARE

## 2024-01-25 ENCOUNTER — DOCUMENTATION (OUTPATIENT)
Dept: PAIN MEDICINE | Facility: CLINIC | Age: 64
End: 2024-01-25

## 2024-01-25 NOTE — PROGRESS NOTES
[facility]  [address] [place] [id number]      PROCEDURE: Fluoroscopically-guided left L2/L3 lumbar Interlaminar Epidural Steroid Injection     PRE-OP DIAGNOSIS: Lumbar spinal stenosis with neurogenic claudication  POST-OP DIAGNOSIS: Same    BLOOD THINNERS (ANTIPLATELETS/ANTICOAGULANTS): Were discussed with the patient and DHIRAJ Guidelines were followed.     CONSENT: Risks, benefits and options were explained to the patient, all questions were answered and written informed consent was obtained.     ANESTHESIA: Moderate sedation was required to maintain comfort, safety, and cooperation during the procedure.  The duration of sedation service was over 10 minutes.  Patient received 1 mg IV Versed and 50 mcg IV fentanyl.  Independent observation and monitoring was performed by ROGELIO Reynolds, RN.  The patient's level of consciousness and physiologic status was continually monitored with pulse oximetry, EKG from 959 to 1013.  There were no complications or adverse events during sedation.  After the sedation patient was taken to the recovery area.    PROCEDURE NOTE: A pre-procedural time out was performed to confirm the correct patient, procedure, side, and site. Standard ASA monitors were applied and oxygen via nasal cannula was provided. All proceduralists donned sterile gloves with masks and surgical hats. The patient was placed prone with pillow under the abdomen and all pressure points padded. The patient's lumbar spine was prepped in standard fashion using [Chlorhexidine] and draped with sterile towels. The target lumbar interspace was identified using fluoroscopy. The overlying skin and subcutaneous tissue was anesthetized with 1% lidocaine. A 20 gauge 10 cm Tuohy needle was advanced using intermittent AP and lateral fluoroscopy. The ligamentum flavum was engaged. Access to the epidural space was gained using a loss of resistance technique to air. Needle placement was  confirmed with 0.5 cc air to provide contrast using biplanar live fluoroscopic imaging. An epidurogram was noted without evidence of intravascular or intrathecal spread. After negative aspiration, a mixture containing 3 ml of preservative-free normal saline, dexamethasone 10mg steroid, lidocaine 1% - 2 ml local anesthetic for a total volume of 6 ml was injected under direct visualization with fluoroscopy. The Tuohy needle was flushed, removed and a bandage applied.     EBL: None     COMPLICATIONS: None     The patient tolerated the procedure well. Vital signs were stable. Sensory and motor exam was unchanged from baseline. The patient was observed in recovery and was discharged after meeting established criteria.    FOLLOW UP: As scheduled     ADDITIONAL NOTES: No contrast was used due to the patient's reported allergy      [facility]   Delgado Lal MD      CODES:  [id number]  [id number]

## 2024-01-29 DIAGNOSIS — M51.9 LUMBAR DISC DISEASE: ICD-10-CM

## 2024-01-30 RX ORDER — TRAMADOL HYDROCHLORIDE 50 MG/1
100 TABLET ORAL EVERY 8 HOURS PRN
Qty: 150 TABLET | Refills: 2 | Status: SHIPPED | OUTPATIENT
Start: 2024-01-30

## 2024-02-05 DIAGNOSIS — D12.6 TUBULAR ADENOMA OF COLON: ICD-10-CM

## 2024-02-05 DIAGNOSIS — K59.09 CHRONIC CONSTIPATION: Primary | ICD-10-CM

## 2024-02-06 DIAGNOSIS — R11.0 NAUSEA: ICD-10-CM

## 2024-02-06 RX ORDER — PROMETHAZINE HYDROCHLORIDE 25 MG/1
25 TABLET ORAL EVERY 6 HOURS PRN
Qty: 30 TABLET | Refills: 0 | Status: SHIPPED | OUTPATIENT
Start: 2024-02-06

## 2024-02-06 NOTE — TELEPHONE ENCOUNTER
Rx Refill Note  Requested Prescriptions     Pending Prescriptions Disp Refills    promethazine (PHENERGAN) 25 MG tablet 30 tablet 0     Sig: Take 1 tablet by mouth Every 6 (Six) Hours As Needed for Nausea or Vomiting.      Last office visit with prescribing clinician: 11/14/2023   Last telemedicine visit with prescribing clinician: Visit date not found   Next office visit with prescribing clinician: 2/14/2024                         Would you like a call back once the refill request has been completed: [] Yes [] No    If the office needs to give you a call back, can they leave a voicemail: [] Yes [] No    Kristy Anderson MA  02/06/24, 11:33 EST

## 2024-02-07 ENCOUNTER — TELEPHONE (OUTPATIENT)
Dept: FAMILY MEDICINE CLINIC | Facility: CLINIC | Age: 64
End: 2024-02-07

## 2024-02-07 ENCOUNTER — OFFICE VISIT (OUTPATIENT)
Dept: FAMILY MEDICINE CLINIC | Facility: CLINIC | Age: 64
End: 2024-02-07
Payer: MEDICARE

## 2024-02-07 VITALS
SYSTOLIC BLOOD PRESSURE: 150 MMHG | BODY MASS INDEX: 30.59 KG/M2 | TEMPERATURE: 98.4 F | HEART RATE: 64 BPM | WEIGHT: 166.2 LBS | HEIGHT: 62 IN | DIASTOLIC BLOOD PRESSURE: 70 MMHG | OXYGEN SATURATION: 95 %

## 2024-02-07 DIAGNOSIS — F41.9 ANXIETY AND DEPRESSION: ICD-10-CM

## 2024-02-07 DIAGNOSIS — F32.A ANXIETY AND DEPRESSION: ICD-10-CM

## 2024-02-07 DIAGNOSIS — M25.552 LEFT HIP PAIN: Primary | ICD-10-CM

## 2024-02-07 DIAGNOSIS — M81.0 AGE-RELATED OSTEOPOROSIS WITHOUT CURRENT PATHOLOGICAL FRACTURE: ICD-10-CM

## 2024-02-07 RX ORDER — KETOROLAC TROMETHAMINE 10 MG/1
10 TABLET, FILM COATED ORAL EVERY 6 HOURS PRN
Qty: 20 TABLET | Refills: 0 | Status: SHIPPED | OUTPATIENT
Start: 2024-02-07 | End: 2024-02-12

## 2024-02-07 RX ORDER — ALENDRONATE SODIUM 70 MG/1
70 TABLET ORAL
Qty: 4 TABLET | Refills: 11 | Status: SHIPPED | OUTPATIENT
Start: 2024-02-07

## 2024-02-07 RX ORDER — KETOROLAC TROMETHAMINE 30 MG/ML
30 INJECTION, SOLUTION INTRAMUSCULAR; INTRAVENOUS ONCE
Status: COMPLETED | OUTPATIENT
Start: 2024-02-07 | End: 2024-02-07

## 2024-02-07 RX ORDER — VENLAFAXINE HYDROCHLORIDE 150 MG/1
300 CAPSULE, EXTENDED RELEASE ORAL DAILY
Qty: 60 CAPSULE | Refills: 2 | Status: SHIPPED | OUTPATIENT
Start: 2024-02-07

## 2024-02-07 RX ADMIN — KETOROLAC TROMETHAMINE 30 MG: 30 INJECTION, SOLUTION INTRAMUSCULAR; INTRAVENOUS at 13:58

## 2024-02-07 NOTE — PROGRESS NOTES
Office Note     Name: Yessica Galvin    : 1960     MRN: [id number]     Chief Complaint  Left Hip Pain (Fell last month, 3 and 1/2 weeks of pain.)    Subjective     History of Present Illness:  Yessica Galvin is a 63 y.o. female who presents today for left hip pain.  Patient reports that she fell a month ago and feels like she has pulled a muscle.  She feels that the leg is out of its socket.  She reports that whenever she coughs, the pain is worse.  She is currently on tramadol and Celebrex and does not feel any improvement in her symptoms.    Depression: Patient does not feel that her depression is well-controlled.  She is currently on venlafaxine 225 mg daily.  She would like the different formulation of this because it is costing her too much money.  She also does not feel that her depression is very well-controlled.    Osteoporosis: Patient has evidence of osteoporosis of the right femoral neck.  We discussed starting alendronate and she would like to start this.          Objective     Past Medical History:   Diagnosis Date    Acute bronchitis     Acute sinusitis     Allergic rhinitis 2023    Asthma     Asthma 2020    Asthma with acute exacerbation     Asthma, extrinsic ,1970    I have had it since I was 5 yrs. Old    Asthmatic bronchitis     Bronchiectasis     Always catch it    Centrilobular emphysema 2022    Chronic bronchitis with acute exacerbation 2022    Disc degeneration, lumbar     Disc degeneration, lumbar     GERD (gastroesophageal reflux disease)     History of mammogram     Hypertension 10/05/2023    Hypothyroidism     Lower back pain     Medicare annual wellness visit, subsequent 10/05/2023    Nodule of upper lobe of right lung 2022    Plantar fasciitis     Restless legs syndrome      Past Surgical History:   Procedure Laterality Date    CARDIAC CATHETERIZATION N/A 2023    Procedure: Left Heart Cath;  Surgeon: Arben Pittman MD;  Location:  "[facility] CATH INVASIVE LOCATION;  Service: Cardiology;  Laterality: N/A;    CHOLECYSTECTOMY      COLONOSCOPY      HYSTERECTOMY      KNEE ARTHROSCOPY Right 03/10/2020    Procedure: KNEE ARTHROSCOPY RIGHT;  Surgeon: Guanaco Thakur MD;  Location:  [facility] OR;  Service: Orthopedics;  Laterality: Right;    NECK SURGERY      ORIF WRIST FRACTURE Left 03/10/2020    Procedure: WRIST OPEN REDUCTION INTERNAL FIXATION LEFT;  Surgeon: Guanaco Thakur MD;  Location:  [facility] OR;  Service: Orthopedics;  Laterality: Left;    TIBIAL PLATEAU OPEN REDUCTION INTERNAL FIXATION Right 03/10/2020    Procedure: TIBIAL PLATEAU OPEN REDUCTION INTERNAL FIXATION RIGHT;  Surgeon: Guanaco Thakur MD;  Location:  [facility] OR;  Service: Orthopedics;  Laterality: Right;    UPPER GASTROINTESTINAL ENDOSCOPY       Family History   Problem Relation Age of Onset    Aneurysm Mother     Hypertension Mother     Cancer Mother     Asthma Mother     Heart disease Mother     Diabetes Mother     Hypertension Father     Alzheimer's disease Father     Asthma Father     No Known Problems Sister     No Known Problems Brother     COPD Maternal Grandmother     Aneurysm Maternal Grandmother     No Known Problems Maternal Grandfather     Alzheimer's disease Paternal Grandmother     Dementia Paternal Grandmother     No Known Problems Paternal Grandfather     Breast cancer Neg Hx     Ovarian cancer Neg Hx        Vital Signs  /70   Pulse 64   Temp 98.4 °F (36.9 °C) (Infrared)   Ht 157.5 cm (62\")   Wt 75.4 kg (166 lb 3.2 oz)   SpO2 95%   BMI 30.40 kg/m²   Estimated body mass index is 30.4 kg/m² as calculated from the following:    Height as of this encounter: 157.5 cm (62\").    Weight as of this encounter: 75.4 kg (166 lb 3.2 oz).    Physical Exam  Vitals reviewed.   HENT:      Head: Normocephalic.      Nose: Nose normal.      Mouth/Throat:      Mouth: Mucous membranes are moist.   Eyes:      Conjunctiva/sclera: Conjunctivae normal.   Cardiovascular:      Rate " and Rhythm: Normal rate.   Pulmonary:      Effort: Pulmonary effort is normal.   Musculoskeletal:      Comments: Tenderness to palpation over left hip, decreased range of motion of left hip due to pain   Neurological:      Mental Status: She is alert.               Assessment and Plan     Diagnoses and all orders for this visit:    1. Left hip pain (Primary)  Assessment & Plan:  - Patient with left hip pain and tenderness to palpation over the left hip with decreased range of motion  - Toradol injection given today and oral continuation was prescribed   - Obtaining hip x-ray today, will follow-up on results and advise further    Orders:  -     ketorolac (TORADOL) injection 30 mg  -     ketorolac (TORADOL) 10 MG tablet; Take 1 tablet by mouth Every 6 (Six) Hours As Needed for Moderate Pain for up to 5 days. HOLD CELEBREX WHILE ON THIS MEDICATION  Dispense: 20 tablet; Refill: 0  -     XR Hip With or Without Pelvis 2 - 3 View Left; Future    2. Anxiety and depression  Assessment & Plan:  - Uncontrolled  - Will increase venlafaxine to 300 mg daily  - Will continue BuSpar 15 mg twice daily  - If symptoms uncontrolled, can consider switching to a different agent    Orders:  -     venlafaxine XR (EFFEXOR-XR) 150 MG 24 hr capsule; Take 2 capsules by mouth Daily.  Dispense: 60 capsule; Refill: 2    3. Age-related osteoporosis without current pathological fracture  Assessment & Plan:  - Patient with evidence osteoporosis on most recent DEXA scan  - We will start trial of alendronate    Orders:  -     alendronate (Fosamax) 70 MG tablet; Take 1 tablet by mouth Every 7 (Seven) Days.  Dispense: 4 tablet; Refill: 11       Follow Up  Return in about 1 month (around 3/7/2024) for follow up.    Betty Jasmine MD

## 2024-02-07 NOTE — TELEPHONE ENCOUNTER
Caller: Yessica Galvin    Relationship: Self    Best call back number: [phone]    Which medication are you concerned about: ketorolac (TORADOL) 10 MG tablet     Who prescribed you this medication: Betty Jasmine MD     When did you start taking this medication: NOT STARTED    What are your concerns: PATIENT STATES SHE DID RECEIVE AN INJECTION TODAY IN THE OFFICE AND THE PHARMACY IS REQUESTING A PRIOR AUTHORIZATION FOR THIS MEDICATION     How long have you had these concerns: 2/7/24

## 2024-02-07 NOTE — ASSESSMENT & PLAN NOTE
- Patient with left hip pain and tenderness to palpation over the left hip with decreased range of motion  - Toradol injection given today and oral continuation was prescribed   - Obtaining hip x-ray today, will follow-up on results and advise further

## 2024-02-07 NOTE — ASSESSMENT & PLAN NOTE
- Patient with evidence osteoporosis on most recent DEXA scan  - We will start trial of alendronate

## 2024-02-07 NOTE — TELEPHONE ENCOUNTER
Caller: [facility] [id number] - [facility] [address] [place] [address] - [phone] [facility] [phone]     Relationship: Pharmacy    Best call back number: [phone]     Which medication are you concerned about: KETOROLAC    What are your concerns: DID THE PATIENT RECEIVE THE INJECTION IN THE OFFICE?    PLEASE CALL AND ADVISE

## 2024-02-07 NOTE — ASSESSMENT & PLAN NOTE
- Uncontrolled  - Will increase venlafaxine to 300 mg daily  - Will continue BuSpar 15 mg twice daily  - If symptoms uncontrolled, can consider switching to a different agent

## 2024-02-08 ENCOUNTER — PRIOR AUTHORIZATION (OUTPATIENT)
Dept: FAMILY MEDICINE CLINIC | Facility: CLINIC | Age: 64
End: 2024-02-08
Payer: MEDICARE

## 2024-02-08 DIAGNOSIS — F32.A ANXIETY AND DEPRESSION: ICD-10-CM

## 2024-02-08 DIAGNOSIS — F41.9 ANXIETY AND DEPRESSION: ICD-10-CM

## 2024-02-08 RX ORDER — BUSPIRONE HYDROCHLORIDE 15 MG/1
15 TABLET ORAL 2 TIMES DAILY
Qty: 60 TABLET | Refills: 5 | Status: SHIPPED | OUTPATIENT
Start: 2024-02-08

## 2024-02-08 NOTE — TELEPHONE ENCOUNTER
Rx Refill Note  Requested Prescriptions     Pending Prescriptions Disp Refills    busPIRone (BUSPAR) 15 MG tablet [Pharmacy Med Name: BUSPIRONE HCL 15 MG TABLET] 60 tablet 2     Sig: TAKE 1 TABLET BY MOUTH TWICE A DAY      Last office visit with prescribing clinician: 2/7/2024   Last telemedicine visit with prescribing clinician: Visit date not found   Next office visit with prescribing clinician: 3/14/2024                         Would you like a call back once the refill request has been completed: [] Yes [] No    If the office needs to give you a call back, can they leave a voicemail: [] Yes [] No    Kristy Anderson MA  02/08/24, 07:43 EST

## 2024-02-09 DIAGNOSIS — G47.62 NOCTURNAL LEG CRAMPS: ICD-10-CM

## 2024-02-09 RX ORDER — PRAMIPEXOLE DIHYDROCHLORIDE 0.5 MG/1
0.5 TABLET ORAL
Qty: 30 TABLET | Refills: 0 | Status: SHIPPED | OUTPATIENT
Start: 2024-02-09

## 2024-02-13 ENCOUNTER — OFFICE VISIT (OUTPATIENT)
Dept: FAMILY MEDICINE CLINIC | Facility: CLINIC | Age: 64
End: 2024-02-13
Payer: MEDICARE

## 2024-02-13 ENCOUNTER — TELEPHONE (OUTPATIENT)
Dept: FAMILY MEDICINE CLINIC | Facility: CLINIC | Age: 64
End: 2024-02-13

## 2024-02-13 VITALS
WEIGHT: 162 LBS | HEIGHT: 62 IN | HEART RATE: 92 BPM | DIASTOLIC BLOOD PRESSURE: 88 MMHG | TEMPERATURE: 98.7 F | BODY MASS INDEX: 29.81 KG/M2 | SYSTOLIC BLOOD PRESSURE: 144 MMHG | OXYGEN SATURATION: 98 %

## 2024-02-13 DIAGNOSIS — G25.81 RESTLESS LEG SYNDROME: ICD-10-CM

## 2024-02-13 DIAGNOSIS — K52.9 GASTROENTERITIS: ICD-10-CM

## 2024-02-13 DIAGNOSIS — R05.9 COUGH, UNSPECIFIED TYPE: ICD-10-CM

## 2024-02-13 DIAGNOSIS — M25.552 LEFT HIP PAIN: Primary | ICD-10-CM

## 2024-02-13 RX ORDER — KETOROLAC TROMETHAMINE 10 MG/1
10 TABLET, FILM COATED ORAL EVERY 6 HOURS PRN
Qty: 20 TABLET | Refills: 0 | Status: SHIPPED | OUTPATIENT
Start: 2024-02-13 | End: 2024-02-18

## 2024-02-13 RX ORDER — PREDNISONE 20 MG/1
40 TABLET ORAL DAILY
Qty: 10 TABLET | Refills: 0 | Status: SHIPPED | OUTPATIENT
Start: 2024-02-13 | End: 2024-02-18

## 2024-02-13 RX ORDER — AZITHROMYCIN 500 MG/1
500 TABLET, FILM COATED ORAL DAILY
Qty: 3 TABLET | Refills: 0 | Status: SHIPPED | OUTPATIENT
Start: 2024-02-13 | End: 2024-02-16

## 2024-02-13 RX ORDER — LOPERAMIDE HYDROCHLORIDE 2 MG/1
2 TABLET ORAL 4 TIMES DAILY PRN
Qty: 30 TABLET | Refills: 0 | Status: SHIPPED | OUTPATIENT
Start: 2024-02-13

## 2024-02-13 RX ORDER — ROPINIROLE 0.5 MG/1
TABLET, FILM COATED ORAL
Qty: 49 TABLET | Refills: 0 | Status: SHIPPED | OUTPATIENT
Start: 2024-02-13 | End: 2024-03-12

## 2024-02-13 NOTE — TELEPHONE ENCOUNTER
Caller: Yessica Galvin    Relationship: Self    Best call back number: [phone]    Which medication are you concerned about: PATIENT DID NOT REMEMBER THE NAME OF THE MEDICATION, IT'S FOR HER RESTLESS LEG SYNDROME    Who prescribed you this medication: DR JIMÉNEZ    What are your concerns: PATIENT STATES DR JIMÉNEZ WAS GOING TO CHANGE THE DOSAGE ON HER MEDICATION FOR RESTLESS LEG SYNDROME. PATIENT DID NOT SEE THAT ONE WHEN SHE WENT TO THE PHARMACY TO  THE OTHER MEDICATIONS TODAY

## 2024-02-13 NOTE — TELEPHONE ENCOUNTER
I called the pharamacy and they stated that the patient picked up the medication. I called the patient and we went through the meds she picked up and she had it.

## 2024-02-15 DIAGNOSIS — R11.0 NAUSEA: ICD-10-CM

## 2024-02-15 RX ORDER — PROMETHAZINE HYDROCHLORIDE 25 MG/1
TABLET ORAL
Qty: 30 TABLET | Refills: 0 | Status: SHIPPED | OUTPATIENT
Start: 2024-02-15

## 2024-02-23 DIAGNOSIS — M25.552 LEFT HIP PAIN: ICD-10-CM

## 2024-02-26 DIAGNOSIS — G25.81 RESTLESS LEG SYNDROME: ICD-10-CM

## 2024-02-26 DIAGNOSIS — M25.552 LEFT HIP PAIN: ICD-10-CM

## 2024-02-26 RX ORDER — KETOROLAC TROMETHAMINE 10 MG/1
10 TABLET, FILM COATED ORAL EVERY 6 HOURS PRN
Qty: 20 TABLET | Refills: 0 | OUTPATIENT
Start: 2024-02-26 | End: 2024-03-02

## 2024-02-26 NOTE — TELEPHONE ENCOUNTER
Rx Refill Note  Requested Prescriptions     Pending Prescriptions Disp Refills    ketorolac (TORADOL) 10 MG tablet 20 tablet 0     Sig: Take 1 tablet by mouth Every 6 (Six) Hours As Needed for Moderate Pain for up to 5 days. HOLD CELEBREX WHILE ON THIS MEDICATION      Last office visit with prescribing clinician: 2/13/2024   Last telemedicine visit with prescribing clinician: Visit date not found   Next office visit with prescribing clinician: 3/14/2024                         Would you like a call back once the refill request has been completed: [] Yes [] No    If the office needs to give you a call back, can they leave a voicemail: [] Yes [] No    Dia Jama MA  02/26/24, 12:41 EST   [Joint Pain] : joint pain [Back Pain] : back pain [Negative] : Heme/Lymph [FreeTextEntry3] : double vision, blurry vision

## 2024-02-26 NOTE — TELEPHONE ENCOUNTER
Rx Refill Note  Requested Prescriptions     Pending Prescriptions Disp Refills    rOPINIRole (REQUIP) 0.5 MG tablet [Pharmacy Med Name: rOPINIRole HCL 0.5 MG TABLET] 49 tablet 0     Sig: TAKE 1/2 TABLET BY MOUTH EVERY NIGHT AT BEDTIME FOR ONE DAY THEN TAKE 1 TABLET BY MOUTH EVERY NIGHT AT BEDTIME FOR 6 DAYS THEN TAKE TWO TABLETS BY MOUTH EVERY NIGHT AT BEDTIME    ketorolac (TORADOL) 10 MG tablet [Pharmacy Med Name: KETOROLAC 10 MG TABLET] 20 tablet 0     Sig: TAKE ONE TABLET BY MOUTH EVERY 6 HOURS AS NEEDED FOR MODERATE PAIN FOR UP TO 5 DAYS ** HOLD CELECOXIB WHILE ON MEDICATION      Last office visit with prescribing clinician: 2/13/2024   Last telemedicine visit with prescribing clinician: Visit date not found   Next office visit with prescribing clinician: 3/14/2024                         Would you like a call back once the refill request has been completed: [] Yes [] No    If the office needs to give you a call back, can they leave a voicemail: [] Yes [] No    Dia Jama MA  02/26/24, 15:39 EST

## 2024-03-01 ENCOUNTER — PREP FOR SURGERY (OUTPATIENT)
Dept: OTHER | Facility: HOSPITAL | Age: 64
End: 2024-03-01
Payer: MEDICARE

## 2024-03-01 ENCOUNTER — OFFICE VISIT (OUTPATIENT)
Dept: NEUROSURGERY | Facility: CLINIC | Age: 64
End: 2024-03-01
Payer: MEDICARE

## 2024-03-01 VITALS — HEIGHT: 62 IN | TEMPERATURE: 97.8 F | WEIGHT: 166.6 LBS | BODY MASS INDEX: 30.66 KG/M2

## 2024-03-01 DIAGNOSIS — J44.9 CHRONIC OBSTRUCTIVE PULMONARY DISEASE, UNSPECIFIED COPD TYPE: ICD-10-CM

## 2024-03-01 DIAGNOSIS — M48.062 LUMBAR STENOSIS WITH NEUROGENIC CLAUDICATION: Primary | ICD-10-CM

## 2024-03-01 DIAGNOSIS — Q76.49 SPINAL DEFORMITY: ICD-10-CM

## 2024-03-01 PROCEDURE — 99214 OFFICE O/P EST MOD 30 MIN: CPT | Performed by: NEUROLOGICAL SURGERY

## 2024-03-01 PROCEDURE — 1160F RVW MEDS BY RX/DR IN RCRD: CPT | Performed by: NEUROLOGICAL SURGERY

## 2024-03-01 PROCEDURE — 1159F MED LIST DOCD IN RCRD: CPT | Performed by: NEUROLOGICAL SURGERY

## 2024-03-01 RX ORDER — FAMOTIDINE 20 MG/1
20 TABLET, FILM COATED ORAL
OUTPATIENT
Start: 2024-03-01

## 2024-03-01 RX ORDER — CHLORHEXIDINE GLUCONATE 4 G/100ML
SOLUTION TOPICAL
Qty: 120 ML | Refills: 0 | Status: SHIPPED | OUTPATIENT
Start: 2024-03-01

## 2024-03-01 RX ORDER — LOPERAMIDE HYDROCHLORIDE 2 MG/1
2 CAPSULE ORAL
COMMUNITY
Start: 2024-02-13

## 2024-03-01 RX ORDER — ACETAMINOPHEN 500 MG
1000 TABLET ORAL ONCE
OUTPATIENT
Start: 2024-03-01 | End: 2024-03-01

## 2024-03-01 RX ORDER — OXYCODONE HCL 10 MG/1
10 TABLET, FILM COATED, EXTENDED RELEASE ORAL ONCE
OUTPATIENT
Start: 2024-03-01 | End: 2024-03-01

## 2024-03-01 RX ORDER — IBUPROFEN 800 MG/1
800 TABLET ORAL ONCE
OUTPATIENT
Start: 2024-03-01 | End: 2024-03-01

## 2024-03-01 NOTE — PROGRESS NOTES
Patient: Yessica Galvin  : 1960    Primary Care Provider: Betty Jasmine MD    Requesting Provider: As above        History    Chief Complaint: Low back and bilateral lower remedy pain.    History of Present Illness: Ms. Galvin is a 63-year-old unemployed woman with a many year history of low back pain. Over the last several months that has become much more pronounced. Pain extends down into the side of her left leg to the ankle. She has some pain more proximally in the right lower extremity. Symptoms are worse with standing, walking, or lying on her left side. Sitting is better tolerated. She denies any bowel or bladder dysfunction. She smokes about 1 pack of tobacco per month and has a plan for quitting. She has a quit date established. She has not undergone formal treatment for her back difficulties. She does take diclofenac. She has significant COPD and is followed by pulmonology.  She is not on home oxygen.  She has attended therapy and that seems to have worsened her symptoms.  She had an epidural injection that helped for about 3 days.  Otherwise, her symptoms persist.    Review of Systems   Constitutional:  Negative for activity change, appetite change, chills, diaphoresis, fatigue, fever and unexpected weight change.   HENT:  Positive for voice change. Negative for congestion, dental problem, drooling, ear discharge, ear pain, facial swelling, hearing loss, mouth sores, nosebleeds, postnasal drip, rhinorrhea, sinus pressure, sinus pain, sneezing, sore throat, tinnitus and trouble swallowing.    Eyes:  Negative for photophobia, pain, discharge, redness, itching and visual disturbance.   Respiratory:  Positive for wheezing. Negative for apnea, cough, choking, chest tightness, shortness of breath and stridor.    Cardiovascular:  Negative for chest pain, palpitations and leg swelling.   Gastrointestinal:  Negative for abdominal distention, abdominal pain, anal bleeding, blood in stool,  "constipation, diarrhea, nausea, rectal pain and vomiting.   Endocrine: Negative for cold intolerance, heat intolerance, polydipsia, polyphagia and polyuria.   Genitourinary:  Negative for decreased urine volume, difficulty urinating, dyspareunia, dysuria, enuresis, flank pain, frequency, genital sores, hematuria, menstrual problem, pelvic pain, urgency, vaginal bleeding, vaginal discharge and vaginal pain.   Musculoskeletal:  Positive for back pain. Negative for arthralgias, gait problem, joint swelling, myalgias, neck pain and neck stiffness.   Skin:  Negative for color change, pallor, rash and wound.   Allergic/Immunologic: Negative for environmental allergies, food allergies and immunocompromised state.   Neurological:  Negative for dizziness, tremors, seizures, syncope, facial asymmetry, speech difficulty, weakness, light-headedness, numbness and headaches.   Hematological:  Negative for adenopathy. Does not bruise/bleed easily.   Psychiatric/Behavioral:  Negative for agitation, behavioral problems, confusion, decreased concentration, dysphoric mood, hallucinations, self-injury, sleep disturbance and suicidal ideas. The patient is not nervous/anxious and is not hyperactive.      The patient's past medical history, past surgical history, family history, and social history have been reviewed at length in the electronic medical record.      Physical Exam:   Temp 97.8 °F (36.6 °C) (Infrared)   Ht 157.5 cm (62.01\")   Wt 75.6 kg (166 lb 9.6 oz)   BMI 30.46 kg/m²   She ambulates in a very stooped fashion.    Medical Decision Making    Data Review:   (All imaging studies were personally reviewed unless stated otherwise)  MRI of the lumbar spine dated 6/20/2023 demonstrates diffuse degenerative disc disease.  There is some degenerative wedging of the upper endplate of L2.  High-grade spinal stenosis is noted at L2-3, L3-4, and L4-5.     Plain films of the lumbar spine dated 9/3/2023 demonstrate scoliosis and extensive " spondylosis.  There is wedging of L2.       Diagnosis:   1.  Lumbar stenosis with neurogenic claudication.  2.  Mechanical low back pain.  3.  Spinal deformity.  4.  Severe COPD.    Treatment Options:   I have recommended decompressive laminectomies at L2-3, L3-4, L4-5.  This will help with some of her symptoms but it will not eliminate all of them some of which are certainly related to mechanical low back pain.  Addressing her stenosis could help her walk more upright but some of that angulation of her spine may be postural due to her deformity.  The nature of the procedure as well as the potential risks, complications, limitations, and alternatives to the procedure were discussed at length with the patient and the patient has agreed to proceed with surgery.  Formal pulmonary clearance will be necessary given her history.    Scribed for Ulises Elias MD by Myriam Goodwin CMA on 3/1/2024 12:44 EST         I, Dr. Elias, personally performed the services described in the documentation, as scribed in my presence, and it is both accurate and complete.

## 2024-03-01 NOTE — H&P
Patient: Yessica Galvin  : 1960     Primary Care Provider: Betty Jasmine MD     Requesting Provider: As above           History     Chief Complaint: Low back and bilateral lower remedy pain.     History of Present Illness: Ms. Galvin is a 63-year-old unemployed woman with a many year history of low back pain. Over the last several months that has become much more pronounced. Pain extends down into the side of her left leg to the ankle. She has some pain more proximally in the right lower extremity. Symptoms are worse with standing, walking, or lying on her left side. Sitting is better tolerated. She denies any bowel or bladder dysfunction. She smokes about 1 pack of tobacco per month and has a plan for quitting. She has a quit date established. She has not undergone formal treatment for her back difficulties. She does take diclofenac. She has significant COPD and is followed by pulmonology.  She is not on home oxygen.  She has attended therapy and that seems to have worsened her symptoms.  She had an epidural injection that helped for about 3 days.  Otherwise, her symptoms persist.     Review of Systems   Constitutional:  Negative for activity change, appetite change, chills, diaphoresis, fatigue, fever and unexpected weight change.   HENT:  Positive for voice change. Negative for congestion, dental problem, drooling, ear discharge, ear pain, facial swelling, hearing loss, mouth sores, nosebleeds, postnasal drip, rhinorrhea, sinus pressure, sinus pain, sneezing, sore throat, tinnitus and trouble swallowing.    Eyes:  Negative for photophobia, pain, discharge, redness, itching and visual disturbance.   Respiratory:  Positive for wheezing. Negative for apnea, cough, choking, chest tightness, shortness of breath and stridor.    Cardiovascular:  Negative for chest pain, palpitations and leg swelling.   Gastrointestinal:  Negative for abdominal distention, abdominal pain, anal bleeding, blood in  stool, constipation, diarrhea, nausea, rectal pain and vomiting.   Endocrine: Negative for cold intolerance, heat intolerance, polydipsia, polyphagia and polyuria.   Genitourinary:  Negative for decreased urine volume, difficulty urinating, dyspareunia, dysuria, enuresis, flank pain, frequency, genital sores, hematuria, menstrual problem, pelvic pain, urgency, vaginal bleeding, vaginal discharge and vaginal pain.   Musculoskeletal:  Positive for back pain. Negative for arthralgias, gait problem, joint swelling, myalgias, neck pain and neck stiffness.   Skin:  Negative for color change, pallor, rash and wound.   Allergic/Immunologic: Negative for environmental allergies, food allergies and immunocompromised state.   Neurological:  Negative for dizziness, tremors, seizures, syncope, facial asymmetry, speech difficulty, weakness, light-headedness, numbness and headaches.   Hematological:  Negative for adenopathy. Does not bruise/bleed easily.   Psychiatric/Behavioral:  Negative for agitation, behavioral problems, confusion, decreased concentration, dysphoric mood, hallucinations, self-injury, sleep disturbance and suicidal ideas. The patient is not nervous/anxious and is not hyperactive.       The patient's past medical history, past surgical history, family history, and social history have been reviewed at length in the electronic medical record.     Past Medical History:   Diagnosis Date    Acute bronchitis     Acute sinusitis     Allergic rhinitis 09/06/2023    Asthma     Asthma 03/07/2020    Asthma with acute exacerbation     Asthma, extrinsic ,1970    I have had it since I was 5 yrs. Old    Asthmatic bronchitis     Bronchiectasis     Always catch it    Centrilobular emphysema 06/02/2022    Chronic bronchitis with acute exacerbation 06/02/2022    Disc degeneration, lumbar     Disc degeneration, lumbar     GERD (gastroesophageal reflux disease)     History of mammogram     Hypertension 10/05/2023    Hypothyroidism      Lower back pain     Medicare annual wellness visit, subsequent 10/05/2023    Nodule of upper lobe of right lung 06/02/2022    Plantar fasciitis     Restless legs syndrome      Past Surgical History:   Procedure Laterality Date    CARDIAC CATHETERIZATION N/A 11/17/2023    Procedure: Left Heart Cath;  Surgeon: rAben Pittman MD;  Location: [facility] CATH INVASIVE LOCATION;  Service: Cardiology;  Laterality: N/A;    CHOLECYSTECTOMY      COLONOSCOPY      HYSTERECTOMY      KNEE ARTHROSCOPY Right 03/10/2020    Procedure: KNEE ARTHROSCOPY RIGHT;  Surgeon: Guanaco Thakur MD;  Location: [facility] OR;  Service: Orthopedics;  Laterality: Right;    NECK SURGERY      ORIF WRIST FRACTURE Left 03/10/2020    Procedure: WRIST OPEN REDUCTION INTERNAL FIXATION LEFT;  Surgeon: Guanaco Thakur MD;  Location: [facility] OR;  Service: Orthopedics;  Laterality: Left;    TIBIAL PLATEAU OPEN REDUCTION INTERNAL FIXATION Right 03/10/2020    Procedure: TIBIAL PLATEAU OPEN REDUCTION INTERNAL FIXATION RIGHT;  Surgeon: Guanaco Thakur MD;  Location: [facility] OR;  Service: Orthopedics;  Laterality: Right;    UPPER GASTROINTESTINAL ENDOSCOPY       Family History   Problem Relation Age of Onset    Aneurysm Mother     Hypertension Mother     Cancer Mother     Asthma Mother     Heart disease Mother     Diabetes Mother     Hypertension Father     Alzheimer's disease Father     Asthma Father     No Known Problems Sister     No Known Problems Brother     COPD Maternal Grandmother     Aneurysm Maternal Grandmother     No Known Problems Maternal Grandfather     Alzheimer's disease Paternal Grandmother     Dementia Paternal Grandmother     No Known Problems Paternal Grandfather     Breast cancer Neg Hx     Ovarian cancer Neg Hx      Social History     Socioeconomic History    Marital status:    Tobacco Use    Smoking status: Some Days     Packs/day: 0.25     Years: 15.00     Additional pack years: 0.00     Total pack years: 3.75     Types:  Cigarettes     Start date: 3/7/2000     Last attempt to quit: 2023     Years since quittin.5    Smokeless tobacco: Never    Tobacco comments:     I started when i was 17   Vaping Use    Vaping Use: Never used   Substance and Sexual Activity    Alcohol use: No    Drug use: No    Sexual activity: Not Currently     Partners: Female     Birth control/protection: None           Allergies   Allergen Reactions    Codeine Nausea And Vomiting    Shrimp Hives       Current Outpatient Medications on File Prior to Visit   Medication Sig Dispense Refill    acetaminophen (TYLENOL) 325 MG tablet Take 2 tablets by mouth Every 4 (Four) Hours As Needed for Mild Pain .      albuterol sulfate  (90 Base) MCG/ACT inhaler Inhale 2 puffs Every 6 (Six) Hours As Needed for Wheezing. 54 g 3    alendronate (Fosamax) 70 MG tablet Take 1 tablet by mouth Every 7 (Seven) Days. 4 tablet 11    amLODIPine (NORVASC) 5 MG tablet TAKE 1 TABLET BY MOUTH DAILY 90 tablet 1    atorvastatin (LIPITOR) 20 MG tablet Take 1 tablet by mouth Every Night. 90 tablet 3    Budeson-Glycopyrrol-Formoterol (Breztri Aerosphere) 160-9-4.8 MCG/ACT aerosol inhaler Inhale 2 puffs 2 (Two) Times a Day. 3 each 3    busPIRone (BUSPAR) 15 MG tablet TAKE 1 TABLET BY MOUTH TWICE A DAY 60 tablet 5    gabapentin (NEURONTIN) 600 MG tablet Take 1 tablet by mouth 3 (Three) Times a Day. 90 tablet 2    levocetirizine (Xyzal) 5 MG tablet Take 1 tablet by mouth Every Evening. 90 tablet 1    levothyroxine (SYNTHROID, LEVOTHROID) 137 MCG tablet Take 1 tablet by mouth Daily. 30 tablet 0    lisinopril (PRINIVIL,ZESTRIL) 10 MG tablet Take 1 tablet by mouth Daily. 90 tablet 1    loperamide (IMODIUM) 2 MG capsule 1 capsule.      montelukast (SINGULAIR) 10 MG tablet Take 1 tablet by mouth Every Night. 90 tablet 1    omeprazole (priLOSEC) 40 MG capsule Take 1 capsule by mouth Daily. 90 capsule 1    ondansetron ODT (ZOFRAN-ODT) 4 MG disintegrating tablet Place 1 tablet on the tongue  "Every 8 (Eight) Hours As Needed for Nausea or Vomiting. 20 tablet 1    promethazine (PHENERGAN) 25 MG tablet TAKE ONE TABLET BY MOUTH EVERY 6 HOURS AS NEEDED FOR  NAUSEA AND/OR VOMITING 30 tablet 0    rOPINIRole (REQUIP) 0.5 MG tablet Take 0.5 tablets by mouth every night at bedtime for 1 day, THEN 1 tablet every night at bedtime for 6 days, THEN 2 tablets every night at bedtime for 21 days. 49 tablet 0    tiZANidine (ZANAFLEX) 4 MG tablet Take 1 tablet by mouth At Night As Needed for Muscle Spasms. 30 tablet 0    traMADol (ULTRAM) 50 MG tablet Take 2 tablets by mouth Every 8 (Eight) Hours As Needed for Moderate Pain. 150 tablet 2    venlafaxine XR (EFFEXOR-XR) 150 MG 24 hr capsule Take 2 capsules by mouth Daily. 60 capsule 2    [DISCONTINUED] loperamide (Imodium A-D) 2 MG tablet Take 1 tablet by mouth 4 (Four) Times a Day As Needed for Diarrhea. 30 tablet 0     No current facility-administered medications on file prior to visit.          Physical Exam:   Temp 97.8 °F (36.6 °C) (Infrared)   Ht 157.5 cm (62.01\")   Wt 75.6 kg (166 lb 9.6 oz)   BMI 30.46 kg/m²   She ambulates in a very stooped fashion.     Medical Decision Making     Data Review:   (All imaging studies were personally reviewed unless stated otherwise)  MRI of the lumbar spine dated 6/20/2023 demonstrates diffuse degenerative disc disease.  There is some degenerative wedging of the upper endplate of L2.  High-grade spinal stenosis is noted at L2-3, L3-4, and L4-5.     Plain films of the lumbar spine dated 9/3/2023 demonstrate scoliosis and extensive spondylosis.  There is wedging of L2.        Diagnosis:   1.  Lumbar stenosis with neurogenic claudication.  2.  Mechanical low back pain.  3.  Spinal deformity.  4.  Severe COPD.     Treatment Options:   I have recommended decompressive laminectomies at L2-3, L3-4, L4-5.  This will help with some of her symptoms but it will not eliminate all of them some of which are certainly related to mechanical low " back pain.  Addressing her stenosis could help her walk more upright but some of that angulation of her spine may be postural due to her deformity.  The nature of the procedure as well as the potential risks, complications, limitations, and alternatives to the procedure were discussed at length with the patient and the patient has agreed to proceed with surgery.  Formal pulmonary clearance will be necessary given her history.

## 2024-03-02 DIAGNOSIS — M25.552 LEFT HIP PAIN: ICD-10-CM

## 2024-03-02 DIAGNOSIS — G25.81 RESTLESS LEG SYNDROME: ICD-10-CM

## 2024-03-04 ENCOUNTER — TELEPHONE (OUTPATIENT)
Dept: PULMONOLOGY | Facility: CLINIC | Age: 64
End: 2024-03-04
Payer: MEDICARE

## 2024-03-04 RX ORDER — KETOROLAC TROMETHAMINE 10 MG/1
TABLET, FILM COATED ORAL
Qty: 20 TABLET | Refills: 0 | OUTPATIENT
Start: 2024-03-04

## 2024-03-04 RX ORDER — KETOROLAC TROMETHAMINE 10 MG/1
10 TABLET, FILM COATED ORAL EVERY 6 HOURS PRN
Qty: 20 TABLET | Refills: 0 | OUTPATIENT
Start: 2024-03-04 | End: 2024-03-09

## 2024-03-04 RX ORDER — ROPINIROLE 0.5 MG/1
TABLET, FILM COATED ORAL
Qty: 49 TABLET | Refills: 0 | OUTPATIENT
Start: 2024-03-04 | End: 2024-03-31

## 2024-03-04 RX ORDER — ROPINIROLE 1 MG/1
1 TABLET, FILM COATED ORAL
Qty: 30 TABLET | Refills: 1 | Status: SHIPPED | OUTPATIENT
Start: 2024-03-04

## 2024-03-04 NOTE — TELEPHONE ENCOUNTER
Caller: Yessica Galvin    Relationship: Self    Best call back number: [phone]    What is the best time to reach you: ANY TIME    Who are you requesting to speak with (clinical staff, provider,  specific staff member): DR JIMÉNEZ    Do you know the name of the person who called: SELF    What was the call regarding: PATIENT STILL HAS NOT RECEIVED HER REFILLS ON ROPINIROLE AND TORADOL AND IS OUT OF MEDICATION. PLEASE ADVISE

## 2024-03-04 NOTE — TELEPHONE ENCOUNTER
Received pre op clearance from Dr Yoo's office. Pt will need to schedule an apt w/ FULL PFT/CHEST XRAY. PAR notified.

## 2024-03-05 ENCOUNTER — TELEPHONE (OUTPATIENT)
Dept: NEUROSURGERY | Facility: CLINIC | Age: 64
End: 2024-03-05
Payer: MEDICARE

## 2024-03-05 ENCOUNTER — TELEPHONE (OUTPATIENT)
Dept: GASTROENTEROLOGY | Facility: CLINIC | Age: 64
End: 2024-03-05
Payer: MEDICARE

## 2024-03-05 NOTE — TELEPHONE ENCOUNTER
Hub staff attempted to follow warm transfer process and was unsuccessful     Caller: Yessica Galvin    Relationship to patient: Self    Best call back number: [phone]    Patient is needing: PT IS CALLING TO CANCEL PROCEDURE ON 03/18/2024. PT IS NOT NEEDING TO RESCHEDULE APPT. SHE IS GOING TO CALL BACK AND RESCHEDULE WHEN SHE IS READY. IF NEEDING TO REACH OUT TO THE PATIENT AND CAN NOT REACH PT. IT IS OKAY TO LEAVE AN VOICEMAIL.

## 2024-03-05 NOTE — TELEPHONE ENCOUNTER
Got hold of pt and she is willing to move forward and schedule her apt for PRE-OP CLEARANCE. Pt transferred to PAR to schedule w/ APRN.

## 2024-03-05 NOTE — TELEPHONE ENCOUNTER
Caller: Yessica Galvin    Relationship to patient: Self    Best call back number: [phone]    Type of visit: SURGERY    Additional notes: PATIENT HAS NOT HEARD ANYTHING REGARDING SCHEDULING SURGERY. SHE STATES SHE WAS ADVISED TO CALL IF THIS WAS THE CASE.   Review of System:   All pertinent positive and negative ROS (Review of Systems) as outlined in the HPI (History of Present Illess).  Constitutional: Negative for Fever, Weight loss or gain, loss of energy, difficulty performing chores  Eyes: Negative for Dry Eyes, Blurry Vision, or Double Vision  Ears, Nose, and Throat: Negative for Difficulty hearing, ringing in ears, runny nose, or sore throat  Cardiovascular: Negative for Rapid heart rate, Palpitations, Chest Pain, or Swollen ankles  Respiratory: Negative for Chronic cough, wheezing, or shortness of breath  Gastrointestinal: Negative for abdominal pain, diarrhea, constipation, blood in stool, heart burn, nausea, abdominal bloating  Musculoskeletal: Negative for multiple joint pains, chronic muscle aches, back pain, or neck pain  Skin/breast: Negative for breast pain, breast mass, skin lesion, or rash  Neurological: Negative for Numbness, Tingling, Seizures, or tremor  Mind: Negative   Endocrine: Negative for hot or cold intolerance, unexplained weight loss or gain, abnormal milk production  Hematologic/Lymphatic: Negative for easy bruising or bleeding, or swollen lymph nodes  Allergic/Immunologic: positive for latex allergy

## 2024-03-05 NOTE — TELEPHONE ENCOUNTER
I left a voicemail for the patient explaining that pulmonary clearance is required prior to scheduling her surgery. Once I receive it, I'll her to schedule.

## 2024-03-14 ENCOUNTER — OFFICE VISIT (OUTPATIENT)
Dept: FAMILY MEDICINE CLINIC | Facility: CLINIC | Age: 64
End: 2024-03-14
Payer: MEDICARE

## 2024-03-14 VITALS
DIASTOLIC BLOOD PRESSURE: 88 MMHG | TEMPERATURE: 98.7 F | SYSTOLIC BLOOD PRESSURE: 148 MMHG | BODY MASS INDEX: 29.81 KG/M2 | HEART RATE: 92 BPM | WEIGHT: 162 LBS | HEIGHT: 62 IN | OXYGEN SATURATION: 98 %

## 2024-03-14 DIAGNOSIS — I10 HYPERTENSION, UNSPECIFIED TYPE: Primary | ICD-10-CM

## 2024-03-14 DIAGNOSIS — F32.A ANXIETY AND DEPRESSION: ICD-10-CM

## 2024-03-14 DIAGNOSIS — M51.9 LUMBAR DISC DISEASE: ICD-10-CM

## 2024-03-14 DIAGNOSIS — M81.0 AGE-RELATED OSTEOPOROSIS WITHOUT CURRENT PATHOLOGICAL FRACTURE: ICD-10-CM

## 2024-03-14 DIAGNOSIS — G25.81 RESTLESS LEG SYNDROME: ICD-10-CM

## 2024-03-14 DIAGNOSIS — F41.9 ANXIETY AND DEPRESSION: ICD-10-CM

## 2024-03-14 PROCEDURE — 3079F DIAST BP 80-89 MM HG: CPT | Performed by: STUDENT IN AN ORGANIZED HEALTH CARE EDUCATION/TRAINING PROGRAM

## 2024-03-14 PROCEDURE — 1160F RVW MEDS BY RX/DR IN RCRD: CPT | Performed by: STUDENT IN AN ORGANIZED HEALTH CARE EDUCATION/TRAINING PROGRAM

## 2024-03-14 PROCEDURE — 1159F MED LIST DOCD IN RCRD: CPT | Performed by: STUDENT IN AN ORGANIZED HEALTH CARE EDUCATION/TRAINING PROGRAM

## 2024-03-14 PROCEDURE — 99214 OFFICE O/P EST MOD 30 MIN: CPT | Performed by: STUDENT IN AN ORGANIZED HEALTH CARE EDUCATION/TRAINING PROGRAM

## 2024-03-14 PROCEDURE — 3077F SYST BP >= 140 MM HG: CPT | Performed by: STUDENT IN AN ORGANIZED HEALTH CARE EDUCATION/TRAINING PROGRAM

## 2024-03-14 RX ORDER — CELECOXIB 100 MG/1
100 CAPSULE ORAL 2 TIMES DAILY PRN
Qty: 30 CAPSULE | Refills: 0 | Status: SHIPPED | OUTPATIENT
Start: 2024-03-14

## 2024-03-14 RX ORDER — LISINOPRIL 20 MG/1
20 TABLET ORAL DAILY
Qty: 30 TABLET | Refills: 0 | Status: SHIPPED | OUTPATIENT
Start: 2024-03-14

## 2024-03-14 RX ORDER — TIZANIDINE 4 MG/1
4 TABLET ORAL EVERY 12 HOURS PRN
Qty: 60 TABLET | Refills: 1 | Status: SHIPPED | OUTPATIENT
Start: 2024-03-14

## 2024-03-14 RX ORDER — ROPINIROLE 2 MG/1
2 TABLET, FILM COATED ORAL
Qty: 30 TABLET | Refills: 1 | Status: SHIPPED | OUTPATIENT
Start: 2024-03-14

## 2024-03-14 RX ORDER — TIZANIDINE 4 MG/1
4 TABLET ORAL EVERY 8 HOURS PRN
Qty: 60 TABLET | Refills: 0 | Status: SHIPPED | OUTPATIENT
Start: 2024-03-14 | End: 2024-03-14

## 2024-03-14 NOTE — ASSESSMENT & PLAN NOTE
- Uncontrolled  - Did not have benefit with pramipexole  - Increase ropinirole to 2 mg daily, advised patient to ensure that she is taking her gabapentin on a regular basis because that does help with restless leg syndrome as well

## 2024-03-14 NOTE — ASSESSMENT & PLAN NOTE
- Uncontrolled  - Increase lisinopril to 20 mg daily and have patient follow-up in 4 weeks for reevaluation

## 2024-03-14 NOTE — ASSESSMENT & PLAN NOTE
- Controlled, has been on this medication for a number of years  - Continue venlafaxine 300 mg daily and BuSpar 15 mg twice daily  -Reached out to nursing staff about prior authorization for venlafaxine

## 2024-03-14 NOTE — ASSESSMENT & PLAN NOTE
- Patient has an extensive history of degenerative disease of her back with scoliosis and a chronic compression fracture of L2  - She is following with neurosurgery, planning on operating soon after she has pulmonary clearance  - We will continue tramadol 100 mg every 8 hours as needed  - Increase tizanidine to twice daily as needed  - Continue gabapentin to 600 mg 3 times a day  -Will add celecoxib 100 mg twice daily to use as needed  - Baltazar reviewed and appropriate  - UDS and medication contract up-to-date

## 2024-03-14 NOTE — PROGRESS NOTES
Pre-operative Clearance    HPI     Yessica Galvin is a pleasant 63 y.o. female who has been referred for preoperative exam for a multilevel lumbar decompressive laminectomies by Dr. Elias.     This patient is accompanied by their self and  who contributes to the history of their care.     There is a PMH of HTN, hypothyroidism, RLS, anxiety/depression, lumbar stenosis with claudication, osteoporosis, GERD, allergic rhinitis, childhood asthma, chronic bronchitis, COPD, and bronchiectasis.     She was last seen in the office by Dr. Tom in July 2023.    Prior PFTs compatible with stage II COPD with significant air trapping and hyperinflation.  She is on Breztri 2 puffs twice a day and albuterol as needed that she does use frequently.     Did have a 6 mm noncalcified right upper lobe nodule that was stable on serial CT imaging.    She does continue to smoke cigarettes occasionally.    She did require intubation for an orthopedic procedure in 2020 without pulmonary complication.    Past Medical History:   Diagnosis Date    Acute bronchitis     Acute sinusitis     Allergic rhinitis 09/06/2023    Asthma     Asthma 03/07/2020    Asthma with acute exacerbation     Asthma, extrinsic ,1970    I have had it since I was 5 yrs. Old    Asthmatic bronchitis     Bronchiectasis     Always catch it    Centrilobular emphysema 06/02/2022    Chronic bronchitis with acute exacerbation 06/02/2022    Disc degeneration, lumbar     Disc degeneration, lumbar     GERD (gastroesophageal reflux disease)     History of mammogram     Hypertension 10/05/2023    Hypothyroidism     Lower back pain     Medicare annual wellness visit, subsequent 10/05/2023    Nodule of upper lobe of right lung 06/02/2022    Plantar fasciitis     Restless legs syndrome        Past Surgical History:   Procedure Laterality Date    CARDIAC CATHETERIZATION N/A 11/17/2023    Procedure: Left Heart Cath;  Surgeon: Arben Pittman MD;  Location: Located within [facility]  INVASIVE LOCATION;  Service: Cardiology;  Laterality: N/A;    CHOLECYSTECTOMY      COLONOSCOPY      HYSTERECTOMY      KNEE ARTHROSCOPY Right 03/10/2020    Procedure: KNEE ARTHROSCOPY RIGHT;  Surgeon: Guanaco Thakur MD;  Location:  [facility];  Service: Orthopedics;  Laterality: Right;    NECK SURGERY      ORIF WRIST FRACTURE Left 03/10/2020    Procedure: WRIST OPEN REDUCTION INTERNAL FIXATION LEFT;  Surgeon: Guanaco Thakur MD;  Location:  [facility];  Service: Orthopedics;  Laterality: Left;    TIBIAL PLATEAU OPEN REDUCTION INTERNAL FIXATION Right 03/10/2020    Procedure: TIBIAL PLATEAU OPEN REDUCTION INTERNAL FIXATION RIGHT;  Surgeon: Guanaco Thakur MD;  Location:  [facility];  Service: Orthopedics;  Laterality: Right;    UPPER GASTROINTESTINAL ENDOSCOPY         Family History   Problem Relation Age of Onset    Aneurysm Mother     Hypertension Mother     Cancer Mother     Asthma Mother     Heart disease Mother     Diabetes Mother     Hypertension Father     Alzheimer's disease Father     Asthma Father     No Known Problems Sister     No Known Problems Brother     COPD Maternal Grandmother     Aneurysm Maternal Grandmother     No Known Problems Maternal Grandfather     Alzheimer's disease Paternal Grandmother     Dementia Paternal Grandmother     No Known Problems Paternal Grandfather     Breast cancer Neg Hx     Ovarian cancer Neg Hx        Social History     Socioeconomic History    Marital status:    Tobacco Use    Smoking status: Some Days     Current packs/day: 0.00     Average packs/day: 0.3 packs/day for 23.4 years (5.9 ttl pk-yrs)     Types: Cigarettes     Start date: 3/7/2000     Last attempt to quit: 2023     Years since quittin.6     Passive exposure: Current    Smokeless tobacco: Never    Tobacco comments:     I started when i was 17   Vaping Use    Vaping status: Never Used   Substance and Sexual Activity    Alcohol use: No    Drug use: No    Sexual activity: Not Currently      Partners: Female     Birth control/protection: None       Allergies   Allergen Reactions    Codeine Nausea And Vomiting    Shrimp Hives       ROS    Review of Systems   Constitutional:  Negative for chills, fatigue and fever.   Respiratory:  Positive for cough and shortness of breath. Negative for chest tightness and wheezing.    Cardiovascular: Negative.        Vitals:    03/19/24 1332   BP: 142/78   Pulse: 92   Temp: 98.1 °F (36.7 °C)   SpO2: 95%         Current Outpatient Medications:     acetaminophen (TYLENOL) 325 MG tablet, Take 2 tablets by mouth Every 4 (Four) Hours As Needed for Mild Pain ., Disp: , Rfl:     albuterol sulfate  (90 Base) MCG/ACT inhaler, Inhale 2 puffs Every 6 (Six) Hours As Needed for Wheezing., Disp: 54 g, Rfl: 3    alendronate (Fosamax) 70 MG tablet, Take 1 tablet by mouth Every 7 (Seven) Days., Disp: 4 tablet, Rfl: 11    atorvastatin (LIPITOR) 20 MG tablet, Take 1 tablet by mouth Every Night., Disp: 90 tablet, Rfl: 3    Budeson-Glycopyrrol-Formoterol (Breztri Aerosphere) 160-9-4.8 MCG/ACT aerosol inhaler, Inhale 2 puffs 2 (Two) Times a Day., Disp: 3 each, Rfl: 3    busPIRone (BUSPAR) 15 MG tablet, TAKE 1 TABLET BY MOUTH TWICE A DAY, Disp: 60 tablet, Rfl: 5    celecoxib (CeleBREX) 100 MG capsule, Take 1 capsule by mouth 2 (Two) Times a Day As Needed for Mild Pain., Disp: 30 capsule, Rfl: 0    chlorhexidine (HIBICLENS) 4 % external liquid, Shower each day with solution for 5 days beginning 5 days before surgery., Disp: 120 mL, Rfl: 0    gabapentin (NEURONTIN) 600 MG tablet, Take 1 tablet by mouth 3 (Three) Times a Day., Disp: 90 tablet, Rfl: 2    levocetirizine (Xyzal) 5 MG tablet, Take 1 tablet by mouth Every Evening., Disp: 90 tablet, Rfl: 1    levothyroxine (SYNTHROID, LEVOTHROID) 137 MCG tablet, Take 1 tablet by mouth Daily., Disp: 30 tablet, Rfl: 0    lisinopril (PRINIVIL,ZESTRIL) 20 MG tablet, Take 1 tablet by mouth Daily., Disp: 30 tablet, Rfl: 0    loperamide (IMODIUM) 2 MG  capsule, 1 capsule., Disp: , Rfl:     montelukast (SINGULAIR) 10 MG tablet, Take 1 tablet by mouth Every Night., Disp: 90 tablet, Rfl: 1    mupirocin (BACTROBAN) 2 % nasal ointment, Apply to the inside of each nostril with a cotton swab two times daily, morning and evening, for 5 days before surgery., Disp: 10 each, Rfl: 0    omeprazole (priLOSEC) 40 MG capsule, Take 1 capsule by mouth Daily., Disp: 90 capsule, Rfl: 1    ondansetron ODT (ZOFRAN-ODT) 4 MG disintegrating tablet, Place 1 tablet on the tongue Every 8 (Eight) Hours As Needed for Nausea or Vomiting., Disp: 20 tablet, Rfl: 1    promethazine (PHENERGAN) 25 MG tablet, TAKE ONE TABLET BY MOUTH EVERY 6 HOURS AS NEEDED FOR  NAUSEA AND/OR VOMITING, Disp: 30 tablet, Rfl: 0    rOPINIRole (REQUIP) 2 MG tablet, Take 1 tablet by mouth every night at bedtime., Disp: 30 tablet, Rfl: 1    tiZANidine (ZANAFLEX) 4 MG tablet, Take 1 tablet by mouth Every 12 (Twelve) Hours As Needed for Muscle Spasms., Disp: 60 tablet, Rfl: 1    traMADol (ULTRAM) 50 MG tablet, Take 2 tablets by mouth Every 8 (Eight) Hours As Needed for Moderate Pain., Disp: 150 tablet, Rfl: 2    venlafaxine XR (EFFEXOR-XR) 150 MG 24 hr capsule, Take 2 capsules by mouth Daily., Disp: 60 capsule, Rfl: 2    nicotine (Nicotrol) 10 MG inhaler, Inhale 1 puff As Needed for Smoking Cessation., Disp: 168 each, Rfl: 1    OBJECTIVE    6 MWT: Unrevealing for exertional hypoxemia. Lowest SpO2 was 90% at 4 minutes. Total distance 213.4 meters.     PFTs:   3/19/24: Moderate obstruction with FEV1 1.46, 69% predicted. No restriction. Normal DLCO.     Spirometry on 7/19/2023.  Please see scanned PFT report for complete details.  FVC was 3.25 L or 101% predicted.  FEV1 was 1.19 L or 50% predicted.  FEV1 to FVC ratio 37%.  Maximal voluntary ventilation 37 L/min or 40% predicted.  Postbronchodilator FEV1 was 1.28 L or 53% predicted, and 8% increase from prebronchodilator.  Interpretation: Moderate obstruction without  significant response to bronchodilator.  Low maximal voluntary ventilation, which may be effort related.  No evidence to suggest restriction.  Compared to prior study on 12/7/2022, FEV1 is decreased by 260 mL.     Spirometry on 12/7/2022.  Please see scanned PFT report for complete details.  FVC was 3.56 L or 110% predicted.  FEV1 was 1.45 L or 61% predicted.  FEV1 to FVC ratio 41%.  Maximal voluntary ventilation 55 L/min or 60% predicted.   Interpretation: Moderate obstruction.  Bronchodilator study not completed secondary to patient using rescue inhaler within 2 hours of testing.  Compared to prior study on 6/2/2022, FEV1 is decreased by 220 mL on today's study.     CXR PA/lateral: Obtained today and awaiting final MD interpretation. I will call if there are abnormal results.     PE    Physical Exam  Vitals reviewed.   Constitutional:       General: She is not in acute distress.     Appearance: Normal appearance.      Comments: Kyphotic    Cardiovascular:      Rate and Rhythm: Normal rate and regular rhythm.      Pulses: Normal pulses.      Heart sounds: Normal heart sounds.   Pulmonary:      Effort: Pulmonary effort is normal. No respiratory distress.      Breath sounds: No wheezing, rhonchi or rales.   Skin:     General: Skin is warm and dry.   Neurological:      Mental Status: She is alert.          ARISCAT Preoperative Pulmonary Risk Index.    Age []   <= 50 years old (0 points)    [x]   51-80 years old (3 points)    []   >80 years old (16 points)       Preoperative Oxygen Saturation []   >= 96% (0 points)    [x]   91-95% (8 points)    []   <= 90% (24 points)       Other Clinical Risk Factors []   Respiratory Infection in the last month (17 points)    []   Pre-operative anemia: Hb < 10 g/dL (11 points)    []   Emergency Surgery (8 points)       Surgical Incision []   Upper abdominal (15 points)    []   Intrathoracic (24 points)       Duration of Surgery [x]   < 2 hours (0 points)    []   2-3 hours (16 points)     []   >3 hours (23 points)       Total Criteria Point Count: 11     ARISCAT risk index interpretation:      0-25 points: Low risk  1.6 % pulmonary complication rate.   26-44 points: Intermediate risk 13.3% pulmonary complication rate.    points: High risk 42.1 % pulmonary complication rate.     Post-operative Pulmonary Complications include but are not limited to: Respiratory failure, pulmonary infection, pleural effusion, atelectasis, pneumothorax, bronchospasm, and/or aspiration pneumonia.    No images are attached to the encounter or orders placed in the encounter.    A/P    Problem List Items Addressed This Visit          Pulmonary and Pneumonias    Asthmatic bronchitis    Relevant Medications    levocetirizine (Xyzal) 5 MG tablet    montelukast (SINGULAIR) 10 MG tablet    promethazine (PHENERGAN) 25 MG tablet    albuterol sulfate  (90 Base) MCG/ACT inhaler    Budeson-Glycopyrrol-Formoterol (Breztri Aerosphere) 160-9-4.8 MCG/ACT aerosol inhaler    Asthma    Relevant Medications    montelukast (SINGULAIR) 10 MG tablet    albuterol sulfate  (90 Base) MCG/ACT inhaler    Budeson-Glycopyrrol-Formoterol (Breztri Aerosphere) 160-9-4.8 MCG/ACT aerosol inhaler    Centrilobular emphysema    Overview     Stage II COPD with FEV1 1.67 L or 76% predicted 6/2/22         Relevant Medications    levocetirizine (Xyzal) 5 MG tablet    montelukast (SINGULAIR) 10 MG tablet    promethazine (PHENERGAN) 25 MG tablet    albuterol sulfate  (90 Base) MCG/ACT inhaler    Budeson-Glycopyrrol-Formoterol (Breztri Aerosphere) 160-9-4.8 MCG/ACT aerosol inhaler    Encounter for pre-operative respiratory clearance       Tobacco    Tobacco abuse     Other Visit Diagnoses       Preoperative respiratory examination    -  Primary    Relevant Orders    XR Chest PA & Lateral    COPD with acute exacerbation        Relevant Medications    albuterol sulfate  (90 Base) MCG/ACT inhaler    Budeson-Glycopyrrol-Formoterol  (Breztri Aerosphere) 160-9-4.8 MCG/ACT aerosol inhaler    Tobacco use        Relevant Medications    nicotine (Nicotrol) 10 MG inhaler          Per the ARISCAT criteria the patient would be deemed a low pulmonary pre-operative risk given the surgery is <2 hours. Should the surgery be longer this risk would be increased.     However given her stage II COPD, I would deem her at least a moderate pulmonary pre-operative risk.  Currently her COPD has been stable on her current regimen.  She denies any recent exacerbations.    She did require intubation for an orthopedic procedure in 2020 without any post-operative pulmonary complication(s).    We discussed the risks stated above in detail at today's visit as well as post-operative management and strategies to minimize post-operative pulmonary complications.     I did refill her Breztri and albuterol rescue inhaler today.     RTC in 3-4 months for ongoing COPD management. She will be due for a LDCT of the chest in November. Also we discussed consideration of A1AT testing at the next visit.      Level of service justified based on 33 minutes spent in patient care on this date of service including, but not limited to: preparing to see the patient, obtaining and/or reviewing history, performing medically appropriate examination, ordering tests/medicine/procedures, independently interpreting results, documenting clinical information in EHR, and counseling/education of patient/family/caregiver. (Level 4 30-39 minutes; Level 5 40-54 minutes)    Electronically signed by IDALIA Cobian, 03/19/24, 2:06 PM EDT.    Please note that portions of this note were completed with a voice recognition program.      CC: Betty Jasmine MD Sara Starks, APRN

## 2024-03-14 NOTE — ASSESSMENT & PLAN NOTE
- Patient with evidence osteoporosis on most recent DEXA scan  - Advised patient to start alendronate

## 2024-03-14 NOTE — PROGRESS NOTES
Office Note     Name: Yessica Galvin    : 1960     MRN: [id number]     Chief Complaint  Anxiety, Depression, and Hip Pain    Subjective     History of Present Illness:  Yessica Galvin is a 63 y.o. female who presents today for chronic care management.    HTN: Patient is currently on lisinopril 10 mg daily and amlodipine 5 mg daily.  She did take both of these last night.  She denies any chest pain, dizziness, or blurry vision.  Her blood pressure is elevated today.     Depression and anxiety: Patient does feel that her depression is well-controlled.  She is currently on venlafaxine 300mg daily.  She is also taking BuSpar 15 mg twice daily.  She reports that she needs a prior authorization for the venlafaxine.     Osteoporosis: Patient has evidence of osteoporosis of the right femoral neck.  We discussed starting alendronate.  Does not remember to take this medication    Back and hip pain: Patient will be getting surgery and she needs pulmonary clearance.  She does have an appointment coming up with her lung doctor.  She is currently on tramadol 100 mg every 8 hours as needed, takes tizanidine at bedtime.  She also is taking gabapentin 600 mg 3 times a day.  She did feel that the nonsteroidal anti-inflammatory medications helped.  She was given a Toradol injection at her last visit.  She would like to start something similar.     RLS: She is currently on ropinirole 1 mg nightly.  She reports that it is getting worse and she even feels that her legs are restless during the day.            Objective     Past Medical History:   Diagnosis Date    Acute bronchitis     Acute sinusitis     Allergic rhinitis 2023    Asthma     Asthma 2020    Asthma with acute exacerbation     Asthma, extrinsic ,1970    I have had it since I was 5 yrs. Old    Asthmatic bronchitis     Bronchiectasis     Always catch it    Centrilobular emphysema 2022    Chronic bronchitis with acute exacerbation  06/02/2022    Disc degeneration, lumbar     Disc degeneration, lumbar     GERD (gastroesophageal reflux disease)     History of mammogram     Hypertension 10/05/2023    Hypothyroidism     Lower back pain     Medicare annual wellness visit, subsequent 10/05/2023    Nodule of upper lobe of right lung 06/02/2022    Plantar fasciitis     Restless legs syndrome      Past Surgical History:   Procedure Laterality Date    CARDIAC CATHETERIZATION N/A 11/17/2023    Procedure: Left Heart Cath;  Surgeon: Arben Pittman MD;  Location:  [facility] C[facility] INVASIVE LOCATION;  Service: Cardiology;  Laterality: N/A;    CHOLECYSTECTOMY      COLONOSCOPY      HYSTERECTOMY      KNEE ARTHROSCOPY Right 03/10/2020    Procedure: KNEE ARTHROSCOPY RIGHT;  Surgeon: Guanaco Thakur MD;  Location: [facility];  Service: Orthopedics;  Laterality: Right;    NECK SURGERY      ORIF WRIST FRACTURE Left 03/10/2020    Procedure: WRIST OPEN REDUCTION INTERNAL FIXATION LEFT;  Surgeon: Guanaco Thakur MD;  Location: [facility];  Service: Orthopedics;  Laterality: Left;    TIBIAL PLATEAU OPEN REDUCTION INTERNAL FIXATION Right 03/10/2020    Procedure: TIBIAL PLATEAU OPEN REDUCTION INTERNAL FIXATION RIGHT;  Surgeon: Guanaco Thakur MD;  Location: [facility];  Service: Orthopedics;  Laterality: Right;    UPPER GASTROINTESTINAL ENDOSCOPY       Family History   Problem Relation Age of Onset    Aneurysm Mother     Hypertension Mother     Cancer Mother     Asthma Mother     Heart disease Mother     Diabetes Mother     Hypertension Father     Alzheimer's disease Father     Asthma Father     No Known Problems Sister     No Known Problems Brother     COPD Maternal Grandmother     Aneurysm Maternal Grandmother     No Known Problems Maternal Grandfather     Alzheimer's disease Paternal Grandmother     Dementia Paternal Grandmother     No Known Problems Paternal Grandfather     Breast cancer Neg Hx     Ovarian cancer Neg Hx        Vital Signs  /88   Pulse 92    "Temp 98.7 °F (37.1 °C) (Infrared)   Ht 157.5 cm (62\")   Wt 73.5 kg (162 lb)   SpO2 98%   BMI 29.63 kg/m²   Estimated body mass index is 29.63 kg/m² as calculated from the following:    Height as of this encounter: 157.5 cm (62\").    Weight as of this encounter: 73.5 kg (162 lb).    Physical Exam  Vitals reviewed.   Constitutional:       Appearance: Normal appearance.   HENT:      Nose: Nose normal.   Cardiovascular:      Rate and Rhythm: Normal rate and regular rhythm.   Pulmonary:      Effort: Pulmonary effort is normal.   Abdominal:      General: Abdomen is flat.      Palpations: Abdomen is soft.   Musculoskeletal:      Comments: Moving all extremities spontaneously   Skin:     General: Skin is warm and dry.   Neurological:      Mental Status: She is alert.      Comments: Alert and oriented   Psychiatric:         Mood and Affect: Mood normal.         Behavior: Behavior normal.               Assessment and Plan     Diagnoses and all orders for this visit:    1. Hypertension, unspecified type (Primary)  Assessment & Plan:  - Uncontrolled  - Increase lisinopril to 20 mg daily and have patient follow-up in 4 weeks for reevaluation    Orders:  -     lisinopril (PRINIVIL,ZESTRIL) 20 MG tablet; Take 1 tablet by mouth Daily.  Dispense: 30 tablet; Refill: 0    2. Restless leg syndrome  Assessment & Plan:  - Uncontrolled  - Did not have benefit with pramipexole  - Increase ropinirole to 2 mg daily, advised patient to ensure that she is taking her gabapentin on a regular basis because that does help with restless leg syndrome as well    Orders:  -     rOPINIRole (REQUIP) 2 MG tablet; Take 1 tablet by mouth every night at bedtime.  Dispense: 30 tablet; Refill: 1    3. Lumbar disc disease  Assessment & Plan:  - Patient has an extensive history of degenerative disease of her back with scoliosis and a chronic compression fracture of L2  - She is following with neurosurgery, planning on operating soon after she has pulmonary " clearance  - We will continue tramadol 100 mg every 8 hours as needed  - Increase tizanidine to twice daily as needed  - Continue gabapentin to 600 mg 3 times a day  -Will add celecoxib 100 mg twice daily to use as needed  - Baltazar reviewed and appropriate  - UDS and medication contract up-to-date      Orders:  -     Discontinue: tiZANidine (ZANAFLEX) 4 MG tablet; Take 1 tablet by mouth Every 8 (Eight) Hours As Needed for Muscle Spasms.  Dispense: 60 tablet; Refill: 0  -     celecoxib (CeleBREX) 100 MG capsule; Take 1 capsule by mouth 2 (Two) Times a Day As Needed for Mild Pain.  Dispense: 30 capsule; Refill: 0  -     tiZANidine (ZANAFLEX) 4 MG tablet; Take 1 tablet by mouth Every 12 (Twelve) Hours As Needed for Muscle Spasms.  Dispense: 60 tablet; Refill: 1    4. Anxiety and depression  Assessment & Plan:  - Controlled, has been on this medication for a number of years  - Continue venlafaxine 300 mg daily and BuSpar 15 mg twice daily  -Reached out to nursing staff about prior authorization for venlafaxine      5. Age-related osteoporosis without current pathological fracture  Assessment & Plan:  - Patient with evidence osteoporosis on most recent DEXA scan  - Advised patient to start alendronate             Follow Up  Return in about 1 month (around 4/14/2024) for follow up HTN.  And restless legs    Betty Jasmine MD

## 2024-03-19 ENCOUNTER — OFFICE VISIT (OUTPATIENT)
Dept: PULMONOLOGY | Facility: CLINIC | Age: 64
End: 2024-03-19
Payer: MEDICARE

## 2024-03-19 VITALS
BODY MASS INDEX: 30.55 KG/M2 | OXYGEN SATURATION: 95 % | SYSTOLIC BLOOD PRESSURE: 142 MMHG | HEART RATE: 92 BPM | DIASTOLIC BLOOD PRESSURE: 78 MMHG | HEIGHT: 62 IN | WEIGHT: 166 LBS | TEMPERATURE: 98.1 F

## 2024-03-19 DIAGNOSIS — Z01.811 PREOPERATIVE RESPIRATORY EXAMINATION: Primary | ICD-10-CM

## 2024-03-19 DIAGNOSIS — Z72.0 TOBACCO ABUSE: ICD-10-CM

## 2024-03-19 DIAGNOSIS — J45.20 MILD INTERMITTENT ASTHMA, UNSPECIFIED WHETHER COMPLICATED: ICD-10-CM

## 2024-03-19 DIAGNOSIS — J45.40 MODERATE PERSISTENT ASTHMATIC BRONCHITIS WITHOUT COMPLICATION: ICD-10-CM

## 2024-03-19 DIAGNOSIS — Z72.0 TOBACCO USE: ICD-10-CM

## 2024-03-19 DIAGNOSIS — Z01.811 ENCOUNTER FOR PRE-OPERATIVE RESPIRATORY CLEARANCE: ICD-10-CM

## 2024-03-19 DIAGNOSIS — J44.1 COPD WITH ACUTE EXACERBATION: ICD-10-CM

## 2024-03-19 DIAGNOSIS — J43.2 CENTRILOBULAR EMPHYSEMA: ICD-10-CM

## 2024-03-19 PROBLEM — J44.9 COPD (CHRONIC OBSTRUCTIVE PULMONARY DISEASE): Status: ACTIVE | Noted: 2024-03-19

## 2024-03-19 PROCEDURE — 3077F SYST BP >= 140 MM HG: CPT | Performed by: NURSE PRACTITIONER

## 2024-03-19 PROCEDURE — 3078F DIAST BP <80 MM HG: CPT | Performed by: NURSE PRACTITIONER

## 2024-03-19 PROCEDURE — 94729 DIFFUSING CAPACITY: CPT | Performed by: NURSE PRACTITIONER

## 2024-03-19 PROCEDURE — 94726 PLETHYSMOGRAPHY LUNG VOLUMES: CPT | Performed by: NURSE PRACTITIONER

## 2024-03-19 PROCEDURE — 1160F RVW MEDS BY RX/DR IN RCRD: CPT | Performed by: NURSE PRACTITIONER

## 2024-03-19 PROCEDURE — 94618 PULMONARY STRESS TESTING: CPT | Performed by: NURSE PRACTITIONER

## 2024-03-19 PROCEDURE — 94010 BREATHING CAPACITY TEST: CPT | Performed by: NURSE PRACTITIONER

## 2024-03-19 PROCEDURE — 1159F MED LIST DOCD IN RCRD: CPT | Performed by: NURSE PRACTITIONER

## 2024-03-19 PROCEDURE — 99214 OFFICE O/P EST MOD 30 MIN: CPT | Performed by: NURSE PRACTITIONER

## 2024-03-19 RX ORDER — NICOTINE 4 MG/1
1 INHALANT RESPIRATORY (INHALATION) AS NEEDED
Qty: 168 EACH | Refills: 1 | Status: SHIPPED | OUTPATIENT
Start: 2024-03-19

## 2024-03-19 RX ORDER — ALBUTEROL SULFATE 90 UG/1
2 AEROSOL, METERED RESPIRATORY (INHALATION) EVERY 6 HOURS PRN
Qty: 54 G | Refills: 3 | Status: SHIPPED | OUTPATIENT
Start: 2024-03-19

## 2024-03-19 RX ORDER — BUDESONIDE, GLYCOPYRROLATE, AND FORMOTEROL FUMARATE 160; 9; 4.8 UG/1; UG/1; UG/1
2 AEROSOL, METERED RESPIRATORY (INHALATION) 2 TIMES DAILY
Qty: 3 EACH | Refills: 3 | Status: SHIPPED | OUTPATIENT
Start: 2024-03-19

## 2024-03-20 DIAGNOSIS — J30.1 SEASONAL ALLERGIC RHINITIS DUE TO POLLEN: ICD-10-CM

## 2024-03-20 RX ORDER — MONTELUKAST SODIUM 10 MG/1
10 TABLET ORAL NIGHTLY
Qty: 90 TABLET | Refills: 1 | Status: SHIPPED | OUTPATIENT
Start: 2024-03-20

## 2024-03-20 NOTE — TELEPHONE ENCOUNTER
Rx Refill Note  Requested Prescriptions     Pending Prescriptions Disp Refills    montelukast (SINGULAIR) 10 MG tablet [Pharmacy Med Name: MONTELUKAST SOD 10 MG TABLET] 90 tablet 1     Sig: TAKE ONE TABLET BY MOUTH ONCE NIGHTLY      Last office visit with prescribing clinician: 3/14/2024  Next office visit with prescribing clinician: 4/19/2024     Luz Marina Jordan MA  03/20/24, 11:50 EDT    Last fill: 09/25/2023

## 2024-03-25 DIAGNOSIS — M51.9 LUMBAR DISC DISEASE: ICD-10-CM

## 2024-03-25 RX ORDER — CELECOXIB 100 MG/1
CAPSULE ORAL
Qty: 30 CAPSULE | Refills: 0 | Status: SHIPPED | OUTPATIENT
Start: 2024-03-25

## 2024-03-28 DIAGNOSIS — E03.9 ACQUIRED HYPOTHYROIDISM: ICD-10-CM

## 2024-03-28 RX ORDER — LEVOTHYROXINE SODIUM 137 UG/1
137 TABLET ORAL DAILY
Qty: 30 TABLET | Refills: 0 | Status: SHIPPED | OUTPATIENT
Start: 2024-03-28

## 2024-03-29 DIAGNOSIS — K21.9 GASTROESOPHAGEAL REFLUX DISEASE, UNSPECIFIED WHETHER ESOPHAGITIS PRESENT: ICD-10-CM

## 2024-03-29 DIAGNOSIS — R11.0 NAUSEA: ICD-10-CM

## 2024-03-29 RX ORDER — PROMETHAZINE HYDROCHLORIDE 25 MG/1
25 TABLET ORAL EVERY 8 HOURS PRN
Qty: 30 TABLET | Refills: 0 | Status: SHIPPED | OUTPATIENT
Start: 2024-03-29

## 2024-03-29 RX ORDER — OMEPRAZOLE 40 MG/1
40 CAPSULE, DELAYED RELEASE ORAL DAILY
Qty: 90 CAPSULE | Refills: 1 | Status: SHIPPED | OUTPATIENT
Start: 2024-03-29

## 2024-03-29 NOTE — TELEPHONE ENCOUNTER
Rx Refill Note  Requested Prescriptions     Pending Prescriptions Disp Refills    omeprazole (priLOSEC) 40 MG capsule [Pharmacy Med Name: OMEPRAZOLE DR 40 MG CAPSULE] 90 capsule 1     Sig: TAKE 1 CAPSULE BY MOUTH DAILY      Last office visit with prescribing clinician: 3/14/2024   Last telemedicine visit with prescribing clinician: Visit date not found   Next office visit with prescribing clinician: 3/29/2024                         Would you like a call back once the refill request has been completed: [] Yes [] No    If the office needs to give you a call back, can they leave a voicemail: [] Yes [] No    Ludy Olivo MA  03/29/24, 13:05 EDT

## 2024-03-29 NOTE — TELEPHONE ENCOUNTER
Rx Refill Note  Requested Prescriptions     Pending Prescriptions Disp Refills    promethazine (PHENERGAN) 25 MG tablet 30 tablet 0      Last office visit with prescribing clinician: 3/14/2024   Last telemedicine visit with prescribing clinician: Visit date not found   Next office visit with prescribing clinician: 3/29/2024                         Would you like a call back once the refill request has been completed: [] Yes [] No    If the office needs to give you a call back, can they leave a voicemail: [] Yes [] No    Ludy Olivo MA  03/29/24, 13:08 EDT

## 2024-04-03 ENCOUNTER — TELEPHONE (OUTPATIENT)
Dept: PULMONOLOGY | Facility: CLINIC | Age: 64
End: 2024-04-03
Payer: MEDICARE

## 2024-04-03 DIAGNOSIS — J45.40 MODERATE PERSISTENT ASTHMATIC BRONCHITIS WITHOUT COMPLICATION: ICD-10-CM

## 2024-04-03 DIAGNOSIS — J44.1 COPD WITH ACUTE EXACERBATION: Primary | ICD-10-CM

## 2024-04-03 DIAGNOSIS — J45.20 MILD INTERMITTENT ASTHMA, UNSPECIFIED WHETHER COMPLICATED: ICD-10-CM

## 2024-04-03 RX ORDER — FLUTICASONE FUROATE AND VILANTEROL 200; 25 UG/1; UG/1
1 POWDER RESPIRATORY (INHALATION)
Qty: 60 EACH | Refills: 2 | Status: SHIPPED | OUTPATIENT
Start: 2024-04-03

## 2024-04-03 NOTE — TELEPHONE ENCOUNTER
Pt called asking if her and her  could switch from Breztri to Breo. Ayleen said yes. Sent Breo 200 and incruse ellipta to [facility].

## 2024-04-05 ENCOUNTER — PRE-ADMISSION TESTING (OUTPATIENT)
Dept: PREADMISSION TESTING | Facility: HOSPITAL | Age: 64
End: 2024-04-05
Payer: MEDICARE

## 2024-04-05 VITALS — WEIGHT: 165.12 LBS | BODY MASS INDEX: 30.39 KG/M2 | HEIGHT: 62 IN

## 2024-04-05 DIAGNOSIS — M48.062 LUMBAR STENOSIS WITH NEUROGENIC CLAUDICATION: ICD-10-CM

## 2024-04-05 DIAGNOSIS — F32.A ANXIETY AND DEPRESSION: ICD-10-CM

## 2024-04-05 DIAGNOSIS — F41.9 ANXIETY AND DEPRESSION: ICD-10-CM

## 2024-04-05 LAB
DEPRECATED RDW RBC AUTO: 50.6 FL (ref 37–54)
ERYTHROCYTE [DISTWIDTH] IN BLOOD BY AUTOMATED COUNT: 14.2 % (ref 12.3–15.4)
HBA1C MFR BLD: 5.2 % (ref 4.8–5.6)
HCT VFR BLD AUTO: 36.1 % (ref 34–46.6)
HGB BLD-MCNC: 11.4 G/DL (ref 12–15.9)
MCH RBC QN AUTO: 30.6 PG (ref 26.6–33)
MCHC RBC AUTO-ENTMCNC: 31.6 G/DL (ref 31.5–35.7)
MCV RBC AUTO: 96.8 FL (ref 79–97)
PLATELET # BLD AUTO: 300 10*3/MM3 (ref 140–450)
PMV BLD AUTO: 8.5 FL (ref 6–12)
POTASSIUM SERPL-SCNC: 4.1 MMOL/L (ref 3.5–5.2)
RBC # BLD AUTO: 3.73 10*6/MM3 (ref 3.77–5.28)
WBC NRBC COR # BLD AUTO: 6.52 10*3/MM3 (ref 3.4–10.8)

## 2024-04-05 PROCEDURE — 84132 ASSAY OF SERUM POTASSIUM: CPT

## 2024-04-05 PROCEDURE — 36415 COLL VENOUS BLD VENIPUNCTURE: CPT

## 2024-04-05 PROCEDURE — 83036 HEMOGLOBIN GLYCOSYLATED A1C: CPT

## 2024-04-05 PROCEDURE — 87081 CULTURE SCREEN ONLY: CPT

## 2024-04-05 PROCEDURE — 85027 COMPLETE CBC AUTOMATED: CPT

## 2024-04-05 RX ORDER — VENLAFAXINE HYDROCHLORIDE 150 MG/1
300 CAPSULE, EXTENDED RELEASE ORAL DAILY
Qty: 60 CAPSULE | Refills: 2 | Status: CANCELLED | OUTPATIENT
Start: 2024-04-05

## 2024-04-05 NOTE — PAT
An arrival time for procedure was not provided during PAT visit. If patient had any questions or concerns about their arrival time, they were instructed to contact their surgeon/physician.  Additionally, if the patient referred to an arrival time that was acquired from their my chart account, patient was encouraged to verify that time with their surgeon/physician. Arrival times are NOT provided in Pre Admission Testing Department.    Per Anesthesia Request, patient instructed not to take their ACE/ARB medications on the AM of surgery.    Bactroban (if prescribed) and Chlorhexidine Prescription prescribed by physician before PAT visit.  Verified with patient that medication(s) were picked up from their pharmacy.  Written instructions given to patient during PAT visit.  Patient/family also instructed to complete skin prep checklist and return the checklist on the day of surgery to preoperative staff.  Patient/family verbalized understanding.    Patient to apply Chlorhexadine wipes  to surgical area (as instructed) the night before procedure and the AM of procedure. Wipes provided.    Patient instructed to drink 20 ounces of Gatorade or Gatorlyte (if diabetic) and it needs to be completed 1 hour (for Main OR patients) or 2 hours (scheduled  section & [facility] patients) before given arrival time for procedure (NO RED Gatorade and NO Gatorade Zero).    Patient verbalized understanding.    One-on-one Pre Admission Testing general education provided during PAT visit.  Copies of PAT general education handouts (Incentive Spirometry, Meds to Beds Program, Patient Belongings, Pre-op skin preparation instructions, Blood Glucose testing, Visitor policy, Surgery FAQ, Code H) distributed to patient. Encouraged patient/family to read PAT general education handouts thoroughly and notify PAT staff with any questions or concerns. Patient instructed to bring PAT pass and completed skin prep sheet (if applicable) on the day of  procedure. Patient verbalized understanding of all information and priority content.     Patient denies any current skin issues.

## 2024-04-06 LAB — MRSA SPEC QL CULT: ABNORMAL

## 2024-04-08 ENCOUNTER — TELEPHONE (OUTPATIENT)
Dept: NEUROSURGERY | Facility: CLINIC | Age: 64
End: 2024-04-08
Payer: MEDICARE

## 2024-04-08 NOTE — TELEPHONE ENCOUNTER
Provider:  Curt  Surgery/Procedure:  Upcoming: Lumbar laminectomy discectomy decompression posterior L2-5  Surgery/Procedure Date:  4/11/24  Last visit:   3/1/24  Next visit: NA     Reason for call:  Received call from microbiology, MRSA nasal swab culture has come back positive.

## 2024-04-09 DIAGNOSIS — I10 HYPERTENSION, UNSPECIFIED TYPE: ICD-10-CM

## 2024-04-09 DIAGNOSIS — M51.9 LUMBAR DISC DISEASE: ICD-10-CM

## 2024-04-09 RX ORDER — LISINOPRIL 20 MG/1
20 TABLET ORAL DAILY
Qty: 30 TABLET | Refills: 0 | Status: ON HOLD | OUTPATIENT
Start: 2024-04-09

## 2024-04-09 RX ORDER — CELECOXIB 100 MG/1
CAPSULE ORAL
Qty: 60 CAPSULE | Refills: 1 | Status: ON HOLD | OUTPATIENT
Start: 2024-04-09

## 2024-04-10 ENCOUNTER — ANESTHESIA EVENT (OUTPATIENT)
Dept: PERIOP | Facility: HOSPITAL | Age: 64
End: 2024-04-10
Payer: MEDICARE

## 2024-04-10 RX ORDER — FAMOTIDINE 10 MG/ML
20 INJECTION, SOLUTION INTRAVENOUS ONCE
Status: CANCELLED | OUTPATIENT
Start: 2024-04-10 | End: 2024-04-10

## 2024-04-11 ENCOUNTER — ANESTHESIA (OUTPATIENT)
Dept: PERIOP | Facility: HOSPITAL | Age: 64
End: 2024-04-11
Payer: MEDICARE

## 2024-04-11 ENCOUNTER — HOSPITAL ENCOUNTER (INPATIENT)
Facility: HOSPITAL | Age: 64
LOS: 2 days | Discharge: HOME | DRG: 552 | End: 2024-04-17
Attending: NEUROLOGICAL SURGERY | Admitting: NEUROLOGICAL SURGERY
Payer: MEDICARE

## 2024-04-11 ENCOUNTER — APPOINTMENT (OUTPATIENT)
Dept: GENERAL RADIOLOGY | Facility: HOSPITAL | Age: 64
DRG: 552 | End: 2024-04-11
Payer: MEDICARE

## 2024-04-11 DIAGNOSIS — M50.10 CERVICAL DISC DISORDER WITH RADICULOPATHY: Primary | ICD-10-CM

## 2024-04-11 DIAGNOSIS — M48.062 LUMBAR STENOSIS WITH NEUROGENIC CLAUDICATION: ICD-10-CM

## 2024-04-11 PROCEDURE — 25010000002 PROPOFOL 10 MG/ML EMULSION

## 2024-04-11 PROCEDURE — 94799 UNLISTED PULMONARY SVC/PX: CPT

## 2024-04-11 PROCEDURE — 63048 LAM FACETEC &FORAMOT EA ADDL: CPT

## 2024-04-11 PROCEDURE — 25810000003 SODIUM CHLORIDE PER 500 ML: Performed by: NEUROLOGICAL SURGERY

## 2024-04-11 PROCEDURE — 25010000002 PHENYLEPHRINE 10 MG/ML SOLUTION 1 ML VIAL

## 2024-04-11 PROCEDURE — 25010000002 FENTANYL CITRATE (PF) 50 MCG/ML SOLUTION

## 2024-04-11 PROCEDURE — 94664 DEMO&/EVAL PT USE INHALER: CPT

## 2024-04-11 PROCEDURE — 25010000002 VANCOMYCIN 1 G RECONSTITUTED SOLUTION 1 EACH VIAL: Performed by: NEUROLOGICAL SURGERY

## 2024-04-11 PROCEDURE — 25010000002 SUGAMMADEX 200 MG/2ML SOLUTION: Performed by: NURSE ANESTHETIST, CERTIFIED REGISTERED

## 2024-04-11 PROCEDURE — 25010000002 FENTANYL CITRATE (PF) 100 MCG/2ML SOLUTION

## 2024-04-11 PROCEDURE — 25810000003 LACTATED RINGERS PER 1000 ML: Performed by: NEUROLOGICAL SURGERY

## 2024-04-11 PROCEDURE — 76000 FLUOROSCOPY <1 HR PHYS/QHP: CPT

## 2024-04-11 PROCEDURE — 63047 LAM FACETEC & FORAMOT LUMBAR: CPT | Performed by: NEUROLOGICAL SURGERY

## 2024-04-11 PROCEDURE — 94761 N-INVAS EAR/PLS OXIMETRY MLT: CPT

## 2024-04-11 PROCEDURE — 63047 LAM FACETEC & FORAMOT LUMBAR: CPT

## 2024-04-11 PROCEDURE — C1889 IMPLANT/INSERT DEVICE, NOC: HCPCS | Performed by: NEUROLOGICAL SURGERY

## 2024-04-11 PROCEDURE — 25810000003 SODIUM CHLORIDE 0.9 % SOLUTION 250 ML FLEX CONT: Performed by: NEUROLOGICAL SURGERY

## 2024-04-11 PROCEDURE — C1713 ANCHOR/SCREW BN/BN,TIS/BN: HCPCS | Performed by: NEUROLOGICAL SURGERY

## 2024-04-11 PROCEDURE — 25810000003 LACTATED RINGERS PER 1000 ML: Performed by: ANESTHESIOLOGY

## 2024-04-11 PROCEDURE — 25010000002 ONDANSETRON PER 1 MG: Performed by: NURSE ANESTHETIST, CERTIFIED REGISTERED

## 2024-04-11 PROCEDURE — 25010000002 DEXAMETHASONE PER 1 MG

## 2024-04-11 PROCEDURE — 25810000003 SODIUM CHLORIDE 0.9 % SOLUTION 250 ML FLEX CONT

## 2024-04-11 PROCEDURE — 94640 AIRWAY INHALATION TREATMENT: CPT

## 2024-04-11 PROCEDURE — 63048 LAM FACETEC &FORAMOT EA ADDL: CPT | Performed by: NEUROLOGICAL SURGERY

## 2024-04-11 DEVICE — MATRX HEMO SURGIFLO FLOWABLE/GELATIN W/THROMBIN 8ML: Type: IMPLANTABLE DEVICE | Site: SPINE LUMBAR | Status: FUNCTIONAL

## 2024-04-11 DEVICE — HEMOST ABS SURGIFOAM SZ100 8X12 10MM: Type: IMPLANTABLE DEVICE | Site: SPINE LUMBAR | Status: FUNCTIONAL

## 2024-04-11 DEVICE — DURAGEN® PLUS DURAL REGENERATION MATRIX, 1 IN X 3 IN (2.5 CM X 7.5 CM)
Type: IMPLANTABLE DEVICE | Site: SPINE LUMBAR | Status: FUNCTIONAL
Brand: DURAGEN® PLUS

## 2024-04-11 DEVICE — SSC BONE WAX
Type: IMPLANTABLE DEVICE | Site: SPINE LUMBAR | Status: FUNCTIONAL
Brand: SSC BONE WAX

## 2024-04-11 RX ORDER — CELECOXIB 100 MG/1
100 CAPSULE ORAL EVERY 12 HOURS SCHEDULED
Status: DISCONTINUED | OUTPATIENT
Start: 2024-04-11 | End: 2024-04-17 | Stop reason: HOSPADM

## 2024-04-11 RX ORDER — MINERAL OIL
OIL (ML) MISCELLANEOUS AS NEEDED
Status: DISCONTINUED | OUTPATIENT
Start: 2024-04-11 | End: 2024-04-11 | Stop reason: HOSPADM

## 2024-04-11 RX ORDER — VENLAFAXINE HYDROCHLORIDE 75 MG/1
75 CAPSULE, EXTENDED RELEASE ORAL DAILY
Status: DISCONTINUED | OUTPATIENT
Start: 2024-04-12 | End: 2024-04-17 | Stop reason: HOSPADM

## 2024-04-11 RX ORDER — FAMOTIDINE 20 MG/1
20 TABLET, FILM COATED ORAL
Status: DISCONTINUED | OUTPATIENT
Start: 2024-04-11 | End: 2024-04-11 | Stop reason: SDUPTHER

## 2024-04-11 RX ORDER — MORPHINE SULFATE 4 MG/ML
4 INJECTION, SOLUTION INTRAMUSCULAR; INTRAVENOUS EVERY 4 HOURS PRN
Status: DISCONTINUED | OUTPATIENT
Start: 2024-04-11 | End: 2024-04-17 | Stop reason: HOSPADM

## 2024-04-11 RX ORDER — MORPHINE SULFATE 1 MG/ML
2 INJECTION, SOLUTION EPIDURAL; INTRATHECAL; INTRAVENOUS EVERY 4 HOURS PRN
Status: DISCONTINUED | OUTPATIENT
Start: 2024-04-11 | End: 2024-04-17 | Stop reason: HOSPADM

## 2024-04-11 RX ORDER — ONDANSETRON 2 MG/ML
4 INJECTION INTRAMUSCULAR; INTRAVENOUS EVERY 6 HOURS PRN
Status: DISCONTINUED | OUTPATIENT
Start: 2024-04-11 | End: 2024-04-17 | Stop reason: HOSPADM

## 2024-04-11 RX ORDER — DROPERIDOL 2.5 MG/ML
0.62 INJECTION, SOLUTION INTRAMUSCULAR; INTRAVENOUS ONCE AS NEEDED
Status: DISCONTINUED | OUTPATIENT
Start: 2024-04-11 | End: 2024-04-11 | Stop reason: HOSPADM

## 2024-04-11 RX ORDER — TRAMADOL HYDROCHLORIDE 50 MG/1
100 TABLET ORAL EVERY 8 HOURS PRN
Status: DISCONTINUED | OUTPATIENT
Start: 2024-04-11 | End: 2024-04-17 | Stop reason: HOSPADM

## 2024-04-11 RX ORDER — FAMOTIDINE 20 MG/1
20 TABLET, FILM COATED ORAL ONCE
Status: COMPLETED | OUTPATIENT
Start: 2024-04-11 | End: 2024-04-11

## 2024-04-11 RX ORDER — FENTANYL CITRATE 50 UG/ML
50 INJECTION, SOLUTION INTRAMUSCULAR; INTRAVENOUS
Status: DISCONTINUED | OUTPATIENT
Start: 2024-04-11 | End: 2024-04-11 | Stop reason: HOSPADM

## 2024-04-11 RX ORDER — SODIUM CHLORIDE 0.9 % (FLUSH) 0.9 %
10 SYRINGE (ML) INJECTION AS NEEDED
Status: DISCONTINUED | OUTPATIENT
Start: 2024-04-11 | End: 2024-04-11 | Stop reason: HOSPADM

## 2024-04-11 RX ORDER — LISINOPRIL 20 MG/1
20 TABLET ORAL DAILY
Status: DISCONTINUED | OUTPATIENT
Start: 2024-04-12 | End: 2024-04-17 | Stop reason: HOSPADM

## 2024-04-11 RX ORDER — OXYCODONE HCL 10 MG/1
10 TABLET, FILM COATED, EXTENDED RELEASE ORAL ONCE
Status: COMPLETED | OUTPATIENT
Start: 2024-04-11 | End: 2024-04-11

## 2024-04-11 RX ORDER — ACETAMINOPHEN 500 MG
1000 TABLET ORAL ONCE
Status: COMPLETED | OUTPATIENT
Start: 2024-04-11 | End: 2024-04-11

## 2024-04-11 RX ORDER — DEXAMETHASONE SODIUM PHOSPHATE 4 MG/ML
INJECTION, SOLUTION INTRA-ARTICULAR; INTRALESIONAL; INTRAMUSCULAR; INTRAVENOUS; SOFT TISSUE AS NEEDED
Status: DISCONTINUED | OUTPATIENT
Start: 2024-04-11 | End: 2024-04-11 | Stop reason: SURG

## 2024-04-11 RX ORDER — IBUPROFEN 800 MG/1
800 TABLET ORAL ONCE
Status: COMPLETED | OUTPATIENT
Start: 2024-04-11 | End: 2024-04-11

## 2024-04-11 RX ORDER — BUSPIRONE HYDROCHLORIDE 15 MG/1
15 TABLET ORAL 2 TIMES DAILY
Status: DISCONTINUED | OUTPATIENT
Start: 2024-04-11 | End: 2024-04-17 | Stop reason: HOSPADM

## 2024-04-11 RX ORDER — DIPHENHYDRAMINE HCL 25 MG
25 CAPSULE ORAL NIGHTLY PRN
Status: DISCONTINUED | OUTPATIENT
Start: 2024-04-11 | End: 2024-04-17 | Stop reason: HOSPADM

## 2024-04-11 RX ORDER — PANTOPRAZOLE SODIUM 40 MG/1
40 TABLET, DELAYED RELEASE ORAL
Status: DISCONTINUED | OUTPATIENT
Start: 2024-04-12 | End: 2024-04-17 | Stop reason: HOSPADM

## 2024-04-11 RX ORDER — FENTANYL CITRATE 50 UG/ML
INJECTION, SOLUTION INTRAMUSCULAR; INTRAVENOUS AS NEEDED
Status: DISCONTINUED | OUTPATIENT
Start: 2024-04-11 | End: 2024-04-11 | Stop reason: SURG

## 2024-04-11 RX ORDER — PROMETHAZINE HYDROCHLORIDE 25 MG/1
25 TABLET ORAL EVERY 8 HOURS PRN
Status: DISCONTINUED | OUTPATIENT
Start: 2024-04-11 | End: 2024-04-17 | Stop reason: HOSPADM

## 2024-04-11 RX ORDER — ACETAMINOPHEN 325 MG/1
650 TABLET ORAL EVERY 4 HOURS PRN
Status: DISCONTINUED | OUTPATIENT
Start: 2024-04-11 | End: 2024-04-17 | Stop reason: HOSPADM

## 2024-04-11 RX ORDER — ROPINIROLE 2 MG/1
2 TABLET, FILM COATED ORAL NIGHTLY
Status: DISCONTINUED | OUTPATIENT
Start: 2024-04-11 | End: 2024-04-17 | Stop reason: HOSPADM

## 2024-04-11 RX ORDER — SODIUM CHLORIDE 0.9 % (FLUSH) 0.9 %
10 SYRINGE (ML) INJECTION AS NEEDED
Status: DISCONTINUED | OUTPATIENT
Start: 2024-04-11 | End: 2024-04-17 | Stop reason: HOSPADM

## 2024-04-11 RX ORDER — SODIUM CHLORIDE 9 MG/ML
INJECTION, SOLUTION INTRAVENOUS AS NEEDED
Status: DISCONTINUED | OUTPATIENT
Start: 2024-04-11 | End: 2024-04-11 | Stop reason: HOSPADM

## 2024-04-11 RX ORDER — MIDAZOLAM HYDROCHLORIDE 1 MG/ML
0.5 INJECTION INTRAMUSCULAR; INTRAVENOUS
Status: DISCONTINUED | OUTPATIENT
Start: 2024-04-11 | End: 2024-04-11 | Stop reason: HOSPADM

## 2024-04-11 RX ORDER — MAGNESIUM HYDROXIDE 1200 MG/15ML
LIQUID ORAL AS NEEDED
Status: DISCONTINUED | OUTPATIENT
Start: 2024-04-11 | End: 2024-04-11 | Stop reason: HOSPADM

## 2024-04-11 RX ORDER — SODIUM CHLORIDE 9 MG/ML
40 INJECTION, SOLUTION INTRAVENOUS AS NEEDED
Status: DISCONTINUED | OUTPATIENT
Start: 2024-04-11 | End: 2024-04-17 | Stop reason: HOSPADM

## 2024-04-11 RX ORDER — AMOXICILLIN 250 MG
2 CAPSULE ORAL NIGHTLY PRN
Status: DISCONTINUED | OUTPATIENT
Start: 2024-04-11 | End: 2024-04-17 | Stop reason: HOSPADM

## 2024-04-11 RX ORDER — BUDESONIDE AND FORMOTEROL FUMARATE DIHYDRATE 160; 4.5 UG/1; UG/1
1 AEROSOL RESPIRATORY (INHALATION)
Status: DISCONTINUED | OUTPATIENT
Start: 2024-04-11 | End: 2024-04-17 | Stop reason: HOSPADM

## 2024-04-11 RX ORDER — BISACODYL 10 MG
10 SUPPOSITORY, RECTAL RECTAL DAILY PRN
Status: DISCONTINUED | OUTPATIENT
Start: 2024-04-11 | End: 2024-04-17 | Stop reason: HOSPADM

## 2024-04-11 RX ORDER — SODIUM CHLORIDE, SODIUM LACTATE, POTASSIUM CHLORIDE, CALCIUM CHLORIDE 600; 310; 30; 20 MG/100ML; MG/100ML; MG/100ML; MG/100ML
9 INJECTION, SOLUTION INTRAVENOUS CONTINUOUS
Status: DISCONTINUED | OUTPATIENT
Start: 2024-04-11 | End: 2024-04-17 | Stop reason: HOSPADM

## 2024-04-11 RX ORDER — ONDANSETRON 2 MG/ML
INJECTION INTRAMUSCULAR; INTRAVENOUS AS NEEDED
Status: DISCONTINUED | OUTPATIENT
Start: 2024-04-11 | End: 2024-04-11 | Stop reason: SURG

## 2024-04-11 RX ORDER — NALOXONE HCL 0.4 MG/ML
0.4 VIAL (ML) INJECTION
Status: DISCONTINUED | OUTPATIENT
Start: 2024-04-11 | End: 2024-04-17 | Stop reason: HOSPADM

## 2024-04-11 RX ORDER — GABAPENTIN 300 MG/1
600 CAPSULE ORAL EVERY 8 HOURS SCHEDULED
Status: DISCONTINUED | OUTPATIENT
Start: 2024-04-11 | End: 2024-04-17 | Stop reason: HOSPADM

## 2024-04-11 RX ORDER — PROPOFOL 10 MG/ML
VIAL (ML) INTRAVENOUS AS NEEDED
Status: DISCONTINUED | OUTPATIENT
Start: 2024-04-11 | End: 2024-04-11 | Stop reason: SURG

## 2024-04-11 RX ORDER — TIZANIDINE 4 MG/1
4 TABLET ORAL EVERY 12 HOURS PRN
Status: DISCONTINUED | OUTPATIENT
Start: 2024-04-11 | End: 2024-04-17 | Stop reason: HOSPADM

## 2024-04-11 RX ORDER — SODIUM CHLORIDE, SODIUM LACTATE, POTASSIUM CHLORIDE, CALCIUM CHLORIDE 600; 310; 30; 20 MG/100ML; MG/100ML; MG/100ML; MG/100ML
75 INJECTION, SOLUTION INTRAVENOUS CONTINUOUS
Status: DISCONTINUED | OUTPATIENT
Start: 2024-04-11 | End: 2024-04-15

## 2024-04-11 RX ORDER — LEVOTHYROXINE SODIUM 137 UG/1
137 TABLET ORAL
Status: DISCONTINUED | OUTPATIENT
Start: 2024-04-12 | End: 2024-04-17 | Stop reason: HOSPADM

## 2024-04-11 RX ORDER — HYDROMORPHONE HYDROCHLORIDE 1 MG/ML
0.5 INJECTION, SOLUTION INTRAMUSCULAR; INTRAVENOUS; SUBCUTANEOUS
Status: DISCONTINUED | OUTPATIENT
Start: 2024-04-11 | End: 2024-04-11 | Stop reason: HOSPADM

## 2024-04-11 RX ORDER — MIDAZOLAM HYDROCHLORIDE 1 MG/ML
1 INJECTION INTRAMUSCULAR; INTRAVENOUS
Status: DISCONTINUED | OUTPATIENT
Start: 2024-04-11 | End: 2024-04-11 | Stop reason: SDUPTHER

## 2024-04-11 RX ORDER — LIDOCAINE HYDROCHLORIDE 10 MG/ML
0.5 INJECTION, SOLUTION EPIDURAL; INFILTRATION; INTRACAUDAL; PERINEURAL ONCE AS NEEDED
Status: COMPLETED | OUTPATIENT
Start: 2024-04-11 | End: 2024-04-11

## 2024-04-11 RX ORDER — OXYCODONE AND ACETAMINOPHEN 7.5; 325 MG/1; MG/1
1 TABLET ORAL EVERY 4 HOURS PRN
Status: DISCONTINUED | OUTPATIENT
Start: 2024-04-11 | End: 2024-04-17 | Stop reason: HOSPADM

## 2024-04-11 RX ORDER — LIDOCAINE HYDROCHLORIDE 10 MG/ML
INJECTION, SOLUTION EPIDURAL; INFILTRATION; INTRACAUDAL; PERINEURAL AS NEEDED
Status: DISCONTINUED | OUTPATIENT
Start: 2024-04-11 | End: 2024-04-11 | Stop reason: SURG

## 2024-04-11 RX ORDER — PROMETHAZINE HYDROCHLORIDE 12.5 MG/1
12.5 SUPPOSITORY RECTAL EVERY 6 HOURS PRN
Status: DISCONTINUED | OUTPATIENT
Start: 2024-04-11 | End: 2024-04-17 | Stop reason: HOSPADM

## 2024-04-11 RX ORDER — CETIRIZINE HYDROCHLORIDE 10 MG/1
5 TABLET ORAL DAILY
Status: DISCONTINUED | OUTPATIENT
Start: 2024-04-11 | End: 2024-04-17 | Stop reason: HOSPADM

## 2024-04-11 RX ORDER — BUPIVACAINE HYDROCHLORIDE AND EPINEPHRINE 2.5; 5 MG/ML; UG/ML
INJECTION, SOLUTION EPIDURAL; INFILTRATION; INTRACAUDAL; PERINEURAL AS NEEDED
Status: DISCONTINUED | OUTPATIENT
Start: 2024-04-11 | End: 2024-04-11 | Stop reason: HOSPADM

## 2024-04-11 RX ORDER — ROCURONIUM BROMIDE 10 MG/ML
INJECTION, SOLUTION INTRAVENOUS AS NEEDED
Status: DISCONTINUED | OUTPATIENT
Start: 2024-04-11 | End: 2024-04-11 | Stop reason: SURG

## 2024-04-11 RX ORDER — PROMETHAZINE HYDROCHLORIDE 12.5 MG/1
12.5 TABLET ORAL EVERY 6 HOURS PRN
Status: DISCONTINUED | OUTPATIENT
Start: 2024-04-11 | End: 2024-04-17 | Stop reason: HOSPADM

## 2024-04-11 RX ORDER — ONDANSETRON 4 MG/1
4 TABLET, ORALLY DISINTEGRATING ORAL EVERY 6 HOURS PRN
Status: DISCONTINUED | OUTPATIENT
Start: 2024-04-11 | End: 2024-04-17 | Stop reason: HOSPADM

## 2024-04-11 RX ORDER — SODIUM CHLORIDE 0.9 % (FLUSH) 0.9 %
3 SYRINGE (ML) INJECTION EVERY 12 HOURS SCHEDULED
Status: DISCONTINUED | OUTPATIENT
Start: 2024-04-11 | End: 2024-04-17 | Stop reason: HOSPADM

## 2024-04-11 RX ORDER — ALBUTEROL SULFATE 90 UG/1
2 AEROSOL, METERED RESPIRATORY (INHALATION) EVERY 6 HOURS PRN
Status: DISCONTINUED | OUTPATIENT
Start: 2024-04-11 | End: 2024-04-17 | Stop reason: HOSPADM

## 2024-04-11 RX ORDER — ATORVASTATIN CALCIUM 20 MG/1
20 TABLET, FILM COATED ORAL NIGHTLY
Status: DISCONTINUED | OUTPATIENT
Start: 2024-04-11 | End: 2024-04-17 | Stop reason: HOSPADM

## 2024-04-11 RX ORDER — SODIUM CHLORIDE 0.9 % (FLUSH) 0.9 %
10 SYRINGE (ML) INJECTION EVERY 12 HOURS SCHEDULED
Status: DISCONTINUED | OUTPATIENT
Start: 2024-04-11 | End: 2024-04-11 | Stop reason: HOSPADM

## 2024-04-11 RX ORDER — FENTANYL CITRATE 50 UG/ML
INJECTION, SOLUTION INTRAMUSCULAR; INTRAVENOUS
Status: COMPLETED
Start: 2024-04-11 | End: 2024-04-11

## 2024-04-11 RX ORDER — MONTELUKAST SODIUM 10 MG/1
10 TABLET ORAL NIGHTLY
Status: DISCONTINUED | OUTPATIENT
Start: 2024-04-11 | End: 2024-04-17 | Stop reason: HOSPADM

## 2024-04-11 RX ORDER — SODIUM CHLORIDE 9 MG/ML
40 INJECTION, SOLUTION INTRAVENOUS AS NEEDED
Status: DISCONTINUED | OUTPATIENT
Start: 2024-04-11 | End: 2024-04-11 | Stop reason: HOSPADM

## 2024-04-11 RX ADMIN — PROPOFOL 25 MCG/KG/MIN: 10 INJECTION, EMULSION INTRAVENOUS at 14:10

## 2024-04-11 RX ADMIN — BUDESONIDE AND FORMOTEROL FUMARATE DIHYDRATE 1 PUFF: 160; 4.5 AEROSOL RESPIRATORY (INHALATION) at 18:53

## 2024-04-11 RX ADMIN — OXYCODONE HYDROCHLORIDE 10 MG: 10 TABLET, FILM COATED, EXTENDED RELEASE ORAL at 12:22

## 2024-04-11 RX ADMIN — SODIUM CHLORIDE, POTASSIUM CHLORIDE, SODIUM LACTATE AND CALCIUM CHLORIDE 90 ML/HR: 600; 310; 30; 20 INJECTION, SOLUTION INTRAVENOUS at 18:12

## 2024-04-11 RX ADMIN — ATORVASTATIN CALCIUM 20 MG: 20 TABLET, FILM COATED ORAL at 21:07

## 2024-04-11 RX ADMIN — CELECOXIB 100 MG: 100 CAPSULE ORAL at 21:07

## 2024-04-11 RX ADMIN — FENTANYL CITRATE 50 MCG: 50 INJECTION, SOLUTION INTRAMUSCULAR; INTRAVENOUS at 14:15

## 2024-04-11 RX ADMIN — FENTANYL CITRATE 50 MCG: 50 INJECTION, SOLUTION INTRAMUSCULAR; INTRAVENOUS at 14:04

## 2024-04-11 RX ADMIN — FENTANYL CITRATE 50 MCG: 50 INJECTION, SOLUTION INTRAMUSCULAR; INTRAVENOUS at 16:34

## 2024-04-11 RX ADMIN — SODIUM CHLORIDE, POTASSIUM CHLORIDE, SODIUM LACTATE AND CALCIUM CHLORIDE 9 ML/HR: 600; 310; 30; 20 INJECTION, SOLUTION INTRAVENOUS at 12:24

## 2024-04-11 RX ADMIN — BUSPIRONE HYDROCHLORIDE 15 MG: 15 TABLET ORAL at 21:07

## 2024-04-11 RX ADMIN — GABAPENTIN 600 MG: 300 CAPSULE ORAL at 21:07

## 2024-04-11 RX ADMIN — DEXAMETHASONE SODIUM PHOSPHATE 4 MG: 4 INJECTION, SOLUTION INTRA-ARTICULAR; INTRALESIONAL; INTRAMUSCULAR; INTRAVENOUS; SOFT TISSUE at 14:09

## 2024-04-11 RX ADMIN — LIDOCAINE HYDROCHLORIDE 0.2 ML: 10 INJECTION, SOLUTION EPIDURAL; INFILTRATION; INTRACAUDAL; PERINEURAL at 12:24

## 2024-04-11 RX ADMIN — ONDANSETRON 4 MG: 2 INJECTION INTRAMUSCULAR; INTRAVENOUS at 16:05

## 2024-04-11 RX ADMIN — PHENYLEPHRINE HYDROCHLORIDE 0.2 MCG/KG/MIN: 10 INJECTION INTRAVENOUS at 14:52

## 2024-04-11 RX ADMIN — ROCURONIUM BROMIDE 50 MG: 10 INJECTION INTRAVENOUS at 14:04

## 2024-04-11 RX ADMIN — PROPOFOL 150 MG: 10 INJECTION, EMULSION INTRAVENOUS at 14:04

## 2024-04-11 RX ADMIN — CETIRIZINE HYDROCHLORIDE 5 MG: 10 TABLET, FILM COATED ORAL at 18:09

## 2024-04-11 RX ADMIN — FAMOTIDINE 20 MG: 20 TABLET, FILM COATED ORAL at 12:22

## 2024-04-11 RX ADMIN — LIDOCAINE HYDROCHLORIDE 50 MG: 10 INJECTION, SOLUTION EPIDURAL; INFILTRATION; INTRACAUDAL; PERINEURAL at 14:04

## 2024-04-11 RX ADMIN — ACETAMINOPHEN 1000 MG: 500 TABLET ORAL at 12:22

## 2024-04-11 RX ADMIN — SUGAMMADEX 200 MG: 100 INJECTION, SOLUTION INTRAVENOUS at 15:49

## 2024-04-11 RX ADMIN — ROPINIROLE HYDROCHLORIDE 2 MG: 2 TABLET, FILM COATED ORAL at 21:07

## 2024-04-11 RX ADMIN — MONTELUKAST 10 MG: 10 TABLET, FILM COATED ORAL at 21:07

## 2024-04-11 RX ADMIN — IBUPROFEN 800 MG: 800 TABLET, FILM COATED ORAL at 12:22

## 2024-04-11 RX ADMIN — ROCURONIUM BROMIDE 10 MG: 10 INJECTION INTRAVENOUS at 15:22

## 2024-04-11 RX ADMIN — TRAMADOL HYDROCHLORIDE 100 MG: 50 TABLET ORAL at 21:09

## 2024-04-11 RX ADMIN — VANCOMYCIN HYDROCHLORIDE 1000 MG: 1 INJECTION, POWDER, LYOPHILIZED, FOR SOLUTION INTRAVENOUS at 12:31

## 2024-04-11 RX ADMIN — OXYCODONE HYDROCHLORIDE AND ACETAMINOPHEN 1 TABLET: 7.5; 325 TABLET ORAL at 18:09

## 2024-04-11 NOTE — PERIOPERATIVE NURSING NOTE
Apologized to patient for long wait. Patient standing at bedside due to back and leg pain.  at bedside. IV patent, WNL. Call light in reach.

## 2024-04-11 NOTE — H&P
Pre-Op H&P  Yessica Galvin  [id number]  1960      Chief complaint: Back pain      Subjective:  Patient is a 63 y.o.female presents for scheduled surgery by Dr. Elias. She anticipates a LUMBAR LAMINECTOMY DISCECTOMY DECOMPRESSION POSTERIOR L2-L5  today. She has had chronic back pain for many years. The pain radiates down the LLE. Symptoms are worse with standing, walking, or lying on her left side. No saddle anesthesia. She tried PT, but made the pain worse. She had an epidural injection that helped for about 3 days.       Review of Systems:  Constitutional-- No fever, chills or sweats. No fatigue.  CV-- No chest pain, palpitation or syncope. +HTN, HLD  Resp-- No cough, hemoptysis. +SOB, asthma, COPD  Skin--No rashes or lesions      Allergies:   Allergies   Allergen Reactions    Codeine Nausea And Vomiting    Shrimp Hives         Home Meds:  Medications Prior to Admission   Medication Sig Dispense Refill Last Dose    acetaminophen (TYLENOL) 325 MG tablet Take 2 tablets by mouth Every 4 (Four) Hours As Needed for Mild Pain .   4/10/2024    albuterol sulfate  (90 Base) MCG/ACT inhaler Inhale 2 puffs Every 6 (Six) Hours As Needed for Wheezing. 54 g 3 4/11/2024    atorvastatin (LIPITOR) 20 MG tablet Take 1 tablet by mouth Every Night. 90 tablet 3 4/10/2024    busPIRone (BUSPAR) 15 MG tablet TAKE 1 TABLET BY MOUTH TWICE A DAY 60 tablet 5 4/10/2024    celecoxib (CeleBREX) 100 MG capsule TAKE 1 CAPSULE BY MOUTH 2 TIMES A DAY AS NEEDED FOR MILD PAIN 60 capsule 1 4/11/2024 at 0900    chlorhexidine (HIBICLENS) 4 % external liquid Shower each day with solution for 5 days beginning 5 days before surgery. 120 mL 0 4/10/2024    Fluticasone Furoate-Vilanterol (Breo Ellipta) 200-25 MCG/ACT inhaler Inhale 1 puff Daily. 60 each 2 4/11/2024 at 0900    gabapentin (NEURONTIN) 600 MG tablet Take 1 tablet by mouth 3 (Three) Times a Day. 90 tablet 2 4/11/2024 at 0900    levocetirizine (Xyzal) 5 MG tablet Take 1 tablet by  mouth Every Evening. 90 tablet 1 4/10/2024    levothyroxine (SYNTHROID, LEVOTHROID) 137 MCG tablet TAKE 1 TABLET BY MOUTH DAILY 30 tablet 0 4/11/2024 at 0900    lisinopril (PRINIVIL,ZESTRIL) 20 MG tablet TAKE 1 TABLET BY MOUTH DAILY 30 tablet 0 4/11/2024 at 0900    montelukast (SINGULAIR) 10 MG tablet TAKE ONE TABLET BY MOUTH ONCE NIGHTLY 90 tablet 1 4/10/2024    mupirocin (BACTROBAN) 2 % nasal ointment Apply to the inside of each nostril with a cotton swab two times daily, morning and evening, for 5 days before surgery. 10 each 0 4/10/2024    omeprazole (priLOSEC) 40 MG capsule TAKE 1 CAPSULE BY MOUTH DAILY 90 capsule 1 4/11/2024 at 0900    promethazine (PHENERGAN) 25 MG tablet Take 1 tablet by mouth Every 8 (Eight) Hours As Needed for Nausea or Vomiting. 30 tablet 0 4/11/2024    rOPINIRole (REQUIP) 2 MG tablet Take 1 tablet by mouth every night at bedtime. 30 tablet 1 4/11/2024 at 0900    tiZANidine (ZANAFLEX) 4 MG tablet Take 1 tablet by mouth Every 12 (Twelve) Hours As Needed for Muscle Spasms. 60 tablet 1 4/10/2024    traMADol (ULTRAM) 50 MG tablet Take 2 tablets by mouth Every 8 (Eight) Hours As Needed for Moderate Pain. 150 tablet 2 4/11/2024 at 0900    venlafaxine XR (EFFEXOR-XR) 150 MG 24 hr capsule Take 2 capsules by mouth Daily. 60 capsule 2 4/11/2024    alendronate (Fosamax) 70 MG tablet Take 1 tablet by mouth Every 7 (Seven) Days. 4 tablet 11 4/4/2024    loperamide (IMODIUM) 2 MG capsule 1 capsule.   More than a month    nicotine (Nicotrol) 10 MG inhaler Inhale 1 puff As Needed for Smoking Cessation. (Patient not taking: Reported on 4/5/2024) 168 each 1     ondansetron ODT (ZOFRAN-ODT) 4 MG disintegrating tablet Place 1 tablet on the tongue Every 8 (Eight) Hours As Needed for Nausea or Vomiting. 20 tablet 1 More than a month    Umeclidinium Bromide (INCRUSE ELLIPTA) 62.5 MCG/ACT aerosol powder  Inhale 1 puff Daily. 30 each 2 More than a month         PMH:   Past Medical History:   Diagnosis Date     Acute bronchitis     Acute sinusitis     Allergic rhinitis 09/06/2023    Asthma     Asthma 03/07/2020    Asthma with acute exacerbation     Asthma, extrinsic ,1970    I have had it since I was 5 yrs. Old    Asthmatic bronchitis     Bronchiectasis     Always catch it    Centrilobular emphysema 06/02/2022    Chronic bronchitis with acute exacerbation 06/02/2022    Disc degeneration, lumbar     Disc degeneration, lumbar     GERD (gastroesophageal reflux disease)     History of mammogram     Hypertension 10/05/2023    Hypothyroidism     Lower back pain     Medicare annual wellness visit, subsequent 10/05/2023    Nodule of upper lobe of right lung 06/02/2022    Plantar fasciitis     Restless legs syndrome      PSH:    Past Surgical History:   Procedure Laterality Date    CARDIAC CATHETERIZATION N/A 11/17/2023    Procedure: Left Heart Cath;  Surgeon: Arben Pittman MD;  Location:  [facility];  Service: Cardiology;  Laterality: N/A;    CHOLECYSTECTOMY      COLONOSCOPY      HYSTERECTOMY      KNEE ARTHROSCOPY Right 03/10/2020    Procedure: KNEE ARTHROSCOPY RIGHT;  Surgeon: Guanaco Thakur MD;  Location:  [facility] OR;  Service: Orthopedics;  Laterality: Right;    NECK SURGERY      ORIF WRIST FRACTURE Left 03/10/2020    Procedure: WRIST OPEN REDUCTION INTERNAL FIXATION LEFT;  Surgeon: Guanaco Thakur MD;  Location:  [facility] OR;  Service: Orthopedics;  Laterality: Left;    TIBIAL PLATEAU OPEN REDUCTION INTERNAL FIXATION Right 03/10/2020    Procedure: TIBIAL PLATEAU OPEN REDUCTION INTERNAL FIXATION RIGHT;  Surgeon: Guanaco Thakur MD;  Location:  [facility];  Service: Orthopedics;  Laterality: Right;    UPPER GASTROINTESTINAL ENDOSCOPY         Immunization History:  Influenza: UTD  Pneumococcal: UTD  Tetanus: UTD  Covid : x5    Social History:   Tobacco:   Social History     Tobacco Use   Smoking Status Some Days    Current packs/day: 0.00    Average packs/day: 0.3 packs/day for 23.4 years (5.9 ttl pk-yrs)     Types: Cigarettes    Start date: 3/7/2000    Last attempt to quit: 2023    Years since quittin.6    Passive exposure: Current   Smokeless Tobacco Never   Tobacco Comments    I started when i was 17      Alcohol:     Social History     Substance and Sexual Activity   Alcohol Use No         Physical Exam:/88 (BP Location: Right arm, Patient Position: Lying)   Pulse 74   Temp 98.2 °F (36.8 °C) (Temporal)   Resp 18   SpO2 92%       General Appearance:    Alert, cooperative, no distress, appears stated age   Head:    Normocephalic, without obvious abnormality, atraumatic   Lungs:     Clear to auscultation bilaterally, respirations unlabored    Heart:   Regular rate and rhythm, S1 and S2 normal    Abdomen:    Soft without tenderness   Extremities:   Extremities normal, atraumatic, no cyanosis or edema   Skin:   Skin color, texture, turgor normal, no rashes or lesions   Neurologic:   Grossly intact     Results Review:     LABS:  Lab Results   Component Value Date    WBC 6.52 2024    HGB 11.4 (L) 2024    HCT 36.1 2024    MCV 96.8 2024     2024    NEUTROABS 4.97 10/05/2023    GLUCOSE 79 2023    BUN 14 2023    CREATININE 0.70 2023    EGFRIFNONA 103 2022    EGFRIFAFRI 116 2021     (L) 2023    K 4.1 2024    CL 98 2023    CO2 28.0 2023    MG 2.2 10/05/2023    CALCIUM 9.5 2023    ALBUMIN 4.3 2023    AST 28 2023    ALT 27 2023    BILITOT 0.2 2023       RADIOLOGY:    Study Result    Narrative & Impression      MRI LUMBAR SPINE WO CONTRAST     Date of Exam: 2023 9:24 AM EDT     Indication: Lumbar radiculopathy, symptoms persist with > 6 wks treatment.     Comparison: Lumbar spine radiographs dated 2023     Technique:  Routine multiplanar/multisequence sequence images of the lumbar spine were obtained without contrast administration.          FINDINGS:  There is straightening  of lumbar lordosis. Multilevel Modic type I degenerative endplate changes are seen from L2-L4. Marrow signal is otherwise unremarkable. There is a superior endplate defect of L2 which appears chronic without associated marrow   edema. Remaining vertebral body heights are maintained. Lumbar discs are desiccated with multilevel disc space narrowing. The conus terminates at approximately the T12/L1 level. No abnormal signal is identified within the conus. The visualized paraspinal   soft tissues are without significant abnormality. Limited visualization of the intra-abdominal structures is unremarkable.     T12-L1: Mild broad-based disc bulging and facet arthropathy contribute to mild bilateral neural foraminal stenosis. There is mild effacement of the anterior thecal sac. Spinal canal is not significantly narrowed.     L1-L2: Broad-based disc bulging and facet arthropathy contribute to moderate left and mild to moderate right neural foraminal stenosis. There is effacement of the anterior thecal sac. Spinal canal is not significantly narrowed.     L2-L3: Posterior disc osteophyte complex and facet arthropathy contribute to moderate spinal canal stenosis (thecal sac measures 6 to 7 mm AP) and moderate to severe bilateral neuroforaminal stenosis.     L3-L4: Posterior disc osteophyte complex and facet arthropathy contribute to severe spinal canal stenosis (thecal sac measures approximately 4 mm AP) with severe bilateral neural foraminal stenosis.     L4-L5: Broad-based disc bulging and facet arthropathy contribute to moderate spinal canal stenosis (thecal sac measures approximately 7 mm AP) with moderate to severe bilateral neural foraminal stenosis.     L5-S1: Broad-based disc bulging and facet arthropathy contribute to moderate spinal canal stenosis (thecal sac measures 6 to 7 mm AP) and moderate to severe bilateral neural foraminal stenosis     IMPRESSION:  1.Lumbar spondylosis with straightening of lumbar lordosis.  There is multilevel spinal canal and neural foraminal stenosis. Spinal canal is narrowest at the L3-L4 level. Please see above for additional details.  2.Chronic superior endplate compression fracture of L2 without associated marrow edema.       I reviewed the patient's new clinical results.    Cancer Staging (if applicable)  Cancer Patient: __ yes __no __unknown; If yes, clinical stage T:__ N:__M:__, stage group or __N/A      Impression: Lumbar stenosis with neurogenic claudication       Plan: LUMBAR LAMINECTOMY DISCECTOMY DECOMPRESSION POSTERIOR L2-L5       Savannah Nicole, IDALIA   4/11/2024   12:16 EDT

## 2024-04-11 NOTE — ANESTHESIA PREPROCEDURE EVALUATION
Anesthesia Evaluation     Patient summary reviewed and Nursing notes reviewed                Airway   Mallampati: II  TM distance: >3 FB  Neck ROM: full  No difficulty expected  Dental - normal exam   (+) upper dentures and lower dentures    Pulmonary - normal exam   (+) a smoker Current, COPD, asthma,  Cardiovascular - normal exam    (+) hypertension    ROS comment:   [facility] 11/23: mild CAD, medical management    Neuro/Psych- negative ROS  GI/Hepatic/Renal/Endo    (+) obesity, GERD, thyroid problem hypothyroidism    Musculoskeletal (-) negative ROS    Abdominal  - normal exam    Bowel sounds: normal.   Substance History - negative use     OB/GYN negative ob/gyn ROS         Other                      Anesthesia Plan    ASA 3     general     intravenous induction     Anesthetic plan, risks, benefits, and alternatives have been provided, discussed and informed consent has been obtained with: patient.    Plan discussed with CRNA.    CODE STATUS:

## 2024-04-11 NOTE — OP NOTE
NEUROSURGICAL OPERATIVE NOTE        PREOPERATIVE DIAGNOSIS:    Lumbar stenosis with neurogenic claudication      POSTOPERATIVE DIAGNOSIS:  Same      PROCEDURE:  L2-3, L3-4, L4-5 laminectomies with medial facetectomies and foraminotomies      SURGEON:  Ulises Elias M.D.      ASSISTANT: Katherine Rose PA-C    PAC assisted with:   Suctioning   Retraction   Tying   Suturing   Closing   Application of dressing   Skilled neurosurgery PA assistance was necessary to perform this procedure.        ANESTHESIA:  General      ESTIMATED BLOOD LOSS: 100 mL      SPECIMEN: None      DRAINS: 7 flat MAGGY      COMPLICATIONS:  None      CLINICAL NOTE:  The patient is a 63-year-old woman with progressive back and lower extremity pain with walking and standing intolerance.  Studies demonstrate high-grade multilevel lumbar spinal stenosis and as such she presents at this time for decompressive laminectomies.  The nature of the procedure as well as the potential risks, complications, limitations, and alternatives to the procedure were discussed at length with the patient and the patient has agreed to proceed with surgery.      TECHNICAL NOTE:  The patient was brought to the operating room and while on her cart, general endotracheal anesthesia was achieved.  She was then turned prone onto the Cloward saddle frame.  Special care was ensured to protect pressure points.  Her low back was prepared and draped in the usual fashion.  A localizing radiograph was obtained with the spinal needle in the lumbosacral midline.  Based on this, a several centimeter vertical incision was fashioned.  Underlying tissues were divided with cautery to provide exposure to the posterior spinal elements.  Another radiograph confirmed the operative levels.  Leksell rongeur was then utilized to remove the spinous processes at L2, L3, L4 and the upper aspect of L5.  A central trough was fashioned using drill and Kerrison punches.  This trough was widened using  similar instruments.  The facet complexes were undercut and the neural foramina decompressed bilaterally at each level.  The stenosis was profound and after the decompression the result was quite good, and I was pleased with the decompression.  Bleeding points were controlled with bone wax and Surgiflo. With the Valsalva maneuver there was no significant bleeding or evidence of CSF leak.  There was a very small area of thin dura on the right at the L3-4 level.  There was no CSF leakage, but I did cover this with some nonsuturable DuraGen.  A 7 flat MAGGY drain was brought in through a separate stab incision and left in the epidural space.  The paraspinous muscle and fascia were reapproximated in interrupted fashion with 0 Vicryl suture; 0.25% Marcaine was instilled in the paraspinous musculature and subcutaneous tissues. Subcutaneous tissues were closed in layers with 2-0 followed by 3-0 Vicryl suture. The skin was closed in a running locked fashion with a 3-0 nylon suture. A sterile dressing was applied. She was rolled onto her cart, extubated and taken to the recovery room in satisfactory condition.             Ulises Elias M.D.

## 2024-04-11 NOTE — ANESTHESIA POSTPROCEDURE EVALUATION
Patient: Yessica Galvin    Procedure Summary       Date: 04/11/24 Room / Location:  [facility]  /  [facility]    Anesthesia Start: 1359 Anesthesia Stop: 1611    Procedure: LUMBAR LAMINECTOMY DISCECTOMY DECOMPRESSION POSTERIOR L2-L5 (Spine Lumbar) Diagnosis:       Lumbar stenosis with neurogenic claudication      (Lumbar stenosis with neurogenic claudication [M48.062])    Surgeons: Ulises Elias MD Provider: Kam Puente MD    Anesthesia Type: general ASA Status: 3            Anesthesia Type: general    Vitals  Vitals Value Taken Time   /89 04/11/24 1700   Temp 98.6 °F (37 °C) 04/11/24 1700   Pulse 66 04/11/24 1700   Resp 12 04/11/24 1700   SpO2 95 % 04/11/24 1700           Post Anesthesia Care and Evaluation    Patient location during evaluation: PACU  Patient participation: complete - patient participated  Level of consciousness: awake and alert  Pain management: adequate    Airway patency: patent  Anesthetic complications: No anesthetic complications  PONV Status: none  Cardiovascular status: hemodynamically stable and acceptable  Respiratory status: nonlabored ventilation, acceptable and nasal cannula  Hydration status: acceptable

## 2024-04-11 NOTE — ANESTHESIA PROCEDURE NOTES
Airway  Urgency: elective    Date/Time: 4/11/2024 2:07 PM  Airway not difficult    General Information and Staff    Patient location during procedure: OR    Indications and Patient Condition  Indications for airway management: airway protection    Preoxygenated: yes  MILS not maintained throughout  Mask difficulty assessment: 2 - vent by mask + OA or adjuvant +/- NMBA    Final Airway Details  Final airway type: endotracheal airway      Successful airway: ETT  Cuffed: yes   Successful intubation technique: direct laryngoscopy  Facilitating devices/methods: intubating stylet  Endotracheal tube insertion site: oral  Blade: Juaquin  Blade size: 3  ETT size (mm): 7.0  Cormack-Lehane Classification: grade I - full view of glottis  Placement verified by: chest auscultation and capnometry   Cuff volume (mL): 5  Measured from: lips  ETT/EBT  to lips (cm): 20  Number of attempts at approach: 1  Assessment: lips, teeth, and gum same as pre-op and atraumatic intubation    Additional Comments  Negative epigastric sounds, Breath sound equal bilaterally with symmetric chest rise and fall

## 2024-04-12 PROCEDURE — 94799 UNLISTED PULMONARY SVC/PX: CPT

## 2024-04-12 PROCEDURE — 25010000002 MORPHINE PER 10 MG: Performed by: NEUROLOGICAL SURGERY

## 2024-04-12 PROCEDURE — 25010000002 VANCOMYCIN 1 G RECONSTITUTED SOLUTION 1 EACH VIAL: Performed by: NEUROLOGICAL SURGERY

## 2024-04-12 PROCEDURE — 99024 POSTOP FOLLOW-UP VISIT: CPT | Performed by: NEUROLOGICAL SURGERY

## 2024-04-12 PROCEDURE — 97161 PT EVAL LOW COMPLEX 20 MIN: CPT

## 2024-04-12 PROCEDURE — 97535 SELF CARE MNGMENT TRAINING: CPT

## 2024-04-12 PROCEDURE — 97116 GAIT TRAINING THERAPY: CPT

## 2024-04-12 PROCEDURE — 97165 OT EVAL LOW COMPLEX 30 MIN: CPT

## 2024-04-12 PROCEDURE — 25810000003 SODIUM CHLORIDE 0.9 % SOLUTION 250 ML FLEX CONT: Performed by: NEUROLOGICAL SURGERY

## 2024-04-12 RX ADMIN — CETIRIZINE HYDROCHLORIDE 5 MG: 10 TABLET, FILM COATED ORAL at 08:18

## 2024-04-12 RX ADMIN — LEVOTHYROXINE SODIUM 137 MCG: 137 TABLET ORAL at 04:58

## 2024-04-12 RX ADMIN — CELECOXIB 100 MG: 100 CAPSULE ORAL at 08:17

## 2024-04-12 RX ADMIN — BUSPIRONE HYDROCHLORIDE 15 MG: 15 TABLET ORAL at 08:17

## 2024-04-12 RX ADMIN — Medication 3 ML: at 08:19

## 2024-04-12 RX ADMIN — ACETAMINOPHEN 650 MG: 325 TABLET ORAL at 19:57

## 2024-04-12 RX ADMIN — MONTELUKAST 10 MG: 10 TABLET, FILM COATED ORAL at 20:00

## 2024-04-12 RX ADMIN — OXYCODONE HYDROCHLORIDE AND ACETAMINOPHEN 1 TABLET: 7.5; 325 TABLET ORAL at 12:34

## 2024-04-12 RX ADMIN — VENLAFAXINE HYDROCHLORIDE 75 MG: 75 CAPSULE, EXTENDED RELEASE ORAL at 08:18

## 2024-04-12 RX ADMIN — Medication 3 ML: at 20:00

## 2024-04-12 RX ADMIN — GABAPENTIN 600 MG: 300 CAPSULE ORAL at 04:58

## 2024-04-12 RX ADMIN — CELECOXIB 100 MG: 100 CAPSULE ORAL at 21:20

## 2024-04-12 RX ADMIN — OXYCODONE HYDROCHLORIDE AND ACETAMINOPHEN 1 TABLET: 7.5; 325 TABLET ORAL at 18:49

## 2024-04-12 RX ADMIN — GABAPENTIN 600 MG: 300 CAPSULE ORAL at 21:20

## 2024-04-12 RX ADMIN — MORPHINE SULFATE 4 MG: 4 INJECTION, SOLUTION INTRAMUSCULAR; INTRAVENOUS at 21:20

## 2024-04-12 RX ADMIN — BUSPIRONE HYDROCHLORIDE 15 MG: 15 TABLET ORAL at 21:20

## 2024-04-12 RX ADMIN — TIOTROPIUM BROMIDE INHALATION SPRAY 2 PUFF: 3.12 SPRAY, METERED RESPIRATORY (INHALATION) at 07:37

## 2024-04-12 RX ADMIN — BUDESONIDE AND FORMOTEROL FUMARATE DIHYDRATE 1 PUFF: 160; 4.5 AEROSOL RESPIRATORY (INHALATION) at 07:34

## 2024-04-12 RX ADMIN — PANTOPRAZOLE SODIUM 40 MG: 40 TABLET, DELAYED RELEASE ORAL at 04:59

## 2024-04-12 RX ADMIN — LISINOPRIL 20 MG: 20 TABLET ORAL at 08:18

## 2024-04-12 RX ADMIN — ACETAMINOPHEN 650 MG: 325 TABLET ORAL at 08:18

## 2024-04-12 RX ADMIN — GABAPENTIN 600 MG: 300 CAPSULE ORAL at 12:34

## 2024-04-12 RX ADMIN — ROPINIROLE HYDROCHLORIDE 2 MG: 2 TABLET, FILM COATED ORAL at 20:00

## 2024-04-12 RX ADMIN — MORPHINE SULFATE 4 MG: 4 INJECTION, SOLUTION INTRAMUSCULAR; INTRAVENOUS at 14:24

## 2024-04-12 RX ADMIN — VANCOMYCIN HYDROCHLORIDE 1000 MG: 1 INJECTION, POWDER, LYOPHILIZED, FOR SOLUTION INTRAVENOUS at 12:33

## 2024-04-12 RX ADMIN — ATORVASTATIN CALCIUM 20 MG: 20 TABLET, FILM COATED ORAL at 20:00

## 2024-04-12 RX ADMIN — VANCOMYCIN HYDROCHLORIDE 1000 MG: 1 INJECTION, POWDER, LYOPHILIZED, FOR SOLUTION INTRAVENOUS at 00:02

## 2024-04-12 RX ADMIN — OXYCODONE HYDROCHLORIDE AND ACETAMINOPHEN 1 TABLET: 7.5; 325 TABLET ORAL at 08:17

## 2024-04-12 RX ADMIN — BUDESONIDE AND FORMOTEROL FUMARATE DIHYDRATE 1 PUFF: 160; 4.5 AEROSOL RESPIRATORY (INHALATION) at 22:07

## 2024-04-12 NOTE — THERAPY EVALUATION
Patient Name: Yessica Galvin  : 1960    MRN: [id number]                              Today's Date: 2024       Admit Date: 2024    Visit Dx:     ICD-10-CM ICD-9-CM   1. Lumbar stenosis with neurogenic claudication  M48.062 724.03     Patient Active Problem List   Diagnosis    Asthmatic bronchitis    Generalized anxiety disorder    Gout    Headache    Acquired hypothyroidism    Chronic midline low back pain without sciatica    Cervical disc disorder with radiculopathy    Lumbar disc disease    Left radial fracture    Asthma    Tobacco abuse    Elevated transaminase level    Chronic back pain    Closed fracture of right tibial plateau    Chronic bronchitis with acute exacerbation    Centrilobular emphysema    History of tobacco use, presenting hazards to health    Nodule of upper lobe of right lung    Nocturnal leg cramps    Left hip pain    Constipation    Skin lesions    Gastroesophageal reflux disease with esophagitis without hemorrhage    Bruising    Therapeutic drug monitoring    Nausea    Allergic rhinitis    Gastroesophageal reflux disease    Medicare annual wellness visit, subsequent    Dyspnea    Anxiety and depression    Hypertension    Fatigue    Tobacco dependence    Abdominal pain, diffuse    Chest pain    Abnormal nuclear stress test    Post-menopause    Age-related osteoporosis without current pathological fracture    Cough    Restless leg syndrome    Gastroenteritis    Lumbar stenosis with neurogenic claudication    Stage II COPD    Encounter for pre-operative respiratory clearance     Past Medical History:   Diagnosis Date    Acute bronchitis     Acute sinusitis     Allergic rhinitis 2023    Asthma     Asthma 2020    Asthma with acute exacerbation     Asthma, extrinsic ,1970    I have had it since I was 5 yrs. Old    Asthmatic bronchitis     Bronchiectasis     Always catch it    Centrilobular emphysema 2022    Chronic bronchitis with acute exacerbation  06/02/2022    Disc degeneration, lumbar     Disc degeneration, lumbar     GERD (gastroesophageal reflux disease)     History of mammogram     Hypertension 10/05/2023    Hypothyroidism     Lower back pain     Medicare annual wellness visit, subsequent 10/05/2023    Nodule of upper lobe of right lung 06/02/2022    Plantar fasciitis     Restless legs syndrome      Past Surgical History:   Procedure Laterality Date    CARDIAC CATHETERIZATION N/A 11/17/2023    Procedure: Left Heart Cath;  Surgeon: Arben Pittman MD;  Location:  [facility];  Service: Cardiology;  Laterality: N/A;    CHOLECYSTECTOMY      COLONOSCOPY      HYSTERECTOMY      KNEE ARTHROSCOPY Right 03/10/2020    Procedure: KNEE ARTHROSCOPY RIGHT;  Surgeon: Guanaco Thakur MD;  Location: [facility];  Service: Orthopedics;  Laterality: Right;    NECK SURGERY      ORIF WRIST FRACTURE Left 03/10/2020    Procedure: WRIST OPEN REDUCTION INTERNAL FIXATION LEFT;  Surgeon: Guanaco Thakur MD;  Location: [facility] OR;  Service: Orthopedics;  Laterality: Left;    TIBIAL PLATEAU OPEN REDUCTION INTERNAL FIXATION Right 03/10/2020    Procedure: TIBIAL PLATEAU OPEN REDUCTION INTERNAL FIXATION RIGHT;  Surgeon: Guanaco Thakur MD;  Location: [facility] OR;  Service: Orthopedics;  Laterality: Right;    UPPER GASTROINTESTINAL ENDOSCOPY        General Information       Row Name 04/12/24 1125          OT Time and Intention    Document Type evaluation  -LC     Mode of Treatment occupational therapy  -LC       Row Name 04/12/24 1125          General Information    Patient Profile Reviewed yes  -LC     Prior Level of Function independent:;all household mobility;transfer;ADL's  -LC     Existing Precautions/Restrictions fall;spinal;other (see comments)  MAGGY drain  -LC     Barriers to Rehab medically complex  -LC       Row Name 04/12/24 1125          Living Environment    People in Home spouse  -LC       Row Name 04/12/24 1125          Home Main Entrance    Number of  Stairs, Main Entrance one  -[facility] Name 04/12/24 1125          Stairs Within Home, Primary    Stairs, Within Home, Primary All needs met on main floor.  -[facility] Name 04/12/24 1125          Cognition    Orientation Status (Cognition) oriented x 4  -[facility] Name 04/12/24 1125          Safety Issues, Functional Mobility    Safety Issues Affecting Function (Mobility) awareness of need for assistance;insight into deficits/self-awareness;safety precautions follow-through/compliance;judgment;safety precaution awareness;sequencing abilities  -     Impairments Affecting Function (Mobility) balance;endurance/activity tolerance;pain;postural/trunk control;range of motion (ROM);strength  -               User Key  (r) = Recorded By, (t) = Taken By, (c) = Cosigned By      Initials Name Provider Type     Louisa Castaneda OT Occupational Therapist                     Mobility/ADL's       [facility] Name 04/12/24 1135          Bed Mobility    Comment, (Bed Mobility) UIC on arrival  -LC       Row Name 04/12/24 1135          Transfers    Transfers sit-stand transfer  -     Comment, (Transfers) VC's for hand placement and to limit trunk flexion during T/Fs to adhere to spinal precautions.  -[facility] Name 04/12/24 1135          Sit-Stand Transfer    Sit-Stand [place] (Transfers) contact guard;verbal cues  -     Assistive Device (Sit-Stand Transfers) walker, front-wheeled  -[facility] Name 04/12/24 1135          Activities of Daily Living    BADL Assessment/Intervention bathing;lower body dressing;toileting  -LC       Row Name 04/12/24 1135          Bathing Assessment/Intervention    Comment, (Bathing) Educated pt. on use of LHS to facilitate maintaining spinal precautions during bathing. Simulated task with good understanding.  -[facility] Name 04/12/24 1135          Lower Body Dressing Assessment/Training    [place] Level (Lower Body Dressing) don;doff;pants/bottoms;shoes/slippers;socks;moderate  assist (50% patient effort);verbal cues  -     Position (Lower Body Dressing) unsupported sitting  -     Comment, (Lower Body Dressing) Educated pt. on use of AE for LBD to facilitate maintaining spinal precautions. Pt. trialed AE, recommend continued trials to ensure carry over.  -[facility] Name 04/12/24 1135          Toileting Assessment/Training    Assistive Devices (Toileting) toilet paper aid  -     Comment, (Toileting) Educated pt. on use of toileting aid to facilitate adhering to spinal precautions during hygiene. Verbalized understanding.  -               User Key  (r) = Recorded By, (t) = Taken By, (c) = Cosigned By      Initials Name Provider Type     Louisa Castaneda OT Occupational Therapist                   Obj/Interventions       [facility] Name 04/12/24 1141          Sensory Assessment (Somatosensory)    Sensory Assessment (Somatosensory) UE sensation intact  -LC       Row Name 04/12/24 1141          Vision Assessment/Intervention    Visual Impairment/Limitations corrective lenses for reading  -LC       Row Name 04/12/24 1141          Range of Motion Comprehensive    General Range of Motion bilateral upper extremity ROM WFL  -LC       Row Name 04/12/24 1141          Strength Comprehensive (MMT)    General Manual Muscle Testing (MMT) Assessment upper extremity strength deficits identified  -LC       Row Name 04/12/24 1141          Upper Extremity (Manual Muscle Testing)    Upper Extremity: Manual Muscle Testing (MMT) left UE strength is WFL;right UE strength is WFL  -[facility] Name 04/12/24 1141          Balance    Balance Assessment sitting static balance;sitting dynamic balance;standing static balance;standing dynamic balance  -     Static Sitting Balance supervision  -     Dynamic Sitting Balance standby assist  -     Position, Sitting Balance unsupported;sitting in chair  -     Static Standing Balance standby assist  -     Dynamic Standing Balance contact guard  -      Position/Device Used, Standing Balance supported;walker, front-wheeled  -LC     Balance Interventions sitting;standing;supported;sit to stand;occupation based/functional task;weight shifting activity  -     Comment, Balance No LOB, CGA for safety  -               User Key  (r) = Recorded By, (t) = Taken By, (c) = Cosigned By      Initials Name Provider Type    Louisa Rivera OT Occupational Therapist                   Goals/Plan       Row Name 04/12/24 1150          Transfer Goal 1 (OT)    Activity/Assistive Device (Transfer Goal 1, OT) sit-to-stand/stand-to-sit;bed-to-chair/chair-to-bed;walker, rolling  -     [place] Level/Cues Needed (Transfer Goal 1, OT) standby assist  -     Time Frame (Transfer Goal 1, OT) long term goal (LTG);by discharge  -     Progress/Outcome (Transfer Goal 1, OT) goal ongoing  -       Row Name 04/12/24 1150          Dressing Goal 1 (OT)    Activity/Device (Dressing Goal 1, OT) lower body dressing;long-handled shoe horn;reacher;sock-aid  -     [place]/Cues Needed (Dressing Goal 1, OT) set-up required;verbal cues required  -     Time Frame (Dressing Goal 1, OT) long term goal (LTG);by discharge  -     Progress/Outcome (Dressing Goal 1, OT) goal ongoing  -       Row Name 04/12/24 1150          Toileting Goal 1 (OT)    Activity/Device (Toileting Goal 1, OT) adjust/manage clothing;perform perineal hygiene;toilet paper aid  -     [place] Level/Cues Needed (Toileting Goal 1, OT) contact guard required;verbal cues required  -     Time Frame (Toileting Goal 1, OT) long term goal (LTG);by discharge  -     Progress/Outcome (Toileting Goal 1, OT) goal ongoing  -               User Key  (r) = Recorded By, (t) = Taken By, (c) = Cosigned By      Initials Name Provider Type    Louisa Rivera OT Occupational Therapist                   Clinical Impression       Row Name 04/12/24 1145          Pain Assessment    Pretreatment Pain Rating 7/10  -      Posttreatment Pain Rating 9/10  -     Pain Location - Side/Orientation Bilateral  -LC     Pain Location incisional  -LC     Pain Location - back  -LC     Pain Intervention(s) Repositioned;Ambulation/increased activity  -     Additional Documentation Pain Scale: Word Pre/Post-Treatment (Group)  -LC       Row Name 04/12/24 1145          Plan of Care Review    Plan of Care Reviewed With patient  -LC     Progress no change  -LC     Outcome Evaluation Pt. educated on AE for ADLs to faciliate maintaining spinal precautions. Recommend continued trials with AE to ensure carry over. Completed STS with Min A and LBD with Mod A. Will progress as able. Recommend home with assist at discharge.  -LC       Row Name 04/12/24 1145          Therapy Assessment/Plan (OT)    Patient/Family Therapy Goal Statement (OT) To take care of self  -LC     Therapy Frequency (OT) daily  -LC       Row Name 04/12/24 1145          Therapy Plan Review/Discharge Plan (OT)    Anticipated Discharge Disposition (OT) home with assist  -       Row Name 04/12/24 1145          Positioning and Restraints    Pre-Treatment Position sitting in chair/recliner  -     Post Treatment Position chair  -LC     In Chair notified nsg;sitting;call light within reach;encouraged to call for assist;exit alarm on;with family/caregiver;waffle cushion  -               User Key  (r) = Recorded By, (t) = Taken By, (c) = Cosigned By      Initials Name Provider Type     Louisa Castaneda, OT Occupational Therapist                   Outcome Measures       Row Name 04/12/24 1151          How much help from another is currently needed...    Putting on and taking off regular lower body clothing? 3  -LC     Bathing (including washing, rinsing, and drying) 3  -LC     Toileting (which includes using toilet bed pan or urinal) 3  -LC     Putting on and taking off regular upper body clothing 3  -LC     Taking care of personal grooming (such as brushing teeth) 4  -LC     Eating meals  4  -     AM-PAC 6 Clicks Score (OT) 20  -       Row Name 04/12/24 1019          How much help from another person do you currently need...    Turning from your back to your side while in flat bed without using bedrails? 3  -SS     Moving from lying on back to sitting on the side of a flat bed without bedrails? 3  -SS     Moving to and from a bed to a chair (including a wheelchair)? 3  -SS     Standing up from a chair using your arms (e.g., wheelchair, bedside chair)? 3  -SS     Climbing 3-5 steps with a railing? 3  -SS     To walk in hospital room? 3  -     AM-PAC 6 Clicks Score (PT) 18  -SS     Highest Level of Mobility Goal 6 --> Walk 10 steps or more  -       Row Name 04/12/24 1151 04/12/24 1019       Functional Assessment    Outcome Measure Options AM-PAC 6 Clicks Daily Activity (OT)  - AM-PAC 6 Clicks Basic Mobility (PT)  -              User Key  (r) = Recorded By, (t) = Taken By, (c) = Cosigned By      Initials Name Provider Type     Louisa Castaneda, OT Occupational Therapist    SS Lexie Tinajero, PT Physical Therapist                    Occupational Therapy Education       Title: PT OT SLP Therapies (In Progress)       Topic: Occupational Therapy (In Progress)       Point: ADL training (In Progress)       Description:   Instruct learner(s) on proper safety adaptation and remediation techniques during self care or transfers.   Instruct in proper use of assistive devices.                  Learning Progress Summary             Patient Acceptance, E, NR by  at 4/12/2024 1012                         Point: Home exercise program (Not Started)       Description:   Instruct learner(s) on appropriate technique for monitoring, assisting and/or progressing therapeutic exercises/activities.                  Learner Progress:  Not documented in this visit.              Point: Precautions (In Progress)       Description:   Instruct learner(s) on prescribed precautions during self-care and functional  transfers.                  Learning Progress Summary             Patient Acceptance, E, NR by  at 4/12/2024 1012                         Point: Body mechanics (In Progress)       Description:   Instruct learner(s) on proper positioning and spine alignment during self-care, functional mobility activities and/or exercises.                  Learning Progress Summary             Patient Acceptance, E, NR by  at 4/12/2024 1012                                         User Key       Initials Effective Dates Name Provider Type Discipline     06/16/21 -  Louisa Castaneda, OT Occupational Therapist OT                  OT Recommendation and Plan  Therapy Frequency (OT): daily  Plan of Care Review  Plan of Care Reviewed With: patient  Progress: no change  Outcome Evaluation: Pt. educated on AE for ADLs to faciliate maintaining spinal precautions. Recommend continued trials with AE to ensure carry over. Completed STS with Min A and LBD with Mod A. Will progress as able. Recommend home with assist at discharge.     Time Calculation:   Evaluation Complexity (OT)  Review Occupational Profile/Medical/Therapy History Complexity: brief/low complexity  Assessment, Occupational Performance/Identification of Deficit Complexity: 1-3 performance deficits  Clinical Decision Making Complexity (OT): problem focused assessment/low complexity  Overall Complexity of Evaluation (OT): low complexity     Time Calculation- OT       Row Name 04/12/24 1152 04/12/24 1021          Time Calculation- OT    OT Start Time 1012  -LC --     OT Received On 04/12/24  - --     OT Goal Re-Cert Due Date 04/22/24  - --        Timed Charges    06734 - Gait Training Minutes  -- 8  -SS     28840 - OT Self Care/Mgmt Minutes 17  -LC --        Untimed Charges    OT Eval/Re-eval Minutes 47  -LC --        Total Minutes    Timed Charges Total Minutes 17  -LC 8  -SS     Untimed Charges Total Minutes 47  -LC --      Total Minutes 64  -LC 8  -SS               User Key   (r) = Recorded By, (t) = Taken By, (c) = Cosigned By      Initials Name Provider Type     Louisa Castaneda, OT Occupational Therapist    SS Lexie Tinajero, PT Physical Therapist                  Therapy Charges for Today       Code Description Service Date Service Provider Modifiers Qty    [id number]  OT SELF CARE/MGMT/TRAIN EA 15 MIN 4/12/2024 Louisa Castaneda OT GO 1    [id number]  OT EVAL LOW COMPLEXITY 4 4/12/2024 Louisa Castaneda OT GO 1                 Louisa Castaneda OT  4/12/2024

## 2024-04-12 NOTE — PLAN OF CARE
Goal Outcome Evaluation:  Plan of Care Reviewed With: patient        Progress: no change  Outcome Evaluation: Pt. educated on AE for ADLs to faciliate maintaining spinal precautions. Recommend continued trials with AE to ensure carry over. Completed STS with Min A and LBD with Mod A. Will progress as able. Recommend home with assist at discharge.      Anticipated Discharge Disposition (OT): home with assist

## 2024-04-12 NOTE — PLAN OF CARE
Goal Outcome Evaluation:                                              Problem: Adult Inpatient Plan of Care  Goal: Absence of Hospital-Acquired Illness or Injury  Intervention: Identify and Manage Fall Risk  Recent Flowsheet Documentation  Taken 4/12/2024 0400 by Daniel Fowler RN  Safety Promotion/Fall Prevention:   activity supervised   safety round/check completed   toileting scheduled  Taken 4/12/2024 0200 by Daniel Fowler RN  Safety Promotion/Fall Prevention:   activity supervised   safety round/check completed   toileting scheduled  Taken 4/12/2024 0000 by Daniel Fowler RN  Safety Promotion/Fall Prevention:   activity supervised   safety round/check completed   toileting scheduled  Taken 4/11/2024 2200 by Daniel Fowler RN  Safety Promotion/Fall Prevention:   activity supervised   safety round/check completed   toileting scheduled  Taken 4/11/2024 2000 by Daniel Fowler RN  Safety Promotion/Fall Prevention:   activity supervised   safety round/check completed   toileting scheduled  Intervention: Prevent Skin Injury  Recent Flowsheet Documentation  Taken 4/12/2024 0400 by Daniel Fowler RN  Body Position: supine  Taken 4/12/2024 0200 by Daniel Fowler RN  Body Position: supine  Taken 4/12/2024 0000 by Daniel Fowler RN  Body Position: supine  Taken 4/11/2024 2200 by Daniel Fowler RN  Body Position: supine  Taken 4/11/2024 2000 by Daniel Fowler RN  Body Position: supine  Intervention: Prevent and Manage VTE (Venous Thromboembolism) Risk  Recent Flowsheet Documentation  Taken 4/12/2024 0400 by Daniel Fowler RN  VTE Prevention/Management:   bilateral   sequential compression devices on  Taken 4/12/2024 0200 by Daniel Fowler RN  VTE Prevention/Management:   bilateral   sequential compression devices on  Taken 4/12/2024 0000 by Daniel Fowler RN  VTE Prevention/Management:   bilateral   sequential compression devices on  Taken 4/11/2024 2200 by Daniel Fowler RN  VTE  Prevention/Management:   bilateral   sequential compression devices on  Taken 4/11/2024 2000 by Daniel Fowler RN  VTE Prevention/Management:   bilateral   sequential compression devices on  Intervention: Prevent Infection  Recent Flowsheet Documentation  Taken 4/12/2024 0400 by Daniel Fowler RN  Infection Prevention: environmental surveillance performed  Taken 4/12/2024 0200 by Daniel Fowler RN  Infection Prevention: environmental surveillance performed  Taken 4/12/2024 0000 by Daniel Fowler RN  Infection Prevention: environmental surveillance performed  Taken 4/11/2024 2200 by Daniel Fowler RN  Infection Prevention: environmental surveillance performed  Taken 4/11/2024 2000 by Daniel Fowler RN  Infection Prevention: environmental surveillance performed  Goal: Optimal Comfort and Wellbeing  Intervention: Monitor Pain and Promote Comfort  Recent Flowsheet Documentation  Taken 4/12/2024 0400 by Daniel Fowler RN  Pain Management Interventions: quiet environment facilitated  Taken 4/12/2024 0200 by Daniel Fowler RN  Pain Management Interventions: quiet environment facilitated  Taken 4/12/2024 0000 by Daniel Fowler RN  Pain Management Interventions: quiet environment facilitated  Taken 4/11/2024 2200 by Daniel Fowler RN  Pain Management Interventions: quiet environment facilitated  Taken 4/11/2024 2000 by Daniel Fowler RN  Pain Management Interventions: quiet environment facilitated  Intervention: Provide Person-Centered Care  Recent Flowsheet Documentation  Taken 4/12/2024 0400 by Daniel Fowler RN  Trust Relationship/Rapport: care explained  Taken 4/12/2024 0200 by Daniel Fowler RN  Trust Relationship/Rapport: care explained  Taken 4/12/2024 0000 by Daniel Fowler RN  Trust Relationship/Rapport: care explained  Taken 4/11/2024 2200 by Daniel Fowler RN  Trust Relationship/Rapport: care explained  Taken 4/11/2024 2000 by Daniel Fowler RN  Trust Relationship/Rapport: care  explained     VSS, voids well, rested throughout the night, ambulates to bathroom, MAGGY output was a lot for the night, pain managed with PRN medications, will continue to monitor for changes.

## 2024-04-12 NOTE — PROGRESS NOTES
NEUROSURGERY PROGRESS NOTE     LOS: 0 days   Patient Care Team:  Betty Jasmine MD as PCP - General (Family Medicine)  Atul Tavarez MD as Consulting Physician (Gastroenterology)  Bobby Tom MD as Consulting Physician (Pulmonary Disease)  Bobby Tom MD as Consulting Physician (Pulmonary Disease)  Delgado Lal MD as Consulting Physician (Pain Medicine)  Ulises Elias MD as Consulting Physician (Neurosurgery)  Betty Jasmine MD as Primary Care Provider (Family Medicine)  Kota Swain MD as Referring Physician (Family Medicine)    Chief Complaint: Low back and lower extremity pain with walking and standing intolerance.    POD#: 1 Day Post-Op  Procedures:  L2-5 laminectomies.    Interval History:   She has ambulated to the bathroom multiple times.  Patient Complaints: Incisional pain.  She has mild headache.  Patient Denies: Weakness or numbness.    Vital Signs: Blood pressure 160/93, pulse 72, temperature 98.3 °F (36.8 °C), temperature source Oral, resp. rate 16, SpO2 96%, not currently breastfeeding.  Intake/Output:   Intake/Output Summary (Last 24 hours) at 4/12/2024 0605  Last data filed at 4/12/2024 0400  Gross per 24 hour   Intake 440 ml   Output 1810 ml   Net -1370 ml     Drain output: 150/160 mL.    Physical Exam:  Patient is awake and alert.  She follows simple commands.  Moderate heme staining is noted on her dressing.       Assessment/Plan:  1.  Lumbar stenosis with neurogenic claudication status post multilevel decompressive laminectomy.  2.  Chronic mechanical low back pain.  3.  History of hypertension.  4.  Disposition: Mobilize patient.  Observe wound drainage.      Ulises Elias MD  04/12/24  06:05 EDT

## 2024-04-12 NOTE — THERAPY EVALUATION
Patient Name: Yessica Galvin  : 1960    MRN: [id number]                              Today's Date: 2024       Admit Date: 2024    Visit Dx:     ICD-10-CM ICD-9-CM   1. Lumbar stenosis with neurogenic claudication  M48.062 724.03     Patient Active Problem List   Diagnosis    Asthmatic bronchitis    Generalized anxiety disorder    Gout    Headache    Acquired hypothyroidism    Chronic midline low back pain without sciatica    Cervical disc disorder with radiculopathy    Lumbar disc disease    Left radial fracture    Asthma    Tobacco abuse    Elevated transaminase level    Chronic back pain    Closed fracture of right tibial plateau    Chronic bronchitis with acute exacerbation    Centrilobular emphysema    History of tobacco use, presenting hazards to health    Nodule of upper lobe of right lung    Nocturnal leg cramps    Left hip pain    Constipation    Skin lesions    Gastroesophageal reflux disease with esophagitis without hemorrhage    Bruising    Therapeutic drug monitoring    Nausea    Allergic rhinitis    Gastroesophageal reflux disease    Medicare annual wellness visit, subsequent    Dyspnea    Anxiety and depression    Hypertension    Fatigue    Tobacco dependence    Abdominal pain, diffuse    Chest pain    Abnormal nuclear stress test    Post-menopause    Age-related osteoporosis without current pathological fracture    Cough    Restless leg syndrome    Gastroenteritis    Lumbar stenosis with neurogenic claudication    Stage II COPD    Encounter for pre-operative respiratory clearance     Past Medical History:   Diagnosis Date    Acute bronchitis     Acute sinusitis     Allergic rhinitis 2023    Asthma     Asthma 2020    Asthma with acute exacerbation     Asthma, extrinsic ,1970    I have had it since I was 5 yrs. Old    Asthmatic bronchitis     Bronchiectasis     Always catch it    Centrilobular emphysema 2022    Chronic bronchitis with acute exacerbation  06/02/2022    Disc degeneration, lumbar     Disc degeneration, lumbar     GERD (gastroesophageal reflux disease)     History of mammogram     Hypertension 10/05/2023    Hypothyroidism     Lower back pain     Medicare annual wellness visit, subsequent 10/05/2023    Nodule of upper lobe of right lung 06/02/2022    Plantar fasciitis     Restless legs syndrome      Past Surgical History:   Procedure Laterality Date    CARDIAC CATHETERIZATION N/A 11/17/2023    Procedure: Left Heart Cath;  Surgeon: Arben Pittman MD;  Location:  [facility];  Service: Cardiology;  Laterality: N/A;    CHOLECYSTECTOMY      COLONOSCOPY      HYSTERECTOMY      KNEE ARTHROSCOPY Right 03/10/2020    Procedure: KNEE ARTHROSCOPY RIGHT;  Surgeon: Guanaco Thakur MD;  Location: [facility];  Service: Orthopedics;  Laterality: Right;    NECK SURGERY      ORIF WRIST FRACTURE Left 03/10/2020    Procedure: WRIST OPEN REDUCTION INTERNAL FIXATION LEFT;  Surgeon: Guanaco Thakur MD;  Location: [facility];  Service: Orthopedics;  Laterality: Left;    TIBIAL PLATEAU OPEN REDUCTION INTERNAL FIXATION Right 03/10/2020    Procedure: TIBIAL PLATEAU OPEN REDUCTION INTERNAL FIXATION RIGHT;  Surgeon: Guanaco Thakur MD;  Location: [facility];  Service: Orthopedics;  Laterality: Right;    UPPER GASTROINTESTINAL ENDOSCOPY        General Information       Row Name 04/12/24 0958          Physical Therapy Time and Intention    Document Type evaluation  -SS     Mode of Treatment physical therapy  -SS       Row Name 04/12/24 0958          General Information    Patient Profile Reviewed yes  -SS     Prior Level of Function independent:;all household mobility;community mobility;gait;transfer;bed mobility;ADL's  pt. declines use of AD or any acute falls; community ambulator w/difficulty due to posture and pain  -SS     Existing Precautions/Restrictions fall;spinal;other (see comments)  MAGGY drain  -SS     Barriers to Rehab medically complex  -SS       Row  Name 04/12/24 0958          Living Environment    People in Home spouse  -SS       Row Name 04/12/24 0958          Home Main Entrance    Number of Stairs, Main Entrance one  -SS     Stair Railings, Main Entrance none  -SS       Row Name 04/12/24 0958          Stairs Within Home, Primary    Stairs, Within Home, Primary does not need to navigate  -     Number of Stairs, Within Home, Primary twelve  -SS       Row Name 04/12/24 0958          Cognition    Orientation Status (Cognition) oriented x 4  -SS       Row Name 04/12/24 0958          Safety Issues, Functional Mobility    Safety Issues Affecting Function (Mobility) awareness of need for assistance;insight into deficits/self-awareness;judgment;positioning of assistive device;problem-solving;safety precaution awareness;safety precautions follow-through/compliance;sequencing abilities  -     Impairments Affecting Function (Mobility) balance;endurance/activity tolerance;pain;postural/trunk control;range of motion (ROM);strength;sensation/sensory awareness  -               User Key  (r) = Recorded By, (t) = Taken By, (c) = Cosigned By      Initials Name Provider Type     Lexie Tinajero, PT Physical Therapist                   Mobility       Row Name 04/12/24 1008          Bed Mobility    Comment, (Bed Mobility) pt. sitting EOB upon arrival and left in chair  -       Row Name 04/12/24 1008          Sit-Stand Transfer    Sit-Stand [place] (Transfers) contact guard;verbal cues  -     Assistive Device (Sit-Stand Transfers) walker, front-wheeled  -     Comment, (Sit-Stand Transfer) VC for hand placement, appropriate alignment, emphasis on lowering with eccentric control  -       Row Name 04/12/24 1008          Gait/Stairs (Locomotion)    [place] Level (Gait) contact guard;verbal cues  -     Assistive Device (Gait) walker, front-wheeled  -     Patient was able to Ambulate yes  -     Distance in Feet (Gait) 350  -     Deviations/Abnormal  Patterns (Gait) bilateral deviations;james decreased;gait speed decreased;stride length decreased  -     Bilateral Gait Deviations forward flexed posture;heel strike decreased  -     Comment, (Gait/Stairs) Pt. ambulated with a step through gait pattern w/a forward flexed posture. VC for upright posture, heel toe gait pattern, decreased shoulder elevation. Activity limited by fatigue, pain.  -               User Key  (r) = Recorded By, (t) = Taken By, (c) = Cosigned By      Initials Name Provider Type     Lexie Tinajero, LIEN Physical Therapist                   Obj/Interventions       Row Name 04/12/24 1012          Range of Motion Comprehensive    General Range of Motion bilateral lower extremity ROM WFL  -       Row Name 04/12/24 1012          Strength Comprehensive (MMT)    Comment, General Manual Muscle Testing (MMT) Assessment BLE gross 4-/5  -       Row Name 04/12/24 1012          Motor Skills    Therapeutic Exercise other (see comments)  deferred for breakfast; will initiate in next visit - handout provided  -       Row Name 04/12/24 1012          Balance    Balance Assessment sitting static balance;sitting dynamic balance;sit to stand dynamic balance;standing static balance;standing dynamic balance  -     Static Sitting Balance independent  -     Dynamic Sitting Balance supervision  -     Position, Sitting Balance unsupported;sitting edge of bed  -     Sit to Stand Dynamic Balance contact guard  -     Static Standing Balance standby assist  -     Dynamic Standing Balance contact guard  -     Position/Device Used, Standing Balance supported;walker, front-wheeled  -     Balance Interventions sitting;standing;sit to stand;supported;static;dynamic  -       Row Name 04/12/24 1012          Sensory Assessment (Somatosensory)    Sensory Assessment (Somatosensory) LE sensation intact  pt. does c/o some tingling in RLE but sensation intact  -               User Key  (r) = Recorded By,  (t) = Taken By, (c) = Cosigned By      Initials Name Provider Type    SS Lexie Tinajero, PT Physical Therapist                   Goals/Plan       Row Name 04/12/24 1018          Bed Mobility Goal 1 (PT)    Activity/Assistive Device (Bed Mobility Goal 1, PT) bed mobility activities, all  -SS     [place] Level/Cues Needed (Bed Mobility Goal 1, PT) independent  -SS     Time Frame (Bed Mobility Goal 1, PT) long term goal (LTG);10 days  -SS     Strategies/Barriers (Bed Mobility Goal 1, PT) log roll technique  -SS       Row Name 04/12/24 1018          Transfer Goal 1 (PT)    Activity/Assistive Device (Transfer Goal 1, PT) sit-to-stand/stand-to-sit;bed-to-chair/chair-to-bed;walker, rolling  -SS     [place] Level/Cues Needed (Transfer Goal 1, PT) modified independence  -SS     Time Frame (Transfer Goal 1, PT) long term goal (LTG);10 days  -SS       Row Name 04/12/24 1018          Gait Training Goal 1 (PT)    Activity/Assistive Device (Gait Training Goal 1, PT) gait (walking locomotion);assistive device use;walker, rolling  -SS     [place] Level (Gait Training Goal 1, PT) modified independence  -SS     Distance (Gait Training Goal 1, PT) 500  -SS     Time Frame (Gait Training Goal 1, PT) long term goal (LTG);10 days  -SS       Row Name 04/12/24 1018          Stairs Goal 1 (PT)    Activity/Assistive Device (Stairs Goal 1, PT) ascending stairs;descending stairs;walker, rolling  -SS     [place] Level/Cues Needed (Stairs Goal 1, PT) modified independence  -SS     Number of Stairs (Stairs Goal 1, PT) 1  -SS     Time Frame (Stairs Goal 1, PT) long term goal (LTG);10 days  -       Row Name 04/12/24 1018          Therapy Assessment/Plan (PT)    Planned Therapy Interventions (PT) balance training;bed mobility training;gait training;home exercise program;lumbar stabilization;neuromuscular re-education;patient/family education;postural re-education;ROM (range of motion);stair  training;strengthening;stretching;transfer training  -               User Key  (r) = Recorded By, (t) = Taken By, (c) = Cosigned By      Initials Name Provider Type     Lexie Tinajero, PT Physical Therapist                   Clinical Impression       Row Name 04/12/24 1013          Pain    Pretreatment Pain Rating 7/10  -     Posttreatment Pain Rating 9/10  -     Pain Location - Side/Orientation Bilateral  -     Pain Location incisional  -     Pain Location - back  -     Pain Intervention(s) Cold applied;Repositioned;Ambulation/increased activity;Elevated  -     Additional Documentation Pain Scale: Numbers Pre/Post-Treatment (Group)  -       Row Name 04/12/24 1013          Plan of Care Review    Plan of Care Reviewed With patient  -     Outcome Evaluation Pt. presents below baseline function w/generalized weakness, acute pain and spinal precautions affecting her ability to safely participate in functional mobility. She performed transfers and ambulated 350' w/front wheeled walker, contact guard assist. Activity limited by fatigue, pain. Pt. educated spinal precautions. She would benefit from IPPT to address stated deficits.  -       Row Name 04/12/24 1013          Therapy Assessment/Plan (PT)    Rehab Potential (PT) good, to achieve stated therapy goals  -     Criteria for Skilled Interventions Met (PT) yes;meets criteria;skilled treatment is necessary  -     Therapy Frequency (PT) daily  -       Row Name 04/12/24 1013          Vital Signs    Pre Systolic BP Rehab 163  -SS     Pre Treatment Diastolic BP 97  -SS     Pretreatment Heart Rate (beats/min) 80  -SS     Pre SpO2 (%) 95  -SS     O2 Delivery Pre Treatment room air  -     Pre Patient Position Supine  -[facility] Name 04/12/24 1013          Positioning and Restraints    Pre-Treatment Position in bed  -SS     Post Treatment Position chair  -SS     In Chair notified nsg;call light within reach;encouraged to call for assist;exit  alarm on;with family/caregiver;waffle cushion;sitting  pt. w/breakfast tray in front of her  -               User Key  (r) = Recorded By, (t) = Taken By, (c) = Cosigned By      Initials Name Provider Type    Lexie Bahena PT Physical Therapist                   Outcome Measures       Row Name 04/12/24 1019          How much help from another person do you currently need...    Turning from your back to your side while in flat bed without using bedrails? 3  -SS     Moving from lying on back to sitting on the side of a flat bed without bedrails? 3  -SS     Moving to and from a bed to a chair (including a wheelchair)? 3  -SS     Standing up from a chair using your arms (e.g., wheelchair, bedside chair)? 3  -SS     Climbing 3-5 steps with a railing? 3  -SS     To walk in hospital room? 3  -SS     AM-PAC 6 Clicks Score (PT) 18  -SS     Highest Level of Mobility Goal 6 --> Walk 10 steps or more  -       Row Name 04/12/24 1019          Functional Assessment    Outcome Measure Options AM-PAC 6 Clicks Basic Mobility (PT)  -               User Key  (r) = Recorded By, (t) = Taken By, (c) = Cosigned By      Initials Name Provider Type    Lexie Bahena PT Physical Therapist                                 Physical Therapy Education       Title: PT OT SLP Therapies (In Progress)       Topic: Physical Therapy (Done)       Point: Mobility training (Done)       Learning Progress Summary             Patient Eager, E,H, VU,DU,NR by  at 4/12/2024 1019    Comment: Educated pt. safety/technique w/transfers, ambulation, spinal precautions, PT POC   Family Eager, E,H, VU,DU,NR by  at 4/12/2024 1019    Comment: Educated pt. safety/technique w/transfers, ambulation, spinal precautions, PT POC                         Point: Home exercise program (Done)       Learning Progress Summary             Patient Eager, E,H, VU,DU,NR by  at 4/12/2024 1019    Comment: Educated pt. safety/technique w/transfers, ambulation, spinal  precautions, PT POC   Family Eager, E,H, VU,DU,NR by  at 4/12/2024 1019    Comment: Educated pt. safety/technique w/transfers, ambulation, spinal precautions, PT POC                         Point: Body mechanics (Done)       Learning Progress Summary             Patient Eager, E,H, VU,DU,NR by  at 4/12/2024 1019    Comment: Educated pt. safety/technique w/transfers, ambulation, spinal precautions, PT POC   Family Eager, E,H, VU,DU,NR by  at 4/12/2024 1019    Comment: Educated pt. safety/technique w/transfers, ambulation, spinal precautions, PT POC                         Point: Precautions (Done)       Learning Progress Summary             Patient Eager, E,H, VU,DU,NR by  at 4/12/2024 1019    Comment: Educated pt. safety/technique w/transfers, ambulation, spinal precautions, PT POC   Family Eager, E,H, VU,DU,NR by  at 4/12/2024 1019    Comment: Educated pt. safety/technique w/transfers, ambulation, spinal precautions, PT POC                                         User Key       Initials Effective Dates Name Provider Type Discipline     06/01/21 -  Lexie Tinajero, PT Physical Therapist PT                  PT Recommendation and Plan  Planned Therapy Interventions (PT): balance training, bed mobility training, gait training, home exercise program, lumbar stabilization, neuromuscular re-education, patient/family education, postural re-education, ROM (range of motion), stair training, strengthening, stretching, transfer training  Plan of Care Reviewed With: patient  Outcome Evaluation: Pt. presents below baseline function w/generalized weakness, acute pain and spinal precautions affecting her ability to safely participate in functional mobility. She performed transfers and ambulated 350' w/front wheeled walker, contact guard assist. Activity limited by fatigue, pain. Pt. educated spinal precautions. She would benefit from IPPT to address stated deficits.     Time Calculation:   PT Evaluation  Complexity  History, PT Evaluation Complexity: 3 or more personal factors and/or comorbidities  Examination of Body Systems (PT Eval Complexity): total of 4 or more elements  Clinical Presentation (PT Evaluation Complexity): evolving  Clinical Decision Making (PT Evaluation Complexity): low complexity  Overall Complexity (PT Evaluation Complexity): low complexity     PT Charges       Row Name 04/12/24 1021             Time Calculation    Start Time 0921  -SS      PT Received On 04/12/24  -SS      PT Goal Re-Cert Due Date 04/22/24  -SS         Timed Charges    36736 - Gait Training Minutes  8  -SS         Untimed Charges    PT Eval/Re-eval Minutes 48  -SS         Total Minutes    Timed Charges Total Minutes 8  -SS      Untimed Charges Total Minutes 48  -SS       Total Minutes 56  -SS                User Key  (r) = Recorded By, (t) = Taken By, (c) = Cosigned By      Initials Name Provider Type    SS Lexie Tinajero, PT Physical Therapist                  Therapy Charges for Today       Code Description Service Date Service Provider Modifiers Qty    45463949547 HC GAIT TRAINING EA 15 MIN 4/12/2024 Lexie Tinajero, PT GP 1    90624070775 HC PT EVAL LOW COMPLEXITY 4 4/12/2024 Lexie Tinajero, PT GP 1            PT G-Codes  Outcome Measure Options: AM-PAC 6 Clicks Basic Mobility (PT)  AM-PAC 6 Clicks Score (PT): 18  PT Discharge Summary  Anticipated Discharge Disposition (PT): home with assist    Lexie Tinajero PT  4/12/2024

## 2024-04-12 NOTE — CASE MANAGEMENT/SOCIAL WORK
Discharge Planning Assessment  [facility]     Patient Name: Yessica Galvin  MRN: [id number]  Today's Date: 4/12/2024    Admit Date: 4/11/2024    Plan: home   Discharge Needs Assessment       Row Name 04/12/24 1035       Living Environment    People in Home spouse    Name(s) of People in Home , Ubaldo    Current Living Arrangements home    Primary Care Provided by self       Transition Planning    Patient/Family Anticipates Transition to home with family       Discharge Needs Assessment    Readmission Within the Last 30 Days no previous admission in last 30 days    Equipment Currently Used at Home none    Concerns to be Addressed basic needs;discharge planning                   Discharge Plan       Row Name 04/12/24 1035       Plan    Plan home    Patient/Family in Agreement with Plan yes    Plan Comments I met with this patient and her  at the bedside. They live in [facility]. She is independent with activities of daily living and mobility. PT and OT have been consulted. She anticipates returning home after this hospitalization, and her  can transport. Case management will continue to follow her plan of care and assist with any discharge planning needs.    Final Discharge Disposition Code 01 - home or self-care                  Continued Care and Services - Admitted Since 4/11/2024    No active coordination exists for this encounter.          Demographic Summary       Row Name 04/12/24 1034       General Information    General Information Comments I confirmed that Betty Jasmine is Ms Galvin' PCP and she has [employer] Medicare                   Functional Status       Row Name 04/12/24 1035       Functional Status, IADL    Medications independent    Meal Preparation independent    Housekeeping independent    Laundry independent    Shopping independent                   Psychosocial    No documentation.                  Abuse/Neglect    No documentation.                  Legal    No  documentation.                  Substance Abuse    No documentation.                  Patient Forms    No documentation.                     Jenae Crisostomo RN

## 2024-04-12 NOTE — PLAN OF CARE
Goal Outcome Evaluation:  Plan of Care Reviewed With: patient           Outcome Evaluation: Pt. presents below baseline function w/generalized weakness, acute pain and spinal precautions affecting her ability to safely participate in functional mobility. She performed transfers and ambulated 350' w/front wheeled walker, contact guard assist. Activity limited by fatigue, pain. Pt. educated spinal precautions. She would benefit from IPPT to address stated deficits.      Anticipated Discharge Disposition (PT): home with assist

## 2024-04-13 PROCEDURE — 97110 THERAPEUTIC EXERCISES: CPT

## 2024-04-13 PROCEDURE — 94761 N-INVAS EAR/PLS OXIMETRY MLT: CPT

## 2024-04-13 PROCEDURE — 99024 POSTOP FOLLOW-UP VISIT: CPT | Performed by: NEUROLOGICAL SURGERY

## 2024-04-13 PROCEDURE — 94799 UNLISTED PULMONARY SVC/PX: CPT

## 2024-04-13 PROCEDURE — 25010000002 MORPHINE PER 10 MG: Performed by: NEUROLOGICAL SURGERY

## 2024-04-13 RX ADMIN — LISINOPRIL 20 MG: 20 TABLET ORAL at 08:10

## 2024-04-13 RX ADMIN — BUDESONIDE AND FORMOTEROL FUMARATE DIHYDRATE 1 PUFF: 160; 4.5 AEROSOL RESPIRATORY (INHALATION) at 08:30

## 2024-04-13 RX ADMIN — OXYCODONE HYDROCHLORIDE AND ACETAMINOPHEN 1 TABLET: 7.5; 325 TABLET ORAL at 16:22

## 2024-04-13 RX ADMIN — GABAPENTIN 600 MG: 300 CAPSULE ORAL at 14:07

## 2024-04-13 RX ADMIN — ROPINIROLE HYDROCHLORIDE 2 MG: 2 TABLET, FILM COATED ORAL at 21:17

## 2024-04-13 RX ADMIN — CELECOXIB 100 MG: 100 CAPSULE ORAL at 21:20

## 2024-04-13 RX ADMIN — MONTELUKAST 10 MG: 10 TABLET, FILM COATED ORAL at 21:17

## 2024-04-13 RX ADMIN — ATORVASTATIN CALCIUM 20 MG: 20 TABLET, FILM COATED ORAL at 21:17

## 2024-04-13 RX ADMIN — MORPHINE SULFATE 4 MG: 4 INJECTION, SOLUTION INTRAMUSCULAR; INTRAVENOUS at 03:27

## 2024-04-13 RX ADMIN — MORPHINE SULFATE 4 MG: 4 INJECTION, SOLUTION INTRAMUSCULAR; INTRAVENOUS at 18:45

## 2024-04-13 RX ADMIN — Medication 3 ML: at 21:29

## 2024-04-13 RX ADMIN — GABAPENTIN 600 MG: 300 CAPSULE ORAL at 21:17

## 2024-04-13 RX ADMIN — OXYCODONE HYDROCHLORIDE AND ACETAMINOPHEN 1 TABLET: 7.5; 325 TABLET ORAL at 10:25

## 2024-04-13 RX ADMIN — Medication 3 ML: at 08:10

## 2024-04-13 RX ADMIN — PANTOPRAZOLE SODIUM 40 MG: 40 TABLET, DELAYED RELEASE ORAL at 05:09

## 2024-04-13 RX ADMIN — TIOTROPIUM BROMIDE INHALATION SPRAY 2 PUFF: 3.12 SPRAY, METERED RESPIRATORY (INHALATION) at 08:30

## 2024-04-13 RX ADMIN — VENLAFAXINE HYDROCHLORIDE 75 MG: 75 CAPSULE, EXTENDED RELEASE ORAL at 08:10

## 2024-04-13 RX ADMIN — MORPHINE SULFATE 4 MG: 4 INJECTION, SOLUTION INTRAMUSCULAR; INTRAVENOUS at 23:09

## 2024-04-13 RX ADMIN — GABAPENTIN 600 MG: 300 CAPSULE ORAL at 05:09

## 2024-04-13 RX ADMIN — BUSPIRONE HYDROCHLORIDE 15 MG: 15 TABLET ORAL at 08:10

## 2024-04-13 RX ADMIN — OXYCODONE HYDROCHLORIDE AND ACETAMINOPHEN 1 TABLET: 7.5; 325 TABLET ORAL at 05:09

## 2024-04-13 RX ADMIN — OXYCODONE HYDROCHLORIDE AND ACETAMINOPHEN 1 TABLET: 7.5; 325 TABLET ORAL at 21:20

## 2024-04-13 RX ADMIN — MORPHINE SULFATE 4 MG: 4 INJECTION, SOLUTION INTRAMUSCULAR; INTRAVENOUS at 12:20

## 2024-04-13 RX ADMIN — BUSPIRONE HYDROCHLORIDE 15 MG: 15 TABLET ORAL at 21:17

## 2024-04-13 RX ADMIN — CETIRIZINE HYDROCHLORIDE 5 MG: 10 TABLET, FILM COATED ORAL at 08:10

## 2024-04-13 RX ADMIN — LEVOTHYROXINE SODIUM 137 MCG: 137 TABLET ORAL at 05:09

## 2024-04-13 RX ADMIN — BUDESONIDE AND FORMOTEROL FUMARATE DIHYDRATE 1 PUFF: 160; 4.5 AEROSOL RESPIRATORY (INHALATION) at 20:00

## 2024-04-13 RX ADMIN — CELECOXIB 100 MG: 100 CAPSULE ORAL at 08:10

## 2024-04-13 NOTE — PLAN OF CARE
Assumed care at 19:00.  Pt awake in bed, A/O, on 2L NC, Standby assist when OOB. Complained of back pain and headache. PRN given, helped and pt was able to sleep. MAGGY drain in place and functioning.  Output for this shift was 155 ml.    Up Around 02:30.  Sat at the edge of the bed watching TV.  Had to receive 2 pain meds back to back for pain 8/10.  Went back to bed at 05:20.  Bed alarm in place and call light within reach.    /72 (BP Location: Right arm, Patient Position: Lying)   Pulse 82   Temp 98.2 °F (36.8 °C) (Oral)   Resp 18   SpO2 (!) 80%     Problem: Adult Inpatient Plan of Care  Goal: Plan of Care Review  Outcome: Ongoing, Progressing  Flowsheets (Taken 4/13/2024 0216)  Progress: no change  Plan of Care Reviewed With: patient  Outcome Evaluation: Pt A/O, on 2L NC, Standby assist when OOB.  Complained of back pain and headache.  PRN given, helped and pt was able to sleep.  Goal: Patient-Specific Goal (Individualized)  Outcome: Ongoing, Progressing  Goal: Absence of Hospital-Acquired Illness or Injury  Outcome: Ongoing, Progressing  Intervention: Identify and Manage Fall Risk  Recent Flowsheet Documentation  Taken 4/13/2024 0200 by Janki Ponce RN  Safety Promotion/Fall Prevention:   activity supervised   assistive device/personal items within reach   safety round/check completed  Taken 4/13/2024 0000 by Janki Ponce RN  Safety Promotion/Fall Prevention:   activity supervised   assistive device/personal items within reach   safety round/check completed  Taken 4/12/2024 2200 by Janki Ponce RN  Safety Promotion/Fall Prevention:   activity supervised   assistive device/personal items within reach   safety round/check completed  Taken 4/12/2024 2000 by Janki Ponce RN  Safety Promotion/Fall Prevention:   activity supervised   assistive device/personal items within reach   safety round/check completed  Intervention: Prevent Skin Injury  Recent Flowsheet Documentation  Taken  4/13/2024 0200 by Janki Ponce RN  Body Position: position changed independently  Taken 4/13/2024 0000 by Janki Ponce RN  Body Position: position changed independently  Skin Protection:   adhesive use limited   incontinence pads utilized   tubing/devices free from skin contact  Taken 4/12/2024 2200 by Janki Ponce RN  Body Position: position changed independently  Taken 4/12/2024 2000 by Jakni Ponce RN  Body Position: position changed independently  Skin Protection:   adhesive use limited   incontinence pads utilized   tubing/devices free from skin contact  Intervention: Prevent and Manage VTE (Venous Thromboembolism) Risk  Recent Flowsheet Documentation  Taken 4/13/2024 0200 by Janki Ponce RN  Activity Management: activity minimized  Taken 4/13/2024 0000 by Janki Ponce RN  Activity Management: activity encouraged  VTE Prevention/Management:   bilateral   sequential compression devices off   patient refused intervention  Range of Motion: active ROM (range of motion) encouraged  Taken 4/12/2024 2200 by Janki Ponce RN  Activity Management: activity encouraged  Taken 4/12/2024 2000 by Janki Ponce RN  Activity Management: activity encouraged  VTE Prevention/Management:   bilateral   sequential compression devices off   patient refused intervention  Range of Motion: active ROM (range of motion) encouraged  Intervention: Prevent Infection  Recent Flowsheet Documentation  Taken 4/13/2024 0200 by Janki Ponce RN  Infection Prevention:   environmental surveillance performed   rest/sleep promoted  Taken 4/13/2024 0000 by Janki Ponce RN  Infection Prevention:   environmental surveillance performed   rest/sleep promoted  Taken 4/12/2024 2200 by Janki Ponce RN  Infection Prevention: rest/sleep promoted  Taken 4/12/2024 2000 by Janki Ponce RN  Infection Prevention:   environmental surveillance performed   rest/sleep promoted  Goal: Optimal Comfort  and Wellbeing  Outcome: Ongoing, Progressing  Intervention: Monitor Pain and Promote Comfort  Recent Flowsheet Documentation  Taken 4/12/2024 2120 by Janki Ponce RN  Pain Management Interventions: see MAR  Taken 4/12/2024 2000 by Janki Ponce RN  Pain Management Interventions:   pillow support provided   position adjusted   relaxation techniques promoted  Taken 4/12/2024 1947 by Janki Ponce RN  Pain Management Interventions: see MAR  Intervention: Provide Person-Centered Care  Recent Flowsheet Documentation  Taken 4/12/2024 2000 by Janki Ponce RN  Trust Relationship/Rapport:   care explained   choices provided   empathic listening provided   questions answered   thoughts/feelings acknowledged  Goal: Readiness for Transition of Care  Outcome: Ongoing, Progressing     Problem: Fall Injury Risk  Goal: Absence of Fall and Fall-Related Injury  Outcome: Ongoing, Progressing  Intervention: Identify and Manage Contributors  Recent Flowsheet Documentation  Taken 4/13/2024 0200 by Janki Ponce RN  Self-Care Promotion:   independence encouraged   BADL personal objects within reach  Taken 4/13/2024 0000 by Janki Ponce RN  Self-Care Promotion:   independence encouraged   BADL personal objects within reach  Taken 4/12/2024 2200 by Janki oPnce RN  Medication Review/Management: medications reviewed  Self-Care Promotion:   independence encouraged   BADL personal objects within reach  Taken 4/12/2024 2000 by Janki Ponce RN  Self-Care Promotion:   independence encouraged   BADL personal objects within reach  Intervention: Promote Injury-Free Environment  Recent Flowsheet Documentation  Taken 4/13/2024 0200 by Janki Ponce RN  Safety Promotion/Fall Prevention:   activity supervised   assistive device/personal items within reach   safety round/check completed  Taken 4/13/2024 0000 by Janki Ponce RN  Safety Promotion/Fall Prevention:   activity supervised   assistive  device/personal items within reach   safety round/check completed  Taken 4/12/2024 2200 by Janki Ponce, RN  Safety Promotion/Fall Prevention:   activity supervised   assistive device/personal items within reach   safety round/check completed  Taken 4/12/2024 2000 by Janki Ponce, RN  Safety Promotion/Fall Prevention:   activity supervised   assistive device/personal items within reach   safety round/check completed   Goal Outcome Evaluation:  Plan of Care Reviewed With: patient        Progress: no change  Outcome Evaluation: Pt A/O, on 2L NC, Standby assist when OOB.  Complained of back pain and headache.  PRN given, helped and pt was able to sleep.

## 2024-04-13 NOTE — THERAPY TREATMENT NOTE
Patient Name: Yessica Galvin  : 1960    MRN: [id number]                              Today's Date: 2024       Admit Date: 2024    Visit Dx:     ICD-10-CM ICD-9-CM   1. Lumbar stenosis with neurogenic claudication  M48.062 724.03     Patient Active Problem List   Diagnosis    Asthmatic bronchitis    Generalized anxiety disorder    Gout    Headache    Acquired hypothyroidism    Chronic midline low back pain without sciatica    Cervical disc disorder with radiculopathy    Lumbar disc disease    Left radial fracture    Asthma    Tobacco abuse    Elevated transaminase level    Chronic back pain    Closed fracture of right tibial plateau    Chronic bronchitis with acute exacerbation    Centrilobular emphysema    History of tobacco use, presenting hazards to health    Nodule of upper lobe of right lung    Nocturnal leg cramps    Left hip pain    Constipation    Skin lesions    Gastroesophageal reflux disease with esophagitis without hemorrhage    Bruising    Therapeutic drug monitoring    Nausea    Allergic rhinitis    Gastroesophageal reflux disease    Medicare annual wellness visit, subsequent    Dyspnea    Anxiety and depression    Hypertension    Fatigue    Tobacco dependence    Abdominal pain, diffuse    Chest pain    Abnormal nuclear stress test    Post-menopause    Age-related osteoporosis without current pathological fracture    Cough    Restless leg syndrome    Gastroenteritis    Lumbar stenosis with neurogenic claudication    Stage II COPD    Encounter for pre-operative respiratory clearance     Past Medical History:   Diagnosis Date    Acute bronchitis     Acute sinusitis     Allergic rhinitis 2023    Asthma     Asthma 2020    Asthma with acute exacerbation     Asthma, extrinsic ,1970    I have had it since I was 5 yrs. Old    Asthmatic bronchitis     Bronchiectasis     Always catch it    Centrilobular emphysema 2022    Chronic bronchitis with acute exacerbation  06/02/2022    Disc degeneration, lumbar     Disc degeneration, lumbar     GERD (gastroesophageal reflux disease)     History of mammogram     Hypertension 10/05/2023    Hypothyroidism     Lower back pain     Medicare annual wellness visit, subsequent 10/05/2023    Nodule of upper lobe of right lung 06/02/2022    Plantar fasciitis     Restless legs syndrome      Past Surgical History:   Procedure Laterality Date    CARDIAC CATHETERIZATION N/A 11/17/2023    Procedure: Left Heart Cath;  Surgeon: Arben Pittman MD;  Location:  [facility];  Service: Cardiology;  Laterality: N/A;    CHOLECYSTECTOMY      COLONOSCOPY      HYSTERECTOMY      KNEE ARTHROSCOPY Right 03/10/2020    Procedure: KNEE ARTHROSCOPY RIGHT;  Surgeon: Guanaco Thakur MD;  Location: [facility];  Service: Orthopedics;  Laterality: Right;    NECK SURGERY      ORIF WRIST FRACTURE Left 03/10/2020    Procedure: WRIST OPEN REDUCTION INTERNAL FIXATION LEFT;  Surgeon: Guanaco Thakur MD;  Location: [facility];  Service: Orthopedics;  Laterality: Left;    TIBIAL PLATEAU OPEN REDUCTION INTERNAL FIXATION Right 03/10/2020    Procedure: TIBIAL PLATEAU OPEN REDUCTION INTERNAL FIXATION RIGHT;  Surgeon: Guanaco Thakur MD;  Location:  [facility] OR;  Service: Orthopedics;  Laterality: Right;    UPPER GASTROINTESTINAL ENDOSCOPY        General Information       Row Name 04/13/24 1051          Physical Therapy Time and Intention    Document Type therapy note (daily note)  -     Mode of Treatment physical therapy  -[facility] Name 04/13/24 1051          General Information    Patient Profile Reviewed yes  -     Existing Precautions/Restrictions fall;spinal   4/13 Per MD: HOB flat, pt may be on her side  -     Barriers to Rehab medically complex  -       Row Name 04/13/24 1051          Cognition    Orientation Status (Cognition) oriented x 4  -       Row Name 04/13/24 1051          Safety Issues, Functional Mobility    Safety Issues Affecting  Function (Mobility) awareness of need for assistance;insight into deficits/self-awareness;safety precaution awareness;safety precautions follow-through/compliance;sequencing abilities  -     Impairments Affecting Function (Mobility) balance;endurance/activity tolerance;pain;postural/trunk control;range of motion (ROM);strength  -               User Key  (r) = Recorded By, (t) = Taken By, (c) = Cosigned By      Initials Name Provider Type     Obdulia Parada PT Physical Therapist                   Mobility       Row Name 04/13/24 1051          Bed Mobility    Bed Mobility rolling left  -     Rolling Left [place] (Bed Mobility) contact guard;1 person assist;verbal cues  -     Comment, (Bed Mobility) Repositioning performed in bed w/ HOB flat. VCs for hand placement and sequencing. Reviewed spinal precautions  -       Row Name 04/13/24 1051          Transfers    Comment, (Transfers) Unable to assess OOB mobility this date d/t MD orders of pt staying flat in bed. Pt agreeable to supine ther ex  -               User Key  (r) = Recorded By, (t) = Taken By, (c) = Cosigned By      Initials Name Provider Type     Obdulia Parada PT Physical Therapist                   Obj/Interventions       [facility] Name 04/13/24 1054          Motor Skills    Therapeutic Exercise hip;knee;ankle;other (see comments)  abdominal sets x10  -[facility] Name 04/13/24 1054          Hip (Therapeutic Exercise)    Hip (Therapeutic Exercise) isometric exercises;AROM (active range of motion)  -     Hip AROM (Therapeutic Exercise) bilateral;external rotation;internal rotation;supine;10 repetitions  -     Hip Isometrics (Therapeutic Exercise) bilateral;gluteal sets;supine;10 repetitions  -[facility] Name 04/13/24 1054          Knee (Therapeutic Exercise)    Knee (Therapeutic Exercise) isometric exercises;strengthening exercise  -     Knee Isometrics (Therapeutic Exercise) bilateral;quad sets;supine;10 repetitions  -     Knee  Strengthening (Therapeutic Exercise) bilateral;heel slides;supine;10 repetitions  -       Row Name 04/13/24 1054          Ankle (Therapeutic Exercise)    Ankle (Therapeutic Exercise) AROM (active range of motion)  -     Ankle AROM (Therapeutic Exercise) bilateral;dorsiflexion;plantarflexion;supine;10 repetitions  -[facility] Name 04/13/24 1054          Balance    Comment, Balance unable to assess this date  -               User Key  (r) = Recorded By, (t) = Taken By, (c) = Cosigned By      Initials Name Provider Type     Obdulia Parada, PT Physical Therapist                   Goals/Plan    No documentation.                  Clinical Impression       [facility] Name 04/13/24 1056          Pain    Pretreatment Pain Rating 8/10  -     Posttreatment Pain Rating 8/10  -     Pain Location - Side/Orientation Bilateral  -     Pain Location incisional  -     Pain Location - back  -     Pre/Posttreatment Pain Comment RN aware and managing  -     Pain Intervention(s) Repositioned;Rest  -[facility] Name 04/13/24 1056          Plan of Care Review    Plan of Care Reviewed With patient;spouse  -     Progress no change  -     Outcome Evaluation Pt continues to present below baseline function d/t acute pain and generalized weakness. Performed repositioing in bed w/ HOB flat and CGA. Pt also agreeable to perform supine ther ex. Unable to assess OOB mobility this date d/t orders for pt to stay flat in bed. Cont w/ IP PT POC.  -       Row Name 04/13/24 1056          Vital Signs    Pre Systolic BP Rehab 120  -LH     Pre Treatment Diastolic BP 72  -LH     Post Systolic BP Rehab 123  -LH     Post Treatment Diastolic BP 68  -LH     Pre SpO2 (%) 97  -LH     O2 Delivery Pre Treatment room air  -     O2 Delivery Intra Treatment room air  -     O2 Delivery Post Treatment room air  -     Pre Patient Position Side Lying  -     Intra Patient Position Supine  -     Post Patient Position Supine  -       Row Name  04/13/24 1056          Positioning and Restraints    Pre-Treatment Position in bed  -LH     Post Treatment Position bed  -LH     In Bed notified nsg;supine;call light within reach;encouraged to call for assist;exit alarm on;with family/caregiver  HOB flat  -               User Key  (r) = Recorded By, (t) = Taken By, (c) = Cosigned By      Initials Name Provider Type     Obdulia Parada, PT Physical Therapist                   Outcome Measures       Row Name 04/13/24 1100          How much help from another person do you currently need...    Turning from your back to your side while in flat bed without using bedrails? 3  -LH     Moving from lying on back to sitting on the side of a flat bed without bedrails? 3  -LH     Moving to and from a bed to a chair (including a wheelchair)? 3  -LH     Standing up from a chair using your arms (e.g., wheelchair, bedside chair)? 3  -LH     Climbing 3-5 steps with a railing? 3  -LH     To walk in hospital room? 3  -     AM-PAC 6 Clicks Score (PT) 18  -     Highest Level of Mobility Goal 6 --> Walk 10 steps or more  -       Row Name 04/13/24 1100          Functional Assessment    Outcome Measure Options AM-PAC 6 Clicks Basic Mobility (PT)  -               User Key  (r) = Recorded By, (t) = Taken By, (c) = Cosigned By      Initials Name Provider Type     Obdulia Parada, LIEN Physical Therapist                                 Physical Therapy Education       Title: PT OT SLP Therapies (In Progress)       Topic: Physical Therapy (Done)       Point: Mobility training (Done)       Learning Progress Summary             Patient ALAN Gorman H, VU, DU,NR by  at 4/12/2024 1019    Comment: Educated pt. safety/technique w/transfers, ambulation, spinal precautions, PT POC   Family ALAN Gorman H, VU, DU,NR by  at 4/12/2024 1019    Comment: Educated pt. safety/technique w/transfers, ambulation, spinal precautions, PT POC                         Point: Home exercise program (Done)        Learning Progress Summary             Patient Acceptance, E, VU,DU,NR by  at 4/13/2024 1101    Comment: reviewed HEP and spinal precautions    Eager, E,H, VU,DU,NR by  at 4/12/2024 1019    Comment: Educated pt. safety/technique w/transfers, ambulation, spinal precautions, PT POC   Family Acceptance, E, VU,DU,NR by  at 4/13/2024 1101    Comment: reviewed HEP and spinal precautions    Eager, E,H, VU,DU,NR by  at 4/12/2024 1019    Comment: Educated pt. safety/technique w/transfers, ambulation, spinal precautions, PT POC                         Point: Body mechanics (Done)       Learning Progress Summary             Patient Acceptance, E, VU,DU,NR by  at 4/13/2024 1101    Comment: reviewed HEP and spinal precautions    Eager, E,H, VU,DU,NR by  at 4/12/2024 1019    Comment: Educated pt. safety/technique w/transfers, ambulation, spinal precautions, PT POC   Family Acceptance, E, VU,DU,NR by  at 4/13/2024 1101    Comment: reviewed HEP and spinal precautions    Eager, E,H, VU,DU,NR by  at 4/12/2024 1019    Comment: Educated pt. safety/technique w/transfers, ambulation, spinal precautions, PT POC                         Point: Precautions (Done)       Learning Progress Summary             Patient Acceptance, E, VU,DU,NR by  at 4/13/2024 1101    Comment: reviewed HEP and spinal precautions    Eager, E,H, VU,DU,NR by  at 4/12/2024 1019    Comment: Educated pt. safety/technique w/transfers, ambulation, spinal precautions, PT POC   Family Acceptance, E, VU,DU,NR by  at 4/13/2024 1101    Comment: reviewed HEP and spinal precautions    Eager, E,H, VU,DU,NR by  at 4/12/2024 1019    Comment: Educated pt. safety/technique w/transfers, ambulation, spinal precautions, PT POC                                         User Key       Initials Effective Dates Name Provider Type Discipline     06/01/21 -  Lexie Tinajero, PT Physical Therapist PT     09/21/23 -  Obdulia Parada, PT Physical Therapist PT                   PT Recommendation and Plan     Plan of Care Reviewed With: patient, spouse  Progress: no change  Outcome Evaluation: Pt continues to present below baseline function d/t acute pain and generalized weakness. Performed repositioing in bed w/ HOB flat and CGA. Pt also agreeable to perform supine ther ex. Unable to assess OOB mobility this date d/t orders for pt to stay flat in bed. Cont w/ IP PT POC.     Time Calculation:         PT Charges       Row Name 04/13/24 1101             Time Calculation    Start Time 1032  -LH      PT Received On 04/13/24  -      PT Goal Re-Cert Due Date 04/22/24  -         Timed Charges    98169 - PT Therapeutic Exercise Minutes 13  -LH      26645 - PT Therapeutic Activity Minutes 3  -LH         Total Minutes    Timed Charges Total Minutes 16  -LH       Total Minutes 16  -LH                User Key  (r) = Recorded By, (t) = Taken By, (c) = Cosigned By      Initials Name Provider Type     Obdulia Parada, PT Physical Therapist                  Therapy Charges for Today       Code Description Service Date Service Provider Modifiers Qty    71669119420 HC PT THER PROC EA 15 MIN 4/13/2024 Obdulia Parada, PT GP 1            PT G-Codes  Outcome Measure Options: AM-PAC 6 Clicks Basic Mobility (PT)  AM-PAC 6 Clicks Score (PT): 18  AM-PAC 6 Clicks Score (OT): 20  PT Discharge Summary  Anticipated Discharge Disposition (PT): home with assist    Obdulia Parada PT  4/13/2024

## 2024-04-13 NOTE — PLAN OF CARE
Goal Outcome Evaluation:  Plan of Care Reviewed With: patient, spouse        Progress: no change  Outcome Evaluation: Pt continues to present below baseline function d/t acute pain and generalized weakness. Performed repositioing in bed w/ HOB flat and CGA. Pt also agreeable to perform supine ther ex. Unable to assess OOB mobility this date d/t orders for pt to stay flat in bed. Cont w/ IP PT POC.      Anticipated Discharge Disposition (PT): home with assist

## 2024-04-13 NOTE — PLAN OF CARE
Goal Outcome Evaluation:  Plan of Care Reviewed With: patient        Progress: no change  Outcome Evaluation: MAGGY drain removed today. New dressing placed to incision. Dressing changed 2x after initial change today. Dressings are timed and dated. Old dressings at bedside for MD to review. Patient lying flat. [place] anchored. Scds in use but ptatient removes frequently.

## 2024-04-13 NOTE — PROGRESS NOTES
NEUROSURGERY PROGRESS NOTE     LOS: 0 days   Patient Care Team:  Betty Jasmine MD as PCP - General (Family Medicine)  Atul Tavarez MD as Consulting Physician (Gastroenterology)  Bobby Tom MD as Consulting Physician (Pulmonary Disease)  Bobby Tom MD as Consulting Physician (Pulmonary Disease)  Delgado Lal MD as Consulting Physician (Pain Medicine)  Ulises Elias MD as Consulting Physician (Neurosurgery)  Betty Jasmine MD as Primary Care Provider (Family Medicine)  Kota Swain MD as Referring Physician (Family Medicine)    Chief Complaint: Low back and lower extremity pain with walking and standing intolerance.    POD#: 2 Days Post-Op  Procedures:  L2-5 laminectomies.    Interval History:   Patient Complaints: Mild headache.  Patient Denies: New weakness or numbness.    Vital Signs: Blood pressure 122/65, pulse 64, temperature 98.2 °F (36.8 °C), temperature source Oral, resp. rate 18, SpO2 98%, not currently breastfeeding.  Intake/Output:   Intake/Output Summary (Last 24 hours) at 4/13/2024 0734  Last data filed at 4/13/2024 0500  Gross per 24 hour   Intake 1392 ml   Output 1493 ml   Net -101 ml     Drain output: 130/163 mL.  Output is clear but blood-tinged.    Physical Exam:  Patient is alert and in good spirits.  Her dressing is dry.     Assessment/Plan:  1.  Lumbar stenosis with neurogenic claudication status post multilevel decompressive laminectomy.  2.  Chronic mechanical low back pain.  3.  History of hypertension.  4.  Disposition: Suspect that she has a small dural rent given the continued drain output and the relatively clear drainage.  Will discontinue drain and keep patient flat in bed through the weekend.      Ulises Elias MD  04/13/24  07:34 EDT

## 2024-04-14 PROCEDURE — 94762 N-INVAS EAR/PLS OXIMTRY CONT: CPT

## 2024-04-14 PROCEDURE — 97110 THERAPEUTIC EXERCISES: CPT

## 2024-04-14 PROCEDURE — 94799 UNLISTED PULMONARY SVC/PX: CPT

## 2024-04-14 PROCEDURE — 25010000002 MORPHINE PER 10 MG: Performed by: NEUROLOGICAL SURGERY

## 2024-04-14 RX ADMIN — OXYCODONE HYDROCHLORIDE AND ACETAMINOPHEN 1 TABLET: 7.5; 325 TABLET ORAL at 22:15

## 2024-04-14 RX ADMIN — MORPHINE SULFATE 4 MG: 4 INJECTION, SOLUTION INTRAMUSCULAR; INTRAVENOUS at 13:30

## 2024-04-14 RX ADMIN — TIOTROPIUM BROMIDE INHALATION SPRAY 2 PUFF: 3.12 SPRAY, METERED RESPIRATORY (INHALATION) at 08:13

## 2024-04-14 RX ADMIN — VENLAFAXINE HYDROCHLORIDE 75 MG: 75 CAPSULE, EXTENDED RELEASE ORAL at 08:46

## 2024-04-14 RX ADMIN — GABAPENTIN 600 MG: 300 CAPSULE ORAL at 22:15

## 2024-04-14 RX ADMIN — CELECOXIB 100 MG: 100 CAPSULE ORAL at 08:52

## 2024-04-14 RX ADMIN — MORPHINE SULFATE 4 MG: 4 INJECTION, SOLUTION INTRAMUSCULAR; INTRAVENOUS at 08:46

## 2024-04-14 RX ADMIN — LISINOPRIL 20 MG: 20 TABLET ORAL at 08:46

## 2024-04-14 RX ADMIN — PANTOPRAZOLE SODIUM 40 MG: 40 TABLET, DELAYED RELEASE ORAL at 05:14

## 2024-04-14 RX ADMIN — MONTELUKAST 10 MG: 10 TABLET, FILM COATED ORAL at 22:15

## 2024-04-14 RX ADMIN — LEVOTHYROXINE SODIUM 137 MCG: 137 TABLET ORAL at 05:14

## 2024-04-14 RX ADMIN — ROPINIROLE HYDROCHLORIDE 2 MG: 2 TABLET, FILM COATED ORAL at 22:14

## 2024-04-14 RX ADMIN — BUSPIRONE HYDROCHLORIDE 15 MG: 15 TABLET ORAL at 22:15

## 2024-04-14 RX ADMIN — Medication 3 ML: at 08:49

## 2024-04-14 RX ADMIN — TIZANIDINE 4 MG: 4 TABLET ORAL at 22:15

## 2024-04-14 RX ADMIN — ATORVASTATIN CALCIUM 20 MG: 20 TABLET, FILM COATED ORAL at 22:15

## 2024-04-14 RX ADMIN — CELECOXIB 100 MG: 100 CAPSULE ORAL at 22:14

## 2024-04-14 RX ADMIN — OXYCODONE HYDROCHLORIDE AND ACETAMINOPHEN 1 TABLET: 7.5; 325 TABLET ORAL at 16:01

## 2024-04-14 RX ADMIN — BUDESONIDE AND FORMOTEROL FUMARATE DIHYDRATE 1 PUFF: 160; 4.5 AEROSOL RESPIRATORY (INHALATION) at 19:20

## 2024-04-14 RX ADMIN — CETIRIZINE HYDROCHLORIDE 5 MG: 10 TABLET, FILM COATED ORAL at 08:47

## 2024-04-14 RX ADMIN — OXYCODONE HYDROCHLORIDE AND ACETAMINOPHEN 1 TABLET: 7.5; 325 TABLET ORAL at 10:56

## 2024-04-14 RX ADMIN — OXYCODONE HYDROCHLORIDE AND ACETAMINOPHEN 1 TABLET: 7.5; 325 TABLET ORAL at 05:14

## 2024-04-14 RX ADMIN — GABAPENTIN 600 MG: 300 CAPSULE ORAL at 13:30

## 2024-04-14 RX ADMIN — BUSPIRONE HYDROCHLORIDE 15 MG: 15 TABLET ORAL at 08:52

## 2024-04-14 RX ADMIN — BUDESONIDE AND FORMOTEROL FUMARATE DIHYDRATE 1 PUFF: 160; 4.5 AEROSOL RESPIRATORY (INHALATION) at 08:13

## 2024-04-14 RX ADMIN — MORPHINE SULFATE 4 MG: 4 INJECTION, SOLUTION INTRAMUSCULAR; INTRAVENOUS at 03:36

## 2024-04-14 RX ADMIN — TIZANIDINE 4 MG: 4 TABLET ORAL at 10:56

## 2024-04-14 RX ADMIN — Medication 3 ML: at 22:15

## 2024-04-14 RX ADMIN — GABAPENTIN 600 MG: 300 CAPSULE ORAL at 05:14

## 2024-04-14 NOTE — THERAPY TREATMENT NOTE
Patient Name: Yessica Galvin  : 1960    MRN: [id number]                              Today's Date: 2024       Admit Date: 2024    Visit Dx:     ICD-10-CM ICD-9-CM   1. Lumbar stenosis with neurogenic claudication  M48.062 724.03     Patient Active Problem List   Diagnosis    Asthmatic bronchitis    Generalized anxiety disorder    Gout    Headache    Acquired hypothyroidism    Chronic midline low back pain without sciatica    Cervical disc disorder with radiculopathy    Lumbar disc disease    Left radial fracture    Asthma    Tobacco abuse    Elevated transaminase level    Chronic back pain    Closed fracture of right tibial plateau    Chronic bronchitis with acute exacerbation    Centrilobular emphysema    History of tobacco use, presenting hazards to health    Nodule of upper lobe of right lung    Nocturnal leg cramps    Left hip pain    Constipation    Skin lesions    Gastroesophageal reflux disease with esophagitis without hemorrhage    Bruising    Therapeutic drug monitoring    Nausea    Allergic rhinitis    Gastroesophageal reflux disease    Medicare annual wellness visit, subsequent    Dyspnea    Anxiety and depression    Hypertension    Fatigue    Tobacco dependence    Abdominal pain, diffuse    Chest pain    Abnormal nuclear stress test    Post-menopause    Age-related osteoporosis without current pathological fracture    Cough    Restless leg syndrome    Gastroenteritis    Lumbar stenosis with neurogenic claudication    Stage II COPD    Encounter for pre-operative respiratory clearance     Past Medical History:   Diagnosis Date    Acute bronchitis     Acute sinusitis     Allergic rhinitis 2023    Asthma     Asthma 2020    Asthma with acute exacerbation     Asthma, extrinsic ,1970    I have had it since I was 5 yrs. Old    Asthmatic bronchitis     Bronchiectasis     Always catch it    Centrilobular emphysema 2022    Chronic bronchitis with acute exacerbation  06/02/2022    Disc degeneration, lumbar     Disc degeneration, lumbar     GERD (gastroesophageal reflux disease)     History of mammogram     Hypertension 10/05/2023    Hypothyroidism     Lower back pain     Medicare annual wellness visit, subsequent 10/05/2023    Nodule of upper lobe of right lung 06/02/2022    Plantar fasciitis     Restless legs syndrome      Past Surgical History:   Procedure Laterality Date    CARDIAC CATHETERIZATION N/A 11/17/2023    Procedure: Left Heart Cath;  Surgeon: Arben Pittman MD;  Location:  [facility];  Service: Cardiology;  Laterality: N/A;    CHOLECYSTECTOMY      COLONOSCOPY      HYSTERECTOMY      KNEE ARTHROSCOPY Right 03/10/2020    Procedure: KNEE ARTHROSCOPY RIGHT;  Surgeon: Guanaco Thakur MD;  Location: [facility];  Service: Orthopedics;  Laterality: Right;    NECK SURGERY      ORIF WRIST FRACTURE Left 03/10/2020    Procedure: WRIST OPEN REDUCTION INTERNAL FIXATION LEFT;  Surgeon: Guanaco Thakur MD;  Location: [facility];  Service: Orthopedics;  Laterality: Left;    TIBIAL PLATEAU OPEN REDUCTION INTERNAL FIXATION Right 03/10/2020    Procedure: TIBIAL PLATEAU OPEN REDUCTION INTERNAL FIXATION RIGHT;  Surgeon: Guanaco Thakur MD;  Location:  [facility];  Service: Orthopedics;  Laterality: Right;    UPPER GASTROINTESTINAL ENDOSCOPY        General Information       Row Name 04/14/24 0933          Physical Therapy Time and Intention    Document Type therapy note (daily note)  -     Mode of Treatment physical therapy  -       Row Name 04/14/24 0933          General Information    Patient Profile Reviewed yes  -     Existing Precautions/Restrictions fall;spinal;other (see comments)   4/13 Per MD: HOB flat, pt may be on her side  -     Barriers to Rehab medically complex  -       Row Name 04/14/24 0933          Cognition    Orientation Status (Cognition) oriented x 4  -       Row Name 04/14/24 0933          Safety Issues, Functional Mobility     Safety Issues Affecting Function (Mobility) awareness of need for assistance;insight into deficits/self-awareness;safety precaution awareness;safety precautions follow-through/compliance  -     Impairments Affecting Function (Mobility) balance;endurance/activity tolerance;pain;postural/trunk control;range of motion (ROM);strength  -               User Key  (r) = Recorded By, (t) = Taken By, (c) = Cosigned By      Initials Name Provider Type     Obdulia Parada PT Physical Therapist                   Mobility       Row Name 04/14/24 0933          Bed Mobility    Bed Mobility rolling left;rolling right  -     Rolling Left [place] (Bed Mobility) standby assist;verbal cues  -     Rolling Right [place] (Bed Mobility) standby assist;verbal cues  -     Assistive Device (Bed Mobility) bed rails  -     Comment, (Bed Mobility) Repositioning performed in bed w/ HOB flat. VCs for sequencing. Pt able to recall 3/3 spinal precautions this date  -       Row Name 04/14/24 0933          Transfers    Comment, (Transfers) Unable to assess. Agreeable to supine ther ex  -               User Key  (r) = Recorded By, (t) = Taken By, (c) = Cosigned By      Initials Name Provider Type     Obdulia Parada PT Physical Therapist                   Obj/Interventions       Row Name 04/14/24 0935          Motor Skills    Therapeutic Exercise hip;knee;ankle;other (see comments)  abdominal sets x10  -[facility] Name 04/14/24 0935          Hip (Therapeutic Exercise)    Hip (Therapeutic Exercise) isometric exercises;AROM (active range of motion)  -     Hip AROM (Therapeutic Exercise) bilateral;external rotation;internal rotation;supine;15 repititions  -     Hip Isometrics (Therapeutic Exercise) bilateral;gluteal sets;supine;10 repetitions  -       Row Name 04/14/24 0935          Knee (Therapeutic Exercise)    Knee (Therapeutic Exercise) isometric exercises;strengthening exercise  -     Knee Isometrics  (Therapeutic Exercise) bilateral;quad sets;supine;10 repetitions  -     Knee Strengthening (Therapeutic Exercise) bilateral;heel slides;supine;15 repititions  -       Row Name 04/14/24 0935          Ankle (Therapeutic Exercise)    Ankle (Therapeutic Exercise) AROM (active range of motion)  -     Ankle AROM (Therapeutic Exercise) bilateral;dorsiflexion;plantarflexion;supine;15 repititions  -               User Key  (r) = Recorded By, (t) = Taken By, (c) = Cosigned By      Initials Name Provider Type     Obdulia Parada, PT Physical Therapist                   Goals/Plan    No documentation.                  Clinical Impression       Row Name 04/14/24 0936          Pain    Pretreatment Pain Rating 8/10  -     Posttreatment Pain Rating 6/10  -     Pain Location - Side/Orientation Bilateral  -     Pain Location incisional  -     Pain Location - back  -     Pre/Posttreatment Pain Comment pre-medicated  -     Pain Intervention(s) Repositioned;Rest  -       Row Name 04/14/24 0936          Plan of Care Review    Plan of Care Reviewed With patient  -     Progress no change  -     Outcome Evaluation Pt continues to present below baseline function d/t acute pain and generalized weakness. Repositioning performed in bed w/ SBA and HOB flat. Pt agreeable to supine ther ex and tolerated well. Pt able to recall 3/3 spinal precautions this date. Cont w/ IP PT POC.  -       Row Name 04/14/24 0936          Vital Signs    Post Systolic BP Rehab 128  -LH     Post Treatment Diastolic BP 70  -     Pretreatment Heart Rate (beats/min) 68  -LH     Posttreatment Heart Rate (beats/min) 69  -     Pre SpO2 (%) 94  -     O2 Delivery Pre Treatment room air  -     O2 Delivery Intra Treatment room air  -     Post SpO2 (%) 92  -     O2 Delivery Post Treatment room air  -     Pre Patient Position Side Lying  -     Intra Patient Position Supine  -LH     Post Patient Position Side Lying  -       Row Name  04/14/24 0936          Positioning and Restraints    Pre-Treatment Position in bed  -LH     Post Treatment Position bed  -LH     In Bed notified nsg;side lying right;call light within reach;encouraged to call for assist;exit alarm on;with nsg  HOB flat  -               User Key  (r) = Recorded By, (t) = Taken By, (c) = Cosigned By      Initials Name Provider Type     Obdulia Parada, LIEN Physical Therapist                   Outcome Measures       Row Name 04/14/24 0940          How much help from another person do you currently need...    Turning from your back to your side while in flat bed without using bedrails? 3  -LH     Moving from lying on back to sitting on the side of a flat bed without bedrails? 3  -LH     Moving to and from a bed to a chair (including a wheelchair)? 3  -LH     Standing up from a chair using your arms (e.g., wheelchair, bedside chair)? 3  -LH     Climbing 3-5 steps with a railing? 3  -LH     To walk in hospital room? 3  -     AM-PAC 6 Clicks Score (PT) 18  -     Highest Level of Mobility Goal 6 --> Walk 10 steps or more  -       Row Name 04/14/24 0940          Functional Assessment    Outcome Measure Options AM-PAC 6 Clicks Basic Mobility (PT)  -               User Key  (r) = Recorded By, (t) = Taken By, (c) = Cosigned By      Initials Name Provider Type     Obdulia Parada, LIEN Physical Therapist                                 Physical Therapy Education       Title: PT OT SLP Therapies (In Progress)       Topic: Physical Therapy (Done)       Point: Mobility training (Done)       Learning Progress Summary             Patient ALAN Gorman H, VU, DU,NR by  at 4/12/2024 1019    Comment: Educated pt. safety/technique w/transfers, ambulation, spinal precautions, PT POC   Family ALAN Gorman H, VU, DU,NR by  at 4/12/2024 1019    Comment: Educated pt. safety/technique w/transfers, ambulation, spinal precautions, PT POC                         Point: Home exercise program (Done)        Learning Progress Summary             Patient Acceptance, E, VU,DU,NR by  at 4/14/2024 0941    Acceptance, E, VU,DU,NR by  at 4/13/2024 1101    Comment: reviewed HEP and spinal precautions    Eager, E,H, VU,DU,NR by SS at 4/12/2024 1019    Comment: Educated pt. safety/technique w/transfers, ambulation, spinal precautions, PT POC   Family Acceptance, E, VU,DU,NR by  at 4/13/2024 1101    Comment: reviewed HEP and spinal precautions    Eager, E,H, VU,DU,NR by SS at 4/12/2024 1019    Comment: Educated pt. safety/technique w/transfers, ambulation, spinal precautions, PT POC                         Point: Body mechanics (Done)       Learning Progress Summary             Patient Acceptance, E, VU,DU,NR by  at 4/14/2024 0941    Acceptance, E, VU,DU,NR by  at 4/13/2024 1101    Comment: reviewed HEP and spinal precautions    Eager, E,H, VU,DU,NR by SS at 4/12/2024 1019    Comment: Educated pt. safety/technique w/transfers, ambulation, spinal precautions, PT POC   Family Acceptance, E, VU,DU,NR by  at 4/13/2024 1101    Comment: reviewed HEP and spinal precautions    Eager, E,H, VU,DU,NR by  at 4/12/2024 1019    Comment: Educated pt. safety/technique w/transfers, ambulation, spinal precautions, PT POC                         Point: Precautions (Done)       Learning Progress Summary             Patient Acceptance, E, VU,DU,NR by  at 4/14/2024 0941    Acceptance, E, VU,DU,NR by  at 4/13/2024 1101    Comment: reviewed HEP and spinal precautions    Eager, E,H, VU,DU,NR by SS at 4/12/2024 1019    Comment: Educated pt. safety/technique w/transfers, ambulation, spinal precautions, PT POC   Family Acceptance, E, VU,DU,NR by  at 4/13/2024 1101    Comment: reviewed HEP and spinal precautions    Eager, E,H, VU,DU,NR by SS at 4/12/2024 1019    Comment: Educated pt. safety/technique w/transfers, ambulation, spinal precautions, PT POC                                         User Key       Initials Effective Dates Name  Provider Type Discipline     06/01/21 -  Lexie Tinajero, PT Physical Therapist PT     09/21/23 -  Obdulia Parada, PT Physical Therapist PT                  PT Recommendation and Plan     Plan of Care Reviewed With: patient  Progress: no change  Outcome Evaluation: Pt continues to present below baseline function d/t acute pain and generalized weakness. Repositioning performed in bed w/ SBA and HOB flat. Pt agreeable to supine ther ex and tolerated well. Pt able to recall 3/3 spinal precautions this date. Cont w/ IP PT POC.     Time Calculation:         PT Charges       Row Name 04/14/24 0941             Time Calculation    Start Time 0900  -      PT Received On 04/14/24  -      PT Goal Re-Cert Due Date 04/22/24  -         Timed Charges    39261 - PT Therapeutic Exercise Minutes 10  -LH      76235 - PT Therapeutic Activity Minutes 5  -LH         Total Minutes    Timed Charges Total Minutes 15  -       Total Minutes 15  -LH                User Key  (r) = Recorded By, (t) = Taken By, (c) = Cosigned By      Initials Name Provider Type     Obdulia Parada, PT Physical Therapist                  Therapy Charges for Today       Code Description Service Date Service Provider Modifiers Qty    46074570596 HC PT THER PROC EA 15 MIN 4/13/2024 Obdulia Parada, PT GP 1    56351097385 HC PT THER PROC EA 15 MIN 4/14/2024 Obdulia Parada, PT GP 1            PT G-Codes  Outcome Measure Options: AM-PAC 6 Clicks Basic Mobility (PT)  AM-PAC 6 Clicks Score (PT): 18  AM-PAC 6 Clicks Score (OT): 20  PT Discharge Summary  Anticipated Discharge Disposition (PT): home with assist    Obdulia Parada PT  4/14/2024

## 2024-04-14 NOTE — PLAN OF CARE
Goal Outcome Evaluation:  Plan of Care Reviewed With: patient        Progress: no change  Outcome Evaluation: Pt continues to present below baseline function d/t acute pain and generalized weakness. Repositioning performed in bed w/ SBA and HOB flat. Pt agreeable to supine ther ex and tolerated well. Pt able to recall 3/3 spinal precautions this date. Cont w/ IP PT POC.      Anticipated Discharge Disposition (PT): home with assist

## 2024-04-14 NOTE — PLAN OF CARE
Assumed care at 19:00.  Pt awake in bed. A/O, on 2L NC. Bedrest/Lay flat orders followed. Pt log rolled to change dressing on back. Drainage steadily decreasing as the night progressed.    Multiple complaints of pain. PRN meds given with a small amount of relief. Grajeda in place and draining. Grajeda care completed. Left in bed at lowest position HOB flat, call light within reach.    /83 (BP Location: Left arm)   Pulse 77   Temp 98 °F (36.7 °C) (Oral)   Resp 16   SpO2 96%         Problem: Adult Inpatient Plan of Care  Goal: Plan of Care Review  Outcome: Ongoing, Progressing  Flowsheets (Taken 4/14/2024 0419)  Progress: improving  Plan of Care Reviewed With: patient  Outcome Evaluation: Pt A/O, on 2L NC.  Bedrest/Lay flat orders followed. Pt log rolled to change dressing on back.  Drainage steadily descreasing as the night progressed.  Multiple complaints of pain.  PRN meds given with a small amount of relief.  Grajeda in lace and draining.  Grajeda care completed.  Goal: Patient-Specific Goal (Individualized)  Outcome: Ongoing, Progressing  Goal: Absence of Hospital-Acquired Illness or Injury  Outcome: Ongoing, Progressing  Intervention: Identify and Manage Fall Risk  Recent Flowsheet Documentation  Taken 4/14/2024 0400 by Janki Ponce RN  Safety Promotion/Fall Prevention:   activity supervised   assistive device/personal items within reach   safety round/check completed  Taken 4/14/2024 0200 by Janki Ponce RN  Safety Promotion/Fall Prevention:   activity supervised   assistive device/personal items within reach   safety round/check completed  Taken 4/14/2024 0000 by Janki Ponce RN  Safety Promotion/Fall Prevention:   activity supervised   assistive device/personal items within reach   safety round/check completed  Taken 4/13/2024 1927 by Janki Ponce RN  Safety Promotion/Fall Prevention:   activity supervised   assistive device/personal items within reach   safety round/check  completed  Intervention: Prevent Skin Injury  Recent Flowsheet Documentation  Taken 4/14/2024 0400 by Janki Ponce RN  Body Position: supine  Skin Protection:   adhesive use limited   incontinence pads utilized   tubing/devices free from skin contact  Taken 4/14/2024 0200 by Janki Ponce RN  Body Position: supine  Skin Protection:   adhesive use limited   tubing/devices free from skin contact   incontinence pads utilized  Taken 4/14/2024 0000 by Janki Ponce RN  Body Position: supine  Skin Protection:   adhesive use limited   incontinence pads utilized   tubing/devices free from skin contact  Taken 4/13/2024 1927 by Janki Ponce RN  Body Position: supine  Skin Protection:   adhesive use limited   incontinence pads utilized   tubing/devices free from skin contact  Intervention: Prevent and Manage VTE (Venous Thromboembolism) Risk  Recent Flowsheet Documentation  Taken 4/14/2024 0400 by Janki Ponce RN  Activity Management: activity encouraged  Range of Motion: active ROM (range of motion) encouraged  Taken 4/14/2024 0200 by Janki Ponce RN  Activity Management: activity encouraged  VTE Prevention/Management:   sequential compression devices on   patient refused intervention  Taken 4/14/2024 0000 by Janki Ponce RN  Activity Management: activity encouraged  VTE Prevention/Management:   bilateral   sequential compression devices off   patient refused intervention  Range of Motion: active ROM (range of motion) encouraged  Taken 4/13/2024 1927 by Janki Ponce RN  Activity Management: activity encouraged  VTE Prevention/Management:   bilateral   sequential compression devices off   patient refused intervention  Range of Motion: active ROM (range of motion) encouraged  Intervention: Prevent Infection  Recent Flowsheet Documentation  Taken 4/14/2024 0400 by Janki Ponce RN  Infection Prevention:   environmental surveillance performed   rest/sleep promoted  Taken  4/14/2024 0200 by Janki Ponce RN  Infection Prevention:   environmental surveillance performed   rest/sleep promoted  Taken 4/14/2024 0000 by Janki Ponce RN  Infection Prevention:   environmental surveillance performed   rest/sleep promoted  Taken 4/13/2024 1927 by Janki Ponce RN  Infection Prevention:   environmental surveillance performed   rest/sleep promoted  Goal: Optimal Comfort and Wellbeing  Outcome: Ongoing, Progressing  Intervention: Monitor Pain and Promote Comfort  Recent Flowsheet Documentation  Taken 4/14/2024 0400 by Janki Ponce RN  Pain Management Interventions:   position adjusted   quiet environment facilitated   relaxation techniques promoted  Taken 4/14/2024 0336 by Janki Ponce RN  Pain Management Interventions: see MAR  Taken 4/13/2024 2309 by Janki Ponce RN  Pain Management Interventions: see MAR  Taken 4/13/2024 2120 by Janki Ponce RN  Pain Management Interventions: see MAR  Intervention: Provide Person-Centered Care  Recent Flowsheet Documentation  Taken 4/14/2024 0000 by Janki Ponce RN  Trust Relationship/Rapport: care explained  Taken 4/13/2024 1927 by Janki Ponce RN  Trust Relationship/Rapport:   care explained   choices provided   empathic listening provided   questions answered   thoughts/feelings acknowledged  Goal: Readiness for Transition of Care  Outcome: Ongoing, Progressing     Problem: Fall Injury Risk  Goal: Absence of Fall and Fall-Related Injury  Outcome: Ongoing, Progressing  Intervention: Identify and Manage Contributors  Recent Flowsheet Documentation  Taken 4/14/2024 0400 by Janki Ponce RN  Self-Care Promotion:   independence encouraged   BADL personal objects within reach  Taken 4/14/2024 0200 by Janki Ponce RN  Medication Review/Management: medications reviewed  Self-Care Promotion:   BADL personal objects within reach   independence encouraged  Taken 4/14/2024 0000 by Janki Ponce  RN  Medication Review/Management: medications reviewed  Self-Care Promotion:   independence encouraged   BADL personal objects within reach  Taken 4/13/2024 1927 by Janki Ponce RN  Medication Review/Management: medications reviewed  Self-Care Promotion:   independence encouraged   BADL personal objects within reach  Intervention: Promote Injury-Free Environment  Recent Flowsheet Documentation  Taken 4/14/2024 0400 by Janki Ponce RN  Safety Promotion/Fall Prevention:   activity supervised   assistive device/personal items within reach   safety round/check completed  Taken 4/14/2024 0200 by Janki Ponce RN  Safety Promotion/Fall Prevention:   activity supervised   assistive device/personal items within reach   safety round/check completed  Taken 4/14/2024 0000 by Janki Ponce RN  Safety Promotion/Fall Prevention:   activity supervised   assistive device/personal items within reach   safety round/check completed  Taken 4/13/2024 1927 by Janki Ponce RN  Safety Promotion/Fall Prevention:   activity supervised   assistive device/personal items within reach   safety round/check completed     Problem: Skin Injury Risk Increased  Goal: Skin Health and Integrity  Outcome: Ongoing, Progressing  Intervention: Optimize Skin Protection  Recent Flowsheet Documentation  Taken 4/14/2024 0400 by Janki Ponce RN  Pressure Reduction Techniques: frequent weight shift encouraged  Head of Bed (HOB) Positioning: HOB flat  Pressure Reduction Devices: pressure-redistributing mattress utilized  Skin Protection:   adhesive use limited   incontinence pads utilized   tubing/devices free from skin contact  Taken 4/14/2024 0200 by Janki Ponce RN  Pressure Reduction Techniques: frequent weight shift encouraged  Head of Bed (HOB) Positioning: HOB flat  Pressure Reduction Devices: pressure-redistributing mattress utilized  Skin Protection:   adhesive use limited   tubing/devices free from skin contact    incontinence pads utilized  Taken 4/14/2024 0000 by Janki Ponce RN  Pressure Reduction Techniques: frequent weight shift encouraged  Head of Bed (HOB) Positioning: HOB flat  Pressure Reduction Devices: pressure-redistributing mattress utilized  Skin Protection:   adhesive use limited   incontinence pads utilized   tubing/devices free from skin contact  Taken 4/13/2024 1927 by Janki Ponce RN  Pressure Reduction Techniques: frequent weight shift encouraged  Head of Bed (HOB) Positioning: HOB flat  Pressure Reduction Devices: pressure-redistributing mattress utilized  Skin Protection:   adhesive use limited   incontinence pads utilized   tubing/devices free from skin contact   Goal Outcome Evaluation:  Plan of Care Reviewed With: patient        Progress: improving  Outcome Evaluation: Pt A/O, on 2L NC.  Bedrest/Lay flat orders followed. Pt log rolled to change dressing on back.  Drainage steadily descreasing as the night progressed.  Multiple complaints of pain.  PRN meds given with a small amount of relief.  Grajeda in lace and draining.  Grajeda care completed.

## 2024-04-15 PROBLEM — Z98.890 STATUS POST LUMBAR LAMINECTOMY: Status: ACTIVE | Noted: 2024-04-15

## 2024-04-15 PROCEDURE — 94761 N-INVAS EAR/PLS OXIMETRY MLT: CPT

## 2024-04-15 PROCEDURE — 94664 DEMO&/EVAL PT USE INHALER: CPT

## 2024-04-15 PROCEDURE — 94799 UNLISTED PULMONARY SVC/PX: CPT

## 2024-04-15 PROCEDURE — 63710000001 PROMETHAZINE PER 12.5 MG: Performed by: NEUROLOGICAL SURGERY

## 2024-04-15 PROCEDURE — 25010000002 HEPARIN (PORCINE) PER 1000 UNITS: Performed by: NEUROLOGICAL SURGERY

## 2024-04-15 PROCEDURE — 99024 POSTOP FOLLOW-UP VISIT: CPT | Performed by: NEUROLOGICAL SURGERY

## 2024-04-15 RX ORDER — HEPARIN SODIUM 5000 [USP'U]/ML
5000 INJECTION, SOLUTION INTRAVENOUS; SUBCUTANEOUS EVERY 8 HOURS SCHEDULED
Status: DISCONTINUED | OUTPATIENT
Start: 2024-04-15 | End: 2024-04-17 | Stop reason: HOSPADM

## 2024-04-15 RX ADMIN — BUSPIRONE HYDROCHLORIDE 15 MG: 15 TABLET ORAL at 09:08

## 2024-04-15 RX ADMIN — CELECOXIB 100 MG: 100 CAPSULE ORAL at 21:10

## 2024-04-15 RX ADMIN — LISINOPRIL 20 MG: 20 TABLET ORAL at 08:55

## 2024-04-15 RX ADMIN — VENLAFAXINE HYDROCHLORIDE 75 MG: 75 CAPSULE, EXTENDED RELEASE ORAL at 08:58

## 2024-04-15 RX ADMIN — SENNOSIDES AND DOCUSATE SODIUM 2 TABLET: 8.6; 5 TABLET ORAL at 12:43

## 2024-04-15 RX ADMIN — OXYCODONE HYDROCHLORIDE AND ACETAMINOPHEN 1 TABLET: 7.5; 325 TABLET ORAL at 08:55

## 2024-04-15 RX ADMIN — HEPARIN SODIUM 5000 UNITS: 5000 INJECTION INTRAVENOUS; SUBCUTANEOUS at 14:57

## 2024-04-15 RX ADMIN — BUDESONIDE AND FORMOTEROL FUMARATE DIHYDRATE 1 PUFF: 160; 4.5 AEROSOL RESPIRATORY (INHALATION) at 18:43

## 2024-04-15 RX ADMIN — GABAPENTIN 600 MG: 300 CAPSULE ORAL at 20:02

## 2024-04-15 RX ADMIN — PANTOPRAZOLE SODIUM 40 MG: 40 TABLET, DELAYED RELEASE ORAL at 05:00

## 2024-04-15 RX ADMIN — BUDESONIDE AND FORMOTEROL FUMARATE DIHYDRATE 1 PUFF: 160; 4.5 AEROSOL RESPIRATORY (INHALATION) at 07:46

## 2024-04-15 RX ADMIN — GABAPENTIN 600 MG: 300 CAPSULE ORAL at 14:57

## 2024-04-15 RX ADMIN — TIZANIDINE 4 MG: 4 TABLET ORAL at 12:43

## 2024-04-15 RX ADMIN — GABAPENTIN 600 MG: 300 CAPSULE ORAL at 05:00

## 2024-04-15 RX ADMIN — HEPARIN SODIUM 5000 UNITS: 5000 INJECTION INTRAVENOUS; SUBCUTANEOUS at 06:13

## 2024-04-15 RX ADMIN — BISACODYL 10 MG: 10 SUPPOSITORY RECTAL at 12:44

## 2024-04-15 RX ADMIN — CELECOXIB 100 MG: 100 CAPSULE ORAL at 09:08

## 2024-04-15 RX ADMIN — LEVOTHYROXINE SODIUM 137 MCG: 137 TABLET ORAL at 05:00

## 2024-04-15 RX ADMIN — MONTELUKAST 10 MG: 10 TABLET, FILM COATED ORAL at 20:02

## 2024-04-15 RX ADMIN — BUSPIRONE HYDROCHLORIDE 15 MG: 15 TABLET ORAL at 21:10

## 2024-04-15 RX ADMIN — Medication 3 ML: at 20:03

## 2024-04-15 RX ADMIN — OXYCODONE HYDROCHLORIDE AND ACETAMINOPHEN 1 TABLET: 7.5; 325 TABLET ORAL at 04:57

## 2024-04-15 RX ADMIN — ATORVASTATIN CALCIUM 20 MG: 20 TABLET, FILM COATED ORAL at 20:02

## 2024-04-15 RX ADMIN — PROMETHAZINE HYDROCHLORIDE 12.5 MG: 12.5 TABLET ORAL at 19:56

## 2024-04-15 RX ADMIN — ROPINIROLE HYDROCHLORIDE 2 MG: 2 TABLET, FILM COATED ORAL at 20:02

## 2024-04-15 RX ADMIN — TIOTROPIUM BROMIDE INHALATION SPRAY 2 PUFF: 3.12 SPRAY, METERED RESPIRATORY (INHALATION) at 07:46

## 2024-04-15 RX ADMIN — CETIRIZINE HYDROCHLORIDE 5 MG: 10 TABLET, FILM COATED ORAL at 08:58

## 2024-04-15 RX ADMIN — HEPARIN SODIUM 5000 UNITS: 5000 INJECTION INTRAVENOUS; SUBCUTANEOUS at 20:02

## 2024-04-15 RX ADMIN — OXYCODONE HYDROCHLORIDE AND ACETAMINOPHEN 1 TABLET: 7.5; 325 TABLET ORAL at 19:57

## 2024-04-15 RX ADMIN — OXYCODONE HYDROCHLORIDE AND ACETAMINOPHEN 1 TABLET: 7.5; 325 TABLET ORAL at 12:44

## 2024-04-15 NOTE — CASE MANAGEMENT/SOCIAL WORK
Continued Stay Note  [facility]     Patient Name: Yessica Galvin  MRN: [id number]  Today's Date: 4/15/2024    Admit Date: 4/11/2024    Plan: home   Discharge Plan       Row Name 04/15/24 0945       Plan    Plan Comments Due to dural rent pt will remain on bedrest for one more day. Plan for discharge is to return home once mobile. Cm to follow                   Discharge Codes    No documentation.                 Expected Discharge Date and Time       Expected Discharge Date Expected Discharge Time    Apr 13, 2024               Zelda Gallego RN

## 2024-04-15 NOTE — PAYOR COMM NOTE
"Kae Suarez RN  Utilization Management  P:[phone]  F:[phone]    Ref #[id number]   OP-IP admission 4/15/24    Jose Galvin (63 y.o. Female)       Date of Birth   1960    Social Security Number       Address   [address] [place] [address]    Home Phone   [phone]    MRN   [id number]       [facility]    Marital Status                               Admission Date   4/11/24    Admission Type   Elective    Admitting Provider   Ulises Elias MD    Attending Provider   Ulises Elias MD    Department, Room/Bed   [address], S372/1       Discharge Date       Discharge Disposition       Discharge Destination                                 Attending Provider: Ulises Elias MD    Allergies: Codeine, Shrimp    Isolation: None   Infection: MRSA (04/06/24)   Code Status: CPR    Ht: 157.5 cm (62\")   Wt: 74.9 kg (165 lb 2 oz)    Admission Cmt: None   Principal Problem: Lumbar stenosis with neurogenic claudication [M48.062]                   Active Insurance as of 4/11/2024       Primary Coverage       Payor Plan Insurance Group Employer/Plan Group    ANTHEM MEDICARE REPLACEMENT ANTHEM MEDICARE ADVANTAGE [id number]       Payor Plan Address Payor Plan Phone Number Payor Plan Fax Number Effective Dates    [phone]  7/1/2023 - None Entered    [facility] [id number]         Subscriber Name Subscriber Birth Date Member ID       JOSE GALVIN 1960 [id number]                 History & Physical        Savannah Nicole APRN at 04/11/24 1216       Attestation signed by Ulises Elias MD at 04/11/24 1330    .                  Pre-Op H&P  Jose Galvin  [id number]  1960      Chief complaint: Back pain      Subjective:  Patient is a 63 y.o.female presents for scheduled surgery by Dr. Elias. She anticipates a LUMBAR LAMINECTOMY DISCECTOMY DECOMPRESSION POSTERIOR L2-L5  today. She has had chronic back pain for many " years. The pain radiates down the LLE. Symptoms are worse with standing, walking, or lying on her left side. No saddle anesthesia. She tried PT, but made the pain worse. She had an epidural injection that helped for about 3 days.       Review of Systems:  Constitutional-- No fever, chills or sweats. No fatigue.  CV-- No chest pain, palpitation or syncope. +HTN, HLD  Resp-- No cough, hemoptysis. +SOB, asthma, COPD  Skin--No rashes or lesions      Allergies:   Allergies   Allergen Reactions    Codeine Nausea And Vomiting    Shrimp Hives         Home Meds:  Medications Prior to Admission   Medication Sig Dispense Refill Last Dose    acetaminophen (TYLENOL) 325 MG tablet Take 2 tablets by mouth Every 4 (Four) Hours As Needed for Mild Pain .   4/10/2024    albuterol sulfate  (90 Base) MCG/ACT inhaler Inhale 2 puffs Every 6 (Six) Hours As Needed for Wheezing. 54 g 3 4/11/2024    atorvastatin (LIPITOR) 20 MG tablet Take 1 tablet by mouth Every Night. 90 tablet 3 4/10/2024    busPIRone (BUSPAR) 15 MG tablet TAKE 1 TABLET BY MOUTH TWICE A DAY 60 tablet 5 4/10/2024    celecoxib (CeleBREX) 100 MG capsule TAKE 1 CAPSULE BY MOUTH 2 TIMES A DAY AS NEEDED FOR MILD PAIN 60 capsule 1 4/11/2024 at 0900    chlorhexidine (HIBICLENS) 4 % external liquid Shower each day with solution for 5 days beginning 5 days before surgery. 120 mL 0 4/10/2024    Fluticasone Furoate-Vilanterol (Breo Ellipta) 200-25 MCG/ACT inhaler Inhale 1 puff Daily. 60 each 2 4/11/2024 at 0900    gabapentin (NEURONTIN) 600 MG tablet Take 1 tablet by mouth 3 (Three) Times a Day. 90 tablet 2 4/11/2024 at 0900    levocetirizine (Xyzal) 5 MG tablet Take 1 tablet by mouth Every Evening. 90 tablet 1 4/10/2024    levothyroxine (SYNTHROID, LEVOTHROID) 137 MCG tablet TAKE 1 TABLET BY MOUTH DAILY 30 tablet 0 4/11/2024 at 0900    lisinopril (PRINIVIL,ZESTRIL) 20 MG tablet TAKE 1 TABLET BY MOUTH DAILY 30 tablet 0 4/11/2024 at 0900    montelukast (SINGULAIR) 10 MG tablet  TAKE ONE TABLET BY MOUTH ONCE NIGHTLY 90 tablet 1 4/10/2024    mupirocin (BACTROBAN) 2 % nasal ointment Apply to the inside of each nostril with a cotton swab two times daily, morning and evening, for 5 days before surgery. 10 each 0 4/10/2024    omeprazole (priLOSEC) 40 MG capsule TAKE 1 CAPSULE BY MOUTH DAILY 90 capsule 1 4/11/2024 at 0900    promethazine (PHENERGAN) 25 MG tablet Take 1 tablet by mouth Every 8 (Eight) Hours As Needed for Nausea or Vomiting. 30 tablet 0 4/11/2024    rOPINIRole (REQUIP) 2 MG tablet Take 1 tablet by mouth every night at bedtime. 30 tablet 1 4/11/2024 at 0900    tiZANidine (ZANAFLEX) 4 MG tablet Take 1 tablet by mouth Every 12 (Twelve) Hours As Needed for Muscle Spasms. 60 tablet 1 4/10/2024    traMADol (ULTRAM) 50 MG tablet Take 2 tablets by mouth Every 8 (Eight) Hours As Needed for Moderate Pain. 150 tablet 2 4/11/2024 at 0900    venlafaxine XR (EFFEXOR-XR) 150 MG 24 hr capsule Take 2 capsules by mouth Daily. 60 capsule 2 4/11/2024    alendronate (Fosamax) 70 MG tablet Take 1 tablet by mouth Every 7 (Seven) Days. 4 tablet 11 4/4/2024    loperamide (IMODIUM) 2 MG capsule 1 capsule.   More than a month    nicotine (Nicotrol) 10 MG inhaler Inhale 1 puff As Needed for Smoking Cessation. (Patient not taking: Reported on 4/5/2024) 168 each 1     ondansetron ODT (ZOFRAN-ODT) 4 MG disintegrating tablet Place 1 tablet on the tongue Every 8 (Eight) Hours As Needed for Nausea or Vomiting. 20 tablet 1 More than a month    Umeclidinium Bromide (INCRUSE ELLIPTA) 62.5 MCG/ACT aerosol powder  Inhale 1 puff Daily. 30 each 2 More than a month         PMH:   Past Medical History:   Diagnosis Date    Acute bronchitis     Acute sinusitis     Allergic rhinitis 09/06/2023    Asthma     Asthma 03/07/2020    Asthma with acute exacerbation     Asthma, extrinsic ,1970    I have had it since I was 5 yrs. Old    Asthmatic bronchitis     Bronchiectasis     Always catch it    Centrilobular emphysema 06/02/2022     Chronic bronchitis with acute exacerbation 2022    Disc degeneration, lumbar     Disc degeneration, lumbar     GERD (gastroesophageal reflux disease)     History of mammogram     Hypertension 10/05/2023    Hypothyroidism     Lower back pain     Medicare annual wellness visit, subsequent 10/05/2023    Nodule of upper lobe of right lung 2022    Plantar fasciitis     Restless legs syndrome      PSH:    Past Surgical History:   Procedure Laterality Date    CARDIAC CATHETERIZATION N/A 2023    Procedure: Left Heart Cath;  Surgeon: Arben Pittman MD;  Location: [facility];  Service: Cardiology;  Laterality: N/A;    CHOLECYSTECTOMY      COLONOSCOPY      HYSTERECTOMY      KNEE ARTHROSCOPY Right 03/10/2020    Procedure: KNEE ARTHROSCOPY RIGHT;  Surgeon: Guanaco Thakur MD;  Location: [facility] OR;  Service: Orthopedics;  Laterality: Right;    NECK SURGERY      ORIF WRIST FRACTURE Left 03/10/2020    Procedure: WRIST OPEN REDUCTION INTERNAL FIXATION LEFT;  Surgeon: Guanaco Thakur MD;  Location: [facility] OR;  Service: Orthopedics;  Laterality: Left;    TIBIAL PLATEAU OPEN REDUCTION INTERNAL FIXATION Right 03/10/2020    Procedure: TIBIAL PLATEAU OPEN REDUCTION INTERNAL FIXATION RIGHT;  Surgeon: Guanaco Thakur MD;  Location: [facility] OR;  Service: Orthopedics;  Laterality: Right;    UPPER GASTROINTESTINAL ENDOSCOPY         Immunization History:  Influenza: UTD  Pneumococcal: UTD  Tetanus: UTD  Covid : x5    Social History:   Tobacco:   Social History     Tobacco Use   Smoking Status Some Days    Current packs/day: 0.00    Average packs/day: 0.3 packs/day for 23.4 years (5.9 ttl pk-yrs)    Types: Cigarettes    Start date: 3/7/2000    Last attempt to quit: 2023    Years since quittin.6    Passive exposure: Current   Smokeless Tobacco Never   Tobacco Comments    I started when i was 17      Alcohol:     Social History     Substance and Sexual Activity   Alcohol Use No          Physical Exam:/88 (BP Location: Right arm, Patient Position: Lying)   Pulse 74   Temp 98.2 °F (36.8 °C) (Temporal)   Resp 18   SpO2 92%       General Appearance:    Alert, cooperative, no distress, appears stated age   Head:    Normocephalic, without obvious abnormality, atraumatic   Lungs:     Clear to auscultation bilaterally, respirations unlabored    Heart:   Regular rate and rhythm, S1 and S2 normal    Abdomen:    Soft without tenderness   Extremities:   Extremities normal, atraumatic, no cyanosis or edema   Skin:   Skin color, texture, turgor normal, no rashes or lesions   Neurologic:   Grossly intact     Results Review:     LABS:  Lab Results   Component Value Date    WBC 6.52 04/05/2024    HGB 11.4 (L) 04/05/2024    HCT 36.1 04/05/2024    MCV 96.8 04/05/2024     04/05/2024    NEUTROABS 4.97 10/05/2023    GLUCOSE 79 11/14/2023    BUN 14 11/14/2023    CREATININE 0.70 11/14/2023    EGFRIFNONA 103 02/01/2022    EGFRIFAFRI 116 08/26/2021     (L) 11/14/2023    K 4.1 04/05/2024    CL 98 11/14/2023    CO2 28.0 11/14/2023    MG 2.2 10/05/2023    CALCIUM 9.5 11/14/2023    ALBUMIN 4.3 11/14/2023    AST 28 11/14/2023    ALT 27 11/14/2023    BILITOT 0.2 11/14/2023       RADIOLOGY:    Study Result    Narrative & Impression      MRI LUMBAR SPINE WO CONTRAST     Date of Exam: 6/20/2023 9:24 AM EDT     Indication: Lumbar radiculopathy, symptoms persist with > 6 wks treatment.     Comparison: Lumbar spine radiographs dated 6/11/2023     Technique:  Routine multiplanar/multisequence sequence images of the lumbar spine were obtained without contrast administration.          FINDINGS:  There is straightening of lumbar lordosis. Multilevel Modic type I degenerative endplate changes are seen from L2-L4. Marrow signal is otherwise unremarkable. There is a superior endplate defect of L2 which appears chronic without associated marrow   edema. Remaining vertebral body heights are maintained. Lumbar  discs are desiccated with multilevel disc space narrowing. The conus terminates at approximately the T12/L1 level. No abnormal signal is identified within the conus. The visualized paraspinal   soft tissues are without significant abnormality. Limited visualization of the intra-abdominal structures is unremarkable.     T12-L1: Mild broad-based disc bulging and facet arthropathy contribute to mild bilateral neural foraminal stenosis. There is mild effacement of the anterior thecal sac. Spinal canal is not significantly narrowed.     L1-L2: Broad-based disc bulging and facet arthropathy contribute to moderate left and mild to moderate right neural foraminal stenosis. There is effacement of the anterior thecal sac. Spinal canal is not significantly narrowed.     L2-L3: Posterior disc osteophyte complex and facet arthropathy contribute to moderate spinal canal stenosis (thecal sac measures 6 to 7 mm AP) and moderate to severe bilateral neuroforaminal stenosis.     L3-L4: Posterior disc osteophyte complex and facet arthropathy contribute to severe spinal canal stenosis (thecal sac measures approximately 4 mm AP) with severe bilateral neural foraminal stenosis.     L4-L5: Broad-based disc bulging and facet arthropathy contribute to moderate spinal canal stenosis (thecal sac measures approximately 7 mm AP) with moderate to severe bilateral neural foraminal stenosis.     L5-S1: Broad-based disc bulging and facet arthropathy contribute to moderate spinal canal stenosis (thecal sac measures 6 to 7 mm AP) and moderate to severe bilateral neural foraminal stenosis     IMPRESSION:  1.Lumbar spondylosis with straightening of lumbar lordosis. There is multilevel spinal canal and neural foraminal stenosis. Spinal canal is narrowest at the L3-L4 level. Please see above for additional details.  2.Chronic superior endplate compression fracture of L2 without associated marrow edema.       I reviewed the patient's new clinical  results.    Cancer Staging (if applicable)  Cancer Patient: __ yes __no __unknown; If yes, clinical stage T:__ N:__M:__, stage group or __N/A      Impression: Lumbar stenosis with neurogenic claudication       Plan: LUMBAR LAMINECTOMY DISCECTOMY DECOMPRESSION POSTERIOR L2-L5       Savannah NicoleIDALIA   4/11/2024   12:16 EDT    Electronically signed by Ulises Elias MD at 04/11/24 1330       Current Facility-Administered Medications   Medication Dose Route Frequency Provider Last Rate Last Admin    acetaminophen (TYLENOL) tablet 650 mg  650 mg Oral Q4H PRN Ulises Elias MD   650 mg at 04/12/24 1957    albuterol sulfate HFA (PROVENTIL HFA;VENTOLIN HFA;PROAIR HFA) inhaler 2 puff  2 puff Inhalation Q6H PRN Ulises Elais MD        atorvastatin (LIPITOR) tablet 20 mg  20 mg Oral Nightly Ulises Elias MD   20 mg at 04/14/24 2215    bisacodyl (DULCOLAX) suppository 10 mg  10 mg Rectal Daily PRN Ulises Elias MD   10 mg at 04/15/24 1244    budesonide-formoterol (SYMBICORT) 160-4.5 MCG/ACT inhaler 1 puff  1 puff Inhalation BID - RT Ulises Elias MD   1 puff at 04/15/24 0746    busPIRone (BUSPAR) tablet 15 mg  15 mg Oral BID Ulises Elias MD   15 mg at 04/15/24 0908    celecoxib (CeleBREX) capsule 100 mg  100 mg Oral Q12H Ulises Elias MD   100 mg at 04/15/24 0908    cetirizine (zyrTEC) tablet 5 mg  5 mg Oral Daily Ulises Elias MD   5 mg at 04/15/24 0858    diphenhydrAMINE (BENADRYL) capsule 25 mg  25 mg Oral Nightly PRN Ulises Elias MD        gabapentin (NEURONTIN) capsule 600 mg  600 mg Oral Q8H Ulises lEias MD   600 mg at 04/15/24 0500    heparin (porcine) 5000 UNIT/ML injection 5,000 Units  5,000 Units Subcutaneous Q8H Ulises Elias MD   5,000 Units at 04/15/24 0613    lactated ringers infusion  9 mL/hr Intravenous Continuous Ulises Elias MD 9 mL/hr at 04/11/24 1224 Restarted at 04/11/24 1359    levothyroxine (SYNTHROID, LEVOTHROID) tablet 137 mcg  137 mcg Oral Q AM  Ulises Elias MD   137 mcg at 04/15/24 0500    lisinopril (PRINIVIL,ZESTRIL) tablet 20 mg  20 mg Oral Daily Ulises Elias MD   20 mg at 04/15/24 0855    magnesium hydroxide (MILK OF MAGNESIA) suspension 10 mL  10 mL Oral Daily PRN Ulises Elias MD        montelukast (SINGULAIR) tablet 10 mg  10 mg Oral Nightly Ulises Elias MD   10 mg at 04/14/24 2215    Morphine PF injection 2 mg  2 mg Intravenous Q4H PRN Ulises Elias MD        And    naloxone (NARCAN) injection 0.4 mg  0.4 mg Intravenous Q5 Min PRN Ulises Elias MD        Morphine sulfate (PF) injection 4 mg  4 mg Intravenous Q4H PRN Ulises Elias MD   4 mg at 04/14/24 1330    And    naloxone (NARCAN) injection 0.4 mg  0.4 mg Intravenous Q5 Min PRN Ulises Elias MD        ondansetron ODT (ZOFRAN-ODT) disintegrating tablet 4 mg  4 mg Oral Q6H PRN Ulises Elias MD        Or    ondansetron (ZOFRAN) injection 4 mg  4 mg Intravenous Q6H PRN Ulises Elias MD        oxyCODONE-acetaminophen (PERCOCET) 7.5-325 MG per tablet 1 tablet  1 tablet Oral Q4H PRN Ulises Elias MD   1 tablet at 04/15/24 1244    pantoprazole (PROTONIX) EC tablet 40 mg  40 mg Oral Q AM Ulises Elias MD   40 mg at 04/15/24 0500    promethazine (PHENERGAN) tablet 12.5 mg  12.5 mg Oral Q6H PRN Ulises Elias MD        Or    promethazine (PHENERGAN) suppository 12.5 mg  12.5 mg Rectal Q6H PRN Ulises Elias MD        promethazine (PHENERGAN) tablet 25 mg  25 mg Oral Q8H PRN Ulises Elias MD        rOPINIRole (REQUIP) tablet 2 mg  2 mg Oral Nightly Ulises Elias MD   2 mg at 04/14/24 2214    sennosides-docusate (PERICOLACE) 8.6-50 MG per tablet 2 tablet  2 tablet Oral Nightly PRN Ulises Elias MD   2 tablet at 04/15/24 1243    sodium chloride 0.9 % flush 10 mL  10 mL Intravenous PRN Ulises Elias MD        sodium chloride 0.9 % flush 3 mL  3 mL Intravenous Q12H Ulisse Elias MD   3 mL at 04/14/24 2215    sodium chloride 0.9 %  infusion 40 mL  40 mL Intravenous PRN Ulises Elias MD        tiotropium (SPIRIVA RESPIMAT) 2.5 mcg/act aerosol solution inhaler  2 puff Inhalation Daily - RT Ulises Elias MD   2 puff at 04/15/24 0746    tiZANidine (ZANAFLEX) tablet 4 mg  4 mg Oral Q12H PRN Ulises Elias MD   4 mg at 04/15/24 1243    traMADol (ULTRAM) tablet 100 mg  100 mg Oral Q8H PRN Ulises Elias MD   100 mg at 04/11/24 2109    venlafaxine XR (EFFEXOR-XR) 24 hr capsule 75 mg  75 mg Oral Daily Ulises Elias MD   75 mg at 04/15/24 0858         Imaging Results (All)       Procedure Component Value Units Date/Time    FL C Arm During Surgery [[id number]] Resulted: 04/11/24 1437     Updated: 04/11/24 1437    Narrative:      This procedure was auto-finalized with no dictation required.             Operative/Procedure Notes (all)        Ulises Elias MD at 04/11/24 1417  Version 2 of 2         NEUROSURGICAL OPERATIVE NOTE        PREOPERATIVE DIAGNOSIS:    Lumbar stenosis with neurogenic claudication      POSTOPERATIVE DIAGNOSIS:  Same      PROCEDURE:  L2-3, L3-4, L4-5 laminectomies with medial facetectomies and foraminotomies      SURGEON:  Ulises Elias M.D.      ASSISTANT: Katherine Rose PA-C    PAC assisted with:   Suctioning   Retraction   Tying   Suturing   Closing   Application of dressing   Skilled neurosurgery PA assistance was necessary to perform this procedure.        ANESTHESIA:  General      ESTIMATED BLOOD LOSS: 100 mL      SPECIMEN: None      DRAINS: 7 flat MAGGY      COMPLICATIONS:  None      CLINICAL NOTE:  The patient is a 63-year-old woman with progressive back and lower extremity pain with walking and standing intolerance.  Studies demonstrate high-grade multilevel lumbar spinal stenosis and as such she presents at this time for decompressive laminectomies.  The nature of the procedure as well as the potential risks, complications, limitations, and alternatives to the procedure were discussed at length  with the patient and the patient has agreed to proceed with surgery.      TECHNICAL NOTE:  The patient was brought to the operating room and while on her cart, general endotracheal anesthesia was achieved.  She was then turned prone onto the Cloward saddle frame.  Special care was ensured to protect pressure points.  Her low back was prepared and draped in the usual fashion.  A localizing radiograph was obtained with the spinal needle in the lumbosacral midline.  Based on this, a several centimeter vertical incision was fashioned.  Underlying tissues were divided with cautery to provide exposure to the posterior spinal elements.  Another radiograph confirmed the operative levels.  Leksell rongeur was then utilized to remove the spinous processes at L2, L3, L4 and the upper aspect of L5.  A central trough was fashioned using drill and Kerrison punches.  This trough was widened using similar instruments.  The facet complexes were undercut and the neural foramina decompressed bilaterally at each level.  The stenosis was profound and after the decompression the result was quite good, and I was pleased with the decompression.  Bleeding points were controlled with bone wax and Surgiflo. With the Valsalva maneuver there was no significant bleeding or evidence of CSF leak.  There was a very small area of thin dura on the right at the L3-4 level.  There was no CSF leakage, but I did cover this with some nonsuturable DuraGen.  A 7 flat MAGGY drain was brought in through a separate stab incision and left in the epidural space.  The paraspinous muscle and fascia were reapproximated in interrupted fashion with 0 Vicryl suture; 0.25% Marcaine was instilled in the paraspinous musculature and subcutaneous tissues. Subcutaneous tissues were closed in layers with 2-0 followed by 3-0 Vicryl suture. The skin was closed in a running locked fashion with a 3-0 nylon suture. A sterile dressing was applied. She was rolled onto her cart,  extubated and taken to the recovery room in satisfactory condition.             Ulises Elias M.D.      Electronically signed by Ulises Elias MD at 04/12/24 0613       Ulises Elias MD at 04/11/24 1417  Version 1 of 2         NEUROSURGICAL OPERATIVE NOTE        PREOPERATIVE DIAGNOSIS:    Lumbar stenosis with neurogenic claudication      POSTOPERATIVE DIAGNOSIS:  Same      PROCEDURE:  L2-3, L3-4, L4-5 laminectomies with medial facetectomies and foraminotomies      SURGEON:  Ulises Elias M.D.      ASSISTANT: Katherine Rose PA-C    PAC assisted with:   Suctioning   Retraction   Tying   Suturing   Closing   Application of dressing   Skilled neurosurgery PA assistance was necessary to perform this procedure.        ANESTHESIA:  General      ESTIMATED BLOOD LOSS: 100 mL      SPECIMEN: None      DRAINS: 7 flat MAGGY      COMPLICATIONS:  None      CLINICAL NOTE:  The patient is a 63-year-old woman with progressive back and lower extremity pain with walking and standing intolerance.  Studies demonstrate high-grade multilevel lumbar spinal stenosis and as such she presents at this time for decompressive laminectomies.  The nature of the procedure as well as the potential risks, complications, limitations, and alternatives to the procedure were discussed at length with the patient and the patient has agreed to proceed with surgery.      TECHNICAL NOTE:   Dictation on: 04/11/2024  4:23 PM by: ULISES ELIAS [[id number]]           Ulises Elias M.D.      Electronically signed by Ulises Elias MD at 04/11/24 1623          Physician Progress Notes (all)        Ulises Elias MD at 04/15/24 0602          NEUROSURGERY PROGRESS NOTE     LOS: 0 days   Patient Care Team:  Betty Jasmine MD as PCP - General (Family Medicine)  Atul Tavarez MD as Consulting Physician (Gastroenterology)  Bobby Tom MD as Consulting Physician (Pulmonary Disease)  Bobby Tom MD as Consulting Physician  (Pulmonary Disease)  Delgado Lal MD as Consulting Physician (Pain Medicine)  Ulises Elias MD as Consulting Physician (Neurosurgery)  Betty Jasmine MD as Primary Care Provider (Family Medicine)  Kota Swain MD as Referring Physician (Family Medicine)    Chief Complaint: Low back and lower extremity pain with walking and standing intolerance.    POD#: 4 Days Post-Op  Procedures:  L2-5 laminectomies.    Interval History:   Patient Complaints: Sore hips.  Patient Denies: Headache.    Vital Signs: Blood pressure 137/84, pulse 76, temperature 98.2 °F (36.8 °C), temperature source Oral, resp. rate 16, SpO2 99%, not currently breastfeeding.  Intake/Output:   Intake/Output Summary (Last 24 hours) at 4/15/2024 0603  Last data filed at 4/15/2024 0310  Gross per 24 hour   Intake 1140 ml   Output 2325 ml   Net -1185 ml       Physical Exam:  Patient awakens easily.  She is in good spirits.  Dry dressing is in place on her incision.     Assessment/Plan:  1.  Lumbar stenosis with neurogenic claudication status post multilevel decompressive laminectomy.  2.  Chronic mechanical low back pain.  3.  History of hypertension.  4.  Disposition: Continue bedrest today and hopefully mobilize tomorrow.      Ulises Elias MD  04/15/24  06:03 EDT      Electronically signed by Ulises Elias MD at 04/15/24 0606       Santosh Norman MD at 04/14/24 0606          NEUROSURGERY PROGRESS NOTE    Chief Complaint: Back and leg pain    Subjective: Stable overnight.  Patient denies headache.  Pain is controlled.    Objective    Vital Signs: Blood pressure 123/83, pulse 77, temperature 98 °F (36.7 °C), temperature source Oral, resp. rate 16, SpO2 96%, not currently breastfeeding.    Physical Exam  Awake, alert and oriented x 3  Opens eyes spont  Pupils 3 mm reactive bilaterally  Extraocular muscles intact bilaterally  Face symmetric bilaterally  Tongue midline  5/5 in her lower extremities bilaterally  Dressing  dry    Intake/Output:   Intake/Output Summary (Last 24 hours) at 4/14/2024 0606  Last data filed at 4/14/2024 0400  Gross per 24 hour   Intake 390 ml   Output 2820 ml   Net -2430 ml       Current Medications:   Current Facility-Administered Medications:     acetaminophen (TYLENOL) tablet 650 mg, 650 mg, Oral, Q4H PRN, Ulises Elias MD, 650 mg at 04/12/24 1957    albuterol sulfate HFA (PROVENTIL HFA;VENTOLIN HFA;PROAIR HFA) inhaler 2 puff, 2 puff, Inhalation, Q6H PRN, Ulises Elias MD    atorvastatin (LIPITOR) tablet 20 mg, 20 mg, Oral, Nightly, Ulises Elias MD, 20 mg at 04/13/24 2117    bisacodyl (DULCOLAX) suppository 10 mg, 10 mg, Rectal, Daily PRN, Ulises Elias MD    budesonide-formoterol (SYMBICORT) 160-4.5 MCG/ACT inhaler 1 puff, 1 puff, Inhalation, BID - RT, Ulises Elias MD, 1 puff at 04/13/24 2000    busPIRone (BUSPAR) tablet 15 mg, 15 mg, Oral, BID, Ulises Elias MD, 15 mg at 04/13/24 2117    celecoxib (CeleBREX) capsule 100 mg, 100 mg, Oral, Q12H, Ulises Elias MD, 100 mg at 04/13/24 2120    cetirizine (zyrTEC) tablet 5 mg, 5 mg, Oral, Daily, Ulises Elias MD, 5 mg at 04/13/24 0810    diphenhydrAMINE (BENADRYL) capsule 25 mg, 25 mg, Oral, Nightly PRN, Ulises Elias MD    gabapentin (NEURONTIN) capsule 600 mg, 600 mg, Oral, Q8H, lUises Elias MD, 600 mg at 04/14/24 0514    lactated ringers infusion, 9 mL/hr, Intravenous, Continuous, Ulises Elias MD, Last Rate: 9 mL/hr at 04/11/24 1224, Restarted at 04/11/24 1359    lactated ringers infusion, 75 mL/hr, Intravenous, Continuous, Ulises Elias MD, Last Rate: 75 mL/hr at 04/12/24 1234, 75 mL/hr at 04/12/24 1234    levothyroxine (SYNTHROID, LEVOTHROID) tablet 137 mcg, 137 mcg, Oral, Q AM, Ulises Elias MD, 137 mcg at 04/14/24 0514    lisinopril (PRINIVIL,ZESTRIL) tablet 20 mg, 20 mg, Oral, Daily, Ulises Elias MD, 20 mg at 04/13/24 0810    magnesium hydroxide (MILK OF MAGNESIA) suspension 10 mL, 10 mL,  Oral, Daily PRN, Ulises Elias MD    montelukast (SINGULAIR) tablet 10 mg, 10 mg, Oral, Nightly, Ulises Elias MD, 10 mg at 04/13/24 2117    Morphine PF injection 2 mg, 2 mg, Intravenous, Q4H PRN **AND** naloxone (NARCAN) injection 0.4 mg, 0.4 mg, Intravenous, Q5 Min PRN, Ulises Elias MD    Morphine sulfate (PF) injection 4 mg, 4 mg, Intravenous, Q4H PRN, 4 mg at 04/14/24 0336 **AND** naloxone (NARCAN) injection 0.4 mg, 0.4 mg, Intravenous, Q5 Min PRN, Ulises Elias MD    ondansetron ODT (ZOFRAN-ODT) disintegrating tablet 4 mg, 4 mg, Oral, Q6H PRN **OR** ondansetron (ZOFRAN) injection 4 mg, 4 mg, Intravenous, Q6H PRN, Ulises Elias MD    oxyCODONE-acetaminophen (PERCOCET) 7.5-325 MG per tablet 1 tablet, 1 tablet, Oral, Q4H PRN, Ulises Elias MD, 1 tablet at 04/14/24 0514    pantoprazole (PROTONIX) EC tablet 40 mg, 40 mg, Oral, Q AM, Ulises Elias MD, 40 mg at 04/14/24 0514    promethazine (PHENERGAN) tablet 12.5 mg, 12.5 mg, Oral, Q6H PRN **OR** promethazine (PHENERGAN) suppository 12.5 mg, 12.5 mg, Rectal, Q6H PRN, Ulises Elias MD    promethazine (PHENERGAN) tablet 25 mg, 25 mg, Oral, Q8H PRN, Ulises Elias MD    rOPINIRole (REQUIP) tablet 2 mg, 2 mg, Oral, Nightly, Ulises Elias MD, 2 mg at 04/13/24 2117    sennosides-docusate (PERICOLACE) 8.6-50 MG per tablet 2 tablet, 2 tablet, Oral, Nightly PRN, Ulises Elias MD    sodium chloride 0.9 % flush 10 mL, 10 mL, Intravenous, PRN, Ulises Elias MD    sodium chloride 0.9 % flush 3 mL, 3 mL, Intravenous, Q12H, Ulises Elias MD, 3 mL at 04/13/24 2129    sodium chloride 0.9 % infusion 40 mL, 40 mL, Intravenous, PRN, Ulises Elias MD    tiotropium (SPIRIVA RESPIMAT) 2.5 mcg/act aerosol solution inhaler, 2 puff, Inhalation, Daily - RT, Ulises Elias MD, 2 puff at 04/13/24 0830    tiZANidine (ZANAFLEX) tablet 4 mg, 4 mg, Oral, Q12H PRN, Ulises Elias MD    traMADol (ULTRAM) tablet 100 mg, 100 mg, Oral, Q8H  PRN, Ulises Elias MD, 100 mg at 04/11/24 2109    venlafaxine XR (EFFEXOR-XR) 24 hr capsule 75 mg, 75 mg, Oral, Daily, Ulises Elias MD, 75 mg at 04/13/24 0810     Laboratory Results:                               Brief Urine Lab Results       None          Microbiology Results (last 10 days)       Procedure Component Value - Date/Time    MRSA Screen Culture (Outpatient) - Swab, Nares [[id number]]  (Abnormal) Collected: 04/05/24 0930    Lab Status: Final result Specimen: Swab from Nares Updated: 04/06/24 1237     MRSA Screen Cx Staphylococcus aureus, MRSA     Comment:   Methicillin resistant Staphylococcus aureus, Patient may be an isolation risk.       Narrative:      The negative predictive value of this diagnostic test is high and should only be used to consider de-escalating anti-MRSA therapy. A positive result may indicate colonization with MRSA and must be correlated clinically.             Diagnostic Imaging: I reviewed and independently interpreted the new imaging.     Assessment/Plan:  This is a 63-year-old female status post multilevel lumbar laminectomy for neurogenic claudication.  Patient tolerating being flat well.  She denies any headache.  Her dressing is dry.  Plan to keep her flat through today.  Please call with any questions.      Any copied data from previous notes included in the (1) History of Present Illness, (2) Physical Examination and (3) Medical Decision Making and/or Assessment and Plan has been reviewed and is accurate as of 04/14/24      Santosh Norman MD  04/14/24  06:06 EDT        Electronically signed by Santosh Norman MD at 04/14/24 0607       Ulises Elias MD at 04/13/24 0733          NEUROSURGERY PROGRESS NOTE     LOS: 0 days   Patient Care Team:  Betty Jasmine MD as PCP - General (Family Medicine)  Atul Tavarez MD as Consulting Physician (Gastroenterology)  Bobby Tom MD as Consulting Physician (Pulmonary Disease)  Bobby Tom MD as  Consulting Physician (Pulmonary Disease)  Delgado Lal MD as Consulting Physician (Pain Medicine)  Ulises Elias MD as Consulting Physician (Neurosurgery)  Betty Jasmine MD as Primary Care Provider (Family Medicine)  Kota Swain MD as Referring Physician (Family Medicine)    Chief Complaint: Low back and lower extremity pain with walking and standing intolerance.    POD#: 2 Days Post-Op  Procedures:  L2-5 laminectomies.    Interval History:   Patient Complaints: Mild headache.  Patient Denies: New weakness or numbness.    Vital Signs: Blood pressure 122/65, pulse 64, temperature 98.2 °F (36.8 °C), temperature source Oral, resp. rate 18, SpO2 98%, not currently breastfeeding.  Intake/Output:   Intake/Output Summary (Last 24 hours) at 4/13/2024 0734  Last data filed at 4/13/2024 0500  Gross per 24 hour   Intake 1392 ml   Output 1493 ml   Net -101 ml     Drain output: 130/163 mL.  Output is clear but blood-tinged.    Physical Exam:  Patient is alert and in good spirits.  Her dressing is dry.     Assessment/Plan:  1.  Lumbar stenosis with neurogenic claudication status post multilevel decompressive laminectomy.  2.  Chronic mechanical low back pain.  3.  History of hypertension.  4.  Disposition: Suspect that she has a small dural rent given the continued drain output and the relatively clear drainage.  Will discontinue drain and keep patient flat in bed through the weekend.      Ulises Elias MD  04/13/24  07:34 EDT      Electronically signed by Ulises Elias MD at 04/13/24 0739       Ulises Elias MD at 04/12/24 0605          NEUROSURGERY PROGRESS NOTE     LOS: 0 days   Patient Care Team:  Betty Jasmine MD as PCP - General (Family Medicine)  Atul Tavarez MD as Consulting Physician (Gastroenterology)  Bobby Tom MD as Consulting Physician (Pulmonary Disease)  Bobby Tom MD as Consulting Physician (Pulmonary Disease)  Delgado Lal MD as Consulting  Physician (Pain Medicine)  Ulises Elias MD as Consulting Physician (Neurosurgery)  Betty Jasmine MD as Primary Care Provider (Family Medicine)  Kota Swain MD as Referring Physician (Family Medicine)    Chief Complaint: Low back and lower extremity pain with walking and standing intolerance.    POD#: 1 Day Post-Op  Procedures:  L2-5 laminectomies.    Interval History:   She has ambulated to the bathroom multiple times.  Patient Complaints: Incisional pain.  She has mild headache.  Patient Denies: Weakness or numbness.    Vital Signs: Blood pressure 160/93, pulse 72, temperature 98.3 °F (36.8 °C), temperature source Oral, resp. rate 16, SpO2 96%, not currently breastfeeding.  Intake/Output:   Intake/Output Summary (Last 24 hours) at 4/12/2024 0605  Last data filed at 4/12/2024 0400  Gross per 24 hour   Intake 440 ml   Output 1810 ml   Net -1370 ml     Drain output: 150/160 mL.    Physical Exam:  Patient is awake and alert.  She follows simple commands.  Moderate heme staining is noted on her dressing.       Assessment/Plan:  1.  Lumbar stenosis with neurogenic claudication status post multilevel decompressive laminectomy.  2.  Chronic mechanical low back pain.  3.  History of hypertension.  4.  Disposition: Mobilize patient.  Observe wound drainage.      Ulises Elias MD  04/12/24  06:05 EDT      Electronically signed by Ulises Elias MD at 04/12/24 0611          Obdulia Parada, PT   Physical Therapist  Physical Therapy     Plan of Care     Signed     Date of Service: 04/14/24 0900  Creation Time: 04/14/24 0941     Signed         Goal Outcome Evaluation:  Plan of Care Reviewed With: patient  Progress: no change  Outcome Evaluation: Pt continues to present below baseline function d/t acute pain and generalized weakness. Repositioning performed in bed w/ SBA and HOB flat. Pt agreeable to supine ther ex and tolerated well. Pt able to recall 3/3 spinal precautions this date. Cont w/ IP PT POC.         Anticipated Discharge Disposition (PT): home with assist

## 2024-04-15 NOTE — PLAN OF CARE
Goal Outcome Evaluation:           Progress: improving  Outcome Evaluation: PT is AOX4, on 3LNC, pt has generalized weakness, Pain controlled with scheduled and PRn medications, DSG on midline back is clean, dry and intact with no drainage. Pt has rolled side to side with HOB flat with minimal assistance x1 through night.

## 2024-04-15 NOTE — PROGRESS NOTES
NEUROSURGERY PROGRESS NOTE     LOS: 0 days   Patient Care Team:  Betty Jasmine MD as PCP - General (Family Medicine)  Atul Tavarez MD as Consulting Physician (Gastroenterology)  Bobby Tom MD as Consulting Physician (Pulmonary Disease)  Bobby Tom MD as Consulting Physician (Pulmonary Disease)  Delgado Lal MD as Consulting Physician (Pain Medicine)  Ulises Elias MD as Consulting Physician (Neurosurgery)  Betty Jasmine MD as Primary Care Provider (Family Medicine)  Kota Swain MD as Referring Physician (Family Medicine)    Chief Complaint: Low back and lower extremity pain with walking and standing intolerance.    POD#: 4 Days Post-Op  Procedures:  L2-5 laminectomies.    Interval History:   Patient Complaints: Sore hips.  Patient Denies: Headache.    Vital Signs: Blood pressure 137/84, pulse 76, temperature 98.2 °F (36.8 °C), temperature source Oral, resp. rate 16, SpO2 99%, not currently breastfeeding.  Intake/Output:   Intake/Output Summary (Last 24 hours) at 4/15/2024 0603  Last data filed at 4/15/2024 0310  Gross per 24 hour   Intake 1140 ml   Output 2325 ml   Net -1185 ml       Physical Exam:  Patient awakens easily.  She is in good spirits.  Dry dressing is in place on her incision.     Assessment/Plan:  1.  Lumbar stenosis with neurogenic claudication status post multilevel decompressive laminectomy.  2.  Chronic mechanical low back pain.  3.  History of hypertension.  4.  Disposition: Continue bedrest today and hopefully mobilize tomorrow.      Ulises Elias MD  04/15/24  06:03 EDT

## 2024-04-15 NOTE — NURSING NOTE
Dressing has remained dry and intact throughout the shift. Oral pain medications administered, two doses IV Morphine early in the shift but patient hesitant to use any more due to its short pain relief and her desire to go home. Patient pleasant and cooperative with all care.

## 2024-04-16 PROCEDURE — 97535 SELF CARE MNGMENT TRAINING: CPT

## 2024-04-16 PROCEDURE — 94799 UNLISTED PULMONARY SVC/PX: CPT

## 2024-04-16 PROCEDURE — 25010000002 HEPARIN (PORCINE) PER 1000 UNITS: Performed by: NEUROLOGICAL SURGERY

## 2024-04-16 PROCEDURE — 99024 POSTOP FOLLOW-UP VISIT: CPT | Performed by: NEUROLOGICAL SURGERY

## 2024-04-16 PROCEDURE — 97110 THERAPEUTIC EXERCISES: CPT

## 2024-04-16 PROCEDURE — 25810000003 SODIUM CHLORIDE 0.9 % SOLUTION

## 2024-04-16 PROCEDURE — 97116 GAIT TRAINING THERAPY: CPT

## 2024-04-16 PROCEDURE — 97530 THERAPEUTIC ACTIVITIES: CPT

## 2024-04-16 PROCEDURE — 94664 DEMO&/EVAL PT USE INHALER: CPT

## 2024-04-16 RX ADMIN — Medication 3 ML: at 20:45

## 2024-04-16 RX ADMIN — SENNOSIDES AND DOCUSATE SODIUM 2 TABLET: 8.6; 5 TABLET ORAL at 20:44

## 2024-04-16 RX ADMIN — TIZANIDINE 4 MG: 4 TABLET ORAL at 03:14

## 2024-04-16 RX ADMIN — Medication 3 ML: at 09:27

## 2024-04-16 RX ADMIN — LEVOTHYROXINE SODIUM 137 MCG: 137 TABLET ORAL at 05:36

## 2024-04-16 RX ADMIN — HEPARIN SODIUM 5000 UNITS: 5000 INJECTION INTRAVENOUS; SUBCUTANEOUS at 05:36

## 2024-04-16 RX ADMIN — VENLAFAXINE HYDROCHLORIDE 75 MG: 75 CAPSULE, EXTENDED RELEASE ORAL at 09:22

## 2024-04-16 RX ADMIN — ROPINIROLE HYDROCHLORIDE 2 MG: 2 TABLET, FILM COATED ORAL at 20:44

## 2024-04-16 RX ADMIN — CELECOXIB 100 MG: 100 CAPSULE ORAL at 09:25

## 2024-04-16 RX ADMIN — TIZANIDINE 4 MG: 4 TABLET ORAL at 20:44

## 2024-04-16 RX ADMIN — OXYCODONE HYDROCHLORIDE AND ACETAMINOPHEN 1 TABLET: 7.5; 325 TABLET ORAL at 09:23

## 2024-04-16 RX ADMIN — ATORVASTATIN CALCIUM 20 MG: 20 TABLET, FILM COATED ORAL at 20:44

## 2024-04-16 RX ADMIN — HEPARIN SODIUM 5000 UNITS: 5000 INJECTION INTRAVENOUS; SUBCUTANEOUS at 20:44

## 2024-04-16 RX ADMIN — GABAPENTIN 600 MG: 300 CAPSULE ORAL at 20:44

## 2024-04-16 RX ADMIN — GABAPENTIN 600 MG: 300 CAPSULE ORAL at 13:27

## 2024-04-16 RX ADMIN — BUDESONIDE AND FORMOTEROL FUMARATE DIHYDRATE 1 PUFF: 160; 4.5 AEROSOL RESPIRATORY (INHALATION) at 09:19

## 2024-04-16 RX ADMIN — PANTOPRAZOLE SODIUM 40 MG: 40 TABLET, DELAYED RELEASE ORAL at 05:36

## 2024-04-16 RX ADMIN — MONTELUKAST 10 MG: 10 TABLET, FILM COATED ORAL at 20:44

## 2024-04-16 RX ADMIN — BUSPIRONE HYDROCHLORIDE 15 MG: 15 TABLET ORAL at 20:44

## 2024-04-16 RX ADMIN — BUDESONIDE AND FORMOTEROL FUMARATE DIHYDRATE 1 PUFF: 160; 4.5 AEROSOL RESPIRATORY (INHALATION) at 20:09

## 2024-04-16 RX ADMIN — SODIUM CHLORIDE 250 ML: 9 INJECTION, SOLUTION INTRAVENOUS at 23:34

## 2024-04-16 RX ADMIN — BUSPIRONE HYDROCHLORIDE 15 MG: 15 TABLET ORAL at 09:25

## 2024-04-16 RX ADMIN — CELECOXIB 100 MG: 100 CAPSULE ORAL at 20:44

## 2024-04-16 RX ADMIN — OXYCODONE HYDROCHLORIDE AND ACETAMINOPHEN 1 TABLET: 7.5; 325 TABLET ORAL at 18:29

## 2024-04-16 RX ADMIN — GABAPENTIN 600 MG: 300 CAPSULE ORAL at 05:36

## 2024-04-16 RX ADMIN — OXYCODONE HYDROCHLORIDE AND ACETAMINOPHEN 1 TABLET: 7.5; 325 TABLET ORAL at 03:14

## 2024-04-16 RX ADMIN — CETIRIZINE HYDROCHLORIDE 5 MG: 10 TABLET, FILM COATED ORAL at 09:23

## 2024-04-16 RX ADMIN — LISINOPRIL 20 MG: 20 TABLET ORAL at 09:22

## 2024-04-16 RX ADMIN — HEPARIN SODIUM 5000 UNITS: 5000 INJECTION INTRAVENOUS; SUBCUTANEOUS at 13:27

## 2024-04-16 RX ADMIN — OXYCODONE HYDROCHLORIDE AND ACETAMINOPHEN 1 TABLET: 7.5; 325 TABLET ORAL at 13:27

## 2024-04-16 RX ADMIN — TIOTROPIUM BROMIDE INHALATION SPRAY 2 PUFF: 3.12 SPRAY, METERED RESPIRATORY (INHALATION) at 09:19

## 2024-04-16 RX ADMIN — MAGNESIUM HYDROXIDE 10 ML: 400 SUSPENSION ORAL at 05:36

## 2024-04-16 NOTE — PLAN OF CARE
Goal Outcome Evaluation:  Plan of Care Reviewed With: patient        Progress: improving  Outcome Evaluation: Pt now off bedrest and was able to mobilize with therapy, denied headache throughout. Ambulation of 250' with CGA and RW was well tolerated. No overt LOB. HEP completed. Pt with mild nausea in all positioning, RN aware. Further IPPT is warrented. PT rec d/c home with initial 24/7 assist.      Anticipated Discharge Disposition (PT): home with 24/7 care

## 2024-04-16 NOTE — THERAPY TREATMENT NOTE
Patient Name: Yessica Galvin  : 1960    MRN: [id number]                              Today's Date: 2024       Admit Date: 2024    Visit Dx:     ICD-10-CM ICD-9-CM   1. Lumbar stenosis with neurogenic claudication  M48.062 724.03     Patient Active Problem List   Diagnosis    Asthmatic bronchitis    Generalized anxiety disorder    Gout    Headache    Acquired hypothyroidism    Chronic midline low back pain without sciatica    Cervical disc disorder with radiculopathy    Lumbar disc disease    Left radial fracture    Asthma    Tobacco abuse    Elevated transaminase level    Chronic back pain    Closed fracture of right tibial plateau    Chronic bronchitis with acute exacerbation    Centrilobular emphysema    History of tobacco use, presenting hazards to health    Nodule of upper lobe of right lung    Nocturnal leg cramps    Left hip pain    Constipation    Skin lesions    Gastroesophageal reflux disease with esophagitis without hemorrhage    Bruising    Therapeutic drug monitoring    Nausea    Allergic rhinitis    Gastroesophageal reflux disease    Medicare annual wellness visit, subsequent    Dyspnea    Anxiety and depression    Hypertension    Fatigue    Tobacco dependence    Abdominal pain, diffuse    Chest pain    Abnormal nuclear stress test    Post-menopause    Age-related osteoporosis without current pathological fracture    Cough    Restless leg syndrome    Gastroenteritis    Lumbar stenosis with neurogenic claudication    Stage II COPD    Encounter for pre-operative respiratory clearance    Status post lumbar laminectomy     Past Medical History:   Diagnosis Date    Acute bronchitis     Acute sinusitis     Allergic rhinitis 2023    Asthma     Asthma 2020    Asthma with acute exacerbation     Asthma, extrinsic ,1970    I have had it since I was 5 yrs. Old    Asthmatic bronchitis     Bronchiectasis     Always catch it    Centrilobular emphysema 2022    Chronic bronchitis  with acute exacerbation 06/02/2022    Disc degeneration, lumbar     Disc degeneration, lumbar     GERD (gastroesophageal reflux disease)     History of mammogram     Hypertension 10/05/2023    Hypothyroidism     Lower back pain     Medicare annual wellness visit, subsequent 10/05/2023    Nodule of upper lobe of right lung 06/02/2022    Plantar fasciitis     Restless legs syndrome      Past Surgical History:   Procedure Laterality Date    CARDIAC CATHETERIZATION N/A 11/17/2023    Procedure: Left Heart Cath;  Surgeon: Arben Pittman MD;  Location: [facility] CATH INVASIVE LOCATION;  Service: Cardiology;  Laterality: N/A;    CHOLECYSTECTOMY      COLONOSCOPY      HYSTERECTOMY      KNEE ARTHROSCOPY Right 03/10/2020    Procedure: KNEE ARTHROSCOPY RIGHT;  Surgeon: Guanaco Thakur MD;  Location: [facility] OR;  Service: Orthopedics;  Laterality: Right;    LUMBAR LAMINECTOMY DISCECTOMY DECOMPRESSION N/A 4/11/2024    Procedure: LUMBAR LAMINECTOMY DISCECTOMY DECOMPRESSION POSTERIOR L2-L5;  Surgeon: Ulises Elias MD;  Location: [facility] OR;  Service: Neurosurgery;  Laterality: N/A;    NECK SURGERY      ORIF WRIST FRACTURE Left 03/10/2020    Procedure: WRIST OPEN REDUCTION INTERNAL FIXATION LEFT;  Surgeon: Guanaco Thakur MD;  Location: [facility] OR;  Service: Orthopedics;  Laterality: Left;    TIBIAL PLATEAU OPEN REDUCTION INTERNAL FIXATION Right 03/10/2020    Procedure: TIBIAL PLATEAU OPEN REDUCTION INTERNAL FIXATION RIGHT;  Surgeon: Guanaco Thakur MD;  Location: [facility] OR;  Service: Orthopedics;  Laterality: Right;    UPPER GASTROINTESTINAL ENDOSCOPY        General Information       Row Name 04/16/24 1058          OT Time and Intention    Document Type therapy note (daily note) (P)   -LR     Mode of Treatment individual therapy;occupational therapy (P)   -LR       Row Name 04/16/24 6343          General Information    Patient Profile Reviewed yes (P)   -LR     Prior Level of Function min assist: (P)   -LR     Existing  Precautions/Restrictions fall;spinal (P)   -LR     Barriers to Rehab none identified (P)   -LR       Row Name 04/16/24 1058          Cognition    Orientation Status (Cognition) oriented x 4 (P)   -LR       Row Name 04/16/24 1058          Safety Issues, Functional Mobility    Safety Issues Affecting Function (Mobility) awareness of need for assistance;judgment;safety precaution awareness;insight into deficits/self-awareness (P)   -LR     Impairments Affecting Function (Mobility) balance;endurance/activity tolerance;pain;shortness of breath;strength;range of motion (ROM) (P)   -LR               User Key  (r) = Recorded By, (t) = Taken By, (c) = Cosigned By      Initials Name Provider Type    LR Shama Hernandez OT Student OT Student                     Mobility/ADL's       [facility] 04/16/24 1100          Transfers    Transfers sit-stand transfer;stand-sit transfer;toilet transfer (P)   -LR       Row Name 04/16/24 1100          Sit-Stand Transfer    Sit-Stand [place] (Transfers) contact guard;verbal cues (P)   -LR     Assistive Device (Sit-Stand Transfers) walker, front-wheeled (P)   -LR       [facility] Name 04/16/24 1100          Stand-Sit Transfer    Stand-Sit [place] (Transfers) contact guard;verbal cues (P)   -LR     Assistive Device (Stand-Sit Transfers) walker, front-wheeled (P)   -LR       Row Name 04/16/24 1100          Toilet Transfer    Type (Toilet Transfer) sit-stand;stand-sit (P)   -LR     [place] Level (Toilet Transfer) verbal cues;contact guard (P)   -LR     Assistive Device (Toilet Transfer) walker, front-wheeled (P)   -LR       Row Name 04/16/24 1100          Activities of Daily Living    BADL Assessment/Intervention toileting;grooming;upper body dressing (P)   -LR       Row Name 04/16/24 1100          Mobility    Extremity Weight-bearing Status other (see comments) (P)   spinal precautions  -LR       Row Name 04/16/24 1100          Toileting Assessment/Training    [place] Level (Toileting)  perform perineal hygiene;minimum assist (75% patient effort) (P)   -LR     Assistive Devices (Toileting) grab bar/safety frame;toilet paper aid;raised toilet seat (P)   -LR     Position (Toileting) unsupported sitting;supported standing (P)   -LR       Row Name 04/16/24 1100          Grooming Assessment/Training    [place] Level (Grooming) wash face, hands;contact guard assist (P)   -LR     Position (Grooming) unsupported standing (P)   -LR     Comment, (Grooming) Pt washed hands at sink (P)   -LR       Row Name 04/16/24 1100          Upper Body Dressing Assessment/Training    [place] Level (Upper Body Dressing) doff;don;front opening garment;contact guard assist (P)   -LR     Position (Upper Body Dressing) supported sitting (P)   -LR               User Key  (r) = Recorded By, (t) = Taken By, (c) = Cosigned By      Initials Name Provider Type    Shama Noriega, OT Student OT Student                   Obj/Interventions       Row Name 04/16/24 1112          Balance    Balance Assessment sitting static balance;sitting dynamic balance;sit to stand dynamic balance;standing static balance;standing dynamic balance (P)   -LR     Static Sitting Balance independent (P)   -LR     Dynamic Sitting Balance standby assist (P)   -LR     Position, Sitting Balance sitting in chair (P)   -LR     Sit to Stand Dynamic Balance contact guard (P)   -LR     Static Standing Balance contact guard (P)   -LR     Dynamic Standing Balance contact guard (P)   -LR     Position/Device Used, Standing Balance walker, front-wheeled (P)   -LR               User Key  (r) = Recorded By, (t) = Taken By, (c) = Cosigned By      Initials Name Provider Type    Shama Noriega, OT Student OT Student                   Goals/Plan    No documentation.                  Clinical Impression       Row Name 04/16/24 1115          Pain Assessment    Pretreatment Pain Rating 3/10 (P)   -LR     Posttreatment Pain Rating 3/10 (P)   -LR     Pain Location lower  (P)   -LR     Pain Location - back (P)   -LR     Pain Intervention(s) Repositioned;Ambulation/increased activity (P)   -LR       Row Name 04/16/24 1115          Plan of Care Review    Plan of Care Reviewed With patient (P)   -LR     Progress improving (P)   -LR     Outcome Evaluation Pt req'd CGA for transfers. Pt performed toileting hygeine with toilet paper aid to comply with spinal pecautions with min assist. Grooming task at sink with CGA. Pt presents with spinal precautions, fall risk, and need for safety awareness. Recommend discharge home with home health and initial 24/7 family assist. (P)   -LR       Row Name 04/16/24 1115          Therapy Assessment/Plan (OT)    Criteria for Skilled Therapeutic Interventions Met (OT) yes;meets criteria (P)   -LR     Therapy Frequency (OT) daily (P)   -LR       Row Name 04/16/24 1115          Therapy Plan Review/Discharge Plan (OT)    Anticipated Discharge Disposition (OT) home with home health (P)   -LR       Row Name 04/16/24 1115          Vital Signs    Pre SpO2 (%) 93 (P)   -LR     O2 Delivery Pre Treatment room air (P)   -LR     O2 Delivery Intra Treatment room air (P)   -LR     Post SpO2 (%) 95 (P)   -LR     O2 Delivery Post Treatment room air (P)   -LR     Pre Patient Position Sitting (P)   -LR     Intra Patient Position Standing (P)   -LR     Post Patient Position Sitting (P)   -LR       Row Name 04/16/24 1115          Positioning and Restraints    Pre-Treatment Position sitting in chair/recliner (P)   -LR     Post Treatment Position chair (P)   -LR     In Chair notified nsg;sitting;call light within reach;encouraged to call for assist;exit alarm on;patient within staff view;with nsg (P)   -LR               User Key  (r) = Recorded By, (t) = Taken By, (c) = Cosigned By      Initials Name Provider Type    LR Shama Hernandez, OT Student OT Student                   Outcome Measures       Row Name 04/16/24 1132          How much help from another is currently needed...     Putting on and taking off regular lower body clothing? 3 (P)   -LR     Bathing (including washing, rinsing, and drying) 3 (P)   -LR     Toileting (which includes using toilet bed pan or urinal) 3 (P)   -LR     Putting on and taking off regular upper body clothing 3 (P)   -LR     Taking care of personal grooming (such as brushing teeth) 3 (P)   -LR     Eating meals 3 (P)   -LR     AM-PAC 6 Clicks Score (OT) 18 (P)   -LR       Row Name 04/16/24 0911          How much help from another person do you currently need...    Turning from your back to your side while in flat bed without using bedrails? 3  -AB     Moving from lying on back to sitting on the side of a flat bed without bedrails? 3  -AB     Moving to and from a bed to a chair (including a wheelchair)? 3  -AB     Standing up from a chair using your arms (e.g., wheelchair, bedside chair)? 3  -AB     Climbing 3-5 steps with a railing? 3  -AB     To walk in hospital room? 3  -AB     AM-PAC 6 Clicks Score (PT) 18  -AB     Highest Level of Mobility Goal 6 --> Walk 10 steps or more  -AB       Row Name 04/16/24 1132 04/16/24 0911       Functional Assessment    Outcome Measure Options AM-PAC 6 Clicks Daily Activity (OT) (P)   -LR AM-PAC 6 Clicks Basic Mobility (PT)  -AB              User Key  (r) = Recorded By, (t) = Taken By, (c) = Cosigned By      Initials Name Provider Type    AB Gisele Henderson, PT Physical Therapist    LR Shama Hernandez, OT Student OT Student                    Occupational Therapy Education       Title: PT OT SLP Therapies (In Progress)       Topic: Occupational Therapy (In Progress)       Point: ADL training (In Progress)       Description:   Instruct learner(s) on proper safety adaptation and remediation techniques during self care or transfers.   Instruct in proper use of assistive devices.                  Learning Progress Summary             Patient Acceptance, E, NR by MARIA FERNANDA at 4/12/2024 1012                         Point: Home exercise  program (Not Started)       Description:   Instruct learner(s) on appropriate technique for monitoring, assisting and/or progressing therapeutic exercises/activities.                  Learner Progress:  Not documented in this visit.              Point: Precautions (In Progress)       Description:   Instruct learner(s) on prescribed precautions during self-care and functional transfers.                  Learning Progress Summary             Patient Acceptance, E, NR by  at 4/12/2024 1012                         Point: Body mechanics (In Progress)       Description:   Instruct learner(s) on proper positioning and spine alignment during self-care, functional mobility activities and/or exercises.                  Learning Progress Summary             Patient Acceptance, E, NR by  at 4/12/2024 1012                                         User Key       Initials Effective Dates Name Provider Type Discipline     06/16/21 -  Louisa Castaneda, OT Occupational Therapist OT                  OT Recommendation and Plan  Therapy Frequency (OT): (P) daily  Plan of Care Review  Plan of Care Reviewed With: (P) patient  Progress: (P) improving  Outcome Evaluation: (P) Pt req'd CGA for transfers. Pt performed toileting hygeine with toilet paper aid to comply with spinal pecautions with min assist. Grooming task at sink with CGA. Pt presents with spinal precautions, fall risk, and need for safety awareness. Recommend discharge home with home health and initial 24/7 family assist.     Time Calculation:   Evaluation Complexity (OT)  Review Occupational Profile/Medical/Therapy History Complexity: (P) brief/low complexity  Assessment, Occupational Performance/Identification of Deficit Complexity: (P) 1-3 performance deficits  Clinical Decision Making Complexity (OT): (P) problem focused assessment/low complexity  Overall Complexity of Evaluation (OT): (P) low complexity     Time Calculation- OT       Row Name 04/16/24 1134 04/16/24  0911          Time Calculation- OT    OT Start Time 0956 (P)   -LR --     OT Received On 04/16/24 (P)   -LR --     OT Goal Re-Cert Due Date 04/26/24 (P)   -LR --        Timed Charges    09742 - Gait Training Minutes  -- 10  -AB     17579 - OT Self Care/Mgmt Minutes 41 (P)   -LR --        Total Minutes    Timed Charges Total Minutes 41 (P)   -LR 10  -AB      Total Minutes 41 (P)   -LR 10  -AB               User Key  (r) = Recorded By, (t) = Taken By, (c) = Cosigned By      Initials Name Provider Type    AB Gisele Henderson, PT Physical Therapist    LR Shama Hernandez, OT Student OT Student                  Therapy Charges for Today       Code Description Service Date Service Provider Modifiers Qty    [id number] HC OT SELF CARE/MGMT/TRAIN EA 15 MIN 4/16/2024 Shama Hernandez, OT Student GO 3                 Shama Hernandez OT Student  4/16/2024

## 2024-04-16 NOTE — PLAN OF CARE
Goal Outcome Evaluation:              Outcome Evaluation: (P) Pt req'd CGA for transfers. Pt performed toileting hygeine with toilet paper aid to comply with spinal pecautions with min assist. Grooming task at sink with CGA. Pt presents with spinal precautions, fall risk, and need for safety awareness. Recommend discharge home with home health and initial 24/7 family assist.      Anticipated Discharge Disposition (OT): (P) home with home health

## 2024-04-16 NOTE — PROGRESS NOTES
NEUROSURGERY PROGRESS NOTE     LOS: 1 day   Patient Care Team:  Betty Jasmine MD as PCP - General (Family Medicine)  Atul Tavarez MD as Consulting Physician (Gastroenterology)  Bobby Tom MD as Consulting Physician (Pulmonary Disease)  Bobby Tom MD as Consulting Physician (Pulmonary Disease)  Delgado Lal MD as Consulting Physician (Pain Medicine)  Ulises Elias MD as Consulting Physician (Neurosurgery)  Betty Jasmine MD as Primary Care Provider (Family Medicine)  Kota Swain MD as Referring Physician (Family Medicine)    Chief Complaint: Low back and lower extremity pain with walking and standing intolerance.    POD#: 5 Days Post-Op  Procedures:  L2-5 laminectomies.    Interval History:   Patient Complaints: None.  Patient Denies: Headache.    Vital Signs: Blood pressure 151/93, pulse 79, temperature 98.5 °F (36.9 °C), temperature source Oral, resp. rate 17, SpO2 97%, not currently breastfeeding.  Intake/Output:   Intake/Output Summary (Last 24 hours) at 4/16/2024 0557  Last data filed at 4/16/2024 0500  Gross per 24 hour   Intake 1360 ml   Output 1950 ml   Net -590 ml       Physical Exam:  The patient awakens and is appropriate.  Dry dressing is in place on her incision.     Assessment/Plan:  1.  Lumbar stenosis with neurogenic claudication status post multilevel decompressive laminectomy.  2.  Chronic mechanical low back pain.  3.  History of hypertension.  4.  Disposition: Mobilize patient.  Hopefully home soon.      Ulises Elias MD  04/16/24  05:57 EDT

## 2024-04-16 NOTE — THERAPY TREATMENT NOTE
Patient Name: Yessica Galvin  : 1960    MRN: [id number]                              Today's Date: 2024       Admit Date: 2024    Visit Dx:     ICD-10-CM ICD-9-CM   1. Lumbar stenosis with neurogenic claudication  M48.062 724.03     Patient Active Problem List   Diagnosis    Asthmatic bronchitis    Generalized anxiety disorder    Gout    Headache    Acquired hypothyroidism    Chronic midline low back pain without sciatica    Cervical disc disorder with radiculopathy    Lumbar disc disease    Left radial fracture    Asthma    Tobacco abuse    Elevated transaminase level    Chronic back pain    Closed fracture of right tibial plateau    Chronic bronchitis with acute exacerbation    Centrilobular emphysema    History of tobacco use, presenting hazards to health    Nodule of upper lobe of right lung    Nocturnal leg cramps    Left hip pain    Constipation    Skin lesions    Gastroesophageal reflux disease with esophagitis without hemorrhage    Bruising    Therapeutic drug monitoring    Nausea    Allergic rhinitis    Gastroesophageal reflux disease    Medicare annual wellness visit, subsequent    Dyspnea    Anxiety and depression    Hypertension    Fatigue    Tobacco dependence    Abdominal pain, diffuse    Chest pain    Abnormal nuclear stress test    Post-menopause    Age-related osteoporosis without current pathological fracture    Cough    Restless leg syndrome    Gastroenteritis    Lumbar stenosis with neurogenic claudication    Stage II COPD    Encounter for pre-operative respiratory clearance    Status post lumbar laminectomy     Past Medical History:   Diagnosis Date    Acute bronchitis     Acute sinusitis     Allergic rhinitis 2023    Asthma     Asthma 2020    Asthma with acute exacerbation     Asthma, extrinsic ,1970    I have had it since I was 5 yrs. Old    Asthmatic bronchitis     Bronchiectasis     Always catch it    Centrilobular emphysema 2022    Chronic bronchitis  with acute exacerbation 06/02/2022    Disc degeneration, lumbar     Disc degeneration, lumbar     GERD (gastroesophageal reflux disease)     History of mammogram     Hypertension 10/05/2023    Hypothyroidism     Lower back pain     Medicare annual wellness visit, subsequent 10/05/2023    Nodule of upper lobe of right lung 06/02/2022    Plantar fasciitis     Restless legs syndrome      Past Surgical History:   Procedure Laterality Date    CARDIAC CATHETERIZATION N/A 11/17/2023    Procedure: Left Heart Cath;  Surgeon: Arben Pittman MD;  Location:  [facility];  Service: Cardiology;  Laterality: N/A;    CHOLECYSTECTOMY      COLONOSCOPY      HYSTERECTOMY      KNEE ARTHROSCOPY Right 03/10/2020    Procedure: KNEE ARTHROSCOPY RIGHT;  Surgeon: Guanaco Thakur MD;  Location: [facility];  Service: Orthopedics;  Laterality: Right;    LUMBAR LAMINECTOMY DISCECTOMY DECOMPRESSION N/A 4/11/2024    Procedure: LUMBAR LAMINECTOMY DISCECTOMY DECOMPRESSION POSTERIOR L2-L5;  Surgeon: Ulises Elias MD;  Location: [facility];  Service: Neurosurgery;  Laterality: N/A;    NECK SURGERY      ORIF WRIST FRACTURE Left 03/10/2020    Procedure: WRIST OPEN REDUCTION INTERNAL FIXATION LEFT;  Surgeon: Guanaco Thakur MD;  Location: [facility];  Service: Orthopedics;  Laterality: Left;    TIBIAL PLATEAU OPEN REDUCTION INTERNAL FIXATION Right 03/10/2020    Procedure: TIBIAL PLATEAU OPEN REDUCTION INTERNAL FIXATION RIGHT;  Surgeon: Guanaco Thakur MD;  Location: [facility];  Service: Orthopedics;  Laterality: Right;    UPPER GASTROINTESTINAL ENDOSCOPY        General Information       Row Name 04/16/24 0902          Physical Therapy Time and Intention    Document Type therapy note (daily note)  -AB     Mode of Treatment physical therapy  -AB       Row Name 04/16/24 0902          General Information    Patient Profile Reviewed yes  -AB     Existing Precautions/Restrictions fall;spinal  -AB     Barriers to Rehab medically  complex  -AB       Row Name 04/16/24 0902          Cognition    Orientation Status (Cognition) oriented x 4  -AB       Row Name 04/16/24 0902          Safety Issues, Functional Mobility    Safety Issues Affecting Function (Mobility) awareness of need for assistance;insight into deficits/self-awareness;safety precaution awareness;safety precautions follow-through/compliance  -AB     Impairments Affecting Function (Mobility) balance;endurance/activity tolerance;pain;postural/trunk control;range of motion (ROM);strength  -AB               User Key  (r) = Recorded By, (t) = Taken By, (c) = Cosigned By      Initials Name Provider Type    AB Gisele Henderson, PT Physical Therapist                   Mobility       Row Name 04/16/24 0902          Bed Mobility    Bed Mobility sidelying-sit  -AB     Sidelying-Sit Rich (Bed Mobility) contact guard;1 person assist;verbal cues  -AB     Assistive Device (Bed Mobility) bed rails  -AB     Comment, (Bed Mobility) Pt received lying on R side. Logroll used to sit EOB. Pt with mild dizziness reported, improved with time.  -AB       Row Name 04/16/24 0902          Transfers    Comment, (Transfers) Denied headache throughout. Mild nausea reported in all positioning, RN aware. Pt able to recite 3/3 spinal precautions.  -AB       Row Name 04/16/24 0902          Sit-Stand Transfer    Sit-Stand Rich (Transfers) contact guard;verbal cues;1 person assist  -AB     Assistive Device (Sit-Stand Transfers) walker, front-wheeled  -AB     Comment, (Sit-Stand Transfer) Cues to push up from bed  -AB       Row Name 04/16/24 0902          Gait/Stairs (Locomotion)    Rich Level (Gait) contact guard;verbal cues;1 person assist  -AB     Assistive Device (Gait) walker, front-wheeled  -AB     Distance in Feet (Gait) 250  -AB     Deviations/Abnormal Patterns (Gait) bilateral deviations;james decreased;gait speed decreased;stride length decreased  -AB     Bilateral Gait Deviations  forward flexed posture;heel strike decreased  -AB     Comment, (Gait/Stairs) Pt ambulated with step through gait pattern, slowed james, and forward flexed posture. Cues for upright posture, keeping walker closer to body, and improved heel strike. No overt LOB or knee buckling. Denied headache or dizziness. Further activity limited by fatigue and pain.  -AB               User Key  (r) = Recorded By, (t) = Taken By, (c) = Cosigned By      Initials Name Provider Type    AB Gisele Henderson PT Physical Therapist                   Obj/Interventions       Row Name 04/16/24 0907          Motor Skills    Therapeutic Exercise hip;knee;ankle  abdominal sets x10  -AB       Row Name 04/16/24 0907          Hip (Therapeutic Exercise)    Hip AROM (Therapeutic Exercise) bilateral;external rotation;internal rotation;10 repetitions  -AB     Hip Isometrics (Therapeutic Exercise) bilateral;gluteal sets;10 repetitions  -AB       Row Name 04/16/24 0907          Knee (Therapeutic Exercise)    Knee Isometrics (Therapeutic Exercise) bilateral;quad sets;10 repetitions  -AB       Row Name 04/16/24 0907          Ankle (Therapeutic Exercise)    Ankle AROM (Therapeutic Exercise) bilateral;dorsiflexion;plantarflexion;10 repetitions  -AB       Row Name 04/16/24 0907          Balance    Balance Assessment sitting static balance;sitting dynamic balance;standing static balance;standing dynamic balance  -AB     Static Sitting Balance contact guard  -AB     Dynamic Sitting Balance contact guard  -AB     Position, Sitting Balance unsupported;sitting edge of bed  -AB     Static Standing Balance contact guard  -AB     Dynamic Standing Balance contact guard;1-person assist;verbal cues  -AB     Position/Device Used, Standing Balance supported;walker, front-wheeled  -AB     Balance Interventions sitting;standing;sit to stand;supported;static;dynamic;occupation based/functional task  -AB     Comment, Balance No overt LOB or knee buckling.  -AB                User Key  (r) = Recorded By, (t) = Taken By, (c) = Cosigned By      Initials Name Provider Type    AB Gisele Henderson, PT Physical Therapist                   Goals/Plan    No documentation.                  Clinical Impression       Row Name 04/16/24 0908          Pain    Pretreatment Pain Rating 7/10  -AB     Posttreatment Pain Rating 4/10  -AB     Pain Location incisional  -AB     Pain Location - back  -AB     Pre/Posttreatment Pain Comment tolerated.  -AB     Pain Intervention(s) Ambulation/increased activity;Repositioned;Nursing Notified  -AB     Additional Documentation Pain Scale: Numbers Pre/Post-Treatment (Group)  -AB       Row Name 04/16/24 0908          Plan of Care Review    Plan of Care Reviewed With patient  -AB     Progress improving  -AB     Outcome Evaluation Pt now off bedrest and was able to mobilize with therapy, denied headache throughout. Ambulation of 250' with CGA and RW was well tolerated. No overt LOB. HEP completed. Pt with mild nausea in all positioning, RN aware. Further IPPT is warrented. PT rec d/c home with initial 24/7 assist.  -AB       Row Name 04/16/24 0908          Vital Signs    Pre Systolic BP Rehab 123  -AB     Pre Treatment Diastolic BP 71  -AB     Post Systolic BP Rehab 118  -AB     Post Treatment Diastolic BP 78  -AB     O2 Delivery Pre Treatment room air  -AB     O2 Delivery Intra Treatment room air  -AB     O2 Delivery Post Treatment room air  -AB     Pre Patient Position Side Lying  -AB     Intra Patient Position Standing  -AB     Post Patient Position Sitting  -AB       Row Name 04/16/24 0908          Positioning and Restraints    Pre-Treatment Position in bed  -AB     Post Treatment Position chair  -AB     In Chair notified nsg;reclined;sitting;call light within reach;encouraged to call for assist;exit alarm on;legs elevated;waffle cushion;compression device  chair in reclined position  -AB               User Key  (r) = Recorded By, (t) = Taken By, (c) = Cosigned By       Initials Name Provider Type    Gisele Toribio, PT Physical Therapist                   Outcome Measures       Row Name 04/16/24 0911          How much help from another person do you currently need...    Turning from your back to your side while in flat bed without using bedrails? 3  -AB     Moving from lying on back to sitting on the side of a flat bed without bedrails? 3  -AB     Moving to and from a bed to a chair (including a wheelchair)? 3  -AB     Standing up from a chair using your arms (e.g., wheelchair, bedside chair)? 3  -AB     Climbing 3-5 steps with a railing? 3  -AB     To walk in hospital room? 3  -AB     AM-PAC 6 Clicks Score (PT) 18  -AB     Highest Level of Mobility Goal 6 --> Walk 10 steps or more  -AB       Row Name 04/16/24 0911          Functional Assessment    Outcome Measure Options AM-PAC 6 Clicks Basic Mobility (PT)  -AB               User Key  (r) = Recorded By, (t) = Taken By, (c) = Cosigned By      Initials Name Provider Type    Gisele Toribio PT Physical Therapist                                 Physical Therapy Education       Title: PT OT SLP Therapies (In Progress)       Topic: Physical Therapy (Done)       Point: Mobility training (Done)       Learning Progress Summary             Patient Acceptance, E,D, VU,NR by AB at 4/16/2024 0911    Eager, E,H, VU,DU,NR by  at 4/12/2024 1019    Comment: Educated pt. safety/technique w/transfers, ambulation, spinal precautions, PT POC   Family Eager, E,H, VU,DU,NR by  at 4/12/2024 1019    Comment: Educated pt. safety/technique w/transfers, ambulation, spinal precautions, PT POC                         Point: Home exercise program (Done)       Learning Progress Summary             Patient Acceptance, E,D, VU,NR by AB at 4/16/2024 0911    Acceptance, E, VU,DU,NR by  at 4/14/2024 0941    Acceptance, E, VU,DU,NR by  at 4/13/2024 1101    Comment: reviewed HEP and spinal precautions    Eager, E,H, VU,DU,NR by  at 4/12/2024 1019     Comment: Educated pt. safety/technique w/transfers, ambulation, spinal precautions, PT POC   Family Acceptance, E, VU,DU,NR by  at 4/13/2024 1101    Comment: reviewed HEP and spinal precautions    Eager, E,H, VU,DU,NR by  at 4/12/2024 1019    Comment: Educated pt. safety/technique w/transfers, ambulation, spinal precautions, PT POC                         Point: Body mechanics (Done)       Learning Progress Summary             Patient Acceptance, E,D, VU,NR by AB at 4/16/2024 0911    Acceptance, E, VU,DU,NR by  at 4/14/2024 0941    Acceptance, E, VU,DU,NR by  at 4/13/2024 1101    Comment: reviewed HEP and spinal precautions    Eager, E,H, VU,DU,NR by  at 4/12/2024 1019    Comment: Educated pt. safety/technique w/transfers, ambulation, spinal precautions, PT POC   Family Acceptance, E, VU,DU,NR by  at 4/13/2024 1101    Comment: reviewed HEP and spinal precautions    Eager, E,H, VU,DU,NR by  at 4/12/2024 1019    Comment: Educated pt. safety/technique w/transfers, ambulation, spinal precautions, PT POC                         Point: Precautions (Done)       Learning Progress Summary             Patient Acceptance, E,D, VU,NR by AB at 4/16/2024 0911    Acceptance, E, VU,DU,NR by  at 4/14/2024 0941    Acceptance, E, VU,DU,NR by  at 4/13/2024 1101    Comment: reviewed HEP and spinal precautions    Eager, E,H, VU,DU,NR by  at 4/12/2024 1019    Comment: Educated pt. safety/technique w/transfers, ambulation, spinal precautions, PT POC   Family Acceptance, E, VU,DU,NR by  at 4/13/2024 1101    Comment: reviewed HEP and spinal precautions    Eager, E,H, VU,DU,NR by  at 4/12/2024 1019    Comment: Educated pt. safety/technique w/transfers, ambulation, spinal precautions, PT POC                                         User Key       Initials Effective Dates Name Provider Type Discipline     06/01/21 -  Lexie Tinajero, PT Physical Therapist PT    AB 09/22/22 -  Gisele Henderson, PT Physical Therapist PT      09/21/23 -  Obdulia Parada, PT Physical Therapist PT                  PT Recommendation and Plan     Plan of Care Reviewed With: patient  Progress: improving  Outcome Evaluation: Pt now off bedrest and was able to mobilize with therapy, denied headache throughout. Ambulation of 250' with CGA and RW was well tolerated. No overt LOB. HEP completed. Pt with mild nausea in all positioning, RN aware. Further IPPT is warrented. PT rec d/c home with initial 24/7 assist.     Time Calculation:         PT Charges       Row Name 04/16/24 0911             Time Calculation    Start Time 0810  -AB      PT Received On 04/16/24  -AB         Timed Charges    71835 - PT Therapeutic Exercise Minutes 15  -AB      05763 - Gait Training Minutes  10  -AB      02559 - PT Therapeutic Activity Minutes 15  -AB         Total Minutes    Timed Charges Total Minutes 40  -AB       Total Minutes 40  -AB                User Key  (r) = Recorded By, (t) = Taken By, (c) = Cosigned By      Initials Name Provider Type    AB Gisele Henderson, PT Physical Therapist                  Therapy Charges for Today       Code Description Service Date Service Provider Modifiers Qty    49697503124 HC PT THER PROC EA 15 MIN 4/16/2024 Gisele Henderson, PT GP 1    04715385966 HC GAIT TRAINING EA 15 MIN 4/16/2024 Gisele Henderson, PT GP 1    08963956530 HC PT THERAPEUTIC ACT EA 15 MIN 4/16/2024 Gisele Henderson, PT GP 1            PT G-Codes  Outcome Measure Options: AM-PAC 6 Clicks Basic Mobility (PT)  AM-PAC 6 Clicks Score (PT): 18  AM-PAC 6 Clicks Score (OT): 20  PT Discharge Summary  Anticipated Discharge Disposition (PT): home with 24/7 care    Gisele Henderson PT  4/16/2024

## 2024-04-17 ENCOUNTER — READMISSION MANAGEMENT (OUTPATIENT)
Dept: CALL CENTER | Facility: HOSPITAL | Age: 64
End: 2024-04-17
Payer: MEDICARE

## 2024-04-17 VITALS
SYSTOLIC BLOOD PRESSURE: 109 MMHG | DIASTOLIC BLOOD PRESSURE: 62 MMHG | TEMPERATURE: 98.1 F | OXYGEN SATURATION: 93 % | RESPIRATION RATE: 16 BRPM | HEART RATE: 64 BPM

## 2024-04-17 PROCEDURE — 99024 POSTOP FOLLOW-UP VISIT: CPT

## 2024-04-17 PROCEDURE — 94799 UNLISTED PULMONARY SVC/PX: CPT

## 2024-04-17 PROCEDURE — 99024 POSTOP FOLLOW-UP VISIT: CPT | Performed by: NEUROLOGICAL SURGERY

## 2024-04-17 PROCEDURE — 25010000002 HEPARIN (PORCINE) PER 1000 UNITS: Performed by: NEUROLOGICAL SURGERY

## 2024-04-17 PROCEDURE — 97116 GAIT TRAINING THERAPY: CPT

## 2024-04-17 PROCEDURE — 97110 THERAPEUTIC EXERCISES: CPT

## 2024-04-17 RX ORDER — OXYCODONE HYDROCHLORIDE AND ACETAMINOPHEN 5; 325 MG/1; MG/1
1 TABLET ORAL 3 TIMES DAILY PRN
Qty: 12 TABLET | Refills: 0 | Status: SHIPPED | OUTPATIENT
Start: 2024-04-17 | End: 2024-04-29

## 2024-04-17 RX ADMIN — LEVOTHYROXINE SODIUM 137 MCG: 137 TABLET ORAL at 05:15

## 2024-04-17 RX ADMIN — TIOTROPIUM BROMIDE INHALATION SPRAY 2 PUFF: 3.12 SPRAY, METERED RESPIRATORY (INHALATION) at 08:14

## 2024-04-17 RX ADMIN — LISINOPRIL 20 MG: 20 TABLET ORAL at 08:36

## 2024-04-17 RX ADMIN — CETIRIZINE HYDROCHLORIDE 5 MG: 10 TABLET, FILM COATED ORAL at 08:36

## 2024-04-17 RX ADMIN — OXYCODONE HYDROCHLORIDE AND ACETAMINOPHEN 1 TABLET: 7.5; 325 TABLET ORAL at 05:15

## 2024-04-17 RX ADMIN — HEPARIN SODIUM 5000 UNITS: 5000 INJECTION INTRAVENOUS; SUBCUTANEOUS at 05:14

## 2024-04-17 RX ADMIN — CELECOXIB 100 MG: 100 CAPSULE ORAL at 08:35

## 2024-04-17 RX ADMIN — OXYCODONE HYDROCHLORIDE AND ACETAMINOPHEN 1 TABLET: 7.5; 325 TABLET ORAL at 10:53

## 2024-04-17 RX ADMIN — GABAPENTIN 600 MG: 300 CAPSULE ORAL at 05:15

## 2024-04-17 RX ADMIN — VENLAFAXINE HYDROCHLORIDE 75 MG: 75 CAPSULE, EXTENDED RELEASE ORAL at 08:36

## 2024-04-17 RX ADMIN — BUDESONIDE AND FORMOTEROL FUMARATE DIHYDRATE 1 PUFF: 160; 4.5 AEROSOL RESPIRATORY (INHALATION) at 08:14

## 2024-04-17 RX ADMIN — TRAMADOL HYDROCHLORIDE 100 MG: 50 TABLET ORAL at 08:35

## 2024-04-17 RX ADMIN — Medication 3 ML: at 08:38

## 2024-04-17 RX ADMIN — PANTOPRAZOLE SODIUM 40 MG: 40 TABLET, DELAYED RELEASE ORAL at 05:15

## 2024-04-17 RX ADMIN — BUSPIRONE HYDROCHLORIDE 15 MG: 15 TABLET ORAL at 08:35

## 2024-04-17 NOTE — PLAN OF CARE
Goal Outcome Evaluation:         Patient discharged home with spouse. Dressing C/D/I. Walker and medication delivered to patient in room

## 2024-04-17 NOTE — PLAN OF CARE
Goal Outcome Evaluation:  Plan of Care Reviewed With: patient        Progress: improving  Outcome Evaluation: Pt with good effort and demonstrated good stability while navigating 1 step with CGA and RW. Ambulation of 180' with CGA and RW was well tolerated. Able to maintain spinal precautions throughout. Pt continues to present below her functional baseline with weakness, impaired endurance, and acute pain. PT rec d/c home with initial 24/7 assist when medically appropriate.      Anticipated Discharge Disposition (PT): home with 24/7 care

## 2024-04-17 NOTE — OUTREACH NOTE
Prep Survey      Flowsheet Row Responses   [facility] patient discharged from? [facility]   Is LACE score < 7 ? No   Eligibility [facility]   Date of Admission 04/11/24   Date of Discharge 04/17/24   Discharge Disposition Home or Self Care   Discharge diagnosis Lumbar stenosis with neurogenic claudication, LUMBAR LAMINECTOMY DISCECTOMY DECOMPRESSION POSTERIOR L2-L5   Does the patient have one of the following disease processes/diagnoses(primary or secondary)? General Surgery   Does the patient have Home health ordered? No   Is there a DME ordered? Yes   What DME was ordered[facility]   Prep survey completed? Yes            Holly KU - Registered Nurse

## 2024-04-17 NOTE — PAYOR COMM NOTE
"Kae Suarez RN  Utilization Management  P:[phone]  F:[phone]    Ref # [id number]   DC date 4/17/24  No DC summary available    Jose Galvin (63 y.o. Female)       Date of Birth   1960    Social Security Number       Address   [address] [place] [address]    Home Phone   [phone]    MRN   [id number]       [facility]   [facility]    Marital Status                               Admission Date   4/11/24    Admission Type   Elective    Admitting Provider   Ulises Elias MD    Attending Provider       Department, Room/Bed   [address], [address]/1       Discharge Date   4/17/2024    Discharge Disposition   Home or Self Care    Discharge Destination                                 Attending Provider: (none)   Allergies: Codeine, Shrimp    Isolation: None   Infection: MRSA (04/06/24)   Code Status: CPR    Ht: 157.5 cm (62\")   Wt: 74.9 kg (165 lb 2 oz)    Admission Cmt: None   Principal Problem: Lumbar stenosis with neurogenic claudication [M48.062]                   Active Insurance as of 4/11/2024       Primary Coverage       Payor Plan Insurance Group Employer/Plan Group    ANTHEM MEDICARE REPLACEMENT ANTHEM MEDICARE ADVANTAGE [id number]       Payor Plan Address Payor Plan Phone Number Payor Plan Fax Number Effective Dates     [id number] [phone]  7/1/2023 - None Entered    [facility] [id number]         Subscriber Name Subscriber Birth Date Member ID       JOSE GALVIN 1960 [id number]                     Emergency Contacts        (Rel.) Home Phone Work Phone Mobile Phone    Santiago Galvin (Spouse) [phone] -- [phone]                Ulises Elias MD   Physician  Neurosurgery     Progress Notes     Signed     Date of Service: 04/17/24 0605  Creation Time: 04/17/24 0605     Signed         NEUROSURGERY PROGRESS NOTE      LOS: 2 days   Patient Care Team:  Betty Jasmine MD as PCP - General (Family Medicine)  Daysi, " Atul FANG MD as Consulting Physician (Gastroenterology)  Bobby Tom MD as Consulting Physician (Pulmonary Disease)  Bobby Tom MD as Consulting Physician (Pulmonary Disease)  Delgado Lal MD as Consulting Physician (Pain Medicine)  Ulises Elias MD as Consulting Physician (Neurosurgery)  Betty Jasmine MD as Primary Care Provider (Family Medicine)  Kota Swain MD as Referring Physician (Family Medicine)     Chief Complaint: Low back and lower extremity pain with walking and standing intolerance.     POD#: 6 Days Post-Op  Procedures:  L2-5 laminectomies.     Interval History:   Patient ambulated in the nix yesterday.  Patient Complaints: Mild incisional pain.  Patient Denies: Headache.     Vital Signs: Blood pressure 115/60, pulse 69, temperature 98.4 °F (36.9 °C), temperature source Oral, resp. rate 18, SpO2 90%, not currently breastfeeding.  Intake/Output:   Intake/Output Summary (Last 24 hours) at 4/17/2024 0605  Last data filed at 4/16/2024 2334      Gross per 24 hour   Intake 1090 ml   Output --   Net 1090 ml         Physical Exam:  The patient awakens easily.  She is in good spirits.  Dry dressing is in place on her incision.                Assessment/Plan:  1.  Lumbar stenosis with neurogenic claudication status post multilevel decompressive laminectomy.  2.  Chronic mechanical low back pain.  3.  History of hypertension.  4.  Disposition: Home today.  Follow-up with GINA in my office for suture removal in approximately 1 week.        Ulises Elias MD  04/17/24  06:05 EDT                      Discharge Summary    No notes of this type exist for this encounter.

## 2024-04-17 NOTE — CASE MANAGEMENT/SOCIAL WORK
Continued Stay Note  [facility]     Patient Name: Yessica Galvin  MRN: [id number]  Today's Date: 4/17/2024    Admit Date: 4/11/2024    Plan: home   Discharge Plan       Row Name 04/17/24 0945       Plan    Plan Comments Rolling walker will be brought to pts room via [employer] prior to dc                   Discharge Codes    No documentation.                 Expected Discharge Date and Time       Expected Discharge Date Expected Discharge Time    Apr 17, 2024               Zelda Gallego RN

## 2024-04-17 NOTE — THERAPY TREATMENT NOTE
Patient Name: Yessica Galvin  : 1960    MRN: [id number]                              Today's Date: 2024       Admit Date: 2024    Visit Dx:     ICD-10-CM ICD-9-CM   1. Cervical disc disorder with radiculopathy  M50.10 723.4   2. Lumbar stenosis with neurogenic claudication  M48.062 724.03     Patient Active Problem List   Diagnosis    Asthmatic bronchitis    Generalized anxiety disorder    Gout    Headache    Acquired hypothyroidism    Chronic midline low back pain without sciatica    Cervical disc disorder with radiculopathy    Lumbar disc disease    Left radial fracture    Asthma    Tobacco abuse    Elevated transaminase level    Chronic back pain    Closed fracture of right tibial plateau    Chronic bronchitis with acute exacerbation    Centrilobular emphysema    History of tobacco use, presenting hazards to health    Nodule of upper lobe of right lung    Nocturnal leg cramps    Left hip pain    Constipation    Skin lesions    Gastroesophageal reflux disease with esophagitis without hemorrhage    Bruising    Therapeutic drug monitoring    Nausea    Allergic rhinitis    Gastroesophageal reflux disease    Medicare annual wellness visit, subsequent    Dyspnea    Anxiety and depression    Hypertension    Fatigue    Tobacco dependence    Abdominal pain, diffuse    Chest pain    Abnormal nuclear stress test    Post-menopause    Age-related osteoporosis without current pathological fracture    Cough    Restless leg syndrome    Gastroenteritis    Lumbar stenosis with neurogenic claudication    Stage II COPD    Encounter for pre-operative respiratory clearance    Status post lumbar laminectomy     Past Medical History:   Diagnosis Date    Acute bronchitis     Acute sinusitis     Allergic rhinitis 2023    Asthma     Asthma 2020    Asthma with acute exacerbation     Asthma, extrinsic ,1970    I have had it since I was 5 yrs. Old    Asthmatic bronchitis     Bronchiectasis     Always catch  it    Centrilobular emphysema 06/02/2022    Chronic bronchitis with acute exacerbation 06/02/2022    Disc degeneration, lumbar     Disc degeneration, lumbar     GERD (gastroesophageal reflux disease)     History of mammogram     Hypertension 10/05/2023    Hypothyroidism     Lower back pain     Medicare annual wellness visit, subsequent 10/05/2023    Nodule of upper lobe of right lung 06/02/2022    Plantar fasciitis     Restless legs syndrome      Past Surgical History:   Procedure Laterality Date    CARDIAC CATHETERIZATION N/A 11/17/2023    Procedure: Left Heart Cath;  Surgeon: Arben Pittman MD;  Location: [facility];  Service: Cardiology;  Laterality: N/A;    CHOLECYSTECTOMY      COLONOSCOPY      HYSTERECTOMY      KNEE ARTHROSCOPY Right 03/10/2020    Procedure: KNEE ARTHROSCOPY RIGHT;  Surgeon: Guanaco Thakur MD;  Location: [facility] OR;  Service: Orthopedics;  Laterality: Right;    LUMBAR LAMINECTOMY DISCECTOMY DECOMPRESSION N/A 4/11/2024    Procedure: LUMBAR LAMINECTOMY DISCECTOMY DECOMPRESSION POSTERIOR L2-L5;  Surgeon: Ulises Elias MD;  Location: [facility];  Service: Neurosurgery;  Laterality: N/A;    NECK SURGERY      ORIF WRIST FRACTURE Left 03/10/2020    Procedure: WRIST OPEN REDUCTION INTERNAL FIXATION LEFT;  Surgeon: Guanaco Thakur MD;  Location: [facility];  Service: Orthopedics;  Laterality: Left;    TIBIAL PLATEAU OPEN REDUCTION INTERNAL FIXATION Right 03/10/2020    Procedure: TIBIAL PLATEAU OPEN REDUCTION INTERNAL FIXATION RIGHT;  Surgeon: Guanaco Thakur MD;  Location: [facility];  Service: Orthopedics;  Laterality: Right;    UPPER GASTROINTESTINAL ENDOSCOPY        General Information       Row Name 04/17/24 1026          Physical Therapy Time and Intention    Document Type therapy note (daily note)  -AB     Mode of Treatment physical therapy  -AB       Row Name 04/17/24 1026          General Information    Patient Profile Reviewed yes  -AB     Existing  Precautions/Restrictions fall;spinal  -AB     Barriers to Rehab none identified  -AB       Row Name 04/17/24 1026          Cognition    Orientation Status (Cognition) oriented x 4  -AB       Row Name 04/17/24 1026          Safety Issues, Functional Mobility    Safety Issues Affecting Function (Mobility) awareness of need for assistance;insight into deficits/self-awareness;safety precaution awareness;safety precautions follow-through/compliance  -AB     Impairments Affecting Function (Mobility) balance;endurance/activity tolerance;pain;shortness of breath;strength;range of motion (ROM)  -AB               User Key  (r) = Recorded By, (t) = Taken By, (c) = Cosigned By      Initials Name Provider Type    AB Gisele Henderson, PT Physical Therapist                   Mobility       Row Name 04/17/24 1026          Bed Mobility    Bed Mobility sidelying-sit;sit-sidelying  -AB     Sidelying-Sit [place] (Bed Mobility) contact guard;1 person assist;verbal cues  -AB     Sit-Sidelying [place] (Bed Mobility) contact guard;1 person assist;verbal cues  -AB     Assistive Device (Bed Mobility) bed rails  -AB     Comment, (Bed Mobility) Received lying on R side. Denied dizziness sitting EOB.  -AB       Row Name 04/17/24 1026          Transfers    Comment, (Transfers) Cues for hand placement and sequencing. Spinal precautions reviewed, pt and spouse verbalized understanding.  -AB       Row Name 04/17/24 1026          Sit-Stand Transfer    Sit-Stand [place] (Transfers) contact guard;verbal cues;1 person assist  -AB     Assistive Device (Sit-Stand Transfers) walker, front-wheeled  -AB       Row Name 04/17/24 1026          Gait/Stairs (Locomotion)    [place] Level (Gait) contact guard;verbal cues;1 person assist  -AB     Assistive Device (Gait) walker, front-wheeled  -AB     Distance in Feet (Gait) 180  -AB     Deviations/Abnormal Patterns (Gait) bilateral deviations;james decreased;gait speed decreased;stride length  decreased  -AB     Bilateral Gait Deviations forward flexed posture;heel strike decreased  -AB     Comment, (Gait/Stairs) Pt ambulated with step through gait pattern, slowed james, and forward flexed posture. Cues for walker positioning, walker positioning, and increased heel strike. Gait mechanics improved with cues. Pt also navigated 1 step with cues for sequencing. No overt LOB or knee buckling. Further activity limited by pain.  -AB               User Key  (r) = Recorded By, (t) = Taken By, (c) = Cosigned By      Initials Name Provider Type    AB Gisele Henderson, PT Physical Therapist                   Obj/Interventions       Row Name 04/17/24 1029          Motor Skills    Therapeutic Exercise hip;knee;ankle  All exercises in HEP reviewed and demonstrated. Abdominal sets x10  -AB       Row Name 04/17/24 1029          Hip (Therapeutic Exercise)    Hip AROM (Therapeutic Exercise) bilateral;external rotation;internal rotation;5 repetitions  -AB     Hip Isometrics (Therapeutic Exercise) bilateral;gluteal sets;10 repetitions  -AB       Row Name 04/17/24 1029          Knee (Therapeutic Exercise)    Knee Isometrics (Therapeutic Exercise) bilateral;quad sets;10 repetitions  -AB     Knee Strengthening (Therapeutic Exercise) bilateral;heel slides;3 repetitions  -AB       Row Name 04/17/24 1029          Ankle (Therapeutic Exercise)    Ankle (Therapeutic Exercise) AROM (active range of motion)  -AB     Ankle AROM (Therapeutic Exercise) bilateral;dorsiflexion;plantarflexion;10 repetitions  -AB       Row Name 04/17/24 1029          Balance    Balance Assessment sitting static balance;sitting dynamic balance;standing static balance;standing dynamic balance  -AB     Static Sitting Balance standby assist  -AB     Dynamic Sitting Balance standby assist  -AB     Position, Sitting Balance unsupported;sitting edge of bed  -AB     Static Standing Balance contact guard  -AB     Dynamic Standing Balance contact guard;1-person  assist;verbal cues  -AB     Position/Device Used, Standing Balance supported;walker, front-wheeled  -AB     Balance Interventions sitting;standing;sit to stand;supported;static;dynamic;occupation based/functional task  -AB     Comment, Balance No overt LOB or knee buckling.  -AB               User Key  (r) = Recorded By, (t) = Taken By, (c) = Cosigned By      Initials Name Provider Type    AB Gisele Henderson, PT Physical Therapist                   Goals/Plan    No documentation.                  Clinical Impression       Row Name 04/17/24 1030          Pain    Pretreatment Pain Rating 7/10  -AB     Posttreatment Pain Rating 7/10  -AB     Pain Location - Side/Orientation Right  -AB     Pain Location lower  -AB     Pain Location - extremity  -AB     Pre/Posttreatment Pain Comment RN aware and managing.  -AB     Pain Intervention(s) Ambulation/increased activity;Repositioned  -AB     Additional Documentation Pain Scale: Numbers Pre/Post-Treatment (Group)  -AB       Row Name 04/17/24 1030          Plan of Care Review    Plan of Care Reviewed With patient  -AB     Progress improving  -AB     Outcome Evaluation Pt with good effort and demonstrated good stability while navigating 1 step with CGA and RW. Ambulation of 180' with CGA and RW was well tolerated. Able to maintain spinal precautions throughout. Pt continues to present below her functional baseline with weakness, impaired endurance, and acute pain. PT rec d/c home with initial 24/7 assist when medically appropriate.  -AB       Row Name 04/17/24 1030          Vital Signs    O2 Delivery Pre Treatment room air  -AB     O2 Delivery Intra Treatment room air  -AB     O2 Delivery Post Treatment room air  -AB     Pre Patient Position Side Lying  -AB     Intra Patient Position Standing  -AB     Post Patient Position Sitting  -AB       Row Name 04/17/24 1030          Positioning and Restraints    Pre-Treatment Position in bed  -AB     Post Treatment Position bed  -AB      In Bed notified nsg;sitting EOB;call light within reach;encouraged to call for assist;exit alarm on  -AB               User Key  (r) = Recorded By, (t) = Taken By, (c) = Cosigned By      Initials Name Provider Type    Gisele Toribio, PT Physical Therapist                   Outcome Measures       Row Name 04/17/24 1036          How much help from another person do you currently need...    Turning from your back to your side while in flat bed without using bedrails? 4  -AB     Moving from lying on back to sitting on the side of a flat bed without bedrails? 3  -AB     Moving to and from a bed to a chair (including a wheelchair)? 3  -AB     Standing up from a chair using your arms (e.g., wheelchair, bedside chair)? 3  -AB     Climbing 3-5 steps with a railing? 3  -AB     To walk in hospital room? 3  -AB     AM-PAC 6 Clicks Score (PT) 19  -AB     Highest Level of Mobility Goal 6 --> Walk 10 steps or more  -AB       Row Name 04/17/24 1036          Functional Assessment    Outcome Measure Options AM-PAC 6 Clicks Basic Mobility (PT)  -AB               User Key  (r) = Recorded By, (t) = Taken By, (c) = Cosigned By      Initials Name Provider Type    Gisele Toribio, PT Physical Therapist                                 Physical Therapy Education       Title: PT OT SLP Therapies (In Progress)       Topic: Physical Therapy (Done)       Point: Mobility training (Done)       Learning Progress Summary             Patient Acceptance, E,D, VU,NR by AB at 4/17/2024 1036    Acceptance, E,D, VU,NR by AB at 4/16/2024 0911    Eager, E,H, VU,DU,NR by  at 4/12/2024 1019    Comment: Educated pt. safety/technique w/transfers, ambulation, spinal precautions, PT POC   Family Eager, E,H, VU,DU,NR by  at 4/12/2024 1019    Comment: Educated pt. safety/technique w/transfers, ambulation, spinal precautions, PT POC                         Point: Home exercise program (Done)       Learning Progress Summary             Patient Acceptance,  E,D, VU,NR by AB at 4/17/2024 1036    Acceptance, E,D, VU,NR by AB at 4/16/2024 0911    Acceptance, E, VU,DU,NR by  at 4/14/2024 0941    Acceptance, E, VU,DU,NR by  at 4/13/2024 1101    Comment: reviewed HEP and spinal precautions    Eager, E,H, VU,DU,NR by SS at 4/12/2024 1019    Comment: Educated pt. safety/technique w/transfers, ambulation, spinal precautions, PT POC   Family Acceptance, E, VU,DU,NR by  at 4/13/2024 1101    Comment: reviewed HEP and spinal precautions    Eager, E,H, VU,DU,NR by SS at 4/12/2024 1019    Comment: Educated pt. safety/technique w/transfers, ambulation, spinal precautions, PT POC                         Point: Body mechanics (Done)       Learning Progress Summary             Patient Acceptance, E,D, VU,NR by AB at 4/17/2024 1036    Acceptance, E,D, VU,NR by AB at 4/16/2024 0911    Acceptance, E, VU,DU,NR by  at 4/14/2024 0941    Acceptance, E, VU,DU,NR by  at 4/13/2024 1101    Comment: reviewed HEP and spinal precautions    Eager, E,H, VU,DU,NR by SS at 4/12/2024 1019    Comment: Educated pt. safety/technique w/transfers, ambulation, spinal precautions, PT POC   Family Acceptance, E, VU,DU,NR by  at 4/13/2024 1101    Comment: reviewed HEP and spinal precautions    Eager, E,H, VU,DU,NR by SS at 4/12/2024 1019    Comment: Educated pt. safety/technique w/transfers, ambulation, spinal precautions, PT POC                         Point: Precautions (Done)       Learning Progress Summary             Patient Acceptance, E,D, VU,NR by AB at 4/17/2024 1036    Acceptance, E,D, VU,NR by AB at 4/16/2024 0911    Acceptance, E, VU,DU,NR by  at 4/14/2024 0941    Acceptance, E, VU,DU,NR by  at 4/13/2024 1101    Comment: reviewed HEP and spinal precautions    Eager, E,H, VU,DU,NR by  at 4/12/2024 1019    Comment: Educated pt. safety/technique w/transfers, ambulation, spinal precautions, PT POC   Family Aiden, ALBA PHILLIPS,BHAVNA,NR by  at 4/13/2024 1101    Comment: reviewed HEP and spinal  precautions    ALAN Gorman H, ALBA,BHAVNA,NR by  at 4/12/2024 1019    Comment: Educated pt. safety/technique w/transfers, ambulation, spinal precautions, PT POC                                         User Key       Initials Effective Dates Name Provider Type Discipline     06/01/21 -  Lexie Tinajero, PT Physical Therapist PT    AB 09/22/22 -  Gisele Henderson, PT Physical Therapist PT     09/21/23 -  Obdulia Parada, PT Physical Therapist PT                  PT Recommendation and Plan     Plan of Care Reviewed With: patient  Progress: improving  Outcome Evaluation: Pt with good effort and demonstrated good stability while navigating 1 step with CGA and RW. Ambulation of 180' with CGA and RW was well tolerated. Able to maintain spinal precautions throughout. Pt continues to present below her functional baseline with weakness, impaired endurance, and acute pain. PT rec d/c home with initial 24/7 assist when medically appropriate.     Time Calculation:         PT Charges       Row Name 04/17/24 1036             Time Calculation    Start Time 0926  -AB      PT Received On 04/17/24  -AB         Timed Charges    01141 - PT Therapeutic Exercise Minutes 10  -AB      14026 - Gait Training Minutes  10  -AB      16443 - PT Therapeutic Activity Minutes 7  -AB         Total Minutes    Timed Charges Total Minutes 27  -AB       Total Minutes 27  -AB                User Key  (r) = Recorded By, (t) = Taken By, (c) = Cosigned By      Initials Name Provider Type    AB Gisele Henderson, PT Physical Therapist                  Therapy Charges for Today       Code Description Service Date Service Provider Modifiers Qty    53068888356 HC PT THER PROC EA 15 MIN 4/16/2024 Gisele Henderson, PT GP 1    52485995074 HC GAIT TRAINING EA 15 MIN 4/16/2024 Gisele Henderson, PT GP 1    42869757292 HC PT THERAPEUTIC ACT EA 15 MIN 4/16/2024 Gisele Henderson, PT GP 1    58218821129 HC PT THER PROC EA 15 MIN 4/17/2024 Gisele Henderson, PT GP 1    78851089575 HC  GAIT TRAINING EA 15 MIN 4/17/2024 Gisele Henderson, PT GP 1            PT G-Codes  Outcome Measure Options: AM-PAC 6 Clicks Basic Mobility (PT)  AM-PAC 6 Clicks Score (PT): 19  AM-PAC 6 Clicks Score (OT): 18  PT Discharge Summary  Anticipated Discharge Disposition (PT): home with 24/7 care    Gisele Henderson, PT  4/17/2024

## 2024-04-17 NOTE — PROGRESS NOTES
NEUROSURGERY PROGRESS NOTE     LOS: 2 days   Patient Care Team:  Betty Jasmine MD as PCP - General (Family Medicine)  Atul Tavarez MD as Consulting Physician (Gastroenterology)  Bobby Tom MD as Consulting Physician (Pulmonary Disease)  Bobby Tom MD as Consulting Physician (Pulmonary Disease)  Delgado Lal MD as Consulting Physician (Pain Medicine)  Ulises Elias MD as Consulting Physician (Neurosurgery)  Betty Jasmine MD as Primary Care Provider (Family Medicine)  Kota Swain MD as Referring Physician (Family Medicine)    Chief Complaint: Low back and lower extremity pain with walking and standing intolerance.    POD#: 6 Days Post-Op  Procedures:  L2-5 laminectomies.    Interval History:   Patient ambulated in the nix yesterday.  Patient Complaints: Mild incisional pain.  Patient Denies: Headache.    Vital Signs: Blood pressure 115/60, pulse 69, temperature 98.4 °F (36.9 °C), temperature source Oral, resp. rate 18, SpO2 90%, not currently breastfeeding.  Intake/Output:   Intake/Output Summary (Last 24 hours) at 4/17/2024 0605  Last data filed at 4/16/2024 2334  Gross per 24 hour   Intake 1090 ml   Output --   Net 1090 ml       Physical Exam:  The patient awakens easily.  She is in good spirits.  Dry dressing is in place on her incision.     Assessment/Plan:  1.  Lumbar stenosis with neurogenic claudication status post multilevel decompressive laminectomy.  2.  Chronic mechanical low back pain.  3.  History of hypertension.  4.  Disposition: Home today.  Follow-up with GINA in my office for suture removal in approximately 1 week.      Ulises Elias MD  04/17/24  06:05 EDT

## 2024-04-18 ENCOUNTER — TRANSITIONAL CARE MANAGEMENT TELEPHONE ENCOUNTER (OUTPATIENT)
Dept: CALL CENTER | Facility: HOSPITAL | Age: 64
End: 2024-04-18
Payer: MEDICARE

## 2024-04-18 NOTE — OUTREACH NOTE
Call Center TCM Note      Flowsheet Row Responses   [facility] patient discharged from? [place]   Does the patient have one of the following disease processes/diagnoses(primary or secondary)? General Surgery   TCM attempt successful? Yes  [VR from 2019 lists Santiago Galvin]   Call start time 0937   Call end time [phone]   Discharge diagnosis Lumbar stenosis with neurogenic claudication, LUMBAR LAMINECTOMY DISCECTOMY DECOMPRESSION POSTERIOR L2-L5   Meds reviewed with patient/caregiver? Yes   Is the patient having any side effects they believe may be caused by any medication additions or changes? No   Does the patient have all medications related to this admission filled (includes all antibiotics, pain medications, etc.) Yes   Is the patient taking all medications as directed (includes completed medication regime)? Yes   Comments Pt prefers to keep appt on 4/19/24 at 130pm--attempted to reschedule for one day next week but she declined, Neurosurgeon 4/24/24 @1000am   Does the patient have an appointment with their PCP within 7-14 days of discharge? Yes   Has home health visited the patient within 72 hours of discharge? N/A   What DME was ordered? rolling walker from [employer]   Has all DME been delivered? Yes   Psychosocial issues? No   Did the patient receive a copy of their discharge instructions? Yes   Nursing interventions Reviewed instructions with patient   What is the patient's perception of their health status since discharge? Improving  [Pt reports she is doing well post op, pain as expected and taking meds as ordered.]   Nursing interventions Nurse provided patient education  [Routine post op instructions]   Is the patient /caregiver able to teach back basic post-op care? Lifting as instructed by MD in discharge instructions, Do not remove steri-strips, Keep incision areas clean,dry and protected, No tub bath, swimming, or hot tub until instructed by MD, Continue use of incentive spirometry at least 1 week  post discharge, Drive as instructed by MD in discharge instructions  [aware of lifting precautions, no driving until f/u]   Is the patient/caregiver able to teach back signs and symptoms of incisional infection? Increased redness, swelling or pain at the incisonal site, Increased drainage or bleeding, Incisional warmth, Pus or odor from incision, Fever  [reviewed incisional instructions with pt]   Is the patient/caregiver able to teach back steps to recovery at home? Rest and rebuild strength, gradually increase activity, Eat a well-balance diet   Is the patient/caregiver able to teach back the hierarchy of who to call/visit for symptoms/problems? PCP, Specialist, Home health nurse, Urgent Care, ED, 911 Yes   TCM call completed? Yes   Call end time [phone]            Tiffani Bello RN    4/18/2024, 09:58 EDT

## 2024-04-18 NOTE — DISCHARGE SUMMARY
[facility]    Date of Admission: 4/11/2024  Date of Discharge:  4/18/2024    Discharge Diagnosis: Lumbar stenosis with neurogenic claudication    Procedures Performed  Procedure(s):  LUMBAR LAMINECTOMY DISCECTOMY DECOMPRESSION POSTERIOR L2-L5       Presenting Problem/History of Present Illness    History of Present Illness:  Ms. Galvin is a 63 y.o. female that presented with chronic back pain that progressed into bilateral lower extremity symptoms and standing/walking intolerance.  Studies demonstrated high-grade multilevel lumbar spinal stenosis. Thus, she underwent laminectomies at the L2-3, L3-4, and the L4-5 levels by Dr. Elias on 4/11/2024. Overall, good decompression at these levels was achieved. There were no noted dural tears during the procedure, but a small area with thin dura was noted and covered with nonsuturable DuraGen.  A drain was placed and a running nylon suture was used to close the incision.     Hospital Course:   The patient was admitted to the floor for observation. She voided independently and took oral medication to control pain. She participated with physical therapy and occupational therapy. Since her drain output continued and showed relatively clear fluid, a dural rent then became suspected. The drain was discontinued on POD #2 and Ms. Galvin remained flat in bed with an indwelling catheter. On POD #5, the patient's bedrest restriction was lifted and she was able to be mobilized; catheter was discontinued.  PT and OT recommended the patient be discharged home with home health. She was ultimately discharged at home on POD #6 (4/17/2024) with a rolling walker.     Condition on Discharge:  Stable  Discharge to: Home with assist    PATIENT SPECIFIC EDUCATION/PLAN:  1. Follow-up with neurosurgery [facility] in one week for wound check and suture removal.   2. No driving until follow-up.  3. The patient may get incision wet in the shower  beginning on 4/18/2024.    4. NO tub bathing or swimming until follow-up  5. Ice pack to incision(s) as needed for associated pain or swelling .  6. The patient is to ambulate frequently at home and may sit as tolerated. No lifting greater than 5 pounds.       Discharge Medications     Discharge Medications        New Medications        Instructions Start Date   oxyCODONE-acetaminophen 5-325 MG per tablet  Commonly known as: PERCOCET   1 tablet, Oral, 3 Times Daily PRN             Continue These Medications        Instructions Start Date   acetaminophen 325 MG tablet  Commonly known as: TYLENOL   650 mg, Oral, Every 4 Hours PRN      albuterol sulfate  (90 Base) MCG/ACT inhaler  Commonly known as: PROVENTIL HFA;VENTOLIN HFA;PROAIR HFA   2 puffs, Inhalation, Every 6 Hours PRN      alendronate 70 MG tablet  Commonly known as: Fosamax   70 mg, Oral, Every 7 Days      atorvastatin 20 MG tablet  Commonly known as: LIPITOR   20 mg, Oral, Nightly      busPIRone 15 MG tablet  Commonly known as: BUSPAR   15 mg, Oral, 2 Times Daily      celecoxib 100 MG capsule  Commonly known as: CeleBREX   TAKE 1 CAPSULE BY MOUTH 2 TIMES A DAY AS NEEDED FOR MILD PAIN      Fluticasone Furoate-Vilanterol 200-25 MCG/ACT inhaler  Commonly known as: Breo Ellipta   1 puff, Inhalation, Daily - RT      gabapentin 600 MG tablet  Commonly known as: NEURONTIN   600 mg, Oral, 3 Times Daily      levocetirizine 5 MG tablet  Commonly known as: Xyzal   5 mg, Oral, Every Evening      levothyroxine 137 MCG tablet  Commonly known as: SYNTHROID, LEVOTHROID   137 mcg, Oral, Daily      lisinopril 20 MG tablet  Commonly known as: PRINIVIL,ZESTRIL   20 mg, Oral, Daily      loperamide 2 MG capsule  Commonly known as: IMODIUM   2 mg      montelukast 10 MG tablet  Commonly known as: SINGULAIR   10 mg, Oral, Nightly      omeprazole 40 MG capsule  Commonly known as: priLOSEC   40 mg, Oral, Daily      ondansetron ODT 4 MG disintegrating tablet  Commonly known as:  ZOFRAN-ODT   4 mg, Translingual, Every 8 Hours PRN      promethazine 25 MG tablet  Commonly known as: PHENERGAN   25 mg, Oral, Every 8 Hours PRN      rOPINIRole 2 MG tablet  Commonly known as: REQUIP   2 mg, Oral, Every Night at Bedtime      tiZANidine 4 MG tablet  Commonly known as: ZANAFLEX   4 mg, Oral, Every 12 Hours PRN      traMADol 50 MG tablet  Commonly known as: ULTRAM   100 mg, Oral, Every 8 Hours PRN      Umeclidinium Bromide 62.5 MCG/ACT aerosol powder   Commonly known as: INCRUSE ELLIPTA   1 puff, Inhalation, Daily      venlafaxine  MG 24 hr capsule  Commonly known as: EFFEXOR-XR   300 mg, Oral, Daily             ASK your doctor about these medications        Instructions Start Date   Nicotrol 10 MG inhaler  Generic drug: nicotine   1 puff, Inhalation, As Needed               Follow-up Appointments  Future Appointments   Date Time Provider Department Center   4/19/2024  1:30 PM Betty Jasmine MD [facility]   4/24/2024 10:00 AM Katherine Rose PA-C [facility]   6/7/2024 11:00 AM Loli Teague APRN [facility]     Additional Instructions for the Follow-ups that You Need to Schedule       Discharge Follow-up with Specified Provider: Curt; 1 Week   As directed      To: Curt   Follow Up: 1 Week   Follow Up Details: Follow-up with physician's assistant in 1 week for wound check and suture removal                Referring Provider  Ulises Elias MD    PCP   Betty Jasmine MD Laryssa Helene Cybriwsky, PA-C  04/18/24  14:41 EDT

## 2024-04-19 ENCOUNTER — LAB (OUTPATIENT)
Dept: LAB | Facility: HOSPITAL | Age: 64
End: 2024-04-19
Payer: MEDICARE

## 2024-04-19 ENCOUNTER — OFFICE VISIT (OUTPATIENT)
Dept: FAMILY MEDICINE CLINIC | Facility: CLINIC | Age: 64
End: 2024-04-19
Payer: MEDICARE

## 2024-04-19 VITALS
HEART RATE: 66 BPM | BODY MASS INDEX: 30.77 KG/M2 | WEIGHT: 167.2 LBS | OXYGEN SATURATION: 98 % | HEIGHT: 62 IN | SYSTOLIC BLOOD PRESSURE: 110 MMHG | DIASTOLIC BLOOD PRESSURE: 60 MMHG | TEMPERATURE: 98.2 F

## 2024-04-19 DIAGNOSIS — M51.9 LUMBAR DISC DISEASE: Primary | ICD-10-CM

## 2024-04-19 DIAGNOSIS — I10 HYPERTENSION, UNSPECIFIED TYPE: ICD-10-CM

## 2024-04-19 DIAGNOSIS — R21 RASH: ICD-10-CM

## 2024-04-19 DIAGNOSIS — G25.81 RESTLESS LEG SYNDROME: ICD-10-CM

## 2024-04-19 PROCEDURE — 36415 COLL VENOUS BLD VENIPUNCTURE: CPT

## 2024-04-19 PROCEDURE — 80053 COMPREHEN METABOLIC PANEL: CPT

## 2024-04-19 RX ORDER — ROPINIROLE 3 MG/1
3 TABLET, FILM COATED ORAL NIGHTLY
Qty: 30 TABLET | Refills: 2 | Status: SHIPPED | OUTPATIENT
Start: 2024-04-19

## 2024-04-19 RX ORDER — TRAMADOL HYDROCHLORIDE 50 MG/1
100 TABLET ORAL EVERY 8 HOURS PRN
Qty: 150 TABLET | Refills: 2 | Status: SHIPPED | OUTPATIENT
Start: 2024-04-19

## 2024-04-19 RX ORDER — TRAMADOL HYDROCHLORIDE 50 MG/1
100 TABLET ORAL EVERY 8 HOURS PRN
Qty: 150 TABLET | Refills: 2 | Status: SHIPPED | OUTPATIENT
Start: 2024-04-19 | End: 2024-04-19

## 2024-04-19 RX ORDER — BUDESONIDE, GLYCOPYRROLATE, AND FORMOTEROL FUMARATE 160; 9; 4.8 UG/1; UG/1; UG/1
AEROSOL, METERED RESPIRATORY (INHALATION)
COMMUNITY
Start: 2024-04-07

## 2024-04-19 RX ORDER — PREDNISONE 20 MG/1
40 TABLET ORAL DAILY
Qty: 10 TABLET | Refills: 0 | Status: SHIPPED | OUTPATIENT
Start: 2024-04-19 | End: 2024-04-24

## 2024-04-19 RX ORDER — GABAPENTIN 600 MG/1
600 TABLET ORAL 3 TIMES DAILY
Qty: 90 TABLET | Refills: 2 | Status: SHIPPED | OUTPATIENT
Start: 2024-04-19

## 2024-04-19 RX ORDER — AMLODIPINE BESYLATE 5 MG/1
TABLET ORAL
COMMUNITY
Start: 2024-04-07

## 2024-04-19 NOTE — ASSESSMENT & PLAN NOTE
- Uncontrolled  - Did not have benefit with pramipexole  - Increase ropinirole to 3 mg daily, advised patient to let me know if there is no improvement after 1 to 2 weeks and we can increase to 4 mg

## 2024-04-19 NOTE — ASSESSMENT & PLAN NOTE
- Patient has an extensive history of degenerative disease of her back with scoliosis and a chronic compression fracture of L2  - Status post recent laminectomy, discectomy and decompression  - She is currently on Percocets, advised not to take tramadol concurrently because she is very sleepy  - We will continue tramadol 100 mg every 8 hours as needed for now, holding until she is done with oxycodone, but did refill it  - Continue tizanidine as needed  - Continue gabapentin to 600 mg 3 times a day  - Baltazar reviewed and appropriate  - UDS and medication contract up-to-date  -Discussed home health recommendation with patient and she declines at this time, advised her to keep her appointment with neurosurgery  - At some point, we will start weaning her off of some of these medicines once she has healed from this back surgery

## 2024-04-19 NOTE — PROGRESS NOTES
Office Note     Name: Yessica Galvin    : 1960     MRN: [id number]     Chief Complaint  Hypertension    Subjective     History of Present Illness:  Yessica Galvin is a 63 y.o. female who presents today for hypertension and restless leg syndrome.    Hypertension: Patient's blood pressure has been uncontrolled and we increased her lisinopril to 20 mg daily.  She is due for repeat labs today.    RLS: Patient is on ropinirole 2 mg daily.  We increased to this at her last visit with me.          Objective     Past Medical History:   Diagnosis Date    Acute bronchitis     Acute sinusitis     Allergic rhinitis 2023    Asthma     Asthma 2020    Asthma with acute exacerbation     Asthma, extrinsic ,1970    I have had it since I was 5 yrs. Old    Asthmatic bronchitis     Bronchiectasis     Always catch it    Centrilobular emphysema 2022    Chronic bronchitis with acute exacerbation 2022    Disc degeneration, lumbar     Disc degeneration, lumbar     GERD (gastroesophageal reflux disease)     History of mammogram     Hypertension 10/05/2023    Hypothyroidism     Lower back pain     Medicare annual wellness visit, subsequent 10/05/2023    Nodule of upper lobe of right lung 2022    Plantar fasciitis     Restless legs syndrome      Past Surgical History:   Procedure Laterality Date    CARDIAC CATHETERIZATION N/A 2023    Procedure: Left Heart Cath;  Surgeon: Arben Pittman MD;  Location:  [facility];  Service: Cardiology;  Laterality: N/A;    CHOLECYSTECTOMY      COLONOSCOPY      HYSTERECTOMY      KNEE ARTHROSCOPY Right 03/10/2020    Procedure: KNEE ARTHROSCOPY RIGHT;  Surgeon: Guanaco Thakur MD;  Location:  [facility] OR;  Service: Orthopedics;  Laterality: Right;    LUMBAR LAMINECTOMY DISCECTOMY DECOMPRESSION N/A 2024    Procedure: LUMBAR LAMINECTOMY DISCECTOMY DECOMPRESSION POSTERIOR L2-L5;  Surgeon: Ulises Elias MD;  Location:  [facility] OR;   "Service: Neurosurgery;  Laterality: N/A;    NECK SURGERY      ORIF WRIST FRACTURE Left 03/10/2020    Procedure: WRIST OPEN REDUCTION INTERNAL FIXATION LEFT;  Surgeon: Guanaco Thakur MD;  Location:  [facility] OR;  Service: Orthopedics;  Laterality: Left;    TIBIAL PLATEAU OPEN REDUCTION INTERNAL FIXATION Right 03/10/2020    Procedure: TIBIAL PLATEAU OPEN REDUCTION INTERNAL FIXATION RIGHT;  Surgeon: Guanaco Thakur MD;  Location:  [facility] OR;  Service: Orthopedics;  Laterality: Right;    UPPER GASTROINTESTINAL ENDOSCOPY       Family History   Problem Relation Age of Onset    Aneurysm Mother     Hypertension Mother     Cancer Mother     Asthma Mother     Heart disease Mother     Diabetes Mother     Hypertension Father     Alzheimer's disease Father     Asthma Father     No Known Problems Sister     No Known Problems Brother     COPD Maternal Grandmother     Aneurysm Maternal Grandmother     No Known Problems Maternal Grandfather     Alzheimer's disease Paternal Grandmother     Dementia Paternal Grandmother     No Known Problems Paternal Grandfather     Breast cancer Neg Hx     Ovarian cancer Neg Hx        Vital Signs  /60   Pulse 66   Temp 98.2 °F (36.8 °C) (Infrared)   Ht 157.5 cm (62\")   Wt 75.8 kg (167 lb 3.2 oz)   SpO2 98%   BMI 30.58 kg/m²   Estimated body mass index is 30.58 kg/m² as calculated from the following:    Height as of this encounter: 157.5 cm (62\").    Weight as of this encounter: 75.8 kg (167 lb 3.2 oz).    Physical Exam     {The following data was reviewed by (Optional):49122}  {Ambulatory Labs (Optional):24573}  {Data reviewed (Optional):20156:::1}     POCT Results (if applicable):  Results for orders placed or performed in visit on 04/05/24   MRSA Screen Culture (Outpatient) - Swab, Nares    Specimen: Nares; Swab   Result Value Ref Range    MRSA Screen Cx Staphylococcus aureus, MRSA (A)    Hemoglobin A1c    Specimen: Blood   Result Value Ref Range    Hemoglobin A1C 5.20 4.80 - 5.60 % "   Potassium    Specimen: Blood   Result Value Ref Range    Potassium 4.1 3.5 - 5.2 mmol/L   CBC (No Diff)    Specimen: Blood   Result Value Ref Range    WBC 6.52 3.40 - 10.80 10*3/mm3    RBC 3.73 (L) 3.77 - 5.28 10*6/mm3    Hemoglobin 11.4 (L) 12.0 - 15.9 g/dL    Hematocrit 36.1 34.0 - 46.6 %    MCV 96.8 79.0 - 97.0 fL    MCH 30.6 26.6 - 33.0 pg    MCHC 31.6 31.5 - 35.7 g/dL    RDW 14.2 12.3 - 15.4 %    RDW-SD 50.6 37.0 - 54.0 fl    MPV 8.5 6.0 - 12.0 fL    Platelets 300 140 - 450 10*3/mm3            Assessment and Plan     There are no diagnoses linked to this encounter.       {Time Spent (Optional):75739}    Advanced Care Planning:   {Advance Directive Status:97462}    Smoking Cessation:   {time:90799}    Follow Up  No follow-ups on file.    Betty Jasmine MD

## 2024-04-19 NOTE — PROGRESS NOTES
Transitional Care Follow Up Visit  Subjective     Yessica June Galvin is a 63 y.o. female who presents for a transitional care management visit.    Within 48 business hours after discharge our office contacted her via telephone to coordinate her care and needs.      I reviewed and discussed the details of that call along with the discharge summary, hospital problems, inpatient lab results, inpatient diagnostic studies, and consultation reports with Yessica.     Current outpatient and discharge medications have been reconciled for the patient.  Reviewed by: Betty Jasmine MD          4/17/2024     6:22 PM   Date of TCM Phone Call   [facility]   Date of Admission 4/11/2024   Date of Discharge 4/17/2024   Discharge Disposition Home or Self Care     Risk for Readmission (LACE) Score: 9 (4/17/2024  6:00 AM)      History of Present Illness   Course During Hospital Stay:   Patient was admitted on 4/11/2024 until 4/18/2024 for lumbar laminectomy discectomy and decompression she was admitted to the hospital afterwards for observation.  She participated in physical therapy and Occupational Therapy and then they suspected a dural rent.  They discontinued her drain and she was flat on the bed and ended up being discharged on postop day #5.  Physical therapy and Occupational Therapy recommended that she be discharged home with home health but patient reports that she has not received any home health and she is not interested in this at this time    She noticed an itchy and painful rash on her back about 2 to 3 days after her surgery.  She would like for me to take a look at it today.    Hypertension: Patient's blood pressure has been uncontrolled and we increased her lisinopril to 20 mg daily.  She is due for repeat labs today.  She does report that her blood pressure was running low in the hospital but has been okay at home.     RLS: Patient is on ropinirole 2 mg daily.  We increased to this at her last  visit with me.  She has not noticed any difference on this dose.       The following portions of the patient's history were reviewed and updated as appropriate: allergies, current medications, past family history, past medical history, past social history, past surgical history, and problem list.    Objective   Physical Exam  Vitals reviewed.   Constitutional:       Appearance: Normal appearance.   HENT:      Head: Normocephalic and atraumatic.      Nose: Nose normal.      Mouth/Throat:      Mouth: Mucous membranes are moist.   Eyes:      Conjunctiva/sclera: Conjunctivae normal.   Cardiovascular:      Rate and Rhythm: Normal rate and regular rhythm.   Pulmonary:      Effort: Pulmonary effort is normal.      Breath sounds: Normal breath sounds.   Abdominal:      General: Abdomen is flat.      Palpations: Abdomen is soft.   Musculoskeletal:      Comments: Ambulating with walker   Skin:     Comments: Slightly raised and erythematous rash on back   Neurological:      Mental Status: She is alert.         Assessment & Plan   Diagnoses and all orders for this visit:    1. Lumbar disc disease (Primary)  Assessment & Plan:  - Patient has an extensive history of degenerative disease of her back with scoliosis and a chronic compression fracture of L2  - Status post recent laminectomy, discectomy and decompression  - She is currently on Percocets, advised not to take tramadol concurrently because she is very sleepy  - We will continue tramadol 100 mg every 8 hours as needed for now, holding until she is done with oxycodone, but did refill it  - Continue tizanidine as needed  - Continue gabapentin to 600 mg 3 times a day  - Baltazar reviewed and appropriate  - UDS and medication contract up-to-date  -Discussed home health recommendation with patient and she declines at this time, advised her to keep her appointment with neurosurgery  - At some point, we will start weaning her off of some of these medicines once she has healed from  this back surgery      Orders:  -     gabapentin (NEURONTIN) 600 MG tablet; Take 1 tablet by mouth 3 (Three) Times a Day.  Dispense: 90 tablet; Refill: 2  -     Discontinue: traMADol (ULTRAM) 50 MG tablet; Take 2 tablets by mouth Every 8 (Eight) Hours As Needed for Moderate Pain.  Dispense: 150 tablet; Refill: 2  -     traMADol (ULTRAM) 50 MG tablet; Take 2 tablets by mouth Every 8 (Eight) Hours As Needed for Moderate Pain.  Dispense: 150 tablet; Refill: 2    2. Hypertension, unspecified type  Assessment & Plan:  - Controlled  - Continue lisinopril 20 mg daily  - Obtaining CMP today    Orders:  -     Comprehensive metabolic panel; Future    3. Restless leg syndrome  Assessment & Plan:  - Uncontrolled  - Did not have benefit with pramipexole  - Increase ropinirole to 3 mg daily, advised patient to let me know if there is no improvement after 1 to 2 weeks and we can increase to 4 mg    Orders:  -     rOPINIRole (REQUIP) 3 MG tablet; Take 1 tablet by mouth Every Night.  Dispense: 30 tablet; Refill: 2    4. Rash  Assessment & Plan:  - Patient looks like she has an allergic rash diffusely on her back  - Will start prednisone 40 mg for 5 days, return to clinic if no improvement or worsening of symptoms    Orders:  -     predniSONE (DELTASONE) 20 MG tablet; Take 2 tablets by mouth Daily for 5 days.  Dispense: 10 tablet; Refill: 0      Return to clinic in 3 months for chronic care management

## 2024-04-19 NOTE — ASSESSMENT & PLAN NOTE
- Patient looks like she has an allergic rash diffusely on her back  - Will start prednisone 40 mg for 5 days, return to clinic if no improvement or worsening of symptoms

## 2024-04-20 DIAGNOSIS — R74.8 ALKALINE PHOSPHATASE ELEVATION: ICD-10-CM

## 2024-04-20 DIAGNOSIS — R74.01 TRANSAMINITIS: Primary | ICD-10-CM

## 2024-04-20 LAB
ALBUMIN SERPL-MCNC: 3.4 G/DL (ref 3.5–5.2)
ALBUMIN/GLOB SERPL: 1.2 G/DL
ALP SERPL-CCNC: 277 U/L (ref 39–117)
ALT SERPL W P-5'-P-CCNC: 97 U/L (ref 1–33)
ANION GAP SERPL CALCULATED.3IONS-SCNC: 13.3 MMOL/L (ref 5–15)
AST SERPL-CCNC: 30 U/L (ref 1–32)
BILIRUB SERPL-MCNC: 0.2 MG/DL (ref 0–1.2)
BUN SERPL-MCNC: 27 MG/DL (ref 8–23)
BUN/CREAT SERPL: 37.5 (ref 7–25)
CALCIUM SPEC-SCNC: 8.9 MG/DL (ref 8.6–10.5)
CHLORIDE SERPL-SCNC: 93 MMOL/L (ref 98–107)
CO2 SERPL-SCNC: 26.7 MMOL/L (ref 22–29)
CREAT SERPL-MCNC: 0.72 MG/DL (ref 0.57–1)
EGFRCR SERPLBLD CKD-EPI 2021: 92.9 ML/MIN/1.73
GLOBULIN UR ELPH-MCNC: 2.8 GM/DL
GLUCOSE SERPL-MCNC: 92 MG/DL (ref 65–99)
POTASSIUM SERPL-SCNC: 4.7 MMOL/L (ref 3.5–5.2)
PROT SERPL-MCNC: 6.2 G/DL (ref 6–8.5)
SODIUM SERPL-SCNC: 133 MMOL/L (ref 136–145)

## 2024-04-23 NOTE — PAYOR COMM NOTE
"Kae Suarez, RN  Utilization Management  P:[phone]  F:[phone]    Auth# [id number]     Jose Galvin (63 y.o. Female)       Date of Birth   1960    Social Security Number       Address   [address] [place] [address]    Home Phone   [phone]    MRN   [id number]       Orthodoxy   [facility]    Marital Status                               Admission Date   4/11/24    Admission Type   Elective    Admitting Provider   Ulises Elias MD    Attending Provider       Department, Room/Bed   [facility] 3H, S372/1       Discharge Date   4/17/2024    Discharge Disposition   Home or Self Care    Discharge Destination                                 Attending Provider: (none)   Allergies: Codeine, Shrimp    Isolation: None   Infection: MRSA (04/06/24)   Code Status: Prior    Ht: 157.5 cm (62\")   Wt: 75.8 kg (167 lb 3.2 oz)    Admission Cmt: None   Principal Problem: Lumbar stenosis with neurogenic claudication [M48.062]                   Active Insurance as of 4/11/2024       Primary Coverage       Payor Plan Insurance Group Employer/Plan Group    ANTHEM MEDICARE REPLACEMENT ANTHEM MEDICARE ADVANTAGE [id number]       Payor Plan Address Payor Plan Phone Number Payor Plan Fax Number Effective Dates    [address] [phone]  7/1/2023 - None Entered    [facility] [id number]         Subscriber Name Subscriber Birth Date Member ID       JOSE GALVIN 1960 [id number]                     Emergency Contacts        (Rel.) Home Phone Work Phone Mobile Phone    Santiago Galvin (Spouse) [phone] -- [phone]                 Discharge Summary        Katherine Rose PA-C at 04/17/24 1326       Attestation signed by Ulises Elias MD at 04/19/24 1303    I have reviewed this documentation and agree.                      [facility]    Date of Admission: 4/11/2024  Date of Discharge:  " 4/18/2024    Discharge Diagnosis: Lumbar stenosis with neurogenic claudication    Procedures Performed  Procedure(s):  LUMBAR LAMINECTOMY DISCECTOMY DECOMPRESSION POSTERIOR L2-L5       Presenting Problem/History of Present Illness    History of Present Illness:  Ms. Galvin is a 63 y.o. female that presented with chronic back pain that progressed into bilateral lower extremity symptoms and standing/walking intolerance.  Studies demonstrated high-grade multilevel lumbar spinal stenosis. Thus, she underwent laminectomies at the L2-3, L3-4, and the L4-5 levels by Dr. Elias on 4/11/2024. Overall, good decompression at these levels was achieved. There were no noted dural tears during the procedure, but a small area with thin dura was noted and covered with nonsuturable DuraGen.  A drain was placed and a running nylon suture was used to close the incision.     Hospital Course:   The patient was admitted to the floor for observation. She voided independently and took oral medication to control pain. She participated with physical therapy and occupational therapy. Since her drain output continued and showed relatively clear fluid, a dural rent then became suspected. The drain was discontinued on POD #2 and Ms. Galvin remained flat in bed with an indwelling catheter. On POD #5, the patient's bedrest restriction was lifted and she was able to be mobilized; catheter was discontinued.  PT and OT recommended the patient be discharged home with home health. She was ultimately discharged at home on POD #6 (4/17/2024) with a rolling walker.     Condition on Discharge:  Stable  Discharge to: Home with assist    PATIENT SPECIFIC EDUCATION/PLAN:  1. Follow-up with neurosurgery [facility] in one week for wound check and suture removal.   2. No driving until follow-up.  3. The patient may get incision wet in the shower beginning on 4/18/2024.    4. NO tub bathing or swimming until follow-up  5. Ice pack to incision(s) as needed for associated  pain or swelling .  6. The patient is to ambulate frequently at home and may sit as tolerated. No lifting greater than 5 pounds.       Discharge Medications     Discharge Medications        New Medications        Instructions Start Date   oxyCODONE-acetaminophen 5-325 MG per tablet  Commonly known as: PERCOCET   1 tablet, Oral, 3 Times Daily PRN             Continue These Medications        Instructions Start Date   acetaminophen 325 MG tablet  Commonly known as: TYLENOL   650 mg, Oral, Every 4 Hours PRN      albuterol sulfate  (90 Base) MCG/ACT inhaler  Commonly known as: PROVENTIL HFA;VENTOLIN HFA;PROAIR HFA   2 puffs, Inhalation, Every 6 Hours PRN      alendronate 70 MG tablet  Commonly known as: Fosamax   70 mg, Oral, Every 7 Days      atorvastatin 20 MG tablet  Commonly known as: LIPITOR   20 mg, Oral, Nightly      busPIRone 15 MG tablet  Commonly known as: BUSPAR   15 mg, Oral, 2 Times Daily      celecoxib 100 MG capsule  Commonly known as: CeleBREX   TAKE 1 CAPSULE BY MOUTH 2 TIMES A DAY AS NEEDED FOR MILD PAIN      Fluticasone Furoate-Vilanterol 200-25 MCG/ACT inhaler  Commonly known as: Breo Ellipta   1 puff, Inhalation, Daily - RT      gabapentin 600 MG tablet  Commonly known as: NEURONTIN   600 mg, Oral, 3 Times Daily      levocetirizine 5 MG tablet  Commonly known as: Xyzal   5 mg, Oral, Every Evening      levothyroxine 137 MCG tablet  Commonly known as: SYNTHROID, LEVOTHROID   137 mcg, Oral, Daily      lisinopril 20 MG tablet  Commonly known as: PRINIVIL,ZESTRIL   20 mg, Oral, Daily      loperamide 2 MG capsule  Commonly known as: IMODIUM   2 mg      montelukast 10 MG tablet  Commonly known as: SINGULAIR   10 mg, Oral, Nightly      omeprazole 40 MG capsule  Commonly known as: priLOSEC   40 mg, Oral, Daily      ondansetron ODT 4 MG disintegrating tablet  Commonly known as: ZOFRAN-ODT   4 mg, Translingual, Every 8 Hours PRN      promethazine 25 MG tablet  Commonly known as: PHENERGAN   25 mg, Oral,  Every 8 Hours PRN      rOPINIRole 2 MG tablet  Commonly known as: REQUIP   2 mg, Oral, Every Night at Bedtime      tiZANidine 4 MG tablet  Commonly known as: ZANAFLEX   4 mg, Oral, Every 12 Hours PRN      traMADol 50 MG tablet  Commonly known as: ULTRAM   100 mg, Oral, Every 8 Hours PRN      Umeclidinium Bromide 62.5 MCG/ACT aerosol powder   Commonly known as: INCRUSE ELLIPTA   1 puff, Inhalation, Daily      venlafaxine  MG 24 hr capsule  Commonly known as: EFFEXOR-XR   300 mg, Oral, Daily             ASK your doctor about these medications        Instructions Start Date   Nicotrol 10 MG inhaler  Generic drug: nicotine   1 puff, Inhalation, As Needed               Follow-up Appointments  Future Appointments   Date Time Provider Department Center   4/19/2024  1:30 PM Betty Jasmine MD [facility]   4/24/2024 10:00 AM Katherine oRse PA-C [facility]   6/7/2024 11:00 AM Loli Teague APRN [facility]     Additional Instructions for the Follow-ups that You Need to Schedule       Discharge Follow-up with Specified Provider: Curt; 1 Week   As directed      To: Curt   Follow Up: 1 Week   Follow Up Details: Follow-up with physician's assistant in 1 week for wound check and suture removal                Referring Provider  Ulises Elias MD    PCP   Betty Jasmine MD Laryssa Helene Cybriwsky, PA-C  04/18/24  14:41 EDT                Electronically signed by Ulises Elias MD at 04/19/24 2897

## 2024-04-24 ENCOUNTER — OFFICE VISIT (OUTPATIENT)
Dept: NEUROSURGERY | Facility: CLINIC | Age: 64
End: 2024-04-24
Payer: MEDICARE

## 2024-04-24 VITALS
DIASTOLIC BLOOD PRESSURE: 80 MMHG | HEIGHT: 62 IN | BODY MASS INDEX: 30.18 KG/M2 | WEIGHT: 164 LBS | SYSTOLIC BLOOD PRESSURE: 130 MMHG | TEMPERATURE: 98 F

## 2024-04-24 DIAGNOSIS — M54.9 MECHANICAL BACK PAIN: ICD-10-CM

## 2024-04-24 DIAGNOSIS — M48.062 LUMBAR STENOSIS WITH NEUROGENIC CLAUDICATION: Primary | ICD-10-CM

## 2024-04-24 DIAGNOSIS — E03.9 ACQUIRED HYPOTHYROIDISM: ICD-10-CM

## 2024-04-24 DIAGNOSIS — Q76.49 SPINAL DEFORMITY: ICD-10-CM

## 2024-04-24 PROCEDURE — 3075F SYST BP GE 130 - 139MM HG: CPT

## 2024-04-24 PROCEDURE — 99024 POSTOP FOLLOW-UP VISIT: CPT

## 2024-04-24 PROCEDURE — 3079F DIAST BP 80-89 MM HG: CPT

## 2024-04-24 RX ORDER — LEVOTHYROXINE SODIUM 137 UG/1
137 TABLET ORAL DAILY
Qty: 30 TABLET | Refills: 0 | Status: SHIPPED | OUTPATIENT
Start: 2024-04-24

## 2024-04-24 NOTE — TELEPHONE ENCOUNTER
PT NEEDS A NEW PHY THERAPY REFERRAL PUT IN CHART BECAUSE THE CURRENT ONE HAS , PATIENT WAS NEVER CONTACTED TO SCHEDULE.     THANK YOU,    Yes

## 2024-04-24 NOTE — PROGRESS NOTES
"  Patient: Yessica Galvin  : 1960  Chart #: [id number]    Date of Service: 2024    Chief Complaint   Patient presents with    Post-op       HPI  Ms. Galvin is 63 year old woman with chronic back pain that progressed into bilateral lower extremity symptoms and standing/walking intolerance. Studies demonstrated high grade, multilevel lumbar spinal stenosis. Thus, she underwent laminectomies from L2-5 by Dr. Elias on 2024. An area of very thin dura was noted during the procedure and her drain output during her hospital stay was reminiscent of a CSF leak.     Today, Ms. Galvin presents for a surgical incision check and the removal of her nylon sutures. Her preoperative symptoms have largely resolved. She denies any fever or chills.   She is currently taking tramodol and gabapentin; she completed her course of Percocet.   She is gradually returning to her usual activities.       Review of Systems   Positive rash, otherwise negative.    Physical examination:  Blood pressure 130/80, temperature 98 °F (36.7 °C), temperature source Infrared, height 157.5 cm (62\"), weight 74.4 kg (164 lb), not currently breastfeeding.    Constitutional: The patient is well developed and appears her stated age.     HEENT: normocephalic, atraumatic, sclera clear    Dermatological: Lumbar surgical incision is clean, dry, and without evidence of erythema or induration. I removed her nylon sutures and the incision is holding together and healing well.     Musculoskeletal: Patient is ambulating with a walker, but reports forgetting to use it sometimes. She has a very stooped posture and a pronounced scoliosis.    Neurologic Exam  A/A/C, speech clear, attention normal, conversant, answers questions appropriately, good historian.  Cranial nerves II through XII are intact.  Sensation is intact.  No tremors are noted.      Medical Decision Making  Diagnoses and all orders for this visit:    1. Lumbar stenosis with neurogenic " claudication (Primary)    2. Mechanical back pain    3. Spinal deformity         Any copied data from previous notes included in the (1) HPI, (2) PE, (3) MDM and/or assessment and plan has been reviewed and is accurate as of this day.    Katherine Rose PA-C     Patient Care Team:  Betty Jasmine MD as PCP - General (Family Medicine)  Atul Tavarez MD as Consulting Physician (Gastroenterology)  Bobby Tom MD as Consulting Physician (Pulmonary Disease)  Bobby Tom MD as Consulting Physician (Pulmonary Disease)  Delgado Lal MD as Consulting Physician (Pain Medicine)  Ulises Elias MD as Consulting Physician (Neurosurgery)  Betty Jasmine MD as Primary Care Provider (Family Medicine)  Kota Swain MD as Referring Physician (Family Medicine)

## 2024-04-29 ENCOUNTER — LAB (OUTPATIENT)
Dept: LAB | Facility: HOSPITAL | Age: 64
End: 2024-04-29
Payer: MEDICARE

## 2024-04-29 ENCOUNTER — OFFICE VISIT (OUTPATIENT)
Dept: NEUROSURGERY | Facility: CLINIC | Age: 64
End: 2024-04-29
Payer: MEDICARE

## 2024-04-29 ENCOUNTER — TELEPHONE (OUTPATIENT)
Dept: NEUROSURGERY | Facility: CLINIC | Age: 64
End: 2024-04-29
Payer: MEDICARE

## 2024-04-29 VITALS — HEIGHT: 62 IN | TEMPERATURE: 98.4 F | WEIGHT: 165 LBS | RESPIRATION RATE: 18 BRPM | BODY MASS INDEX: 30.36 KG/M2

## 2024-04-29 DIAGNOSIS — R11.0 NAUSEA: ICD-10-CM

## 2024-04-29 DIAGNOSIS — Z22.322 MRSA (METHICILLIN RESISTANT STAPH AUREUS) CULTURE POSITIVE: Primary | ICD-10-CM

## 2024-04-29 DIAGNOSIS — T14.8XXA WOUND DRAINAGE: Primary | ICD-10-CM

## 2024-04-29 DIAGNOSIS — M51.9 LUMBAR DISC DISEASE: ICD-10-CM

## 2024-04-29 DIAGNOSIS — T14.8XXA WOUND DRAINAGE: ICD-10-CM

## 2024-04-29 PROCEDURE — 87186 SC STD MICRODIL/AGAR DIL: CPT

## 2024-04-29 PROCEDURE — 87070 CULTURE OTHR SPECIMN AEROBIC: CPT

## 2024-04-29 PROCEDURE — 87147 CULTURE TYPE IMMUNOLOGIC: CPT

## 2024-04-29 PROCEDURE — 99024 POSTOP FOLLOW-UP VISIT: CPT | Performed by: PHYSICIAN ASSISTANT

## 2024-04-29 PROCEDURE — 87205 SMEAR GRAM STAIN: CPT

## 2024-04-29 RX ORDER — PROMETHAZINE HYDROCHLORIDE 25 MG/1
25 TABLET ORAL EVERY 8 HOURS PRN
Qty: 30 TABLET | Refills: 1 | Status: SHIPPED | OUTPATIENT
Start: 2024-04-29

## 2024-04-29 RX ORDER — TIZANIDINE 4 MG/1
4 TABLET ORAL EVERY 12 HOURS PRN
Qty: 60 TABLET | Refills: 1 | Status: SHIPPED | OUTPATIENT
Start: 2024-04-29

## 2024-04-29 NOTE — TELEPHONE ENCOUNTER
I have called the patient and told her to come in for a wound check per Gladis ESPARZA.  She will be coming in today.            Provider:  Curt  Surgery/Procedure:  LUMBAR LAMINECTOMY DISCECTOMY DECOMPRESSION POSTERIOR L2-L5   Surgery/Procedure Date:  4-11-24  Last visit:   Office Visit with Katherine Rose PA-C (04/24/2024)   Next visit: 6-5-24       Reason for call: Patient called and stated the she had drainage coming from her incision site. Patient was scratching her back and accidentally scratched the bottom part of incision, and now she describes the drainage as a tangerine color. Patient says she is having headaches as well.                          Name and Age:  Yessica 63  Provider: Curt    Previously seen for (Diagnosis):Lumbar stenosis with neurogenic claudication   Last Visit Date(with what provider?):Office Visit with Katherine Rose PA-C (04/24/2024)   Next Visit: 6-5-24    Surgery Date: 4-  Surgery:  LUMBAR LAMINECTOMY DISCECTOMY DECOMPRESSION POSTERIOR L2-L5         When did symptoms(pain, numbness, tingling, weakness?) start :  Friday night    Recent injury, accident, trauma?    Scratched her incision site    Where is it located/radiate: Incision site     How long has this been going on:  Friday night    Is this new or the same as before surgery: new    Characteristics of symptom/severity: throbbing  and burning    POST OP? Fever, chills, purulent drainage, redness, swelling, worsening pain?:   Drainage and hurts some    Is it constant or intermittent: starts and stops    What makes it worse: walking    What makes it better: ice pack and a pillow behind her back    What therapies/medications have you tried:gabapentin (NEURONTIN) 600 MG tablet (04/19/2024)  Take 1 three times a day, traMADol (ULTRAM) 50 MG tablet (04/19/2024) Two tablets every 8 hours    Most recent imaging:XR Spine Lumbar Complete 4+VW (09/06/2023 15:42)     Red flag sxs: weakness, acute foot drop, bladder  issues, saddle paresthesia?:  none    No fever

## 2024-04-29 NOTE — PROGRESS NOTES
Yessica Galvin  1960  04/29/2024  [id number]    CC: Wound drainage    HPI:  S/P lumbar laminectomy L2-L5 on 4/11/2024.  The patient called the office today stating that she had some drainage from the lower end of her incision and she presents to the office for my evaluation today.  The patient has been asymptomatic, no fever or chills.  She unfortunately is smoking.    Past Medical History:   Diagnosis Date    Acute bronchitis     Acute sinusitis     Allergic rhinitis 09/06/2023    Asthma     Asthma 03/07/2020    Asthma with acute exacerbation     Asthma, extrinsic ,1970    I have had it since I was 5 yrs. Old    Asthmatic bronchitis     Bronchiectasis     Always catch it    Centrilobular emphysema 06/02/2022    Chronic bronchitis with acute exacerbation 06/02/2022    Disc degeneration, lumbar     Disc degeneration, lumbar     GERD (gastroesophageal reflux disease)     History of mammogram     Hypertension 10/05/2023    Hypothyroidism     Lower back pain     Medicare annual wellness visit, subsequent 10/05/2023    Nodule of upper lobe of right lung 06/02/2022    Plantar fasciitis     Restless legs syndrome        Allergies   Allergen Reactions    Codeine Nausea And Vomiting    Shrimp Hives         Current Outpatient Medications:     acetaminophen (TYLENOL) 325 MG tablet, Take 2 tablets by mouth Every 4 (Four) Hours As Needed for Mild Pain ., Disp: , Rfl:     albuterol sulfate  (90 Base) MCG/ACT inhaler, Inhale 2 puffs Every 6 (Six) Hours As Needed for Wheezing., Disp: 54 g, Rfl: 3    alendronate (Fosamax) 70 MG tablet, Take 1 tablet by mouth Every 7 (Seven) Days., Disp: 4 tablet, Rfl: 11    amLODIPine (NORVASC) 5 MG tablet, , Disp: , Rfl:     atorvastatin (LIPITOR) 20 MG tablet, Take 1 tablet by mouth Every Night., Disp: 90 tablet, Rfl: 3    Breztri Aerosphere 160-9-4.8 MCG/ACT aerosol inhaler, , Disp: , Rfl:     busPIRone (BUSPAR) 15 MG tablet, TAKE 1 TABLET BY MOUTH TWICE A DAY, Disp: 60 tablet,  Rfl: 5    celecoxib (CeleBREX) 100 MG capsule, TAKE 1 CAPSULE BY MOUTH 2 TIMES A DAY AS NEEDED FOR MILD PAIN, Disp: 60 capsule, Rfl: 1    Fluticasone Furoate-Vilanterol (Breo Ellipta) 200-25 MCG/ACT inhaler, Inhale 1 puff Daily., Disp: 60 each, Rfl: 2    gabapentin (NEURONTIN) 600 MG tablet, Take 1 tablet by mouth 3 (Three) Times a Day., Disp: 90 tablet, Rfl: 2    levocetirizine (Xyzal) 5 MG tablet, Take 1 tablet by mouth Every Evening., Disp: 90 tablet, Rfl: 1    levothyroxine (SYNTHROID, LEVOTHROID) 137 MCG tablet, TAKE 1 TABLET BY MOUTH DAILY, Disp: 30 tablet, Rfl: 0    lisinopril (PRINIVIL,ZESTRIL) 20 MG tablet, TAKE 1 TABLET BY MOUTH DAILY, Disp: 30 tablet, Rfl: 0    loperamide (IMODIUM) 2 MG capsule, 1 capsule., Disp: , Rfl:     montelukast (SINGULAIR) 10 MG tablet, TAKE ONE TABLET BY MOUTH ONCE NIGHTLY, Disp: 90 tablet, Rfl: 1    nicotine (Nicotrol) 10 MG inhaler, Inhale 1 puff As Needed for Smoking Cessation., Disp: 168 each, Rfl: 1    omeprazole (priLOSEC) 40 MG capsule, TAKE 1 CAPSULE BY MOUTH DAILY, Disp: 90 capsule, Rfl: 1    ondansetron ODT (ZOFRAN-ODT) 4 MG disintegrating tablet, Place 1 tablet on the tongue Every 8 (Eight) Hours As Needed for Nausea or Vomiting., Disp: 20 tablet, Rfl: 1    promethazine (PHENERGAN) 25 MG tablet, Take 1 tablet by mouth Every 8 (Eight) Hours As Needed for Nausea or Vomiting., Disp: 30 tablet, Rfl: 1    rOPINIRole (REQUIP) 3 MG tablet, Take 1 tablet by mouth Every Night., Disp: 30 tablet, Rfl: 2    tiZANidine (ZANAFLEX) 4 MG tablet, Take 1 tablet by mouth Every 12 (Twelve) Hours As Needed for Muscle Spasms., Disp: 60 tablet, Rfl: 1    traMADol (ULTRAM) 50 MG tablet, Take 2 tablets by mouth Every 8 (Eight) Hours As Needed for Moderate Pain., Disp: 150 tablet, Rfl: 2    Umeclidinium Bromide (INCRUSE ELLIPTA) 62.5 MCG/ACT aerosol powder , Inhale 1 puff Daily., Disp: 30 each, Rfl: 2    venlafaxine XR (EFFEXOR-XR) 150 MG 24 hr capsule, Take 2 capsules by mouth Daily., Disp: 60  capsule, Rfl: 2    Review of Systems   Constitutional:  Negative for activity change, appetite change, chills, diaphoresis, fatigue, fever and unexpected weight change.   HENT:  Positive for sinus pressure. Negative for congestion, dental problem, drooling, ear discharge, ear pain, facial swelling, hearing loss, mouth sores, nosebleeds, postnasal drip, rhinorrhea, sneezing, sore throat, tinnitus, trouble swallowing and voice change.    Eyes:  Negative for photophobia, pain, discharge, redness, itching and visual disturbance.   Respiratory:  Positive for cough. Negative for apnea, choking, chest tightness, shortness of breath, wheezing and stridor.    Cardiovascular:  Positive for leg swelling. Negative for chest pain and palpitations.   Gastrointestinal:  Negative for abdominal distention, abdominal pain, anal bleeding, blood in stool, constipation, diarrhea, nausea, rectal pain and vomiting.   Endocrine: Negative for cold intolerance, heat intolerance, polydipsia, polyphagia and polyuria.   Genitourinary:  Negative for decreased urine volume, difficulty urinating, dysuria, enuresis, flank pain, frequency, genital sores, hematuria and urgency.   Musculoskeletal:  Positive for back pain, joint swelling and myalgias. Negative for arthralgias, gait problem, neck pain and neck stiffness.   Skin:  Positive for wound. Negative for color change, pallor and rash.   Allergic/Immunologic: Negative for environmental allergies, food allergies and immunocompromised state.   Neurological:  Positive for light-headedness and headaches. Negative for dizziness, tremors, seizures, syncope, facial asymmetry, speech difficulty, weakness and numbness.   Hematological:  Negative for adenopathy. Does not bruise/bleed easily.   Psychiatric/Behavioral:  Negative for agitation, behavioral problems, confusion, decreased concentration, dysphoric mood, hallucinations, self-injury, sleep disturbance and suicidal ideas. The patient is not  "nervous/anxious and is not hyperactive.    All other systems reviewed and are negative.        PE:  Temp 98.4 °F (36.9 °C) (Infrared)   Resp 18   Ht 157.5 cm (62\")   Wt 74.8 kg (165 lb)   BMI 30.18 kg/m²   Heart- RRR  Lungs- no wheezing, normal expansion    Wound-drainage of milky fluid from the lower pole of the incision.  The wound was prepped in a sterile culture was obtained.  The patient was then prepped again and probed with a sterile Q-tip with limited findings.    Neurologic Exam   Continues with horrible kyphosis, she is ambulatory with her rolling walker without any signs of weakness.    MDM  I have stressed to the patient that she needs to stop smoking while her wound tries to heal.  She will do her best.  I got no further drainage from her incision and a large Band-Aid was placed over the incision.  The  will check the incision a couple times per day and call us with any problems.  I will check her labs and give her a call tomorrow.  Activities and restrictions were discussed.  Wound care was discussed with the patient.  Diagnoses and all orders for this visit:    1. Wound drainage (Primary)  -     Wound Culture - Surgical Site, Back; Future  -     Gram Stain - No Culture - , Back; Future         Any copied data from previous notes included in the (1) HPI, (2) PE, (3) MDM and/or Assessment and Plan has been reviewed and is accurate.    Faye Pandey, PAC    "

## 2024-04-30 ENCOUNTER — TELEPHONE (OUTPATIENT)
Dept: NEUROSURGERY | Facility: CLINIC | Age: 64
End: 2024-04-30
Payer: MEDICARE

## 2024-04-30 PROBLEM — Z22.322 MRSA (METHICILLIN RESISTANT STAPH AUREUS) CULTURE POSITIVE: Status: ACTIVE | Noted: 2024-04-30

## 2024-04-30 RX ORDER — SULFAMETHOXAZOLE AND TRIMETHOPRIM 800; 160 MG/1; MG/1
1 TABLET ORAL 2 TIMES DAILY
Qty: 20 TABLET | Refills: 0 | Status: SHIPPED | OUTPATIENT
Start: 2024-04-30

## 2024-04-30 NOTE — TELEPHONE ENCOUNTER
Patients wound culture came back positive for MRSA.     Called patient to let her know:    Typically caused by scratching or touching her incision.     Let her know that it is highly contagious and that her  needs to either wear gloves when changing bandage or scrub his hands/nails several times a day.     Needs to wash any clothes/lines in hot water/bleach(if able)    She needs an appt to see SOPHIE Murphy next Tuesday 5/7 for a wound check.     She needs to call us Friday morning 5/3 if having any drainage to come in same day to be re-evaluated.     Please schedule appt for next Tuesday.     Patient is also requesting a prescription for percocet for pain. She has Tramadol on hand but states it is not helping.

## 2024-05-01 LAB
BACTERIA SPEC AEROBE CULT: ABNORMAL
GRAM STN SPEC: ABNORMAL
GRAM STN SPEC: ABNORMAL

## 2024-05-07 ENCOUNTER — OFFICE VISIT (OUTPATIENT)
Dept: NEUROSURGERY | Facility: CLINIC | Age: 64
End: 2024-05-07
Payer: MEDICARE

## 2024-05-07 ENCOUNTER — LAB (OUTPATIENT)
Dept: LAB | Facility: HOSPITAL | Age: 64
End: 2024-05-07
Payer: MEDICARE

## 2024-05-07 VITALS
BODY MASS INDEX: 29.19 KG/M2 | SYSTOLIC BLOOD PRESSURE: 140 MMHG | TEMPERATURE: 98 F | WEIGHT: 158.6 LBS | HEIGHT: 62 IN | DIASTOLIC BLOOD PRESSURE: 70 MMHG

## 2024-05-07 DIAGNOSIS — T14.8XXA WOUND DRAINAGE: Primary | ICD-10-CM

## 2024-05-07 DIAGNOSIS — R74.8 ALKALINE PHOSPHATASE ELEVATION: ICD-10-CM

## 2024-05-07 DIAGNOSIS — R74.01 TRANSAMINITIS: ICD-10-CM

## 2024-05-07 DIAGNOSIS — T14.8XXA WOUND DRAINAGE: ICD-10-CM

## 2024-05-07 DIAGNOSIS — M48.062 LUMBAR STENOSIS WITH NEUROGENIC CLAUDICATION: ICD-10-CM

## 2024-05-07 LAB
ALBUMIN SERPL-MCNC: 3.9 G/DL (ref 3.5–5.2)
ALBUMIN/GLOB SERPL: 1.4 G/DL
ALP SERPL-CCNC: 148 U/L (ref 39–117)
ALT SERPL W P-5'-P-CCNC: 19 U/L (ref 1–33)
ANION GAP SERPL CALCULATED.3IONS-SCNC: 11 MMOL/L (ref 5–15)
AST SERPL-CCNC: 18 U/L (ref 1–32)
BASOPHILS # BLD AUTO: 0.04 10*3/MM3 (ref 0–0.2)
BASOPHILS NFR BLD AUTO: 0.3 % (ref 0–1.5)
BILIRUB SERPL-MCNC: 0.2 MG/DL (ref 0–1.2)
BUN SERPL-MCNC: 11 MG/DL (ref 8–23)
BUN/CREAT SERPL: 15.5 (ref 7–25)
CALCIUM SPEC-SCNC: 9.5 MG/DL (ref 8.6–10.5)
CHLORIDE SERPL-SCNC: 100 MMOL/L (ref 98–107)
CO2 SERPL-SCNC: 24 MMOL/L (ref 22–29)
CREAT SERPL-MCNC: 0.71 MG/DL (ref 0.57–1)
CRP SERPL-MCNC: 7.71 MG/DL (ref 0–0.5)
DEPRECATED RDW RBC AUTO: 41.9 FL (ref 37–54)
EGFRCR SERPLBLD CKD-EPI 2021: 94.5 ML/MIN/1.73
EOSINOPHIL # BLD AUTO: 0.54 10*3/MM3 (ref 0–0.4)
EOSINOPHIL NFR BLD AUTO: 4.5 % (ref 0.3–6.2)
ERYTHROCYTE [DISTWIDTH] IN BLOOD BY AUTOMATED COUNT: 12.9 % (ref 12.3–15.4)
ERYTHROCYTE [SEDIMENTATION RATE] IN BLOOD: 35 MM/HR (ref 0–30)
GLOBULIN UR ELPH-MCNC: 2.8 GM/DL
GLUCOSE SERPL-MCNC: 84 MG/DL (ref 65–99)
HCT VFR BLD AUTO: 30.5 % (ref 34–46.6)
HGB BLD-MCNC: 9.9 G/DL (ref 12–15.9)
IMM GRANULOCYTES # BLD AUTO: 0.05 10*3/MM3 (ref 0–0.05)
IMM GRANULOCYTES NFR BLD AUTO: 0.4 % (ref 0–0.5)
LYMPHOCYTES # BLD AUTO: 1.73 10*3/MM3 (ref 0.7–3.1)
LYMPHOCYTES NFR BLD AUTO: 14.3 % (ref 19.6–45.3)
MCH RBC QN AUTO: 29.1 PG (ref 26.6–33)
MCHC RBC AUTO-ENTMCNC: 32.5 G/DL (ref 31.5–35.7)
MCV RBC AUTO: 89.7 FL (ref 79–97)
MONOCYTES # BLD AUTO: 1.05 10*3/MM3 (ref 0.1–0.9)
MONOCYTES NFR BLD AUTO: 8.7 % (ref 5–12)
NEUTROPHILS NFR BLD AUTO: 71.8 % (ref 42.7–76)
NEUTROPHILS NFR BLD AUTO: 8.66 10*3/MM3 (ref 1.7–7)
NRBC BLD AUTO-RTO: 0 /100 WBC (ref 0–0.2)
PLATELET # BLD AUTO: 405 10*3/MM3 (ref 140–450)
PMV BLD AUTO: 8.7 FL (ref 6–12)
POTASSIUM SERPL-SCNC: 4.8 MMOL/L (ref 3.5–5.2)
PROT SERPL-MCNC: 6.7 G/DL (ref 6–8.5)
RBC # BLD AUTO: 3.4 10*6/MM3 (ref 3.77–5.28)
SODIUM SERPL-SCNC: 135 MMOL/L (ref 136–145)
WBC NRBC COR # BLD AUTO: 12.07 10*3/MM3 (ref 3.4–10.8)

## 2024-05-07 PROCEDURE — 85652 RBC SED RATE AUTOMATED: CPT

## 2024-05-07 PROCEDURE — 99024 POSTOP FOLLOW-UP VISIT: CPT

## 2024-05-07 PROCEDURE — 3077F SYST BP >= 140 MM HG: CPT

## 2024-05-07 PROCEDURE — 3078F DIAST BP <80 MM HG: CPT

## 2024-05-07 PROCEDURE — 86140 C-REACTIVE PROTEIN: CPT

## 2024-05-07 PROCEDURE — 36415 COLL VENOUS BLD VENIPUNCTURE: CPT

## 2024-05-07 PROCEDURE — 85025 COMPLETE CBC W/AUTO DIFF WBC: CPT

## 2024-05-07 PROCEDURE — 80053 COMPREHEN METABOLIC PANEL: CPT

## 2024-05-07 RX ORDER — SULFAMETHOXAZOLE AND TRIMETHOPRIM 800; 160 MG/1; MG/1
1 TABLET ORAL 2 TIMES DAILY
Qty: 20 TABLET | Refills: 0 | Status: SHIPPED | OUTPATIENT
Start: 2024-05-07

## 2024-05-09 ENCOUNTER — PRIOR AUTHORIZATION (OUTPATIENT)
Dept: FAMILY MEDICINE CLINIC | Facility: CLINIC | Age: 64
End: 2024-05-09
Payer: MEDICARE

## 2024-05-09 ENCOUNTER — TELEPHONE (OUTPATIENT)
Dept: FAMILY MEDICINE CLINIC | Facility: CLINIC | Age: 64
End: 2024-05-09

## 2024-05-09 DIAGNOSIS — I10 HYPERTENSION, UNSPECIFIED TYPE: ICD-10-CM

## 2024-05-09 RX ORDER — LISINOPRIL 20 MG/1
20 TABLET ORAL DAILY
Qty: 30 TABLET | Refills: 0 | Status: SHIPPED | OUTPATIENT
Start: 2024-05-09

## 2024-05-09 NOTE — TELEPHONE ENCOUNTER
Caller: Yessica Galvin    Relationship: Self    Best call back number: [phone]    Which medication are you concerned about: NICOTROL    Who prescribed you this medication: DR JIMÉNEZ    What are your concerns: INSURANCE IS REQUIRING A PRIOR AUTHORIZATION FOR MEDICATION. PLEASE ADVISE

## 2024-05-09 NOTE — TELEPHONE ENCOUNTER
Yessica Galvin   (Ayoub: [id number])  PA Case ID #: [id number]    Approved today     Coverage Starts on: 2/8/2024 12:00:00 AM,   Coverage Ends on: 5/9/2025 12:00:00 AM.    Nicotrol 10MG inhalers

## 2024-05-14 ENCOUNTER — LAB (OUTPATIENT)
Dept: LAB | Facility: HOSPITAL | Age: 64
End: 2024-05-14
Payer: MEDICARE

## 2024-05-14 ENCOUNTER — OFFICE VISIT (OUTPATIENT)
Dept: NEUROSURGERY | Facility: CLINIC | Age: 64
End: 2024-05-14
Payer: MEDICARE

## 2024-05-14 VITALS
WEIGHT: 156 LBS | BODY MASS INDEX: 28.71 KG/M2 | TEMPERATURE: 98.2 F | HEIGHT: 62 IN | DIASTOLIC BLOOD PRESSURE: 80 MMHG | SYSTOLIC BLOOD PRESSURE: 148 MMHG

## 2024-05-14 DIAGNOSIS — R11.0 NAUSEA: ICD-10-CM

## 2024-05-14 DIAGNOSIS — T14.8XXA WOUND DRAINAGE: Primary | ICD-10-CM

## 2024-05-14 DIAGNOSIS — M48.062 LUMBAR STENOSIS WITH NEUROGENIC CLAUDICATION: ICD-10-CM

## 2024-05-14 DIAGNOSIS — T14.8XXA WOUND DRAINAGE: ICD-10-CM

## 2024-05-14 LAB
BASOPHILS # BLD AUTO: 0.03 10*3/MM3 (ref 0–0.2)
BASOPHILS NFR BLD AUTO: 0.4 % (ref 0–1.5)
DEPRECATED RDW RBC AUTO: 41 FL (ref 37–54)
EOSINOPHIL # BLD AUTO: 0.45 10*3/MM3 (ref 0–0.4)
EOSINOPHIL NFR BLD AUTO: 5.8 % (ref 0.3–6.2)
ERYTHROCYTE [DISTWIDTH] IN BLOOD BY AUTOMATED COUNT: 13 % (ref 12.3–15.4)
ERYTHROCYTE [SEDIMENTATION RATE] IN BLOOD: 31 MM/HR (ref 0–30)
HCT VFR BLD AUTO: 29.8 % (ref 34–46.6)
HGB BLD-MCNC: 10 G/DL (ref 12–15.9)
IMM GRANULOCYTES # BLD AUTO: 0.03 10*3/MM3 (ref 0–0.05)
IMM GRANULOCYTES NFR BLD AUTO: 0.4 % (ref 0–0.5)
LYMPHOCYTES # BLD AUTO: 1.89 10*3/MM3 (ref 0.7–3.1)
LYMPHOCYTES NFR BLD AUTO: 24.3 % (ref 19.6–45.3)
MCH RBC QN AUTO: 29.4 PG (ref 26.6–33)
MCHC RBC AUTO-ENTMCNC: 33.6 G/DL (ref 31.5–35.7)
MCV RBC AUTO: 87.6 FL (ref 79–97)
MONOCYTES # BLD AUTO: 0.71 10*3/MM3 (ref 0.1–0.9)
MONOCYTES NFR BLD AUTO: 9.1 % (ref 5–12)
NEUTROPHILS NFR BLD AUTO: 4.67 10*3/MM3 (ref 1.7–7)
NEUTROPHILS NFR BLD AUTO: 60 % (ref 42.7–76)
NRBC BLD AUTO-RTO: 0 /100 WBC (ref 0–0.2)
PLATELET # BLD AUTO: 513 10*3/MM3 (ref 140–450)
PMV BLD AUTO: 8.6 FL (ref 6–12)
RBC # BLD AUTO: 3.4 10*6/MM3 (ref 3.77–5.28)
WBC NRBC COR # BLD AUTO: 7.78 10*3/MM3 (ref 3.4–10.8)

## 2024-05-14 PROCEDURE — 36415 COLL VENOUS BLD VENIPUNCTURE: CPT

## 2024-05-14 PROCEDURE — 85025 COMPLETE CBC W/AUTO DIFF WBC: CPT

## 2024-05-14 PROCEDURE — 85652 RBC SED RATE AUTOMATED: CPT

## 2024-05-14 PROCEDURE — 99024 POSTOP FOLLOW-UP VISIT: CPT

## 2024-05-14 PROCEDURE — 3077F SYST BP >= 140 MM HG: CPT

## 2024-05-14 PROCEDURE — 86140 C-REACTIVE PROTEIN: CPT

## 2024-05-14 PROCEDURE — 3079F DIAST BP 80-89 MM HG: CPT

## 2024-05-14 RX ORDER — PROMETHAZINE HYDROCHLORIDE 25 MG/1
25 TABLET ORAL EVERY 8 HOURS PRN
Qty: 30 TABLET | Refills: 1 | OUTPATIENT
Start: 2024-05-14

## 2024-05-14 NOTE — PROGRESS NOTES
Patient: Yessica Galvin  : 1960  Chart #: [id number]    Date of Service: 2024    Chief Complaint   Patient presents with    Post-op     Wound drainage        HPI  Ms. Galvin is a 63-year-old woman s/p lumbar laminectomies from L2-5 by Dr. Elias on 2024.  She is here for follow-up on incisional drainage from her postoperative wound site.  Wound cultures demonstrated that she was positive for MRSA.  Her most recent labs demonstrated a CRP of 7.71, sedimentation rate of 35, and a white blood count of 12.07 consistent with infection.  She has been taking Bactrim twice daily.  She is a smoker and generally struggles to eat a nutritious diet.  She does deny any fever or chills and only has intermittent drainage from her symptoms.  She does feel she has had increased mouth and hand dryness and wondered if this could be related to her Bactrim therapy.    She is suffering some falls that she and her  feel may be related to the muscle relaxer that she is taking a couple of hours before bed.    Chronic Illnesses:    Past Medical History:   Diagnosis Date    Acute bronchitis     Acute sinusitis     Allergic rhinitis 2023    Asthma     Asthma 2020    Asthma with acute exacerbation     Asthma, extrinsic ,1970    I have had it since I was 5 yrs. Old    Asthmatic bronchitis     Bronchiectasis     Always catch it    Centrilobular emphysema 2022    Chronic bronchitis with acute exacerbation 2022    Disc degeneration, lumbar     Disc degeneration, lumbar     GERD (gastroesophageal reflux disease)     History of mammogram     Hypertension 10/05/2023    Hypothyroidism     Lower back pain     Medicare annual wellness visit, subsequent 10/05/2023    Nodule of upper lobe of right lung 2022    Plantar fasciitis     Restless legs syndrome          Past Surgical History:   Procedure Laterality Date    CARDIAC CATHETERIZATION N/A 2023    Procedure: Left Heart Cath;  Surgeon:  Arben Pittman MD;  Location:  [facility];  Service: Cardiology;  Laterality: N/A;    CHOLECYSTECTOMY      COLONOSCOPY      HYSTERECTOMY      KNEE ARTHROSCOPY Right 03/10/2020    Procedure: KNEE ARTHROSCOPY RIGHT;  Surgeon: Guanaco Thakur MD;  Location:  [facility];  Service: Orthopedics;  Laterality: Right;    LUMBAR LAMINECTOMY DISCECTOMY DECOMPRESSION N/A 4/11/2024    Procedure: LUMBAR LAMINECTOMY DISCECTOMY DECOMPRESSION POSTERIOR L2-L5;  Surgeon: Ulises Elias MD;  Location:  [facility];  Service: Neurosurgery;  Laterality: N/A;    NECK SURGERY      ORIF WRIST FRACTURE Left 03/10/2020    Procedure: WRIST OPEN REDUCTION INTERNAL FIXATION LEFT;  Surgeon: Guanaco Thakur MD;  Location:  [facility];  Service: Orthopedics;  Laterality: Left;    TIBIAL PLATEAU OPEN REDUCTION INTERNAL FIXATION Right 03/10/2020    Procedure: TIBIAL PLATEAU OPEN REDUCTION INTERNAL FIXATION RIGHT;  Surgeon: Guanaco Thakur MD;  Location:  [facility];  Service: Orthopedics;  Laterality: Right;    UPPER GASTROINTESTINAL ENDOSCOPY         Allergies   Allergen Reactions    Codeine Nausea And Vomiting    Shrimp Hives         Current Outpatient Medications:     acetaminophen (TYLENOL) 325 MG tablet, Take 2 tablets by mouth Every 4 (Four) Hours As Needed for Mild Pain ., Disp: , Rfl:     albuterol sulfate  (90 Base) MCG/ACT inhaler, Inhale 2 puffs Every 6 (Six) Hours As Needed for Wheezing., Disp: 54 g, Rfl: 3    alendronate (Fosamax) 70 MG tablet, Take 1 tablet by mouth Every 7 (Seven) Days., Disp: 4 tablet, Rfl: 11    amLODIPine (NORVASC) 5 MG tablet, , Disp: , Rfl:     atorvastatin (LIPITOR) 20 MG tablet, Take 1 tablet by mouth Every Night., Disp: 90 tablet, Rfl: 3    Breztri Aerosphere 160-9-4.8 MCG/ACT aerosol inhaler, , Disp: , Rfl:     busPIRone (BUSPAR) 15 MG tablet, TAKE 1 TABLET BY MOUTH TWICE A DAY, Disp: 60 tablet, Rfl: 5    celecoxib (CeleBREX) 100 MG capsule, TAKE 1 CAPSULE BY MOUTH 2 TIMES A DAY  AS NEEDED FOR MILD PAIN, Disp: 60 capsule, Rfl: 1    Fluticasone Furoate-Vilanterol (Breo Ellipta) 200-25 MCG/ACT inhaler, Inhale 1 puff Daily., Disp: 60 each, Rfl: 2    gabapentin (NEURONTIN) 600 MG tablet, Take 1 tablet by mouth 3 (Three) Times a Day., Disp: 90 tablet, Rfl: 2    levocetirizine (Xyzal) 5 MG tablet, Take 1 tablet by mouth Every Evening., Disp: 90 tablet, Rfl: 1    levothyroxine (SYNTHROID, LEVOTHROID) 137 MCG tablet, TAKE 1 TABLET BY MOUTH DAILY, Disp: 30 tablet, Rfl: 0    lisinopril (PRINIVIL,ZESTRIL) 20 MG tablet, TAKE 1 TABLET BY MOUTH DAILY, Disp: 30 tablet, Rfl: 0    loperamide (IMODIUM) 2 MG capsule, 1 capsule., Disp: , Rfl:     montelukast (SINGULAIR) 10 MG tablet, TAKE ONE TABLET BY MOUTH ONCE NIGHTLY, Disp: 90 tablet, Rfl: 1    nicotine (Nicotrol) 10 MG inhaler, Inhale 1 puff As Needed for Smoking Cessation., Disp: 168 each, Rfl: 1    omeprazole (priLOSEC) 40 MG capsule, TAKE 1 CAPSULE BY MOUTH DAILY, Disp: 90 capsule, Rfl: 1    ondansetron ODT (ZOFRAN-ODT) 4 MG disintegrating tablet, Place 1 tablet on the tongue Every 8 (Eight) Hours As Needed for Nausea or Vomiting., Disp: 20 tablet, Rfl: 1    promethazine (PHENERGAN) 25 MG tablet, Take 1 tablet by mouth Every 8 (Eight) Hours As Needed for Nausea or Vomiting., Disp: 30 tablet, Rfl: 1    rOPINIRole (REQUIP) 3 MG tablet, Take 1 tablet by mouth Every Night., Disp: 30 tablet, Rfl: 2    sulfamethoxazole-trimethoprim (Bactrim DS) 800-160 MG per tablet, Take 1 tablet by mouth 2 (Two) Times a Day., Disp: 20 tablet, Rfl: 0    sulfamethoxazole-trimethoprim (Bactrim DS) 800-160 MG per tablet, Take 1 tablet by mouth 2 (Two) Times a Day., Disp: 20 tablet, Rfl: 0    tiZANidine (ZANAFLEX) 4 MG tablet, Take 1 tablet by mouth Every 12 (Twelve) Hours As Needed for Muscle Spasms., Disp: 60 tablet, Rfl: 1    traMADol (ULTRAM) 50 MG tablet, Take 2 tablets by mouth Every 8 (Eight) Hours As Needed for Moderate Pain., Disp: 150 tablet, Rfl: 2    Umeclidinium  Bromide (INCRUSE ELLIPTA) 62.5 MCG/ACT aerosol powder , Inhale 1 puff Daily., Disp: 30 each, Rfl: 2    venlafaxine XR (EFFEXOR-XR) 150 MG 24 hr capsule, Take 2 capsules by mouth Daily., Disp: 60 capsule, Rfl: 2    Social History     Socioeconomic History    Marital status:    Tobacco Use    Smoking status: Some Days     Current packs/day: 0.00     Average packs/day: 0.3 packs/day for 23.4 years (5.9 ttl pk-yrs)     Types: Cigarettes     Start date: 3/7/2000     Last attempt to quit: 2023     Years since quittin.7     Passive exposure: Current    Smokeless tobacco: Never    Tobacco comments:     I started when i was 17   Vaping Use    Vaping status: Never Used   Substance and Sexual Activity    Alcohol use: No    Drug use: No    Sexual activity: Not Currently     Partners: Female     Birth control/protection: None       Family History   Problem Relation Age of Onset    Aneurysm Mother     Hypertension Mother     Cancer Mother     Asthma Mother     Heart disease Mother     Diabetes Mother     Hypertension Father     Alzheimer's disease Father     Asthma Father     No Known Problems Sister     No Known Problems Brother     COPD Maternal Grandmother     Aneurysm Maternal Grandmother     No Known Problems Maternal Grandfather     Alzheimer's disease Paternal Grandmother     Dementia Paternal Grandmother     No Known Problems Paternal Grandfather     Breast cancer Neg Hx     Ovarian cancer Neg Hx           Review of Systems   Constitutional:  Negative for activity change, appetite change, chills, diaphoresis, fatigue, fever and unexpected weight change.   HENT:  Negative for congestion, dental problem, drooling, ear discharge, ear pain, facial swelling, hearing loss, mouth sores, nosebleeds, postnasal drip, rhinorrhea, sinus pressure, sinus pain, sneezing, sore throat, tinnitus, trouble swallowing and voice change.    Eyes:  Negative for photophobia, pain, discharge, redness, itching and visual  "disturbance.   Respiratory:  Negative for apnea, cough, choking, chest tightness, shortness of breath, wheezing and stridor.    Cardiovascular:  Negative for chest pain, palpitations and leg swelling.   Gastrointestinal:  Negative for abdominal distention, abdominal pain, anal bleeding, blood in stool, constipation, diarrhea, nausea, rectal pain and vomiting.   Endocrine: Negative for cold intolerance, heat intolerance, polydipsia, polyphagia and polyuria.   Genitourinary:  Negative for decreased urine volume, difficulty urinating, dyspareunia, dysuria, enuresis, flank pain, frequency, genital sores, hematuria, menstrual problem, pelvic pain, urgency, vaginal bleeding, vaginal discharge and vaginal pain.   Musculoskeletal:  Positive for myalgias. Negative for arthralgias, back pain, gait problem, joint swelling, neck pain and neck stiffness.   Skin:  Positive for wound. Negative for color change, pallor and rash.   Allergic/Immunologic: Negative for environmental allergies, food allergies and immunocompromised state.   Neurological:  Negative for dizziness, tremors, seizures, syncope, facial asymmetry, speech difficulty, weakness, light-headedness, numbness and headaches.   Hematological:  Negative for adenopathy. Does not bruise/bleed easily.   Psychiatric/Behavioral:  Negative for agitation, behavioral problems, confusion, decreased concentration, dysphoric mood, hallucinations, self-injury, sleep disturbance and suicidal ideas. The patient is not nervous/anxious and is not hyperactive.           Physical examination:  Blood pressure 148/80, temperature 98.2 °F (36.8 °C), temperature source Infrared, height 157.5 cm (62\"), weight 70.8 kg (156 lb), not currently breastfeeding.    Constitutional: The patient appears older than her stated age.  She is pleasant and cooperative.    HEENT: normocephalic, atraumatic, sclera clear.  The patient's tongue is very cracked and fissured with a beet red appearance.  I do not " appreciate any obvious lesion on the inside of her lips and mouth.    Musculoskeletal: The patient has a very stooped posture secondary to her spinal deformities.    Dermatological: Again minimal serosanguineous drainage from the lower pole of the incision.  I attempted to probe the site with a sterile Q-tip and was unable to find any evidence of wound tunneling.  The rest of the incision is dry and without evidence of induration erythema.  There is some tenderness to palpation that does not appear to be changed.    Of note, the patient has lots of abrasions and scrapes on bilateral extremities particularly over her left hand after a recent fall.    Neurologic Exam  A/A/C, speech clear, attention normal, conversant, answers questions appropriately, good historian.  Motor examination does not reveal weakness in the , upper or lower extremities.   Sensation is intact.  Gait is stooped, balance is normal.   No tremors are noted.  Reflexes are intact.       Medical Decision Making  Diagnoses and all orders for this visit:    1. Wound drainage (Primary)  -     Sedimentation Rate; Future  -     C-reactive Protein; Future  -     CBC & Differential; Future    2. Lumbar stenosis with neurogenic claudication    Ms. Galvin' surgical site appears to be unchanged in comparison to last week.  I am going to obtain labs again to see if her infection markers are trending down.  We had a addison discussion concerning her smoking status and inadequate nutrition are making it difficult for her to heal.  I recommended that she increase the protein and whole foods in her diet.  I have reiterated the importance of the patient ambulating with her walker.  She is going to follow-up with her primary care provider with regards to a muscle relaxer she takes at night as she and her  feel this could be making her unsteady.  She will follow-up with me next week to assess her labs and incision status.      Any copied data from previous  notes included in the (1) HPI, (2) PE, (3) MDM and/or assessment and plan has been reviewed and is accurate as of this day.    Katherine Rose PA-C     Patient Care Team:  Betty Jasmine MD as PCP - General (Family Medicine)  Atul Tavarez MD as Consulting Physician (Gastroenterology)  Bobby Tom MD as Consulting Physician (Pulmonary Disease)  Bobby Tom MD as Consulting Physician (Pulmonary Disease)  Delgado Lal MD as Consulting Physician (Pain Medicine)  Ulises Elias MD as Consulting Physician (Neurosurgery)  Betty Jasmine MD as Primary Care Provider (Family Medicine)  Kota Swain MD as Referring Physician (Family Medicine)

## 2024-05-15 LAB — CRP SERPL-MCNC: 1.85 MG/DL (ref 0–0.5)

## 2024-05-17 DIAGNOSIS — F32.A ANXIETY AND DEPRESSION: ICD-10-CM

## 2024-05-17 DIAGNOSIS — F41.9 ANXIETY AND DEPRESSION: ICD-10-CM

## 2024-05-20 RX ORDER — VENLAFAXINE HYDROCHLORIDE 150 MG/1
300 CAPSULE, EXTENDED RELEASE ORAL DAILY
Qty: 60 CAPSULE | Refills: 2 | Status: SHIPPED | OUTPATIENT
Start: 2024-05-20 | End: 2024-05-24

## 2024-05-21 DIAGNOSIS — E03.9 ACQUIRED HYPOTHYROIDISM: ICD-10-CM

## 2024-05-21 RX ORDER — LEVOTHYROXINE SODIUM 137 UG/1
137 TABLET ORAL DAILY
Qty: 30 TABLET | Refills: 0 | Status: SHIPPED | OUTPATIENT
Start: 2024-05-21

## 2024-05-24 ENCOUNTER — LAB (OUTPATIENT)
Dept: LAB | Facility: HOSPITAL | Age: 64
End: 2024-05-24
Payer: MEDICARE

## 2024-05-24 ENCOUNTER — OFFICE VISIT (OUTPATIENT)
Dept: NEUROSURGERY | Facility: CLINIC | Age: 64
End: 2024-05-24
Payer: MEDICARE

## 2024-05-24 VITALS — WEIGHT: 157 LBS | BODY MASS INDEX: 28.89 KG/M2 | TEMPERATURE: 98.4 F | HEIGHT: 62 IN

## 2024-05-24 DIAGNOSIS — G25.81 RESTLESS LEG SYNDROME: ICD-10-CM

## 2024-05-24 DIAGNOSIS — M48.062 LUMBAR STENOSIS WITH NEUROGENIC CLAUDICATION: ICD-10-CM

## 2024-05-24 DIAGNOSIS — M51.9 LUMBAR DISC DISEASE: ICD-10-CM

## 2024-05-24 DIAGNOSIS — T14.8XXA WOUND DRAINAGE: ICD-10-CM

## 2024-05-24 DIAGNOSIS — T14.8XXA WOUND DRAINAGE: Primary | ICD-10-CM

## 2024-05-24 LAB
BASOPHILS # BLD AUTO: 0.05 10*3/MM3 (ref 0–0.2)
BASOPHILS NFR BLD AUTO: 0.6 % (ref 0–1.5)
DEPRECATED RDW RBC AUTO: 46.1 FL (ref 37–54)
EOSINOPHIL # BLD AUTO: 0.43 10*3/MM3 (ref 0–0.4)
EOSINOPHIL NFR BLD AUTO: 5.2 % (ref 0.3–6.2)
ERYTHROCYTE [DISTWIDTH] IN BLOOD BY AUTOMATED COUNT: 13.8 % (ref 12.3–15.4)
ERYTHROCYTE [SEDIMENTATION RATE] IN BLOOD: 18 MM/HR (ref 0–30)
HCT VFR BLD AUTO: 31.2 % (ref 34–46.6)
HGB BLD-MCNC: 9.8 G/DL (ref 12–15.9)
IMM GRANULOCYTES # BLD AUTO: 0.02 10*3/MM3 (ref 0–0.05)
IMM GRANULOCYTES NFR BLD AUTO: 0.2 % (ref 0–0.5)
LYMPHOCYTES # BLD AUTO: 1.98 10*3/MM3 (ref 0.7–3.1)
LYMPHOCYTES NFR BLD AUTO: 23.7 % (ref 19.6–45.3)
MCH RBC QN AUTO: 29.2 PG (ref 26.6–33)
MCHC RBC AUTO-ENTMCNC: 31.4 G/DL (ref 31.5–35.7)
MCV RBC AUTO: 92.9 FL (ref 79–97)
MONOCYTES # BLD AUTO: 0.76 10*3/MM3 (ref 0.1–0.9)
MONOCYTES NFR BLD AUTO: 9.1 % (ref 5–12)
NEUTROPHILS NFR BLD AUTO: 5.1 10*3/MM3 (ref 1.7–7)
NEUTROPHILS NFR BLD AUTO: 61.2 % (ref 42.7–76)
NRBC BLD AUTO-RTO: 0 /100 WBC (ref 0–0.2)
PLATELET # BLD AUTO: 442 10*3/MM3 (ref 140–450)
PMV BLD AUTO: 8.7 FL (ref 6–12)
RBC # BLD AUTO: 3.36 10*6/MM3 (ref 3.77–5.28)
WBC NRBC COR # BLD AUTO: 8.34 10*3/MM3 (ref 3.4–10.8)

## 2024-05-24 PROCEDURE — 99024 POSTOP FOLLOW-UP VISIT: CPT

## 2024-05-24 PROCEDURE — 36415 COLL VENOUS BLD VENIPUNCTURE: CPT

## 2024-05-24 PROCEDURE — 86140 C-REACTIVE PROTEIN: CPT

## 2024-05-24 PROCEDURE — 85652 RBC SED RATE AUTOMATED: CPT

## 2024-05-24 PROCEDURE — 85025 COMPLETE CBC W/AUTO DIFF WBC: CPT

## 2024-05-24 RX ORDER — TRAMADOL HYDROCHLORIDE 50 MG/1
100 TABLET ORAL EVERY 8 HOURS PRN
Qty: 150 TABLET | Refills: 2 | OUTPATIENT
Start: 2024-05-24

## 2024-05-24 RX ORDER — VENLAFAXINE HYDROCHLORIDE 225 MG/1
225 TABLET, EXTENDED RELEASE ORAL
COMMUNITY
Start: 2024-05-18

## 2024-05-24 RX ORDER — SULFAMETHOXAZOLE AND TRIMETHOPRIM 800; 160 MG/1; MG/1
1 TABLET ORAL 2 TIMES DAILY
Qty: 20 TABLET | Refills: 0 | Status: SHIPPED | OUTPATIENT
Start: 2024-05-24

## 2024-05-24 NOTE — TELEPHONE ENCOUNTER
Rx Refill Note  Requested Prescriptions     Pending Prescriptions Disp Refills    traMADol (ULTRAM) 50 MG tablet 150 tablet 2     Sig: Take 2 tablets by mouth Every 8 (Eight) Hours As Needed for Moderate Pain.      Last office visit with prescribing clinician: 4/19/2024   Last telemedicine visit with prescribing clinician: Visit date not found   Next office visit with prescribing clinician: 7/19/2024                         Would you like a call back once the refill request has been completed: [] Yes [] No    If the office needs to give you a call back, can they leave a voicemail: [] Yes [] No    Ludy Olivo MA  05/24/24, 11:12 EDT

## 2024-05-24 NOTE — PROGRESS NOTES
Patient: Yessica Glavin  : 1960  Chart #: [id number]    Date of Service: 2024    Chief Complaint   Patient presents with    Post-op       HPI  Ms. Galvin a 63-year-old woman status post lumbar laminectomies from L2-5 by Dr. Elias on 2024. Preoperative symptoms included back pain with bilateral lower extremity difficulties with walking/standing intolerance. The patient also has a known spinal deformity. Postoperatively, her symptoms have improved, but healing of her surgical incision has been slow. The patient admitted to scratching the lower pole of her incision that resulted in some dehiscence and modest drainage. Cultures demonstrated MRSA and she has been taking Bactrim. Labs are trending down. She continues to deny fever or chills, and there has been no drainage from the incision site. She has smoked tobacco products for a long time and admits to struggling with her nutrition and diet secondary to stomach upset.    Review of Systems   Constitutional:  Negative for activity change, appetite change, chills, diaphoresis, fatigue, fever and unexpected weight change.   HENT:  Negative for congestion, dental problem, drooling, ear discharge, ear pain, facial swelling, hearing loss, mouth sores, nosebleeds, postnasal drip, rhinorrhea, sinus pressure, sinus pain, sneezing, sore throat, tinnitus, trouble swallowing and voice change.    Eyes:  Negative for photophobia, pain, discharge, redness, itching and visual disturbance.   Respiratory:  Negative for apnea, cough, choking, chest tightness, shortness of breath, wheezing and stridor.    Cardiovascular:  Negative for chest pain, palpitations and leg swelling.   Gastrointestinal:  Negative for abdominal distention, abdominal pain, anal bleeding, blood in stool, constipation, diarrhea, nausea, rectal pain and vomiting.   Endocrine: Negative for cold intolerance, heat intolerance, polydipsia, polyphagia and polyuria.   Genitourinary:  Negative for  "decreased urine volume, difficulty urinating, dysuria, enuresis, flank pain, frequency, genital sores, hematuria and urgency.   Musculoskeletal:  Negative for arthralgias, back pain, gait problem, joint swelling, myalgias, neck pain and neck stiffness.   Skin:  Negative for color change, pallor, rash and wound.   Allergic/Immunologic: Negative for environmental allergies, food allergies and immunocompromised state.   Neurological:  Negative for dizziness, tremors, seizures, syncope, facial asymmetry, speech difficulty, weakness, light-headedness, numbness and headaches.   Hematological:  Negative for adenopathy. Does not bruise/bleed easily.   Psychiatric/Behavioral:  Negative for agitation, behavioral problems, confusion, decreased concentration, dysphoric mood, hallucinations, self-injury, sleep disturbance and suicidal ideas. The patient is not nervous/anxious and is not hyperactive.    All other systems reviewed and are negative.       Physical examination:  Temperature 98.4 °F (36.9 °C), temperature source Temporal, height 157.5 cm (62\"), weight 71.2 kg (157 lb), not currently breastfeeding.    Constitutional: The patient appears older than her stated age. Her coloring has improved today. She is pleasant and cooperative.     HEENT: normocephalic, atraumatic, sclera clear    Musculoskeletal: The patient has a very stooped posture secondary to her spinal deformity.     Dermatological: Again, minimal serosanguinous drainage from the lower pole of the incision with some associated erythema and tenderness. I cannot find evidence of wound tunneling when probing with a sterile Qtip. The rest of the incision is dry and without evidence of erythema. Overall, stable compared to previous exam.     Neurologic Exam  A/A/C, speech clear, attention normal, conversant, answers questions appropriately, good historian.  Sensation is intact.  Gait is normal, balance is normal.     Lab trends:     CBC:  2 weeks ago: 12.07  10 days " ago: 7.78    Sed Rate:   2 weeks ago: 35  10 days ago: 31    CRP:   2 weeks ago: 7.71  10 days ago: 1.85      Medical Decision Making  Diagnoses and all orders for this visit:    1. Wound drainage (Primary)  -     Sedimentation Rate; Future  -     C-reactive Protein; Future  -     CBC & Differential; Future    Ms. Galvin is making progress. I have redrawn labs and reordered Bactrim. She will follow up in 2 weeks with Dr. Elias to assess progress.    Any copied data from previous notes included in the (1) HPI, (2) PE, (3) MDM and/or assessment and plan has been reviewed and is accurate as of this day.    Katherine Rose PA-C     Patient Care Team:  Betty Jasmine MD as PCP - General (Family Medicine)  Atul Tavarez MD as Consulting Physician (Gastroenterology)  Bobby Tom MD as Consulting Physician (Pulmonary Disease)  Bobby Tom MD as Consulting Physician (Pulmonary Disease)  Delgado Lal MD as Consulting Physician (Pain Medicine)  Ulises Elias MD as Consulting Physician (Neurosurgery)  Betty Jasmine MD as Primary Care Provider (Family Medicine)  Kota Swain MD as Referring Physician (Family Medicine)

## 2024-05-25 LAB — CRP SERPL-MCNC: <0.3 MG/DL (ref 0–0.5)

## 2024-05-28 DIAGNOSIS — M51.9 LUMBAR DISC DISEASE: ICD-10-CM

## 2024-05-28 RX ORDER — ROPINIROLE 3 MG/1
3 TABLET, FILM COATED ORAL NIGHTLY
Qty: 30 TABLET | Refills: 2 | Status: SHIPPED | OUTPATIENT
Start: 2024-05-28

## 2024-05-28 RX ORDER — TRAMADOL HYDROCHLORIDE 50 MG/1
100 TABLET ORAL EVERY 8 HOURS PRN
Qty: 150 TABLET | Refills: 0 | Status: SHIPPED | OUTPATIENT
Start: 2024-05-28

## 2024-06-05 ENCOUNTER — OFFICE VISIT (OUTPATIENT)
Dept: NEUROSURGERY | Facility: CLINIC | Age: 64
End: 2024-06-05
Payer: MEDICARE

## 2024-06-05 VITALS — HEIGHT: 62 IN | BODY MASS INDEX: 28.1 KG/M2 | WEIGHT: 152.7 LBS | TEMPERATURE: 97.8 F

## 2024-06-05 DIAGNOSIS — M51.9 LUMBAR DISC DISEASE: ICD-10-CM

## 2024-06-05 DIAGNOSIS — M48.062 LUMBAR STENOSIS WITH NEUROGENIC CLAUDICATION: ICD-10-CM

## 2024-06-05 DIAGNOSIS — T14.8XXA WOUND DRAINAGE: Primary | ICD-10-CM

## 2024-06-05 PROCEDURE — 99024 POSTOP FOLLOW-UP VISIT: CPT | Performed by: NEUROLOGICAL SURGERY

## 2024-06-05 RX ORDER — CELECOXIB 100 MG/1
CAPSULE ORAL
Qty: 60 CAPSULE | Refills: 1 | Status: SHIPPED | OUTPATIENT
Start: 2024-06-05

## 2024-06-05 NOTE — PROGRESS NOTES
Patient: Yessica Galvin  : 1960    Primary Care Provider: Betty Jasmine MD    Requesting Provider: As above        History    Chief Complaint: Low back and lower extremity pain with walking and standing intolerance.    History of Present Illness: Ms. Galvin is a 63-year-old woman with a known spinal deformity who presented with neurogenic claudication and ultimately on 2024 underwent decompressive laminectomies L2-5.  She had a small wound drainage.  Cultures demonstrated MRSA.  She has been treated with Bactrim.  She denies any fevers or chills.  She has not had any recent drainage from her wound.  Her claudication symptoms are dramatically improved and she is pleased with her progress.    Review of Systems   Constitutional:  Negative for activity change, appetite change, chills, diaphoresis, fatigue, fever and unexpected weight change.   HENT:  Negative for congestion, dental problem, drooling, ear discharge, ear pain, facial swelling, hearing loss, mouth sores, nosebleeds, postnasal drip, rhinorrhea, sinus pressure, sinus pain, sneezing, sore throat, tinnitus, trouble swallowing and voice change.    Eyes:  Negative for photophobia, pain, discharge, redness, itching and visual disturbance.   Respiratory:  Negative for apnea, cough, choking, chest tightness, shortness of breath, wheezing and stridor.    Cardiovascular:  Negative for chest pain, palpitations and leg swelling.   Gastrointestinal:  Negative for abdominal distention, abdominal pain, anal bleeding, blood in stool, constipation, diarrhea, nausea, rectal pain and vomiting.   Endocrine: Negative for cold intolerance, heat intolerance, polydipsia, polyphagia and polyuria.   Genitourinary:  Negative for decreased urine volume, difficulty urinating, dyspareunia, dysuria, enuresis, flank pain, frequency, genital sores, hematuria, menstrual problem, pelvic pain, urgency, vaginal bleeding, vaginal discharge and vaginal pain.  "  Musculoskeletal:  Negative for arthralgias, back pain, gait problem, joint swelling, myalgias, neck pain and neck stiffness.   Skin:  Positive for wound. Negative for color change, pallor and rash.   Allergic/Immunologic: Negative for environmental allergies, food allergies and immunocompromised state.   Neurological:  Negative for dizziness, tremors, seizures, syncope, facial asymmetry, speech difficulty, weakness, light-headedness, numbness and headaches.   Hematological:  Negative for adenopathy. Does not bruise/bleed easily.   Psychiatric/Behavioral:  Negative for agitation, behavioral problems, confusion, decreased concentration, dysphoric mood, hallucinations, self-injury, sleep disturbance and suicidal ideas. The patient is not nervous/anxious and is not hyperactive.      The patient's past medical history, past surgical history, family history, and social history have been reviewed at length in the electronic medical record.      Physical Exam:   Temp 97.8 °F (36.6 °C) (Infrared)   Ht 157.5 cm (62\")   Wt 69.3 kg (152 lb 11.2 oz)   BMI 27.93 kg/m²   Lumbar wound is dry without any erythema, swelling, or drainage.    Medical Decision Making      Diagnosis:   1.  Lumbar stenosis status post decompressive laminectomies.  2.  Lumbar spinal deformity.    Treatment Options:   Ms. Galvin will complete her course of Bactrim.  She will follow-up in our clinic in 3 weeks to check on her progress.  If she develops further wound drainage, fevers, chills then she will contact my office in the meantime.          I, Dr. Elias, personally performed the services described in the documentation, as scribed in my presence, and it is both accurate and complete.  "

## 2024-06-09 DIAGNOSIS — I10 HYPERTENSION, UNSPECIFIED TYPE: ICD-10-CM

## 2024-06-10 DIAGNOSIS — R11.0 NAUSEA: ICD-10-CM

## 2024-06-10 RX ORDER — LISINOPRIL 20 MG/1
20 TABLET ORAL DAILY
Qty: 30 TABLET | Refills: 0 | Status: SHIPPED | OUTPATIENT
Start: 2024-06-10

## 2024-06-10 RX ORDER — PROMETHAZINE HYDROCHLORIDE 25 MG/1
25 TABLET ORAL EVERY 8 HOURS PRN
Qty: 30 TABLET | Refills: 1 | Status: SHIPPED | OUTPATIENT
Start: 2024-06-10

## 2024-06-10 NOTE — TELEPHONE ENCOUNTER
Caller: Yessica Galvin    Relationship: Self    Best call back number: [phone]     Requested Prescriptions:   Requested Prescriptions     Pending Prescriptions Disp Refills    promethazine (PHENERGAN) 25 MG tablet 30 tablet 1     Sig: Take 1 tablet by mouth Every 8 (Eight) Hours As Needed for Nausea or Vomiting.        Pharmacy where request should be sent: [facility] [id number] [address] [phone] [facility] [phone]      Last office visit with prescribing clinician: 4/19/2024   Last telemedicine visit with prescribing clinician: Visit date not found   Next office visit with prescribing clinician: 7/19/2024     Additional details provided by patient: PATIENT IS OUT OF THE MEDICATION     Does the patient have less than a 3 day supply:  [x] Yes  [] No    Would you like a call back once the refill request has been completed: [x] Yes [] No    If the office needs to give you a call back, can they leave a voicemail: [x] Yes [] No    Monserrat Ferreira   06/10/24 12:25 EDT

## 2024-06-11 DIAGNOSIS — R11.0 NAUSEA: ICD-10-CM

## 2024-06-11 RX ORDER — PROMETHAZINE HYDROCHLORIDE 25 MG/1
25 TABLET ORAL EVERY 8 HOURS PRN
Qty: 30 TABLET | Refills: 1 | OUTPATIENT
Start: 2024-06-11

## 2024-06-18 DIAGNOSIS — E03.9 ACQUIRED HYPOTHYROIDISM: ICD-10-CM

## 2024-06-18 RX ORDER — LEVOTHYROXINE SODIUM 137 UG/1
137 TABLET ORAL DAILY
Qty: 30 TABLET | Refills: 0 | Status: SHIPPED | OUTPATIENT
Start: 2024-06-18

## 2024-06-25 ENCOUNTER — OFFICE VISIT (OUTPATIENT)
Dept: NEUROSURGERY | Facility: CLINIC | Age: 64
End: 2024-06-25
Payer: MEDICARE

## 2024-06-25 VITALS — WEIGHT: 154 LBS | TEMPERATURE: 97.3 F | HEIGHT: 62 IN | BODY MASS INDEX: 28.34 KG/M2

## 2024-06-25 DIAGNOSIS — M48.062 LUMBAR STENOSIS WITH NEUROGENIC CLAUDICATION: Primary | ICD-10-CM

## 2024-06-25 DIAGNOSIS — T14.8XXA WOUND DRAINAGE: ICD-10-CM

## 2024-06-25 PROCEDURE — 1159F MED LIST DOCD IN RCRD: CPT

## 2024-06-25 PROCEDURE — 99024 POSTOP FOLLOW-UP VISIT: CPT

## 2024-06-25 PROCEDURE — 1160F RVW MEDS BY RX/DR IN RCRD: CPT

## 2024-06-25 NOTE — PROGRESS NOTES
"  Patient: Yessica Galvin  : 1960  Chart #: [id number]    Date of Service: 2024    Chief Complaint   Patient presents with    Back Pain       Back Pain      Ms. Galivn is a 63-year-old woman with a known spinal deformity who presented with neurogenic claudication and ultimately on 2024 underwent decompressive laminectomies L2-5 by Dr. Elias. She had a small amount of wound drainage. Cultures demonstrated MRSA. She has been treated with Bactrim and completed the course. She denies any fevers or chills. She has not had any recent drainage from her wound. Her claudication symptoms are dramatically improved and she is pleased with her progress. She does endorse some fatigue.      Review of Systems   Musculoskeletal:  Positive for back pain.     Physical examination:  Temperature 97.3 °F (36.3 °C), temperature source Temporal, height 157.5 cm (62\"), weight 69.9 kg (154 lb), not currently breastfeeding.    Constitutional: The patient is well-developed. She appears older than her stated age. She is pleasant and is in no acute distress.     HEENT: normocephalic, atraumatic, sclera clear    Dermatological: The surgical incision is well-healed and dry. No evidence of induration or erythema. Minimal tenderness to palpation.     Musculoskeletal: Stopped posture secondary to spinal deformities.  Motor examination does not reveal weakness in the lower extremities.     Neurologic Exam  A/A/C, speech clear, attention normal, conversant, answers questions appropriately, good historian.  Cranial nerves II through XII are intact.  Sensation is intact to light touch testing, other than some numbness the patient endorses in the right leg.  Gait is normal, balance is normal.   No tremors are noted.  Reflexes are intact.     Last labs drawn in May 2024 demonstrate normal WBC, sedimentation rate, and C-reactive protein.       Medical Decision Making  Diagnoses and all orders for this visit:    1. Lumbar stenosis " "with neurogenic claudication (Primary)    2. Wound drainage    Ms. Galvin' back and leg pain are \"100% better,\" in her words. Her surgical incision has healed very well.  She is working on improving her diet. From a neurosurgical perspective, she can follow-up on an as-needed basis at this point. We reviewed new or worsening symptoms she should make our office aware of. The patient was agreeable with this plan.     Any copied data from previous notes included in the (1) HPI, (2) PE, (3) MDM and/or assessment and plan has been reviewed and is accurate as of this day.    Katherine Rose PA-C     Patient Care Team:  Betty Jamsine MD as PCP - General (Family Medicine)  Atul Tavarez MD as Consulting Physician (Gastroenterology)  Bobby Tom MD as Consulting Physician (Pulmonary Disease)  Bobby Tom MD as Consulting Physician (Pulmonary Disease)  Delgado Lal MD as Consulting Physician (Pain Medicine)  Ulises Elias MD as Consulting Physician (Neurosurgery)  Betty Jasmine MD as Primary Care Provider (Family Medicine)  Kota Swain MD as Referring Physician (Family Medicine)        "

## 2024-07-07 DIAGNOSIS — I10 HYPERTENSION, UNSPECIFIED TYPE: ICD-10-CM

## 2024-07-08 RX ORDER — LISINOPRIL 20 MG/1
20 TABLET ORAL DAILY
Qty: 30 TABLET | Refills: 0 | Status: SHIPPED | OUTPATIENT
Start: 2024-07-08

## 2024-07-10 RX ORDER — AMLODIPINE BESYLATE 5 MG/1
5 TABLET ORAL DAILY
Qty: 90 TABLET | Refills: 0 | Status: SHIPPED | OUTPATIENT
Start: 2024-07-10

## 2024-07-15 ENCOUNTER — HOSPITAL ENCOUNTER (EMERGENCY)
Facility: HOSPITAL | Age: 64
Discharge: HOME | End: 2024-07-15
Attending: EMERGENCY MEDICINE
Payer: MEDICARE

## 2024-07-15 ENCOUNTER — APPOINTMENT (OUTPATIENT)
Dept: CT IMAGING | Facility: HOSPITAL | Age: 64
End: 2024-07-15
Payer: MEDICARE

## 2024-07-15 VITALS
RESPIRATION RATE: 18 BRPM | HEIGHT: 62 IN | OXYGEN SATURATION: 97 % | WEIGHT: 143 LBS | BODY MASS INDEX: 26.31 KG/M2 | HEART RATE: 60 BPM | SYSTOLIC BLOOD PRESSURE: 128 MMHG | DIASTOLIC BLOOD PRESSURE: 80 MMHG | TEMPERATURE: 98.5 F

## 2024-07-15 DIAGNOSIS — R07.81 RIB PAIN ON LEFT SIDE: Primary | ICD-10-CM

## 2024-07-15 DIAGNOSIS — J44.9 CHRONIC OBSTRUCTIVE PULMONARY DISEASE, UNSPECIFIED COPD TYPE: ICD-10-CM

## 2024-07-15 DIAGNOSIS — F17.200 CURRENT SMOKER: ICD-10-CM

## 2024-07-15 DIAGNOSIS — S20.212A CONTUSION OF LEFT CHEST WALL, INITIAL ENCOUNTER: ICD-10-CM
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
## 2024-07-15 DIAGNOSIS — E03.9 ACQUIRED HYPOTHYROIDISM: ICD-10-CM

## 2024-07-15 LAB
QT INTERVAL: 418 MS
QTC INTERVAL: 479 MS

## 2024-07-15 PROCEDURE — 99284 EMERGENCY DEPT VISIT MOD MDM: CPT

## 2024-07-15 PROCEDURE — 93005 ELECTROCARDIOGRAM TRACING: CPT

## 2024-07-15 PROCEDURE — 71250 CT THORAX DX C-: CPT

## 2024-07-15 PROCEDURE — 93005 ELECTROCARDIOGRAM TRACING: CPT | Performed by: EMERGENCY MEDICINE

## 2024-07-15 RX ORDER — LEVOTHYROXINE SODIUM 137 UG/1
137 TABLET ORAL DAILY
Qty: 30 TABLET | Refills: 0 | Status: SHIPPED | OUTPATIENT
Start: 2024-07-15

## 2024-07-15 RX ORDER — ONDANSETRON 4 MG/1
4 TABLET, ORALLY DISINTEGRATING ORAL 4 TIMES DAILY PRN
Qty: 15 TABLET | Refills: 0 | Status: SHIPPED | OUTPATIENT
Start: 2024-07-15

## 2024-07-15 RX ORDER — HYDROCODONE BITARTRATE AND ACETAMINOPHEN 5; 325 MG/1; MG/1
1 TABLET ORAL EVERY 4 HOURS PRN
Qty: 10 TABLET | Refills: 0 | Status: SHIPPED | OUTPATIENT
Start: 2024-07-15

## 2024-07-15 RX ORDER — OXYCODONE AND ACETAMINOPHEN 7.5; 325 MG/1; MG/1
1 TABLET ORAL ONCE
Status: COMPLETED | OUTPATIENT
Start: 2024-07-15 | End: 2024-07-15

## 2024-07-15 RX ADMIN — OXYCODONE HYDROCHLORIDE AND ACETAMINOPHEN 1 TABLET: 7.5; 325 TABLET ORAL at 10:22

## 2024-07-15 NOTE — ED PROVIDER NOTES
[facility]    EMERGENCY DEPARTMENT ENCOUNTER      Pt Name: Yessica Galvin  MRN: [id number]  YOB: 1960  Date of evaluation: 7/15/2024  Provider: Mauricio Hatch DO    CHIEF COMPLAINT       Chief Complaint   Patient presents with    Fall     HPI  Stated Reason for Visit: pt states fell on Saturday on left side, denies hitting head. no blood thinners. states is now having pain in left shoulder and in left rib area under armpit. pt states trouble breathing due to pain History Obtained From: patient     HISTORY OF PRESENT ILLNESS  (Location/Symptom, Timing/Onset, Context/Setting, Quality, Duration, Modifying Factors, Severity.)   Yessica Galvin is a 63 y.o. female who presents to the emergency department for evaluation of left-sided anterior lateral chest pain that she had a fall 2 days ago on Saturday.  She notes pain extends from the left anterior chest region is point tender worse with deep inspiration and movement.  Pain radiates up towards the left shoulder.  She is a history of recurrent falls, has issues with tripping over her feet.  She denies any head injury or loss of consciousness, no blood thinning medications.  No chest pain or palpitations prior to this incident.  Denies any headache, vision changes, neck pain or new onset back pain.  She is able to ambulate.  She takes tramadol, gabapentin at baseline with chronic back pain      Nursing notes were reviewed.      PAST MEDICAL HISTORY     Past Medical History:   Diagnosis Date    Acute bronchitis     Acute sinusitis     Allergic rhinitis 09/06/2023    Asthma     Asthma 03/07/2020    Asthma with acute exacerbation     Asthma, extrinsic ,1970    I have had it since I was 5 yrs. Old    Asthmatic bronchitis     Bronchiectasis     Always catch it    Centrilobular emphysema 06/02/2022    Chronic bronchitis with acute exacerbation 06/02/2022    Disc degeneration, lumbar     Disc degeneration, lumbar     GERD (gastroesophageal reflux  disease)     History of mammogram     Hypertension 10/05/2023    Hypothyroidism     Lower back pain     Medicare annual wellness visit, subsequent 10/05/2023    Nodule of upper lobe of right lung 06/02/2022    Plantar fasciitis     Restless legs syndrome          SURGICAL HISTORY       Past Surgical History:   Procedure Laterality Date    CARDIAC CATHETERIZATION N/A 11/17/2023    Procedure: Left Heart Cath;  Surgeon: Arben Pittman MD;  Location:  [facility];  Service: Cardiology;  Laterality: N/A;    CHOLECYSTECTOMY      COLONOSCOPY      HYSTERECTOMY      KNEE ARTHROSCOPY Right 03/10/2020    Procedure: KNEE ARTHROSCOPY RIGHT;  Surgeon: Guanaco Thakur MD;  Location: [facility];  Service: Orthopedics;  Laterality: Right;    LUMBAR LAMINECTOMY DISCECTOMY DECOMPRESSION N/A 4/11/2024    Procedure: LUMBAR LAMINECTOMY DISCECTOMY DECOMPRESSION POSTERIOR L2-L5;  Surgeon: Ulises Elias MD;  Location: [facility];  Service: Neurosurgery;  Laterality: N/A;    NECK SURGERY      ORIF WRIST FRACTURE Left 03/10/2020    Procedure: WRIST OPEN REDUCTION INTERNAL FIXATION LEFT;  Surgeon: Guanaco Thakur MD;  Location: [facility];  Service: Orthopedics;  Laterality: Left;    TIBIAL PLATEAU OPEN REDUCTION INTERNAL FIXATION Right 03/10/2020    Procedure: TIBIAL PLATEAU OPEN REDUCTION INTERNAL FIXATION RIGHT;  Surgeon: Guanaco Thakur MD;  Location: [facility];  Service: Orthopedics;  Laterality: Right;    UPPER GASTROINTESTINAL ENDOSCOPY           CURRENT MEDICATIONS     No current facility-administered medications for this encounter.    Current Outpatient Medications:     acetaminophen (TYLENOL) 325 MG tablet, Take 2 tablets by mouth Every 4 (Four) Hours As Needed for Mild Pain ., Disp: , Rfl:     albuterol sulfate  (90 Base) MCG/ACT inhaler, Inhale 2 puffs Every 6 (Six) Hours As Needed for Wheezing., Disp: 54 g, Rfl: 3    alendronate (Fosamax) 70 MG tablet, Take 1 tablet by mouth Every 7 (Seven)  Days., Disp: 4 tablet, Rfl: 11    amLODIPine (NORVASC) 5 MG tablet, TAKE 1 TABLET BY MOUTH DAILY, Disp: 90 tablet, Rfl: 0    atorvastatin (LIPITOR) 20 MG tablet, Take 1 tablet by mouth Every Night., Disp: 90 tablet, Rfl: 3    busPIRone (BUSPAR) 15 MG tablet, TAKE 1 TABLET BY MOUTH TWICE A DAY, Disp: 60 tablet, Rfl: 5    celecoxib (CeleBREX) 100 MG capsule, TAKE 1 CAPSULE BY MOUTH 2 TIMES A DAY AS NEEDED FOR MILD PAIN, Disp: 60 capsule, Rfl: 1    Fluticasone Furoate-Vilanterol (Breo Ellipta) 200-25 MCG/ACT inhaler, Inhale 1 puff Daily., Disp: 60 each, Rfl: 2    gabapentin (NEURONTIN) 600 MG tablet, Take 1 tablet by mouth 3 (Three) Times a Day., Disp: 90 tablet, Rfl: 2    levocetirizine (Xyzal) 5 MG tablet, Take 1 tablet by mouth Every Evening., Disp: 90 tablet, Rfl: 1    lisinopril (PRINIVIL,ZESTRIL) 20 MG tablet, TAKE 1 TABLET BY MOUTH DAILY, Disp: 30 tablet, Rfl: 0    loperamide (IMODIUM) 2 MG capsule, 1 capsule., Disp: , Rfl:     montelukast (SINGULAIR) 10 MG tablet, TAKE ONE TABLET BY MOUTH ONCE NIGHTLY, Disp: 90 tablet, Rfl: 1    omeprazole (priLOSEC) 40 MG capsule, TAKE 1 CAPSULE BY MOUTH DAILY, Disp: 90 capsule, Rfl: 1    ondansetron ODT (ZOFRAN-ODT) 4 MG disintegrating tablet, Place 1 tablet on the tongue Every 8 (Eight) Hours As Needed for Nausea or Vomiting., Disp: 20 tablet, Rfl: 1    rOPINIRole (REQUIP) 3 MG tablet, Take 1 tablet by mouth Every Night., Disp: 30 tablet, Rfl: 2    sulfamethoxazole-trimethoprim (Bactrim DS) 800-160 MG per tablet, Take 1 tablet by mouth 2 (Two) Times a Day., Disp: 20 tablet, Rfl: 0    sulfamethoxazole-trimethoprim (Bactrim DS) 800-160 MG per tablet, Take 1 tablet by mouth 2 (Two) Times a Day., Disp: 20 tablet, Rfl: 0    sulfamethoxazole-trimethoprim (Bactrim DS) 800-160 MG per tablet, Take 1 tablet by mouth 2 (Two) Times a Day., Disp: 20 tablet, Rfl: 0    tiZANidine (ZANAFLEX) 4 MG tablet, Take 1 tablet by mouth Every 12 (Twelve) Hours As Needed for Muscle Spasms., Disp: 60  tablet, Rfl: 1    traMADol (ULTRAM) 50 MG tablet, Take 2 tablets by mouth Every 8 (Eight) Hours As Needed for Moderate Pain., Disp: 150 tablet, Rfl: 0    Umeclidinium Bromide (INCRUSE ELLIPTA) 62.5 MCG/ACT aerosol powder , Inhale 1 puff Daily., Disp: 30 each, Rfl: 2    venlafaxine 225 MG tablet sustained-release 24 hour 24 hr tablet, 1 tablet., Disp: , Rfl:     Breztri Aerosphere 160-9-4.8 MCG/ACT aerosol inhaler, , Disp: , Rfl:     HYDROcodone-acetaminophen (NORCO) 5-325 MG per tablet, Take 1 tablet by mouth Every 4 (Four) Hours As Needed for Moderate Pain., Disp: 10 tablet, Rfl: 0    levothyroxine (SYNTHROID, LEVOTHROID) 137 MCG tablet, TAKE 1 TABLET BY MOUTH DAILY, Disp: 30 tablet, Rfl: 0    naloxone (NARCAN) 4 MG/0.1ML nasal spray, Call 911. Don't prime. [place] in 1 nostril for overdose. Repeat in 2-3 minutes in other nostril if no or minimal breathing/responsiveness., Disp: 2 each, Rfl: 0    nicotine (Nicotrol) 10 MG inhaler, Inhale 1 puff As Needed for Smoking Cessation., Disp: 168 each, Rfl: 1    ondansetron ODT (ZOFRAN-ODT) 4 MG disintegrating tablet, Take 1 tablet by mouth 4 (Four) Times a Day As Needed for Nausea or Vomiting., Disp: 15 tablet, Rfl: 0    promethazine (PHENERGAN) 25 MG tablet, Take 1 tablet by mouth Every 8 (Eight) Hours As Needed for Nausea or Vomiting., Disp: 30 tablet, Rfl: 1    ALLERGIES     Codeine and Shrimp    FAMILY HISTORY       Family History   Problem Relation Age of Onset    Aneurysm Mother     Hypertension Mother     Cancer Mother     Asthma Mother     Heart disease Mother     Diabetes Mother     Hypertension Father     Alzheimer's disease Father     Asthma Father     No Known Problems Sister     No Known Problems Brother     COPD Maternal Grandmother     Aneurysm Maternal Grandmother     No Known Problems Maternal Grandfather     Alzheimer's disease Paternal Grandmother     Dementia Paternal Grandmother     No Known Problems Paternal Grandfather     Breast cancer Neg Hx      Ovarian cancer Neg Hx           SOCIAL HISTORY       Social History     Socioeconomic History    Marital status:    Tobacco Use    Smoking status: Some Days     Current packs/day: 0.00     Average packs/day: 0.3 packs/day for 23.4 years (5.9 ttl pk-yrs)     Types: Cigarettes     Start date: 3/7/2000     Last attempt to quit: 2023     Years since quittin.9     Passive exposure: Current    Smokeless tobacco: Never    Tobacco comments:     I started when i was 17   Vaping Use    Vaping status: Never Used   Substance and Sexual Activity    Alcohol use: No    Drug use: No    Sexual activity: Not Currently     Partners: Female     Birth control/protection: None         PHYSICAL EXAM    (up to 7 for level 4, 8 or more for level 5)     Vitals:    07/15/24 1145 07/15/24 1159 07/15/24 1200 07/15/24 1215   BP: 124/76  120/71 125/85   BP Location:       Patient Position:       Pulse:  69  55   Resp:       Temp:       TempSrc:       SpO2:  96%  95%   Weight:       Height:           Physical Exam  General : Patient is awake, alert, oriented, in no acute distress, nontoxic appearing  HEENT: Pupils are equally round, EOMI, conjunctivae clear  Neck: Neck is supple, full range of motion, trachea midline  Cardiac: Heart regular rate, rhythm, no murmurs, rubs, or gallops  Lungs: Lungs are clear to auscultation, there is no wheezing, rhonchi, or rales. There is no use of accessory muscles  Chest wall: There is reproducible point tenderness along the left anterolateral rib cage, there is no overlying contusions, there is no flail chest, there is equal breath sounds bilateral chest rise.  Abdomen: Abdomen is soft, nontender, nondistended. There are no firm or pulsatile masses, no rebound rigidity or guarding  Musculoskeletal: There is tenderness to palpation over the left anterior chest, left anterior shoulder with tenderness overlying the clavicular region without any signs of obvious deformity, no signs of acute  dislocation.  Distal pulses neurovascular status are intact.  5 out of 5 strength in all 4 extremities.  No focal muscle deficits are appreciated  Neuro: Motor intact, sensory intact, level of consciousness is normal, is able to range of motion of her bilateral upper and lower extremities with good strength.  Dermatology: There are multiple areas of healing contusions corrugations to the left anterior knee, left lateral elbow which are dried, scabbed over.  Skin is warm and dry  Psych: Mentation is grossly normal, cognition is grossly normal. Affect is appropriate      DIAGNOSTIC RESULTS     EKG:  All EKGs are interpreted by the Emergency Department Physician who either signs or Co-signs this chart in the absence of a cardiologist.    ECG 12 Lead Dyspnea   Final Result   Test Reason : Dyspnea   Blood Pressure :   */*   mmHG   Vent. Rate :  79 BPM     Atrial Rate :  79 BPM      P-R Int : 164 ms          QRS Dur : 100 ms       QT Int : 418 ms       P-R-T Axes :  80  64 -11 degrees      QTc Int : 479 ms      Sinus rhythm with premature supraventricular complexes   When compared with ECG of 25-OCT-2023 14:35,   premature supraventricular complexes are now present   QRS axis shifted right   Confirmed by MARIXA MOURA MD ([id number]) on 7/15/2024 9:56:59 AM      Referred By: EDMD           Confirmed By: MARIXA MOURA MD          RADIOLOGY:     [x] Radiologist's Report Reviewed:  CT Chest Without Contrast Diagnostic   Final Result   Impression:      1. No acute fractures are identified.   2. Severe emphysema.            Electronically Signed: Eric Montoya MD     7/15/2024 11:16 AM EDT     Workstation ID: [id number]          I ordered and independently reviewed the above noted radiographic studies.      I viewed images of CT chest which showed emphysema, no acute fractures per my independent interpretation.    See radiologist's dictation for official interpretation.      ED BEDSIDE ULTRASOUND:   Performed by ED Physician -  none    LABS:    I have reviewed and interpreted all of the currently available lab results from this visit (if applicable):  Results for orders placed or performed during the hospital encounter of 07/15/24   ECG 12 Lead Dyspnea   Result Value Ref Range    QT Interval 418 ms    QTC Interval 479 ms        If labs were ordered, I independently reviewed the results and considered them in treating the patient.      EMERGENCY DEPARTMENT COURSE and DIFFERENTIAL DIAGNOSIS/MDM:   Vitals:  AS OF 12:21 EDT    BP - 125/85  HR - 55  TEMP - 98.5 °F (36.9 °C) (Oral)  O2 SATS - 95%      Orders placed during this visit:  Orders Placed This Encounter   Procedures    CT Chest Without Contrast Diagnostic    ECG 12 Lead Dyspnea       All labs have been independently reviewed by me.  All radiology studies have been reviewed by me and the radiologist dictating the report.  All EKG's have been independently viewed and interpreted by me.      Discussion below represents my analysis of pertinent findings related to patient's condition, differential diagnosis, treatment plan and final disposition.    Differential diagnosis:  The differential diagnosis associated with the patient's presentation includes: Pulmonary contusion, rib fracture, hematoma, shoulder and proximal humerus injury, clavicular fracture    Additional sources  Discussed/ obtained information from independent historians:   [x] Spouse  [] Parent  [] Family member  [] Friend  [] EMS   [] Other:    External (non-ED) record review:   [] Inpatient record:   [] Office record:   [] Outpatient record:   [] Prior Outpatient labs:   [] Prior Outpatient radiology:   [] Primary Care record:   [] Outside ED record:   [] Other:     Patient's care impacted by:   [] Diabetes  [] Hypertension  [] CHF  [] Hyperlipidemia  [] Coronary Artery Disease   [] COPD   [] Cancer   [] Tobacco Abuse   [] Substance Abuse    [] Other:     Care significantly affected by Social Determinants of Health (housing  and economic circumstances, unemployment)    [] Yes     [x] No   If yes, Patient's care significantly limited by Social Determinants of Health including:   [] Inadequate housing   [] Low income   [] Alcoholism and drug addiction in family   [] Problems related to primary support group   [] Unemployment   [] Problems related to employment   [] Other Social Determinants of Health:       MEDICATIONS ADMINISTERED IN ED:  Medications   oxyCODONE-acetaminophen (PERCOCET) 7.5-325 MG per tablet 1 tablet (1 tablet Oral Given 7/15/24 1022)              This a pleasant 63-year-old female with a history of recurrent falls who had a fall 2 days ago landing onto her left shoulder, injuring her left rib region.  She has moderate to point tenderness along the left anterior lateral chest without any signs of flail chest, no significant contusions overlying this area, has bilateral breath sounds.  Vital signs are stable.  Patient was given symptomatic control, CT chest imaging obtained.  Imaging with no acute pulmonary injury, fractures.  She does have an old healed left rib fracture from a previous injury.  On reassessment I updated patient with results, she does have underlying COPD/emphysema continues to smoke, discussed smoking cessation, she is appointment with her PCP this upcoming Friday for recheck which we will continue with symptomatic treatments, pain control as needed, fall prevention in the meantime.  She feels comfortable with this plan of care, will return with any worsening symptoms or further concerns in the meantime.    I had a discussion with the patient/family regarding diagnosis, diagnostic results, treatment plan, and medications.  The patient/family indicated understanding of these instructions.  I spent adequate time at the bedside preceding discharge necessary to personally discuss the aftercare instructions, giving patient education, providing explanations of the results of our evaluations/findings, and my  decision making to assure that the patient/family understand the plan of care.  Time was allotted to answer questions at that time and throughout the ED course.  Emphasis was placed on timely follow-up after discharge.  I also discussed the potential for the development of an acute emergent condition requiring further evaluation, admission, or even surgical intervention. I discussed that we found nothing during the visit today indicating the need for further workup, admission, or the presence of an unstable medical condition.  I encouraged the patient to return to the emergency department immediately for ANY concerns, worsening, new complaints, or if symptoms persist and unable to seek follow-up in a timely fashion.  The patient/family expressed understanding and agreement with this plan.  The patient will follow-up with their PCP in 1-2 days for reevaluation.     PROCEDURES:  Procedures    CRITICAL CARE TIME    Total Critical Care time was 0 minutes, excluding separately reportable procedures.   There was a high probability of clinically significant/life threatening deterioration in the patient's condition which required my urgent intervention.      FINAL IMPRESSION      1. Rib pain on left side    2. Fall, initial encounter    3. Contusion of left chest wall, initial encounter    4. Chronic obstructive pulmonary disease, unspecified COPD type    5. Current smoker          DISPOSITION/PLAN     ED Disposition       ED Disposition   Discharge    Condition   Stable    Comment   --               PATIENT REFERRED TO:  Btety Jasmine MD  [address] [address]  [phone]    In 2 days      [facility] EMERGENCY DEPARTMENT  [address] [facility] [address] [phone]    If symptoms worsen      DISCHARGE MEDICATIONS:     Medication List        START taking these medications      HYDROcodone-acetaminophen 5-325 MG per tablet  Commonly known as: NORCO  Take 1  tablet by mouth Every 4 (Four) Hours As Needed for Moderate Pain.     naloxone 4 MG/0.1ML nasal spray  Commonly known as: NARCAN  Call 911. Don't prime. [place] in 1 nostril for overdose. Repeat in 2-3 minutes in other nostril if no or minimal breathing/responsiveness.            CHANGE how you take these medications      * ondansetron ODT 4 MG disintegrating tablet  Commonly known as: ZOFRAN-ODT  Place 1 tablet on the tongue Every 8 (Eight) Hours As Needed for Nausea or Vomiting.  What changed: Another medication with the same name was added. Make sure you understand how and when to take each.     * ondansetron ODT 4 MG disintegrating tablet  Commonly known as: ZOFRAN-ODT  Take 1 tablet by mouth 4 (Four) Times a Day As Needed for Nausea or Vomiting.  What changed: You were already taking a medication with the same name, and this prescription was added. Make sure you understand how and when to take each.           * This list has 2 medication(s) that are the same as other medications prescribed for you. Read the directions carefully, and ask your doctor or other care provider to review them with you.                CONTINUE taking these medications      acetaminophen 325 MG tablet  Commonly known as: TYLENOL  Take 2 tablets by mouth Every 4 (Four) Hours As Needed for Mild Pain .     albuterol sulfate  (90 Base) MCG/ACT inhaler  Commonly known as: PROVENTIL HFA;VENTOLIN HFA;PROAIR HFA  Inhale 2 puffs Every 6 (Six) Hours As Needed for Wheezing.     alendronate 70 MG tablet  Commonly known as: Fosamax  Take 1 tablet by mouth Every 7 (Seven) Days.     amLODIPine 5 MG tablet  Commonly known as: NORVASC  TAKE 1 TABLET BY MOUTH DAILY     atorvastatin 20 MG tablet  Commonly known as: LIPITOR  Take 1 tablet by mouth Every Night.     Breztri Aerosphere 160-9-4.8 MCG/ACT aerosol inhaler  Generic drug: Budeson-Glycopyrrol-Formoterol     busPIRone 15 MG tablet  Commonly known as: BUSPAR  TAKE 1 TABLET BY MOUTH TWICE A DAY      celecoxib 100 MG capsule  Commonly known as: CeleBREX  TAKE 1 CAPSULE BY MOUTH 2 TIMES A DAY AS NEEDED FOR MILD PAIN     Fluticasone Furoate-Vilanterol 200-25 MCG/ACT inhaler  Commonly known as: Breo Ellipta  Inhale 1 puff Daily.     gabapentin 600 MG tablet  Commonly known as: NEURONTIN  Take 1 tablet by mouth 3 (Three) Times a Day.     levocetirizine 5 MG tablet  Commonly known as: Xyzal  Take 1 tablet by mouth Every Evening.     levothyroxine 137 MCG tablet  Commonly known as: SYNTHROID, LEVOTHROID  TAKE 1 TABLET BY MOUTH DAILY     lisinopril 20 MG tablet  Commonly known as: PRINIVIL,ZESTRIL  TAKE 1 TABLET BY MOUTH DAILY     loperamide 2 MG capsule  Commonly known as: IMODIUM     montelukast 10 MG tablet  Commonly known as: SINGULAIR  TAKE ONE TABLET BY MOUTH ONCE NIGHTLY     Nicotrol 10 MG inhaler  Generic drug: nicotine  Inhale 1 puff As Needed for Smoking Cessation.     omeprazole 40 MG capsule  Commonly known as: priLOSEC  TAKE 1 CAPSULE BY MOUTH DAILY     promethazine 25 MG tablet  Commonly known as: PHENERGAN  Take 1 tablet by mouth Every 8 (Eight) Hours As Needed for Nausea or Vomiting.     rOPINIRole 3 MG tablet  Commonly known as: REQUIP  Take 1 tablet by mouth Every Night.     * sulfamethoxazole-trimethoprim 800-160 MG per tablet  Commonly known as: Bactrim DS  Take 1 tablet by mouth 2 (Two) Times a Day.     * sulfamethoxazole-trimethoprim 800-160 MG per tablet  Commonly known as: Bactrim DS  Take 1 tablet by mouth 2 (Two) Times a Day.     * sulfamethoxazole-trimethoprim 800-160 MG per tablet  Commonly known as: Bactrim DS  Take 1 tablet by mouth 2 (Two) Times a Day.     tiZANidine 4 MG tablet  Commonly known as: ZANAFLEX  Take 1 tablet by mouth Every 12 (Twelve) Hours As Needed for Muscle Spasms.     traMADol 50 MG tablet  Commonly known as: ULTRAM  Take 2 tablets by mouth Every 8 (Eight) Hours As Needed for Moderate Pain.     Umeclidinium Bromide 62.5 MCG/ACT aerosol powder   Commonly known as:  INCRUSE ELLIPTA  Inhale 1 puff Daily.     venlafaxine 225 MG tablet sustained-release 24 hour 24 hr tablet           * This list has 3 medication(s) that are the same as other medications prescribed for you. Read the directions carefully, and ask your doctor or other care provider to review them with you.                   Where to Get Your Medications        These medications were sent to [facility] [id number] - [place], [place] - [address] [place] [address] - [phone]  - [phone]   [address] [place], [place] [address]      Phone: [phone]   HYDROcodone-acetaminophen 5-325 MG per tablet  levothyroxine 137 MCG tablet  naloxone 4 MG/0.1ML nasal spray  ondansetron ODT 4 MG disintegrating tablet             Comment: Please note this report has been produced using speech recognition software.      Mauricio Hatch DO  Attending Emergency Physician         Mauricio Hatch DO  07/15/24 0673

## 2024-07-17 DIAGNOSIS — M51.9 LUMBAR DISC DISEASE: ICD-10-CM

## 2024-07-17 RX ORDER — GABAPENTIN 600 MG/1
600 TABLET ORAL 3 TIMES DAILY
Qty: 90 TABLET | Refills: 2 | Status: SHIPPED | OUTPATIENT
Start: 2024-07-17

## 2024-07-17 NOTE — TELEPHONE ENCOUNTER
Caller: Yessica Galvin    Relationship: Self    Best call back number: [phone]    Requested Prescriptions:   Requested Prescriptions     Pending Prescriptions Disp Refills    gabapentin (NEURONTIN) 600 MG tablet 90 tablet 2     Sig: Take 1 tablet by mouth 3 (Three) Times a Day.        Pharmacy where request should be sent: [facility] [id number] [address] [phone] [facility] [phone] FX     Last office visit with prescribing clinician: 4/19/2024   Last telemedicine visit with prescribing clinician: Visit date not found   Next office visit with prescribing clinician: 7/19/2024     Additional details provided by patient: PT HAS BEEN OUT FOR OVER A WEEK PER PT    Does the patient have less than a 3 day supply:  [x] Yes  [] No    Would you like a call back once the refill request has been completed: [x] Yes [] No    If the office needs to give you a call back, can they leave a voicemail: [x] Yes [] No    Monserrat Thomas Rep   07/17/24 15:58 EDT

## 2024-07-30 ENCOUNTER — OFFICE VISIT (OUTPATIENT)
Dept: FAMILY MEDICINE CLINIC | Facility: CLINIC | Age: 64
End: 2024-07-30
Payer: MEDICARE

## 2024-07-30 VITALS
DIASTOLIC BLOOD PRESSURE: 86 MMHG | OXYGEN SATURATION: 96 % | WEIGHT: 149 LBS | BODY MASS INDEX: 27.42 KG/M2 | HEART RATE: 85 BPM | SYSTOLIC BLOOD PRESSURE: 148 MMHG | TEMPERATURE: 98 F | HEIGHT: 62 IN

## 2024-07-30 DIAGNOSIS — F17.200 TOBACCO DEPENDENCE: Primary | ICD-10-CM

## 2024-07-30 DIAGNOSIS — R07.81 RIB PAIN ON LEFT SIDE: ICD-10-CM

## 2024-07-30 DIAGNOSIS — G25.81 RESTLESS LEG SYNDROME: ICD-10-CM

## 2024-07-30 DIAGNOSIS — R09.3 INCREASED SPUTUM PRODUCTION: ICD-10-CM

## 2024-07-30 PROCEDURE — 99214 OFFICE O/P EST MOD 30 MIN: CPT | Performed by: STUDENT IN AN ORGANIZED HEALTH CARE EDUCATION/TRAINING PROGRAM

## 2024-07-30 PROCEDURE — 3077F SYST BP >= 140 MM HG: CPT | Performed by: STUDENT IN AN ORGANIZED HEALTH CARE EDUCATION/TRAINING PROGRAM

## 2024-07-30 PROCEDURE — 1159F MED LIST DOCD IN RCRD: CPT | Performed by: STUDENT IN AN ORGANIZED HEALTH CARE EDUCATION/TRAINING PROGRAM

## 2024-07-30 PROCEDURE — 3079F DIAST BP 80-89 MM HG: CPT | Performed by: STUDENT IN AN ORGANIZED HEALTH CARE EDUCATION/TRAINING PROGRAM

## 2024-07-30 PROCEDURE — 1125F AMNT PAIN NOTED PAIN PRSNT: CPT | Performed by: STUDENT IN AN ORGANIZED HEALTH CARE EDUCATION/TRAINING PROGRAM

## 2024-07-30 PROCEDURE — 1160F RVW MEDS BY RX/DR IN RCRD: CPT | Performed by: STUDENT IN AN ORGANIZED HEALTH CARE EDUCATION/TRAINING PROGRAM

## 2024-07-30 RX ORDER — PREDNISONE 20 MG/1
40 TABLET ORAL DAILY
Qty: 10 TABLET | Refills: 0 | Status: SHIPPED | OUTPATIENT
Start: 2024-07-30 | End: 2024-08-04

## 2024-07-30 RX ORDER — PRAMIPEXOLE DIHYDROCHLORIDE 0.5 MG/1
TABLET ORAL
COMMUNITY
Start: 2024-07-14 | End: 2024-07-30

## 2024-07-30 RX ORDER — HYDROCODONE BITARTRATE AND ACETAMINOPHEN 5; 325 MG/1; MG/1
1 TABLET ORAL EVERY 6 HOURS PRN
Qty: 12 TABLET | Refills: 0 | Status: SHIPPED | OUTPATIENT
Start: 2024-07-30 | End: 2024-08-02

## 2024-07-30 RX ORDER — ROPINIROLE 4 MG/1
4 TABLET, FILM COATED ORAL NIGHTLY
Qty: 30 TABLET | Refills: 2 | Status: SHIPPED | OUTPATIENT
Start: 2024-07-30

## 2024-07-30 NOTE — PROGRESS NOTES
Office Note     Name: Yessica Galvin    : 1960     MRN: [id number]     Chief Complaint   chronic care management    Subjective     History of Present Illness:  Yessica aGlvin is a 63 y.o. female who presents today for rib pain, RLS and smoking.    Patient reports that she has frequent falls.  She has not tripping over her own feet and loses her balance.  She fell the day before yesterday and then about 2 weeks ago and ended up going to the ER because she was having very severe left rib pain.  She continues to have some tenderness on that left side.  She has been taking tramadol and gabapentin, which she uses for her back pain.  She had been on Holden's that the ER gave her and she would like a refill on that because she continues to have soreness in the left side of her ribs.    She has had back surgery but reports that she tried to take less of the tramadol and ended up being in pain so she wants to continue the tramadol as prescribed.  She does also have tizanidine as needed.    She has been smoking about 1/4 pack/day.  She is requesting Nicotrol nasal spray.  Patches did not work for her.  She has not tried Wellbutrin or Chantix.  She would like to start with the nasal spray first.    She has had some trouble breathing and increased sputum production.  She has not seen pulm and they wanted to see her back this summer.  She is using her Incruse and Breo Ellipta as prescribed.  She is using her albuterol inhaler 3 times a day.    RLS: Patient is on ropinirole 3 mg daily.  She has not noticed any difference on this dose.  She would like to go up on her dose.        Objective     Past Medical History:   Diagnosis Date    Acute bronchitis     Acute sinusitis     Allergic rhinitis 2023    Asthma     Asthma 2020    Asthma with acute exacerbation     Asthma, extrinsic ,1970    I have had it since I was 5 yrs. Old    Asthmatic bronchitis     Bronchiectasis     Always catch it     Centrilobular emphysema 06/02/2022    Chronic bronchitis with acute exacerbation 06/02/2022    Disc degeneration, lumbar     Disc degeneration, lumbar     GERD (gastroesophageal reflux disease)     History of mammogram     Hypertension 10/05/2023    Hypothyroidism     Lower back pain     Medicare annual wellness visit, subsequent 10/05/2023    Nodule of upper lobe of right lung 06/02/2022    Plantar fasciitis     Restless legs syndrome      Past Surgical History:   Procedure Laterality Date    CARDIAC CATHETERIZATION N/A 11/17/2023    Procedure: Left Heart Cath;  Surgeon: Arben Pittman MD;  Location: [facility] CATH INVASIVE LOCATION;  Service: Cardiology;  Laterality: N/A;    CHOLECYSTECTOMY      COLONOSCOPY      HYSTERECTOMY      KNEE ARTHROSCOPY Right 03/10/2020    Procedure: KNEE ARTHROSCOPY RIGHT;  Surgeon: Guanaco Thakur MD;  Location: [facility] OR;  Service: Orthopedics;  Laterality: Right;    LUMBAR LAMINECTOMY DISCECTOMY DECOMPRESSION N/A 4/11/2024    Procedure: LUMBAR LAMINECTOMY DISCECTOMY DECOMPRESSION POSTERIOR L2-L5;  Surgeon: Ulises Elias MD;  Location: [facility] OR;  Service: Neurosurgery;  Laterality: N/A;    NECK SURGERY      ORIF WRIST FRACTURE Left 03/10/2020    Procedure: WRIST OPEN REDUCTION INTERNAL FIXATION LEFT;  Surgeon: Guanaco Thakur MD;  Location: [facility] OR;  Service: Orthopedics;  Laterality: Left;    TIBIAL PLATEAU OPEN REDUCTION INTERNAL FIXATION Right 03/10/2020    Procedure: TIBIAL PLATEAU OPEN REDUCTION INTERNAL FIXATION RIGHT;  Surgeon: Guanaco Thakur MD;  Location: [facility] OR;  Service: Orthopedics;  Laterality: Right;    UPPER GASTROINTESTINAL ENDOSCOPY       Family History   Problem Relation Age of Onset    Aneurysm Mother     Hypertension Mother     Cancer Mother     Asthma Mother     Heart disease Mother     Diabetes Mother     Hypertension Father     Alzheimer's disease Father     Asthma Father     No Known Problems Sister     No Known Problems Brother     COPD  "Maternal Grandmother     Aneurysm Maternal Grandmother     No Known Problems Maternal Grandfather     Alzheimer's disease Paternal Grandmother     Dementia Paternal Grandmother     No Known Problems Paternal Grandfather     Breast cancer Neg Hx     Ovarian cancer Neg Hx        Vital Signs  /86   Pulse 85   Temp 98 °F (36.7 °C) (Infrared)   Ht 157.5 cm (62\")   Wt 67.6 kg (149 lb)   SpO2 96%   BMI 27.25 kg/m²   Estimated body mass index is 27.25 kg/m² as calculated from the following:    Height as of this encounter: 157.5 cm (62\").    Weight as of this encounter: 67.6 kg (149 lb).    Physical Exam  Vitals reviewed.   Constitutional:       Appearance: Normal appearance.   HENT:      Head: Normocephalic.   Cardiovascular:      Rate and Rhythm: Normal rate.   Pulmonary:      Effort: Pulmonary effort is normal.      Breath sounds: Wheezing present.   Chest:      Comments: Tender to palpation along left ribs  Abdominal:      General: Abdomen is flat.   Neurological:      Mental Status: She is alert.                   Assessment and Plan     Diagnoses and all orders for this visit:    1. Tobacco dependence (Primary)  Assessment & Plan:   - Has tried patches, lozenges/gum, Wellbutrin, continues to smoke  - Is currently smoking quarter pack per day  - We will trial on Nicotrol nasal spray    Orders:  -     nicotine (NICOTROL) 10 MG/ML solution nasal solution; 1 spray by Each Nare route Every 1 (One) Hour As Needed (smoking cessation). 1 to 2 doses/hour, do not exceed more than 5 doses (10 sprays) per hour  Dispense: 10 mL; Refill: 2    2. Restless leg syndrome  Assessment & Plan:  - Uncontrolled  - Did not have benefit with pramipexole  - Increase ropinirole to 4 mg daily    Orders:  -     rOPINIRole (REQUIP) 4 MG tablet; Take 1 tablet by mouth Every Night.  Dispense: 30 tablet; Refill: 2    3. Rib pain on left side  Assessment & Plan:  - Likely rib contusion, did have CT scan that did not show any new fractures " but old fractures that are healing  - Obtaining repeat x-ray today given continued pain  - Will do a 3-day course of Norco, advised to not take the tramadol -discussed with patient that I will not be prescribing the Norco passed 3 days  - We will start diclofenac gel to be used in that area as needed    Orders:  -     XR Ribs 2 View Left; Future  -     HYDROcodone-acetaminophen (NORCO) 5-325 MG per tablet; Take 1 tablet by mouth Every 6 (Six) Hours As Needed for Moderate Pain for up to 3 days.  Dispense: 12 tablet; Refill: 0  -     Diclofenac Sodium (VOLTAREN) 1 % gel gel; Apply 2 g topically to the appropriate area as directed 4 (Four) Times a Day As Needed (legs).  Dispense: 350 g; Refill: 0    4. Increased sputum production  Assessment & Plan:  - Likely secondary to her smoking, has had cough, increased sputum production and some wheezing  - We will do a steroid burst for 5 days  - Continue inhalers as prescribed  - Discussed calling pulmonary and setting up an appointment with them ASAP    Orders:  -     predniSONE (DELTASONE) 20 MG tablet; Take 2 tablets by mouth Daily for 5 days.  Dispense: 10 tablet; Refill: 0           Follow Up  Return in about 1 month (around 8/30/2024) for Medicare Wellness.    Betty Jasmine MD

## 2024-07-31 DIAGNOSIS — M51.9 LUMBAR DISC DISEASE: ICD-10-CM

## 2024-07-31 PROBLEM — R09.3 INCREASED SPUTUM PRODUCTION: Status: ACTIVE | Noted: 2024-07-31

## 2024-07-31 PROBLEM — R07.81 RIB PAIN ON LEFT SIDE: Status: ACTIVE | Noted: 2024-07-31

## 2024-07-31 RX ORDER — CELECOXIB 100 MG/1
CAPSULE ORAL
Qty: 60 CAPSULE | Refills: 1 | Status: SHIPPED | OUTPATIENT
Start: 2024-07-31

## 2024-07-31 NOTE — ASSESSMENT & PLAN NOTE
- Likely rib contusion, did have CT scan that did not show any new fractures but old fractures that are healing  - Obtaining repeat x-ray today given continued pain  - Will do a 3-day course of Norco, advised to not take the tramadol -discussed with patient that I will not be prescribing the Norco passed 3 days  - We will start diclofenac gel to be used in that area as needed

## 2024-07-31 NOTE — ASSESSMENT & PLAN NOTE
- Has tried patches, lozenges/gum, Wellbutrin, continues to smoke  - Is currently smoking quarter pack per day  - We will trial on Nicotrol nasal spray

## 2024-07-31 NOTE — ASSESSMENT & PLAN NOTE
- Likely secondary to her smoking, has had cough, increased sputum production and some wheezing  - We will do a steroid burst for 5 days  - Continue inhalers as prescribed  - Discussed calling pulmonary and setting up an appointment with them ASAP

## 2024-08-02 DIAGNOSIS — F32.A ANXIETY AND DEPRESSION: ICD-10-CM

## 2024-08-02 DIAGNOSIS — F41.9 ANXIETY AND DEPRESSION: ICD-10-CM

## 2024-08-02 RX ORDER — BUSPIRONE HYDROCHLORIDE 15 MG/1
15 TABLET ORAL 2 TIMES DAILY
Qty: 60 TABLET | Refills: 5 | Status: SHIPPED | OUTPATIENT
Start: 2024-08-02

## 2024-08-09 DIAGNOSIS — G47.62 NOCTURNAL LEG CRAMPS: ICD-10-CM

## 2024-08-09 RX ORDER — PRAMIPEXOLE DIHYDROCHLORIDE 0.5 MG/1
0.5 TABLET ORAL
Qty: 30 TABLET | Refills: 0 | OUTPATIENT
Start: 2024-08-09

## 2024-08-11 DIAGNOSIS — G47.62 NOCTURNAL LEG CRAMPS: ICD-10-CM

## 2024-08-11 DIAGNOSIS — R11.0 NAUSEA: ICD-10-CM

## 2024-08-11 RX ORDER — PROMETHAZINE HYDROCHLORIDE 25 MG/1
25 TABLET ORAL EVERY 8 HOURS PRN
Qty: 30 TABLET | Refills: 1 | Status: CANCELLED | OUTPATIENT
Start: 2024-08-11

## 2024-08-12 RX ORDER — PRAMIPEXOLE DIHYDROCHLORIDE 0.5 MG/1
0.5 TABLET ORAL
Qty: 30 TABLET | Refills: 0 | OUTPATIENT
Start: 2024-08-12

## 2024-08-12 RX ORDER — PROMETHAZINE HYDROCHLORIDE 25 MG/1
TABLET ORAL
Qty: 30 TABLET | Refills: 1 | Status: SHIPPED | OUTPATIENT
Start: 2024-08-12

## 2024-08-13 DIAGNOSIS — E03.9 ACQUIRED HYPOTHYROIDISM: ICD-10-CM

## 2024-08-13 RX ORDER — LEVOTHYROXINE SODIUM 137 UG/1
137 TABLET ORAL DAILY
Qty: 30 TABLET | Refills: 0 | Status: SHIPPED | OUTPATIENT
Start: 2024-08-13

## 2024-08-23 DIAGNOSIS — M51.9 LUMBAR DISC DISEASE: ICD-10-CM

## 2024-08-23 RX ORDER — TRAMADOL HYDROCHLORIDE 50 MG/1
100 TABLET ORAL EVERY 8 HOURS PRN
Qty: 150 TABLET | Refills: 0 | Status: SHIPPED | OUTPATIENT
Start: 2024-08-23

## 2024-08-23 RX ORDER — VENLAFAXINE HYDROCHLORIDE 225 MG/1
225 TABLET, EXTENDED RELEASE ORAL
Qty: 90 EACH | Refills: 1 | Status: SHIPPED | OUTPATIENT
Start: 2024-08-23 | End: 2024-08-26

## 2024-08-23 RX ORDER — TIZANIDINE 4 MG/1
4 TABLET ORAL EVERY 12 HOURS PRN
Qty: 60 TABLET | Refills: 1 | Status: SHIPPED | OUTPATIENT
Start: 2024-08-23

## 2024-08-23 RX ORDER — GABAPENTIN 600 MG/1
600 TABLET ORAL 3 TIMES DAILY
Qty: 90 TABLET | Refills: 2 | Status: CANCELLED | OUTPATIENT
Start: 2024-08-23

## 2024-08-26 ENCOUNTER — TELEPHONE (OUTPATIENT)
Dept: FAMILY MEDICINE CLINIC | Facility: CLINIC | Age: 64
End: 2024-08-26
Payer: MEDICARE

## 2024-08-26 DIAGNOSIS — F41.1 GENERALIZED ANXIETY DISORDER: Primary | ICD-10-CM

## 2024-08-26 RX ORDER — VENLAFAXINE HYDROCHLORIDE 150 MG/1
150 CAPSULE, EXTENDED RELEASE ORAL DAILY
Qty: 90 CAPSULE | Refills: 1 | Status: SHIPPED | OUTPATIENT
Start: 2024-08-26

## 2024-08-26 RX ORDER — VENLAFAXINE HYDROCHLORIDE 75 MG/1
75 CAPSULE, EXTENDED RELEASE ORAL DAILY
Qty: 90 CAPSULE | Refills: 1 | Status: SHIPPED | OUTPATIENT
Start: 2024-08-26

## 2024-08-26 NOTE — TELEPHONE ENCOUNTER
Caller: [facility]    Relationship:     Best call back number: [phone]     Which medication are you concerned about:     venlafaxine 225 MG tablet sustained-release 24 hour 24 hr tablet       Who prescribed you this medication: DR JIMÉNEZ    What are your concerns: DUE TO INSURANCE PHARMACY WANT TO CHANGE FROM 225 MG TABLET TO  MG AND 75 MG CAPSULES.  [id number] - [address] - [phone] [facility] [phone]  [phone]

## 2024-08-28 DIAGNOSIS — J44.1 COPD WITH ACUTE EXACERBATION: ICD-10-CM

## 2024-08-28 DIAGNOSIS — M51.9 LUMBAR DISC DISEASE: ICD-10-CM

## 2024-08-28 DIAGNOSIS — J45.40 MODERATE PERSISTENT ASTHMATIC BRONCHITIS WITHOUT COMPLICATION: ICD-10-CM

## 2024-08-28 DIAGNOSIS — J45.20 MILD INTERMITTENT ASTHMA, UNSPECIFIED WHETHER COMPLICATED: ICD-10-CM

## 2024-08-28 RX ORDER — GABAPENTIN 600 MG/1
600 TABLET ORAL 3 TIMES DAILY
Qty: 90 TABLET | Refills: 2 | Status: CANCELLED | OUTPATIENT
Start: 2024-08-28

## 2024-08-28 RX ORDER — TRAMADOL HYDROCHLORIDE 50 MG/1
100 TABLET ORAL EVERY 8 HOURS PRN
Qty: 150 TABLET | Refills: 0 | Status: CANCELLED | OUTPATIENT
Start: 2024-08-28

## 2024-08-31 DIAGNOSIS — R11.0 NAUSEA: ICD-10-CM

## 2024-09-03 RX ORDER — PROMETHAZINE HYDROCHLORIDE 25 MG/1
25 TABLET ORAL EVERY 8 HOURS PRN
Qty: 30 TABLET | Refills: 1 | Status: SHIPPED | OUTPATIENT
Start: 2024-09-03

## 2024-09-04 ENCOUNTER — OFFICE VISIT (OUTPATIENT)
Dept: FAMILY MEDICINE CLINIC | Facility: CLINIC | Age: 64
End: 2024-09-04
Payer: MEDICARE

## 2024-09-04 VITALS
DIASTOLIC BLOOD PRESSURE: 90 MMHG | OXYGEN SATURATION: 95 % | HEIGHT: 62 IN | HEART RATE: 82 BPM | SYSTOLIC BLOOD PRESSURE: 145 MMHG | WEIGHT: 147 LBS | TEMPERATURE: 97.7 F | BODY MASS INDEX: 27.05 KG/M2

## 2024-09-04 DIAGNOSIS — R11.2 NAUSEA AND VOMITING, UNSPECIFIED VOMITING TYPE: ICD-10-CM

## 2024-09-04 DIAGNOSIS — M25.552 LEFT HIP PAIN: ICD-10-CM

## 2024-09-04 DIAGNOSIS — I10 HYPERTENSION, UNSPECIFIED TYPE: ICD-10-CM

## 2024-09-04 DIAGNOSIS — R10.10 UPPER ABDOMINAL PAIN: ICD-10-CM

## 2024-09-04 DIAGNOSIS — G25.81 RESTLESS LEG SYNDROME: Primary | ICD-10-CM

## 2024-09-04 PROCEDURE — 3080F DIAST BP >= 90 MM HG: CPT | Performed by: STUDENT IN AN ORGANIZED HEALTH CARE EDUCATION/TRAINING PROGRAM

## 2024-09-04 PROCEDURE — 1160F RVW MEDS BY RX/DR IN RCRD: CPT | Performed by: STUDENT IN AN ORGANIZED HEALTH CARE EDUCATION/TRAINING PROGRAM

## 2024-09-04 PROCEDURE — 99214 OFFICE O/P EST MOD 30 MIN: CPT | Performed by: STUDENT IN AN ORGANIZED HEALTH CARE EDUCATION/TRAINING PROGRAM

## 2024-09-04 PROCEDURE — 1125F AMNT PAIN NOTED PAIN PRSNT: CPT | Performed by: STUDENT IN AN ORGANIZED HEALTH CARE EDUCATION/TRAINING PROGRAM

## 2024-09-04 PROCEDURE — 3077F SYST BP >= 140 MM HG: CPT | Performed by: STUDENT IN AN ORGANIZED HEALTH CARE EDUCATION/TRAINING PROGRAM

## 2024-09-04 PROCEDURE — 1159F MED LIST DOCD IN RCRD: CPT | Performed by: STUDENT IN AN ORGANIZED HEALTH CARE EDUCATION/TRAINING PROGRAM

## 2024-09-04 RX ORDER — PANTOPRAZOLE SODIUM 40 MG/1
40 TABLET, DELAYED RELEASE ORAL DAILY
Qty: 30 TABLET | Refills: 2 | Status: SHIPPED | OUTPATIENT
Start: 2024-09-04

## 2024-09-04 RX ORDER — LISINOPRIL 20 MG/1
20 TABLET ORAL DAILY
Qty: 90 TABLET | Refills: 0 | Status: SHIPPED | OUTPATIENT
Start: 2024-09-04

## 2024-09-04 RX ORDER — CELECOXIB 200 MG/1
200 CAPSULE ORAL 2 TIMES DAILY
Qty: 60 CAPSULE | Refills: 0 | Status: SHIPPED | OUTPATIENT
Start: 2024-09-04

## 2024-09-04 RX ORDER — DICYCLOMINE HYDROCHLORIDE 10 MG/1
10 CAPSULE ORAL
Qty: 90 CAPSULE | Refills: 0 | Status: SHIPPED | OUTPATIENT
Start: 2024-09-04

## 2024-09-04 RX ORDER — ROPINIROLE 1 MG/1
TABLET, FILM COATED ORAL
Qty: 42 TABLET | Refills: 0 | Status: SHIPPED | OUTPATIENT
Start: 2024-09-04 | End: 2024-09-25

## 2024-09-04 NOTE — PROGRESS NOTES
Office Note     Name: Yessica Galvin    : 1960     MRN: [id number]     Chief Complaint  Restless Legs Syndrome, Hip Pain, and Abdominal Pain    Subjective     History of Present Illness:  Yessica Galvin is a 63 y.o. female who presents today for multiple concerns.    Patient reports that she is having nausea and vomiting for the last several months and feels a burning sensation in the upper portion of her abdomen.  She sometimes feels cramping as well.  She has been taking Zofran and Phenergan and it does seem to relieve her symptoms somewhat but they are persistent.  She is willing to switch to a different acid reducing medicine.  She is currently on omeprazole and is not sure if she has tried Protonix.    Patient reports that she always feels that her legs are restless even during the day.  She feels that she needs to move them around all the time and she feels tightness in her muscles.  She has been on ropinirole 4 mg for some time but sometimes even doubles up on the dose because of how restless her legs are during the day.  She is interested in seeing neurology for this.    Patient continues to have severe left-sided hip pain.  She has done physical therapy for about 6 weeks with little improvement in symptoms.  Patient reports that the pain is not dull but can be sharp.  She never ended up going to orthopedic surgery previously because she was having her back surgery.    Her blood pressure is uncontrolled today.  This could be secondary to her not taking her lisinopril since she has been out for a couple of weeks.          Objective     Past Medical History:   Diagnosis Date    Acute bronchitis     Acute sinusitis     Allergic rhinitis 2023    Asthma     Asthma 2020    Asthma with acute exacerbation     Asthma, extrinsic ,1970    I have had it since I was 5 yrs. Old    Asthmatic bronchitis     Bronchiectasis     Always catch it    Centrilobular emphysema 2022    Chronic  bronchitis with acute exacerbation 06/02/2022    Disc degeneration, lumbar     Disc degeneration, lumbar     GERD (gastroesophageal reflux disease)     History of mammogram     Hypertension 10/05/2023    Hypothyroidism     Lower back pain     Medicare annual wellness visit, subsequent 10/05/2023    Nodule of upper lobe of right lung 06/02/2022    Plantar fasciitis     Restless legs syndrome      Past Surgical History:   Procedure Laterality Date    CARDIAC CATHETERIZATION N/A 11/17/2023    Procedure: Left Heart Cath;  Surgeon: Arben Pittman MD;  Location: [facility] CATH INVASIVE LOCATION;  Service: Cardiology;  Laterality: N/A;    CHOLECYSTECTOMY      COLONOSCOPY      HYSTERECTOMY      KNEE ARTHROSCOPY Right 03/10/2020    Procedure: KNEE ARTHROSCOPY RIGHT;  Surgeon: Guanaco Thakur MD;  Location: [facility] OR;  Service: Orthopedics;  Laterality: Right;    LUMBAR LAMINECTOMY DISCECTOMY DECOMPRESSION N/A 4/11/2024    Procedure: LUMBAR LAMINECTOMY DISCECTOMY DECOMPRESSION POSTERIOR L2-L5;  Surgeon: Ulises Elias MD;  Location: [facility] OR;  Service: Neurosurgery;  Laterality: N/A;    NECK SURGERY      ORIF WRIST FRACTURE Left 03/10/2020    Procedure: WRIST OPEN REDUCTION INTERNAL FIXATION LEFT;  Surgeon: Guanaco Thakur MD;  Location: [facility] OR;  Service: Orthopedics;  Laterality: Left;    TIBIAL PLATEAU OPEN REDUCTION INTERNAL FIXATION Right 03/10/2020    Procedure: TIBIAL PLATEAU OPEN REDUCTION INTERNAL FIXATION RIGHT;  Surgeon: Guanaco Thakur MD;  Location: [facility] OR;  Service: Orthopedics;  Laterality: Right;    UPPER GASTROINTESTINAL ENDOSCOPY       Family History   Problem Relation Age of Onset    Aneurysm Mother     Hypertension Mother     Cancer Mother     Asthma Mother     Heart disease Mother     Diabetes Mother     Hypertension Father     Alzheimer's disease Father     Asthma Father     No Known Problems Sister     No Known Problems Brother     COPD Maternal Grandmother     Aneurysm Maternal  "Grandmother     No Known Problems Maternal Grandfather     Alzheimer's disease Paternal Grandmother     Dementia Paternal Grandmother     No Known Problems Paternal Grandfather     Breast cancer Neg Hx     Ovarian cancer Neg Hx        Vital Signs  /90   Pulse 82   Temp 97.7 °F (36.5 °C)   Ht 157.5 cm (62\")   Wt 66.7 kg (147 lb)   SpO2 95%   BMI 26.89 kg/m²   Estimated body mass index is 26.89 kg/m² as calculated from the following:    Height as of this encounter: 157.5 cm (62\").    Weight as of this encounter: 66.7 kg (147 lb).    Physical Exam  Vitals reviewed.   HENT:      Head: Normocephalic.   Cardiovascular:      Rate and Rhythm: Normal rate and regular rhythm.   Pulmonary:      Effort: Pulmonary effort is normal.      Breath sounds: Normal breath sounds.   Abdominal:      Tenderness: There is abdominal tenderness.      Comments: Tender to palpation left upper quadrant and midepigastric area   Musculoskeletal:      Comments: Pain with external rotation of left hip   Neurological:      Mental Status: She is alert.             Assessment and Plan     Diagnoses and all orders for this visit:    1. Restless leg syndrome (Primary)  Assessment & Plan:  - Uncontrolled  - Did not have benefit with pramipexole in the past  - Will taper off of ropinirole and start rotigotine  - Neurology referral placed    Orders:  -     rotigotine (NEUPRO) 1 MG/24HR patch 24 hour 24 hour patch; Place 1 patch on the skin as directed by provider Daily.  Dispense: 30 patch; Refill: 2  -     rOPINIRole (REQUIP) 1 MG tablet; Take 3 tablets by mouth Every Night for 7 days, THEN 2 tablets Every Night for 7 days, THEN 1 tablet Every Night for 7 days. Take 1 hour before bedtime.  Dispense: 42 tablet; Refill: 0  -     Ambulatory Referral to Neurology    2. Hypertension, unspecified type  Assessment & Plan:  - Uncontrolled, patient has not taken her blood pressure medicine for couple of weeks that she has been out  - Continue " lisinopril 20 mg daily, refilled today    Orders:  -     lisinopril (PRINIVIL,ZESTRIL) 20 MG tablet; Take 1 tablet by mouth Daily.  Dispense: 90 tablet; Refill: 0    3. Left hip pain  Assessment & Plan:  - Previous hip x-ray which showed moderate to severe arthritis, has undergone physical therapy with little improvement in symptoms  - Will increase Celebrex to 200 mg twice daily for the short-term  - New orthopedic surgery referral placed    Orders:  -     celecoxib (CeleBREX) 200 MG capsule; Take 1 capsule by mouth 2 (Two) Times a Day.  Dispense: 60 capsule; Refill: 0  -     Ambulatory Referral to Orthopedic Surgery    4. Upper abdominal pain  Assessment & Plan:  - Can be secondary to GERD versus gastritis  - Patient is currently on omeprazole, will discontinue that and start pantoprazole 40 mg daily  - Will also start Bentyl to use as needed when she experiences cramping  - New referral placed to GI    Orders:  -     pantoprazole (Protonix) 40 MG EC tablet; Take 1 tablet by mouth Daily.  Dispense: 30 tablet; Refill: 2  -     dicyclomine (BENTYL) 10 MG capsule; Take 1 capsule by mouth 4 (Four) Times a Day Before Meals & at Bedtime As Needed for Abdominal Cramping.  Dispense: 90 capsule; Refill: 0  -     Ambulatory Referral to Gastroenterology    5. Nausea and vomiting, unspecified vomiting type  Assessment & Plan:  - Patient with nausea and vomiting for several months  - She has been on Zofran and Phenergan, which seems to help but the nausea and vomiting is persistent  - I did place a referral to GI in the past but patient canceled those appointments and was supposed to do an EGD and never did  - New referral placed to GI    Orders:  -     Ambulatory Referral to Gastroenterology           Follow Up  Return in about 1 month (around 10/4/2024) for Medicare Wellness. - followup in the above    Betty Jasmine MD

## 2024-09-04 NOTE — PATIENT INSTRUCTIONS
- for the hip:   -- increase celebrex to 200mg twice daily  -- orthopedic surgery referral    - for the stomach:  --call GI  --bentyl to help with belly cramping/spasms  --switch omeprazole to pantoprazole    - for the restless legs:   --come of ropinirole - sent prescription to pharmacy to come off of it  --start patch once on 1mg of the ropinirole  --neurology referral

## 2024-09-06 ENCOUNTER — TELEPHONE (OUTPATIENT)
Dept: FAMILY MEDICINE CLINIC | Facility: CLINIC | Age: 64
End: 2024-09-06
Payer: MEDICARE

## 2024-09-06 NOTE — TELEPHONE ENCOUNTER
Caller: Yessica Galvin    Relationship: Self    Best call back number: [phone]     What is the medical concern/diagnosis: STOMACH GETS SICK EASY    What specialty or service is being requested: GASTROENTEROLOGIST     What is the office phone number: [phone]    Any additional details: YESSICA IS CALLING FOR A REFERRAL TO THE GASTROENTEROLOGIST.  SHE NEEDS THIS TO MAKE HER APPT.

## 2024-09-07 PROBLEM — R11.2 NAUSEA AND VOMITING: Status: ACTIVE | Noted: 2024-09-07

## 2024-09-07 PROBLEM — R10.10 UPPER ABDOMINAL PAIN: Status: ACTIVE | Noted: 2024-09-07

## 2024-09-07 NOTE — ASSESSMENT & PLAN NOTE
- Uncontrolled, patient has not taken her blood pressure medicine for couple of weeks that she has been out  - Continue lisinopril 20 mg daily, refilled today

## 2024-09-07 NOTE — ASSESSMENT & PLAN NOTE
- Uncontrolled  - Did not have benefit with pramipexole in the past  - Will taper off of ropinirole and start rotigotine  - Neurology referral placed

## 2024-09-07 NOTE — ASSESSMENT & PLAN NOTE
- Can be secondary to GERD versus gastritis  - Patient is currently on omeprazole, will discontinue that and start pantoprazole 40 mg daily  - Will also start Bentyl to use as needed when she experiences cramping  - New referral placed to GI

## 2024-09-07 NOTE — ASSESSMENT & PLAN NOTE
- Patient with nausea and vomiting for several months  - She has been on Zofran and Phenergan, which seems to help but the nausea and vomiting is persistent  - I did place a referral to GI in the past but patient canceled those appointments and was supposed to do an EGD and never did  - New referral placed to GI

## 2024-09-07 NOTE — ASSESSMENT & PLAN NOTE
- Previous hip x-ray which showed moderate to severe arthritis, has undergone physical therapy with little improvement in symptoms  - Will increase Celebrex to 200 mg twice daily for the short-term  - New orthopedic surgery referral placed

## 2024-09-09 ENCOUNTER — TELEPHONE (OUTPATIENT)
Dept: FAMILY MEDICINE CLINIC | Facility: CLINIC | Age: 64
End: 2024-09-09
Payer: MEDICARE

## 2024-09-09 NOTE — TELEPHONE ENCOUNTER
Caller: Yessica Galvin    Relationship: Self    Best call back number:     What test/procedure requested: MEDICATION    rotigotine (NEUPRO) 1 MG/24HR patch 24 hour 24 hour patch     When is it needed: ASAP      Additional information or concerns: PATIENT WAS TOLD THIS MEDICATION WOULD BE OVER $200.00 AND SHE CAN NOT AFFORD THAT. SHE IS NOT SURE IF A PRIOR AUTHORIZATION IS REQUIRED, OR IF SOMETHING SIMILAR THAT IS COVERED BY HER INSURANCE NEEDS TO BE PRESCRIBED. PLEASE ADVISE

## 2024-09-09 NOTE — TELEPHONE ENCOUNTER
Detailed message left on patient's personal voicemail advising her referral has been placed and someone will reach out to schedule.

## 2024-09-10 DIAGNOSIS — E03.9 ACQUIRED HYPOTHYROIDISM: ICD-10-CM

## 2024-09-10 RX ORDER — LEVOTHYROXINE SODIUM 137 UG/1
137 TABLET ORAL DAILY
Qty: 30 TABLET | Refills: 0 | Status: SHIPPED | OUTPATIENT
Start: 2024-09-10

## 2024-09-12 ENCOUNTER — PRIOR AUTHORIZATION (OUTPATIENT)
Dept: FAMILY MEDICINE CLINIC | Facility: CLINIC | Age: 64
End: 2024-09-12
Payer: MEDICARE

## 2024-09-12 NOTE — TELEPHONE ENCOUNTER
Yessica Galvin   (Key: [id number])    Available without authorization.   The member is able to fill the requested drug at the pharmacy. If coverage is still needed or requesting prior to the expiration of a current authorization, a request can be made by sending a fax or calling the number on the back of the member's ID card.    Neupro 1MG/24HR 24 hr patches

## 2024-09-12 NOTE — TELEPHONE ENCOUNTER
Prior authorization completed    Yessica Galvin   (Key: [id number])    Available without authorization. The member is able to fill the requested drug at the pharmacy. If coverage is still needed or requesting prior to the expiration of a current authorization, a request can be made by sending a fax or calling the number on the back of the member's ID card.    Neupro 1MG/24HR 24 hr patches

## 2024-09-13 ENCOUNTER — TELEPHONE (OUTPATIENT)
Age: 64
End: 2024-09-13
Payer: MEDICARE

## 2024-09-13 DIAGNOSIS — J43.2 CENTRILOBULAR EMPHYSEMA: ICD-10-CM

## 2024-09-13 DIAGNOSIS — J45.20 MILD INTERMITTENT ASTHMA, UNSPECIFIED WHETHER COMPLICATED: ICD-10-CM

## 2024-09-13 DIAGNOSIS — J45.40 MODERATE PERSISTENT ASTHMATIC BRONCHITIS WITHOUT COMPLICATION: ICD-10-CM

## 2024-09-13 DIAGNOSIS — J44.1 COPD WITH ACUTE EXACERBATION: Primary | ICD-10-CM

## 2024-09-13 RX ORDER — BUDESONIDE AND FORMOTEROL FUMARATE DIHYDRATE 160; 4.5 UG/1; UG/1
2 AEROSOL RESPIRATORY (INHALATION) 2 TIMES DAILY
Qty: 10.2 G | Refills: 11 | Status: SHIPPED | OUTPATIENT
Start: 2024-09-13

## 2024-09-13 NOTE — TELEPHONE ENCOUNTER
Called pt to clarify if she is on Incruse Ellipta or Spiriva inhaler as I had received a fax for a PA on Incruse. Pt states she is on Spiriva. Will D/C Incruse. Pt also wants to know if she can switch to Breyna as Breo is too expensive. Pt states that Breyna is covered with a $2 co-pay. Please advise.

## 2024-09-17 DIAGNOSIS — J30.1 SEASONAL ALLERGIC RHINITIS DUE TO POLLEN: ICD-10-CM

## 2024-09-17 RX ORDER — ROPINIROLE 4 MG/1
4 TABLET, FILM COATED ORAL NIGHTLY
Qty: 30 TABLET | Refills: 2 | Status: SHIPPED | OUTPATIENT
Start: 2024-09-17

## 2024-09-17 RX ORDER — MONTELUKAST SODIUM 10 MG/1
10 TABLET ORAL NIGHTLY
Qty: 90 TABLET | Refills: 1 | Status: SHIPPED | OUTPATIENT
Start: 2024-09-17

## 2024-09-19 ENCOUNTER — OFFICE VISIT (OUTPATIENT)
Dept: GASTROENTEROLOGY | Facility: CLINIC | Age: 64
End: 2024-09-19
Payer: MEDICARE

## 2024-09-19 VITALS
WEIGHT: 151.6 LBS | OXYGEN SATURATION: 93 % | SYSTOLIC BLOOD PRESSURE: 150 MMHG | HEIGHT: 62 IN | TEMPERATURE: 97.6 F | DIASTOLIC BLOOD PRESSURE: 90 MMHG | BODY MASS INDEX: 27.9 KG/M2 | HEART RATE: 97 BPM

## 2024-09-19 DIAGNOSIS — R10.13 EPIGASTRIC PAIN: Primary | ICD-10-CM

## 2024-09-19 DIAGNOSIS — R11.14 BILIOUS VOMITING WITH NAUSEA: ICD-10-CM

## 2024-09-19 DIAGNOSIS — K21.00 GASTROESOPHAGEAL REFLUX DISEASE WITH ESOPHAGITIS, UNSPECIFIED WHETHER HEMORRHAGE: ICD-10-CM

## 2024-09-19 DIAGNOSIS — Z79.1 NSAID LONG-TERM USE: ICD-10-CM

## 2024-09-19 RX ORDER — SUCRALFATE ORAL 1 G/10ML
1 SUSPENSION ORAL 3 TIMES DAILY
Qty: 210 ML | Refills: 0 | Status: SHIPPED | OUTPATIENT
Start: 2024-09-19 | End: 2024-09-26

## 2024-09-19 RX ORDER — PANTOPRAZOLE SODIUM 40 MG/1
40 TABLET, DELAYED RELEASE ORAL 2 TIMES DAILY
Qty: 180 TABLET | Refills: 3 | Status: SHIPPED | OUTPATIENT
Start: 2024-09-19

## 2024-09-22 DIAGNOSIS — G25.81 RESTLESS LEG SYNDROME: ICD-10-CM

## 2024-09-23 DIAGNOSIS — R11.0 NAUSEA: ICD-10-CM

## 2024-09-23 RX ORDER — ROPINIROLE 1 MG/1
TABLET, FILM COATED ORAL
Qty: 42 TABLET | Refills: 0 | OUTPATIENT
Start: 2024-09-23

## 2024-09-24 ENCOUNTER — OFFICE VISIT (OUTPATIENT)
Dept: ORTHOPEDIC SURGERY | Facility: CLINIC | Age: 64
End: 2024-09-24
Payer: MEDICARE

## 2024-09-24 VITALS
WEIGHT: 149.8 LBS | BODY MASS INDEX: 27.57 KG/M2 | DIASTOLIC BLOOD PRESSURE: 70 MMHG | SYSTOLIC BLOOD PRESSURE: 112 MMHG | HEIGHT: 62 IN

## 2024-09-24 DIAGNOSIS — M70.62 TROCHANTERIC BURSITIS OF LEFT HIP: Primary | ICD-10-CM

## 2024-09-24 DIAGNOSIS — M25.552 LEFT HIP PAIN: ICD-10-CM

## 2024-09-24 DIAGNOSIS — K21.9 GASTROESOPHAGEAL REFLUX DISEASE, UNSPECIFIED WHETHER ESOPHAGITIS PRESENT: ICD-10-CM

## 2024-09-24 DIAGNOSIS — M16.12 PRIMARY OSTEOARTHRITIS OF LEFT HIP: ICD-10-CM

## 2024-09-24 PROCEDURE — 3078F DIAST BP <80 MM HG: CPT | Performed by: ORTHOPAEDIC SURGERY

## 2024-09-24 PROCEDURE — 20610 DRAIN/INJ JOINT/BURSA W/O US: CPT | Performed by: ORTHOPAEDIC SURGERY

## 2024-09-24 PROCEDURE — 1159F MED LIST DOCD IN RCRD: CPT | Performed by: ORTHOPAEDIC SURGERY

## 2024-09-24 PROCEDURE — 1160F RVW MEDS BY RX/DR IN RCRD: CPT | Performed by: ORTHOPAEDIC SURGERY

## 2024-09-24 PROCEDURE — 99214 OFFICE O/P EST MOD 30 MIN: CPT | Performed by: ORTHOPAEDIC SURGERY

## 2024-09-24 PROCEDURE — 3074F SYST BP LT 130 MM HG: CPT | Performed by: ORTHOPAEDIC SURGERY

## 2024-09-24 RX ORDER — TRIAMCINOLONE ACETONIDE 40 MG/ML
80 INJECTION, SUSPENSION INTRA-ARTICULAR; INTRAMUSCULAR
Status: COMPLETED | OUTPATIENT
Start: 2024-09-24 | End: 2024-09-24

## 2024-09-24 RX ORDER — ROPINIROLE 1 MG/1
1 TABLET, FILM COATED ORAL NIGHTLY
COMMUNITY

## 2024-09-24 RX ORDER — LIDOCAINE HYDROCHLORIDE 10 MG/ML
3 INJECTION, SOLUTION EPIDURAL; INFILTRATION; INTRACAUDAL; PERINEURAL
Status: COMPLETED | OUTPATIENT
Start: 2024-09-24 | End: 2024-09-24

## 2024-09-24 RX ORDER — PROMETHAZINE HYDROCHLORIDE 25 MG/1
25 TABLET ORAL EVERY 8 HOURS PRN
Qty: 30 TABLET | Refills: 1 | Status: SHIPPED | OUTPATIENT
Start: 2024-09-24

## 2024-09-24 RX ORDER — OMEPRAZOLE 40 MG/1
40 CAPSULE, DELAYED RELEASE ORAL DAILY
Qty: 90 CAPSULE | Refills: 1 | OUTPATIENT
Start: 2024-09-24

## 2024-09-24 RX ORDER — BUPIVACAINE HYDROCHLORIDE 2.5 MG/ML
3 INJECTION, SOLUTION EPIDURAL; INFILTRATION; INTRACAUDAL
Status: COMPLETED | OUTPATIENT
Start: 2024-09-24 | End: 2024-09-24

## 2024-09-24 RX ADMIN — LIDOCAINE HYDROCHLORIDE 3 ML: 10 INJECTION, SOLUTION EPIDURAL; INFILTRATION; INTRACAUDAL; PERINEURAL at 11:32

## 2024-09-24 RX ADMIN — BUPIVACAINE HYDROCHLORIDE 3 ML: 2.5 INJECTION, SOLUTION EPIDURAL; INFILTRATION; INTRACAUDAL at 11:32

## 2024-09-24 RX ADMIN — TRIAMCINOLONE ACETONIDE 80 MG: 40 INJECTION, SUSPENSION INTRA-ARTICULAR; INTRAMUSCULAR at 11:32

## 2024-09-27 DIAGNOSIS — M25.552 LEFT HIP PAIN: ICD-10-CM

## 2024-09-27 RX ORDER — ROPINIROLE 4 MG/1
4 TABLET, FILM COATED ORAL NIGHTLY
Qty: 30 TABLET | Refills: 2 | Status: CANCELLED | OUTPATIENT
Start: 2024-09-27

## 2024-09-27 RX ORDER — ROPINIROLE 1 MG/1
1 TABLET, FILM COATED ORAL NIGHTLY
Status: CANCELLED | OUTPATIENT
Start: 2024-09-27

## 2024-09-27 RX ORDER — CELECOXIB 200 MG/1
200 CAPSULE ORAL 2 TIMES DAILY
Qty: 60 CAPSULE | Refills: 0 | Status: SHIPPED | OUTPATIENT
Start: 2024-09-27

## 2024-10-05 DIAGNOSIS — E03.9 ACQUIRED HYPOTHYROIDISM: ICD-10-CM

## 2024-10-06 DIAGNOSIS — M51.9 LUMBAR DISC DISEASE: ICD-10-CM

## 2024-10-07 RX ORDER — LEVOTHYROXINE SODIUM 137 UG/1
137 TABLET ORAL DAILY
Qty: 30 TABLET | Refills: 0 | Status: SHIPPED | OUTPATIENT
Start: 2024-10-07

## 2024-10-07 RX ORDER — TRAMADOL HYDROCHLORIDE 50 MG/1
100 TABLET ORAL EVERY 8 HOURS PRN
Qty: 150 TABLET | Refills: 0 | Status: SHIPPED | OUTPATIENT
Start: 2024-10-07

## 2024-10-14 ENCOUNTER — LAB (OUTPATIENT)
Dept: LAB | Facility: HOSPITAL | Age: 64
End: 2024-10-14
Payer: MEDICARE

## 2024-10-14 ENCOUNTER — OFFICE VISIT (OUTPATIENT)
Dept: FAMILY MEDICINE CLINIC | Facility: CLINIC | Age: 64
End: 2024-10-14
Payer: MEDICARE

## 2024-10-14 VITALS
SYSTOLIC BLOOD PRESSURE: 144 MMHG | BODY MASS INDEX: 28.23 KG/M2 | OXYGEN SATURATION: 99 % | HEART RATE: 68 BPM | TEMPERATURE: 98.4 F | WEIGHT: 153.4 LBS | DIASTOLIC BLOOD PRESSURE: 82 MMHG | HEIGHT: 62 IN

## 2024-10-14 DIAGNOSIS — F41.9 ANXIETY AND DEPRESSION: ICD-10-CM

## 2024-10-14 DIAGNOSIS — F32.A ANXIETY AND DEPRESSION: ICD-10-CM

## 2024-10-14 DIAGNOSIS — G89.29 CHRONIC MIDLINE LOW BACK PAIN WITHOUT SCIATICA: ICD-10-CM

## 2024-10-14 DIAGNOSIS — M79.642 LEFT HAND PAIN: ICD-10-CM

## 2024-10-14 DIAGNOSIS — G25.81 RESTLESS LEG SYNDROME: ICD-10-CM

## 2024-10-14 DIAGNOSIS — Z00.00 MEDICARE ANNUAL WELLNESS VISIT, SUBSEQUENT: ICD-10-CM

## 2024-10-14 DIAGNOSIS — Z00.00 MEDICARE ANNUAL WELLNESS VISIT, SUBSEQUENT: Primary | ICD-10-CM

## 2024-10-14 DIAGNOSIS — M81.8 OTHER OSTEOPOROSIS, UNSPECIFIED PATHOLOGICAL FRACTURE PRESENCE: ICD-10-CM

## 2024-10-14 DIAGNOSIS — M54.50 CHRONIC MIDLINE LOW BACK PAIN WITHOUT SCIATICA: ICD-10-CM

## 2024-10-14 DIAGNOSIS — I10 HYPERTENSION, UNSPECIFIED TYPE: ICD-10-CM

## 2024-10-14 DIAGNOSIS — K21.9 GASTROESOPHAGEAL REFLUX DISEASE, UNSPECIFIED WHETHER ESOPHAGITIS PRESENT: ICD-10-CM

## 2024-10-14 DIAGNOSIS — Z23 IMMUNIZATION DUE: ICD-10-CM

## 2024-10-14 DIAGNOSIS — D64.9 ANEMIA, UNSPECIFIED TYPE: ICD-10-CM

## 2024-10-14 DIAGNOSIS — E03.9 ACQUIRED HYPOTHYROIDISM: ICD-10-CM

## 2024-10-14 DIAGNOSIS — J30.1 SEASONAL ALLERGIC RHINITIS DUE TO POLLEN: ICD-10-CM

## 2024-10-14 PROBLEM — M81.0 OSTEOPOROSIS: Status: ACTIVE | Noted: 2024-10-14

## 2024-10-14 LAB
BASOPHILS # BLD AUTO: 0.03 10*3/MM3 (ref 0–0.2)
BASOPHILS NFR BLD AUTO: 0.4 % (ref 0–1.5)
DEPRECATED RDW RBC AUTO: 47.3 FL (ref 37–54)
EOSINOPHIL # BLD AUTO: 0.14 10*3/MM3 (ref 0–0.4)
EOSINOPHIL NFR BLD AUTO: 1.8 % (ref 0.3–6.2)
ERYTHROCYTE [DISTWIDTH] IN BLOOD BY AUTOMATED COUNT: 14 % (ref 12.3–15.4)
HCT VFR BLD AUTO: 35 % (ref 34–46.6)
HGB BLD-MCNC: 11 G/DL (ref 12–15.9)
IMM GRANULOCYTES # BLD AUTO: 0.01 10*3/MM3 (ref 0–0.05)
IMM GRANULOCYTES NFR BLD AUTO: 0.1 % (ref 0–0.5)
LYMPHOCYTES # BLD AUTO: 2.4 10*3/MM3 (ref 0.7–3.1)
LYMPHOCYTES NFR BLD AUTO: 30.9 % (ref 19.6–45.3)
MCH RBC QN AUTO: 28.6 PG (ref 26.6–33)
MCHC RBC AUTO-ENTMCNC: 31.4 G/DL (ref 31.5–35.7)
MCV RBC AUTO: 91.1 FL (ref 79–97)
MONOCYTES # BLD AUTO: 0.8 10*3/MM3 (ref 0.1–0.9)
MONOCYTES NFR BLD AUTO: 10.3 % (ref 5–12)
NEUTROPHILS NFR BLD AUTO: 4.38 10*3/MM3 (ref 1.7–7)
NEUTROPHILS NFR BLD AUTO: 56.5 % (ref 42.7–76)
NRBC BLD AUTO-RTO: 0 /100 WBC (ref 0–0.2)
PLATELET # BLD AUTO: 369 10*3/MM3 (ref 140–450)
PMV BLD AUTO: 9.2 FL (ref 6–12)
RBC # BLD AUTO: 3.84 10*6/MM3 (ref 3.77–5.28)
WBC NRBC COR # BLD AUTO: 7.76 10*3/MM3 (ref 3.4–10.8)

## 2024-10-14 PROCEDURE — 3079F DIAST BP 80-89 MM HG: CPT | Performed by: STUDENT IN AN ORGANIZED HEALTH CARE EDUCATION/TRAINING PROGRAM

## 2024-10-14 PROCEDURE — 83540 ASSAY OF IRON: CPT

## 2024-10-14 PROCEDURE — 3077F SYST BP >= 140 MM HG: CPT | Performed by: STUDENT IN AN ORGANIZED HEALTH CARE EDUCATION/TRAINING PROGRAM

## 2024-10-14 PROCEDURE — 84443 ASSAY THYROID STIM HORMONE: CPT

## 2024-10-14 PROCEDURE — 1126F AMNT PAIN NOTED NONE PRSNT: CPT | Performed by: STUDENT IN AN ORGANIZED HEALTH CARE EDUCATION/TRAINING PROGRAM

## 2024-10-14 PROCEDURE — 1160F RVW MEDS BY RX/DR IN RCRD: CPT | Performed by: STUDENT IN AN ORGANIZED HEALTH CARE EDUCATION/TRAINING PROGRAM

## 2024-10-14 PROCEDURE — 36415 COLL VENOUS BLD VENIPUNCTURE: CPT

## 2024-10-14 PROCEDURE — 80053 COMPREHEN METABOLIC PANEL: CPT

## 2024-10-14 PROCEDURE — 1170F FXNL STATUS ASSESSED: CPT | Performed by: STUDENT IN AN ORGANIZED HEALTH CARE EDUCATION/TRAINING PROGRAM

## 2024-10-14 PROCEDURE — 84466 ASSAY OF TRANSFERRIN: CPT

## 2024-10-14 PROCEDURE — G0439 PPPS, SUBSEQ VISIT: HCPCS | Performed by: STUDENT IN AN ORGANIZED HEALTH CARE EDUCATION/TRAINING PROGRAM

## 2024-10-14 PROCEDURE — 80061 LIPID PANEL: CPT

## 2024-10-14 PROCEDURE — G0008 ADMIN INFLUENZA VIRUS VAC: HCPCS | Performed by: STUDENT IN AN ORGANIZED HEALTH CARE EDUCATION/TRAINING PROGRAM

## 2024-10-14 PROCEDURE — 99214 OFFICE O/P EST MOD 30 MIN: CPT | Performed by: STUDENT IN AN ORGANIZED HEALTH CARE EDUCATION/TRAINING PROGRAM

## 2024-10-14 PROCEDURE — 85025 COMPLETE CBC W/AUTO DIFF WBC: CPT

## 2024-10-14 PROCEDURE — 90662 IIV NO PRSV INCREASED AG IM: CPT | Performed by: STUDENT IN AN ORGANIZED HEALTH CARE EDUCATION/TRAINING PROGRAM

## 2024-10-14 PROCEDURE — 1159F MED LIST DOCD IN RCRD: CPT | Performed by: STUDENT IN AN ORGANIZED HEALTH CARE EDUCATION/TRAINING PROGRAM

## 2024-10-14 RX ORDER — ROPINIROLE 1 MG/1
1 TABLET, FILM COATED ORAL NIGHTLY
Status: CANCELLED | OUTPATIENT
Start: 2024-10-14

## 2024-10-14 RX ORDER — OMEPRAZOLE 40 MG/1
40 CAPSULE, DELAYED RELEASE ORAL DAILY
Qty: 90 CAPSULE | Refills: 1 | Status: CANCELLED | OUTPATIENT
Start: 2024-10-14

## 2024-10-14 RX ORDER — OMEPRAZOLE 40 MG/1
40 CAPSULE, DELAYED RELEASE ORAL DAILY
Qty: 90 CAPSULE | Refills: 1 | Status: SHIPPED | OUTPATIENT
Start: 2024-10-14

## 2024-10-14 RX ORDER — AMLODIPINE BESYLATE 10 MG/1
10 TABLET ORAL DAILY
Qty: 90 TABLET | Refills: 1 | Status: SHIPPED | OUTPATIENT
Start: 2024-10-14

## 2024-10-14 RX ORDER — ROPINIROLE 4 MG/1
4 TABLET, FILM COATED ORAL NIGHTLY
Qty: 30 TABLET | Refills: 2 | Status: CANCELLED | OUTPATIENT
Start: 2024-10-14

## 2024-10-14 RX ORDER — AMLODIPINE BESYLATE 5 MG/1
5 TABLET ORAL DAILY
Qty: 90 TABLET | Refills: 0 | OUTPATIENT
Start: 2024-10-14

## 2024-10-14 NOTE — ASSESSMENT & PLAN NOTE
- Patient with chronic back pain, did have recent laminectomy and does report improvement in her symptoms of her back pain as long as she continues her gabapentin 600 mg 3 times a day and tramadol 100 mg every 8 hours, she also has tizanidine and Celebrex available as needed  - We updated her medication contract and urine drug screen today  - Reviewed Baltazar, which is appropriate

## 2024-10-14 NOTE — PROGRESS NOTES
Subjective   The ABCs of the Annual Wellness Visit  Medicare Wellness Visit      Yessica Galvin is a 63 y.o. patient who presents for a Medicare Wellness Visit.    Left hand pain: Patient reports that she was screwing on a hose and for the last week or so, she has not been able to bend her left thumb.  She reports that there is no pain but there is a slight burning sensation.     Patient reports that she is having nausea and vomiting for the last several months and feels a burning sensation in the upper portion of her abdomen.  She sometimes feels cramping as well.  She has been taking Zofran and Phenergan and it does seem to relieve her symptoms somewhat but they are persistent.  She has been on the Protonix 40 mg twice a day but it has not helped with her symptoms and she would like to go back to omeprazole.  She was supposed to get an EGD done but it was too expensive so she canceled.  She has not reached out to GI about next steps yet.       Patient reports that she always feels that her legs are restless even during the day.  She feels that she needs to move them around all the time and she feels tightness in her muscles.  Started her on Neupro patches but she did not do well with them and wanted to go back to the ropinirole.  She is currently taking ropinirole 4 mg daily and sometimes takes a 1 mg tablet if she feels that her symptoms are uncontrolled.  She does have a neurology appointment scheduled in December.    Her blood pressure is uncontrolled today.  She did take her lisinopril 20 mg daily and amlodipine 5 mg daily.  She denies any chest pain, dizziness, or blurry vision.        Depression and anxiety: Patient does feel that her depression is well-controlled.  She is currently on venlafaxine 225mg daily.  She is also taking BuSpar 15 mg twice daily.     Back and hip pain: She did have back surgery and does report improvement in her symptoms.  She still feels that she needs to take the tramadol,  gabapentin and tizanidine in order for her pain to be completely controlled.  She did receive a cortisol shot by orthopedic surgery since her last visit and has not noticed any improvement in her symptoms in her hip.     Osteoporosis: Patient is currently on alendronate.  She started this in December 2023.    Hypothyroidism: Patient is currently on levothyroxine 137 mcg daily.  She reports compliance with this medicine.    Allergic rhinitis: Patient is currently on montelukast 10 mg daily and Xyzal 5 mg daily.  She reports compliance with these medicines.  She feels that her symptoms of allergies are well-controlled.    The following portions of the patient's history were reviewed and   updated as appropriate: allergies, current medications, past family history, past medical history, past social history, past surgical history, and problem list.    Compared to one year ago, the patient's physical   health is the same.  Compared to one year ago, the patient's mental   health is better.    Recent Hospitalizations:  This patient has had a [facility] admission record on file within the last 365 days.  Current Medical Providers:  Patient Care Team:  Betty Jasmine MD as PCP - General (Family Medicine)  Atul Tavarez MD as Consulting Physician (Gastroenterology)  Bobby Tom MD as Consulting Physician (Pulmonary Disease)  Delgado Lal MD as Consulting Physician (Pain Medicine)  Ulises Elias MD as Consulting Physician (Neurosurgery)  Betty Jasmine MD as Primary Care Provider (Family Medicine)    Outpatient Medications Prior to Visit   Medication Sig Dispense Refill    acetaminophen (TYLENOL) 325 MG tablet Take 2 tablets by mouth Every 4 (Four) Hours As Needed for Mild Pain .      albuterol sulfate  (90 Base) MCG/ACT inhaler Inhale 2 puffs Every 6 (Six) Hours As Needed for Wheezing. 54 g 3    alendronate (Fosamax) 70 MG tablet Take 1 tablet by mouth Every 7 (Seven) Days. 4 tablet  11    amLODIPine (NORVASC) 5 MG tablet TAKE 1 TABLET BY MOUTH DAILY 90 tablet 0    atorvastatin (LIPITOR) 20 MG tablet Take 1 tablet by mouth Every Night. 90 tablet 3    Breztri Aerosphere 160-9-4.8 MCG/ACT aerosol inhaler       budesonide-formoterol (SYMBICORT) 160-4.5 MCG/ACT inhaler Inhale 2 puffs 2 (Two) Times a Day. 10.2 g 11    busPIRone (BUSPAR) 15 MG tablet TAKE 1 TABLET BY MOUTH TWICE A DAY 60 tablet 5    celecoxib (CeleBREX) 200 MG capsule Take 1 capsule by mouth 2 (Two) Times a Day. 60 capsule 0    Diclofenac Sodium (VOLTAREN) 1 % gel gel Apply 2 g topically to the appropriate area as directed 4 (Four) Times a Day As Needed (legs). 350 g 0    dicyclomine (BENTYL) 10 MG capsule Take 1 capsule by mouth 4 (Four) Times a Day Before Meals & at Bedtime As Needed for Abdominal Cramping. 90 capsule 0    gabapentin (NEURONTIN) 600 MG tablet Take 1 tablet by mouth 3 (Three) Times a Day. 90 tablet 2    levocetirizine (Xyzal) 5 MG tablet Take 1 tablet by mouth Every Evening. 90 tablet 1    levothyroxine (SYNTHROID, LEVOTHROID) 137 MCG tablet TAKE 1 TABLET BY MOUTH DAILY 30 tablet 0    lisinopril (PRINIVIL,ZESTRIL) 20 MG tablet Take 1 tablet by mouth Daily. 90 tablet 0    loperamide (IMODIUM) 2 MG capsule 1 capsule.      montelukast (SINGULAIR) 10 MG tablet TAKE ONE TABLET BY MOUTH ONCE NIGHTLY 90 tablet 1    naloxone (NARCAN) 4 MG/0.1ML nasal spray Call 911. Don't prime. [place] in 1 nostril for overdose. Repeat in 2-3 minutes in other nostril if no or minimal breathing/responsiveness. 2 each 0    nicotine (NICOTROL) 10 MG/ML solution nasal solution 1 spray by Each Nare route Every 1 (One) Hour As Needed (smoking cessation). 1 to 2 doses/hour, do not exceed more than 5 doses (10 sprays) per hour 10 mL 2    ondansetron ODT (ZOFRAN-ODT) 4 MG disintegrating tablet Place 1 tablet on the tongue Every 8 (Eight) Hours As Needed for Nausea or Vomiting. 20 tablet 1    pantoprazole (PROTONIX) 40 MG EC tablet Take 1 tablet by  mouth 2 (Two) Times a Day. 180 tablet 3    promethazine (PHENERGAN) 25 MG tablet Take 1 tablet by mouth Every 8 (Eight) Hours As Needed for Nausea or Vomiting. 30 tablet 1    rOPINIRole (REQUIP) 1 MG tablet Take 1 tablet by mouth Every Night. Take 1 hour before bedtime.      rOPINIRole (REQUIP) 4 MG tablet Take 1 tablet by mouth Every Night. 30 tablet 2    tiotropium bromide monohydrate (SPIRIVA RESPIMAT) 2.5 MCG/ACT aerosol solution inhaler Inhale 2 puffs Daily. 4 g 5    tiZANidine (ZANAFLEX) 4 MG tablet Take 1 tablet by mouth Every 12 (Twelve) Hours As Needed for Muscle Spasms. 60 tablet 1    traMADol (ULTRAM) 50 MG tablet Take 2 tablets by mouth Every 8 (Eight) Hours As Needed for Moderate Pain. 150 tablet 0    venlafaxine XR (Effexor XR) 75 MG 24 hr capsule Take 1 capsule by mouth Daily. Take with 150 mg tablet 90 capsule 1    venlafaxine XR (EFFEXOR-XR) 150 MG 24 hr capsule Take 1 capsule by mouth Daily. Take with 75 mg tablet 90 capsule 1     No facility-administered medications prior to visit.     Opioid medication/s are on active medication list.  and I have evaluated her active treatment plan and pain score trends (see table).  Vitals:    10/14/24 1109   PainSc: 0-No pain     I have reviewed the chart for potential of high risk medication and harmful drug interactions in the elderly.        Aspirin is not on active medication list.  Aspirin use is not indicated based on review of current medical condition/s. Risk of harm outweighs potential benefits.  .    Patient Active Problem List   Diagnosis    Asthmatic bronchitis    Generalized anxiety disorder    Gout    Headache    Acquired hypothyroidism    Chronic midline low back pain without sciatica    Cervical disc disorder with radiculopathy    Lumbar disc disease    Left radial fracture    Asthma    Tobacco abuse    Elevated transaminase level    Chronic back pain    Closed fracture of right tibial plateau    Chronic bronchitis with acute exacerbation     "Centrilobular emphysema    History of tobacco use, presenting hazards to health    Nodule of upper lobe of right lung    Nocturnal leg cramps    Left hip pain    Constipation    Skin lesions    Gastroesophageal reflux disease with esophagitis without hemorrhage    Wound drainage    Therapeutic drug monitoring    Nausea    Allergic rhinitis    Gastroesophageal reflux disease    Medicare annual wellness visit, subsequent    Dyspnea    Anxiety and depression    Hypertension    Fatigue    Tobacco dependence    Abdominal pain, diffuse    Chest pain    Abnormal nuclear stress test    Post-menopause    Age-related osteoporosis without current pathological fracture    Cough    Restless leg syndrome    Gastroenteritis    Lumbar stenosis with neurogenic claudication    Stage II COPD    Encounter for pre-operative respiratory clearance    Status post lumbar laminectomy    Rash    MRSA (methicillin resistant staph aureus) culture positive    Rib pain on left side    Increased sputum production    Upper abdominal pain    Nausea and vomiting     Advance Care Planning Advance Directive is not on file.  ACP discussion was held with the patient during this visit. Patient does not have an advance directive, information provided.            Objective   Vitals:    10/14/24 1109   BP: 144/82   BP Location: Left arm   Patient Position: Sitting   Cuff Size: Large Adult   Pulse: 68   Temp: 98.4 °F (36.9 °C)   TempSrc: Infrared   SpO2: 99%   Weight: 69.6 kg (153 lb 6.4 oz)   Height: 157.5 cm (62\")   PainSc: 0-No pain       Estimated body mass index is 28.06 kg/m² as calculated from the following:    Height as of this encounter: 157.5 cm (62\").    Weight as of this encounter: 69.6 kg (153 lb 6.4 oz).            Does the patient have evidence of cognitive impairment? No                                                                                                 Health  Risk Assessment    Smoking Status:  Social History     Tobacco Use "   Smoking Status Some Days    Current packs/day: 0.00    Average packs/day: 0.3 packs/day for 23.4 years (5.9 ttl pk-yrs)    Types: Cigarettes    Start date: 3/7/2000    Last attempt to quit: 2023    Years since quittin.2    Passive exposure: Current   Smokeless Tobacco Never   Tobacco Comments    I started when i was 17     Alcohol Consumption:  Social History     Substance and Sexual Activity   Alcohol Use No       Fall Risk Screen  STEADI Fall Risk Assessment was completed, and patient is at HIGH risk for falls. Assessment completed on:10/14/2024    Depression Screening:      10/14/2024    11:11 AM   PHQ-2/PHQ-9 Depression Screening   Little interest or pleasure in doing things Not at all   Feeling down, depressed, or hopeless Not at all     Health Habits and Functional and Cognitive Screening:      10/14/2024    11:08 AM   Functional & Cognitive Status   Do you have difficulty preparing food and eating? No   Do you have difficulty bathing yourself, getting dressed or grooming yourself? No   Do you have difficulty using the toilet? No   Do you have difficulty moving around from place to place? No   Do you have trouble with steps or getting out of a bed or a chair? No   Current Diet Unhealthy Diet   Dental Exam Up to date   Eye Exam Up to date   Exercise (times per week) 0 times per week   Current Exercises Include No Regular Exercise   Do you need help using the phone?  No   Are you deaf or do you have serious difficulty hearing?  No   Do you need help to go to places out of walking distance? Yes   Do you need help shopping? No   Do you need help preparing meals?  No   Do you need help with housework?  No   Do you need help with laundry? No   Do you need help taking your medications? No   Do you need help managing money? No   Do you ever drive or ride in a car without wearing a seat belt? No   Have you felt unusual stress, anger or loneliness in the last month? No   Who do you live with? Spouse   If you  need help, do you have trouble finding someone available to you? No   Have you been bothered in the last four weeks by sexual problems? No   Do you have difficulty concentrating, remembering or making decisions? No           Age-appropriate Screening Schedule:  Refer to the list below for future screening recommendations based on patient's age, sex and/or medical conditions. Orders for these recommended tests are listed in the plan section. The patient has been provided with a written plan.    Health Maintenance List  Health Maintenance   Topic Date Due    INFLUENZA VACCINE  08/01/2024    COVID-19 Vaccine (5 - 2023-24 season) 09/01/2024    ANNUAL WELLNESS VISIT  10/05/2024    MAMMOGRAM  01/03/2025    LIPID PANEL  12/28/2024    BMI FOLLOWUP  09/04/2025    DXA SCAN  12/27/2025    TDAP/TD VACCINES (3 - Td or Tdap) 04/30/2028    COLORECTAL CANCER SCREENING  06/21/2028    HEPATITIS C SCREENING  Completed    Pneumococcal Vaccine 65+  Completed    ZOSTER VACCINE  Completed    PAP SMEAR  Discontinued                                                                                                                                                CMS Preventative Services Quick Reference  Risk Factors Identified During Encounter  Chronic Pain: Natural history and expected course discussed. Questions answered.  Follow up in 3 months.  Fall Risk-High or Moderate: Discussed Fall Prevention in the home  Immunizations Discussed/Encouraged: Influenza  Polypharmacy: Medication List reviewed    The above risks/problems have been discussed with the patient.  Pertinent information has been shared with the patient in the After Visit Summary.  An After Visit Summary and PPPS were made available to the patient.    Follow Up:   Next Medicare Wellness visit to be scheduled in 1 year.         Additional E&M Note during same encounter follows:  Patient has additional, significant, and separately identifiable condition(s)/problem(s) that require  work above and beyond the Medicare Wellness Visit     Chief Complaint  Medicare Wellness-subsequent and Hand Pain (Can't bend thumb on left hand )    Subjective   HPI  Yessica is also being seen today for additional medical problem/s.    Left hand pain: Patient reports that she was screwing on a hose and for the last week or so, she has not been able to bend her left thumb.  She reports that there is no pain but there is a slight burning sensation.     Patient reports that she is having nausea and vomiting for the last several months and feels a burning sensation in the upper portion of her abdomen.  She sometimes feels cramping as well.  She has been taking Zofran and Phenergan and it does seem to relieve her symptoms somewhat but they are persistent.  She has been on the Protonix 40 mg twice a day but it has not helped with her symptoms and she would like to go back to omeprazole.  She was supposed to get an EGD done but it was too expensive so she canceled.  She has not reached out to GI about next steps yet.       Patient reports that she always feels that her legs are restless even during the day.  She feels that she needs to move them around all the time and she feels tightness in her muscles.  Started her on Neupro patches but she did not do well with them and wanted to go back to the ropinirole.  She is currently taking ropinirole 4 mg daily and sometimes takes a 1 mg tablet if she feels that her symptoms are uncontrolled.  She does have a neurology appointment scheduled in December.    Her blood pressure is uncontrolled today.  She did take her lisinopril 20 mg daily and amlodipine 5 mg daily.  She denies any chest pain, dizziness, or blurry vision.        Depression and anxiety: Patient does feel that her depression is well-controlled.  She is currently on venlafaxine 225mg daily.  She is also taking BuSpar 15 mg twice daily.     Back and hip pain: She did have back surgery and does report improvement in her  "symptoms.  She still feels that she needs to take the tramadol, gabapentin and tizanidine in order for her pain to be completely controlled.  She did receive a cortisol shot by orthopedic surgery since her last visit and has not noticed any improvement in her symptoms in her hip.     Osteoporosis: Patient is currently on alendronate.  She started this in December 2023.    Hypothyroidism: Patient is currently on levothyroxine 137 mcg daily.  She reports compliance with this medicine.    Allergic rhinitis: Patient is currently on montelukast 10 mg daily and Xyzal 5 mg daily.  She reports compliance with these medicines.  She feels that her symptoms of allergies are well-controlled.          Objective   Vital Signs:  /82 (BP Location: Left arm, Patient Position: Sitting, Cuff Size: Large Adult)   Pulse 68   Temp 98.4 °F (36.9 °C) (Infrared)   Ht 157.5 cm (62\")   Wt 69.6 kg (153 lb 6.4 oz)   SpO2 99%   BMI 28.06 kg/m²   Physical Exam  Vitals reviewed.   Constitutional:       Comments: Chronically ill appearing   HENT:      Head: Normocephalic.      Right Ear: Tympanic membrane normal.      Left Ear: Tympanic membrane normal.      Nose: Nose normal.      Mouth/Throat:      Mouth: Mucous membranes are moist.   Eyes:      Conjunctiva/sclera: Conjunctivae normal.   Cardiovascular:      Rate and Rhythm: Normal rate and regular rhythm.   Pulmonary:      Effort: Pulmonary effort is normal.      Breath sounds: Normal breath sounds.   Abdominal:      General: Abdomen is flat.      Palpations: Abdomen is soft.   Musculoskeletal:      Comments: Kyphotic back, moving all extremities spontaneously, no active flexion of left thumb   Skin:     General: Skin is warm.   Neurological:      General: No focal deficit present.      Mental Status: She is alert.   Psychiatric:         Mood and Affect: Mood normal.         Behavior: Behavior normal.               Assessment and Plan      Medicare annual wellness visit, " subsequent  - Patient does have risk factors of chronic pain, high risk for fall, polypharmacy  - Reviewed all of her medications today  - Flu vaccine administered today  - She is up-to-date on her colorectal cancer screening and up-to-date on mammogram  - Obtaining basic labs today  Left hand pain  - Patient has been unable to flex her right thumb actively for about a week, this happened when she was screwing on a hose  - Obtaining x-ray today, will likely need MRI pending x-ray  Chronic midline low back pain without sciatica  - Patient with chronic back pain, did have recent laminectomy and does report improvement in her symptoms of her back pain as long as she continues her gabapentin 600 mg 3 times a day and tramadol 100 mg every 8 hours, she also has tizanidine and Celebrex available as needed  - We updated her medication contract and urine drug screen today  - Reviewed [place], which is appropriate  Acquired hypothyroidism  - Obtaining TSH today  - Continue levothyroxine 137 mcg daily for now pending labs  Gastroesophageal reflux disease, unspecified whether esophagitis present  - Uncontrolled, patient could not get EGD done due to cost  - She reports that she was doing better on the omeprazole, so switching Protonix out to omeprazole 40 mg daily  - Advised patient to reach out to GI again to discuss next steps  Restless leg syndrome  - Uncontrolled but stable  - Did not have benefit with pramipexole in the past, did not notice any improvement on rotigotine  - Patient is back on ropinirole 4 mg daily  - Has neurology appointment in early December  Hypertension, unspecified type  - Uncontrolled  - Continue lisinopril 20 mg daily, increase amlodipine to 10 mg daily  - Advised patient to reach out to clinic if she has any ill side effects from the increased dose of amlodipine  Seasonal allergic rhinitis due to pollen  - Controlled  - Continue Xyzal 5 mg daily and Singulair 10 mg daily  Anxiety and depression  -  Controlled  -Continue venlafaxine 225 mg daily and BuSpar 15 mg twice daily  Other osteoporosis, unspecified pathological fracture presence  - Patient's last DEXA scan showed evidence of osteoporosis  - She has been on alendronate since December 2023, tolerating it well  Immunization due  - Flu vaccine administered    Orders Placed This Encounter   Procedures    XR Hand 3+ View Left     Standing Status:   Future     Standing Expiration Date:   10/14/2025     Order Specific Question:   Reason for Exam:     Answer:   left hand pain, cannot bend thumb     Order Specific Question:   Release to patient     Answer:   Routine Release [[id number]]    Fluzone High-Dose 65+yrs ([id number])    TSH Rfx On Abnormal To Free T4     Standing Status:   Future     Number of Occurrences:   1     Order Specific Question:   Release to patient     Answer:   Routine Release [[id number]]    Comprehensive Metabolic Panel     Standing Status:   Future     Number of Occurrences:   1     Order Specific Question:   Release to patient     Answer:   Routine Release [[id number]]    Lipid Panel     Standing Status:   Future     Number of Occurrences:   1     Order Specific Question:   Release to patient     Answer:   Routine Release [[id number]]    Compliance Drug Analysis, Ur - Urine, Clean Catch     Standing Status:   Future     Number of Occurrences:   1     Standing Expiration Date:   10/14/2025     Order Specific Question:   Release to patient     Answer:   Routine Release [[id number]]    CBC & Differential     Standing Status:   Future     Number of Occurrences:   1     Order Specific Question:   Manual Differential     Answer:   No     Order Specific Question:   Release to patient     Answer:   Routine Release [[id number]]             Follow Up   Return in about 3 months (around 1/14/2025) for chronic pain.  Patient was given instructions and counseling regarding her condition or for health maintenance advice. Please see specific information  pulled into the AVS if appropriate.

## 2024-10-14 NOTE — ASSESSMENT & PLAN NOTE
- Uncontrolled, patient could not get EGD done due to cost  - She reports that she was doing better on the omeprazole, so switching Protonix out to omeprazole 40 mg daily  - Advised patient to reach out to GI again to discuss next steps

## 2024-10-14 NOTE — ASSESSMENT & PLAN NOTE
- Patient's last DEXA scan showed evidence of osteoporosis  - She has been on alendronate since December 2023, tolerating it well

## 2024-10-14 NOTE — ASSESSMENT & PLAN NOTE
- Patient does have risk factors of chronic pain, high risk for fall, polypharmacy  - Reviewed all of her medications today  - Flu vaccine administered today  - She is up-to-date on her colorectal cancer screening and up-to-date on mammogram  - Obtaining basic labs today

## 2024-10-14 NOTE — ASSESSMENT & PLAN NOTE
- Patient has been unable to flex her right thumb actively for about a week, this happened when she was screwing on a hose  - Obtaining x-ray today, will likely need MRI pending x-ray

## 2024-10-14 NOTE — ASSESSMENT & PLAN NOTE
- Uncontrolled  - Continue lisinopril 20 mg daily, increase amlodipine to 10 mg daily  - Advised patient to reach out to clinic if she has any ill side effects from the increased dose of amlodipine

## 2024-10-14 NOTE — PATIENT INSTRUCTIONS
Health Maintenance, Female  Adopting a healthy lifestyle and getting preventive care can go a long way to promote health and wellness. Talk with your health care provider about what schedule of regular examinations is right for you. This is a good chance for you to check in with your provider about disease prevention and staying healthy.  In between checkups, there are plenty of things you can do on your own. Experts have done a lot of research about which lifestyle changes and preventive measures are most likely to keep you healthy. Ask your health care provider for more information.  Weight and diet  Eat a healthy diet  Be sure to include plenty of vegetables, fruits, low-fat dairy products, and lean protein.  Do not eat a lot of foods high in solid fats, added sugars, or salt.  Get regular exercise. This is one of the most important things you can do for your health.  Most adults should exercise for at least 150 minutes each week. The exercise should increase your heart rate and make you sweat (moderate-intensity exercise).  Most adults should also do strengthening exercises at least twice a week. This is in addition to the moderate-intensity exercise.     Maintain a healthy weight  Body mass index (BMI) is a measurement that can be used to identify possible weight problems. It estimates body fat based on height and weight. Your health care provider can help determine your BMI and help you achieve or maintain a healthy weight.  For females 20 years of age and older:  A BMI below 18.5 is considered underweight.  A BMI of 18.5 to 24.9 is normal.  A BMI of 25 to 29.9 is considered overweight.  A BMI of 30 and above is considered obese.     Watch levels of cholesterol and blood lipids  You should start having your blood tested for lipids and cholesterol at 20 years of age, then have this test every 5 years.  You may need to have your cholesterol levels checked more often if:  Your lipid or cholesterol levels are  high.  You are older than 50 years of age.  You are at high risk for heart disease.     Cancer screening  Lung Cancer  Lung cancer screening is recommended for adults 55-80 years old who are at high risk for lung cancer because of a history of smoking.  A yearly low-dose CT scan of the lungs is recommended for people who:  Currently smoke.  Have quit within the past 15 years.  Have at least a 30-pack-year history of smoking. A pack year is smoking an average of one pack of cigarettes a day for 1 year.  Yearly screening should continue until it has been 15 years since you quit.  Yearly screening should stop if you develop a health problem that would prevent you from having lung cancer treatment.     Breast Cancer  Practice breast self-awareness. This means understanding how your breasts normally appear and feel.  It also means doing regular breast self-exams. Let your health care provider know about any changes, no matter how small.  If you are in your 20s or 30s, you should have a clinical breast exam (CBE) by a health care provider every 1-3 years as part of a regular health exam.  If you are 40 or older, have a CBE every year. Also consider having a breast X-ray (mammogram) every year.  If you have a family history of breast cancer, talk to your health care provider about genetic screening.  If you are at high risk for breast cancer, talk to your health care provider about having an MRI and a mammogram every year.  Breast cancer gene (BRCA) assessment is recommended for women who have family members with BRCA-related cancers. BRCA-related cancers include:  Breast.  Ovarian.  Tubal.  Peritoneal cancers.  Results of the assessment will determine the need for genetic counseling and BRCA1 and BRCA2 testing.     Cervical Cancer  Your health care provider may recommend that you be screened regularly for cancer of the pelvic organs (ovaries, uterus, and vagina). This screening involves a pelvic examination, including  checking for microscopic changes to the surface of your cervix (Pap test). You may be encouraged to have this screening done every 3 years, beginning at age 21.  For women ages 30-65, health care providers may recommend pelvic exams and Pap testing every 3 years, or they may recommend the Pap and pelvic exam, combined with testing for human papilloma virus (HPV), every 5 years. Some types of HPV increase your risk of cervical cancer. Testing for HPV may also be done on women of any age with unclear Pap test results.  Other health care providers may not recommend any screening for nonpregnant women who are considered low risk for pelvic cancer and who do not have symptoms. Ask your health care provider if a screening pelvic exam is right for you.  If you have had past treatment for cervical cancer or a condition that could lead to cancer, you need Pap tests and screening for cancer for at least 20 years after your treatment. If Pap tests have been discontinued, your risk factors (such as having a new sexual partner) need to be reassessed to determine if screening should resume. Some women have medical problems that increase the chance of getting cervical cancer. In these cases, your health care provider may recommend more frequent screening and Pap tests.     Colorectal Cancer  This type of cancer can be detected and often prevented.  Routine colorectal cancer screening usually begins at 50 years of age and continues through 75 years of age.  Your health care provider may recommend screening at an earlier age if you have risk factors for colon cancer.  Your health care provider may also recommend using home test kits to check for hidden blood in the stool.  A small camera at the end of a tube can be used to examine your colon directly (sigmoidoscopy or colonoscopy). This is done to check for the earliest forms of colorectal cancer.  Routine screening usually begins at age 50.  Direct examination of the colon should  be repeated every 5-10 years through 75 years of age. However, you may need to be screened more often if early forms of precancerous polyps or small growths are found.     Skin Cancer  Check your skin from head to toe regularly.  Tell your health care provider about any new moles or changes in moles, especially if there is a change in a mole's shape or color.  Also tell your health care provider if you have a mole that is larger than the size of a pencil eraser.  Always use sunscreen. Apply sunscreen liberally and repeatedly throughout the day.  Protect yourself by wearing long sleeves, pants, a wide-brimmed hat, and sunglasses whenever you are outside.     Heart disease, diabetes, and high blood pressure  High blood pressure causes heart disease and increases the risk of stroke. High blood pressure is more likely to develop in:  People who have blood pressure in the high end of the normal range (130-139/85-89 mm Hg).  People who are overweight or obese.  People who are .  If you are 18-39 years of age, have your blood pressure checked every 3-5 years. If you are 40 years of age or older, have your blood pressure checked every year. You should have your blood pressure measured twice--once when you are at a hospital or clinic, and once when you are not at a hospital or clinic. Record the average of the two measurements. To check your blood pressure when you are not at a hospital or clinic, you can use:  An automated blood pressure machine at a pharmacy.  A home blood pressure monitor.  If you are between 55 years and 79 years old, ask your health care provider if you should take aspirin to prevent strokes.  Have regular diabetes screenings. This involves taking a blood sample to check your fasting blood sugar level.  If you are at a normal weight and have a low risk for diabetes, have this test once every three years after 45 years of age.  If you are overweight and have a high risk for diabetes,  consider being tested at a younger age or more often.  Preventing infection  Hepatitis B  If you have a higher risk for hepatitis B, you should be screened for this virus. You are considered at high risk for hepatitis B if:  You were born in a country where hepatitis B is common. Ask your health care provider which countries are considered high risk.  Your parents were born in a high-risk country, and you have not been immunized against hepatitis B (hepatitis B vaccine).  You have HIV or AIDS.  You use needles to inject street drugs.  You live with someone who has hepatitis B.  You have had sex with someone who has hepatitis B.  You get hemodialysis treatment.  You take certain medicines for conditions, including cancer, organ transplantation, and autoimmune conditions.     Hepatitis C  Blood testing is recommended for:  Everyone born from 1945 through 1965.  Anyone with known risk factors for hepatitis C.     Sexually transmitted infections (STIs)  You should be screened for sexually transmitted infections (STIs) including gonorrhea and chlamydia if:  You are sexually active and are younger than 24 years of age.  You are older than 24 years of age and your health care provider tells you that you are at risk for this type of infection.  Your sexual activity has changed since you were last screened and you are at an increased risk for chlamydia or gonorrhea. Ask your health care provider if you are at risk.  If you do not have HIV, but are at risk, it may be recommended that you take a prescription medicine daily to prevent HIV infection. This is called pre-exposure prophylaxis (PrEP). You are considered at risk if:  You are sexually active and do not regularly use condoms or know the HIV status of your partner(s).  You take drugs by injection.  You are sexually active with a partner who has HIV.     Talk with your health care provider about whether you are at high risk of being infected with HIV. If you choose to  begin PrEP, you should first be tested for HIV. You should then be tested every 3 months for as long as you are taking PrEP.  Pregnancy  If you are premenopausal and you may become pregnant, ask your health care provider about preconception counseling.  If you may become pregnant, take 400 to 800 micrograms (mcg) of folic acid every day.  If you want to prevent pregnancy, talk to your health care provider about birth control (contraception).  Osteoporosis and menopause  Osteoporosis is a disease in which the bones lose minerals and strength with aging. This can result in serious bone fractures. Your risk for osteoporosis can be identified using a bone density scan.  If you are 65 years of age or older, or if you are at risk for osteoporosis and fractures, ask your health care provider if you should be screened.  Ask your health care provider whether you should take a calcium or vitamin D supplement to lower your risk for osteoporosis.  Menopause may have certain physical symptoms and risks.  Hormone replacement therapy may reduce some of these symptoms and risks.  Talk to your health care provider about whether hormone replacement therapy is right for you.  Follow these instructions at home:  Schedule regular health, dental, and eye exams.  Stay current with your immunizations.  Do not use any tobacco products including cigarettes, chewing tobacco, or electronic cigarettes.  If you are pregnant, do not drink alcohol.  If you are breastfeeding, limit how much and how often you drink alcohol.  Limit alcohol intake to no more than 1 drink per day for nonpregnant women. One drink equals 12 ounces of beer, 5 ounces of wine, or 1½ ounces of hard liquor.  Do not use street drugs.  Do not share needles.  Ask your health care provider for help if you need support or information about quitting drugs.  Tell your health care provider if you often feel depressed.  Tell your health care provider if you have ever been abused or do  not feel safe at home.  This information is not intended to replace advice given to you by your health care provider. Make sure you discuss any questions you have with your health care provider.  Document Released: 07/02/2012 Document Revised: 05/25/2017 Document Reviewed: 09/20/2016  [employer] Patient Education © 2018 [employer]

## 2024-10-14 NOTE — ASSESSMENT & PLAN NOTE
- Uncontrolled but stable  - Did not have benefit with pramipexole in the past, did not notice any improvement on rotigotine  - Patient is back on ropinirole 4 mg daily  - Has neurology appointment in early December

## 2024-10-15 ENCOUNTER — HOSPITAL ENCOUNTER (OUTPATIENT)
Dept: GENERAL RADIOLOGY | Facility: HOSPITAL | Age: 64
Discharge: HOME | End: 2024-10-15
Admitting: STUDENT IN AN ORGANIZED HEALTH CARE EDUCATION/TRAINING PROGRAM
Payer: MEDICARE

## 2024-10-15 ENCOUNTER — TRANSCRIBE ORDERS (OUTPATIENT)
Dept: GENERAL RADIOLOGY | Facility: HOSPITAL | Age: 64
End: 2024-10-15
Payer: MEDICARE

## 2024-10-15 DIAGNOSIS — M79.642 LEFT HAND PAIN: Primary | ICD-10-CM

## 2024-10-15 DIAGNOSIS — M51.9 LUMBAR DISC DISEASE: ICD-10-CM

## 2024-10-15 DIAGNOSIS — M79.642 LEFT HAND PAIN: ICD-10-CM

## 2024-10-15 DIAGNOSIS — R11.0 NAUSEA: ICD-10-CM

## 2024-10-15 LAB
ALBUMIN SERPL-MCNC: 4 G/DL (ref 3.5–5.2)
ALBUMIN/GLOB SERPL: 1.4 G/DL
ALP SERPL-CCNC: 99 U/L (ref 39–117)
ALT SERPL W P-5'-P-CCNC: 18 U/L (ref 1–33)
ANION GAP SERPL CALCULATED.3IONS-SCNC: 11.2 MMOL/L (ref 5–15)
AST SERPL-CCNC: 25 U/L (ref 1–32)
BILIRUB SERPL-MCNC: 0.3 MG/DL (ref 0–1.2)
BUN SERPL-MCNC: 14 MG/DL (ref 8–23)
BUN/CREAT SERPL: 18.9 (ref 7–25)
CALCIUM SPEC-SCNC: 9.2 MG/DL (ref 8.6–10.5)
CHLORIDE SERPL-SCNC: 99 MMOL/L (ref 98–107)
CHOLEST SERPL-MCNC: 189 MG/DL (ref 0–200)
CO2 SERPL-SCNC: 25.8 MMOL/L (ref 22–29)
CREAT SERPL-MCNC: 0.74 MG/DL (ref 0.57–1)
EGFRCR SERPLBLD CKD-EPI 2021: 89.9 ML/MIN/1.73
GLOBULIN UR ELPH-MCNC: 2.9 GM/DL
GLUCOSE SERPL-MCNC: 82 MG/DL (ref 65–99)
HDLC SERPL-MCNC: 97 MG/DL (ref 40–60)
LDLC SERPL CALC-MCNC: 80 MG/DL (ref 0–100)
LDLC/HDLC SERPL: 0.82 {RATIO}
POTASSIUM SERPL-SCNC: 4.6 MMOL/L (ref 3.5–5.2)
PROT SERPL-MCNC: 6.9 G/DL (ref 6–8.5)
SODIUM SERPL-SCNC: 136 MMOL/L (ref 136–145)
TRIGL SERPL-MCNC: 64 MG/DL (ref 0–150)
TSH SERPL DL<=0.05 MIU/L-ACNC: 1.37 UIU/ML (ref 0.27–4.2)
VLDLC SERPL-MCNC: 12 MG/DL (ref 5–40)

## 2024-10-15 PROCEDURE — 73130 X-RAY EXAM OF HAND: CPT

## 2024-10-16 DIAGNOSIS — D64.9 ANEMIA, UNSPECIFIED TYPE: Primary | ICD-10-CM

## 2024-10-16 LAB
IRON 24H UR-MRATE: 78 MCG/DL (ref 37–145)
IRON SATN MFR SERPL: 16 % (ref 20–50)
TIBC SERPL-MCNC: 498 MCG/DL (ref 298–536)
TRANSFERRIN SERPL-MCNC: 334 MG/DL (ref 200–360)

## 2024-10-16 RX ORDER — GABAPENTIN 600 MG/1
600 TABLET ORAL 3 TIMES DAILY
Qty: 90 TABLET | Refills: 2 | Status: SHIPPED | OUTPATIENT
Start: 2024-10-16

## 2024-10-16 RX ORDER — PROMETHAZINE HYDROCHLORIDE 25 MG/1
25 TABLET ORAL EVERY 8 HOURS PRN
Qty: 30 TABLET | Refills: 1 | Status: SHIPPED | OUTPATIENT
Start: 2024-10-16

## 2024-10-22 LAB — DRUGS UR: NORMAL

## 2024-10-24 DIAGNOSIS — M25.552 LEFT HIP PAIN: ICD-10-CM

## 2024-10-24 RX ORDER — CELECOXIB 200 MG/1
200 CAPSULE ORAL 2 TIMES DAILY
Qty: 60 CAPSULE | Refills: 0 | Status: SHIPPED | OUTPATIENT
Start: 2024-10-24

## 2024-10-28 DIAGNOSIS — M51.9 LUMBAR DISC DISEASE: ICD-10-CM

## 2024-10-30 ENCOUNTER — OFFICE VISIT (OUTPATIENT)
Dept: FAMILY MEDICINE CLINIC | Facility: CLINIC | Age: 64
End: 2024-10-30
Payer: MEDICARE

## 2024-10-30 VITALS
RESPIRATION RATE: 18 BRPM | TEMPERATURE: 98.2 F | OXYGEN SATURATION: 97 % | HEART RATE: 80 BPM | HEIGHT: 62 IN | BODY MASS INDEX: 27.57 KG/M2 | WEIGHT: 149.8 LBS | DIASTOLIC BLOOD PRESSURE: 76 MMHG | SYSTOLIC BLOOD PRESSURE: 140 MMHG

## 2024-10-30 DIAGNOSIS — R10.84 GENERALIZED ABDOMINAL PAIN: Primary | ICD-10-CM

## 2024-10-30 DIAGNOSIS — R19.7 DIARRHEA, UNSPECIFIED TYPE: ICD-10-CM

## 2024-10-30 DIAGNOSIS — R11.2 NAUSEA AND VOMITING, UNSPECIFIED VOMITING TYPE: ICD-10-CM

## 2024-10-30 DIAGNOSIS — K21.9 GASTROESOPHAGEAL REFLUX DISEASE, UNSPECIFIED WHETHER ESOPHAGITIS PRESENT: ICD-10-CM

## 2024-10-30 PROCEDURE — 1126F AMNT PAIN NOTED NONE PRSNT: CPT | Performed by: PHYSICIAN ASSISTANT

## 2024-10-30 PROCEDURE — 3077F SYST BP >= 140 MM HG: CPT | Performed by: PHYSICIAN ASSISTANT

## 2024-10-30 PROCEDURE — 99214 OFFICE O/P EST MOD 30 MIN: CPT | Performed by: PHYSICIAN ASSISTANT

## 2024-10-30 PROCEDURE — 1160F RVW MEDS BY RX/DR IN RCRD: CPT | Performed by: PHYSICIAN ASSISTANT

## 2024-10-30 PROCEDURE — 1159F MED LIST DOCD IN RCRD: CPT | Performed by: PHYSICIAN ASSISTANT

## 2024-10-30 PROCEDURE — 3078F DIAST BP <80 MM HG: CPT | Performed by: PHYSICIAN ASSISTANT

## 2024-10-30 RX ORDER — ROPINIROLE 3 MG/1
3 TABLET, FILM COATED ORAL NIGHTLY
COMMUNITY
Start: 2024-10-14

## 2024-10-30 RX ORDER — DICYCLOMINE HYDROCHLORIDE 10 MG/1
10 CAPSULE ORAL
Qty: 90 CAPSULE | Refills: 0 | Status: SHIPPED | OUTPATIENT
Start: 2024-10-30

## 2024-10-30 NOTE — ASSESSMENT & PLAN NOTE
See above.  Discussed my suspicion symptoms could be secondary to viral gastroenteritis.  I recommended patient not use any antidiarrheals until stool studies return.  Discussed the importance of adequate rest and hydration.  Brat diet discussed.  May benefit from repeat CT imaging of abdomen and pelvis if symptoms persist.  Strict return precautions discussed.

## 2024-10-30 NOTE — ASSESSMENT & PLAN NOTE
Uncontrolled, though patient states she is doing better on omeprazole.  Will continue omeprazole 40 mg daily.  Strongly advise she reach back out to GI.

## 2024-10-30 NOTE — ASSESSMENT & PLAN NOTE
This appears to be a chronic issue.  Symptoms relatively controlled with Phenergan and Zofran as needed.  Strongly advised patient follow-up with GI and discussed she would likely benefit from  EGD and colonoscopy, though patient states she will not be returning to GI as the cost of the EGD was greater than $200 and states she cannot afford this.

## 2024-10-30 NOTE — PROGRESS NOTES
Follow Up Office Visit      Patient Name: Yessica Galvin  : 1960   MRN: [id number]     Chief Complaint:    Chief Complaint   Patient presents with    Nausea     Per patient she has been nauseated with vomiting x 3 nights     History of Present Illness  The patient presents for evaluation of diarrhea. She is accompanied by her .    She has been experiencing increased abdominal discomfort for approximately 5 days, that said she has a longstanding history of upper abdominal pain, uncontrolled GERD, and nausea/vomiting. She reports persistent diarrhea for the past 5 days, with episodes occurring whenever she consumes food. Four days ago, she had a single episode of vomiting, though no vomiting since.  She has not taken any over-the-counter medications but has been using Phenergan for nausea. She attempted to alleviate her symptoms by consuming half a sandwich with peanut butter and bread, which provided some relief. Her condition was less severe yesterday, allowing her to consume a few popsicles, and states her symptoms continue to improve.    Previously, regarding patient's chronic upper abdominal pain/cramping, she was prescribed Bentyl for abdominal cramping last month, which she found helpful, but she has since run out of the medication.  She is interested in a refill today.  She was scheduled for an EGD but cancelled due to insurance issues and cost.  She was previously switched from omeprazole to pantoprazole, but she found omeprazole more effective and has since returned to it.     She reports that her bowel movements are loose and have a foul odor, though states the foul odor is unchanged.  She does not endorse any urinary symptoms.   Denies any fever, chills, body aches, chest pain, shortness of breath, cough, bloody stools, hematemesis, dizziness.      Subjective        I have reviewed and the following portions of the patient's history were updated as appropriate: past family history,  past medical history, past social history, past surgical history and problem list.    Medications:     Current Outpatient Medications:     acetaminophen (TYLENOL) 325 MG tablet, Take 2 tablets by mouth Every 4 (Four) Hours As Needed for Mild Pain ., Disp: , Rfl:     albuterol sulfate  (90 Base) MCG/ACT inhaler, Inhale 2 puffs Every 6 (Six) Hours As Needed for Wheezing., Disp: 54 g, Rfl: 3    alendronate (Fosamax) 70 MG tablet, Take 1 tablet by mouth Every 7 (Seven) Days., Disp: 4 tablet, Rfl: 11    amLODIPine (NORVASC) 10 MG tablet, Take 1 tablet by mouth Daily., Disp: 90 tablet, Rfl: 1    atorvastatin (LIPITOR) 20 MG tablet, Take 1 tablet by mouth Every Night., Disp: 90 tablet, Rfl: 3    Breztri Aerosphere 160-9-4.8 MCG/ACT aerosol inhaler, , Disp: , Rfl:     budesonide-formoterol (SYMBICORT) 160-4.5 MCG/ACT inhaler, Inhale 2 puffs 2 (Two) Times a Day., Disp: 10.2 g, Rfl: 11    busPIRone (BUSPAR) 15 MG tablet, TAKE 1 TABLET BY MOUTH TWICE A DAY, Disp: 60 tablet, Rfl: 5    celecoxib (CeleBREX) 200 MG capsule, TAKE 1 CAPSULE BY MOUTH 2 TIMES A DAY, Disp: 60 capsule, Rfl: 0    Diclofenac Sodium (VOLTAREN) 1 % gel gel, Apply 2 g topically to the appropriate area as directed 4 (Four) Times a Day As Needed (legs)., Disp: 350 g, Rfl: 0    dicyclomine (BENTYL) 10 MG capsule, Take 1 capsule by mouth 4 (Four) Times a Day Before Meals & at Bedtime As Needed for Abdominal Cramping., Disp: 90 capsule, Rfl: 0    gabapentin (NEURONTIN) 600 MG tablet, Take 1 tablet by mouth 3 (Three) Times a Day., Disp: 90 tablet, Rfl: 2    levocetirizine (Xyzal) 5 MG tablet, Take 1 tablet by mouth Every Evening., Disp: 90 tablet, Rfl: 1    levothyroxine (SYNTHROID, LEVOTHROID) 137 MCG tablet, TAKE 1 TABLET BY MOUTH DAILY, Disp: 30 tablet, Rfl: 0    lisinopril (PRINIVIL,ZESTRIL) 20 MG tablet, Take 1 tablet by mouth Daily., Disp: 90 tablet, Rfl: 0    montelukast (SINGULAIR) 10 MG tablet, TAKE ONE TABLET BY MOUTH ONCE NIGHTLY, Disp: 90  tablet, Rfl: 1    naloxone (NARCAN) 4 MG/0.1ML nasal spray, Call 911. Don't prime. [place] in 1 nostril for overdose. Repeat in 2-3 minutes in other nostril if no or minimal breathing/responsiveness., Disp: 2 each, Rfl: 0    nicotine (NICOTROL) 10 MG/ML solution nasal solution, 1 spray by Each Nare route Every 1 (One) Hour As Needed (smoking cessation). 1 to 2 doses/hour, do not exceed more than 5 doses (10 sprays) per hour, Disp: 10 mL, Rfl: 2    omeprazole (priLOSEC) 40 MG capsule, Take 1 capsule by mouth Daily., Disp: 90 capsule, Rfl: 1    ondansetron ODT (ZOFRAN-ODT) 4 MG disintegrating tablet, Place 1 tablet on the tongue Every 8 (Eight) Hours As Needed for Nausea or Vomiting., Disp: 20 tablet, Rfl: 1    promethazine (PHENERGAN) 25 MG tablet, Take 1 tablet by mouth Every 8 (Eight) Hours As Needed for Nausea or Vomiting., Disp: 30 tablet, Rfl: 1    rOPINIRole (REQUIP) 3 MG tablet, Take 1 tablet by mouth Every Night., Disp: , Rfl:     tiotropium bromide monohydrate (SPIRIVA RESPIMAT) 2.5 MCG/ACT aerosol solution inhaler, Inhale 2 puffs Daily., Disp: 4 g, Rfl: 5    tiZANidine (ZANAFLEX) 4 MG tablet, Take 1 tablet by mouth Every 12 (Twelve) Hours As Needed for Muscle Spasms., Disp: 60 tablet, Rfl: 1    traMADol (ULTRAM) 50 MG tablet, Take 2 tablets by mouth Every 8 (Eight) Hours As Needed for Moderate Pain., Disp: 150 tablet, Rfl: 0    venlafaxine XR (Effexor XR) 75 MG 24 hr capsule, Take 1 capsule by mouth Daily. Take with 150 mg tablet, Disp: 90 capsule, Rfl: 1    venlafaxine XR (EFFEXOR-XR) 150 MG 24 hr capsule, Take 1 capsule by mouth Daily. Take with 75 mg tablet, Disp: 90 capsule, Rfl: 1    Allergies:   Allergies   Allergen Reactions    Codeine Nausea And Vomiting    Shrimp Hives       Objective     Physical Exam:   Physical Exam  Constitutional:       General: She is not in acute distress.     Appearance: She is not ill-appearing.   HENT:      Head: Normocephalic.   Cardiovascular:      Rate and Rhythm: Normal  "rate and regular rhythm.      Heart sounds: No murmur heard.  Pulmonary:      Effort: Pulmonary effort is normal. No respiratory distress.      Breath sounds: Normal breath sounds.   Abdominal:      General: There is no distension.      Palpations: There is no mass.      Tenderness: There is abdominal tenderness (diffusely without guarding or rebound tenderness).   Musculoskeletal:         General: Normal range of motion.   Skin:     General: Skin is warm.   Neurological:      General: No focal deficit present.      Mental Status: She is alert.   Psychiatric:         Mood and Affect: Mood normal.         Vital Signs:   Vitals:    10/30/24 1107   BP: 140/76   Pulse: 80   Resp: 18   Temp: 98.2 °F (36.8 °C)   TempSrc: Infrared   SpO2: 97%   Weight: 67.9 kg (149 lb 12.8 oz)   Height: 157.5 cm (62.01\")   PainSc: 0-No pain     Body mass index is 27.39 kg/m².    Procedures    Assessment / Plan         Assessment and Plan     Diagnoses and all orders for this visit:    1. Generalized abdominal pain (Primary)  Assessment & Plan:  This appears to be a chronic issue for patient.  Typically well-controlled with Bentyl per patient  Requesting refill today.  Due to new onset of diarrhea we will obtain labs and stool analysis today.  Unfortunately patient's insurance will only cover a stool culture, will not cover GI panel.  Patient is uninterested in GI panel due to cost.  ER precautions discussed at length    Orders:  -     H. Pylori Breath Test - Breath, Lung  -     Lipase; Future  -     Comprehensive Metabolic Panel; Future  -     CBC & Differential; Future  -     Stool Culture (Reference Lab) - Stool, Per Rectum; Future  -     Urinalysis With Culture If Indicated -; Future  -     dicyclomine (BENTYL) 10 MG capsule; Take 1 capsule by mouth 4 (Four) Times a Day Before Meals & at Bedtime As Needed for Abdominal Cramping.  Dispense: 90 capsule; Refill: 0    2. Diarrhea, unspecified type  Assessment & Plan:  See above.  Discussed " my suspicion symptoms could be secondary to viral gastroenteritis.  I recommended patient not use any antidiarrheals until stool studies return.  Discussed the importance of adequate rest and hydration.  Brat diet discussed.  May benefit from repeat CT imaging of abdomen and pelvis if symptoms persist.  Strict return precautions discussed.    Orders:  -     H. Pylori Breath Test - Breath, Lung  -     Lipase; Future  -     Comprehensive Metabolic Panel; Future  -     CBC & Differential; Future  -     Stool Culture (Reference Lab) - Stool, Per Rectum; Future  -     Urinalysis With Culture If Indicated -; Future    3. Nausea and vomiting, unspecified vomiting type  Assessment & Plan:  This appears to be a chronic issue.  Symptoms relatively controlled with Phenergan and Zofran as needed.  Strongly advised patient follow-up with GI and discussed she would likely benefit from  EGD and colonoscopy, though patient states she will not be returning to GI as the cost of the EGD was greater than $200 and states she cannot afford this.      Orders:  -     H. Pylori Breath Test - Breath, Lung  -     Lipase; Future  -     Comprehensive Metabolic Panel; Future  -     CBC & Differential; Future  -     Stool Culture (Reference Lab) - Stool, Per Rectum; Future  -     Urinalysis With Culture If Indicated -; Future    4. Gastroesophageal reflux disease, unspecified whether esophagitis present  Assessment & Plan:  Uncontrolled, though patient states she is doing better on omeprazole.  Will continue omeprazole 40 mg daily.  Strongly advise she reach back out to GI.              Follow Up:   Return for Next scheduled follow up.    Elysia Nolasco PA-C    [facility]

## 2024-10-30 NOTE — ASSESSMENT & PLAN NOTE
This appears to be a chronic issue for patient.  Typically well-controlled with Bentyl per patient  Requesting refill today.  Due to new onset of diarrhea we will obtain labs and stool analysis today.  Unfortunately patient's insurance will only cover a stool culture, will not cover GI panel.  Patient is uninterested in GI panel due to cost.  ER precautions discussed at length

## 2024-11-03 DIAGNOSIS — E03.9 ACQUIRED HYPOTHYROIDISM: ICD-10-CM

## 2024-11-04 RX ORDER — LEVOTHYROXINE SODIUM 137 UG/1
137 TABLET ORAL DAILY
Qty: 30 TABLET | Refills: 0 | Status: SHIPPED | OUTPATIENT
Start: 2024-11-04

## 2024-11-12 RX ORDER — ROPINIROLE 3 MG/1
3 TABLET, FILM COATED ORAL NIGHTLY
Qty: 30 TABLET | Refills: 0 | Status: SHIPPED | OUTPATIENT
Start: 2024-11-12

## 2024-11-13 DIAGNOSIS — M79.642 LEFT HAND PAIN: Primary | ICD-10-CM

## 2024-11-18 ENCOUNTER — OFFICE VISIT (OUTPATIENT)
Dept: NEUROLOGY | Facility: CLINIC | Age: 64
End: 2024-11-18
Payer: MEDICARE

## 2024-11-18 VITALS
WEIGHT: 149.69 LBS | SYSTOLIC BLOOD PRESSURE: 130 MMHG | DIASTOLIC BLOOD PRESSURE: 80 MMHG | HEIGHT: 62 IN | OXYGEN SATURATION: 93 % | BODY MASS INDEX: 27.55 KG/M2 | HEART RATE: 90 BPM

## 2024-11-18 DIAGNOSIS — M51.9 LUMBAR DISC DISEASE: ICD-10-CM

## 2024-11-18 DIAGNOSIS — G25.81 RESTLESS LEGS SYNDROME (RLS): Primary | ICD-10-CM

## 2024-11-18 RX ORDER — ROPINIROLE 4 MG/1
TABLET, FILM COATED ORAL
COMMUNITY
Start: 2024-11-11

## 2024-11-18 NOTE — Clinical Note
Devi will you please call Yessica and let her know that I have talked with her PCP who would like for me to manage her gabapentin. I have sent adjusted Gabapentin script of 900 mg TID for better symptom control of RLS. Please ask that she closely monitor for any SE such as grogginess or fatigue with the adjusted dose. Also she needs to decrease her Requip to 4 mg daily. She will also need to come by the office to sign a CSA. Please let me know if she has any questions or concerns, thank you!

## 2024-11-18 NOTE — PROGRESS NOTES
Neuro Office Visit      Encounter Date: 2024   Patient Name: Yessica Galvin  : 1960   MRN: [id number]   PCP:  Betty Jasmine MD     Chief Complaint:    Chief Complaint   Patient presents with    Restless Legs Syndrome       History of Present Illness: Yessica Galvin is a 64 y.o. female who is here today in Neurology for  RLS    PMH of asthmatic bronchitis, generalized anxiety disorder, gout, headache, hypothyroidism, chronic midline back pain without sciatica, cervical disc disorder with myelopathy, asthma, tobacco abuse.  Nocturnal leg cramps, constipation, skin lesions, GERD with esophagitis, anxiety and depression, hypertension, restless leg syndrome, gastroenteritis, stage II COPD, osteoporosis, anemia    Patient was seen by primary care on 2024 for restless legs, per review of primary care office visit note she reported at that time that she feels that legs are restless even during the day.  She reports the need to move them around all the time and feels tightness in her muscles.  She has been on ropinirole 4 mg for some time but sometimes even doubles up on the dose because of her restless or spikes are during the day.  At that visit primary care was going to wean off of ropinirole, of note she also did not have benefit with pramipexole previously.  Plan was to wean off of ropinirole and start rotigotine.    In clinic today Yessica reports that Neupro was cost prohibitive so she has been taking Requip. She has been combining prior Requip scripts of 3 mg and 4 mg to equal 7 mg total in a day. She reports that she has seen improvement in her symptoms with the higher dose of Requip. Restless sensation in legs only. She states that this has been present for 4-5 years and is worsening overall. Her mother had RLS as well. She notes that her legs started bothering her during the day in addition to the nighttime about 1 month ago. She also takes Gabapentin 600 mg TID for lumbar back  pain, she feels that it helps some but that her back pain is still significant, denies SE from Gabapentin. Of note she underwent decompressive laminectomies L2-5 with Dr. Elias on 4/11/2024.     Lab work from 10/14/2024-iron and TIBC showed normal iron level at 78, iron saturation was low at 16%, transferrin normal at 334, TIBC normal at 498.    Subjective      Review of Systems   Constitutional:  Positive for fatigue.   HENT: Negative.     Eyes: Negative.    Respiratory: Negative.     Cardiovascular: Negative.    Gastrointestinal: Negative.    Musculoskeletal:  Positive for back pain and gait problem.   Skin: Negative.    Allergic/Immunologic: Positive for food allergies.   Neurological:         Restless legs   Psychiatric/Behavioral:  Positive for sleep disturbance.           Past Medical History:   Past Medical History:   Diagnosis Date    Acute bronchitis     Acute sinusitis     Allergic rhinitis 09/06/2023    Anemia 10/16/2024    Asthma     Asthma 03/07/2020    Asthma with acute exacerbation     Asthma, extrinsic ,1970    I have had it since I was 5 yrs. Old    Asthmatic bronchitis     Bronchiectasis     Always catch it    Centrilobular emphysema 06/02/2022    Chronic bronchitis with acute exacerbation 06/02/2022    Disc degeneration, lumbar     Disc degeneration, lumbar     GERD (gastroesophageal reflux disease)     History of mammogram     Hypertension 10/05/2023    Hypothyroidism     Lower back pain     Medicare annual wellness visit, subsequent 10/05/2023    Nodule of upper lobe of right lung 06/02/2022    Plantar fasciitis     Restless legs syndrome        Past Surgical History:   Past Surgical History:   Procedure Laterality Date    CARDIAC CATHETERIZATION N/A 11/17/2023    Procedure: Left Heart Cath;  Surgeon: Arben Pittman MD;  Location: [facility] INVASIVE LOCATION;  Service: Cardiology;  Laterality: N/A;    CHOLECYSTECTOMY      COLONOSCOPY      HYSTERECTOMY      KNEE ARTHROSCOPY Right 03/10/2020     Procedure: KNEE ARTHROSCOPY RIGHT;  Surgeon: Guanaco Thakur MD;  Location:  [facility];  Service: Orthopedics;  Laterality: Right;    LUMBAR LAMINECTOMY DISCECTOMY DECOMPRESSION N/A 2024    Procedure: LUMBAR LAMINECTOMY DISCECTOMY DECOMPRESSION POSTERIOR L2-L5;  Surgeon: Ulises Elias MD;  Location:  [facility];  Service: Neurosurgery;  Laterality: N/A;    NECK SURGERY      ORIF WRIST FRACTURE Left 03/10/2020    Procedure: WRIST OPEN REDUCTION INTERNAL FIXATION LEFT;  Surgeon: Guanaco Thakur MD;  Location:  [facility];  Service: Orthopedics;  Laterality: Left;    TIBIAL PLATEAU OPEN REDUCTION INTERNAL FIXATION Right 03/10/2020    Procedure: TIBIAL PLATEAU OPEN REDUCTION INTERNAL FIXATION RIGHT;  Surgeon: Guanaco Thakur MD;  Location:  [facility];  Service: Orthopedics;  Laterality: Right;    UPPER GASTROINTESTINAL ENDOSCOPY         Family History:   Family History   Problem Relation Age of Onset    Aneurysm Mother     Hypertension Mother     Cancer Mother     Asthma Mother     Heart disease Mother     Diabetes Mother     Hypertension Father     Alzheimer's disease Father     Asthma Father     No Known Problems Sister     No Known Problems Brother     COPD Maternal Grandmother     Aneurysm Maternal Grandmother     No Known Problems Maternal Grandfather     Alzheimer's disease Paternal Grandmother     Dementia Paternal Grandmother     No Known Problems Paternal Grandfather     Breast cancer Neg Hx     Ovarian cancer Neg Hx        Social History:   Social History     Socioeconomic History    Marital status:    Tobacco Use    Smoking status: Some Days     Current packs/day: 0.00     Average packs/day: 0.3 packs/day for 23.4 years (5.9 ttl pk-yrs)     Types: Cigarettes     Start date: 3/7/2000     Last attempt to quit: 2023     Years since quittin.3     Passive exposure: Current    Smokeless tobacco: Never    Tobacco comments:     I started when i was 17   Vaping Use    Vaping status:  Never Used   Substance and Sexual Activity    Alcohol use: No    Drug use: No    Sexual activity: Not Currently     Partners: Female     Birth control/protection: None       Medications:     Current Outpatient Medications:     acetaminophen (TYLENOL) 325 MG tablet, Take 2 tablets by mouth Every 4 (Four) Hours As Needed for Mild Pain ., Disp: , Rfl:     albuterol sulfate  (90 Base) MCG/ACT inhaler, Inhale 2 puffs Every 6 (Six) Hours As Needed for Wheezing., Disp: 54 g, Rfl: 3    alendronate (Fosamax) 70 MG tablet, Take 1 tablet by mouth Every 7 (Seven) Days., Disp: 4 tablet, Rfl: 11    amLODIPine (NORVASC) 10 MG tablet, Take 1 tablet by mouth Daily., Disp: 90 tablet, Rfl: 1    atorvastatin (LIPITOR) 20 MG tablet, Take 1 tablet by mouth Every Night., Disp: 90 tablet, Rfl: 3    Breztri Aerosphere 160-9-4.8 MCG/ACT aerosol inhaler, , Disp: , Rfl:     budesonide-formoterol (SYMBICORT) 160-4.5 MCG/ACT inhaler, Inhale 2 puffs 2 (Two) Times a Day., Disp: 10.2 g, Rfl: 11    busPIRone (BUSPAR) 15 MG tablet, TAKE 1 TABLET BY MOUTH TWICE A DAY, Disp: 60 tablet, Rfl: 5    celecoxib (CeleBREX) 200 MG capsule, TAKE 1 CAPSULE BY MOUTH 2 TIMES A DAY, Disp: 60 capsule, Rfl: 0    Diclofenac Sodium (VOLTAREN) 1 % gel gel, Apply 2 g topically to the appropriate area as directed 4 (Four) Times a Day As Needed (legs)., Disp: 350 g, Rfl: 0    dicyclomine (BENTYL) 10 MG capsule, Take 1 capsule by mouth 4 (Four) Times a Day Before Meals & at Bedtime As Needed for Abdominal Cramping., Disp: 90 capsule, Rfl: 0    gabapentin (NEURONTIN) 600 MG tablet, Take 1.5 tablets by mouth 3 (Three) Times a Day., Disp: 90 tablet, Rfl: 2    levocetirizine (Xyzal) 5 MG tablet, Take 1 tablet by mouth Every Evening., Disp: 90 tablet, Rfl: 1    levothyroxine (SYNTHROID, LEVOTHROID) 137 MCG tablet, TAKE 1 TABLET BY MOUTH DAILY, Disp: 30 tablet, Rfl: 0    lisinopril (PRINIVIL,ZESTRIL) 20 MG tablet, Take 1 tablet by mouth Daily., Disp: 90 tablet, Rfl: 0     montelukast (SINGULAIR) 10 MG tablet, TAKE ONE TABLET BY MOUTH ONCE NIGHTLY, Disp: 90 tablet, Rfl: 1    naloxone (NARCAN) 4 MG/0.1ML nasal spray, Call 911. Don't prime. [place] in 1 nostril for overdose. Repeat in 2-3 minutes in other nostril if no or minimal breathing/responsiveness., Disp: 2 each, Rfl: 0    nicotine (NICOTROL) 10 MG/ML solution nasal solution, 1 spray by Each Nare route Every 1 (One) Hour As Needed (smoking cessation). 1 to 2 doses/hour, do not exceed more than 5 doses (10 sprays) per hour, Disp: 10 mL, Rfl: 2    omeprazole (priLOSEC) 40 MG capsule, Take 1 capsule by mouth Daily., Disp: 90 capsule, Rfl: 1    ondansetron ODT (ZOFRAN-ODT) 4 MG disintegrating tablet, Place 1 tablet on the tongue Every 8 (Eight) Hours As Needed for Nausea or Vomiting., Disp: 20 tablet, Rfl: 1    promethazine (PHENERGAN) 25 MG tablet, Take 1 tablet by mouth Every 8 (Eight) Hours As Needed for Nausea or Vomiting., Disp: 30 tablet, Rfl: 1    rOPINIRole (REQUIP) 4 MG tablet, , Disp: , Rfl:     tiotropium bromide monohydrate (SPIRIVA RESPIMAT) 2.5 MCG/ACT aerosol solution inhaler, Inhale 2 puffs Daily., Disp: 4 g, Rfl: 5    tiZANidine (ZANAFLEX) 4 MG tablet, Take 1 tablet by mouth Every 12 (Twelve) Hours As Needed for Muscle Spasms., Disp: 60 tablet, Rfl: 1    traMADol (ULTRAM) 50 MG tablet, Take 2 tablets by mouth Every 8 (Eight) Hours As Needed for Moderate Pain., Disp: 150 tablet, Rfl: 0    venlafaxine XR (Effexor XR) 75 MG 24 hr capsule, Take 1 capsule by mouth Daily. Take with 150 mg tablet, Disp: 90 capsule, Rfl: 1    venlafaxine XR (EFFEXOR-XR) 150 MG 24 hr capsule, Take 1 capsule by mouth Daily. Take with 75 mg tablet, Disp: 90 capsule, Rfl: 1    methylPREDNISolone (MEDROL) 4 MG dose pack, Take 1 tablet by mouth Daily. Use as directed by package instructions, Disp: 21 tablet, Rfl: 0    Allergies:   Allergies   Allergen Reactions    Codeine Nausea And Vomiting    Shrimp Hives         STEADI Fall Risk Assessment was  "completed, and patient is at HIGH risk for falls. Assessment completed on:10/14/2024    Objective     Objective:    /80   Pulse 90   Ht 157.5 cm (62.01\")   Wt 67.9 kg (149 lb 11.1 oz)   SpO2 93%   BMI 27.37 kg/m²   Body mass index is 27.37 kg/m².    Physical Exam  Vitals reviewed.   Constitutional:       Appearance: Normal appearance.   HENT:      Head: Normocephalic and atraumatic.      Mouth/Throat:      Mouth: Mucous membranes are moist.      Pharynx: Oropharynx is clear.   Pulmonary:      Effort: Pulmonary effort is normal. No respiratory distress.   Skin:     General: Skin is warm and dry.   Neurological:      Mental Status: She is alert.          Neurology Exam:    General apperance: NAD.     Mental status: Alert, awake and oriented to time place and person.    Fund of knowledge:  Normal.     Language and Speech: No aphasia or dysarthria.    Naming , Repetition and Comprehension:  Can name objects, repeat a sentence and follow commands. Speech is clear and fluent with good repetition, comprehension, and naming.    Cranial Nerves:   CN II: Visual fields are full. Intact. Pupils - PERRLA  CN III, IV and VI: Extraocular movements are intact. Normal saccades.   CN V: Facial sensation is intact.   CN VII: Muscles of facial expression reveal no asymmetry. Intact.   CN VIII: Hearing is intact.   CN IX and X: Palate elevates symmetrically. Intact  CN XI: Shoulder shrug is intact.   CN XII: Tongue is midline without evidence of atrophy or fasciculation.     Mild eyelid ptosis present, R>L - states that this has been present for several years, considering blepharoplasty if becomes severe    Motor:  Right UE muscle strength 5/5. Normal tone.     Left UE muscle strength 5/5. Normal tone.      Right LE muscle strength 5-/5. Normal tone.     Left LE muscle strength 5-/5. Normal tone.      Sensory: Normal light touch sensation bilaterally.    DTRs: 1+ bilaterally in upper and lower extremities.    Coordination: " Normal finger-to-nose    Gait: Abnormal, ambulates with assistance of cane, antalgic gait, leans heavily on cane while walking.          Results:   Imaging:   XR Hand 3+ View Left    Result Date: 10/16/2024  Impression: 1.No definite radiographic findings of acute osseous hand abnormality. 2.Advanced arthropathy of the hand and wrist with findings suggestive of osteoarthritis. Severe joint space narrowing at the first CMC joint. Electronically Signed: Sonido Melvin  10/16/2024 3:58 PM EDT  Workstation ID: [id number]    XR Ribs 2 View Left    Result Date: 7/31/2024  Impression: No acute or healing left rib fracture. Electronically Signed: Hai Joshua MD  7/31/2024 4:59 PM EDT  Workstation ID: [id number]       Labs:   Lab Results   Component Value Date    GLUCOSE 82 10/14/2024    BUN 14 10/14/2024    CREATININE 0.74 10/14/2024     10/14/2024    K 4.6 10/14/2024    CL 99 10/14/2024    CALCIUM 9.2 10/14/2024    PROTEINTOT 6.9 10/14/2024    ALBUMIN 4.0 10/14/2024    ALT 18 10/14/2024    AST 25 10/14/2024    ALKPHOS 99 10/14/2024    BILITOT 0.3 10/14/2024    GLOB 2.9 10/14/2024    AGRATIO 1.4 10/14/2024    BCR 18.9 10/14/2024    ANIONGAP 11.2 10/14/2024    EGFR 89.9 10/14/2024         Assessment / Plan      Assessment/Plan:   Diagnoses and all orders for this visit:    1. Restless legs syndrome (RLS) (Primary)  -     gabapentin (NEURONTIN) 600 MG tablet; Take 1.5 tablets by mouth 3 (Three) Times a Day.  Dispense: 90 tablet; Refill: 2    2. Lumbar disc disease  -     gabapentin (NEURONTIN) 600 MG tablet; Take 1.5 tablets by mouth 3 (Three) Times a Day.  Dispense: 90 tablet; Refill: 2       Yessica Galvin is in neurology clinic to establish care for RLS. She has previously tried Pramipexole and is currently taking Ropinirole, she has self increased her daily amount from 4 mg to 7 mg. She feels that overall symptoms have become progressively more bothersome over the last several years and has noted daytime involvement over  the last month.  Given her high dose of Requip I am concerned that she is experiencing augmentation and have advised that she decrease Requip daily dose to 4 mg.  Will also message primary care as they have been prescribing her gabapentin, in discussion with collaborating neurologist, Dr. Zuniga, we would recommend decreasing Requip and increasing gabapentin to 900 mg 3 times daily.  As this is a controlled substance we will just need to clarify if this medication is going to be coming from primary care or neurology, if primary care prefers Gabapentin to be sent from neurology will need to have patient signed CSA from our office.    Addendum 11/20/2024 - Talked with PCP, Gabapentin 900 mg TID will be send from neurology office. Patient will need to come by office to sign CSA, she has also been advised to closely monitor for any SE with adjusted Gabapentin dose.     Patient Education:       Reviewed medications, potential side effects and signs and symptoms to report. Discussed risk versus benefits of treatment plan with patient and/or family-including medications, labs and radiology that may be ordered. Addressed questions and concerns during visit. Patient and/or family verbalized understanding and agree with plan. Instructed to call the office with any questions and report to ER with any life-threatening symptoms.     Follow Up:   Return in about 8 weeks (around 1/13/2025).    I spent  44  minutes in the care of this patient. I personally spent 50 percent of this time counseling and discussing diagnosis, evaluation, treatment options, and management .       During this visit the following were done:  Labs Reviewed [x]    Labs Ordered []    Radiology Reports Reviewed []    Radiology Ordered []    PCP Records Reviewed [x]    Referring Provider Records Reviewed []    ER Records Reviewed []    Hospital Records Reviewed []    History Obtained From Family []    Radiology Images Reviewed []    Other Reviewed []    Records  Requested []      IDALIA Patel  [facility] NE[facility]OLOG[facility]  [address] [address] [phone]

## 2024-11-18 NOTE — Clinical Note
Dear Dr. Jasmine, I saw Yessica for RLS and am concerned that she is experiencing augmentation as she has been taking Requip 7 mg total per day. I would like for her to decrease back to 4 mg daily and would also like to increase her Gabapentin to 900 mg TID for better RLS symptom control. I wanted to check with you before adjusting the script - would you like to continue sending from primary care or would you like for the Gabapentin to be transitioned to neurology?   Sincerely, Ludy FRIEDMAN

## 2024-11-19 ENCOUNTER — OFFICE VISIT (OUTPATIENT)
Age: 64
End: 2024-11-19
Payer: MEDICARE

## 2024-11-19 VITALS
HEIGHT: 59 IN | DIASTOLIC BLOOD PRESSURE: 80 MMHG | SYSTOLIC BLOOD PRESSURE: 118 MMHG | WEIGHT: 151.1 LBS | BODY MASS INDEX: 30.46 KG/M2

## 2024-11-19 DIAGNOSIS — M19.049 ARTHRITIS OF FINGER: Primary | ICD-10-CM

## 2024-11-19 DIAGNOSIS — S56.019A RUPTURE OF FLEXOR POLLICIS LONGUS MUSCLE: ICD-10-CM

## 2024-11-19 RX ORDER — METHYLPREDNISOLONE 4 MG/1
1 TABLET ORAL DAILY
Qty: 21 TABLET | Refills: 0 | Status: SHIPPED | OUTPATIENT
Start: 2024-11-19

## 2024-11-19 NOTE — PROGRESS NOTES
[facility] Orthopedic     Office Visit       Date: 11/19/2024   Patient Name: Yessica Galvin  MRN: [id number]  YOB: 1960    Referring Physician: Betty Jasmine MD     Chief Complaint:   Chief Complaint   Patient presents with    Left Hand - Pain     History of Present Illness:   Yessica Galvin is a 64 y.o. female presented clinic complaints of left index and long finger MCP joint pain and decreased left thumb range of motion.  She reports that 6 weeks ago she was working in the yard when she felt a pop with sudden pain.  She then reports decreased range of motion of the left thumb.  She states that a couple of weeks later she began to notice increased pain mostly to the index finger MCP joint after opening a bottle.  She denies any thumb pain today.  She reports decreased range of motion of the thumb.  She complains mostly of index finger MCP joint swelling and pain this is described as a heavy throb.  Pain is 7/10.  No numbness or tingling.  No other complaints or concerns.    PMH: Hypothyroidism, gout, JAVIER, GERD, COPD.    Subjective   Review of Systems:   Review of Systems   Constitutional:  Negative for chills, fever, unexpected weight gain and unexpected weight loss.   HENT:  Negative for congestion, postnasal drip and rhinorrhea.    Eyes:  Negative for blurred vision.   Respiratory:  Negative for shortness of breath.    Cardiovascular:  Negative for leg swelling.   Gastrointestinal:  Negative for abdominal pain, nausea and vomiting.   Genitourinary:  Negative for difficulty urinating.   Musculoskeletal:  Positive for arthralgias. Negative for gait problem, joint swelling and myalgias.   Skin:  Negative for skin lesions and wound.   Neurological:  Negative for dizziness, weakness, light-headedness and numbness.   Hematological:  Does not bruise/bleed easily.   Psychiatric/Behavioral:  Negative for depressed mood.      "  Pertinent review of systems per HPI    I reviewed the patient's chief complaint, history of present illness, review of systems, past medical history, surgical history, family history, social history, medications and allergy list in the EMR on 11/19/2024 and agree with the findings above.    Objective    Vital Signs:   Vitals:    11/19/24 0948   BP: 118/80   Weight: 68.5 kg (151 lb 1.6 oz)   Height: 149.9 cm (59\")     BMI:      General: No acute distress. Alert and oriented.   Cardiovascular: Palpable radial pulse.   Respiratory: Breathing is nonlabored.     Ortho Exam:  Examination of the left upper extremity demonstrates a healed surgical incision overlying the distal radius.  There is swelling and prominence of the index and long finger MCP joints with ulnar deviation.  She is tender palpation at the MCP joints of the index and long fingers with positive MCP joint grind.  She is able to make a composite fist.  She is unable to actively flex the thumb IP joint.  There is negative tenodesis to the left thumb IP joint.  Sensations intact light touch throughout the hand.  Warm well-perfused distally.    Imaging / Studies:    Imaging Results (Last 24 Hours)       ** No results found for the last 24 hours. **        Left hand x-rays obtained on 10/15/2024 were personally reviewed and interpreted by myself.  These demonstrate diffuse arthritic changes throughout the hands and digits.  This appears worse at the thumb CMC joint, index and long finger MCP joints, and diffusely throughout the PIP and DIP joints.  Prior left distal radius open reduction internal fixation with volar plating.  Ulnar deviation and volar subluxation of the index and long finger MCP joints.  Application noted within the TF[facility].    Assessment / Plan    Assessment/Plan:   Yessica Galvin is a 64 y.o. 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    I discussed with the patient their clinical and " radiographic findings demonstrate FPL tendon rupture and MCP joint arthritis.  We had a lengthy discussion regarding the pathophysiology of their diagnosis.  Regarding the patient's MCP joint arthritis, we discussed operative and nonoperative treatments.  Nonoperative treatments include oral and topical anti-inflammatories, bracing, therapy, and corticosteroid injection.  Operative treatment would include MCP joint arthroplasty versus fusion.  The patient was most interested in a oral steroid course today.  She like to hold off on any injections.  Additionally, I discussed that her exam demonstrates FPL tendon rupture.  I discussed the operative and nonoperative treatments.  Nonoperative treatments include expectant management.  Operative treatments would include flexor tendon transfer or intercalary graft for FPL tendon repair.  Patient states that she is not bothered by the lack of flexion to her thumb IP joint, therefore she would like to hold off on any additional treatments.  I will see her back in 2 months for reevaluation.  We may consider MCP joint corticosteroid injections at that time.  They were agreeable with the plan.  All questions and concerns were addressed.       ICD-10-CM ICD-9-CM   1. Arthritis of finger  M19.049 716.94   2. Rupture of flexor pollicis longus muscle  S56.019A 841.8     Follow Up:   Return in about 2 months (around 1/19/2025) for Follow Up.      Brenda Richardson MD  [facility] [place] [facility]

## 2024-11-20 ENCOUNTER — TELEPHONE (OUTPATIENT)
Dept: NEUROLOGY | Facility: CLINIC | Age: 64
End: 2024-11-20
Payer: MEDICARE

## 2024-11-20 RX ORDER — GABAPENTIN 600 MG/1
900 TABLET ORAL 3 TIMES DAILY
Qty: 90 TABLET | Refills: 2 | Status: SHIPPED | OUTPATIENT
Start: 2024-11-20

## 2024-11-20 NOTE — TELEPHONE ENCOUNTER
Spoke with patient and she is in good understanding.  She stated she will be in this week to sign CSA.

## 2024-11-20 NOTE — TELEPHONE ENCOUNTER
----- Message from Ludy Mills sent at 11/20/2024  4:25 PM EST -----  Devi will you please call Yessica and let her know that I have talked with her PCP who would like for me to manage her gabapentin. I have sent adjusted Gabapentin script of 900 mg TID for better symptom control of RLS. Please ask that she closely monitor for any SE such as grogginess or fatigue with the adjusted dose. Also she needs to decrease her Requip to 4 mg daily. She will also need to come by the office to sign a CSA. Please let me know if she has any questions or concerns, thank you!

## 2024-11-21 DIAGNOSIS — R11.0 NAUSEA: ICD-10-CM

## 2024-11-21 RX ORDER — PROMETHAZINE HYDROCHLORIDE 25 MG/1
25 TABLET ORAL EVERY 8 HOURS PRN
Qty: 30 TABLET | Refills: 1 | Status: SHIPPED | OUTPATIENT
Start: 2024-11-21

## 2024-11-25 DIAGNOSIS — M25.552 LEFT HIP PAIN: ICD-10-CM

## 2024-11-25 DIAGNOSIS — I25.10 CORONARY ARTERY DISEASE INVOLVING NATIVE CORONARY ARTERY OF NATIVE HEART WITHOUT ANGINA PECTORIS: ICD-10-CM

## 2024-11-25 RX ORDER — ATORVASTATIN CALCIUM 20 MG/1
20 TABLET, FILM COATED ORAL NIGHTLY
Qty: 90 TABLET | Refills: 3 | OUTPATIENT
Start: 2024-11-25

## 2024-11-26 RX ORDER — CELECOXIB 200 MG/1
200 CAPSULE ORAL 2 TIMES DAILY
Qty: 60 CAPSULE | Refills: 0 | Status: SHIPPED | OUTPATIENT
Start: 2024-11-26

## 2024-11-28 DIAGNOSIS — I25.10 CORONARY ARTERY DISEASE INVOLVING NATIVE CORONARY ARTERY OF NATIVE HEART WITHOUT ANGINA PECTORIS: ICD-10-CM

## 2024-11-28 DIAGNOSIS — E03.9 ACQUIRED HYPOTHYROIDISM: ICD-10-CM

## 2024-11-28 DIAGNOSIS — I10 HYPERTENSION, UNSPECIFIED TYPE: ICD-10-CM

## 2024-11-30 RX ORDER — LISINOPRIL 20 MG/1
20 TABLET ORAL DAILY
Qty: 90 TABLET | Refills: 0 | Status: SHIPPED | OUTPATIENT
Start: 2024-11-30

## 2024-11-30 RX ORDER — LEVOTHYROXINE SODIUM 137 UG/1
137 TABLET ORAL DAILY
Qty: 30 TABLET | Refills: 0 | Status: SHIPPED | OUTPATIENT
Start: 2024-11-30 | End: 2024-12-02

## 2024-12-01 DIAGNOSIS — E03.9 ACQUIRED HYPOTHYROIDISM: ICD-10-CM

## 2024-12-02 RX ORDER — LEVOTHYROXINE SODIUM 137 UG/1
137 TABLET ORAL DAILY
Qty: 30 TABLET | Refills: 0 | Status: SHIPPED | OUTPATIENT
Start: 2024-12-02

## 2024-12-02 RX ORDER — ATORVASTATIN CALCIUM 20 MG/1
20 TABLET, FILM COATED ORAL NIGHTLY
Qty: 90 TABLET | Refills: 3 | Status: SHIPPED | OUTPATIENT
Start: 2024-12-02

## 2024-12-15 DIAGNOSIS — G25.81 RESTLESS LEG SYNDROME: Primary | ICD-10-CM

## 2024-12-15 DIAGNOSIS — M51.9 LUMBAR DISC DISEASE: ICD-10-CM

## 2024-12-16 ENCOUNTER — OFFICE VISIT (OUTPATIENT)
Dept: GASTROENTEROLOGY | Facility: CLINIC | Age: 64
End: 2024-12-16
Payer: MEDICARE

## 2024-12-16 VITALS
WEIGHT: 152 LBS | SYSTOLIC BLOOD PRESSURE: 160 MMHG | DIASTOLIC BLOOD PRESSURE: 80 MMHG | HEART RATE: 94 BPM | OXYGEN SATURATION: 95 % | HEIGHT: 62 IN | BODY MASS INDEX: 27.97 KG/M2 | TEMPERATURE: 97.7 F

## 2024-12-16 DIAGNOSIS — K21.9 GASTROESOPHAGEAL REFLUX DISEASE, UNSPECIFIED WHETHER ESOPHAGITIS PRESENT: Primary | ICD-10-CM

## 2024-12-16 PROCEDURE — 1159F MED LIST DOCD IN RCRD: CPT | Performed by: PHYSICIAN ASSISTANT

## 2024-12-16 PROCEDURE — 3079F DIAST BP 80-89 MM HG: CPT | Performed by: PHYSICIAN ASSISTANT

## 2024-12-16 PROCEDURE — G2211 COMPLEX E/M VISIT ADD ON: HCPCS | Performed by: PHYSICIAN ASSISTANT

## 2024-12-16 PROCEDURE — 3077F SYST BP >= 140 MM HG: CPT | Performed by: PHYSICIAN ASSISTANT

## 2024-12-16 PROCEDURE — 99214 OFFICE O/P EST MOD 30 MIN: CPT | Performed by: PHYSICIAN ASSISTANT

## 2024-12-16 PROCEDURE — 1160F RVW MEDS BY RX/DR IN RCRD: CPT | Performed by: PHYSICIAN ASSISTANT

## 2024-12-16 RX ORDER — TRAMADOL HYDROCHLORIDE 50 MG/1
100 TABLET ORAL EVERY 8 HOURS PRN
Qty: 150 TABLET | Refills: 0 | Status: SHIPPED | OUTPATIENT
Start: 2024-12-16

## 2024-12-16 RX ORDER — ROPINIROLE 4 MG/1
4 TABLET, FILM COATED ORAL NIGHTLY
Qty: 90 TABLET | Refills: 1 | Status: SHIPPED | OUTPATIENT
Start: 2024-12-16

## 2024-12-16 RX ORDER — ROPINIROLE 3 MG/1
TABLET, FILM COATED ORAL
COMMUNITY
Start: 2024-12-09

## 2024-12-16 NOTE — PROGRESS NOTES
Follow Up      Patient Name: Yessica Galvin  : 1960   MRN: [id number]     Chief Complaint:    Chief Complaint   Patient presents with    PROCEDURE FOLLOW-UP (EGD)       History of Present Illness: Yessica Galvin is a 64 y.o. female who is here today for follow up on GERD.  is with patient for visit today.     Pt is doing well on pantoprazole 40mg BID. She states that overall, she is feeling much better. She has nausea less frequently, about once every 2 weeks. She denies further vomiting. She has not yet tried alva root supplement or completed EGD as previously scheduled.    She is not interested in pursuing CSY at this time for h/o tubular adenomas.     Patient denies associated fever, chills, abdominal pain, indigestion, vomiting, diarrhea, constipation, hematemesis, dysphagia, hematochezia, melena, bloating, weight loss or gain, dysuria, jaundice or bruising.    EGD 10/2022 with Dr. Tavarez. Normal upper / middle third of esophagus. Small hiatal hernia. LA Grade A esophagitis. Bx negative for EoE, intestinal metaplasia or dysplasia. Normal stomach and duodenum.      CSY  with Dr. Tavarez. Adequate bowel prep conditions. Internal hemorrhoids. 2mm polyp (tubular adenoma) in sigmoid colon, resected and retrieved. 6mm polyp (tubular adenoma) in transverse colon, resected and retrieved. Recommend repeat CSY in 3 years.     Subjective      Review of Systems:   Review of Systems   Constitutional:  Negative for appetite change, chills, diaphoresis, fatigue, fever, unexpected weight gain and unexpected weight loss.   HENT:  Negative for drooling, facial swelling, mouth sores, rhinorrhea, sore throat, tinnitus, trouble swallowing and voice change.    Eyes: Negative.    Respiratory:  Negative for cough, chest tightness and shortness of breath.    Cardiovascular:  Negative for chest pain.   Gastrointestinal:  Positive for nausea. Negative for abdominal pain, blood in stool, constipation,  diarrhea, vomiting, GERD and indigestion.   Genitourinary:  Negative for dysuria, flank pain, hematuria and pelvic pain.   Musculoskeletal:  Negative for arthralgias and myalgias.   Skin:  Negative for color change, pallor and rash.   Neurological:  Negative for dizziness, tremors, syncope, weakness and numbness.   Psychiatric/Behavioral:  Negative for hallucinations and sleep disturbance. The patient is not nervous/anxious.    All other systems reviewed and are negative.      Medications:     Current Outpatient Medications:     acetaminophen (TYLENOL) 325 MG tablet, Take 2 tablets by mouth Every 4 (Four) Hours As Needed for Mild Pain ., Disp: , Rfl:     albuterol sulfate  (90 Base) MCG/ACT inhaler, Inhale 2 puffs Every 6 (Six) Hours As Needed for Wheezing., Disp: 54 g, Rfl: 3    alendronate (Fosamax) 70 MG tablet, Take 1 tablet by mouth Every 7 (Seven) Days., Disp: 4 tablet, Rfl: 11    amLODIPine (NORVASC) 10 MG tablet, Take 1 tablet by mouth Daily., Disp: 90 tablet, Rfl: 1    atorvastatin (LIPITOR) 20 MG tablet, TAKE ONE TABLET BY MOUTH ONCE NIGHTLY, Disp: 90 tablet, Rfl: 3    Breztri Aerosphere 160-9-4.8 MCG/ACT aerosol inhaler, , Disp: , Rfl:     budesonide-formoterol (SYMBICORT) 160-4.5 MCG/ACT inhaler, Inhale 2 puffs 2 (Two) Times a Day., Disp: 10.2 g, Rfl: 11    busPIRone (BUSPAR) 15 MG tablet, TAKE 1 TABLET BY MOUTH TWICE A DAY, Disp: 60 tablet, Rfl: 5    celecoxib (CeleBREX) 200 MG capsule, TAKE 1 CAPSULE BY MOUTH 2 TIMES A DAY, Disp: 60 capsule, Rfl: 0    Diclofenac Sodium (VOLTAREN) 1 % gel gel, Apply 2 g topically to the appropriate area as directed 4 (Four) Times a Day As Needed (legs)., Disp: 350 g, Rfl: 0    dicyclomine (BENTYL) 10 MG capsule, Take 1 capsule by mouth 4 (Four) Times a Day Before Meals & at Bedtime As Needed for Abdominal Cramping., Disp: 90 capsule, Rfl: 0    gabapentin (NEURONTIN) 600 MG tablet, Take 1.5 tablets by mouth 3 (Three) Times a Day., Disp: 90 tablet, Rfl: 2     levocetirizine (Xyzal) 5 MG tablet, Take 1 tablet by mouth Every Evening., Disp: 90 tablet, Rfl: 1    levothyroxine (SYNTHROID, LEVOTHROID) 137 MCG tablet, TAKE 1 TABLET BY MOUTH DAILY, Disp: 30 tablet, Rfl: 0    lisinopril (PRINIVIL,ZESTRIL) 20 MG tablet, TAKE 1 TABLET BY MOUTH DAILY, Disp: 90 tablet, Rfl: 0    methylPREDNISolone (MEDROL) 4 MG dose pack, Take 1 tablet by mouth Daily. Use as directed by package instructions, Disp: 21 tablet, Rfl: 0    montelukast (SINGULAIR) 10 MG tablet, TAKE ONE TABLET BY MOUTH ONCE NIGHTLY, Disp: 90 tablet, Rfl: 1    naloxone (NARCAN) 4 MG/0.1ML nasal spray, Call 911. Don't prime. [place] in 1 nostril for overdose. Repeat in 2-3 minutes in other nostril if no or minimal breathing/responsiveness., Disp: 2 each, Rfl: 0    nicotine (NICOTROL) 10 MG/ML solution nasal solution, 1 spray by Each Nare route Every 1 (One) Hour As Needed (smoking cessation). 1 to 2 doses/hour, do not exceed more than 5 doses (10 sprays) per hour, Disp: 10 mL, Rfl: 2    omeprazole (priLOSEC) 40 MG capsule, Take 1 capsule by mouth Daily., Disp: 90 capsule, Rfl: 1    ondansetron ODT (ZOFRAN-ODT) 4 MG disintegrating tablet, Place 1 tablet on the tongue Every 8 (Eight) Hours As Needed for Nausea or Vomiting., Disp: 20 tablet, Rfl: 1    promethazine (PHENERGAN) 25 MG tablet, Take 1 tablet by mouth Every 8 (Eight) Hours As Needed for Nausea or Vomiting., Disp: 30 tablet, Rfl: 1    rOPINIRole (REQUIP) 3 MG tablet, , Disp: , Rfl:     rOPINIRole (REQUIP) 4 MG tablet, , Disp: , Rfl:     tiotropium bromide monohydrate (SPIRIVA RESPIMAT) 2.5 MCG/ACT aerosol solution inhaler, Inhale 2 puffs Daily., Disp: 4 g, Rfl: 5    tiZANidine (ZANAFLEX) 4 MG tablet, Take 1 tablet by mouth Every 12 (Twelve) Hours As Needed for Muscle Spasms., Disp: 60 tablet, Rfl: 1    traMADol (ULTRAM) 50 MG tablet, Take 2 tablets by mouth Every 8 (Eight) Hours As Needed for Moderate Pain., Disp: 150 tablet, Rfl: 0    venlafaxine XR (Effexor XR) 75 MG  24 hr capsule, Take 1 capsule by mouth Daily. Take with 150 mg tablet, Disp: 90 capsule, Rfl: 1    venlafaxine XR (EFFEXOR-XR) 150 MG 24 hr capsule, Take 1 capsule by mouth Daily. Take with 75 mg tablet, Disp: 90 capsule, Rfl: 1    Allergies:   Allergies   Allergen Reactions    Codeine Nausea And Vomiting    Shrimp Hives       Social History:   Social History     Socioeconomic History    Marital status:    Tobacco Use    Smoking status: Some Days     Current packs/day: 0.00     Average packs/day: 0.3 packs/day for 23.4 years (5.9 ttl pk-yrs)     Types: Cigarettes     Start date: 3/7/2000     Last attempt to quit: 2023     Years since quittin.3     Passive exposure: Current    Smokeless tobacco: Never    Tobacco comments:     I started when i was 17   Vaping Use    Vaping status: Never Used   Substance and Sexual Activity    Alcohol use: No    Drug use: No    Sexual activity: Not Currently     Partners: Female     Birth control/protection: None        Surgical History:   Past Surgical History:   Procedure Laterality Date    CARDIAC CATHETERIZATION N/A 2023    Procedure: Left Heart Cath;  Surgeon: Arben Pittman MD;  Location:  [email or web address] LOCATION;  Service: Cardiology;  Laterality: N/A;    CHOLECYSTECTOMY      COLONOSCOPY      HYSTERECTOMY      KNEE ARTHROSCOPY Right 03/10/2020    Procedure: KNEE ARTHROSCOPY RIGHT;  Surgeon: Guanaco Thakur MD;  Location: [facility];  Service: Orthopedics;  Laterality: Right;    LUMBAR LAMINECTOMY DISCECTOMY DECOMPRESSION N/A 2024    Procedure: LUMBAR LAMINECTOMY DISCECTOMY DECOMPRESSION POSTERIOR L2-L5;  Surgeon: Ulises Elias MD;  Location: [facility];  Service: Neurosurgery;  Laterality: N/A;    NECK SURGERY      ORIF WRIST FRACTURE Left 03/10/2020    Procedure: WRIST OPEN REDUCTION INTERNAL FIXATION LEFT;  Surgeon: Guanaco Thakur MD;  Location: [facility];  Service: Orthopedics;  Laterality: Left;    TIBIAL PLATEAU OPEN REDUCTION  "INTERNAL FIXATION Right 03/10/2020    Procedure: TIBIAL PLATEAU OPEN REDUCTION INTERNAL FIXATION RIGHT;  Surgeon: Guanaco Thakur MD;  Location: [facility];  Service: Orthopedics;  Laterality: Right;    UPPER GASTROINTESTINAL ENDOSCOPY          Medical History:   Past Medical History:   Diagnosis Date    Acute bronchitis     Acute sinusitis     Allergic rhinitis 09/06/2023    Anemia 10/16/2024    Asthma     Asthma 03/07/2020    Asthma with acute exacerbation     Asthma, extrinsic ,1970    I have had it since I was 5 yrs. Old    Asthmatic bronchitis     Bronchiectasis     Always catch it    Centrilobular emphysema 06/02/2022    Chronic bronchitis with acute exacerbation 06/02/2022    Disc degeneration, lumbar     Disc degeneration, lumbar     GERD (gastroesophageal reflux disease)     History of mammogram     Hypertension 10/05/2023    Hypothyroidism     Lower back pain     Medicare annual wellness visit, subsequent 10/05/2023    Nodule of upper lobe of right lung 06/02/2022    Plantar fasciitis     Restless legs syndrome         Objective     Physical Exam:  Vital Signs:   Vitals:    12/16/24 1019   Pulse: 94   Temp: 97.7 °F (36.5 °C)   TempSrc: Temporal   Weight: 68.9 kg (152 lb)   Height: 157.5 cm (62\")   PainSc: 0-No pain     Body mass index is 27.8 kg/m².     Physical Exam  Vitals and nursing note reviewed.   Constitutional:       Appearance: Normal appearance. She is normal weight. She is not ill-appearing or diaphoretic.   HENT:      Head: Normocephalic and atraumatic.      Right Ear: External ear normal.      Left Ear: External ear normal.      Nose: Nose normal.      Mouth/Throat:      Mouth: Mucous membranes are moist.      Pharynx: Oropharynx is clear.   Eyes:      Conjunctiva/sclera: Conjunctivae normal.      Pupils: Pupils are equal, round, and reactive to light.   Neck:      Thyroid: No thyromegaly.   Cardiovascular:      Rate and Rhythm: Normal rate and regular rhythm.      Pulses: Normal pulses.     "  Heart sounds: Normal heart sounds.   Pulmonary:      Effort: Pulmonary effort is normal.      Breath sounds: Normal breath sounds.   Abdominal:      General: Abdomen is flat. Bowel sounds are normal. There is no distension.      Tenderness: There is no abdominal tenderness.   Musculoskeletal:         General: Normal range of motion.      Cervical back: Normal range of motion and neck supple.   Skin:     General: Skin is warm and dry.   Neurological:      General: No focal deficit present.      Mental Status: She is oriented to person, place, and time.   Psychiatric:         Mood and Affect: Mood normal.         Assessment / Plan      Assessment/Plan:   There are no diagnoses linked to this encounter.     Chronic nausea, improved  GERD  Chronic constipation  Tubular adenoma of colon   - continue pantoprazole 40mg BID   - pt given GERD diet instructions, advised to avoid GI irritants such as caffeine, carbonation, EtOH, tobacco, chocolate, peppermint, acid-based foods   - previous endoscopy and pathology reports reviewed   - schedule for EGD   - follow up in clinic after completion of above studies   - call clinic at any time for questions or new / worsened sx    Follow Up:   Return for Next scheduled follow up.    Plan of care reviewed with the patient at the conclusion of today's visit.  Education was provided regarding diagnosis, management, and any prescribed or recommended OTC medications.  Patient verbalized understanding of and agreement with management plan.     NOTE TO PATIENT: The 21st Century Cures Act makes medical notes like these available to patients in the interest of transparency. However, be advised this is a medical document. It is intended as peer to peer communication. It is written in medical language and may contain abbreviations or verbiage that are unfamiliar. It may appear blunt or direct. Medical documents are intended to carry relevant information, facts as evident, and the clinical  opinion of the practitioner.     Time Statement:   Discussed plan of care in detail with patient today. Patient verbally understands and agrees. I have spent 30 minutes reviewing available diagnostics, obtaining history, examining the patient, developing a treatment plan, and educating the patient on disease process and plan of care.     Dorene Gamboa PA-C   [facility]

## 2024-12-27 DIAGNOSIS — M25.552 LEFT HIP PAIN: ICD-10-CM

## 2024-12-28 RX ORDER — CELECOXIB 200 MG/1
200 CAPSULE ORAL 2 TIMES DAILY
Qty: 60 CAPSULE | Refills: 0 | Status: SHIPPED | OUTPATIENT
Start: 2024-12-28

## 2025-01-02 DIAGNOSIS — M81.0 AGE-RELATED OSTEOPOROSIS WITHOUT CURRENT PATHOLOGICAL FRACTURE: ICD-10-CM

## 2025-01-02 RX ORDER — ALENDRONATE SODIUM 70 MG/1
70 TABLET ORAL WEEKLY
Qty: 12 TABLET | Refills: 1 | Status: SHIPPED | OUTPATIENT
Start: 2025-01-02

## 2025-01-07 ENCOUNTER — TELEPHONE (OUTPATIENT)
Dept: NEUROLOGY | Facility: CLINIC | Age: 65
End: 2025-01-07

## 2025-01-07 NOTE — TELEPHONE ENCOUNTER
Caller: Yessica Galvin    Relationship:  Self    Best call back number: [phone]    PATIENT CALLED REQUESTING TO CANCEL SAME DAY APPT.    Did the patient call AFTER the start time of their scheduled appointment?  []YES  [x]NO    Was the patient's appointment rescheduled? [x]YES  []NO    Any additional information: RESCHEDULED DUE TO WEATHER

## 2025-01-14 ENCOUNTER — OFFICE VISIT (OUTPATIENT)
Dept: FAMILY MEDICINE CLINIC | Facility: CLINIC | Age: 65
End: 2025-01-14
Payer: MEDICARE

## 2025-01-14 VITALS
BODY MASS INDEX: 26.61 KG/M2 | HEART RATE: 115 BPM | DIASTOLIC BLOOD PRESSURE: 80 MMHG | TEMPERATURE: 98.6 F | SYSTOLIC BLOOD PRESSURE: 140 MMHG | HEIGHT: 62 IN | RESPIRATION RATE: 22 BRPM | OXYGEN SATURATION: 94 % | WEIGHT: 144.6 LBS

## 2025-01-14 DIAGNOSIS — M51.9 LUMBAR DISC DISEASE: ICD-10-CM

## 2025-01-14 DIAGNOSIS — J44.1 COPD WITH EXACERBATION: ICD-10-CM

## 2025-01-14 DIAGNOSIS — J40 BRONCHITIS: ICD-10-CM

## 2025-01-14 DIAGNOSIS — E03.9 ACQUIRED HYPOTHYROIDISM: ICD-10-CM

## 2025-01-14 DIAGNOSIS — R05.9 COUGH, UNSPECIFIED TYPE: Primary | ICD-10-CM

## 2025-01-14 DIAGNOSIS — M54.50 LUMBAR PAIN: ICD-10-CM

## 2025-01-14 PROCEDURE — 1159F MED LIST DOCD IN RCRD: CPT | Performed by: FAMILY MEDICINE

## 2025-01-14 PROCEDURE — 99214 OFFICE O/P EST MOD 30 MIN: CPT | Performed by: FAMILY MEDICINE

## 2025-01-14 PROCEDURE — 1126F AMNT PAIN NOTED NONE PRSNT: CPT | Performed by: FAMILY MEDICINE

## 2025-01-14 PROCEDURE — 87804 INFLUENZA ASSAY W/OPTIC: CPT | Performed by: FAMILY MEDICINE

## 2025-01-14 PROCEDURE — 1160F RVW MEDS BY RX/DR IN RCRD: CPT | Performed by: FAMILY MEDICINE

## 2025-01-14 PROCEDURE — 87426 SARSCOV CORONAVIRUS AG IA: CPT | Performed by: FAMILY MEDICINE

## 2025-01-14 PROCEDURE — 3079F DIAST BP 80-89 MM HG: CPT | Performed by: FAMILY MEDICINE

## 2025-01-14 PROCEDURE — 3077F SYST BP >= 140 MM HG: CPT | Performed by: FAMILY MEDICINE

## 2025-01-14 RX ORDER — TRAMADOL HYDROCHLORIDE 50 MG/1
100 TABLET ORAL EVERY 8 HOURS PRN
Qty: 150 TABLET | Refills: 0 | Status: SHIPPED | OUTPATIENT
Start: 2025-01-14

## 2025-01-14 RX ORDER — LEVOTHYROXINE SODIUM 137 UG/1
137 TABLET ORAL DAILY
Qty: 30 TABLET | Refills: 0 | Status: SHIPPED | OUTPATIENT
Start: 2025-01-14

## 2025-01-14 RX ORDER — AZITHROMYCIN 250 MG/1
TABLET, FILM COATED ORAL
Qty: 6 TABLET | Refills: 0 | Status: SHIPPED | OUTPATIENT
Start: 2025-01-14

## 2025-01-14 RX ORDER — PREDNISONE 10 MG/1
TABLET ORAL
Qty: 18 TABLET | Refills: 0 | Status: SHIPPED | OUTPATIENT
Start: 2025-01-14

## 2025-01-14 NOTE — PROGRESS NOTES
Follow Up Office Visit      Date: 2025   Patient Name: Yessica Galvin  : 1960   MRN: [id number]     Chief Complaint:    Chief Complaint   Patient presents with    Cough     Also here for follow up       History of Present Illness: Yessica Galvin is a 64 y.o. female who presents today scheduled for follow-up for chronic pain.  Sees Dr. Jasmine for PCP.    Reports having a cough.  Reports ongoing for about a month.  No fever.  Has felt cold.  No sick contacts recently.  Smokes      Reports she is here for three month follow-up as well.      Patient takes Celebrex for left hip pain.      Requip for restless legs.  Reports the medication helps.    Takes Tramadol for lumbar disc disease.  Reports lumbar pain bilateral.  States pain radiates down posteriorly to hip areas and down legs to about knees at times.  Reports throbbing, burning pain.  Prev sugery.      Takes Effexor for anxiety.  Taking BuSpar as well.    Reports [place] prescribing Neurontin for restless legs.  Neurology.            Subjective      Review of Systems:   Review of Systems   Constitutional:  Positive for chills. Negative for fever.   Respiratory:  Positive for cough.    Gastrointestinal:  Positive for nausea (intermittent). Negative for vomiting.       I have reviewed the patients family history, social history, past medical history, past surgical history and have updated it as appropriate.     Medications:     Current Outpatient Medications:     acetaminophen (TYLENOL) 325 MG tablet, Take 2 tablets by mouth Every 4 (Four) Hours As Needed for Mild Pain ., Disp: , Rfl:     albuterol sulfate  (90 Base) MCG/ACT inhaler, Inhale 2 puffs Every 6 (Six) Hours As Needed for Wheezing., Disp: 54 g, Rfl: 3    alendronate (FOSAMAX) 70 MG tablet, TAKE 1 TABLET BY MOUTH ONCE WEEKLY, Disp: 12 tablet, Rfl: 1    amLODIPine (NORVASC) 10 MG tablet, Take 1 tablet by mouth Daily., Disp: 90 tablet, Rfl: 1    atorvastatin (LIPITOR) 20  MG tablet, TAKE ONE TABLET BY MOUTH ONCE NIGHTLY, Disp: 90 tablet, Rfl: 3    Breztri Aerosphere 160-9-4.8 MCG/ACT aerosol inhaler, , Disp: , Rfl:     budesonide-formoterol (SYMBICORT) 160-4.5 MCG/ACT inhaler, Inhale 2 puffs 2 (Two) Times a Day., Disp: 10.2 g, Rfl: 11    busPIRone (BUSPAR) 15 MG tablet, TAKE 1 TABLET BY MOUTH TWICE A DAY, Disp: 60 tablet, Rfl: 5    celecoxib (CeleBREX) 200 MG capsule, Take 1 capsule by mouth 2 (Two) Times a Day., Disp: 60 capsule, Rfl: 0    Diclofenac Sodium (VOLTAREN) 1 % gel gel, Apply 2 g topically to the appropriate area as directed 4 (Four) Times a Day As Needed (legs)., Disp: 350 g, Rfl: 0    dicyclomine (BENTYL) 10 MG capsule, Take 1 capsule by mouth 4 (Four) Times a Day Before Meals & at Bedtime As Needed for Abdominal Cramping., Disp: 90 capsule, Rfl: 0    gabapentin (NEURONTIN) 600 MG tablet, Take 1.5 tablets by mouth 3 (Three) Times a Day., Disp: 90 tablet, Rfl: 2    levocetirizine (Xyzal) 5 MG tablet, Take 1 tablet by mouth Every Evening., Disp: 90 tablet, Rfl: 1    levothyroxine (SYNTHROID, LEVOTHROID) 137 MCG tablet, Take 1 tablet by mouth Daily., Disp: 30 tablet, Rfl: 0    lisinopril (PRINIVIL,ZESTRIL) 20 MG tablet, TAKE 1 TABLET BY MOUTH DAILY, Disp: 90 tablet, Rfl: 0    montelukast (SINGULAIR) 10 MG tablet, TAKE ONE TABLET BY MOUTH ONCE NIGHTLY, Disp: 90 tablet, Rfl: 1    naloxone (NARCAN) 4 MG/0.1ML nasal spray, Call 911. Don't prime. [place] in 1 nostril for overdose. Repeat in 2-3 minutes in other nostril if no or minimal breathing/responsiveness., Disp: 2 each, Rfl: 0    omeprazole (priLOSEC) 40 MG capsule, Take 1 capsule by mouth Daily., Disp: 90 capsule, Rfl: 1    ondansetron ODT (ZOFRAN-ODT) 4 MG disintegrating tablet, Place 1 tablet on the tongue Every 8 (Eight) Hours As Needed for Nausea or Vomiting., Disp: 20 tablet, Rfl: 1    promethazine (PHENERGAN) 25 MG tablet, Take 1 tablet by mouth Every 8 (Eight) Hours As Needed for Nausea or Vomiting., Disp: 30  "tablet, Rfl: 1    rOPINIRole (REQUIP) 3 MG tablet, , Disp: , Rfl:     rOPINIRole (REQUIP) 4 MG tablet, Take 1 tablet by mouth Every Night., Disp: 90 tablet, Rfl: 1    tiotropium bromide monohydrate (SPIRIVA RESPIMAT) 2.5 MCG/ACT aerosol solution inhaler, Inhale 2 puffs Daily., Disp: 4 g, Rfl: 5    tiZANidine (ZANAFLEX) 4 MG tablet, Take 1 tablet by mouth Every 12 (Twelve) Hours As Needed for Muscle Spasms., Disp: 60 tablet, Rfl: 1    traMADol (ULTRAM) 50 MG tablet, Take 2 tablets by mouth Every 8 (Eight) Hours As Needed for Moderate Pain., Disp: 150 tablet, Rfl: 0    venlafaxine XR (Effexor XR) 75 MG 24 hr capsule, Take 1 capsule by mouth Daily. Take with 150 mg tablet, Disp: 90 capsule, Rfl: 1    venlafaxine XR (EFFEXOR-XR) 150 MG 24 hr capsule, Take 1 capsule by mouth Daily. Take with 75 mg tablet, Disp: 90 capsule, Rfl: 1    azithromycin (Zithromax Z-Toan) 250 MG tablet, Take 2 tablets by mouth on day 1, then 1 tablet daily on days 2-5, Disp: 6 tablet, Rfl: 0    nicotine (NICOTROL) 10 MG/ML solution nasal solution, 1 spray by Each Nare route Every 1 (One) Hour As Needed (smoking cessation). 1 to 2 doses/hour, do not exceed more than 5 doses (10 sprays) per hour (Patient not taking: Reported on 1/14/2025), Disp: 10 mL, Rfl: 2    predniSONE (DELTASONE) 10 MG tablet, Take three tablets daily for three days, then two tablets daily for three days, then one tablet daily for three days., Disp: 18 tablet, Rfl: 0    Allergies:   Allergies   Allergen Reactions    Codeine Nausea And Vomiting    Shrimp Hives       Objective     Physical Exam: Please see above  Vital Signs:   Vitals:    01/14/25 1129   BP: 140/80   Pulse: 115   Resp: 22   Temp: 98.6 °F (37 °C)   TempSrc: Temporal   SpO2: 94%   Weight: 65.6 kg (144 lb 9.6 oz)   Height: 157.6 cm (62.05\")   PainSc: 0-No pain     Body mass index is 26.41 kg/m².          Physical Exam  Vitals and nursing note reviewed.   Constitutional:       Appearance: Normal appearance.   HENT: "      Head: Normocephalic and atraumatic.   Neck:      Vascular: No carotid bruit.   Cardiovascular:      Rate and Rhythm: Normal rate and regular rhythm.      Heart sounds: Normal heart sounds. No murmur heard.  Pulmonary:      Effort: Pulmonary effort is normal.      Breath sounds: Rhonchi present.      Comments: coarse  Abdominal:      General: Bowel sounds are normal.      Palpations: Abdomen is soft. There is no mass.      Tenderness: There is no abdominal tenderness.   Musculoskeletal:      Right lower leg: No edema.      Left lower leg: No edema.   Skin:     Coloration: Skin is not jaundiced or pale.      Findings: No erythema.   Neurological:      Mental Status: She is alert. Mental status is at baseline.   Psychiatric:         Mood and Affect: Mood normal.         Behavior: Behavior normal.           MRI LUMBAR SPINE WO CONTRAST     Date of Exam: 6/20/2023 9:24 AM EDT     Indication: Lumbar radiculopathy, symptoms persist with > 6 wks treatment.     Comparison: Lumbar spine radiographs dated 6/11/2023     Technique:  Routine multiplanar/multisequence sequence images of the lumbar spine were obtained without contrast administration.          FINDINGS:  There is straightening of lumbar lordosis. Multilevel Modic type I degenerative endplate changes are seen from L2-L4. Marrow signal is otherwise unremarkable. There is a superior endplate defect of L2 which appears chronic without associated marrow   edema. Remaining vertebral body heights are maintained. Lumbar discs are desiccated with multilevel disc space narrowing. The conus terminates at approximately the T12/L1 level. No abnormal signal is identified within the conus. The visualized paraspinal   soft tissues are without significant abnormality. Limited visualization of the intra-abdominal structures is unremarkable.     T12-L1: Mild broad-based disc bulging and facet arthropathy contribute to mild bilateral neural foraminal stenosis. There is mild  effacement of the anterior thecal sac. Spinal canal is not significantly narrowed.     L1-L2: Broad-based disc bulging and facet arthropathy contribute to moderate left and mild to moderate right neural foraminal stenosis. There is effacement of the anterior thecal sac. Spinal canal is not significantly narrowed.     L2-L3: Posterior disc osteophyte complex and facet arthropathy contribute to moderate spinal canal stenosis (thecal sac measures 6 to 7 mm AP) and moderate to severe bilateral neuroforaminal stenosis.     L3-L4: Posterior disc osteophyte complex and facet arthropathy contribute to severe spinal canal stenosis (thecal sac measures approximately 4 mm AP) with severe bilateral neural foraminal stenosis.     L4-L5: Broad-based disc bulging and facet arthropathy contribute to moderate spinal canal stenosis (thecal sac measures approximately 7 mm AP) with moderate to severe bilateral neural foraminal stenosis.     L5-S1: Broad-based disc bulging and facet arthropathy contribute to moderate spinal canal stenosis (thecal sac measures 6 to 7 mm AP) and moderate to severe bilateral neural foraminal stenosis     IMPRESSION:  1.Lumbar spondylosis with straightening of lumbar lordosis. There is multilevel spinal canal and neural foraminal stenosis. Spinal canal is narrowest at the L3-L4 level. Please see above for additional details.  2.Chronic superior endplate compression fracture of L2 without associated marrow edema.     Electronically Signed: Sebastian Torres    6/20/2023 5:18 PM EDT    Workstation ID: [id number]        Procedures    Results:   Labs:   Hemoglobin A1C   Date Value Ref Range Status   04/05/2024 5.20 4.80 - 5.60 % Final     TSH   Date Value Ref Range Status   10/14/2024 1.370 0.270 - 4.200 uIU/mL Final        POCT Results (if applicable):   Results for orders placed or performed in visit on 01/14/25   POC Influenza A / B    Collection Time: 01/14/25 11:49 AM    Specimen: Swab   Result Value Ref Range     Rapid Influenza A Ag Negative Negative    Rapid Influenza B Ag Negative Negative    Internal Control Passed Passed    Lot Number [id number]     Expiration Date 10/11/2026    POCT SARS-CoV-2 Antigen    Collection Time: 01/14/25 11:49 AM    Specimen: Nasopharynx; Swab   Result Value Ref Range    SARS Antigen Not Detected Not Detected, Presumptive Negative    Internal Control Passed Passed    Lot Number [id number]     Expiration Date 05/06/2025      *Note: Due to a large number of results and/or encounters for the requested time period, some results have not been displayed. A complete set of results can be found in Results Review.       PHQ-9: PHQ-9 Total Score:       Imaging:   No valid procedures specified.     Measures:   Advanced Care Planning:   Patient does not have an advance directive, declines further assistance.    Smoking Cessation:   less than 3 minutes spent counseling Not agreeable to stopping    Assessment / Plan      Assessment/Plan:     [facility] reviewed.  Gabapentin prescribed by Gene  Previous compliance drug screen on file dated 10/14/2024.  Patient's PCP out of the office temporarily.      1. Cough, unspecified type  Testing in office today for COVID antigen, and influenza A/B antigen all negative-1/14/2025.    - POC Influenza A / B  - POCT SARS-CoV-2 Antigen  - azithromycin (Zithromax Z-Toan) 250 MG tablet; Take 2 tablets by mouth on day 1, then 1 tablet daily on days 2-5  Dispense: 6 tablet; Refill: 0  - predniSONE (DELTASONE) 10 MG tablet; Take three tablets daily for three days, then two tablets daily for three days, then one tablet daily for three days.  Dispense: 18 tablet; Refill: 0    2. Bronchitis  Patient with coarse breath sounds and scattered rhonchi.  Will prescribe azithromycin course.  Prednisone taper.    - azithromycin (Zithromax Z-Toan) 250 MG tablet; Take 2 tablets by mouth on day 1, then 1 tablet daily on days 2-5  Dispense: 6 tablet; Refill: 0  - predniSONE (DELTASONE) 10 MG  tablet; Take three tablets daily for three days, then two tablets daily for three days, then one tablet daily for three days.  Dispense: 18 tablet; Refill: 0    3. COPD with exacerbation  As above.  - azithromycin (Zithromax Z-Toan) 250 MG tablet; Take 2 tablets by mouth on day 1, then 1 tablet daily on days 2-5  Dispense: 6 tablet; Refill: 0  - predniSONE (DELTASONE) 10 MG tablet; Take three tablets daily for three days, then two tablets daily for three days, then one tablet daily for three days.  Dispense: 18 tablet; Refill: 0    4. Lumbar disc disease  Patient to continue tramadol as she reports medication helps with daily activities and with pain.  [place] reviewed.  Patient with contract on file with PCP.  Previous drug screen done.    - traMADol (ULTRAM) 50 MG tablet; Take 2 tablets by mouth Every 8 (Eight) Hours As Needed for Moderate Pain.  Dispense: 150 tablet; Refill: 0    5. Lumbar pain  As above.  - traMADol (ULTRAM) 50 MG tablet; Take 2 tablets by mouth Every 8 (Eight) Hours As Needed for Moderate Pain.  Dispense: 150 tablet; Refill: 0    6. Acquired hypothyroidism  Patient to continue Synthroid at current dosing.  - levothyroxine (SYNTHROID, LEVOTHROID) 137 MCG tablet; Take 1 tablet by mouth Daily.  Dispense: 30 tablet; Refill: 0          Part of this note may be an electronic transcription/translation of spoken language to printed text using the Dragon Dictation System.      Vaccine Counseling:      Follow Up:   Return if symptoms worsen or fail to improve or as scheduled with pcp..      DO KANDY Posey

## 2025-01-14 NOTE — PATIENT INSTRUCTIONS
Antibiotic as ordered.  Steroid taper as ordered.  Take medications as ordered.  Low fat, low salt diet.  Exercise as tolerated.

## 2025-01-20 NOTE — PROGRESS NOTES
"                                                                 [facility]     Office Visit       Date: 01/21/2025   Patient Name: Yessica Galvin  MRN: [id number]  YOB: 1960    Referring Physician: Betty Jasmine MD     Chief Complaint:   Chief Complaint   Patient presents with    Follow-up     2 month follow up--Left Hand - Pain     History of Present Illness:   Yessica Galvin is a 64 y.o. 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  At the patient's last clinic visit she is provided an oral steroid course and elected to defer corticosteroid injections into the MCP joints.  We discussed options regarding her FPL rupture and she was not particular bothered by this and therefore wanted to continue to annual expectant management.  She reports doing well since her last clinic visit.  She denies 0/10 pain.  She does occasionally she has discomfort in the MCP joint of her index finger but overall this is not really bothersome to her.  She has some difficulty with fine motor dexterity due to her thumb, however is learning to accommodate.  No other complaints or concerns.    PMH: Hypothyroidism, gout, JAVIER, GERD, COPD.    Subjective   Review of Systems:   Review of Systems   Pertinent review of systems per HPI    I reviewed the patient's chief complaint, history of present illness, review of systems, past medical history, surgical history, family history, social history, medications and allergy list in the EMR on 01/21/2025 and agree with the findings above.    Objective    Vital Signs:   Vitals:    01/21/25 0904   BP: 134/74   Weight: 65.3 kg (144 lb)   Height: 157.6 cm (62.05\")     General: No acute distress. Alert and oriented.   Cardiovascular: Palpable radial pulse.   Respiratory: Breathing is nonlabored.   Ortho Exam:  Examination of the left upper extremity demonstrates a healed surgical incision " overlying the distal radius.  She has nodule formation noted throughout the digits.  There is swelling and prominence of the index and long finger MCP joints with ulnar deviation.  No change from prior exam.  She is tender palpation at the MCP joints of the index and long fingers with positive MCP joint grind. She is able to make a composite fist. She is unable to actively flex the thumb IP joint. There is negative tenodesis to the left thumb IP joint. Sensations intact light touch throughout the hand. Warm and well-perfused distally.     Imaging / Studies:    Imaging Results (Last 24 Hours)       ** No results found for the last 24 hours. **        Left hand x-rays obtained on 10/15/2024 were personally reviewed and interpreted by myself.  These demonstrate diffuse arthritic changes throughout the hands and digits.  This appears worse at the thumb CMC joint, index and long finger MCP joints, and diffusely throughout the PIP and DIP joints.  Prior left distal radius open reduction internal fixation with volar plating.  Ulnar deviation and volar subluxation of the index and long finger MCP joints.  Calcifications noted within the [facility].     Assessment / Plan    Assessment/Plan:   Yessica Galvin is a 64 y.o. 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     Overall the patient reports doing well.  We discussed options today regarding her MCP joint arthritis including a corticosteroid injection.  She would like to hold off on this as she is currently asymptomatic.  We may consider this in the future.  Additionally, she does not wish to pursue any interventions for her FPL tendon rupture.  She may continue all of her activities without restriction.  I will see her back as needed if symptoms worsen or fail to improve.  All question and concerns were addressed.  She is agreeable.      ICD-10-CM ICD-9-CM   1. Arthritis of finger  M19.049 716.94   2. Rupture of flexor pollicis  longus muscle  S56.019A 841.8     Follow Up:   Return if symptoms worsen or fail to improve.      Brenda Richardson MD  [facility]

## 2025-01-21 ENCOUNTER — OFFICE VISIT (OUTPATIENT)
Age: 65
End: 2025-01-21
Payer: MEDICARE

## 2025-01-21 VITALS
DIASTOLIC BLOOD PRESSURE: 74 MMHG | BODY MASS INDEX: 26.5 KG/M2 | WEIGHT: 144 LBS | HEIGHT: 62 IN | SYSTOLIC BLOOD PRESSURE: 134 MMHG

## 2025-01-21 DIAGNOSIS — M19.049 ARTHRITIS OF FINGER: Primary | ICD-10-CM

## 2025-01-21 DIAGNOSIS — S56.019A RUPTURE OF FLEXOR POLLICIS LONGUS MUSCLE: ICD-10-CM

## 2025-01-29 DIAGNOSIS — F32.A ANXIETY AND DEPRESSION: ICD-10-CM

## 2025-01-29 DIAGNOSIS — F41.9 ANXIETY AND DEPRESSION: ICD-10-CM

## 2025-01-29 DIAGNOSIS — M25.552 LEFT HIP PAIN: ICD-10-CM

## 2025-01-29 RX ORDER — CELECOXIB 200 MG/1
200 CAPSULE ORAL 2 TIMES DAILY
Qty: 60 CAPSULE | Refills: 3 | Status: SHIPPED | OUTPATIENT
Start: 2025-01-29

## 2025-01-29 RX ORDER — BUSPIRONE HYDROCHLORIDE 15 MG/1
15 TABLET ORAL 2 TIMES DAILY
Qty: 60 TABLET | Refills: 5 | Status: SHIPPED | OUTPATIENT
Start: 2025-01-29

## 2025-02-03 DIAGNOSIS — J40 BRONCHITIS: ICD-10-CM

## 2025-02-03 DIAGNOSIS — M51.9 LUMBAR DISC DISEASE: ICD-10-CM

## 2025-02-03 DIAGNOSIS — R05.9 COUGH, UNSPECIFIED TYPE: ICD-10-CM

## 2025-02-03 DIAGNOSIS — R11.0 NAUSEA: ICD-10-CM

## 2025-02-03 DIAGNOSIS — J44.1 COPD WITH EXACERBATION: ICD-10-CM

## 2025-02-03 RX ORDER — AZITHROMYCIN 250 MG/1
TABLET, FILM COATED ORAL
Qty: 6 TABLET | Refills: 0 | OUTPATIENT
Start: 2025-02-03

## 2025-02-03 RX ORDER — ROPINIROLE 3 MG/1
TABLET, FILM COATED ORAL
OUTPATIENT
Start: 2025-02-03

## 2025-02-03 RX ORDER — PROMETHAZINE HYDROCHLORIDE 25 MG/1
25 TABLET ORAL EVERY 8 HOURS PRN
Qty: 30 TABLET | Refills: 1 | Status: SHIPPED | OUTPATIENT
Start: 2025-02-03

## 2025-02-05 DIAGNOSIS — M51.9 LUMBAR DISC DISEASE: ICD-10-CM

## 2025-02-05 DIAGNOSIS — F41.1 GENERALIZED ANXIETY DISORDER: ICD-10-CM

## 2025-02-06 RX ORDER — VENLAFAXINE HYDROCHLORIDE 75 MG/1
75 CAPSULE, EXTENDED RELEASE ORAL DAILY
Qty: 90 CAPSULE | Refills: 1 | Status: SHIPPED | OUTPATIENT
Start: 2025-02-06

## 2025-02-06 RX ORDER — VENLAFAXINE HYDROCHLORIDE 150 MG/1
150 CAPSULE, EXTENDED RELEASE ORAL DAILY
Qty: 90 CAPSULE | Refills: 1 | Status: SHIPPED | OUTPATIENT
Start: 2025-02-06

## 2025-02-12 ENCOUNTER — TELEPHONE (OUTPATIENT)
Dept: GASTROENTEROLOGY | Facility: CLINIC | Age: 65
End: 2025-02-12

## 2025-02-12 NOTE — TELEPHONE ENCOUNTER
Hub staff attempted to follow warm transfer process and was unsuccessful     Caller: Yessica Galvin    Relationship to patient: Self    Best call back number: [phone]    Patient is needing: PT IS CALLING TO RESCHEDULE PROCEDURE THAT IS SCHEDULED FOR 03/03/2025. PLEASE GIVE PT A CALL BACK TO RESCHEDULE. IF NOT ABLE TO REACH PT. IT IS OKAY TO LVM.

## 2025-02-24 DIAGNOSIS — I10 HYPERTENSION, UNSPECIFIED TYPE: ICD-10-CM

## 2025-02-24 DIAGNOSIS — E03.9 ACQUIRED HYPOTHYROIDISM: ICD-10-CM

## 2025-02-24 RX ORDER — LISINOPRIL 20 MG/1
20 TABLET ORAL DAILY
Qty: 90 TABLET | Refills: 0 | Status: SHIPPED | OUTPATIENT
Start: 2025-02-24

## 2025-02-24 RX ORDER — LEVOTHYROXINE SODIUM 137 UG/1
137 TABLET ORAL DAILY
Qty: 30 TABLET | Refills: 0 | Status: SHIPPED | OUTPATIENT
Start: 2025-02-24

## 2025-03-03 DIAGNOSIS — M51.9 LUMBAR DISC DISEASE: ICD-10-CM

## 2025-03-08 DIAGNOSIS — M51.9 LUMBAR DISC DISEASE: ICD-10-CM

## 2025-03-08 DIAGNOSIS — G47.62 NOCTURNAL LEG CRAMPS: ICD-10-CM

## 2025-03-08 DIAGNOSIS — M54.50 LUMBAR PAIN: ICD-10-CM

## 2025-03-11 DIAGNOSIS — M51.9 LUMBAR DISC DISEASE: ICD-10-CM

## 2025-03-11 DIAGNOSIS — G25.81 RESTLESS LEGS SYNDROME (RLS): ICD-10-CM

## 2025-03-11 RX ORDER — TRAMADOL HYDROCHLORIDE 50 MG/1
100 TABLET ORAL EVERY 8 HOURS PRN
Qty: 150 TABLET | Refills: 0 | Status: SHIPPED | OUTPATIENT
Start: 2025-03-11

## 2025-03-11 RX ORDER — GABAPENTIN 600 MG/1
900 TABLET ORAL 3 TIMES DAILY
Qty: 135 TABLET | Refills: 2 | Status: SHIPPED | OUTPATIENT
Start: 2025-03-11 | End: 2025-06-09

## 2025-03-11 RX ORDER — PRAMIPEXOLE DIHYDROCHLORIDE 0.5 MG/1
0.5 TABLET ORAL
Qty: 30 TABLET | Refills: 0 | OUTPATIENT
Start: 2025-03-11

## 2025-03-11 NOTE — TELEPHONE ENCOUNTER
Rx Refill Note  Requested Prescriptions     Pending Prescriptions Disp Refills    gabapentin (NEURONTIN) 600 MG tablet [Pharmacy Med Name: GABAPENTIN 600 MG TABLET] 90 tablet      Sig: TAKE 1.5 TABLETS BY MOUTH THREE TIMES A DAY      Last filled:11/20/24 qty 90 w/2 ref  Last office visit with prescribing clinician: 11/18/2024      Next office visit with prescribing clinician: 4/9/2025     Camila Callaway MA  03/11/25, 10:02 EDT    Pending to provider

## 2025-03-13 DIAGNOSIS — J30.1 SEASONAL ALLERGIC RHINITIS DUE TO POLLEN: ICD-10-CM

## 2025-03-13 RX ORDER — MONTELUKAST SODIUM 10 MG/1
10 TABLET ORAL NIGHTLY
Qty: 90 TABLET | Refills: 0 | Status: SHIPPED | OUTPATIENT
Start: 2025-03-13

## 2025-03-28 ENCOUNTER — OFFICE VISIT (OUTPATIENT)
Dept: FAMILY MEDICINE CLINIC | Facility: CLINIC | Age: 65
End: 2025-03-28
Payer: MEDICARE

## 2025-03-28 VITALS
OXYGEN SATURATION: 98 % | BODY MASS INDEX: 27.53 KG/M2 | DIASTOLIC BLOOD PRESSURE: 68 MMHG | WEIGHT: 149.6 LBS | SYSTOLIC BLOOD PRESSURE: 138 MMHG | HEART RATE: 68 BPM | TEMPERATURE: 98.7 F | HEIGHT: 62 IN

## 2025-03-28 DIAGNOSIS — L03.90 WOUND CELLULITIS: ICD-10-CM

## 2025-03-28 DIAGNOSIS — M25.561 ACUTE PAIN OF RIGHT KNEE: ICD-10-CM

## 2025-03-28 DIAGNOSIS — G25.81 RESTLESS LEG SYNDROME: Primary | ICD-10-CM

## 2025-03-28 DIAGNOSIS — R06.00 DYSPNEA, UNSPECIFIED TYPE: ICD-10-CM

## 2025-03-28 DIAGNOSIS — E03.9 ACQUIRED HYPOTHYROIDISM: ICD-10-CM

## 2025-03-28 DIAGNOSIS — M79.604 RIGHT LEG PAIN: ICD-10-CM

## 2025-03-28 PROCEDURE — 99214 OFFICE O/P EST MOD 30 MIN: CPT | Performed by: STUDENT IN AN ORGANIZED HEALTH CARE EDUCATION/TRAINING PROGRAM

## 2025-03-28 PROCEDURE — 1125F AMNT PAIN NOTED PAIN PRSNT: CPT | Performed by: STUDENT IN AN ORGANIZED HEALTH CARE EDUCATION/TRAINING PROGRAM

## 2025-03-28 PROCEDURE — 1159F MED LIST DOCD IN RCRD: CPT | Performed by: STUDENT IN AN ORGANIZED HEALTH CARE EDUCATION/TRAINING PROGRAM

## 2025-03-28 PROCEDURE — 1160F RVW MEDS BY RX/DR IN RCRD: CPT | Performed by: STUDENT IN AN ORGANIZED HEALTH CARE EDUCATION/TRAINING PROGRAM

## 2025-03-28 PROCEDURE — 3078F DIAST BP <80 MM HG: CPT | Performed by: STUDENT IN AN ORGANIZED HEALTH CARE EDUCATION/TRAINING PROGRAM

## 2025-03-28 PROCEDURE — 3075F SYST BP GE 130 - 139MM HG: CPT | Performed by: STUDENT IN AN ORGANIZED HEALTH CARE EDUCATION/TRAINING PROGRAM

## 2025-03-28 RX ORDER — LEVOTHYROXINE SODIUM 137 UG/1
137 TABLET ORAL DAILY
Qty: 30 TABLET | Refills: 0 | Status: SHIPPED | OUTPATIENT
Start: 2025-03-28

## 2025-03-28 RX ORDER — MUPIROCIN 20 MG/G
1 OINTMENT TOPICAL 3 TIMES DAILY
Qty: 30 G | Refills: 0 | Status: SHIPPED | OUTPATIENT
Start: 2025-03-28

## 2025-03-28 RX ORDER — CLONAZEPAM 0.5 MG/1
0.5 TABLET ORAL NIGHTLY
Qty: 15 TABLET | Refills: 0 | Status: SHIPPED | OUTPATIENT
Start: 2025-03-28

## 2025-03-28 NOTE — ASSESSMENT & PLAN NOTE
- Patient has been short of breath recently, with significant dyspnea on exertion  -She does have a history of severe COPD, so this might be the etiology but cannot rule out cardiac abnormality, especially given her lower extremity edema  - Obtain chest x-ray, will follow-up on results and advise further  - Will obtain echo, will follow-up on results and advise further

## 2025-03-28 NOTE — ASSESSMENT & PLAN NOTE
- Uncontrolled, worsening pain  - Did not have benefit with pramipexole in the past, did not notice any improvement on rotigotine  - Continue ropinirole 4 mg daily  - Patient does have a neurology appointment in April, advised to keep that appointment  - I did increase her gabapentin dose and she has not noticed any improvement in symptoms, so discussed with her to let them know about this  - She may be a candidate for carbidopa-levodopa?,  I will leave the management of this up to neurology  - I did prescribe Klonopin to take nightly to potentially help with her symptoms until her neurology appointment

## 2025-03-28 NOTE — PROGRESS NOTES
"    Office Note     Name: Yessica Galvin    : 1960     MRN: [id number]     Chief Complaint  Leg Pain (Pain gets worse at night )    Subjective     History of Present Illness:  Yessica Galvin is a 64 y.o. female who presents today for leg pain.  Patient reports that she has bilateral leg pain that is worse at night, she feels that she has to keep moving her legs and they are driving her \"crazy.\" Patient has been seeing neurology for restless leg syndrome.  The last saw them in November and was told not to take high doses of the Requip and to take no more than 4 mg.  They recommended increasing the gabapentin to 900 mg 3 times a day.  She has been taking the higher dose of gabapentin and it has not helped her symptoms.  Patient was supposed to follow-up with neurology but did not.  She does have an appointment with April.    Patient reports that approximately 1 month ago, she fell off her bed, and hit her right knee.  She has pain in her right knee and distal lower extremity on the right.  She does have a nonhealing wound there.  She reports that the pain is sharp and burning.  She has had surgery in that right knee before.    She has had leg swelling for a long time but it has gotten worse recently since this fall.  Her right leg swells more than her right leg.  She does have dyspnea on very little exertion.          Objective     Past Medical History:   Diagnosis Date    Acute bronchitis     Acute sinusitis     Allergic rhinitis 2023    Anemia 10/16/2024    Asthma     Asthma 2020    Asthma with acute exacerbation     Asthma, extrinsic ,1970    I have had it since I was 5 yrs. Old    Asthmatic bronchitis     Bronchiectasis     Always catch it    Centrilobular emphysema 2022    Chronic bronchitis with acute exacerbation 2022    Disc degeneration, lumbar     Disc degeneration, lumbar     GERD (gastroesophageal reflux disease)     History of mammogram     Hypertension 10/05/2023 "    Hypothyroidism     Lower back pain     Medicare annual wellness visit, subsequent 10/05/2023    Nodule of upper lobe of right lung 06/02/2022    Plantar fasciitis     Restless legs syndrome      Past Surgical History:   Procedure Laterality Date    CARDIAC CATHETERIZATION N/A 11/17/2023    Procedure: Left Heart Cath;  Surgeon: Arben Pittman MD;  Location:  [facility];  Service: Cardiology;  Laterality: N/A;    CHOLECYSTECTOMY      COLONOSCOPY      HYSTERECTOMY      KNEE ARTHROSCOPY Right 03/10/2020    Procedure: KNEE ARTHROSCOPY RIGHT;  Surgeon: Guanaco Thakur MD;  Location: [facility];  Service: Orthopedics;  Laterality: Right;    LUMBAR LAMINECTOMY DISCECTOMY DECOMPRESSION N/A 4/11/2024    Procedure: LUMBAR LAMINECTOMY DISCECTOMY DECOMPRESSION POSTERIOR L2-L5;  Surgeon: Ulises Elias MD;  Location: [facility];  Service: Neurosurgery;  Laterality: N/A;    NECK SURGERY      ORIF WRIST FRACTURE Left 03/10/2020    Procedure: WRIST OPEN REDUCTION INTERNAL FIXATION LEFT;  Surgeon: Guanaco Thakur MD;  Location: [facility];  Service: Orthopedics;  Laterality: Left;    TIBIAL PLATEAU OPEN REDUCTION INTERNAL FIXATION Right 03/10/2020    Procedure: TIBIAL PLATEAU OPEN REDUCTION INTERNAL FIXATION RIGHT;  Surgeon: Guanaco Thakur MD;  Location: [facility];  Service: Orthopedics;  Laterality: Right;    UPPER GASTROINTESTINAL ENDOSCOPY       Family History   Problem Relation Age of Onset    Aneurysm Mother     Hypertension Mother     Cancer Mother     Asthma Mother     Heart disease Mother     Diabetes Mother     Hypertension Father     Alzheimer's disease Father     Asthma Father     No Known Problems Sister     No Known Problems Brother     COPD Maternal Grandmother     Aneurysm Maternal Grandmother     No Known Problems Maternal Grandfather     Alzheimer's disease Paternal Grandmother     Dementia Paternal Grandmother     No Known Problems Paternal Grandfather     Breast cancer Neg Hx      "Ovarian cancer Neg Hx     Colon cancer Neg Hx     Esophageal cancer Neg Hx        Vital Signs  /68   Pulse 68   Temp 98.7 °F (37.1 °C) (Infrared)   Ht 157.6 cm (62.05\")   Wt 67.9 kg (149 lb 9.6 oz)   SpO2 98%   BMI 27.32 kg/m²   Estimated body mass index is 27.32 kg/m² as calculated from the following:    Height as of this encounter: 157.6 cm (62.05\").    Weight as of this encounter: 67.9 kg (149 lb 9.6 oz).    Physical Exam  Vitals reviewed.   Constitutional:       Comments: Chronically ill-appearing   Cardiovascular:      Rate and Rhythm: Normal rate and regular rhythm.      Comments: 1+ pitting edema on the right and trace pitting edema on the left to the shin  Pulmonary:      Effort: Pulmonary effort is normal.      Breath sounds: Normal breath sounds.   Abdominal:      General: Abdomen is flat.      Palpations: Abdomen is soft.   Musculoskeletal:      Comments: Tender to palpation over right tibia centrally, tender to palpation over right knee   Skin:     Comments: Small wound on tibia with surrounding erythema, does have healthy granulation tissue centrally   Neurological:      Mental Status: She is alert.               Assessment and Plan     Diagnoses and all orders for this visit:    1. Restless leg syndrome (Primary)  Assessment & Plan:  - Uncontrolled, worsening pain  - Did not have benefit with pramipexole in the past, did not notice any improvement on rotigotine  - Continue ropinirole 4 mg daily  - Patient does have a neurology appointment in April, advised to keep that appointment  - I did increase her gabapentin dose and she has not noticed any improvement in symptoms, so discussed with her to let them know about this  - She may be a candidate for carbidopa-levodopa?,  I will leave the management of this up to neurology  - I did prescribe Klonopin to take nightly to potentially help with her symptoms until her neurology appointment    Orders:  -     clonazePAM (KlonoPIN) 0.5 MG tablet; " Take 1 tablet by mouth Every Night.  Dispense: 15 tablet; Refill: 0    2. Acute pain of right knee  Assessment & Plan:  - Patient with pain in her right knee after a fall 1 month ago, she has had knee surgery in that knee in the past  - Obtaining x-ray to rule out any potential fracture    Orders:  -     XR Knee 1 or 2 View Right (In Office)    3. Right leg pain  Assessment & Plan:  - Patient with tenderness to palpation along the right tibia, had a fall in this area 1 month ago  - Obtaining x-ray to rule out potential fracture, will follow-up on results and advise further    Orders:  -     XR Tibia Fibula 2 View Right (In Office)    4. Dyspnea, unspecified type  Assessment & Plan:  - Patient has been short of breath recently, with significant dyspnea on exertion  -She does have a history of severe COPD, so this might be the etiology but cannot rule out cardiac abnormality, especially given her lower extremity edema  - Obtain chest x-ray, will follow-up on results and advise further  - Will obtain echo, will follow-up on results and advise further    Orders:  -     XR Chest PA & Lateral (In Office)  -     Adult Transthoracic Echo Complete W/ Cont if Necessary Per Protocol; Future    5. Wound cellulitis  Assessment & Plan:  - Patient with a very superficial wound from a fall 1 month ago, wound is nonhealing and does have some surrounding erythema, concerning for cellulitis  - We will start on mupirocin ointment for 10 days    Orders:  -     mupirocin (BACTROBAN) 2 % ointment; Apply 1 Application topically to the appropriate area as directed 3 (Three) Times a Day. For 10 dyas  Dispense: 30 g; Refill: 0        Follow Up  Return in about 4 weeks (around 4/25/2025) for chronic care management.    Betty Jasmine MD

## 2025-03-28 NOTE — ASSESSMENT & PLAN NOTE
- Patient with tenderness to palpation along the right tibia, had a fall in this area 1 month ago  - Obtaining x-ray to rule out potential fracture, will follow-up on results and advise further

## 2025-03-28 NOTE — ASSESSMENT & PLAN NOTE
- Patient with a very superficial wound from a fall 1 month ago, wound is nonhealing and does have some surrounding erythema, concerning for cellulitis  - We will start on mupirocin ointment for 10 days

## 2025-04-01 DIAGNOSIS — L03.90 WOUND CELLULITIS: ICD-10-CM

## 2025-04-01 DIAGNOSIS — M51.9 LUMBAR DISC DISEASE: ICD-10-CM

## 2025-04-01 RX ORDER — MUPIROCIN 20 MG/G
1 OINTMENT TOPICAL 3 TIMES DAILY
Qty: 30 G | Refills: 0 | OUTPATIENT
Start: 2025-04-01

## 2025-04-01 RX ORDER — AMLODIPINE BESYLATE 10 MG/1
10 TABLET ORAL DAILY
Qty: 90 TABLET | Refills: 1 | Status: SHIPPED | OUTPATIENT
Start: 2025-04-01

## 2025-04-09 ENCOUNTER — OFFICE VISIT (OUTPATIENT)
Dept: NEUROLOGY | Facility: CLINIC | Age: 65
End: 2025-04-09
Payer: MEDICARE

## 2025-04-09 VITALS
OXYGEN SATURATION: 91 % | HEIGHT: 62 IN | BODY MASS INDEX: 27.55 KG/M2 | SYSTOLIC BLOOD PRESSURE: 128 MMHG | WEIGHT: 149.69 LBS | HEART RATE: 64 BPM | DIASTOLIC BLOOD PRESSURE: 70 MMHG

## 2025-04-09 DIAGNOSIS — G25.81 RESTLESS LEGS SYNDROME (RLS): Primary | ICD-10-CM

## 2025-04-09 RX ORDER — OMEPRAZOLE 40 MG/1
40 CAPSULE, DELAYED RELEASE ORAL DAILY
Qty: 90 CAPSULE | Refills: 1 | Status: SHIPPED | OUTPATIENT
Start: 2025-04-09

## 2025-04-09 NOTE — PROGRESS NOTES
Neuro Office Visit      Encounter Date: 2025   Patient Name: Yessica Galvin  : 1960   MRN: [id number]   PCP:  Betty Jasmine MD     Chief Complaint:    Chief Complaint   Patient presents with    Follow-up       History of Present Illness: Yessica Galvin is a 64 y.o. female who is here today in Neurology for  RLS    Initial visit 2024:  PMH of asthmatic bronchitis, generalized anxiety disorder, gout, headache, hypothyroidism, chronic midline back pain without sciatica, cervical disc disorder with myelopathy, asthma, tobacco abuse.  Nocturnal leg cramps, constipation, skin lesions, GERD with esophagitis, anxiety and depression, hypertension, restless leg syndrome, gastroenteritis, stage II COPD, osteoporosis, anemia    Patient was seen by primary care on 2024 for restless legs, per review of primary care office visit note she reported at that time that she feels that legs are restless even during the day.  She reports the need to move them around all the time and feels tightness in her muscles.  She has been on ropinirole 4 mg for some time but sometimes even doubles up on the dose because of her restless or spikes are during the day.  At that visit primary care was going to wean off of ropinirole, of note she also did not have benefit with pramipexole previously.  Plan was to wean off of ropinirole and start rotigotine.    In clinic today Yessica reports that Neupro was cost prohibitive so she has been taking Requip. She has been combining prior Requip scripts of 3 mg and 4 mg to equal 7 mg total in a day. She reports that she has seen improvement in her symptoms with the higher dose of Requip. Restless sensation in legs only. She states that this has been present for 4-5 years and is worsening overall. Her mother had RLS as well. She notes that her legs started bothering her during the day in addition to the nighttime about 1 month ago. She also takes Gabapentin 600 mg TID for  lumbar back pain, she feels that it helps some but that her back pain is still significant, denies SE from Gabapentin. Of note she underwent decompressive laminectomies L2-5 with Dr. Elias on 4/11/2024.     Lab work from 10/14/2024-iron and TIBC showed normal iron level at 78, iron saturation was low at 16%, transferrin normal at 334, TIBC normal at 498.    Current visit 4/9/2025:  Yessica returns to neurology clinic for routine follow up. She is currently taking Gabapentin 900 mg TID plus Requip 4 mg daily for RLS. She reports frequent falls, she reports that she is up at night due to the restless sensation. She does frequently fall asleep when doing routine tasks such as washing dishes. She was recently started on Clonazepam 0.5 mg by PCP. She notes that the medications she is taking for RLS help with the symptoms, she forgot to take her afternoon Gabapentin dose today and can see a significant difference without the medication. She takes Requip 4 mg nightly. She does feel like the clonzepam is helping but is making her more sleepy, this was recently added by PCP.    Subjective      Review of Systems   Constitutional:  Positive for fatigue.   HENT: Negative.     Eyes: Negative.    Respiratory: Negative.     Cardiovascular: Negative.    Gastrointestinal: Negative.    Musculoskeletal:  Positive for back pain and gait problem.   Skin: Negative.    Allergic/Immunologic: Positive for food allergies.   Neurological:         Restless legs   Psychiatric/Behavioral:  Positive for sleep disturbance.           Past Medical History:   Past Medical History:   Diagnosis Date    Acute bronchitis     Acute sinusitis     Allergic rhinitis 09/06/2023    Anemia 10/16/2024    Asthma     Asthma 03/07/2020    Asthma with acute exacerbation     Asthma, extrinsic ,1970    I have had it since I was 5 yrs. Old    Asthmatic bronchitis     Bronchiectasis     Always catch it    Centrilobular emphysema 06/02/2022    Chronic bronchitis with  acute exacerbation 06/02/2022    Disc degeneration, lumbar     Disc degeneration, lumbar     GERD (gastroesophageal reflux disease)     History of mammogram     Hypertension 10/05/2023    Hypothyroidism     Lower back pain     Medicare annual wellness visit, subsequent 10/05/2023    Nodule of upper lobe of right lung 06/02/2022    Plantar fasciitis     Restless legs syndrome        Past Surgical History:   Past Surgical History:   Procedure Laterality Date    CARDIAC CATHETERIZATION N/A 11/17/2023    Procedure: Left Heart Cath;  Surgeon: Arben Pittman MD;  Location: [email or web address];  Service: Cardiology;  Laterality: N/A;    CHOLECYSTECTOMY      COLONOSCOPY      HYSTERECTOMY      KNEE ARTHROSCOPY Right 03/10/2020    Procedure: KNEE ARTHROSCOPY RIGHT;  Surgeon: Guanaco Thakur MD;  Location: [email or web address] OR;  Service: Orthopedics;  Laterality: Right;    LUMBAR LAMINECTOMY DISCECTOMY DECOMPRESSION N/A 4/11/2024    Procedure: LUMBAR LAMINECTOMY DISCECTOMY DECOMPRESSION POSTERIOR L2-L5;  Surgeon: Ulises Elias MD;  Location: [email or web address] OR;  Service: Neurosurgery;  Laterality: N/A;    NECK SURGERY      ORIF WRIST FRACTURE Left 03/10/2020    Procedure: WRIST OPEN REDUCTION INTERNAL FIXATION LEFT;  Surgeon: Guanaco Thakur MD;  Location: [email or web address] OR;  Service: Orthopedics;  Laterality: Left;    TIBIAL PLATEAU OPEN REDUCTION INTERNAL FIXATION Right 03/10/2020    Procedure: TIBIAL PLATEAU OPEN REDUCTION INTERNAL FIXATION RIGHT;  Surgeon: Guanaoc Thakur MD;  Location: [email or web address] OR;  Service: Orthopedics;  Laterality: Right;    UPPER GASTROINTESTINAL ENDOSCOPY         Family History:   Family History   Problem Relation Age of Onset    Aneurysm Mother     Hypertension Mother     Cancer Mother     Asthma Mother     Heart disease Mother     Diabetes Mother     Hypertension Father     Alzheimer's disease Father     Asthma Father     No Known Problems Sister     No Known Problems Brother     COPD Maternal  Grandmother     Aneurysm Maternal Grandmother     No Known Problems Maternal Grandfather     Alzheimer's disease Paternal Grandmother     Dementia Paternal Grandmother     No Known Problems Paternal Grandfather     Breast cancer Neg Hx     Ovarian cancer Neg Hx     Colon cancer Neg Hx     Esophageal cancer Neg Hx        Social History:   Social History     Socioeconomic History    Marital status:    Tobacco Use    Smoking status: Every Day     Current packs/day: 0.00     Average packs/day: 0.3 packs/day for 23.4 years (5.9 ttl pk-yrs)     Types: Cigarettes     Start date: 3/7/2000     Last attempt to quit: 2023     Years since quittin.7     Passive exposure: Current    Smokeless tobacco: Never    Tobacco comments:     I started when i was 17   Vaping Use    Vaping status: Never Used   Substance and Sexual Activity    Alcohol use: No    Drug use: No    Sexual activity: Not Currently     Partners: Female     Birth control/protection: None       Medications:     Current Outpatient Medications:     acetaminophen (TYLENOL) 325 MG tablet, Take 2 tablets by mouth Every 4 (Four) Hours As Needed for Mild Pain ., Disp: , Rfl:     albuterol sulfate  (90 Base) MCG/ACT inhaler, Inhale 2 puffs Every 6 (Six) Hours As Needed for Wheezing., Disp: 54 g, Rfl: 3    alendronate (FOSAMAX) 70 MG tablet, TAKE 1 TABLET BY MOUTH ONCE WEEKLY, Disp: 12 tablet, Rfl: 1    amLODIPine (NORVASC) 10 MG tablet, Take 1 tablet by mouth Daily., Disp: 90 tablet, Rfl: 1    atorvastatin (LIPITOR) 20 MG tablet, TAKE ONE TABLET BY MOUTH ONCE NIGHTLY, Disp: 90 tablet, Rfl: 3    Breztri Aerosphere 160-9-4.8 MCG/ACT aerosol inhaler, , Disp: , Rfl:     budesonide-formoterol (SYMBICORT) 160-4.5 MCG/ACT inhaler, Inhale 2 puffs 2 (Two) Times a Day., Disp: 10.2 g, Rfl: 11    busPIRone (BUSPAR) 15 MG tablet, Take 1 tablet by mouth 2 (Two) Times a Day., Disp: 60 tablet, Rfl: 5    celecoxib (CeleBREX) 200 MG capsule, Take 1 capsule by mouth 2  (Two) Times a Day., Disp: 60 capsule, Rfl: 3    clonazePAM (KlonoPIN) 0.5 MG tablet, Take 1 tablet by mouth Every Night., Disp: 15 tablet, Rfl: 0    Diclofenac Sodium (VOLTAREN) 1 % gel gel, Apply 2 g topically to the appropriate area as directed 4 (Four) Times a Day As Needed (legs)., Disp: 350 g, Rfl: 0    dicyclomine (BENTYL) 10 MG capsule, Take 1 capsule by mouth 4 (Four) Times a Day Before Meals & at Bedtime As Needed for Abdominal Cramping., Disp: 90 capsule, Rfl: 0    levocetirizine (Xyzal) 5 MG tablet, Take 1 tablet by mouth Every Evening., Disp: 90 tablet, Rfl: 1    levothyroxine (SYNTHROID, LEVOTHROID) 137 MCG tablet, TAKE 1 TABLET BY MOUTH DAILY, Disp: 30 tablet, Rfl: 0    lisinopril (PRINIVIL,ZESTRIL) 20 MG tablet, TAKE 1 TABLET BY MOUTH DAILY, Disp: 90 tablet, Rfl: 0    montelukast (SINGULAIR) 10 MG tablet, TAKE ONE TABLET BY MOUTH ONCE NIGHTLY, Disp: 90 tablet, Rfl: 0    mupirocin (BACTROBAN) 2 % ointment, Apply 1 Application topically to the appropriate area as directed 3 (Three) Times a Day. For 10 dyas, Disp: 30 g, Rfl: 0    naloxone (NARCAN) 4 MG/0.1ML nasal spray, Call 911. Don't prime. [place] in 1 nostril for overdose. Repeat in 2-3 minutes in other nostril if no or minimal breathing/responsiveness., Disp: 2 each, Rfl: 0    nicotine (NICOTROL) 10 MG/ML solution nasal solution, 1 spray by Each Nare route Every 1 (One) Hour As Needed (smoking cessation). 1 to 2 doses/hour, do not exceed more than 5 doses (10 sprays) per hour, Disp: 10 mL, Rfl: 2    omeprazole (priLOSEC) 40 MG capsule, TAKE 1 CAPSULE BY MOUTH DAILY, Disp: 90 capsule, Rfl: 1    ondansetron ODT (ZOFRAN-ODT) 4 MG disintegrating tablet, Place 1 tablet on the tongue Every 8 (Eight) Hours As Needed for Nausea or Vomiting., Disp: 20 tablet, Rfl: 1    promethazine (PHENERGAN) 25 MG tablet, Take 1 tablet by mouth Every 8 (Eight) Hours As Needed for Nausea or Vomiting., Disp: 30 tablet, Rfl: 1    rOPINIRole (REQUIP) 4 MG tablet, Take 1 tablet  "by mouth Every Night., Disp: 90 tablet, Rfl: 1    tiotropium bromide monohydrate (SPIRIVA RESPIMAT) 2.5 MCG/ACT aerosol solution inhaler, Inhale 2 puffs Daily., Disp: 4 g, Rfl: 5    tiZANidine (ZANAFLEX) 4 MG tablet, Take 1 tablet by mouth Every 12 (Twelve) Hours As Needed for Muscle Spasms., Disp: 60 tablet, Rfl: 1    traMADol (ULTRAM) 50 MG tablet, TAKE TWO TABLETS BY MOUTH EVERY 8 HOURS AS NEEDED FOR MODERATE PAIN, Disp: 150 tablet, Rfl: 0    venlafaxine XR (Effexor XR) 75 MG 24 hr capsule, Take 1 capsule by mouth Daily. Take with 150 mg tablet, Disp: 90 capsule, Rfl: 1    venlafaxine XR (EFFEXOR-XR) 150 MG 24 hr capsule, Take 1 capsule by mouth Daily. Take with 75 mg tablet, Disp: 90 capsule, Rfl: 1    Gabapentin Enacarbil ER (Horizant) 600 MG tablet controlled-release, Take 600 mg by mouth Every Evening for 90 days., Disp: 30 tablet, Rfl: 2    Allergies:   Allergies   Allergen Reactions    Codeine Nausea And Vomiting    Shrimp Hives         STEADI Fall Risk Assessment was completed, and patient is at HIGH risk for falls. Assessment completed on:4/9/2025    Objective     Objective:    /70   Pulse 64   Ht 157.6 cm (62.05\")   Wt 67.9 kg (149 lb 11.1 oz)   SpO2 91%   Breastfeeding No   BMI 27.34 kg/m²   Body mass index is 27.34 kg/m².    Physical Exam  Vitals reviewed.   Constitutional:       Appearance: Normal appearance.   HENT:      Head: Normocephalic and atraumatic.      Mouth/Throat:      Mouth: Mucous membranes are moist.      Pharynx: Oropharynx is clear.   Pulmonary:      Effort: Pulmonary effort is normal. No respiratory distress.   Skin:     General: Skin is warm and dry.   Neurological:      Mental Status: She is alert.          Neurology Exam:    General apperance: NAD.     Mental status: Alert, awake and oriented to time place and person.    Fund of knowledge:  Normal.     Language and Speech: No aphasia or dysarthria.    Naming , Repetition and Comprehension:  Can name objects, repeat a " sentence and follow commands. Speech is clear and fluent with good repetition, comprehension, and naming.    Cranial Nerves:   CN II: Visual fields are full. Intact. Pupils - PERRLA  CN III, IV and VI: Extraocular movements are intact. Normal saccades.   CN V: Facial sensation is intact.   CN VII: Muscles of facial expression reveal no asymmetry. Intact.   CN VIII: Hearing is intact.   CN IX and X: Palate elevates symmetrically. Intact  CN XI: Shoulder shrug is intact.   CN XII: Tongue is midline without evidence of atrophy or fasciculation.     Mild eyelid ptosis present, R>L - states that this has been present for several years, considering blepharoplasty if becomes severe    Motor:  Right UE muscle strength 5/5. Normal tone.     Left UE muscle strength 5/5. Normal tone.      Right LE muscle strength 5-/5. Normal tone.     Left LE muscle strength 5-/5. Normal tone.      Sensory: Normal light touch sensation bilaterally.    DTRs: 1+ bilaterally in upper and lower extremities.    Coordination: Normal finger-to-nose    Gait: Abnormal, ambulates with assistance of cane, antalgic gait, leans heavily on cane while walking as she is bent forward while walking.          Results:   Imaging:   XR Hand 3+ View Left    Result Date: 10/16/2024  Impression: 1.No definite radiographic findings of acute osseous hand abnormality. 2.Advanced arthropathy of the hand and wrist with findings suggestive of osteoarthritis. Severe joint space narrowing at the first CMC joint. Electronically Signed: Sonido Melvin  10/16/2024 3:58 PM EDT  Workstation ID: [id number]    XR Ribs 2 View Left    Result Date: 7/31/2024  Impression: No acute or healing left rib fracture. Electronically Signed: Hai Joshua MD  7/31/2024 4:59 PM EDT  Workstation ID: [id number]       Labs:   Lab Results   Component Value Date    GLUCOSE 82 10/14/2024    BUN 14 10/14/2024    CREATININE 0.74 10/14/2024     10/14/2024    K 4.6 10/14/2024    CL 99 10/14/2024    CALCIUM  9.2 10/14/2024    PROTEINTOT 6.9 10/14/2024    ALBUMIN 4.0 10/14/2024    ALT 18 10/14/2024    AST 25 10/14/2024    ALKPHOS 99 10/14/2024    BILITOT 0.3 10/14/2024    GLOB 2.9 10/14/2024    AGRATIO 1.4 10/14/2024    BCR 18.9 10/14/2024    ANIONGAP 11.2 10/14/2024    EGFR 89.9 10/14/2024         Assessment / Plan      Assessment/Plan:   Diagnoses and all orders for this visit:    1. Restless legs syndrome (RLS) (Primary)  -     Gabapentin Enacarbil ER (Horizant) 600 MG tablet controlled-release; Take 600 mg by mouth Every Evening for 90 days.  Dispense: 30 tablet; Refill: 2      I have advised that she needs to stop Gabapentin 900 mg TID and switch to Horizant 600 mg nightly instead. She is to take this around 5 PM each night with food. She has been advised that this medication will require a PA so she is to continue with her current treatment regimen until she starts Horizant, both patient and her  have been given clear instruction that this is a switch of treatment plan and that she is to stop her current Gabapentin when she starts Horizant. I am hopeful that this switch in her treatment plan will provide better RLS control while decreasing SE from higher dose Gabapentin. We discussed that with the addition recently of Clonazepam I am concerned about polypharmacy so I am hopeful that making this adjustment will help to minimize sleepiness and help to reduce falls. Requip can be continued unchanged. I have requested 2 week update or sooner for with any concerns. I have reviewed with Dr. Zuniga who was in agreement with plan as well.     As part of this patient's treatment plan I am prescribing controlled substances. The patient has been made aware of appropriate use of such medications, including potential risk of somnolence, limited ability to drive and/or work safely, and potential for dependence or overdose. It has also been made clear that these medications are for use by the patient only, without  concomitant use of alcohol or other substances unless prescribed. Keep out of reach of children.  [place] report has been reviewed. If this is going to be prescribed long term, [facility] Controlled Substance Agreement Contract has also been read and signed by patient and myself.        Patient Education:       Reviewed medications, potential side effects and signs and symptoms to report. Discussed risk versus benefits of treatment plan with patient and/or family-including medications, labs and radiology that may be ordered. Addressed questions and concerns during visit. Patient and/or family verbalized understanding and agree with plan. Instructed to call the office with any questions and report to ER with any life-threatening symptoms.     Follow Up:   Return in about 6 weeks (around 5/21/2025).    I spent  30  minutes in the care of this patient. I personally spent 50 percent of this time counseling and discussing diagnosis, evaluation, treatment options, and management .       During this visit the following were done:  Labs Reviewed [x]    Labs Ordered []    Radiology Reports Reviewed []    Radiology Ordered []    PCP Records Reviewed [x]    Referring Provider Records Reviewed []    ER Records Reviewed []    Hospital Records Reviewed []    History Obtained From Family []    Radiology Images Reviewed []    Other Reviewed []    Records Requested []      IDALIA Patel  [facility]  [address] [address] [phone]

## 2025-04-09 NOTE — Clinical Note
Kelly - I have sent script for Horizant 600 mg nightly. If you can please let Yessica know the status of the PA, I did make her aware to not take Gabapentin concurrently with Horizant. Thank you!

## 2025-04-11 RX ORDER — GABAPENTIN ENACARBIL 600 MG/1
600 TABLET, EXTENDED RELEASE ORAL EVERY EVENING
Qty: 30 TABLET | Refills: 2 | Status: SHIPPED | OUTPATIENT
Start: 2025-04-11 | End: 2025-07-10

## 2025-04-12 DIAGNOSIS — G25.81 RESTLESS LEG SYNDROME: ICD-10-CM

## 2025-04-12 DIAGNOSIS — M54.50 LUMBAR PAIN: ICD-10-CM

## 2025-04-12 DIAGNOSIS — M51.9 LUMBAR DISC DISEASE: ICD-10-CM

## 2025-04-12 RX ORDER — TRAMADOL HYDROCHLORIDE 50 MG/1
100 TABLET ORAL EVERY 8 HOURS PRN
Qty: 150 TABLET | Refills: 0 | Status: CANCELLED | OUTPATIENT
Start: 2025-04-12

## 2025-04-12 RX ORDER — CLONAZEPAM 0.5 MG/1
0.5 TABLET ORAL NIGHTLY
Qty: 15 TABLET | Refills: 0 | Status: CANCELLED | OUTPATIENT
Start: 2025-04-12

## 2025-04-14 RX ORDER — CLONAZEPAM 0.5 MG/1
0.5 TABLET ORAL NIGHTLY
Qty: 15 TABLET | OUTPATIENT
Start: 2025-04-14

## 2025-04-14 RX ORDER — TRAMADOL HYDROCHLORIDE 50 MG/1
50 TABLET ORAL EVERY 8 HOURS PRN
Qty: 150 TABLET | Refills: 2 | Status: SHIPPED | OUTPATIENT
Start: 2025-04-14

## 2025-04-16 ENCOUNTER — TELEPHONE (OUTPATIENT)
Dept: NEUROLOGY | Facility: CLINIC | Age: 65
End: 2025-04-16
Payer: MEDICARE

## 2025-04-16 ENCOUNTER — TELEPHONE (OUTPATIENT)
Dept: NEUROLOGY | Facility: CLINIC | Age: 65
End: 2025-04-16

## 2025-04-16 NOTE — TELEPHONE ENCOUNTER
Caller: Yessica Galvin    Relationship: Self    Best call back number: Mobile phone: [phone]    What is the best time to reach you: ANY TIME FROM 8AM ONWARDS    Who are you requesting to speak with (clinical staff, provider,  specific staff member): ANYONE    What was the call regarding: RETURNING CALL REGARDING MEDICATION APPROVALS

## 2025-04-16 NOTE — TELEPHONE ENCOUNTER
----- Message from Ludy Mills sent at 4/15/2025  5:00 PM EDT -----  Would one of you be able to reach out to the drug rep to see if there are any other payment/discount options?  ----- Message -----  From: Kelly Velez RPH  Sent: 4/14/2025   9:56 AM EDT  To: IDALIA Patel    Good morning!Ms. Galvin's Horizant was approved, however, the test bill showed her copay would be $216/month. I called and asked Ms. Galvin but she could not afford the copay.Since she has Medicare Part D she's not eligible for the copay card and the  doesn't offer a free drug program for Horizant. The  does suggest a coupon if patient's wanted to pay cash, but the copay is still around $170 and Ms. Galvin couldn't afford that either. Is there an alternative you'd like her to try? If anything needs a PA we're happy to help with it!Thank you!  ----- Message -----  From: Ludy Mills APRN  Sent: 4/11/2025   1:12 PM EDT  To: CHARO Blanco I have sent script for Horizant 600 mg nightly. If you can please let Yessica know the status of the PA, I did make her aware to not take Gabapentin concurrently with Horizant. Thank you!

## 2025-04-16 NOTE — TELEPHONE ENCOUNTER
PATIENT CALLING TO ADVISE, INSURANCE WILL NOT COVER MEDICATION GABAPENTIN.    PT SAYS THE FOLLOWNG ARE COVERED    NEURONTIN 500 MG  LYRICA 600 MG  OR PREGABALIN 600 MG      PLEASE ADVISE PATIENT ON MEDICATION

## 2025-04-17 DIAGNOSIS — E03.9 ACQUIRED HYPOTHYROIDISM: ICD-10-CM

## 2025-04-17 DIAGNOSIS — R11.0 NAUSEA: ICD-10-CM

## 2025-04-17 RX ORDER — LEVOTHYROXINE SODIUM 137 UG/1
137 TABLET ORAL DAILY
Qty: 30 TABLET | Refills: 0 | Status: SHIPPED | OUTPATIENT
Start: 2025-04-17

## 2025-04-18 RX ORDER — PROMETHAZINE HYDROCHLORIDE 25 MG/1
25 TABLET ORAL EVERY 8 HOURS PRN
Qty: 30 TABLET | Refills: 1 | Status: SHIPPED | OUTPATIENT
Start: 2025-04-18

## 2025-04-22 DIAGNOSIS — E03.9 ACQUIRED HYPOTHYROIDISM: ICD-10-CM

## 2025-04-22 RX ORDER — LEVOTHYROXINE SODIUM 137 UG/1
137 TABLET ORAL DAILY
Qty: 30 TABLET | Refills: 0 | Status: SHIPPED | OUTPATIENT
Start: 2025-04-22

## 2025-04-25 ENCOUNTER — OFFICE VISIT (OUTPATIENT)
Dept: FAMILY MEDICINE CLINIC | Facility: CLINIC | Age: 65
End: 2025-04-25
Payer: MEDICARE

## 2025-04-25 ENCOUNTER — LAB (OUTPATIENT)
Dept: LAB | Facility: HOSPITAL | Age: 65
End: 2025-04-25
Payer: MEDICARE

## 2025-04-25 VITALS
SYSTOLIC BLOOD PRESSURE: 134 MMHG | BODY MASS INDEX: 26.57 KG/M2 | DIASTOLIC BLOOD PRESSURE: 80 MMHG | TEMPERATURE: 98.4 F | HEIGHT: 62 IN | OXYGEN SATURATION: 95 % | WEIGHT: 144.4 LBS | HEART RATE: 71 BPM

## 2025-04-25 DIAGNOSIS — M81.0 OSTEOPOROSIS WITHOUT CURRENT PATHOLOGICAL FRACTURE, UNSPECIFIED OSTEOPOROSIS TYPE: ICD-10-CM

## 2025-04-25 DIAGNOSIS — M51.9 LUMBAR DISC DISEASE: ICD-10-CM

## 2025-04-25 DIAGNOSIS — J30.9 ALLERGIC RHINITIS, UNSPECIFIED SEASONALITY, UNSPECIFIED TRIGGER: ICD-10-CM

## 2025-04-25 DIAGNOSIS — Z12.31 ENCOUNTER FOR SCREENING MAMMOGRAM FOR MALIGNANT NEOPLASM OF BREAST: ICD-10-CM

## 2025-04-25 DIAGNOSIS — I10 HYPERTENSION, UNSPECIFIED TYPE: ICD-10-CM

## 2025-04-25 DIAGNOSIS — K21.9 GASTROESOPHAGEAL REFLUX DISEASE, UNSPECIFIED WHETHER ESOPHAGITIS PRESENT: ICD-10-CM

## 2025-04-25 DIAGNOSIS — K21.00 GASTROESOPHAGEAL REFLUX DISEASE WITH ESOPHAGITIS WITHOUT HEMORRHAGE: ICD-10-CM

## 2025-04-25 DIAGNOSIS — E03.9 ACQUIRED HYPOTHYROIDISM: ICD-10-CM

## 2025-04-25 DIAGNOSIS — E78.5 HYPERLIPIDEMIA, UNSPECIFIED HYPERLIPIDEMIA TYPE: ICD-10-CM

## 2025-04-25 DIAGNOSIS — L03.90 WOUND CELLULITIS: ICD-10-CM

## 2025-04-25 DIAGNOSIS — M25.561 ACUTE PAIN OF RIGHT KNEE: ICD-10-CM

## 2025-04-25 DIAGNOSIS — E03.9 ACQUIRED HYPOTHYROIDISM: Primary | ICD-10-CM

## 2025-04-25 DIAGNOSIS — G25.81 RESTLESS LEG SYNDROME: ICD-10-CM

## 2025-04-25 DIAGNOSIS — F32.A ANXIETY AND DEPRESSION: ICD-10-CM

## 2025-04-25 DIAGNOSIS — R11.0 NAUSEA: ICD-10-CM

## 2025-04-25 DIAGNOSIS — F41.9 ANXIETY AND DEPRESSION: ICD-10-CM

## 2025-04-25 LAB
ALBUMIN SERPL-MCNC: 3.9 G/DL (ref 3.5–5.2)
ALBUMIN/GLOB SERPL: 1.3 G/DL
ALP SERPL-CCNC: 112 U/L (ref 39–117)
ALT SERPL W P-5'-P-CCNC: 20 U/L (ref 1–33)
ANION GAP SERPL CALCULATED.3IONS-SCNC: 12.6 MMOL/L (ref 5–15)
AST SERPL-CCNC: 27 U/L (ref 1–32)
BILIRUB SERPL-MCNC: 0.2 MG/DL (ref 0–1.2)
BUN SERPL-MCNC: 14 MG/DL (ref 8–23)
BUN/CREAT SERPL: 19.2 (ref 7–25)
CALCIUM SPEC-SCNC: 9.1 MG/DL (ref 8.6–10.5)
CHLORIDE SERPL-SCNC: 101 MMOL/L (ref 98–107)
CHOLEST SERPL-MCNC: 174 MG/DL (ref 0–200)
CO2 SERPL-SCNC: 26.4 MMOL/L (ref 22–29)
CREAT SERPL-MCNC: 0.73 MG/DL (ref 0.57–1)
EGFRCR SERPLBLD CKD-EPI 2021: 90.8 ML/MIN/1.73
GLOBULIN UR ELPH-MCNC: 3.1 GM/DL
GLUCOSE SERPL-MCNC: 96 MG/DL (ref 65–99)
HDLC SERPL-MCNC: 81 MG/DL (ref 40–60)
LDLC SERPL CALC-MCNC: 81 MG/DL (ref 0–100)
LDLC/HDLC SERPL: 1 {RATIO}
MAGNESIUM SERPL-MCNC: 2.6 MG/DL (ref 1.6–2.4)
POTASSIUM SERPL-SCNC: 4.4 MMOL/L (ref 3.5–5.2)
PROT SERPL-MCNC: 7 G/DL (ref 6–8.5)
SODIUM SERPL-SCNC: 140 MMOL/L (ref 136–145)
T4 FREE SERPL-MCNC: 1.29 NG/DL (ref 0.92–1.68)
TRIGL SERPL-MCNC: 60 MG/DL (ref 0–150)
TSH SERPL DL<=0.05 MIU/L-ACNC: 6.77 UIU/ML (ref 0.27–4.2)
VIT B12 BLD-MCNC: 587 PG/ML (ref 211–946)
VLDLC SERPL-MCNC: 12 MG/DL (ref 5–40)

## 2025-04-25 PROCEDURE — 82607 VITAMIN B-12: CPT

## 2025-04-25 PROCEDURE — 36415 COLL VENOUS BLD VENIPUNCTURE: CPT

## 2025-04-25 PROCEDURE — 80053 COMPREHEN METABOLIC PANEL: CPT

## 2025-04-25 PROCEDURE — 84443 ASSAY THYROID STIM HORMONE: CPT

## 2025-04-25 PROCEDURE — 80061 LIPID PANEL: CPT

## 2025-04-25 PROCEDURE — 84439 ASSAY OF FREE THYROXINE: CPT

## 2025-04-25 PROCEDURE — 83735 ASSAY OF MAGNESIUM: CPT

## 2025-04-25 NOTE — PROGRESS NOTES
"    Office Note     Name: Yessica Galvin    : 1960     MRN: [id number]     Chief Complaint  chronic care  (Follow up /)    Subjective     History of Present Illness:  Yessica Galvin is a 64 y.o. female who presents today for chronic medical conditions.     Patient reports that she is having nausea and vomiting for the last several months and feels a burning sensation in the upper portion of her abdomen.  She was referred to GI but she did not want to do the EGD because of cost.  She has been taking Phenergan as needed.  She feels that her symptoms have overall improved.  She does reports that she has been taking her alendronate break before bedtime and did not sit up for 30 minutes but has started doing this.    Patient has had a lot of trouble with restless leg syndrome.  She is currently working with neurology to figure out a medication regimen.      Her blood pressure is controlled today.  She did take her lisinopril 20 mg daily and amlodipine 10 mg daily.  She denies any chest pain, dizziness, or blurry vision.       Depression and anxiety: Patient does feel that her depression is well-controlled overall but in the last month, her 's brother passed away and she has been slightly more depressed.  She is currently on venlafaxine 225mg daily.  She is also taking BuSpar 15 mg twice daily.  She does not desire counseling at this time.     Back and hip pain: She did have back surgery and does report improvement in her symptoms but continues to feel like her back is \"knotting up.\"  She still feels that she needs to take the tramadol, gabapentin and tizanidine in order for her pain to be completely controlled.  She would like to go up on her tizanidine.  She will be reaching out to her neurosurgeon to see if there is anything else that can be done.      Osteoporosis: Patient is currently on alendronate.  She started this in 2023.     Hypothyroidism: Patient is currently on levothyroxine " 137 mcg daily.  She reports compliance with this medicine.     Allergic rhinitis: Patient is currently on montelukast 10 mg daily and Xyzal 5 mg daily.  She reports compliance with these medicines.  She feels that her symptoms of allergies are well-controlled.     GERD: Patient is currently on omeprazole for acid reflux and feels that it does help control her symptoms.    HLD: Patient is currently on atorvastatin 20 mg daily.  She reports compliance with this medicine.    Patient ended up tripping and has a small wound on her left leg that is a little bit erythematous.  She does have some topical mupirocin from her previous injury and has wondering if she can continue this.    Her right knee still hurts after a fall.  X-ray was negative.  She has a total knee arthroplasty in that area.  She will be reaching out to her orthopedic surgeon to discuss what else needs to be done.        Objective     Past Medical History:   Diagnosis Date    Acute bronchitis     Acute sinusitis     Allergic rhinitis 09/06/2023    Anemia 10/16/2024    Asthma     Asthma 03/07/2020    Asthma with acute exacerbation     Asthma, extrinsic ,1970    I have had it since I was 5 yrs. Old    Asthmatic bronchitis     Bronchiectasis     Always catch it    Centrilobular emphysema 06/02/2022    Chronic bronchitis with acute exacerbation 06/02/2022    Disc degeneration, lumbar     Disc degeneration, lumbar     GERD (gastroesophageal reflux disease)     History of mammogram     Hypertension 10/05/2023    Hypothyroidism     Lower back pain     Medicare annual wellness visit, subsequent 10/05/2023    Nodule of upper lobe of right lung 06/02/2022    Plantar fasciitis     Restless legs syndrome      Past Surgical History:   Procedure Laterality Date    CARDIAC CATHETERIZATION N/A 11/17/2023    Procedure: Left Heart Cath;  Surgeon: Arben Pittman MD;  Location: [facility] CATH INVASIVE LOCATION;  Service: Cardiology;  Laterality: N/A;    CHOLECYSTECTOMY       "COLONOSCOPY      HYSTERECTOMY      KNEE ARTHROSCOPY Right 03/10/2020    Procedure: KNEE ARTHROSCOPY RIGHT;  Surgeon: Guanaco Thakur MD;  Location:  [facility];  Service: Orthopedics;  Laterality: Right;    LUMBAR LAMINECTOMY DISCECTOMY DECOMPRESSION N/A 4/11/2024    Procedure: LUMBAR LAMINECTOMY DISCECTOMY DECOMPRESSION POSTERIOR L2-L5;  Surgeon: Ulises Elias MD;  Location:  [facility];  Service: Neurosurgery;  Laterality: N/A;    NECK SURGERY      ORIF WRIST FRACTURE Left 03/10/2020    Procedure: WRIST OPEN REDUCTION INTERNAL FIXATION LEFT;  Surgeon: Guanaco Thakur MD;  Location:  [facility];  Service: Orthopedics;  Laterality: Left;    TIBIAL PLATEAU OPEN REDUCTION INTERNAL FIXATION Right 03/10/2020    Procedure: TIBIAL PLATEAU OPEN REDUCTION INTERNAL FIXATION RIGHT;  Surgeon: Guanaco Thakur MD;  Location:  [facility];  Service: Orthopedics;  Laterality: Right;    UPPER GASTROINTESTINAL ENDOSCOPY       Family History   Problem Relation Age of Onset    Aneurysm Mother     Hypertension Mother     Cancer Mother     Asthma Mother     Heart disease Mother     Diabetes Mother     Hypertension Father     Alzheimer's disease Father     Asthma Father     No Known Problems Sister     No Known Problems Brother     COPD Maternal Grandmother     Aneurysm Maternal Grandmother     No Known Problems Maternal Grandfather     Alzheimer's disease Paternal Grandmother     Dementia Paternal Grandmother     No Known Problems Paternal Grandfather     Breast cancer Neg Hx     Ovarian cancer Neg Hx     Colon cancer Neg Hx     Esophageal cancer Neg Hx        Vital Signs  /80   Pulse 71   Temp 98.4 °F (36.9 °C) (Infrared)   Ht 157.5 cm (62\")   Wt 65.5 kg (144 lb 6.4 oz)   SpO2 95%   BMI 26.41 kg/m²   Estimated body mass index is 26.41 kg/m² as calculated from the following:    Height as of this encounter: 157.5 cm (62\").    Weight as of this encounter: 65.5 kg (144 lb 6.4 oz).    Physical Exam  Vitals reviewed.   HENT: " "     Head: Normocephalic.      Nose: Nose normal.   Eyes:      Conjunctiva/sclera: Conjunctivae normal.   Cardiovascular:      Rate and Rhythm: Normal rate and regular rhythm.   Pulmonary:      Effort: Pulmonary effort is normal.      Breath sounds: Normal breath sounds.   Musculoskeletal:      Comments: Small wound on left distal lower extremity with surrounding erythema   Neurological:      Mental Status: She is alert.                   Assessment and Plan     Diagnoses and all orders for this visit:    1. Acquired hypothyroidism (Primary)  Assessment & Plan:  - Obtaining TSH today  - Continue levothyroxine 137 mcg daily for now pending labs    Orders:  -     TSH Rfx On Abnormal To Free T4; Future    2. Lumbar disc disease  Assessment & Plan:  - Patient has an extensive history of degenerative disease of her back with scoliosis and a chronic compression fracture of L2, reports continued pain, feels like her back is \"knotted up.\"  - Status post recent laminectomy, discectomy and decompression  - We will continue tramadol 50 mg every 8 hours as needed  - Will increase tizanidine dose  - Baltazar reviewed and appropriate  - UDS and medication contract up-to-date  - She will be reaching out to her neurosurgeon to see if there is anything else that they can do from a neurosurgical standpoint  -Can consider interventional pain referral      Orders:  -     tiZANidine (ZANAFLEX) 4 MG tablet; Take 1 tablet by mouth Every 8 (Eight) Hours As Needed for Muscle Spasms.  Dispense: 90 tablet; Refill: 1    3. Gastroesophageal reflux disease, unspecified whether esophagitis present  Assessment & Plan:  - Stable, advised follow-up with GI  - Continue omeprazole 40 mg daily    Orders:  -     Comprehensive Metabolic Panel; Future  -     Vitamin B12; Future  -     Magnesium; Future    4. Hypertension, unspecified type  Assessment & Plan:  - Controlled  - Continue lisinopril 20 mg daily, increase amlodipine to 10 mg daily    Orders:  -   "   Comprehensive Metabolic Panel; Future    5. Hyperlipidemia, unspecified hyperlipidemia type  Assessment & Plan:  - Due for lipid panel, obtained today  - Will continue atorvastatin 20 mg daily pending lab    Orders:  -     Lipid Panel; Future    6. Encounter for screening mammogram for malignant neoplasm of breast  Assessment & Plan:  - Mammogram ordered    Orders:  -     Mammo Screening Digital Tomosynthesis Bilateral With CAD; Future    7. Nausea  Assessment & Plan:  - Patient is persistently nauseous and reports that she was worked up for this in the past and nothing was revealed, no bleed, patient has been taking her alendronate right before bed but has stopped doing that, so this may have been exacerbating her symptoms, she reports that her nausea is much improved  - Can continue Phenergan as needed, advised GI follow-up      8. Restless leg syndrome  Assessment & Plan:  - Uncontrolled, working with neurology  - Will defer management of this to them  -Continue ropinirole 4 mg daily      9. Allergic rhinitis, unspecified seasonality, unspecified trigger  Assessment & Plan:  - Controlled  - Continue Xyzal 5 mg daily and Singulair 10 mg daily      10. Anxiety and depression  Assessment & Plan:  - Had been told until the last month when her brother-in-law passed away, does not desire any changes to her medications or counseling  -Continue venlafaxine 225 mg daily and BuSpar 15 mg twice daily      11. Acute pain of right knee  Assessment & Plan:  - Patient with pain in her right knee after a fall, has had surgery in this knee in the past  - Did have x-ray at last visit which was unrevealing  - Patient will be reaching out to her orthopedic surgeon for further advice and management      12. Osteoporosis without current pathological fracture, unspecified osteoporosis type  Assessment & Plan:  - Patient's last DEXA scan showed evidence of osteoporosis  - She has been on alendronate since December 2023, may have  exacerbated some of her nausea because she was taking it at night before bedtime, advised taking it with a full glass of water and sitting up for 30 minutes      13. Gastroesophageal reflux disease with esophagitis without hemorrhage  Assessment & Plan:  - Stable  - Continue Prilosec 40 mg daily      14. Wound cellulitis  Assessment & Plan:  - Patient with a very superficial wound from a fall, wound does have some surrounding erythema, concerning for cellulitis  - We will start on mupirocin ointment for 10 days,, patient reports that she does have mupirocin at home from her previous, recent treatment for a wound with some surrounding cellulitis        Follow Up  Return in about 3 months (around 7/25/2025) for back pain.    Betty Jasmine MD

## 2025-04-30 PROBLEM — Z12.31 ENCOUNTER FOR SCREENING MAMMOGRAM FOR MALIGNANT NEOPLASM OF BREAST: Status: ACTIVE | Noted: 2025-04-30

## 2025-04-30 PROBLEM — E78.5 HYPERLIPIDEMIA: Status: ACTIVE | Noted: 2025-04-30

## 2025-04-30 NOTE — ASSESSMENT & PLAN NOTE
- Patient's last DEXA scan showed evidence of osteoporosis  - She has been on alendronate since December 2023, may have exacerbated some of her nausea because she was taking it at night before bedtime, advised taking it with a full glass of water and sitting up for 30 minutes

## 2025-04-30 NOTE — ASSESSMENT & PLAN NOTE
- Patient is persistently nauseous and reports that she was worked up for this in the past and nothing was revealed, no bleed, patient has been taking her alendronate right before bed but has stopped doing that, so this may have been exacerbating her symptoms, she reports that her nausea is much improved  - Can continue Phenergan as needed, advised GI follow-up

## 2025-04-30 NOTE — ASSESSMENT & PLAN NOTE
- Patient with pain in her right knee after a fall, has had surgery in this knee in the past  - Did have x-ray at last visit which was unrevealing  - Patient will be reaching out to her orthopedic surgeon for further advice and management

## 2025-04-30 NOTE — ASSESSMENT & PLAN NOTE
"- Patient has an extensive history of degenerative disease of her back with scoliosis and a chronic compression fracture of L2, reports continued pain, feels like her back is \"knotted up.\"  - Status post recent laminectomy, discectomy and decompression  - We will continue tramadol 50 mg every 8 hours as needed  - Will increase tizanidine dose  - Baltazar reviewed and appropriate  - UDS and medication contract up-to-date  - She will be reaching out to her neurosurgeon to see if there is anything else that they can do from a neurosurgical standpoint  -Can consider interventional pain referral    "

## 2025-04-30 NOTE — ASSESSMENT & PLAN NOTE
- Had been told until the last month when her brother-in-law passed away, does not desire any changes to her medications or counseling  -Continue venlafaxine 225 mg daily and BuSpar 15 mg twice daily

## 2025-04-30 NOTE — ASSESSMENT & PLAN NOTE
- Patient with a very superficial wound from a fall, wound does have some surrounding erythema, concerning for cellulitis  - We will start on mupirocin ointment for 10 days,, patient reports that she does have mupirocin at home from her previous, recent treatment for a wound with some surrounding cellulitis

## 2025-04-30 NOTE — ASSESSMENT & PLAN NOTE
- Uncontrolled, working with neurology  - Will defer management of this to them  -Continue ropinirole 4 mg daily   marijuana

## 2025-05-12 DIAGNOSIS — R11.0 NAUSEA: ICD-10-CM

## 2025-05-12 DIAGNOSIS — M54.50 LUMBAR PAIN: ICD-10-CM

## 2025-05-12 DIAGNOSIS — M51.9 LUMBAR DISC DISEASE: ICD-10-CM

## 2025-05-12 RX ORDER — PROMETHAZINE HYDROCHLORIDE 25 MG/1
25 TABLET ORAL EVERY 8 HOURS PRN
Qty: 30 TABLET | Refills: 1 | Status: SHIPPED | OUTPATIENT
Start: 2025-05-12

## 2025-05-12 RX ORDER — TRAMADOL HYDROCHLORIDE 50 MG/1
50 TABLET ORAL EVERY 8 HOURS PRN
Qty: 150 TABLET | Refills: 2 | Status: SHIPPED | OUTPATIENT
Start: 2025-05-12

## 2025-05-14 NOTE — ASSESSMENT & PLAN NOTE
- Patient with pain in her right knee after a fall 1 month ago, she has had knee surgery in that knee in the past  - Obtaining x-ray to rule out any potential fracture   Safety sitter remains at the bedside. Pt is asleep but withdraws from pain and moans when turned. Awaiting blood results for dispo.

## 2025-05-19 DIAGNOSIS — E03.9 ACQUIRED HYPOTHYROIDISM: ICD-10-CM

## 2025-05-19 RX ORDER — LEVOTHYROXINE SODIUM 137 UG/1
137 TABLET ORAL DAILY
Qty: 30 TABLET | Refills: 0 | OUTPATIENT
Start: 2025-05-19

## 2025-05-19 NOTE — TELEPHONE ENCOUNTER
Called patient she stated that she thought she were to take the 135 mcg will stop and start the 150 mcg in the morning 5/20/25.

## 2025-05-26 DIAGNOSIS — I10 HYPERTENSION, UNSPECIFIED TYPE: ICD-10-CM

## 2025-05-26 DIAGNOSIS — M25.552 LEFT HIP PAIN: ICD-10-CM

## 2025-05-27 RX ORDER — LISINOPRIL 20 MG/1
20 TABLET ORAL DAILY
Qty: 90 TABLET | Refills: 1 | Status: SHIPPED | OUTPATIENT
Start: 2025-05-27

## 2025-05-27 RX ORDER — CELECOXIB 200 MG/1
200 CAPSULE ORAL 2 TIMES DAILY
Qty: 60 CAPSULE | Refills: 3 | Status: SHIPPED | OUTPATIENT
Start: 2025-05-27

## 2025-05-30 DIAGNOSIS — G25.81 RESTLESS LEG SYNDROME: ICD-10-CM

## 2025-05-30 RX ORDER — ROPINIROLE 4 MG/1
4 TABLET, FILM COATED ORAL NIGHTLY
Qty: 90 TABLET | Refills: 0 | Status: SHIPPED | OUTPATIENT
Start: 2025-05-30

## 2025-06-02 NOTE — PROGRESS NOTES
Patient: Yessica Galvin  : 1960  Chart #: [id number]    Date of Service: 2025    Chief Complaint   Patient presents with    Back Pain    Leg Pain     Bilateral Leg Pain       Chief complaint: Low back pain     HPI: Ms. Galvin is a 64-year-old woman that is known to our service. She initially presented our service in  for low back and bilateral leg pain. Studies demonstrated high-grade, multilevel lumbar stenosis, so she underwent decompressive laminectomies at L2-3, L3-4, and L4-5. She had a postoperative incision infection that was treated with oral Bactrim. Overall she has done well. In the past several months, she's noticed increased low back pain. She does have some leg and groin pain, worse on the left, that improves when she gets up and walks around. She denies changes to her bowel or bladder function. No new numbness, tingling, or weakness. She is smoking about a pack of cigarettes daily. She is taking a medicine for restless leg syndrome. She takes gabapentin daily and tramadol 50 mg 2-3 times daily as needed.        Social History    Tobacco Use      Smoking status: Every Day        Packs/day: 1.00        Years: 0.3 packs/day for 25.2 years (8.1 ttl pk-yrs)        Types: Cigarettes        Start date: 3/7/2000        Passive exposure: Current      Smokeless tobacco: Never      Tobacco comments: I started when i was 17       Review of Systems   Constitutional:  Positive for appetite change. Negative for activity change, chills, diaphoresis, fatigue, fever and unexpected weight change.   HENT:  Negative for congestion, dental problem, drooling, ear discharge, ear pain, facial swelling, hearing loss, mouth sores, nosebleeds, postnasal drip, rhinorrhea, sinus pressure, sinus pain, sneezing, sore throat, tinnitus, trouble swallowing and voice change.    Eyes:  Negative for photophobia, pain, discharge, redness, itching and visual disturbance.   Respiratory:  Negative for apnea, cough,  "choking, chest tightness, shortness of breath, wheezing and stridor.    Cardiovascular:  Negative for chest pain, palpitations and leg swelling.   Gastrointestinal:  Negative for abdominal distention, abdominal pain, anal bleeding, blood in stool, constipation, diarrhea, nausea, rectal pain and vomiting.   Endocrine: Negative for cold intolerance, heat intolerance, polydipsia, polyphagia and polyuria.   Genitourinary:  Negative for decreased urine volume, difficulty urinating, dyspareunia, dysuria, enuresis, flank pain, frequency, genital sores, hematuria, menstrual problem, pelvic pain, urgency, vaginal bleeding, vaginal discharge and vaginal pain.   Musculoskeletal:  Positive for back pain and gait problem. Negative for arthralgias, joint swelling, myalgias, neck pain and neck stiffness.   Skin:  Negative for color change, pallor, rash and wound.   Allergic/Immunologic: Negative for environmental allergies, food allergies and immunocompromised state.   Neurological:  Positive for weakness and numbness. Negative for dizziness, tremors, seizures, syncope, facial asymmetry, speech difficulty, light-headedness and headaches.   Hematological:  Negative for adenopathy. Does not bruise/bleed easily.   Psychiatric/Behavioral:  Negative for agitation, behavioral problems, confusion, decreased concentration, dysphoric mood, hallucinations, self-injury, sleep disturbance and suicidal ideas. The patient is not nervous/anxious and is not hyperactive.         The patient's past medical history, past surgical history, family history, and social history have been reviewed at length in the electronic medical record.     Physical examination:  Temperature 97.1 °F (36.2 °C), temperature source Infrared, height 157.5 cm (62.01\"), weight 62.8 kg (138 lb 8 oz), not currently breastfeeding.  Lumbar incision is well-healed.    Constitutional: The patient is well-developed. She appears older than her stated age. She is pleasant and is in " no acute distress.      HEENT: normocephalic, atraumatic, sclera clear     Musculoskeletal: Stopped posture secondary to spinal deformities.     Neurologic Exam  A/A/C, speech clear, attention normal, conversant, answers questions appropriately, good historian.  Cranial nerves II through XII are intact.  Motor examination does not reveal weakness in the lower extremities.   Sensation is intact to light touch testing, other than some numbness the patient endorses in the right leg. This is chronic.   Gait is normal, balance is normal.   No tremors are noted.  Reflexes are intact.     Medical Decision Making  Diagnoses and all orders for this visit:    1. Mechanical back pain (Primary)  -     Ambulatory Referral to Physical Therapy for Evaluation & Treatment  -     XR Spine Lumbar AP & Lateral    2. Spinal deformity  -     Ambulatory Referral to Physical Therapy for Evaluation & Treatment    3. History of lumbar fusion    Most of Ms. Galvin' pain sounds mechanical. I reiterated that her spine fusion from last year was to improve her leg pain with walking and standing intolerance, and that she would likely have long standing low back pack pain. I have recommended physical therapy and have ordered xrays of the lumbar spine to assess her fusion hardware. She will follow up with me in 6-8 weeks to assess progress.     Any copied data from previous notes included in the (1) HPI, (2) PE, (3) MDM and/or assessment and plan has been reviewed and is accurate as of this day.    Katherine Rose PA-C     Patient Care Team:  Betty Jasmine MD as PCP - General (Family Medicine)  Atul Tavarez MD as Consulting Physician (Gastroenterology)  Bobby Tom MD as Consulting Physician (Pulmonary Disease)  Delgado Lal MD as Consulting Physician (Pain Medicine)  Ulises Elias MD as Consulting Physician (Neurosurgery)  Betty Jasmine MD as Primary Care Provider (Family Medicine)  Brenda Richardson MD as  Consulting Physician (Orthopedic Surgery)

## 2025-06-03 ENCOUNTER — OFFICE VISIT (OUTPATIENT)
Dept: NEUROSURGERY | Facility: CLINIC | Age: 65
End: 2025-06-03
Payer: MEDICARE

## 2025-06-03 VITALS — WEIGHT: 138.5 LBS | BODY MASS INDEX: 25.49 KG/M2 | HEIGHT: 62 IN | TEMPERATURE: 97.1 F

## 2025-06-03 DIAGNOSIS — Z98.1 HISTORY OF LUMBAR FUSION: ICD-10-CM

## 2025-06-03 DIAGNOSIS — Q76.49 SPINAL DEFORMITY: ICD-10-CM

## 2025-06-03 DIAGNOSIS — M54.9 MECHANICAL BACK PAIN: Primary | ICD-10-CM

## 2025-06-03 RX ORDER — GABAPENTIN 600 MG/1
600 TABLET ORAL 3 TIMES DAILY
COMMUNITY
Start: 2025-05-20

## 2025-06-04 ENCOUNTER — HOSPITAL ENCOUNTER (OUTPATIENT)
Dept: MAMMOGRAPHY | Facility: HOSPITAL | Age: 65
Discharge: HOME | End: 2025-06-04
Payer: MEDICARE

## 2025-06-04 ENCOUNTER — HOSPITAL ENCOUNTER (OUTPATIENT)
Dept: GENERAL RADIOLOGY | Facility: HOSPITAL | Age: 65
Discharge: HOME | End: 2025-06-04
Payer: MEDICARE

## 2025-06-04 ENCOUNTER — TELEPHONE (OUTPATIENT)
Dept: FAMILY MEDICINE CLINIC | Facility: CLINIC | Age: 65
End: 2025-06-04
Payer: MEDICARE

## 2025-06-04 DIAGNOSIS — Z12.31 ENCOUNTER FOR SCREENING MAMMOGRAM FOR MALIGNANT NEOPLASM OF BREAST: ICD-10-CM

## 2025-06-04 PROCEDURE — 72100 X-RAY EXAM L-S SPINE 2/3 VWS: CPT

## 2025-06-04 NOTE — TELEPHONE ENCOUNTER
Caller: Yessica Galvin    Relationship: Self    Best call back number: [phone]     What is the best time to reach you: TODAY ASAP    Who are you requesting to speak with (clinical staff, provider,  specific staff member): PCP/MA    Do you know the name of the person who called: YESSICA    What was the call regarding: PATIENT MARJORIE AN APPT FOR MONDAY 6/9/25. SHE IS HAVING PAIN UNDER HER RIGHT ARMPIT AND SHOOTING DOWN ON RIGHT BREAST UNDERNEATH. SHE IS WANTING TO KNOW IF SHE CAN PUT HEAT OR SOME KIND OF RUB ON IT. CALLBACK    Is it okay if the provider responds through MyChart: CALLBACK

## 2025-06-04 NOTE — TELEPHONE ENCOUNTER
Name: Yessica Galvin      Relationship: Self      Best Callback Number: [phone]       HUB PROVIDED THE RELAY MESSAGE FROM THE OFFICE      PATIENT: HAS FURTHER QUESTIONS AND WOULD LIKE A CALL BACK AT THE FOLLOWING PHONE [phone]

## 2025-06-04 NOTE — TELEPHONE ENCOUNTER
HUB TO RELAY    LVM. Per Dr. Jasmine: Yes, she can try some diclofenac gel or see if the heating pad will help. If there is no improvement in symptoms, she should get evaluated.

## 2025-06-09 ENCOUNTER — OFFICE VISIT (OUTPATIENT)
Dept: FAMILY MEDICINE CLINIC | Facility: CLINIC | Age: 65
End: 2025-06-09
Payer: MEDICARE

## 2025-06-09 ENCOUNTER — LAB (OUTPATIENT)
Dept: LAB | Facility: HOSPITAL | Age: 65
End: 2025-06-09
Payer: MEDICARE

## 2025-06-09 VITALS
SYSTOLIC BLOOD PRESSURE: 118 MMHG | BODY MASS INDEX: 25.34 KG/M2 | DIASTOLIC BLOOD PRESSURE: 66 MMHG | WEIGHT: 138.6 LBS | TEMPERATURE: 97.6 F | HEART RATE: 62 BPM | OXYGEN SATURATION: 98 %

## 2025-06-09 DIAGNOSIS — R63.4 UNINTENTIONAL WEIGHT LOSS: ICD-10-CM

## 2025-06-09 DIAGNOSIS — R11.0 NAUSEA: ICD-10-CM

## 2025-06-09 DIAGNOSIS — N64.4 BREAST PAIN: Primary | ICD-10-CM

## 2025-06-09 DIAGNOSIS — D64.9 LOW HEMOGLOBIN: ICD-10-CM

## 2025-06-09 LAB
BASOPHILS # BLD AUTO: 0.03 10*3/MM3 (ref 0–0.2)
BASOPHILS NFR BLD AUTO: 0.4 % (ref 0–1.5)
CRP SERPL-MCNC: <0.3 MG/DL (ref 0–0.5)
DEPRECATED RDW RBC AUTO: 45.3 FL (ref 37–54)
EOSINOPHIL # BLD AUTO: 0.35 10*3/MM3 (ref 0–0.4)
EOSINOPHIL NFR BLD AUTO: 5 % (ref 0.3–6.2)
ERYTHROCYTE [DISTWIDTH] IN BLOOD BY AUTOMATED COUNT: 13.4 % (ref 12.3–15.4)
ERYTHROCYTE [SEDIMENTATION RATE] IN BLOOD: 10 MM/HR (ref 0–30)
HCT VFR BLD AUTO: 32.8 % (ref 34–46.6)
HCV AB SER QL: NORMAL
HGB BLD-MCNC: 10.9 G/DL (ref 12–15.9)
HIV 1+2 AB+HIV1 P24 AG SERPL QL IA: NORMAL
IMM GRANULOCYTES # BLD AUTO: 0.01 10*3/MM3 (ref 0–0.05)
IMM GRANULOCYTES NFR BLD AUTO: 0.1 % (ref 0–0.5)
LYMPHOCYTES # BLD AUTO: 1.62 10*3/MM3 (ref 0.7–3.1)
LYMPHOCYTES NFR BLD AUTO: 22.9 % (ref 19.6–45.3)
MCH RBC QN AUTO: 30.4 PG (ref 26.6–33)
MCHC RBC AUTO-ENTMCNC: 33.2 G/DL (ref 31.5–35.7)
MCV RBC AUTO: 91.6 FL (ref 79–97)
MONOCYTES # BLD AUTO: 0.76 10*3/MM3 (ref 0.1–0.9)
MONOCYTES NFR BLD AUTO: 10.8 % (ref 5–12)
NEUTROPHILS NFR BLD AUTO: 4.29 10*3/MM3 (ref 1.7–7)
NEUTROPHILS NFR BLD AUTO: 60.8 % (ref 42.7–76)
NRBC BLD AUTO-RTO: 0 /100 WBC (ref 0–0.2)
PLATELET # BLD AUTO: 321 10*3/MM3 (ref 140–450)
PMV BLD AUTO: 9.5 FL (ref 6–12)
RBC # BLD AUTO: 3.58 10*6/MM3 (ref 3.77–5.28)
WBC NRBC COR # BLD AUTO: 7.06 10*3/MM3 (ref 3.4–10.8)

## 2025-06-09 PROCEDURE — 86140 C-REACTIVE PROTEIN: CPT

## 2025-06-09 PROCEDURE — 85652 RBC SED RATE AUTOMATED: CPT

## 2025-06-09 PROCEDURE — 3074F SYST BP LT 130 MM HG: CPT | Performed by: STUDENT IN AN ORGANIZED HEALTH CARE EDUCATION/TRAINING PROGRAM

## 2025-06-09 PROCEDURE — 3078F DIAST BP <80 MM HG: CPT | Performed by: STUDENT IN AN ORGANIZED HEALTH CARE EDUCATION/TRAINING PROGRAM

## 2025-06-09 PROCEDURE — 1159F MED LIST DOCD IN RCRD: CPT | Performed by: STUDENT IN AN ORGANIZED HEALTH CARE EDUCATION/TRAINING PROGRAM

## 2025-06-09 PROCEDURE — 86803 HEPATITIS C AB TEST: CPT

## 2025-06-09 PROCEDURE — 84466 ASSAY OF TRANSFERRIN: CPT

## 2025-06-09 PROCEDURE — 99214 OFFICE O/P EST MOD 30 MIN: CPT | Performed by: STUDENT IN AN ORGANIZED HEALTH CARE EDUCATION/TRAINING PROGRAM

## 2025-06-09 PROCEDURE — 1125F AMNT PAIN NOTED PAIN PRSNT: CPT | Performed by: STUDENT IN AN ORGANIZED HEALTH CARE EDUCATION/TRAINING PROGRAM

## 2025-06-09 PROCEDURE — 83540 ASSAY OF IRON: CPT

## 2025-06-09 PROCEDURE — 1160F RVW MEDS BY RX/DR IN RCRD: CPT | Performed by: STUDENT IN AN ORGANIZED HEALTH CARE EDUCATION/TRAINING PROGRAM

## 2025-06-09 PROCEDURE — 85025 COMPLETE CBC W/AUTO DIFF WBC: CPT

## 2025-06-09 PROCEDURE — 36415 COLL VENOUS BLD VENIPUNCTURE: CPT

## 2025-06-09 PROCEDURE — G0432 EIA HIV-1/HIV-2 SCREEN: HCPCS

## 2025-06-09 RX ORDER — ESOMEPRAZOLE MAGNESIUM 40 MG/1
40 CAPSULE, DELAYED RELEASE ORAL
Qty: 30 CAPSULE | Refills: 1 | Status: SHIPPED | OUTPATIENT
Start: 2025-06-09

## 2025-06-09 NOTE — PROGRESS NOTES
Office Note     Name: Yessica Galvin    : 1960     MRN: [id number]     Chief Complaint  Breast Pain (Under her right and left armpit and runs into the side of her breast. Started about 2 weeks ago on the R and now is moving into the left the last couple days. )    Subjective     History of Present Illness:  Yessica Galvin is a 64 y.o. female who presents today for breast pain.  Patient reports that for the last 2 weeks, she had pain in her right underarm, now moving into her left and running into the side of of her breasts.  She reports that the pain is aching and sharp.  She had a mammogram scheduled with this and that they would not do the mammogram because there might be something wrong with the breast tissue.  Her right breast hurts worse than the left.      Weight loss: Patient has had a 15 pound weight loss since 2024.  She reports that her appetite is about the same but it is slightly decreased in the evenings.  She does feel nauseous from time to time.  She has not had her EGD done yet.  She reports that every 5 hours, she has stomach pain and feels that she is going to throw up.  She has been taking omeprazole with little improvement in symptoms.  She has tried pantoprazole previously.          Objective     Past Medical History:   Diagnosis Date    Acute bronchitis     Acute sinusitis     Allergic rhinitis 2023    Anemia 10/16/2024    Asthma     Asthma 2020    Asthma with acute exacerbation     Asthma, extrinsic ,1970    I have had it since I was 5 yrs. Old    Asthmatic bronchitis     Bronchiectasis     Always catch it    Centrilobular emphysema 2022    Chronic bronchitis with acute exacerbation 2022    Disc degeneration, lumbar     Disc degeneration, lumbar     GERD (gastroesophageal reflux disease)     History of mammogram     Hypertension 10/05/2023    Hypothyroidism     Lower back pain     Lumbosacral disc disease     Medicare annual wellness visit,  subsequent 10/05/2023    Nodule of upper lobe of right lung 06/02/2022    Plantar fasciitis     Restless legs syndrome      Past Surgical History:   Procedure Laterality Date    CARDIAC CATHETERIZATION N/A 11/17/2023    Procedure: Left Heart Cath;  Surgeon: Arben Pittman MD;  Location:  [facility];  Service: Cardiology;  Laterality: N/A;    CHOLECYSTECTOMY      COLONOSCOPY      HYSTERECTOMY      KNEE ARTHROSCOPY Right 03/10/2020    Procedure: KNEE ARTHROSCOPY RIGHT;  Surgeon: Guanaco Thakur MD;  Location:  [facility];  Service: Orthopedics;  Laterality: Right;    LUMBAR LAMINECTOMY DISCECTOMY DECOMPRESSION N/A 4/11/2024    Procedure: LUMBAR LAMINECTOMY DISCECTOMY DECOMPRESSION POSTERIOR L2-L5;  Surgeon: Ulises Elias MD;  Location:  [facility];  Service: Neurosurgery;  Laterality: N/A;    NECK SURGERY      ORIF WRIST FRACTURE Left 03/10/2020    Procedure: WRIST OPEN REDUCTION INTERNAL FIXATION LEFT;  Surgeon: Guanaco Thakur MD;  Location:  [facility];  Service: Orthopedics;  Laterality: Left;    TIBIAL PLATEAU OPEN REDUCTION INTERNAL FIXATION Right 03/10/2020    Procedure: TIBIAL PLATEAU OPEN REDUCTION INTERNAL FIXATION RIGHT;  Surgeon: Guanaco Thakur MD;  Location:  [facility];  Service: Orthopedics;  Laterality: Right;    UPPER GASTROINTESTINAL ENDOSCOPY       Family History   Problem Relation Age of Onset    Aneurysm Mother     Hypertension Mother     Cancer Mother     Asthma Mother     Heart disease Mother     Diabetes Mother     Hypertension Father     Alzheimer's disease Father     Asthma Father     No Known Problems Sister     No Known Problems Brother     COPD Maternal Grandmother     Aneurysm Maternal Grandmother     No Known Problems Maternal Grandfather     Alzheimer's disease Paternal Grandmother     Dementia Paternal Grandmother     No Known Problems Paternal Grandfather     Breast cancer Neg Hx     Ovarian cancer Neg Hx     Colon cancer Neg Hx     Esophageal cancer Neg Hx   "      Vital Signs  /66   Pulse 62   Temp 97.6 °F (36.4 °C) (Infrared)   Wt 62.9 kg (138 lb 9.6 oz)   SpO2 98%   BMI 25.34 kg/m²   Estimated body mass index is 25.34 kg/m² as calculated from the following:    Height as of 6/3/25: 157.5 cm (62.01\").    Weight as of this encounter: 62.9 kg (138 lb 9.6 oz).    Physical Exam  Vitals reviewed.   Constitutional:       Appearance: Normal appearance.   Cardiovascular:      Rate and Rhythm: Normal rate and regular rhythm.   Pulmonary:      Effort: Pulmonary effort is normal.      Breath sounds: Normal breath sounds.   Chest:          Comments: Palpable breast lumps  Neurological:      Mental Status: She is alert.                   Assessment and Plan     Diagnoses and all orders for this visit:    1. Breast pain (Primary)  -     Mammo Diagnostic Digital Tomosynthesis Bilateral With CAD; Future    2. Unintentional weight loss  -     CBC & Differential; Future  -     Sedimentation rate, automated; Future  -     C-reactive protein; Future  -     HIV-1/O/2 Ag/Ab w Reflex; Future  -     Hepatitis C antibody; Future    3. Nausea  -     esomeprazole (nexIUM) 40 MG capsule; Take 1 capsule by mouth Every Morning Before Breakfast.  Dispense: 30 capsule; Refill: 1    Patient with bilateral breast pain and axillary lymphadenopathy, right worse than left.  Does seem to have palpable breast lumps as above.  Obtaining stat diagnostic mammogram.    Along with this, patient has had unintentional weight loss of 15 pounds since October.  Obtaining labs for further evaluation.  Will follow-up on those results and advise further.    Patient continues to be nauseous.  She has tried both pantoprazole and omeprazole without improvement in symptoms.  We will trial on esomeprazole 40 mg daily.  Advised patient to reach out to GI clinic, will need EGD.     Follow Up  As scheduled    Betty Jasmine MD  "

## 2025-06-11 LAB
IRON 24H UR-MRATE: 42 MCG/DL (ref 37–145)
IRON SATN MFR SERPL: 9 % (ref 20–50)
TIBC SERPL-MCNC: 447 MCG/DL (ref 298–536)
TRANSFERRIN SERPL-MCNC: 300 MG/DL (ref 200–360)

## 2025-06-12 ENCOUNTER — TREATMENT (OUTPATIENT)
Dept: PHYSICAL THERAPY | Facility: CLINIC | Age: 65
End: 2025-06-12
Payer: MEDICARE

## 2025-06-12 DIAGNOSIS — M54.41 CHRONIC MIDLINE LOW BACK PAIN WITH BILATERAL SCIATICA: Primary | ICD-10-CM

## 2025-06-12 DIAGNOSIS — M25.552 BILATERAL HIP PAIN: ICD-10-CM

## 2025-06-12 DIAGNOSIS — M41.9 SCOLIOSIS OF LUMBAR SPINE, UNSPECIFIED SCOLIOSIS TYPE: ICD-10-CM

## 2025-06-12 DIAGNOSIS — M25.551 BILATERAL HIP PAIN: ICD-10-CM

## 2025-06-12 DIAGNOSIS — M54.42 CHRONIC MIDLINE LOW BACK PAIN WITH BILATERAL SCIATICA: Primary | ICD-10-CM

## 2025-06-12 DIAGNOSIS — G89.29 CHRONIC MIDLINE LOW BACK PAIN WITH BILATERAL SCIATICA: Primary | ICD-10-CM

## 2025-06-12 NOTE — PROGRESS NOTES
Physical Therapy Initial Evaluation and Plan of Care  [address] [place] [id number]                  [phone]    Patient: Yessica Galvin   : 1960  Diagnosis/ICD-10 Code:  Chronic midline low back pain with bilateral sciatica [M54.41, M54.42, G89.29]  Referring practitioner: Katherine David*    Subjective Evaluation    History of Present Illness  Mechanism of injury: History of cervical fusion and lumbar laminectomy.     Has had progressive back pain again. Has restless leg which has worsened. Pain goes to front of hips and sides of legs. Some numbness and tingling in both feet that stays. Back pain starts in middle and burns and goes into both sides.     Has been tripping over own feet more.     Pain  Location: back and bilateral leg pain  Quality: dull ache, sharp, tight and burning  Aggravating factors: lifting, movement, sleeping, ambulation, stairs and standing    Patient Goals  Patient goals for therapy: decreased edema, decreased pain, increased motion, independence with ADLs/IADLs, increased strength, return to sport/leisure activities and improved balance           Objective          Static Posture   General Observations  Scoliosis.     Head  Forward.    Shoulders  Elevated and rounded.    Scapulae  Right upwardly rotated and right elevated.    Thoracic Spine  Hyperkyphosis.    Rib Cage  Rib hump.    Lumbar Spine   Decreased lordosis.   Lumbar spine (Right): Convex curve.      Pelvis   Posterior pelvic tilt  Pelvis (Left): Elevated and obliquity.     Active Range of Motion   Left Hip   Flexion: WFL  External rotation (90/90): WFL  Internal rotation (90/90): WFL    Right Hip   Flexion: WFL  External rotation (90/90): WFL and with pain  Internal rotation (90/90): 20 (med-lat sliding of patella) degrees with pain    Strength/Myotome Testing     Left Hip   Planes of Motion   Flexion: 4  Extension: 4-  Abduction: 4-  External rotation: 4-  Internal rotation:  4-    Right Hip   Planes of Motion   Flexion: 4-  Extension: 4-  Abduction: 4-  External rotation: 4-  Internal rotation: 4-    Tests     Left Hip   Positive femoral nerve tension, scour and SI compression.     Right Hip   Positive FADIR, femoral nerve tension, scour and SI compression.     Ambulation     Ambulation: Level Surfaces   Ambulation with assistive device: independent  Ambulation without assistive device: minimum assist    Observational Gait   Gait: asymmetric, circumduction and crouched   Decreased walking speed and stride length.   Base of support: decreased    Quality of Movement During Gait     Additional Quality of Movement During Gait Details  Extreme forward lean/crouch    Functional Assessment     Comments  Bed mobility full independent with minimal use of UE.           Assessment & Plan       Assessment  Impairments: abnormal coordination, abnormal gait, abnormal muscle firing, abnormal muscle tone, abnormal or restricted ROM, activity intolerance, impaired balance, impaired physical strength, lacks appropriate home exercise program, pain with function and safety issue   Functional limitations: lifting, sleeping, walking, pulling, pushing, uncomfortable because of pain, standing and stooping   Assessment details: Patient is a 64 year old female presenting with chronic low back pain, gait abnormality, fall risk, and poor postural endurance. She presents with severe right lumbar scoliosis and thoracolumar kyphosis, (+) femoral nerve tension, poor LE, core, and back strength, fall risk, and severe gait abnormality. She has extensive history including neck and back surgeries, restless leg, and neuropathy. She is appropriate for physical therapy to address this issue to reduce fall risk, improve upright postural endurance for ADLs, and reduce back pain to manageable level.   Barriers to therapy: multiple comorbidities  Prognosis: fair  Prognosis details: Short Term Goals (4 weeks):  1. Patient will be  independent with home exercise program.  2. Patient will demonstrate improved bilateral hip flexion strength to 4+/5  3. Patient will be able to ambulate at least 250 feet with upright posture using LRAD independently.     Long Term Goals (8 weeks):  1. Patient will be able to sit with upright posture for 1 minute without UE assist to be able to perform cooking and self care tasks.   2. Patient will demonstrates 5x sit<>stand of no more than 14 seconds to demonstrate reduced risk of falls.   3. Patient will be independent with final HEP.       Plan  Therapy options: will be seen for skilled therapy services  Planned modality interventions: thermotherapy (paraffin bath), thermotherapy (hydrocollator packs), TENS, high voltage pulsed current (spasm management), high voltage pulsed current (pain management) and cryotherapy  Planned therapy interventions: therapeutic activities, stretching, strengthening, spinal/joint mobilization, soft tissue mobilization, postural training, neuromuscular re-education, motor coordination training, manual therapy, joint mobilization, IADL retraining, home exercise program, gait training, abdominal trunk stabilization, ADL retraining, balance/weight-bearing training, body mechanics training, fine motor coordination training, flexibility and functional ROM exercises  Frequency: 2x week  Duration in weeks: 8  Treatment plan discussed with: patient              Access Code: [email or web address]  URL: [email or web address]  Date: 06/12/2025  Prepared by: Meagan Lynn    Exercises  - Prone Femoral Nerve Mobilization  - 1-3 x daily - 7 x weekly - 10 reps  - Supine Posterior Pelvic Tilt  - 1-3 x daily - 7 x weekly - 10 reps - 5 hold  - Seated Scapular Retraction  - 1-3 x daily - 7 x weekly - 10 reps - 5 hold  - Hooklying Gluteal Sets  - 1-3 x daily - 7 x weekly - 10 reps - 5 hold    Manual Therapy:         mins  07566;  Therapeutic Exercise:   15      mins  15523;     Neuromuscular Kathy:    10     mins  23045;    Therapeutic Activity:          mins  02890;     Gait Training:           mins  18912;     Ultrasound:          mins  78361;    Electrical Stimulation:         mins  44791 ( );  Dry Needling         mins self-pay    Timed Treatment:   25   mins   Total Treatment:     45   mins    PT SIGNATURE: Meagan Lynn, PT   DATE TREATMENT INITIATED: 6/13/2025    Medicare Initial Certification  Certification Period: 9/11/2025  I certify that the therapy services are furnished while this patient is under my care.  The services outlined above are required by this patient, and will be reviewed every 90 days.     PHYSICIAN:       DATE:     Please sign and return via fax to [phone].. Thank you, [facility].

## 2025-06-13 DIAGNOSIS — J30.1 SEASONAL ALLERGIC RHINITIS DUE TO POLLEN: ICD-10-CM

## 2025-06-13 DIAGNOSIS — M81.0 AGE-RELATED OSTEOPOROSIS WITHOUT CURRENT PATHOLOGICAL FRACTURE: ICD-10-CM

## 2025-06-13 RX ORDER — MONTELUKAST SODIUM 10 MG/1
10 TABLET ORAL NIGHTLY
Qty: 90 TABLET | Refills: 0 | Status: SHIPPED | OUTPATIENT
Start: 2025-06-13

## 2025-06-13 RX ORDER — ALENDRONATE SODIUM 70 MG/1
70 TABLET ORAL WEEKLY
Qty: 12 TABLET | Refills: 0 | Status: SHIPPED | OUTPATIENT
Start: 2025-06-13

## 2025-06-17 ENCOUNTER — HOSPITAL ENCOUNTER (OUTPATIENT)
Facility: HOSPITAL | Age: 65
Discharge: HOME | End: 2025-06-17
Payer: MEDICARE

## 2025-06-17 DIAGNOSIS — N64.4 BREAST PAIN: ICD-10-CM

## 2025-06-17 PROCEDURE — G0279 TOMOSYNTHESIS, MAMMO: HCPCS | Performed by: RADIOLOGY

## 2025-06-17 PROCEDURE — 76642 ULTRASOUND BREAST LIMITED: CPT

## 2025-06-17 PROCEDURE — 76642 ULTRASOUND BREAST LIMITED: CPT | Performed by: RADIOLOGY

## 2025-06-17 PROCEDURE — G0279 TOMOSYNTHESIS, MAMMO: HCPCS

## 2025-06-17 PROCEDURE — 77066 DX MAMMO INCL CAD BI: CPT | Performed by: RADIOLOGY

## 2025-06-17 PROCEDURE — 77066 DX MAMMO INCL CAD BI: CPT

## 2025-06-23 RX ORDER — LEVOTHYROXINE SODIUM 150 UG/1
150 TABLET ORAL
Qty: 30 TABLET | Refills: 1 | Status: SHIPPED | OUTPATIENT
Start: 2025-06-23

## 2025-06-25 ENCOUNTER — TREATMENT (OUTPATIENT)
Dept: PHYSICAL THERAPY | Facility: CLINIC | Age: 65
End: 2025-06-25
Payer: MEDICARE

## 2025-06-25 DIAGNOSIS — M41.9 SCOLIOSIS OF LUMBAR SPINE, UNSPECIFIED SCOLIOSIS TYPE: ICD-10-CM

## 2025-06-25 DIAGNOSIS — M54.41 CHRONIC MIDLINE LOW BACK PAIN WITH BILATERAL SCIATICA: Primary | ICD-10-CM

## 2025-06-25 DIAGNOSIS — M25.551 BILATERAL HIP PAIN: ICD-10-CM

## 2025-06-25 DIAGNOSIS — M25.552 BILATERAL HIP PAIN: ICD-10-CM

## 2025-06-25 DIAGNOSIS — G89.29 CHRONIC MIDLINE LOW BACK PAIN WITH BILATERAL SCIATICA: Primary | ICD-10-CM

## 2025-06-25 DIAGNOSIS — M54.42 CHRONIC MIDLINE LOW BACK PAIN WITH BILATERAL SCIATICA: Primary | ICD-10-CM

## 2025-06-25 NOTE — PROGRESS NOTES
Physical Therapy Daily Treatment Note         [address] [place] [address]    Patient: Yessica Galvin   : 1960  Diagnosis/ICD-10 Code:  Chronic midline low back pain with bilateral sciatica [M54.41, M54.42, G89.29]  Referring practitioner: Katherine David*  Date of Initial Visit: Type: THERAPY  Noted: 2025  Today's Date: 2025  Patient seen for 2 sessions         Yessica Galvin reports: some soreness with exercise but got better.         Objective     SLS: unable to get into position without UE assist. Unable to hold 3 seconds bilaterally.     See Exercise, Manual, and Modality Logs for complete treatment.       Assessment/Plan  Working on gross mobility and strength of LE, core, and upper postural musculature.   Progress per Plan of Care           Manual Therapy:         mins  14119;  Therapeutic Exercise:    32     mins  43896;     Neuromuscular Kathy:    23    mins  17936;    Therapeutic Activity:          mins  02891;     Gait Training:           mins  36212;     Ultrasound:          mins  06683;    Electrical Stimulation:         mins  84640 ( );  Dry Needling         mins self-pay    Timed Treatment:   55   mins   Total Treatment:     55   mins    Meagan Lynn PT  Physical Therapist

## 2025-06-26 ENCOUNTER — OFFICE VISIT (OUTPATIENT)
Dept: NEUROLOGY | Facility: CLINIC | Age: 65
End: 2025-06-26
Payer: MEDICARE

## 2025-06-26 ENCOUNTER — TELEPHONE (OUTPATIENT)
Dept: NEUROLOGY | Facility: CLINIC | Age: 65
End: 2025-06-26

## 2025-06-26 VITALS — DIASTOLIC BLOOD PRESSURE: 68 MMHG | OXYGEN SATURATION: 97 % | HEART RATE: 54 BPM | SYSTOLIC BLOOD PRESSURE: 118 MMHG

## 2025-06-26 DIAGNOSIS — G25.81 RESTLESS LEGS SYNDROME (RLS): ICD-10-CM

## 2025-06-26 RX ORDER — GABAPENTIN ENACARBIL 600 MG/1
600 TABLET, EXTENDED RELEASE ORAL EVERY EVENING
Qty: 30 TABLET | Refills: 2 | Status: SHIPPED | OUTPATIENT
Start: 2025-06-26 | End: 2025-06-27

## 2025-06-26 NOTE — PROGRESS NOTES
Neuro Office Visit      Encounter Date: 2025   Patient Name: Yessica Galvin  : 1960   MRN: [id number]   PCP:  Betty Jasmine MD     Chief Complaint:    Chief Complaint   Patient presents with    Restless Legs Syndrome       History of Present Illness: Yessica Galvin is a 64 y.o. female who is here today in Neurology for  RLS    Initial visit 2024:  PMH of asthmatic bronchitis, generalized anxiety disorder, gout, headache, hypothyroidism, chronic midline back pain without sciatica, cervical disc disorder with myelopathy, asthma, tobacco abuse.  Nocturnal leg cramps, constipation, skin lesions, GERD with esophagitis, anxiety and depression, hypertension, restless leg syndrome, gastroenteritis, stage II COPD, osteoporosis, anemia    Patient was seen by primary care on 2024 for restless legs, per review of primary care office visit note she reported at that time that she feels that legs are restless even during the day.  She reports the need to move them around all the time and feels tightness in her muscles.  She has been on ropinirole 4 mg for some time but sometimes even doubles up on the dose because of her restless or spikes are during the day.  At that visit primary care was going to wean off of ropinirole, of note she also did not have benefit with pramipexole previously.  Plan was to wean off of ropinirole and start rotigotine.    In clinic today Yessica reports that Neupro was cost prohibitive so she has been taking Requip. She has been combining prior Requip scripts of 3 mg and 4 mg to equal 7 mg total in a day. She reports that she has seen improvement in her symptoms with the higher dose of Requip. Restless sensation in legs only. She states that this has been present for 4-5 years and is worsening overall. Her mother had RLS as well. She notes that her legs started bothering her during the day in addition to the nighttime about 1 month ago. She also takes Gabapentin 600  mg TID for lumbar back pain, she feels that it helps some but that her back pain is still significant, denies SE from Gabapentin. Of note she underwent decompressive laminectomies L2-5 with Dr. Elias on 4/11/2024.     Lab work from 10/14/2024-iron and TIBC showed normal iron level at 78, iron saturation was low at 16%, transferrin normal at 334, TIBC normal at 498.    Follow up visit 4/9/2025:  Yessica returns to neurology clinic for routine follow up. She is currently taking Gabapentin 900 mg TID plus Requip 4 mg daily for RLS. She reports frequent falls, she reports that she is up at night due to the restless sensation. She does frequently fall asleep when doing routine tasks such as washing dishes. She was recently started on Clonazepam 0.5 mg by PCP. She notes that the medications she is taking for RLS help with the symptoms, she forgot to take her afternoon Gabapentin dose today and can see a significant difference without the medication. She takes Requip 4 mg nightly. She does feel like the clonzepam is helping but is making her more sleepy, this was recently added by PCP.      Current visit 6/26/2025:  Yessica Galvin returns for follow up. At last visit she was to be switched from Gabapentin 900 mg TID to Horizant 600 mg nightly. Initially it was thought that Horizant would not be affordable however upon further discussion patient/ do feel that they can afford discounted rate of $55/month. They would like to proceed with switching to Horizant as even decreasing Gabapentin to 600 mg TID she is still frequently groggy. She continues to take Requip 4 mg nightly. She does feel that both Gabapentin and Requip help with her RLS symptoms but would like better symptom control, she notes that symptoms are worst in the evening. She was seen by neurosurgery on 6/3/2025 who felt that her back pain was mechanical, they recommended physical therapy and ordered x-rays of the lumbar spine to assess for fusion hardware.  She is no longer taking Clonazepam.       Subjective      Review of Systems   Constitutional:  Positive for fatigue.   HENT: Negative.     Eyes: Negative.    Respiratory: Negative.     Cardiovascular: Negative.    Gastrointestinal: Negative.    Musculoskeletal:  Positive for back pain and gait problem.   Skin: Negative.    Allergic/Immunologic: Positive for food allergies.   Neurological:         Restless legs   Psychiatric/Behavioral:  Positive for sleep disturbance.           Past Medical History:   Past Medical History:   Diagnosis Date    Acute bronchitis     Acute sinusitis     Allergic rhinitis 09/06/2023    Anemia 10/16/2024    Asthma     Asthma 03/07/2020    Asthma with acute exacerbation     Asthma, extrinsic ,1970    I have had it since I was 5 yrs. Old    Asthmatic bronchitis     Bronchiectasis     Always catch it    Centrilobular emphysema 06/02/2022    Chronic bronchitis with acute exacerbation 06/02/2022    Disc degeneration, lumbar     Disc degeneration, lumbar     GERD (gastroesophageal reflux disease)     History of mammogram     Hypertension 10/05/2023    Hypothyroidism     Lower back pain     Lumbosacral disc disease     Medicare annual wellness visit, subsequent 10/05/2023    Nodule of upper lobe of right lung 06/02/2022    Plantar fasciitis     Restless legs syndrome        Past Surgical History:   Past Surgical History:   Procedure Laterality Date    CARDIAC CATHETERIZATION N/A 11/17/2023    Procedure: Left Heart Cath;  Surgeon: Arben Pittman MD;  Location:  [facility];  Service: Cardiology;  Laterality: N/A;    CHOLECYSTECTOMY      COLONOSCOPY      HYSTERECTOMY      KNEE ARTHROSCOPY Right 03/10/2020    Procedure: KNEE ARTHROSCOPY RIGHT;  Surgeon: Guanaco Thakur MD;  Location: [facility] OR;  Service: Orthopedics;  Laterality: Right;    LUMBAR LAMINECTOMY DISCECTOMY DECOMPRESSION N/A 4/11/2024    Procedure: LUMBAR LAMINECTOMY DISCECTOMY DECOMPRESSION POSTERIOR L2-L5;   Surgeon: Ulises Elias MD;  Location:  [facility] OR;  Service: Neurosurgery;  Laterality: N/A;    NECK SURGERY      ORIF WRIST FRACTURE Left 03/10/2020    Procedure: WRIST OPEN REDUCTION INTERNAL FIXATION LEFT;  Surgeon: Guanaco Thakur MD;  Location:  [facility] OR;  Service: Orthopedics;  Laterality: Left;    TIBIAL PLATEAU OPEN REDUCTION INTERNAL FIXATION Right 03/10/2020    Procedure: TIBIAL PLATEAU OPEN REDUCTION INTERNAL FIXATION RIGHT;  Surgeon: Guanaco Thakur MD;  Location:  [facility] OR;  Service: Orthopedics;  Laterality: Right;    UPPER GASTROINTESTINAL ENDOSCOPY         Family History:   Family History   Problem Relation Age of Onset    Aneurysm Mother     Hypertension Mother     Cancer Mother     Asthma Mother     Heart disease Mother     Diabetes Mother     Hypertension Father     Alzheimer's disease Father     Asthma Father     No Known Problems Sister     No Known Problems Brother     COPD Maternal Grandmother     Aneurysm Maternal Grandmother     No Known Problems Maternal Grandfather     Alzheimer's disease Paternal Grandmother     Dementia Paternal Grandmother     No Known Problems Paternal Grandfather     Breast cancer Neg Hx     Ovarian cancer Neg Hx     Colon cancer Neg Hx     Esophageal cancer Neg Hx        Social History:   Social History     Socioeconomic History    Marital status:    Tobacco Use    Smoking status: Every Day     Current packs/day: 1.00     Average packs/day: 0.3 packs/day for 25.3 years (8.2 ttl pk-yrs)     Types: Cigarettes     Start date: 3/7/2000     Passive exposure: Current    Smokeless tobacco: Never    Tobacco comments:     I started when i was 17   Vaping Use    Vaping status: Never Used   Substance and Sexual Activity    Alcohol use: No    Drug use: No    Sexual activity: Not Currently     Partners: Female     Birth control/protection: None       Medications:     Current Outpatient Medications:     acetaminophen (TYLENOL) 325 MG tablet, Take 2 tablets by mouth  Every 4 (Four) Hours As Needed for Mild Pain ., Disp: , Rfl:     albuterol sulfate  (90 Base) MCG/ACT inhaler, Inhale 2 puffs Every 6 (Six) Hours As Needed for Wheezing., Disp: 54 g, Rfl: 3    alendronate (FOSAMAX) 70 MG tablet, TAKE 1 TABLET BY MOUTH ONCE WEEKLY, Disp: 12 tablet, Rfl: 0    amLODIPine (NORVASC) 10 MG tablet, Take 1 tablet by mouth Daily., Disp: 90 tablet, Rfl: 1    atorvastatin (LIPITOR) 20 MG tablet, TAKE ONE TABLET BY MOUTH ONCE NIGHTLY, Disp: 90 tablet, Rfl: 3    Breztri Aerosphere 160-9-4.8 MCG/ACT aerosol inhaler, , Disp: , Rfl:     budesonide-formoterol (SYMBICORT) 160-4.5 MCG/ACT inhaler, Inhale 2 puffs 2 (Two) Times a Day., Disp: 10.2 g, Rfl: 11    busPIRone (BUSPAR) 15 MG tablet, Take 1 tablet by mouth 2 (Two) Times a Day., Disp: 60 tablet, Rfl: 5    celecoxib (CeleBREX) 200 MG capsule, TAKE 1 CAPSULE BY MOUTH 2 TIMES A DAY, Disp: 60 capsule, Rfl: 3    Diclofenac Sodium (VOLTAREN) 1 % gel gel, Apply 2 g topically to the appropriate area as directed 4 (Four) Times a Day As Needed (legs)., Disp: 350 g, Rfl: 0    dicyclomine (BENTYL) 10 MG capsule, Take 1 capsule by mouth 4 (Four) Times a Day Before Meals & at Bedtime As Needed for Abdominal Cramping., Disp: 90 capsule, Rfl: 0    esomeprazole (nexIUM) 40 MG capsule, Take 1 capsule by mouth Every Morning Before Breakfast., Disp: 30 capsule, Rfl: 1    Gabapentin Enacarbil ER (Horizant) 600 MG tablet controlled-release, Take 600 mg by mouth Every Evening for 90 days., Disp: 30 tablet, Rfl: 2    levocetirizine (Xyzal) 5 MG tablet, Take 1 tablet by mouth Every Evening., Disp: 90 tablet, Rfl: 1    levothyroxine (SYNTHROID, LEVOTHROID) 150 MCG tablet, TAKE 1 TABLET BY MOUTH EVERY MORNING, Disp: 30 tablet, Rfl: 1    lisinopril (PRINIVIL,ZESTRIL) 20 MG tablet, TAKE 1 TABLET BY MOUTH DAILY, Disp: 90 tablet, Rfl: 1    montelukast (SINGULAIR) 10 MG tablet, TAKE ONE TABLET BY MOUTH ONCE NIGHTLY, Disp: 90 tablet, Rfl: 0    mupirocin (BACTROBAN) 2 %  ointment, Apply 1 Application topically to the appropriate area as directed 3 (Three) Times a Day. For 10 dyas, Disp: 30 g, Rfl: 0    naloxone (NARCAN) 4 MG/0.1ML nasal spray, Call 911. Don't prime. [place] in 1 nostril for overdose. Repeat in 2-3 minutes in other nostril if no or minimal breathing/responsiveness., Disp: 2 each, Rfl: 0    nicotine (NICOTROL) 10 MG/ML solution nasal solution, 1 spray by Each Nare route Every 1 (One) Hour As Needed (smoking cessation). 1 to 2 doses/hour, do not exceed more than 5 doses (10 sprays) per hour, Disp: 10 mL, Rfl: 2    promethazine (PHENERGAN) 25 MG tablet, Take 1 tablet by mouth Every 8 (Eight) Hours As Needed for Nausea or Vomiting., Disp: 30 tablet, Rfl: 1    rOPINIRole (REQUIP) 4 MG tablet, Take 1 tablet by mouth Every Night., Disp: 90 tablet, Rfl: 0    tiotropium bromide monohydrate (SPIRIVA RESPIMAT) 2.5 MCG/ACT aerosol solution inhaler, Inhale 2 puffs Daily., Disp: 4 g, Rfl: 5    tiZANidine (ZANAFLEX) 4 MG tablet, Take 1 tablet by mouth Every 8 (Eight) Hours As Needed for Muscle Spasms., Disp: 90 tablet, Rfl: 1    traMADol (ULTRAM) 50 MG tablet, Take 1 tablet by mouth Every 8 (Eight) Hours As Needed for Moderate Pain., Disp: 150 tablet, Rfl: 2    venlafaxine XR (Effexor XR) 75 MG 24 hr capsule, Take 1 capsule by mouth Daily. Take with 150 mg tablet, Disp: 90 capsule, Rfl: 1    venlafaxine XR (EFFEXOR-XR) 150 MG 24 hr capsule, Take 1 capsule by mouth Daily. Take with 75 mg tablet, Disp: 90 capsule, Rfl: 1    Allergies:   Allergies   Allergen Reactions    Codeine Nausea And Vomiting    Shrimp Hives         STEADI Fall Risk Assessment was completed, and patient is at HIGH risk for falls. Assessment completed on:6/26/2025    Objective     Objective:    /68   Pulse 54   SpO2 97%   There is no height or weight on file to calculate BMI.    Physical Exam  Vitals reviewed.   Constitutional:       Appearance: Normal appearance.   HENT:      Head: Normocephalic and  atraumatic.      Mouth/Throat:      Mouth: Mucous membranes are moist.      Pharynx: Oropharynx is clear.   Pulmonary:      Effort: Pulmonary effort is normal. No respiratory distress.   Skin:     General: Skin is warm and dry.   Neurological:      Mental Status: She is alert.          Neurology Exam:    General apperance: NAD.     Mental status: Alert, awake and oriented to time place and person.    Fund of knowledge:  Normal.     Language and Speech: No aphasia or dysarthria.    Naming , Repetition and Comprehension:  Can name objects, repeat a sentence and follow commands. Speech is clear and fluent with good repetition, comprehension, and naming.    Cranial Nerves:   CN II: Visual fields are full. Intact. Pupils - PERRLA  CN III, IV and VI: Extraocular movements are intact. Normal saccades.   CN V: Facial sensation is intact.   CN VII: Muscles of facial expression reveal no asymmetry. Intact.   CN VIII: Hearing is intact.   CN IX and X: Palate elevates symmetrically. Intact  CN XI: Shoulder shrug is intact.   CN XII: Tongue is midline without evidence of atrophy or fasciculation.     Mild eyelid ptosis present, R>L - states that this has been present for several years, considering blepharoplasty if becomes severe    Motor:  Right UE muscle strength 5/5. Normal tone.     Left UE muscle strength 5/5. Normal tone.      Right LE muscle strength 5-/5. Normal tone.     Left LE muscle strength 5-/5. Normal tone.      Sensory: Normal light touch sensation bilaterally.    DTRs: 1+ bilaterally in upper and lower extremities.    Coordination: Normal finger-to-nose    Gait: Abnormal, ambulates with assistance of cane, antalgic gait, leans heavily on cane while walking as she is bent forward while walking.          Results:   Imaging:   XR Hand 3+ View Left    Result Date: 10/16/2024  Impression: 1.No definite radiographic findings of acute osseous hand abnormality. 2.Advanced arthropathy of the hand and wrist with findings  suggestive of osteoarthritis. Severe joint space narrowing at the first CMC joint. Electronically Signed: Sonido Melvin  10/16/2024 3:58 PM EDT  Workstation ID: [id number]    XR Ribs 2 View Left    Result Date: 7/31/2024  Impression: No acute or healing left rib fracture. Electronically Signed: Hai Joshua MD  7/31/2024 4:59 PM EDT  Workstation ID: [id number]       Labs:   Lab Results   Component Value Date    GLUCOSE 96 04/25/2025    BUN 14 04/25/2025    CREATININE 0.73 04/25/2025     04/25/2025    K 4.4 04/25/2025     04/25/2025    CALCIUM 9.1 04/25/2025    PROTEINTOT 7.0 04/25/2025    ALBUMIN 3.9 04/25/2025    ALT 20 04/25/2025    AST 27 04/25/2025    ALKPHOS 112 04/25/2025    BILITOT 0.2 04/25/2025    GLOB 3.1 04/25/2025    AGRATIO 1.3 04/25/2025    BCR 19.2 04/25/2025    ANIONGAP 12.6 04/25/2025    EGFR 90.8 04/25/2025         Assessment / Plan      Assessment/Plan:   Diagnoses and all orders for this visit:    1. Restless legs syndrome (RLS)  -     Gabapentin Enacarbil ER (Horizant) 600 MG tablet controlled-release; Take 600 mg by mouth Every Evening for 90 days.  Dispense: 30 tablet; Refill: 2      Yessica Galvin returns for follow up visit. Initially per discussion with patient Horizant was too cost prohibitive however now they feel that the $55 per month amount would be something that they can afford.  Will proceed with Horizon 600 mg nightly with prior authorization, will discontinue Gabapentin.  We discussed that Horizant is not to be taken with gabapentin, she is to take Horizant at 5 PM each night with food. I am hopeful that Horizant will provide better RLS control while minimizing sedative side effects.  I have asked her to please provide update regarding her response in the next few weeks or sooner for any questions or concerns, will continue Requip unchanged. She is to continue utilizing cane to help reduce fall risk.     As part of this patient's treatment plan I am prescribing controlled  substances. The patient has been made aware of appropriate use of such medications, including potential risk of somnolence, limited ability to drive and/or work safely, and potential for dependence or overdose. It has also been made clear that these medications are for use by the patient only, without concomitant use of alcohol or other substances unless prescribed. Keep out of reach of children.  [place] report has been reviewed. If this is going to be prescribed long term, [facility] Controlled Substance Agreement Contract has also been read and signed by patient and myself.    Addendum 6/27/2025:  Horizant through local pharmacy is $200, our office staff is working with patient/drug assistance program for reduced cost. I have sent Gabapentin 600 mg TID to her local [facility] so that she has this until she is able to get Horizant sent to her.       Patient Education:       Reviewed medications, potential side effects and signs and symptoms to report. Discussed risk versus benefits of treatment plan with patient and/or family-including medications, labs and radiology that may be ordered. Addressed questions and concerns during visit. Patient and/or family verbalized understanding and agree with plan. Instructed to call the office with any questions and report to ER with any life-threatening symptoms.     Follow Up:   Return in about 3 months (around 9/26/2025).    I spent 30 minutes in the care of this patient. I personally spent 50 percent of this time counseling and discussing diagnosis, evaluation, treatment options, and management .       During this visit the following were done:  Labs Reviewed [x]    Labs Ordered []    Radiology Reports Reviewed []    Radiology Ordered []    PCP Records Reviewed []    Referring Provider Records Reviewed []    ER Records Reviewed []    Hospital Records Reviewed []    History Obtained From Family []    Radiology Images Reviewed []    Other Reviewed []    Records Requested []       IDALIA Patel  [facility]  [address] [facility] [address]  [facility] [address] [phone]

## 2025-06-26 NOTE — TELEPHONE ENCOUNTER
Pharmacy Name:  [facility] [id number] - [facility] [address] [place] [address] - [phone]  - [phone]  [phone]     Reference Number (if applicable): [id number]    Pharmacy representative name: AMEENA    Pharmacy representative phone number: [phone]    What medication are you calling in regards to: GABAPENTIN ENACARBIL  MG    What question does the pharmacy have: PHARMACY STATES THE THE MEDICATION GOES THROUGH THE INSURANCE BUT THE COPAY IS $200. PHARMACY IS REQUESTING AN ALTERNATIVE MEDICATION THAT IS MORE AFFORDABLE.     Who is the provider that prescribed the medication: ALYSE    PLEASE REVIEW

## 2025-06-27 RX ORDER — GABAPENTIN 600 MG/1
600 TABLET ORAL 3 TIMES DAILY
Qty: 90 TABLET | Refills: 2 | Status: SHIPPED | OUTPATIENT
Start: 2025-06-27 | End: 2025-09-25

## 2025-06-27 NOTE — ADDENDUM NOTE
Addended by: FABIEN CULLEN on: 6/27/2025 04:22 PM     Modules accepted: Orders     Patient states the Azelastine nasal spray from the  [facility], is being recalled, can the provider send a different medication to [facility] on [address], please call, thank you

## 2025-06-27 NOTE — TELEPHONE ENCOUNTER
Received text from Gisele-drug rep for Horizant-she is not eligible for copay or assistance with Medicare D insurance but she can do cash pay through [facility] in [place].

## 2025-06-27 NOTE — TELEPHONE ENCOUNTER
LVM letting her know that we are still working on her Horizant but want to make sure that she has enough regular gabapentin to make it through the weekend.

## 2025-06-30 RX ORDER — GABAPENTIN ENACARBIL 600 MG/1
600 TABLET, EXTENDED RELEASE ORAL DAILY
Qty: 30 TABLET | Refills: 3 | Status: SHIPPED | OUTPATIENT
Start: 2025-06-30 | End: 2025-07-07 | Stop reason: SDUPTHER

## 2025-06-30 NOTE — TELEPHONE ENCOUNTER
Provider: ALYSE     Caller: Yessica Galvin    Relationship to Patient: Self    Phone Number: [phone]    Reason for Call:  PATIENT SAYS THIS IS WHAT HER INSURANCE WANTS HER TO SWITCH TO INSTEAD OF GABAPENTIN    ANY OF THESE -     NEURONGIN - 600 MG  LYRICA - 600 MG  PREGABALIN - 600 MG  (NO CHARGE TO PATIENT)

## 2025-07-07 ENCOUNTER — SPECIALTY PHARMACY (OUTPATIENT)
Dept: NEUROLOGY | Facility: CLINIC | Age: 65
End: 2025-07-07
Payer: MEDICARE

## 2025-07-07 DIAGNOSIS — G25.81 RESTLESS LEGS SYNDROME (RLS): Primary | ICD-10-CM

## 2025-07-07 DIAGNOSIS — G25.81 RESTLESS LEGS SYNDROME (RLS): ICD-10-CM

## 2025-07-07 RX ORDER — GABAPENTIN ENACARBIL 600 MG/1
600 TABLET, EXTENDED RELEASE ORAL DAILY
Qty: 30 TABLET | Refills: 3 | Status: SHIPPED | OUTPATIENT
Start: 2025-07-07 | End: 2025-07-07 | Stop reason: SDUPTHER

## 2025-07-07 RX ORDER — GABAPENTIN ENACARBIL 600 MG/1
600 TABLET, EXTENDED RELEASE ORAL DAILY
Qty: 30 TABLET | Refills: 3 | Status: SHIPPED | OUTPATIENT
Start: 2025-07-07 | End: 2025-07-07

## 2025-07-07 RX ORDER — GABAPENTIN ENACARBIL 600 MG/1
600 TABLET, EXTENDED RELEASE ORAL DAILY
Qty: 30 TABLET | Refills: 3 | Status: SHIPPED | OUTPATIENT
Start: 2025-07-07

## 2025-07-08 ENCOUNTER — OFFICE VISIT (OUTPATIENT)
Dept: FAMILY MEDICINE CLINIC | Facility: CLINIC | Age: 65
End: 2025-07-08
Payer: MEDICARE

## 2025-07-08 VITALS
BODY MASS INDEX: 24.69 KG/M2 | TEMPERATURE: 97.7 F | WEIGHT: 135 LBS | DIASTOLIC BLOOD PRESSURE: 68 MMHG | RESPIRATION RATE: 16 BRPM | HEART RATE: 76 BPM | OXYGEN SATURATION: 97 % | SYSTOLIC BLOOD PRESSURE: 116 MMHG

## 2025-07-08 DIAGNOSIS — M79.642 LEFT HAND PAIN: ICD-10-CM

## 2025-07-08 DIAGNOSIS — M54.50 CHRONIC MIDLINE LOW BACK PAIN WITHOUT SCIATICA: ICD-10-CM

## 2025-07-08 DIAGNOSIS — M25.552 BILATERAL HIP PAIN: ICD-10-CM

## 2025-07-08 DIAGNOSIS — M25.512 ACUTE PAIN OF LEFT SHOULDER: ICD-10-CM

## 2025-07-08 DIAGNOSIS — R29.6 RECURRENT FALLS: Primary | ICD-10-CM

## 2025-07-08 DIAGNOSIS — M25.551 BILATERAL HIP PAIN: ICD-10-CM

## 2025-07-08 DIAGNOSIS — M25.562 ACUTE PAIN OF LEFT KNEE: ICD-10-CM

## 2025-07-08 DIAGNOSIS — G89.29 CHRONIC MIDLINE LOW BACK PAIN WITHOUT SCIATICA: ICD-10-CM

## 2025-07-08 DIAGNOSIS — T14.8XXA MULTIPLE SKIN TEARS: ICD-10-CM

## 2025-07-08 PROCEDURE — 3078F DIAST BP <80 MM HG: CPT | Performed by: PHYSICIAN ASSISTANT

## 2025-07-08 PROCEDURE — 1125F AMNT PAIN NOTED PAIN PRSNT: CPT | Performed by: PHYSICIAN ASSISTANT

## 2025-07-08 PROCEDURE — 1159F MED LIST DOCD IN RCRD: CPT | Performed by: PHYSICIAN ASSISTANT

## 2025-07-08 PROCEDURE — 99214 OFFICE O/P EST MOD 30 MIN: CPT | Performed by: PHYSICIAN ASSISTANT

## 2025-07-08 PROCEDURE — 3074F SYST BP LT 130 MM HG: CPT | Performed by: PHYSICIAN ASSISTANT

## 2025-07-08 PROCEDURE — 1160F RVW MEDS BY RX/DR IN RCRD: CPT | Performed by: PHYSICIAN ASSISTANT

## 2025-07-08 NOTE — PROGRESS NOTES
Follow Up Office Visit      Patient Name: Yessica Galvin  : 1960   MRN: [id number]     Chief Complaint:    Chief Complaint   Patient presents with    Fall     Multiple falls    Leg Injury     L leg    Shoulder Injury     L shoulder    Arm Injury     L arm    Hip Pain     Bilateral       History of Present Illness  64-year-old female presents today with complaints of recurrent falls and joint pain secondary to this.    She has been experiencing falls from her bed and while walking, which she attributes to her memory foam mattress and poor balance. These incidents have resulted in injuries to her left hip, shoulder, and arm.  Since Wednesday, she has had two falls out of bed, one fall up her stairs, one fall at the hospital.  She denies any lightheadedness, dizziness, shortness of breath.  States all of the falls are mechanical in nature, frequently tripping or losing her balance.  She is considering getting a walker with a seat.  She prefers not to use a walker outdoors.  She states her home has been modified to mitigate fall risks as well.    Majority of her pain is localized to bilateral hips, left shoulder, left hand, left knee, and low back.  She has multiple skin tears secondary to the falls.  Her current home regimen has been providing minimal pain relief.        Subjective      I have reviewed and the following portions of the patient's history were updated as appropriate: past family history, past medical history, past social history, past surgical history and problem list.    Medications:     Current Outpatient Medications:     acetaminophen (TYLENOL) 325 MG tablet, Take 2 tablets by mouth Every 4 (Four) Hours As Needed for Mild Pain ., Disp: , Rfl:     albuterol sulfate  (90 Base) MCG/ACT inhaler, Inhale 2 puffs Every 6 (Six) Hours As Needed for Wheezing., Disp: 54 g, Rfl: 3    alendronate (FOSAMAX) 70 MG tablet, TAKE 1 TABLET BY MOUTH ONCE WEEKLY, Disp: 12 tablet, Rfl: 0     amLODIPine (NORVASC) 10 MG tablet, Take 1 tablet by mouth Daily., Disp: 90 tablet, Rfl: 1    atorvastatin (LIPITOR) 20 MG tablet, TAKE ONE TABLET BY MOUTH ONCE NIGHTLY, Disp: 90 tablet, Rfl: 3    Breztri Aerosphere 160-9-4.8 MCG/ACT aerosol inhaler, , Disp: , Rfl:     budesonide-formoterol (SYMBICORT) 160-4.5 MCG/ACT inhaler, Inhale 2 puffs 2 (Two) Times a Day., Disp: 10.2 g, Rfl: 11    busPIRone (BUSPAR) 15 MG tablet, Take 1 tablet by mouth 2 (Two) Times a Day., Disp: 60 tablet, Rfl: 5    celecoxib (CeleBREX) 200 MG capsule, TAKE 1 CAPSULE BY MOUTH 2 TIMES A DAY, Disp: 60 capsule, Rfl: 3    Diclofenac Sodium (VOLTAREN) 1 % gel gel, Apply 2 g topically to the appropriate area as directed 4 (Four) Times a Day As Needed (legs)., Disp: 350 g, Rfl: 0    dicyclomine (BENTYL) 10 MG capsule, Take 1 capsule by mouth 4 (Four) Times a Day Before Meals & at Bedtime As Needed for Abdominal Cramping., Disp: 90 capsule, Rfl: 0    esomeprazole (nexIUM) 40 MG capsule, Take 1 capsule by mouth Every Morning Before Breakfast., Disp: 30 capsule, Rfl: 1    gabapentin (NEURONTIN) 600 MG tablet, Take 1 tablet by mouth 3 (Three) Times a Day for 90 days., Disp: 90 tablet, Rfl: 2    Gabapentin Enacarbil ER (Horizant) 600 MG tablet controlled-release, Take 600 mg by mouth Daily., Disp: 30 tablet, Rfl: 3    levocetirizine (Xyzal) 5 MG tablet, Take 1 tablet by mouth Every Evening., Disp: 90 tablet, Rfl: 1    levothyroxine (SYNTHROID, LEVOTHROID) 150 MCG tablet, TAKE 1 TABLET BY MOUTH EVERY MORNING, Disp: 30 tablet, Rfl: 1    lisinopril (PRINIVIL,ZESTRIL) 20 MG tablet, TAKE 1 TABLET BY MOUTH DAILY, Disp: 90 tablet, Rfl: 1    montelukast (SINGULAIR) 10 MG tablet, TAKE ONE TABLET BY MOUTH ONCE NIGHTLY, Disp: 90 tablet, Rfl: 0    mupirocin (BACTROBAN) 2 % ointment, Apply 1 Application topically to the appropriate area as directed 3 (Three) Times a Day. For 10 dyas, Disp: 30 g, Rfl: 0    naloxone (NARCAN) 4 MG/0.1ML nasal spray, Call 911. Don't  prime. Spray in 1 nostril for overdose. Repeat in 2-3 minutes in other nostril if no or minimal breathing/responsiveness., Disp: 2 each, Rfl: 0    nicotine (NICOTROL) 10 MG/ML solution nasal solution, 1 spray by Each Nare route Every 1 (One) Hour As Needed (smoking cessation). 1 to 2 doses/hour, do not exceed more than 5 doses (10 sprays) per hour, Disp: 10 mL, Rfl: 2    promethazine (PHENERGAN) 25 MG tablet, Take 1 tablet by mouth Every 8 (Eight) Hours As Needed for Nausea or Vomiting., Disp: 30 tablet, Rfl: 1    rOPINIRole (REQUIP) 4 MG tablet, Take 1 tablet by mouth Every Night., Disp: 90 tablet, Rfl: 0    tiotropium bromide monohydrate (SPIRIVA RESPIMAT) 2.5 MCG/ACT aerosol solution inhaler, Inhale 2 puffs Daily., Disp: 4 g, Rfl: 5    tiZANidine (ZANAFLEX) 4 MG tablet, Take 1 tablet by mouth Every 8 (Eight) Hours As Needed for Muscle Spasms., Disp: 90 tablet, Rfl: 1    traMADol (ULTRAM) 50 MG tablet, Take 1 tablet by mouth Every 8 (Eight) Hours As Needed for Moderate Pain., Disp: 150 tablet, Rfl: 2    venlafaxine XR (Effexor XR) 75 MG 24 hr capsule, Take 1 capsule by mouth Daily. Take with 150 mg tablet, Disp: 90 capsule, Rfl: 1    venlafaxine XR (EFFEXOR-XR) 150 MG 24 hr capsule, Take 1 capsule by mouth Daily. Take with 75 mg tablet, Disp: 90 capsule, Rfl: 1    Allergies:   Allergies   Allergen Reactions    Codeine Nausea And Vomiting    Shrimp Hives       Objective     Physical Exam:   Physical Exam  Constitutional:       General: She is not in acute distress.     Appearance: She is not ill-appearing.   HENT:      Head: Normocephalic.   Cardiovascular:      Rate and Rhythm: Normal rate and regular rhythm.      Heart sounds: No murmur heard.  Pulmonary:      Effort: Pulmonary effort is normal. No respiratory distress.      Breath sounds: Normal breath sounds.   Musculoskeletal:      Right shoulder: Normal.      Left shoulder: Swelling and tenderness present. No deformity or effusion. Decreased range of motion.  Decreased strength.      Lumbar back: Tenderness present. No spasms or bony tenderness.      Right hip: Tenderness present. Decreased range of motion.      Left hip: Tenderness present. Decreased range of motion.      Comments: Stooped posture   Skin:     General: Skin is warm.      Findings: Bruising (Left hip, left upper extremity, low back, left hand) present.      Comments: Multiple skin tears of left upper extremity, left hand, and left knee   Neurological:      General: No focal deficit present.      Mental Status: She is alert.   Psychiatric:         Mood and Affect: Mood normal.         Vital Signs:   Vitals:    07/08/25 1341   BP: 116/68   BP Location: Right arm   Patient Position: Sitting   Cuff Size: Adult   Pulse: 76   Resp: 16   Temp: 97.7 °F (36.5 °C)   TempSrc: Infrared   SpO2: 97%   Weight: 61.2 kg (135 lb)   PainSc: 9    PainLoc: Hip  Comment: Hips, arm, shoulder.     Body mass index is 24.69 kg/m².  BMI is within normal parameters. No other follow-up for BMI required.      Procedures    Assessment / Plan         Assessment and Plan     Diagnoses and all orders for this visit:    1. Recurrent falls (Primary)  -     Miscellaneous DME    2. Acute pain of left shoulder  -     XR Shoulder 2+ View Left; Future    3. Bilateral hip pain  -     XR Hips Bilateral With or Without Pelvis 3-4 View; Future    4. Chronic midline low back pain without sciatica  -     XR Spine Lumbar Complete 4+VW; Future    5. Acute pain of left knee  -     XR Knee 1 or 2 View Left; Future    6. Left hand pain  -     XR Hand 3+ View Left; Future    7. Multiple skin tears    Patient with recent episodes of multiple mechanical falls.  She feels this is secondary to her stooped posture and always looking down when she walks.  She is currently undergoing physical therapy, and states she is doing balance exercises there.  Will obtain x-rays today to rule out acute injury, especially in setting of known osteoporosis.  Advised to  continue home medications for pain control.  Fall prevention in the home discussed at length.  Will attempt to get patient a rollator walker as I suspect this would help with prevention.  Further plan of care pending imaging.         Follow Up:   Return if symptoms worsen or fail to improve, for Next scheduled follow up.    Patient or patient representative verbalized consent for the use of Ambient Listening during the visit with  Elysia Nolasco PA-C for chart documentation. 7/8/2025  13:50 EDT    Elysia Nolasco PA-C    [facility]

## 2025-07-09 ENCOUNTER — TELEPHONE (OUTPATIENT)
Dept: PHYSICAL THERAPY | Facility: CLINIC | Age: 65
End: 2025-07-09

## 2025-07-09 RX ORDER — GABAPENTIN ENACARBIL 600 MG/1
600 TABLET, EXTENDED RELEASE ORAL DAILY
Qty: 30 TABLET | Refills: 3
Start: 2025-07-09 | End: 2025-08-08

## 2025-07-09 NOTE — TELEPHONE ENCOUNTER
Caller: Yessica Galvin    Relationship: Self        What was the call regarding: FELL A COUPLE OF TIMES HAS BEEN TO DOCTOR.

## 2025-07-11 RX ORDER — DICLOFENAC SODIUM 75 MG/1
75 TABLET, DELAYED RELEASE ORAL 2 TIMES DAILY
Qty: 60 TABLET | Refills: 0 | Status: SHIPPED | OUTPATIENT
Start: 2025-07-11

## 2025-07-15 DIAGNOSIS — R11.0 NAUSEA: ICD-10-CM

## 2025-07-15 RX ORDER — PROMETHAZINE HYDROCHLORIDE 25 MG/1
25 TABLET ORAL EVERY 8 HOURS PRN
Qty: 30 TABLET | Refills: 1 | Status: SHIPPED | OUTPATIENT
Start: 2025-07-15

## 2025-07-16 ENCOUNTER — TELEPHONE (OUTPATIENT)
Dept: PHYSICAL THERAPY | Facility: CLINIC | Age: 65
End: 2025-07-16

## 2025-07-16 NOTE — TELEPHONE ENCOUNTER
Caller: Yessica Galvin    Relationship: Self         What was the call regarding: NOT FEELING WELL, FALLING, CAN HARDLY WALK , SHE IS SO SORRY

## 2025-07-17 ENCOUNTER — LAB (OUTPATIENT)
Dept: LAB | Facility: HOSPITAL | Age: 65
End: 2025-07-17
Payer: MEDICARE

## 2025-07-17 ENCOUNTER — OFFICE VISIT (OUTPATIENT)
Dept: FAMILY MEDICINE CLINIC | Facility: CLINIC | Age: 65
End: 2025-07-17
Payer: MEDICARE

## 2025-07-17 VITALS
WEIGHT: 132 LBS | TEMPERATURE: 98.6 F | OXYGEN SATURATION: 98 % | SYSTOLIC BLOOD PRESSURE: 104 MMHG | DIASTOLIC BLOOD PRESSURE: 66 MMHG | HEIGHT: 58 IN | BODY MASS INDEX: 27.71 KG/M2 | HEART RATE: 85 BPM

## 2025-07-17 DIAGNOSIS — R63.4 WEIGHT LOSS: ICD-10-CM

## 2025-07-17 DIAGNOSIS — R29.6 RECURRENT FALLS: ICD-10-CM

## 2025-07-17 DIAGNOSIS — L65.9 HAIR LOSS: ICD-10-CM

## 2025-07-17 DIAGNOSIS — M51.9 LUMBAR DISC DISEASE: Primary | ICD-10-CM

## 2025-07-17 DIAGNOSIS — E03.9 HYPOTHYROIDISM, UNSPECIFIED TYPE: ICD-10-CM

## 2025-07-17 DIAGNOSIS — M25.551 RIGHT HIP PAIN: ICD-10-CM

## 2025-07-17 DIAGNOSIS — R32 URINARY INCONTINENCE, UNSPECIFIED TYPE: ICD-10-CM

## 2025-07-17 LAB
BILIRUB BLD-MCNC: ABNORMAL MG/DL
CLARITY, POC: ABNORMAL
COLOR UR: ABNORMAL
EXPIRATION DATE: ABNORMAL
GLUCOSE UR STRIP-MCNC: NEGATIVE MG/DL
KETONES UR QL: NEGATIVE
LEUKOCYTE EST, POC: NEGATIVE
Lab: ABNORMAL
NITRITE UR-MCNC: NEGATIVE MG/ML
PH UR: 6 [PH] (ref 5–8)
PROT UR STRIP-MCNC: ABNORMAL MG/DL
RBC # UR STRIP: NEGATIVE /UL
SP GR UR: 1.01 (ref 1–1.03)
TSH SERPL DL<=0.05 MIU/L-ACNC: 3.04 UIU/ML (ref 0.27–4.2)
UROBILINOGEN UR QL: ABNORMAL

## 2025-07-17 PROCEDURE — 84443 ASSAY THYROID STIM HORMONE: CPT

## 2025-07-17 PROCEDURE — 87086 URINE CULTURE/COLONY COUNT: CPT | Performed by: STUDENT IN AN ORGANIZED HEALTH CARE EDUCATION/TRAINING PROGRAM

## 2025-07-17 RX ORDER — KETOROLAC TROMETHAMINE 10 MG/1
10 TABLET, FILM COATED ORAL EVERY 6 HOURS PRN
Qty: 20 TABLET | Refills: 0 | Status: SHIPPED | OUTPATIENT
Start: 2025-07-17 | End: 2025-07-22

## 2025-07-17 RX ORDER — KETOROLAC TROMETHAMINE 15 MG/ML
15 INJECTION, SOLUTION INTRAMUSCULAR; INTRAVENOUS ONCE
Status: COMPLETED | OUTPATIENT
Start: 2025-07-17 | End: 2025-07-17

## 2025-07-17 RX ORDER — METHYLPREDNISOLONE 4 MG/1
TABLET ORAL
Qty: 21 TABLET | Refills: 0 | Status: SHIPPED | OUTPATIENT
Start: 2025-07-17

## 2025-07-17 RX ADMIN — KETOROLAC TROMETHAMINE 15 MG: 15 INJECTION, SOLUTION INTRAMUSCULAR; INTRAVENOUS at 11:43

## 2025-07-17 NOTE — ASSESSMENT & PLAN NOTE
"- Patient had significant weight loss, likely secondary to not eating, reports that food makes her \"sick to her stomach\"  - Referral placed to GI for further evaluation  "

## 2025-07-17 NOTE — ASSESSMENT & PLAN NOTE
- Patient's last TSH was not within normal limits  - She is currently on levothyroxine 150 mcg daily  - Rechecking TSH today, will follow-up on results and advise further

## 2025-07-17 NOTE — ASSESSMENT & PLAN NOTE
- Patient with recurrent falls that are mostly mechanical in nature but she has had worsening low back pain  - She does have a walker and a cane but does need something for her to be able to sit  - Wrote a prescription for a walker with a seat, will fax to [employer] supply store

## 2025-07-17 NOTE — ASSESSMENT & PLAN NOTE
- Patient has experienced urinary incontinence and progressive back pain in the last several weeks, reports that back pain has worsened after her surgery  - UA negative for infection today, obtain urine culture for definitive results  - Obtaining MRI of lumbar region stat

## 2025-07-17 NOTE — PROGRESS NOTES
Office Note     Name: Yessica Galvin    : 1960     MRN: [id number]     Chief Complaint  Hip Pain (PT fell last week) and Back Pain    Subjective     History of Present Illness:  Yessica Galvin is a 64 y.o. female who presents today for hip pain.  Patient has been falling recently, fell from her bed as her last arm.  She reports falls are due to tripping or losing her balance.  She did have a hip x-ray which showed degenerative changes in her hips.  She is currently on diclofenac and tramadol for her symptoms.  They have not helped.  She reports that she has been falling a lot more after her back surgery.  She did have some urinary incontinence in the last couple weeks.  Denies any pain or burning with urination.  She feels an urge to go and then cannot make it to the bathroom.  She has also had diarrhea for the last week and has had accidents.  She has needed to cancel her physical therapy appointments because she cannot walk.  She is falling 2-3 times a week.  She feels that there is a shooting pain from her back into her right hip.    She continues to lose weight.  She has poor appetite and does not feel hungry.  She has not been eating very much because it makes her sick to her stomach.  She would like for me to put in a new referral for GI.    She has had some hair loss.  We did adjust her thyroid medicine several months ago.  She has been compliant with it.        Objective     Past Medical History:   Diagnosis Date    Acute bronchitis     Acute sinusitis     Allergic rhinitis 2023    Anemia 10/16/2024    Asthma     Asthma 2020    Asthma with acute exacerbation     Asthma, extrinsic ,1970    I have had it since I was 5 yrs. Old    Asthmatic bronchitis     Bronchiectasis     Always catch it    Centrilobular emphysema 2022    Chronic bronchitis with acute exacerbation 2022    Disc degeneration, lumbar     Disc degeneration, lumbar     GERD (gastroesophageal reflux  disease)     History of mammogram     Hypertension 10/05/2023    Hypothyroidism     Lower back pain     Lumbosacral disc disease     Medicare annual wellness visit, subsequent 10/05/2023    Nodule of upper lobe of right lung 06/02/2022    Plantar fasciitis     Restless legs syndrome      Past Surgical History:   Procedure Laterality Date    CARDIAC CATHETERIZATION N/A 11/17/2023    Procedure: Left Heart Cath;  Surgeon: Arben Pittman MD;  Location: [facility] CATH INVASIVE LOCATION;  Service: Cardiology;  Laterality: N/A;    CHOLECYSTECTOMY      COLONOSCOPY      HYSTERECTOMY      KNEE ARTHROSCOPY Right 03/10/2020    Procedure: KNEE ARTHROSCOPY RIGHT;  Surgeon: Guanaco Thakur MD;  Location: [facility] OR;  Service: Orthopedics;  Laterality: Right;    LUMBAR LAMINECTOMY DISCECTOMY DECOMPRESSION N/A 4/11/2024    Procedure: LUMBAR LAMINECTOMY DISCECTOMY DECOMPRESSION POSTERIOR L2-L5;  Surgeon: Ulises Elias MD;  Location: [facility] OR;  Service: Neurosurgery;  Laterality: N/A;    NECK SURGERY      ORIF WRIST FRACTURE Left 03/10/2020    Procedure: WRIST OPEN REDUCTION INTERNAL FIXATION LEFT;  Surgeon: Guanaco Thakur MD;  Location: [facility] OR;  Service: Orthopedics;  Laterality: Left;    TIBIAL PLATEAU OPEN REDUCTION INTERNAL FIXATION Right 03/10/2020    Procedure: TIBIAL PLATEAU OPEN REDUCTION INTERNAL FIXATION RIGHT;  Surgeon: Guanaco Thakur MD;  Location: [facility] OR;  Service: Orthopedics;  Laterality: Right;    UPPER GASTROINTESTINAL ENDOSCOPY       Family History   Problem Relation Age of Onset    Aneurysm Mother     Hypertension Mother     Cancer Mother     Asthma Mother     Heart disease Mother     Diabetes Mother     Hypertension Father     Alzheimer's disease Father     Asthma Father     No Known Problems Sister     No Known Problems Brother     COPD Maternal Grandmother     Aneurysm Maternal Grandmother     No Known Problems Maternal Grandfather     Alzheimer's disease Paternal Grandmother     Dementia  "Paternal Grandmother     No Known Problems Paternal Grandfather     Breast cancer Neg Hx     Ovarian cancer Neg Hx     Colon cancer Neg Hx     Esophageal cancer Neg Hx        Vital Signs  /66   Pulse 85   Temp 98.6 °F (37 °C) (Infrared)   Ht 147.3 cm (58\")   Wt 59.9 kg (132 lb)   SpO2 98%   BMI 27.59 kg/m²   Estimated body mass index is 27.59 kg/m² as calculated from the following:    Height as of this encounter: 147.3 cm (58\").    Weight as of this encounter: 59.9 kg (132 lb).    Physical Exam  Vitals reviewed.   HENT:      Head: Normocephalic.   Cardiovascular:      Rate and Rhythm: Normal rate.   Pulmonary:      Effort: Pulmonary effort is normal.   Musculoskeletal:      Comments: Tender to palpation along her right lower back, pain with flexion of right hip   Neurological:      Mental Status: She is alert.                   Assessment and Plan     Diagnoses and all orders for this visit:    1. Lumbar disc disease (Primary)  Assessment & Plan:  - Patient has an extensive history of degenerative disease of her back with scoliosis and a chronic compression fracture of L2, reports continued pain, feels like her back is \"knotted up.\"  - Status post recent laminectomy, discectomy and decompression  - We will continue tramadol 50 mg every 8 hours as needed  - Will sinew tizanidine  - Baltazar reviewed and appropriate  - UDS and medication contract up-to-date  - Can consider interventional pain referral in the future, obtaining repeat MRI given worsening symptoms postoperatively      Orders:  -     tiZANidine (ZANAFLEX) 4 MG tablet; Take 1 tablet by mouth Every 8 (Eight) Hours As Needed for Muscle Spasms.  Dispense: 90 tablet; Refill: 1  -     ketorolac (TORADOL) injection 15 mg  -     MRI Lumbar Spine Without Contrast; Future    2. Right hip pain  Assessment & Plan:  - With right hip pain secondary to her fall, did have an x-ray that showed degenerative changes  - Will start Medrol Dosepak, Toradol injection " "given during visit today  - Will start continuation therapy with oral Toradol  - If no improvement in symptoms, will consider referral to orthopedic surgery    Orders:  -     methylPREDNISolone (MEDROL) 4 MG dose pack; Take as directed on package instructions.  Dispense: 21 tablet; Refill: 0  -     ketorolac (TORADOL) 10 MG tablet; Take 1 tablet by mouth Every 6 (Six) Hours As Needed for Mild Pain for up to 5 days.  Dispense: 20 tablet; Refill: 0    3. Urinary incontinence, unspecified type  Assessment & Plan:  - Patient has experienced urinary incontinence and progressive back pain in the last several weeks, reports that back pain has worsened after her surgery  - UA negative for infection today, obtain urine culture for definitive results  - Obtaining MRI of lumbar region stat    Orders:  -     POCT urinalysis dipstick, automated  -     Urine Culture - Urine, Urine, Clean Catch; Future    4. Hair loss  Assessment & Plan:  - Patient with hair loss for the last several months, likely secondary to telogen effluvium from her rapid weight loss  - Obtaining TSH to rule that out as a potential etiology    Orders:  -     TSH Rfx On Abnormal To Free T4; Future    5. Recurrent falls  Assessment & Plan:  - Patient with recurrent falls that are mostly mechanical in nature but she has had worsening low back pain  - She does have a walker and a cane but does need something for her to be able to sit  - Wrote a prescription for a walker with a seat, will fax to medical supply store      6. Hypothyroidism, unspecified type  Assessment & Plan:  - Patient's last TSH was not within normal limits  - She is currently on levothyroxine 150 mcg daily  - Rechecking TSH today, will follow-up on results and advise further      7. Weight loss  Assessment & Plan:  - Patient had significant weight loss, likely secondary to not eating, reports that food makes her \"sick to her stomach\"  - Referral placed to GI for further evaluation    Orders:  - "     Ambulatory Referral to Gastroenterology      BMI is within normal parameters. No other follow-up for BMI required.      Follow Up  As scheduled old    Betty Jasmine MD

## 2025-07-17 NOTE — ASSESSMENT & PLAN NOTE
"- Patient has an extensive history of degenerative disease of her back with scoliosis and a chronic compression fracture of L2, reports continued pain, feels like her back is \"knotted up.\"  - Status post recent laminectomy, discectomy and decompression  - We will continue tramadol 50 mg every 8 hours as needed  - Will sinew tizanidine  - Baltazar reviewed and appropriate  - UDS and medication contract up-to-date  - Can consider interventional pain referral in the future, obtaining repeat MRI given worsening symptoms postoperatively    "

## 2025-07-17 NOTE — ASSESSMENT & PLAN NOTE
- With right hip pain secondary to her fall, did have an x-ray that showed degenerative changes  - Will start Medrol Dosepak, Toradol injection given during visit today  - Will start continuation therapy with oral Toradol  - If no improvement in symptoms, will consider referral to orthopedic surgery

## 2025-07-17 NOTE — ASSESSMENT & PLAN NOTE
- Patient with hair loss for the last several months, likely secondary to telogen effluvium from her rapid weight loss  - Obtaining TSH to rule that out as a potential etiology

## 2025-07-18 ENCOUNTER — TELEPHONE (OUTPATIENT)
Dept: FAMILY MEDICINE CLINIC | Facility: CLINIC | Age: 65
End: 2025-07-18
Payer: MEDICARE

## 2025-07-18 NOTE — TELEPHONE ENCOUNTER
Name: Yessica Galvin      Relationship: Self      Best Callback Number: [phone]       HUB PROVIDED THE RELAY MESSAGE FROM THE OFFICE      PATIENT: VOICED UNDERSTANDING AND HAS NO FURTHER QUESTIONS AT THIS TIME    ADDITIONAL INFORMATION:

## 2025-07-19 LAB — BACTERIA SPEC AEROBE CULT: NO GROWTH

## 2025-07-23 ENCOUNTER — OFFICE VISIT (OUTPATIENT)
Dept: NEUROSURGERY | Facility: CLINIC | Age: 65
End: 2025-07-23
Payer: MEDICARE

## 2025-07-23 VITALS — TEMPERATURE: 97.7 F | BODY MASS INDEX: 27.41 KG/M2 | HEIGHT: 58 IN | WEIGHT: 130.6 LBS

## 2025-07-23 DIAGNOSIS — Q76.49 SPINAL DEFORMITY: Primary | ICD-10-CM

## 2025-07-23 DIAGNOSIS — M54.9 MECHANICAL BACK PAIN: ICD-10-CM

## 2025-07-23 DIAGNOSIS — M48.062 LUMBAR STENOSIS WITH NEUROGENIC CLAUDICATION: ICD-10-CM

## 2025-07-23 RX ORDER — METHOCARBAMOL 750 MG/1
750 TABLET, FILM COATED ORAL 3 TIMES DAILY
Qty: 90 TABLET | Refills: 0 | Status: SHIPPED | OUTPATIENT
Start: 2025-07-23

## 2025-07-23 NOTE — PROGRESS NOTES
Patient: Yessica Galvin  : 1960  Chart #: [id number]    Date of Service: 2025    Chief Complaint: Low back and leg pain     HPI: Ms. Galvin is a 64-year-old woman that is known to our service. She initially presented our service in  for low back and bilateral leg pain. Studies demonstrated high-grade, multilevel lumbar stenosis, so she underwent decompressive laminectomies at L2-3, L3-4, and L4-5. She had a postoperative incision infection that was treated with oral Bactrim. Overall she has done well. In the past several months, she's noticed increased low back pain.  This has now become more of a bilateral leg pain that will spread to both of her thighs, and she attributes this to recent falls.  Symptoms are actually better whenever she stands or moves around and worse when she sits.  Her primary care provider has ordered a lumbar MRI that she no new numbness, tingling, or weakness. She is smoking about a pack of cigarettes daily. She is taking a medicine for restless leg syndrome. She takes gabapentin daily and tramadol 50 mg 2-3 times daily as needed.     Chronic Illnesses:    Past Medical History:   Diagnosis Date    Acute bronchitis     Acute sinusitis     Allergic rhinitis 2023    Anemia 10/16/2024    Asthma     Asthma 2020    Asthma with acute exacerbation     Asthma, extrinsic ,1970    I have had it since I was 5 yrs. Old    Asthmatic bronchitis     Bronchiectasis     Always catch it    Centrilobular emphysema 2022    Chronic bronchitis with acute exacerbation 2022    Disc degeneration, lumbar     Disc degeneration, lumbar     GERD (gastroesophageal reflux disease)     History of mammogram     Hypertension 10/05/2023    Hypothyroidism     Lower back pain     Lumbosacral disc disease     Medicare annual wellness visit, subsequent 10/05/2023    Nodule of upper lobe of right lung 2022    Plantar fasciitis     Restless legs syndrome          Past Surgical  History:   Procedure Laterality Date    CARDIAC CATHETERIZATION N/A 11/17/2023    Procedure: Left Heart Cath;  Surgeon: Arben Pittman MD;  Location:  [facility]AS[facility];  Service: Cardiology;  Laterality: N/A;    CHOLECYSTECTOMY      COLONOSCOPY      HYSTERECTOMY      KNEE ARTHROSCOPY Right 03/10/2020    Procedure: KNEE ARTHROSCOPY RIGHT;  Surgeon: Guanaco Thakur MD;  Location:  [facility] OR;  Service: Orthopedics;  Laterality: Right;    LUMBAR LAMINECTOMY DISCECTOMY DECOMPRESSION N/A 4/11/2024    Procedure: LUMBAR LAMINECTOMY DISCECTOMY DECOMPRESSION POSTERIOR L2-L5;  Surgeon: Ulises Elias MD;  Location:  [facility] OR;  Service: Neurosurgery;  Laterality: N/A;    NECK SURGERY      ORIF WRIST FRACTURE Left 03/10/2020    Procedure: WRIST OPEN REDUCTION INTERNAL FIXATION LEFT;  Surgeon: Guanaco Thakur MD;  Location:  [facility] OR;  Service: Orthopedics;  Laterality: Left;    TIBIAL PLATEAU OPEN REDUCTION INTERNAL FIXATION Right 03/10/2020    Procedure: TIBIAL PLATEAU OPEN REDUCTION INTERNAL FIXATION RIGHT;  Surgeon: Guanaco Thakur MD;  Location:  [facility];  Service: Orthopedics;  Laterality: Right;    UPPER GASTROINTESTINAL ENDOSCOPY         Allergies   Allergen Reactions    Codeine Nausea And Vomiting    Shrimp Hives         Current Outpatient Medications:     acetaminophen (TYLENOL) 325 MG tablet, Take 2 tablets by mouth Every 4 (Four) Hours As Needed for Mild Pain ., Disp: , Rfl:     albuterol sulfate  (90 Base) MCG/ACT inhaler, Inhale 2 puffs Every 6 (Six) Hours As Needed for Wheezing., Disp: 54 g, Rfl: 3    alendronate (FOSAMAX) 70 MG tablet, TAKE 1 TABLET BY MOUTH ONCE WEEKLY, Disp: 12 tablet, Rfl: 0    amLODIPine (NORVASC) 10 MG tablet, Take 1 tablet by mouth Daily., Disp: 90 tablet, Rfl: 1    atorvastatin (LIPITOR) 20 MG tablet, TAKE ONE TABLET BY MOUTH ONCE NIGHTLY, Disp: 90 tablet, Rfl: 3    Breztri Aerosphere 160-9-4.8 MCG/ACT aerosol inhaler, , Disp: , Rfl:      budesonide-formoterol (SYMBICORT) 160-4.5 MCG/ACT inhaler, Inhale 2 puffs 2 (Two) Times a Day., Disp: 10.2 g, Rfl: 11    busPIRone (BUSPAR) 15 MG tablet, Take 1 tablet by mouth 2 (Two) Times a Day., Disp: 60 tablet, Rfl: 5    Diclofenac Sodium (VOLTAREN) 1 % gel gel, Apply 2 g topically to the appropriate area as directed 4 (Four) Times a Day As Needed (legs)., Disp: 350 g, Rfl: 0    dicyclomine (BENTYL) 10 MG capsule, Take 1 capsule by mouth 4 (Four) Times a Day Before Meals & at Bedtime As Needed for Abdominal Cramping., Disp: 90 capsule, Rfl: 0    esomeprazole (nexIUM) 40 MG capsule, Take 1 capsule by mouth Every Morning Before Breakfast., Disp: 30 capsule, Rfl: 1    gabapentin (NEURONTIN) 600 MG tablet, Take 1 tablet by mouth 3 (Three) Times a Day for 90 days., Disp: 90 tablet, Rfl: 2    Gabapentin Enacarbil ER (Horizant) 600 MG tablet controlled-release, Take 600 mg by mouth Daily., Disp: 30 tablet, Rfl: 3    Gabapentin Enacarbil ER (Horizant) 600 MG tablet controlled-release, Take 600 mg by mouth Daily for 30 days., Disp: 30 tablet, Rfl: 3    levocetirizine (Xyzal) 5 MG tablet, Take 1 tablet by mouth Every Evening., Disp: 90 tablet, Rfl: 1    levothyroxine (SYNTHROID, LEVOTHROID) 150 MCG tablet, TAKE 1 TABLET BY MOUTH EVERY MORNING, Disp: 30 tablet, Rfl: 1    lisinopril (PRINIVIL,ZESTRIL) 20 MG tablet, TAKE 1 TABLET BY MOUTH DAILY, Disp: 90 tablet, Rfl: 1    methylPREDNISolone (MEDROL) 4 MG dose pack, Take as directed on package instructions., Disp: 21 tablet, Rfl: 0    montelukast (SINGULAIR) 10 MG tablet, TAKE ONE TABLET BY MOUTH ONCE NIGHTLY, Disp: 90 tablet, Rfl: 0    mupirocin (BACTROBAN) 2 % ointment, Apply 1 Application topically to the appropriate area as directed 3 (Three) Times a Day. For 10 dyas, Disp: 30 g, Rfl: 0    nicotine (NICOTROL) 10 MG/ML solution nasal solution, 1 spray by Each Nare route Every 1 (One) Hour As Needed (smoking cessation). 1 to 2 doses/hour, do not exceed more than 5 doses  (10 sprays) per hour, Disp: 10 mL, Rfl: 2    promethazine (PHENERGAN) 25 MG tablet, Take 1 tablet by mouth Every 8 (Eight) Hours As Needed for Nausea or Vomiting., Disp: 30 tablet, Rfl: 1    rOPINIRole (REQUIP) 4 MG tablet, Take 1 tablet by mouth Every Night., Disp: 90 tablet, Rfl: 0    tiotropium bromide monohydrate (SPIRIVA RESPIMAT) 2.5 MCG/ACT aerosol solution inhaler, Inhale 2 puffs Daily., Disp: 4 g, Rfl: 5    tiZANidine (ZANAFLEX) 4 MG tablet, Take 1 tablet by mouth Every 8 (Eight) Hours As Needed for Muscle Spasms., Disp: 90 tablet, Rfl: 1    traMADol (ULTRAM) 50 MG tablet, Take 1 tablet by mouth Every 8 (Eight) Hours As Needed for Moderate Pain., Disp: 150 tablet, Rfl: 2    methocarbamol (ROBAXIN) 750 MG tablet, Take 1 tablet by mouth 3 (Three) Times a Day., Disp: 90 tablet, Rfl: 0    venlafaxine XR (Effexor XR) 75 MG 24 hr capsule, Take 1 capsule by mouth Daily. Take with 150 mg tablet (Patient not taking: Reported on 7/23/2025), Disp: 90 capsule, Rfl: 1    venlafaxine XR (EFFEXOR-XR) 150 MG 24 hr capsule, Take 1 capsule by mouth Daily. Take with 75 mg tablet (Patient not taking: Reported on 7/23/2025), Disp: 90 capsule, Rfl: 1    Social History     Socioeconomic History    Marital status:    Tobacco Use    Smoking status: Every Day     Current packs/day: 1.00     Average packs/day: 0.3 packs/day for 25.4 years (8.3 ttl pk-yrs)     Types: Cigarettes     Start date: 3/7/2000     Passive exposure: Current    Smokeless tobacco: Never    Tobacco comments:     I started when i was 17   Vaping Use    Vaping status: Never Used   Substance and Sexual Activity    Alcohol use: No    Drug use: No    Sexual activity: Not Currently     Partners: Female     Birth control/protection: None       Family History   Problem Relation Age of Onset    Aneurysm Mother     Hypertension Mother     Cancer Mother     Asthma Mother     Heart disease Mother     Diabetes Mother     Hypertension Father     Alzheimer's disease  Father     Asthma Father     No Known Problems Sister     No Known Problems Brother     COPD Maternal Grandmother     Aneurysm Maternal Grandmother     No Known Problems Maternal Grandfather     Alzheimer's disease Paternal Grandmother     Dementia Paternal Grandmother     No Known Problems Paternal Grandfather     Breast cancer Neg Hx     Ovarian cancer Neg Hx     Colon cancer Neg Hx     Esophageal cancer Neg Hx                Social History    Tobacco Use      Smoking status: Every Day        Packs/day: 1.00        Years: 0.3 packs/day for 25.4 years (8.3 ttl pk-yrs)        Types: Cigarettes        Start date: 3/7/2000        Passive exposure: Current      Smokeless tobacco: Never      Tobacco comments: I started when i was 17       Review of Systems   Constitutional:  Positive for activity change and fatigue. Negative for appetite change, chills, diaphoresis, fever and unexpected weight change.   HENT:  Negative for congestion, dental problem, drooling, ear discharge, ear pain, facial swelling, hearing loss, mouth sores, nosebleeds, postnasal drip, rhinorrhea, sinus pressure, sinus pain, sneezing, sore throat, tinnitus, trouble swallowing and voice change.    Eyes:  Negative for photophobia, pain, discharge, redness, itching and visual disturbance.   Respiratory:  Negative for apnea, cough, choking, chest tightness, shortness of breath, wheezing and stridor.    Cardiovascular:  Negative for chest pain, palpitations and leg swelling.   Gastrointestinal:  Negative for abdominal distention, abdominal pain, anal bleeding, blood in stool, constipation, diarrhea, nausea, rectal pain and vomiting.   Endocrine: Negative for cold intolerance, heat intolerance, polydipsia, polyphagia and polyuria.   Genitourinary:  Negative for decreased urine volume, difficulty urinating, dyspareunia, dysuria, enuresis, flank pain, frequency, genital sores, hematuria, menstrual problem, pelvic pain, urgency, vaginal bleeding, vaginal  "discharge and vaginal pain.   Musculoskeletal:  Positive for back pain. Negative for arthralgias, gait problem, joint swelling, myalgias, neck pain and neck stiffness.   Skin:  Negative for color change, pallor, rash and wound.   Allergic/Immunologic: Negative for environmental allergies, food allergies and immunocompromised state.   Neurological:  Negative for dizziness, tremors, seizures, syncope, facial asymmetry, speech difficulty, weakness, light-headedness, numbness and headaches.   Hematological:  Negative for adenopathy. Does not bruise/bleed easily.   Psychiatric/Behavioral:  Negative for agitation, behavioral problems, confusion, decreased concentration, dysphoric mood, hallucinations, self-injury, sleep disturbance and suicidal ideas. The patient is not nervous/anxious and is not hyperactive.         The patient's past medical history, past surgical history, family history, and social history have been reviewed at length in the electronic medical record.     Physical examination:  Temperature 97.7 °F (36.5 °C), temperature source Infrared, height 147.3 cm (57.99\"), weight 59.2 kg (130 lb 9.6 oz), not currently breastfeeding.  Lumbar incision is well-healed.     Constitutional: The patient is well-developed. She appears older than her stated age. She is pleasant and is in no acute distress.      HEENT: normocephalic, atraumatic, sclera clear     Musculoskeletal: Stopped posture secondary to spinal deformities.     Neurologic Exam  A/A/C, speech clear, attention normal, conversant, answers questions appropriately, good historian.  Cranial nerves II through XII are intact.  Motor examination does not reveal weakness in the lower extremities.   Sensation is intact to light touch testing, other than some numbness the patient endorses in the right leg. This is chronic.   Gait is normal, balance is normal.   No tremors are noted.  Reflexes are intact.      Plain films of the lumbar spine do not demonstrate acute " fracture.  There is no new or worsening spondylolisthesis but there is an unchanged retrolisthesis of L2 on L3.  There is a chronic L2 compression fracture.  There is advanced degenerative disc disease and facet arthropathy.  There is extensive bony osteophyte formation.    Medical Decision Making  Diagnoses and all orders for this visit:    1. Spinal deformity (Primary)    2. Mechanical back pain    3. Lumbar stenosis with neurogenic claudication    Other orders  -     methocarbamol (ROBAXIN) 750 MG tablet; Take 1 tablet by mouth 3 (Three) Times a Day.  Dispense: 90 tablet; Refill: 0    Ms. Galvin previously described mechanical symptoms.  Physical therapy was unhelpful.  She is obtaining a lumbar MRI.  I will review that with her in the next 2 to 3 weeks and we will discuss if there are any surgical options at follow-up.    Any copied data from previous notes included in the (1) HPI, (2) PE, (3) MDM and/or assessment and plan has been reviewed and is accurate as of this day.    Katherine Rose PA-C     Patient Care Team:  Betty Jasmine MD as PCP - General (Family Medicine)  Atul Tavarez MD as Consulting Physician (Gastroenterology)  Bobby Tom MD as Consulting Physician (Pulmonary Disease)  Dlegado Lal MD as Consulting Physician (Pain Medicine)  Ulises Elias MD as Consulting Physician (Neurosurgery)  Betty Jasmine MD as Primary Care Provider (Family Medicine)  Brenda Richardson MD as Consulting Physician (Orthopedic Surgery)

## 2025-07-25 ENCOUNTER — OFFICE VISIT (OUTPATIENT)
Dept: FAMILY MEDICINE CLINIC | Facility: CLINIC | Age: 65
End: 2025-07-25
Payer: MEDICARE

## 2025-07-25 VITALS
HEIGHT: 58 IN | OXYGEN SATURATION: 98 % | SYSTOLIC BLOOD PRESSURE: 100 MMHG | BODY MASS INDEX: 27.84 KG/M2 | HEART RATE: 68 BPM | WEIGHT: 132.6 LBS | TEMPERATURE: 98.6 F | DIASTOLIC BLOOD PRESSURE: 62 MMHG

## 2025-07-25 DIAGNOSIS — M54.50 CHRONIC MIDLINE LOW BACK PAIN WITHOUT SCIATICA: Primary | ICD-10-CM

## 2025-07-25 DIAGNOSIS — G89.29 CHRONIC MIDLINE LOW BACK PAIN WITHOUT SCIATICA: Primary | ICD-10-CM

## 2025-07-25 RX ORDER — KETOROLAC TROMETHAMINE 10 MG/1
10 TABLET, FILM COATED ORAL EVERY 6 HOURS PRN
Qty: 20 TABLET | Refills: 0 | Status: SHIPPED | OUTPATIENT
Start: 2025-07-25 | End: 2025-07-30

## 2025-07-25 RX ORDER — CELECOXIB 200 MG/1
200 CAPSULE ORAL 2 TIMES DAILY
Qty: 60 CAPSULE | Refills: 1 | Status: SHIPPED | OUTPATIENT
Start: 2025-07-25

## 2025-07-25 RX ORDER — KETOROLAC TROMETHAMINE 30 MG/ML
30 INJECTION, SOLUTION INTRAMUSCULAR; INTRAVENOUS ONCE
Status: COMPLETED | OUTPATIENT
Start: 2025-07-25 | End: 2025-07-25

## 2025-07-25 RX ORDER — CELECOXIB 100 MG/1
200 CAPSULE ORAL 2 TIMES DAILY
Qty: 60 CAPSULE | Refills: 1 | Status: SHIPPED | OUTPATIENT
Start: 2025-07-25 | End: 2025-07-25

## 2025-07-25 RX ADMIN — KETOROLAC TROMETHAMINE 30 MG: 30 INJECTION, SOLUTION INTRAMUSCULAR; INTRAVENOUS at 14:04

## 2025-07-25 NOTE — ASSESSMENT & PLAN NOTE
- Patient with chronic back pain, did have recent laminectomy and did report improvement in her symptoms of her back pain, now complains of worsening pain  -MRI is pending at the end of this month and she does have follow-up with neurosurgery thereafter  -She is currently on gabapentin per neurology   -We will increase her tramadol to 100 mg every 6 hours as needed, will refill when patient runs out of her current tramadol prescription  -Dose of ketorolac administered today, 30 mg and then continuation therapy with oral Toradol for 5 days  - After the 5 days are complete, patient to start Celebrex 200 mg twice daily  - Patient was sent in a new muscle relaxer per neurosurgery, so advised patient to stop the tizanidine and start the Robaxin that was sent in  -I did discuss patient seeing in pain management clinic and she declines at this time  -Patient contract and UDS up-to-date  - Reviewed Baltazar, which is appropriate

## 2025-07-25 NOTE — PROGRESS NOTES
Office Note     Name: Yessica Galvin    : 1960     MRN: [id number]     Chief Complaint  Hip Pain and Back Pain (PT came in last week for hip and back pain and although she reports some improvement she is still in pain)    Subjective     History of Present Illness:  Yessica Galvin is a 64 y.o. female who presents today for follow-up on back pain and right hip pain.  She reports that her pain is very.  It is improved from what it was when she saw me last visit, approximately 50% better.  She reports that the Toradol did help and she is interested in getting a new shot today.  She does like she has a lot of spasms in her right hip.  She was prescribed Robaxin by neurosurgery and did see them a couple days ago.  They did order a repeat MRI.  She has been taking the tramadol 100 mg every 8 hours and it has not been very beneficial.  Diclofenac was not helpful.      Objective     Past Medical History:   Diagnosis Date    Acute bronchitis     Acute sinusitis     Allergic rhinitis 2023    Anemia 10/16/2024    Asthma     Asthma 2020    Asthma with acute exacerbation     Asthma, extrinsic ,1970    I have had it since I was 5 yrs. Old    Asthmatic bronchitis     Bronchiectasis     Always catch it    Centrilobular emphysema 2022    Chronic bronchitis with acute exacerbation 2022    Disc degeneration, lumbar     Disc degeneration, lumbar     GERD (gastroesophageal reflux disease)     History of mammogram     Hypertension 10/05/2023    Hypothyroidism     Lower back pain     Lumbosacral disc disease     Medicare annual wellness visit, subsequent 10/05/2023    Nodule of upper lobe of right lung 2022    Plantar fasciitis     Restless legs syndrome      Past Surgical History:   Procedure Laterality Date    CARDIAC CATHETERIZATION N/A 2023    Procedure: Left Heart Cath;  Surgeon: Arben Pittman MD;  Location: [facility] CATH INVASIVE LOCATION;  Service: Cardiology;  Laterality:  "N/A;    CHOLECYSTECTOMY      COLONOSCOPY      HYSTERECTOMY      KNEE ARTHROSCOPY Right 03/10/2020    Procedure: KNEE ARTHROSCOPY RIGHT;  Surgeon: Guanaco Thakur MD;  Location:  [facility] OR;  Service: Orthopedics;  Laterality: Right;    LUMBAR LAMINECTOMY DISCECTOMY DECOMPRESSION N/A 4/11/2024    Procedure: LUMBAR LAMINECTOMY DISCECTOMY DECOMPRESSION POSTERIOR L2-L5;  Surgeon: Ulises Elias MD;  Location:  [facility] OR;  Service: Neurosurgery;  Laterality: N/A;    NECK SURGERY      ORIF WRIST FRACTURE Left 03/10/2020    Procedure: WRIST OPEN REDUCTION INTERNAL FIXATION LEFT;  Surgeon: Guanaco Thakur MD;  Location:  [facility];  Service: Orthopedics;  Laterality: Left;    TIBIAL PLATEAU OPEN REDUCTION INTERNAL FIXATION Right 03/10/2020    Procedure: TIBIAL PLATEAU OPEN REDUCTION INTERNAL FIXATION RIGHT;  Surgeon: Guanaco Thakur MD;  Location:  [facility];  Service: Orthopedics;  Laterality: Right;    UPPER GASTROINTESTINAL ENDOSCOPY       Family History   Problem Relation Age of Onset    Aneurysm Mother     Hypertension Mother     Cancer Mother     Asthma Mother     Heart disease Mother     Diabetes Mother     Hypertension Father     Alzheimer's disease Father     Asthma Father     No Known Problems Sister     No Known Problems Brother     COPD Maternal Grandmother     Aneurysm Maternal Grandmother     No Known Problems Maternal Grandfather     Alzheimer's disease Paternal Grandmother     Dementia Paternal Grandmother     No Known Problems Paternal Grandfather     Breast cancer Neg Hx     Ovarian cancer Neg Hx     Colon cancer Neg Hx     Esophageal cancer Neg Hx        Vital Signs  /62   Pulse 68   Temp 98.6 °F (37 °C) (Infrared)   Ht 147.3 cm (57.99\")   Wt 60.1 kg (132 lb 9.6 oz)   SpO2 98%   BMI 27.72 kg/m²   Estimated body mass index is 27.72 kg/m² as calculated from the following:    Height as of this encounter: 147.3 cm (57.99\").    Weight as of this encounter: 60.1 kg (132 lb 9.6 " oz).    Physical Exam  Vitals reviewed.   Cardiovascular:      Rate and Rhythm: Normal rate.   Pulmonary:      Effort: Pulmonary effort is normal.   Musculoskeletal:      Comments: Tender to palpation along low back   Neurological:      Mental Status: She is alert.                   Assessment and Plan     Diagnoses and all orders for this visit:    1. Chronic midline low back pain without sciatica (Primary)  Assessment & Plan:  - Patient with chronic back pain, did have recent laminectomy and did report improvement in her symptoms of her back pain, now complains of worsening pain  -MRI is pending at the end of this month and she does have follow-up with neurosurgery thereafter  -She is currently on gabapentin per neurology   -We will increase her tramadol to 100 mg every 6 hours as needed, will refill when patient runs out of her current tramadol prescription  -Dose of ketorolac administered today, 30 mg and then continuation therapy with oral Toradol for 5 days  - After the 5 days are complete, patient to start Celebrex 200 mg twice daily  - Patient was sent in a new muscle relaxer per neurosurgery, so advised patient to stop the tizanidine and start the Robaxin that was sent in  -I did discuss patient seeing in pain management clinic and she declines at this time  -Patient contract and UDS up-to-date  - Reviewed Baltazar, which is appropriate    Orders:  -     ketorolac (TORADOL) injection 30 mg  -     ketorolac (TORADOL) 10 MG tablet; Take 1 tablet by mouth Every 6 (Six) Hours As Needed for Mild Pain or Moderate Pain for up to 5 days.  Dispense: 20 tablet; Refill: 0  -     celecoxib (CeleBREX) 200 MG capsule; Take 1 capsule by mouth 2 (Two) Times a Day. TAKE AFTER THE TORADOL IS COMPLETE  Dispense: 60 capsule; Refill: 1    Other orders  -     Discontinue: celecoxib (CeleBREX) 100 MG capsule; Take 2 capsules by mouth 2 (Two) Times a Day. TAKE AFTER THE TORADOL IS COMPLETE  Dispense: 60 capsule; Refill: 1          Follow Up  Return in about 4 weeks (around 8/22/2025) for back pain and hip pain.    Betty Jasmine MD

## 2025-07-27 DIAGNOSIS — F32.A ANXIETY AND DEPRESSION: ICD-10-CM

## 2025-07-27 DIAGNOSIS — F41.9 ANXIETY AND DEPRESSION: ICD-10-CM

## 2025-07-28 ENCOUNTER — TELEPHONE (OUTPATIENT)
Dept: FAMILY MEDICINE CLINIC | Facility: CLINIC | Age: 65
End: 2025-07-28
Payer: MEDICARE

## 2025-07-28 RX ORDER — BUSPIRONE HYDROCHLORIDE 15 MG/1
15 TABLET ORAL 2 TIMES DAILY
Qty: 60 TABLET | Refills: 5 | Status: SHIPPED | OUTPATIENT
Start: 2025-07-28

## 2025-07-28 NOTE — TELEPHONE ENCOUNTER
Fax notification received that PA has been started for [facility]OL[email or web address]

## 2025-07-29 ENCOUNTER — PRIOR AUTHORIZATION (OUTPATIENT)
Dept: FAMILY MEDICINE CLINIC | Facility: CLINIC | Age: 65
End: 2025-07-29
Payer: MEDICARE

## 2025-07-29 NOTE — TELEPHONE ENCOUNTER
Outcome  Approved today by [employer] 2017  PA Case: [id number], Status: Approved, Coverage Starts on: 4/29/2025 12:00:00 AM, Coverage Ends on: 7/29/2026 12:00:00 AM.  Effective Date: 4/29/2025  Authorization Expiration Date: 7/29/2026

## 2025-07-29 NOTE — TELEPHONE ENCOUNTER
Outcome  Approved today by [employer] 2017  PA Case: [id number], Status: Approved, Coverage Starts on: 4/29/2025 12:00:00 AM, Coverage Ends on: 7/29/2026 12:00:00 AM.  Effective Date: 4/29/2025  Authorization Expiration Date: 7/29/2026

## 2025-07-31 ENCOUNTER — HOSPITAL ENCOUNTER (OUTPATIENT)
Facility: HOSPITAL | Age: 65
Discharge: HOME | End: 2025-07-31
Admitting: STUDENT IN AN ORGANIZED HEALTH CARE EDUCATION/TRAINING PROGRAM
Payer: MEDICARE

## 2025-07-31 DIAGNOSIS — M51.9 LUMBAR DISC DISEASE: ICD-10-CM

## 2025-07-31 PROCEDURE — 72148 MRI LUMBAR SPINE W/O DYE: CPT

## 2025-08-01 ENCOUNTER — TELEPHONE (OUTPATIENT)
Dept: FAMILY MEDICINE CLINIC | Facility: CLINIC | Age: 65
End: 2025-08-01
Payer: MEDICARE

## 2025-08-01 NOTE — TELEPHONE ENCOUNTER
Hey I called and asked to have her moved up and they told me no. Maybe you could directly message the provider that she is seeing and ask her?

## 2025-08-01 NOTE — TELEPHONE ENCOUNTER
Can someone please call neurosurgery and see if they can get her in even sooner than what she is currently scheduled?  If they can look at her imaging and see what they think.  She does have an appointment scheduled with a PA on Friday but she is in a lot of pain and I would like to see if someone can see her maybe on Monday or Tuesday.  Please let me know.

## 2025-08-05 ENCOUNTER — TELEPHONE (OUTPATIENT)
Dept: NEUROLOGY | Facility: CLINIC | Age: 65
End: 2025-08-05
Payer: MEDICARE

## 2025-08-05 DIAGNOSIS — R11.0 NAUSEA: ICD-10-CM

## 2025-08-05 RX ORDER — HYDROCODONE BITARTRATE AND ACETAMINOPHEN 7.5; 325 MG/1; MG/1
1 TABLET ORAL EVERY 6 HOURS PRN
Qty: 12 TABLET | Refills: 0 | Status: SHIPPED | OUTPATIENT
Start: 2025-08-05

## 2025-08-05 RX ORDER — ESOMEPRAZOLE MAGNESIUM 40 MG/1
40 CAPSULE, DELAYED RELEASE ORAL
Qty: 30 CAPSULE | Refills: 1 | Status: SHIPPED | OUTPATIENT
Start: 2025-08-05

## 2025-08-07 DIAGNOSIS — G25.81 RESTLESS LEGS SYNDROME (RLS): ICD-10-CM

## 2025-08-07 RX ORDER — GABAPENTIN 600 MG/1
600 TABLET ORAL 3 TIMES DAILY
Qty: 90 TABLET | Refills: 2 | Status: SHIPPED | OUTPATIENT
Start: 2025-08-07 | End: 2025-11-05

## 2025-08-08 ENCOUNTER — OFFICE VISIT (OUTPATIENT)
Dept: NEUROSURGERY | Facility: CLINIC | Age: 65
End: 2025-08-08
Payer: MEDICARE

## 2025-08-08 VITALS — WEIGHT: 130 LBS | BODY MASS INDEX: 23.92 KG/M2 | TEMPERATURE: 97.5 F | HEIGHT: 62 IN

## 2025-08-08 DIAGNOSIS — Q76.49 SPINAL DEFORMITY: ICD-10-CM

## 2025-08-08 DIAGNOSIS — M54.9 MECHANICAL BACK PAIN: ICD-10-CM

## 2025-08-08 DIAGNOSIS — M80.08XA FRACTURE OF VERTEBRA DUE TO OSTEOPOROSIS, INITIAL ENCOUNTER: ICD-10-CM

## 2025-08-08 DIAGNOSIS — M48.56XA: ICD-10-CM

## 2025-08-08 DIAGNOSIS — M48.062 LUMBAR STENOSIS WITH NEUROGENIC CLAUDICATION: Primary | ICD-10-CM

## 2025-08-08 RX ORDER — FAMOTIDINE 10 MG
20 TABLET ORAL
OUTPATIENT
Start: 2025-08-08

## 2025-08-08 RX ORDER — CHLORHEXIDINE GLUCONATE 40 MG/ML
SOLUTION TOPICAL
Qty: 120 ML | Refills: 0 | Status: SHIPPED | OUTPATIENT
Start: 2025-08-08

## 2025-08-11 ENCOUNTER — PRE-ADMISSION TESTING (OUTPATIENT)
Dept: PREADMISSION TESTING | Facility: HOSPITAL | Age: 65
End: 2025-08-11
Payer: MEDICARE

## 2025-08-11 VITALS — WEIGHT: 131.17 LBS | BODY MASS INDEX: 27.53 KG/M2 | HEIGHT: 58 IN

## 2025-08-11 DIAGNOSIS — M48.56XA: ICD-10-CM

## 2025-08-11 LAB
DEPRECATED RDW RBC AUTO: 52.9 FL (ref 37–54)
ERYTHROCYTE [DISTWIDTH] IN BLOOD BY AUTOMATED COUNT: 15.5 % (ref 12.3–15.4)
HBA1C MFR BLD: 5.74 % (ref 4.8–5.6)
HCT VFR BLD AUTO: 32.5 % (ref 34–46.6)
HGB BLD-MCNC: 10.3 G/DL (ref 12–15.9)
MCH RBC QN AUTO: 29.5 PG (ref 26.6–33)
MCHC RBC AUTO-ENTMCNC: 31.7 G/DL (ref 31.5–35.7)
MCV RBC AUTO: 93.1 FL (ref 79–97)
PLATELET # BLD AUTO: 456 10*3/MM3 (ref 140–450)
PMV BLD AUTO: 8.6 FL (ref 6–12)
POTASSIUM SERPL-SCNC: 5.2 MMOL/L (ref 3.5–5.2)
RBC # BLD AUTO: 3.49 10*6/MM3 (ref 3.77–5.28)
WBC NRBC COR # BLD AUTO: 7.77 10*3/MM3 (ref 3.4–10.8)

## 2025-08-11 PROCEDURE — 87081 CULTURE SCREEN ONLY: CPT

## 2025-08-11 PROCEDURE — 93005 ELECTROCARDIOGRAM TRACING: CPT

## 2025-08-11 PROCEDURE — 85027 COMPLETE CBC AUTOMATED: CPT

## 2025-08-11 PROCEDURE — 84132 ASSAY OF SERUM POTASSIUM: CPT

## 2025-08-11 PROCEDURE — 83036 HEMOGLOBIN GLYCOSYLATED A1C: CPT

## 2025-08-11 PROCEDURE — 36415 COLL VENOUS BLD VENIPUNCTURE: CPT

## 2025-08-12 LAB
MRSA SPEC QL CULT: NORMAL
QT INTERVAL: 366 MS
QTC INTERVAL: 411 MS

## 2025-08-12 RX ORDER — HYDROCODONE BITARTRATE AND ACETAMINOPHEN 7.5; 325 MG/1; MG/1
1 TABLET ORAL EVERY 6 HOURS PRN
Qty: 12 TABLET | Refills: 0 | OUTPATIENT
Start: 2025-08-12

## 2025-08-13 ENCOUNTER — OFFICE VISIT (OUTPATIENT)
Dept: FAMILY MEDICINE CLINIC | Facility: CLINIC | Age: 65
End: 2025-08-13
Payer: MEDICARE

## 2025-08-13 VITALS
DIASTOLIC BLOOD PRESSURE: 60 MMHG | OXYGEN SATURATION: 95 % | BODY MASS INDEX: 27.96 KG/M2 | RESPIRATION RATE: 20 BRPM | TEMPERATURE: 98.2 F | HEART RATE: 69 BPM | SYSTOLIC BLOOD PRESSURE: 120 MMHG | HEIGHT: 58 IN | WEIGHT: 133.2 LBS

## 2025-08-13 DIAGNOSIS — G89.29 CHRONIC MIDLINE LOW BACK PAIN WITHOUT SCIATICA: Primary | ICD-10-CM

## 2025-08-13 DIAGNOSIS — M54.50 CHRONIC MIDLINE LOW BACK PAIN WITHOUT SCIATICA: Primary | ICD-10-CM

## 2025-08-13 DIAGNOSIS — M51.9 LUMBAR DISC DISEASE: ICD-10-CM

## 2025-08-13 PROCEDURE — 99213 OFFICE O/P EST LOW 20 MIN: CPT | Performed by: STUDENT IN AN ORGANIZED HEALTH CARE EDUCATION/TRAINING PROGRAM

## 2025-08-13 PROCEDURE — 1126F AMNT PAIN NOTED NONE PRSNT: CPT | Performed by: STUDENT IN AN ORGANIZED HEALTH CARE EDUCATION/TRAINING PROGRAM

## 2025-08-13 PROCEDURE — 3078F DIAST BP <80 MM HG: CPT | Performed by: STUDENT IN AN ORGANIZED HEALTH CARE EDUCATION/TRAINING PROGRAM

## 2025-08-13 PROCEDURE — 3074F SYST BP LT 130 MM HG: CPT | Performed by: STUDENT IN AN ORGANIZED HEALTH CARE EDUCATION/TRAINING PROGRAM

## 2025-08-13 PROCEDURE — 1159F MED LIST DOCD IN RCRD: CPT | Performed by: STUDENT IN AN ORGANIZED HEALTH CARE EDUCATION/TRAINING PROGRAM

## 2025-08-13 PROCEDURE — 1160F RVW MEDS BY RX/DR IN RCRD: CPT | Performed by: STUDENT IN AN ORGANIZED HEALTH CARE EDUCATION/TRAINING PROGRAM

## 2025-08-13 RX ORDER — OXYCODONE AND ACETAMINOPHEN 5; 325 MG/1; MG/1
1 TABLET ORAL EVERY 6 HOURS PRN
Qty: 12 TABLET | Refills: 0 | Status: SHIPPED | OUTPATIENT
Start: 2025-08-13

## 2025-08-15 DIAGNOSIS — F41.1 GENERALIZED ANXIETY DISORDER: ICD-10-CM

## 2025-08-15 RX ORDER — VENLAFAXINE HYDROCHLORIDE 75 MG/1
CAPSULE, EXTENDED RELEASE ORAL
Qty: 90 CAPSULE | Refills: 0 | Status: SHIPPED | OUTPATIENT
Start: 2025-08-15

## 2025-08-18 DIAGNOSIS — M48.062 LUMBAR STENOSIS WITH NEUROGENIC CLAUDICATION: Primary | ICD-10-CM

## 2025-08-18 DIAGNOSIS — M48.062 SPINAL STENOSIS OF LUMBAR REGION WITH NEUROGENIC CLAUDICATION: ICD-10-CM

## 2025-08-18 DIAGNOSIS — Z98.1 HISTORY OF LUMBAR FUSION: ICD-10-CM

## 2025-08-18 RX ORDER — METHOCARBAMOL 750 MG/1
750 TABLET, FILM COATED ORAL 3 TIMES DAILY
Qty: 90 TABLET | Refills: 0 | Status: ON HOLD | OUTPATIENT
Start: 2025-08-18

## 2025-08-19 DIAGNOSIS — G89.29 CHRONIC MIDLINE LOW BACK PAIN WITHOUT SCIATICA: ICD-10-CM

## 2025-08-19 DIAGNOSIS — R11.0 NAUSEA: ICD-10-CM

## 2025-08-19 DIAGNOSIS — M54.50 CHRONIC MIDLINE LOW BACK PAIN WITHOUT SCIATICA: ICD-10-CM

## 2025-08-19 DIAGNOSIS — F41.1 GENERALIZED ANXIETY DISORDER: ICD-10-CM

## 2025-08-19 DIAGNOSIS — M51.9 LUMBAR DISC DISEASE: ICD-10-CM

## 2025-08-20 RX ORDER — VENLAFAXINE HYDROCHLORIDE 150 MG/1
150 CAPSULE, EXTENDED RELEASE ORAL DAILY
Qty: 90 CAPSULE | Refills: 1 | Status: ON HOLD | OUTPATIENT
Start: 2025-08-20

## 2025-08-20 RX ORDER — ESOMEPRAZOLE MAGNESIUM 40 MG/1
40 CAPSULE, DELAYED RELEASE ORAL
Qty: 90 CAPSULE | Refills: 1 | Status: ON HOLD | OUTPATIENT
Start: 2025-08-20

## 2025-08-20 RX ORDER — OXYCODONE AND ACETAMINOPHEN 5; 325 MG/1; MG/1
1 TABLET ORAL EVERY 6 HOURS PRN
Qty: 16 TABLET | Refills: 0 | Status: ON HOLD | OUTPATIENT
Start: 2025-08-20

## 2025-08-20 RX ORDER — HYDROCODONE BITARTRATE AND ACETAMINOPHEN 7.5; 325 MG/1; MG/1
1 TABLET ORAL EVERY 6 HOURS PRN
Qty: 12 TABLET | Refills: 0 | OUTPATIENT
Start: 2025-08-20

## 2025-08-21 DIAGNOSIS — R11.0 NAUSEA: ICD-10-CM

## 2025-08-22 RX ORDER — PROMETHAZINE HYDROCHLORIDE 25 MG/1
25 TABLET ORAL EVERY 8 HOURS PRN
Qty: 30 TABLET | Refills: 1 | Status: ON HOLD | OUTPATIENT
Start: 2025-08-22

## 2025-08-25 ENCOUNTER — APPOINTMENT (OUTPATIENT)
Dept: GENERAL RADIOLOGY | Facility: HOSPITAL | Age: 65
End: 2025-08-25
Payer: MEDICARE

## 2025-08-25 ENCOUNTER — HOSPITAL ENCOUNTER (OUTPATIENT)
Facility: HOSPITAL | Age: 65
Discharge: HOME | End: 2025-08-26
Attending: NEUROLOGICAL SURGERY | Admitting: NEUROLOGICAL SURGERY
Payer: MEDICARE

## 2025-08-25 ENCOUNTER — ANESTHESIA (OUTPATIENT)
Dept: PERIOP | Facility: HOSPITAL | Age: 65
End: 2025-08-25
Payer: MEDICARE

## 2025-08-25 ENCOUNTER — ANESTHESIA EVENT (OUTPATIENT)
Dept: PERIOP | Facility: HOSPITAL | Age: 65
End: 2025-08-25
Payer: MEDICARE

## 2025-08-25 DIAGNOSIS — M48.062 LUMBAR STENOSIS WITH NEUROGENIC CLAUDICATION: ICD-10-CM

## 2025-08-25 LAB — POTASSIUM SERPL-SCNC: 4.4 MMOL/L (ref 3.5–5.2)

## 2025-08-25 PROCEDURE — 63710000001 REVEFENACIN 175 MCG/3ML SOLUTION: Performed by: NEUROLOGICAL SURGERY

## 2025-08-25 PROCEDURE — 25010000002 DEXAMETHASONE SODIUM PHOSPHATE 10 MG/ML SOLUTION: Performed by: NURSE ANESTHETIST, CERTIFIED REGISTERED

## 2025-08-25 PROCEDURE — 25510000001 IOPAMIDOL 41 % SOLUTION: Performed by: NEUROLOGICAL SURGERY

## 2025-08-25 PROCEDURE — 88311 DECALCIFY TISSUE: CPT | Performed by: NEUROLOGICAL SURGERY

## 2025-08-25 PROCEDURE — 25810000003 SODIUM CHLORIDE PER 500 ML: Performed by: NEUROLOGICAL SURGERY

## 2025-08-25 PROCEDURE — 25010000002 CEFAZOLIN PER 500 MG

## 2025-08-25 PROCEDURE — 22510 PERQ CERVICOTHORACIC INJECT: CPT

## 2025-08-25 PROCEDURE — 94799 UNLISTED PULMONARY SVC/PX: CPT

## 2025-08-25 PROCEDURE — 22514 PERQ VERTEBRAL AUGMENTATION: CPT | Performed by: NEUROLOGICAL SURGERY

## 2025-08-25 PROCEDURE — 63047 LAM FACETEC & FORAMOT LUMBAR: CPT | Performed by: PHYSICIAN ASSISTANT

## 2025-08-25 PROCEDURE — 63047 LAM FACETEC & FORAMOT LUMBAR: CPT | Performed by: NEUROLOGICAL SURGERY

## 2025-08-25 PROCEDURE — 25010000002 FENTANYL CITRATE (PF) 50 MCG/ML SOLUTION

## 2025-08-25 PROCEDURE — C1713 ANCHOR/SCREW BN/BN,TIS/BN: HCPCS | Performed by: NEUROLOGICAL SURGERY

## 2025-08-25 PROCEDURE — 94640 AIRWAY INHALATION TREATMENT: CPT

## 2025-08-25 PROCEDURE — 25010000002 ONDANSETRON PER 1 MG: Performed by: NURSE ANESTHETIST, CERTIFIED REGISTERED

## 2025-08-25 PROCEDURE — 25810000003 LACTATED RINGERS PER 1000 ML: Performed by: NEUROLOGICAL SURGERY

## 2025-08-25 PROCEDURE — 88307 TISSUE EXAM BY PATHOLOGIST: CPT | Performed by: NEUROLOGICAL SURGERY

## 2025-08-25 PROCEDURE — 25010000002 FENTANYL CITRATE (PF) 50 MCG/ML SOLUTION: Performed by: NURSE ANESTHETIST, CERTIFIED REGISTERED

## 2025-08-25 PROCEDURE — 25010000002 SUGAMMADEX 200 MG/2ML SOLUTION: Performed by: NURSE ANESTHETIST, CERTIFIED REGISTERED

## 2025-08-25 PROCEDURE — 25810000003 LACTATED RINGERS PER 1000 ML: Performed by: ANESTHESIOLOGY

## 2025-08-25 PROCEDURE — 25010000002 PROPOFOL 10 MG/ML EMULSION: Performed by: NURSE ANESTHETIST, CERTIFIED REGISTERED

## 2025-08-25 PROCEDURE — G0378 HOSPITAL OBSERVATION PER HR: HCPCS

## 2025-08-25 PROCEDURE — 25010000002 LIDOCAINE PF 1% 1 % SOLUTION: Performed by: NURSE ANESTHETIST, CERTIFIED REGISTERED

## 2025-08-25 PROCEDURE — 84132 ASSAY OF SERUM POTASSIUM: CPT | Performed by: ANESTHESIOLOGY

## 2025-08-25 PROCEDURE — 25010000002 CEFAZOLIN PER 500 MG: Performed by: NEUROLOGICAL SURGERY

## 2025-08-25 PROCEDURE — 25010000002 FENTANYL CITRATE (PF) 100 MCG/2ML SOLUTION: Performed by: NURSE ANESTHETIST, CERTIFIED REGISTERED

## 2025-08-25 DEVICE — FLOSEAL WITH RECOTHROM - 10ML.
Type: IMPLANTABLE DEVICE | Site: BACK | Status: FUNCTIONAL
Brand: FLOSEAL HEMOSTATIC MATRIX

## 2025-08-25 DEVICE — IMPLANTABLE DEVICE
Type: IMPLANTABLE DEVICE | Site: BACK | Status: FUNCTIONAL
Brand: SURGIFOAM® ABSORBABLE GELATIN SPONGE, U.S.P.

## 2025-08-25 DEVICE — CEMENT C01A KYPHX HV-R BONE CEMENT EN
Type: IMPLANTABLE DEVICE | Site: BACK | Status: FUNCTIONAL
Brand: KYPHON® HV-R® BONE CEMENT

## 2025-08-25 DEVICE — SSC BONE WAX
Type: IMPLANTABLE DEVICE | Site: BACK | Status: FUNCTIONAL
Brand: SSC BONE WAX

## 2025-08-25 RX ORDER — MIDAZOLAM HYDROCHLORIDE 1 MG/ML
0.5 INJECTION, SOLUTION INTRAMUSCULAR; INTRAVENOUS
Status: DISCONTINUED | OUTPATIENT
Start: 2025-08-25 | End: 2025-08-25 | Stop reason: HOSPADM

## 2025-08-25 RX ORDER — BUSPIRONE HYDROCHLORIDE 15 MG/1
15 TABLET ORAL 2 TIMES DAILY
Status: DISCONTINUED | OUTPATIENT
Start: 2025-08-25 | End: 2025-08-26 | Stop reason: HOSPADM

## 2025-08-25 RX ORDER — SODIUM CHLORIDE, SODIUM LACTATE, POTASSIUM CHLORIDE, CALCIUM CHLORIDE 600; 310; 30; 20 MG/100ML; MG/100ML; MG/100ML; MG/100ML
9 INJECTION, SOLUTION INTRAVENOUS CONTINUOUS
Status: DISCONTINUED | OUTPATIENT
Start: 2025-08-25 | End: 2025-08-26 | Stop reason: HOSPADM

## 2025-08-25 RX ORDER — ONDANSETRON 2 MG/ML
4 INJECTION INTRAMUSCULAR; INTRAVENOUS ONCE AS NEEDED
Status: DISCONTINUED | OUTPATIENT
Start: 2025-08-25 | End: 2025-08-25 | Stop reason: HOSPADM

## 2025-08-25 RX ORDER — NALOXONE HCL 0.4 MG/ML
0.4 VIAL (ML) INJECTION AS NEEDED
Status: DISCONTINUED | OUTPATIENT
Start: 2025-08-25 | End: 2025-08-25 | Stop reason: HOSPADM

## 2025-08-25 RX ORDER — ONDANSETRON 2 MG/ML
INJECTION INTRAMUSCULAR; INTRAVENOUS AS NEEDED
Status: DISCONTINUED | OUTPATIENT
Start: 2025-08-25 | End: 2025-08-25 | Stop reason: SURG

## 2025-08-25 RX ORDER — LEVOTHYROXINE SODIUM 150 UG/1
150 TABLET ORAL
Status: DISCONTINUED | OUTPATIENT
Start: 2025-08-26 | End: 2025-08-26 | Stop reason: HOSPADM

## 2025-08-25 RX ORDER — ONDANSETRON 2 MG/ML
4 INJECTION INTRAMUSCULAR; INTRAVENOUS EVERY 6 HOURS PRN
Status: DISCONTINUED | OUTPATIENT
Start: 2025-08-25 | End: 2025-08-26 | Stop reason: HOSPADM

## 2025-08-25 RX ORDER — DICYCLOMINE HYDROCHLORIDE 10 MG/1
10 CAPSULE ORAL
Status: DISCONTINUED | OUTPATIENT
Start: 2025-08-25 | End: 2025-08-26 | Stop reason: HOSPADM

## 2025-08-25 RX ORDER — IPRATROPIUM BROMIDE AND ALBUTEROL SULFATE 2.5; .5 MG/3ML; MG/3ML
3 SOLUTION RESPIRATORY (INHALATION)
Status: DISCONTINUED | OUTPATIENT
Start: 2025-08-25 | End: 2025-08-25

## 2025-08-25 RX ORDER — SODIUM CHLORIDE, SODIUM LACTATE, POTASSIUM CHLORIDE, CALCIUM CHLORIDE 600; 310; 30; 20 MG/100ML; MG/100ML; MG/100ML; MG/100ML
9 INJECTION, SOLUTION INTRAVENOUS CONTINUOUS
Status: ACTIVE | OUTPATIENT
Start: 2025-08-26 | End: 2025-08-26

## 2025-08-25 RX ORDER — DIPHENHYDRAMINE HCL 25 MG
25 CAPSULE ORAL NIGHTLY PRN
Status: DISCONTINUED | OUTPATIENT
Start: 2025-08-25 | End: 2025-08-26 | Stop reason: HOSPADM

## 2025-08-25 RX ORDER — ROPINIROLE 2 MG/1
4 TABLET, FILM COATED ORAL NIGHTLY
Status: DISCONTINUED | OUTPATIENT
Start: 2025-08-25 | End: 2025-08-26 | Stop reason: HOSPADM

## 2025-08-25 RX ORDER — MAGNESIUM HYDROXIDE 1200 MG/15ML
LIQUID ORAL AS NEEDED
Status: DISCONTINUED | OUTPATIENT
Start: 2025-08-25 | End: 2025-08-25 | Stop reason: HOSPADM

## 2025-08-25 RX ORDER — PROMETHAZINE HYDROCHLORIDE 25 MG/1
25 TABLET ORAL ONCE AS NEEDED
Status: DISCONTINUED | OUTPATIENT
Start: 2025-08-25 | End: 2025-08-25 | Stop reason: HOSPADM

## 2025-08-25 RX ORDER — LIDOCAINE HYDROCHLORIDE 10 MG/ML
INJECTION, SOLUTION EPIDURAL; INFILTRATION; INTRACAUDAL; PERINEURAL AS NEEDED
Status: DISCONTINUED | OUTPATIENT
Start: 2025-08-25 | End: 2025-08-25 | Stop reason: SURG

## 2025-08-25 RX ORDER — LABETALOL HYDROCHLORIDE 5 MG/ML
5 INJECTION, SOLUTION INTRAVENOUS
Status: DISCONTINUED | OUTPATIENT
Start: 2025-08-25 | End: 2025-08-25 | Stop reason: HOSPADM

## 2025-08-25 RX ORDER — SODIUM CHLORIDE, SODIUM LACTATE, POTASSIUM CHLORIDE, CALCIUM CHLORIDE 600; 310; 30; 20 MG/100ML; MG/100ML; MG/100ML; MG/100ML
90 INJECTION, SOLUTION INTRAVENOUS CONTINUOUS
Status: DISCONTINUED | OUTPATIENT
Start: 2025-08-25 | End: 2025-08-26

## 2025-08-25 RX ORDER — ALBUTEROL SULFATE 0.83 MG/ML
2.5 SOLUTION RESPIRATORY (INHALATION) EVERY 6 HOURS PRN
Status: DISCONTINUED | OUTPATIENT
Start: 2025-08-25 | End: 2025-08-26 | Stop reason: HOSPADM

## 2025-08-25 RX ORDER — ATORVASTATIN CALCIUM 20 MG/1
20 TABLET, FILM COATED ORAL NIGHTLY
Status: DISCONTINUED | OUTPATIENT
Start: 2025-08-25 | End: 2025-08-26 | Stop reason: HOSPADM

## 2025-08-25 RX ORDER — BUDESONIDE 0.5 MG/2ML
0.5 INHALANT ORAL
Status: DISCONTINUED | OUTPATIENT
Start: 2025-08-25 | End: 2025-08-26 | Stop reason: HOSPADM

## 2025-08-25 RX ORDER — FAMOTIDINE 10 MG/ML
20 INJECTION, SOLUTION INTRAVENOUS ONCE
Status: CANCELLED | OUTPATIENT
Start: 2025-08-25 | End: 2025-08-25

## 2025-08-25 RX ORDER — ROCURONIUM BROMIDE 10 MG/ML
INJECTION, SOLUTION INTRAVENOUS AS NEEDED
Status: DISCONTINUED | OUTPATIENT
Start: 2025-08-25 | End: 2025-08-25 | Stop reason: SURG

## 2025-08-25 RX ORDER — IOPAMIDOL 408 MG/ML
INJECTION, SOLUTION INTRATHECAL AS NEEDED
Status: DISCONTINUED | OUTPATIENT
Start: 2025-08-25 | End: 2025-08-25 | Stop reason: HOSPADM

## 2025-08-25 RX ORDER — SODIUM CHLORIDE 0.9 % (FLUSH) 0.9 %
10 SYRINGE (ML) INJECTION AS NEEDED
Status: DISCONTINUED | OUTPATIENT
Start: 2025-08-25 | End: 2025-08-26 | Stop reason: HOSPADM

## 2025-08-25 RX ORDER — GABAPENTIN 300 MG/1
600 CAPSULE ORAL EVERY 8 HOURS SCHEDULED
Status: DISCONTINUED | OUTPATIENT
Start: 2025-08-25 | End: 2025-08-26 | Stop reason: HOSPADM

## 2025-08-25 RX ORDER — FAMOTIDINE 20 MG/1
20 TABLET, FILM COATED ORAL
Status: DISCONTINUED | OUTPATIENT
Start: 2025-08-25 | End: 2025-08-25 | Stop reason: SDUPTHER

## 2025-08-25 RX ORDER — BUDESONIDE AND FORMOTEROL FUMARATE DIHYDRATE 160; 4.5 UG/1; UG/1
2 AEROSOL RESPIRATORY (INHALATION) 2 TIMES DAILY
Status: DISCONTINUED | OUTPATIENT
Start: 2025-08-25 | End: 2025-08-25

## 2025-08-25 RX ORDER — PROPOFOL 10 MG/ML
VIAL (ML) INTRAVENOUS AS NEEDED
Status: DISCONTINUED | OUTPATIENT
Start: 2025-08-25 | End: 2025-08-25 | Stop reason: SURG

## 2025-08-25 RX ORDER — AMLODIPINE BESYLATE 10 MG/1
10 TABLET ORAL DAILY
Status: DISCONTINUED | OUTPATIENT
Start: 2025-08-26 | End: 2025-08-26 | Stop reason: HOSPADM

## 2025-08-25 RX ORDER — BISACODYL 5 MG/1
5 TABLET, DELAYED RELEASE ORAL DAILY PRN
Status: DISCONTINUED | OUTPATIENT
Start: 2025-08-25 | End: 2025-08-26 | Stop reason: HOSPADM

## 2025-08-25 RX ORDER — VENLAFAXINE HYDROCHLORIDE 75 MG/1
75 CAPSULE, EXTENDED RELEASE ORAL
Status: DISCONTINUED | OUTPATIENT
Start: 2025-08-26 | End: 2025-08-26 | Stop reason: HOSPADM

## 2025-08-25 RX ORDER — SODIUM CHLORIDE 0.9 % (FLUSH) 0.9 %
3-10 SYRINGE (ML) INJECTION AS NEEDED
Status: DISCONTINUED | OUTPATIENT
Start: 2025-08-25 | End: 2025-08-25 | Stop reason: HOSPADM

## 2025-08-25 RX ORDER — OXYCODONE HYDROCHLORIDE 10 MG/1
10 TABLET ORAL EVERY 6 HOURS PRN
Refills: 0 | Status: DISCONTINUED | OUTPATIENT
Start: 2025-08-25 | End: 2025-08-26 | Stop reason: HOSPADM

## 2025-08-25 RX ORDER — CETIRIZINE HYDROCHLORIDE 10 MG/1
5 TABLET ORAL DAILY
Status: DISCONTINUED | OUTPATIENT
Start: 2025-08-26 | End: 2025-08-26 | Stop reason: HOSPADM

## 2025-08-25 RX ORDER — CELECOXIB 200 MG/1
200 CAPSULE ORAL 2 TIMES DAILY
Status: DISCONTINUED | OUTPATIENT
Start: 2025-08-25 | End: 2025-08-26 | Stop reason: HOSPADM

## 2025-08-25 RX ORDER — SODIUM CHLORIDE 0.9 % (FLUSH) 0.9 %
3 SYRINGE (ML) INJECTION EVERY 12 HOURS SCHEDULED
Status: DISCONTINUED | OUTPATIENT
Start: 2025-08-25 | End: 2025-08-25 | Stop reason: HOSPADM

## 2025-08-25 RX ORDER — IPRATROPIUM BROMIDE AND ALBUTEROL SULFATE 2.5; .5 MG/3ML; MG/3ML
3 SOLUTION RESPIRATORY (INHALATION) ONCE AS NEEDED
Status: DISCONTINUED | OUTPATIENT
Start: 2025-08-25 | End: 2025-08-25 | Stop reason: HOSPADM

## 2025-08-25 RX ORDER — SODIUM CHLORIDE 0.9 % (FLUSH) 0.9 %
10 SYRINGE (ML) INJECTION AS NEEDED
Status: CANCELLED | OUTPATIENT
Start: 2025-08-25

## 2025-08-25 RX ORDER — MONTELUKAST SODIUM 10 MG/1
10 TABLET ORAL NIGHTLY
Status: DISCONTINUED | OUTPATIENT
Start: 2025-08-25 | End: 2025-08-26 | Stop reason: HOSPADM

## 2025-08-25 RX ORDER — ACETAMINOPHEN 650 MG/1
650 SUPPOSITORY RECTAL EVERY 4 HOURS PRN
Status: DISCONTINUED | OUTPATIENT
Start: 2025-08-25 | End: 2025-08-26 | Stop reason: HOSPADM

## 2025-08-25 RX ORDER — FENTANYL CITRATE 50 UG/ML
INJECTION, SOLUTION INTRAMUSCULAR; INTRAVENOUS
Status: COMPLETED
Start: 2025-08-25 | End: 2025-08-25

## 2025-08-25 RX ORDER — DROPERIDOL 2.5 MG/ML
0.62 INJECTION, SOLUTION INTRAMUSCULAR; INTRAVENOUS ONCE AS NEEDED
Status: DISCONTINUED | OUTPATIENT
Start: 2025-08-25 | End: 2025-08-25 | Stop reason: HOSPADM

## 2025-08-25 RX ORDER — AMOXICILLIN 250 MG
2 CAPSULE ORAL 2 TIMES DAILY
Status: DISCONTINUED | OUTPATIENT
Start: 2025-08-25 | End: 2025-08-26 | Stop reason: HOSPADM

## 2025-08-25 RX ORDER — NALOXONE HCL 0.4 MG/ML
0.4 VIAL (ML) INJECTION
Status: DISCONTINUED | OUTPATIENT
Start: 2025-08-25 | End: 2025-08-26 | Stop reason: HOSPADM

## 2025-08-25 RX ORDER — HYDRALAZINE HYDROCHLORIDE 20 MG/ML
5 INJECTION INTRAMUSCULAR; INTRAVENOUS
Status: DISCONTINUED | OUTPATIENT
Start: 2025-08-25 | End: 2025-08-25 | Stop reason: HOSPADM

## 2025-08-25 RX ORDER — PROMETHAZINE HYDROCHLORIDE 12.5 MG/1
12.5 TABLET ORAL EVERY 6 HOURS PRN
Status: DISCONTINUED | OUTPATIENT
Start: 2025-08-25 | End: 2025-08-26 | Stop reason: HOSPADM

## 2025-08-25 RX ORDER — ACETAMINOPHEN 160 MG/5ML
650 SOLUTION ORAL EVERY 4 HOURS PRN
Status: DISCONTINUED | OUTPATIENT
Start: 2025-08-25 | End: 2025-08-26 | Stop reason: HOSPADM

## 2025-08-25 RX ORDER — ARFORMOTEROL TARTRATE 15 UG/2ML
15 SOLUTION RESPIRATORY (INHALATION)
Status: DISCONTINUED | OUTPATIENT
Start: 2025-08-25 | End: 2025-08-26 | Stop reason: HOSPADM

## 2025-08-25 RX ORDER — POLYETHYLENE GLYCOL 3350 17 G/17G
17 POWDER, FOR SOLUTION ORAL DAILY PRN
Status: DISCONTINUED | OUTPATIENT
Start: 2025-08-25 | End: 2025-08-26 | Stop reason: HOSPADM

## 2025-08-25 RX ORDER — SODIUM CHLORIDE 0.9 % (FLUSH) 0.9 %
10 SYRINGE (ML) INJECTION EVERY 12 HOURS SCHEDULED
Status: CANCELLED | OUTPATIENT
Start: 2025-08-25

## 2025-08-25 RX ORDER — DEXMEDETOMIDINE HYDROCHLORIDE 4 UG/ML
INJECTION, SOLUTION INTRAVENOUS AS NEEDED
Status: DISCONTINUED | OUTPATIENT
Start: 2025-08-25 | End: 2025-08-25 | Stop reason: SURG

## 2025-08-25 RX ORDER — LISINOPRIL 20 MG/1
20 TABLET ORAL DAILY
Status: DISCONTINUED | OUTPATIENT
Start: 2025-08-25 | End: 2025-08-26 | Stop reason: HOSPADM

## 2025-08-25 RX ORDER — ONDANSETRON 4 MG/1
4 TABLET, ORALLY DISINTEGRATING ORAL EVERY 6 HOURS PRN
Status: DISCONTINUED | OUTPATIENT
Start: 2025-08-25 | End: 2025-08-26 | Stop reason: HOSPADM

## 2025-08-25 RX ORDER — FENTANYL CITRATE 50 UG/ML
INJECTION, SOLUTION INTRAMUSCULAR; INTRAVENOUS AS NEEDED
Status: DISCONTINUED | OUTPATIENT
Start: 2025-08-25 | End: 2025-08-25 | Stop reason: SURG

## 2025-08-25 RX ORDER — PANTOPRAZOLE SODIUM 40 MG/1
40 TABLET, DELAYED RELEASE ORAL
Status: DISCONTINUED | OUTPATIENT
Start: 2025-08-26 | End: 2025-08-26 | Stop reason: HOSPADM

## 2025-08-25 RX ORDER — SODIUM CHLORIDE 0.9 % (FLUSH) 0.9 %
3 SYRINGE (ML) INJECTION EVERY 12 HOURS SCHEDULED
Status: DISCONTINUED | OUTPATIENT
Start: 2025-08-25 | End: 2025-08-26 | Stop reason: HOSPADM

## 2025-08-25 RX ORDER — BUPIVACAINE HYDROCHLORIDE AND EPINEPHRINE 2.5; 5 MG/ML; UG/ML
INJECTION, SOLUTION EPIDURAL; INFILTRATION; INTRACAUDAL; PERINEURAL AS NEEDED
Status: DISCONTINUED | OUTPATIENT
Start: 2025-08-25 | End: 2025-08-25 | Stop reason: HOSPADM

## 2025-08-25 RX ORDER — IPRATROPIUM BROMIDE AND ALBUTEROL SULFATE 2.5; .5 MG/3ML; MG/3ML
3 SOLUTION RESPIRATORY (INHALATION) ONCE
Status: COMPLETED | OUTPATIENT
Start: 2025-08-25 | End: 2025-08-25

## 2025-08-25 RX ORDER — DEXAMETHASONE SODIUM PHOSPHATE 10 MG/ML
INJECTION, SOLUTION INTRAMUSCULAR; INTRAVENOUS AS NEEDED
Status: DISCONTINUED | OUTPATIENT
Start: 2025-08-25 | End: 2025-08-25 | Stop reason: SURG

## 2025-08-25 RX ORDER — BISACODYL 10 MG
10 SUPPOSITORY, RECTAL RECTAL DAILY PRN
Status: DISCONTINUED | OUTPATIENT
Start: 2025-08-25 | End: 2025-08-26 | Stop reason: HOSPADM

## 2025-08-25 RX ORDER — SODIUM CHLORIDE 9 MG/ML
40 INJECTION, SOLUTION INTRAVENOUS AS NEEDED
Status: DISCONTINUED | OUTPATIENT
Start: 2025-08-25 | End: 2025-08-26 | Stop reason: HOSPADM

## 2025-08-25 RX ORDER — PROMETHAZINE HYDROCHLORIDE 12.5 MG/1
12.5 SUPPOSITORY RECTAL EVERY 6 HOURS PRN
Status: DISCONTINUED | OUTPATIENT
Start: 2025-08-25 | End: 2025-08-26 | Stop reason: HOSPADM

## 2025-08-25 RX ORDER — FAMOTIDINE 20 MG/1
20 TABLET, FILM COATED ORAL ONCE
Status: COMPLETED | OUTPATIENT
Start: 2025-08-25 | End: 2025-08-25

## 2025-08-25 RX ORDER — ACETAMINOPHEN 325 MG/1
650 TABLET ORAL EVERY 4 HOURS PRN
Status: DISCONTINUED | OUTPATIENT
Start: 2025-08-25 | End: 2025-08-26 | Stop reason: HOSPADM

## 2025-08-25 RX ORDER — HYDROMORPHONE HYDROCHLORIDE 1 MG/ML
0.5 INJECTION, SOLUTION INTRAMUSCULAR; INTRAVENOUS; SUBCUTANEOUS
Status: DISCONTINUED | OUTPATIENT
Start: 2025-08-25 | End: 2025-08-25 | Stop reason: HOSPADM

## 2025-08-25 RX ORDER — HYDROCODONE BITARTRATE AND ACETAMINOPHEN 7.5; 325 MG/1; MG/1
1 TABLET ORAL EVERY 4 HOURS PRN
Status: DISCONTINUED | OUTPATIENT
Start: 2025-08-25 | End: 2025-08-25 | Stop reason: HOSPADM

## 2025-08-25 RX ORDER — SODIUM CHLORIDE 9 MG/ML
9 INJECTION, SOLUTION INTRAVENOUS AS NEEDED
Status: DISCONTINUED | OUTPATIENT
Start: 2025-08-25 | End: 2025-08-25 | Stop reason: HOSPADM

## 2025-08-25 RX ORDER — PROMETHAZINE HYDROCHLORIDE 25 MG/1
25 SUPPOSITORY RECTAL ONCE AS NEEDED
Status: DISCONTINUED | OUTPATIENT
Start: 2025-08-25 | End: 2025-08-25 | Stop reason: HOSPADM

## 2025-08-25 RX ORDER — VENLAFAXINE HYDROCHLORIDE 75 MG/1
150 CAPSULE, EXTENDED RELEASE ORAL DAILY
Status: DISCONTINUED | OUTPATIENT
Start: 2025-08-25 | End: 2025-08-26 | Stop reason: HOSPADM

## 2025-08-25 RX ORDER — SODIUM CHLORIDE 9 MG/ML
INJECTION, SOLUTION INTRAVENOUS AS NEEDED
Status: DISCONTINUED | OUTPATIENT
Start: 2025-08-25 | End: 2025-08-25 | Stop reason: HOSPADM

## 2025-08-25 RX ORDER — FENTANYL CITRATE 50 UG/ML
50 INJECTION, SOLUTION INTRAMUSCULAR; INTRAVENOUS
Status: DISCONTINUED | OUTPATIENT
Start: 2025-08-25 | End: 2025-08-25 | Stop reason: HOSPADM

## 2025-08-25 RX ORDER — HYDROCODONE BITARTRATE AND ACETAMINOPHEN 5; 325 MG/1; MG/1
1 TABLET ORAL ONCE AS NEEDED
Status: DISCONTINUED | OUTPATIENT
Start: 2025-08-25 | End: 2025-08-25 | Stop reason: HOSPADM

## 2025-08-25 RX ORDER — LIDOCAINE HYDROCHLORIDE 10 MG/ML
0.5 INJECTION, SOLUTION EPIDURAL; INFILTRATION; INTRACAUDAL; PERINEURAL ONCE AS NEEDED
Status: DISCONTINUED | OUTPATIENT
Start: 2025-08-25 | End: 2025-08-25 | Stop reason: HOSPADM

## 2025-08-25 RX ORDER — OXYCODONE AND ACETAMINOPHEN 7.5; 325 MG/1; MG/1
1 TABLET ORAL EVERY 4 HOURS PRN
Refills: 0 | Status: DISCONTINUED | OUTPATIENT
Start: 2025-08-25 | End: 2025-08-26 | Stop reason: HOSPADM

## 2025-08-25 RX ORDER — DROPERIDOL 2.5 MG/ML
0.62 INJECTION, SOLUTION INTRAMUSCULAR; INTRAVENOUS
Status: DISCONTINUED | OUTPATIENT
Start: 2025-08-25 | End: 2025-08-25 | Stop reason: HOSPADM

## 2025-08-25 RX ADMIN — FENTANYL CITRATE 50 MCG: 50 INJECTION, SOLUTION INTRAMUSCULAR; INTRAVENOUS at 16:18

## 2025-08-25 RX ADMIN — SODIUM CHLORIDE 2 G: 900 INJECTION INTRAVENOUS at 14:09

## 2025-08-25 RX ADMIN — OXYCODONE HYDROCHLORIDE AND ACETAMINOPHEN 1 TABLET: 7.5; 325 TABLET ORAL at 22:02

## 2025-08-25 RX ADMIN — LIDOCAINE HYDROCHLORIDE 60 MG: 10 INJECTION, SOLUTION EPIDURAL; INFILTRATION; INTRACAUDAL; PERINEURAL at 14:03

## 2025-08-25 RX ADMIN — VENLAFAXINE HYDROCHLORIDE 150 MG: 75 CAPSULE, EXTENDED RELEASE ORAL at 22:01

## 2025-08-25 RX ADMIN — FENTANYL CITRATE 100 MCG: 50 INJECTION, SOLUTION INTRAMUSCULAR; INTRAVENOUS at 14:03

## 2025-08-25 RX ADMIN — MONTELUKAST 10 MG: 10 TABLET, FILM COATED ORAL at 22:03

## 2025-08-25 RX ADMIN — LISINOPRIL 20 MG: 20 TABLET ORAL at 22:02

## 2025-08-25 RX ADMIN — ROCURONIUM BROMIDE 10 MG: 10 INJECTION INTRAVENOUS at 15:34

## 2025-08-25 RX ADMIN — PROPOFOL 150 MG: 10 INJECTION, EMULSION INTRAVENOUS at 14:03

## 2025-08-25 RX ADMIN — ARFORMOTEROL TARTRATE 15 MCG: 15 SOLUTION RESPIRATORY (INHALATION) at 20:32

## 2025-08-25 RX ADMIN — IPRATROPIUM BROMIDE AND ALBUTEROL SULFATE 3 ML: .5; 3 SOLUTION RESPIRATORY (INHALATION) at 13:05

## 2025-08-25 RX ADMIN — SENNOSIDES, DOCUSATE SODIUM 2 TABLET: 50; 8.6 TABLET, FILM COATED ORAL at 22:01

## 2025-08-25 RX ADMIN — SODIUM CHLORIDE, POTASSIUM CHLORIDE, SODIUM LACTATE AND CALCIUM CHLORIDE 9 ML/HR: 600; 310; 30; 20 INJECTION, SOLUTION INTRAVENOUS at 12:46

## 2025-08-25 RX ADMIN — SUGAMMADEX 200 MG: 100 INJECTION, SOLUTION INTRAVENOUS at 15:47

## 2025-08-25 RX ADMIN — ROPINIROLE HYDROCHLORIDE 4 MG: 2 TABLET, FILM COATED ORAL at 22:01

## 2025-08-25 RX ADMIN — SODIUM CHLORIDE, POTASSIUM CHLORIDE, SODIUM LACTATE AND CALCIUM CHLORIDE: 600; 310; 30; 20 INJECTION, SOLUTION INTRAVENOUS at 15:39

## 2025-08-25 RX ADMIN — BUDESONIDE 0.5 MG: 0.5 SUSPENSION RESPIRATORY (INHALATION) at 20:32

## 2025-08-25 RX ADMIN — ROCURONIUM BROMIDE 50 MG: 10 INJECTION INTRAVENOUS at 14:03

## 2025-08-25 RX ADMIN — FAMOTIDINE 20 MG: 20 TABLET, FILM COATED ORAL at 12:45

## 2025-08-25 RX ADMIN — DEXMEDETOMIDINE HYDROCHLORIDE IN 0.9% SODIUM CHLORIDE 4 MCG: 4 INJECTION INTRAVENOUS at 14:09

## 2025-08-25 RX ADMIN — SODIUM CHLORIDE, POTASSIUM CHLORIDE, SODIUM LACTATE AND CALCIUM CHLORIDE 90 ML/HR: 600; 310; 30; 20 INJECTION, SOLUTION INTRAVENOUS at 22:00

## 2025-08-25 RX ADMIN — FENTANYL CITRATE 50 MCG: 50 INJECTION, SOLUTION INTRAMUSCULAR; INTRAVENOUS at 16:04

## 2025-08-25 RX ADMIN — CELECOXIB 200 MG: 200 CAPSULE ORAL at 22:02

## 2025-08-25 RX ADMIN — ONDANSETRON 4 MG: 2 INJECTION, SOLUTION INTRAMUSCULAR; INTRAVENOUS at 15:35

## 2025-08-25 RX ADMIN — OXYCODONE HYDROCHLORIDE 10 MG: 10 TABLET ORAL at 23:24

## 2025-08-25 RX ADMIN — ATORVASTATIN CALCIUM 20 MG: 20 TABLET, FILM COATED ORAL at 22:01

## 2025-08-25 RX ADMIN — CEFAZOLIN 2000 MG: 2 INJECTION, POWDER, FOR SOLUTION INTRAMUSCULAR; INTRAVENOUS at 22:00

## 2025-08-25 RX ADMIN — REVEFENACIN 175 MCG: 175 SOLUTION RESPIRATORY (INHALATION) at 20:32

## 2025-08-25 RX ADMIN — PROPOFOL 50 MG: 10 INJECTION, EMULSION INTRAVENOUS at 15:33

## 2025-08-25 RX ADMIN — Medication 3 ML: at 22:03

## 2025-08-25 RX ADMIN — BUSPIRONE HYDROCHLORIDE 15 MG: 15 TABLET ORAL at 22:01

## 2025-08-25 RX ADMIN — DEXAMETHASONE SODIUM PHOSPHATE 10 MG: 10 INJECTION, SOLUTION INTRAMUSCULAR; INTRAVENOUS at 14:07

## 2025-08-25 RX ADMIN — GABAPENTIN 600 MG: 300 CAPSULE ORAL at 22:01

## 2025-08-26 VITALS
DIASTOLIC BLOOD PRESSURE: 92 MMHG | RESPIRATION RATE: 18 BRPM | OXYGEN SATURATION: 93 % | HEART RATE: 60 BPM | TEMPERATURE: 97.7 F | SYSTOLIC BLOOD PRESSURE: 150 MMHG

## 2025-08-26 PROCEDURE — 94799 UNLISTED PULMONARY SVC/PX: CPT

## 2025-08-26 PROCEDURE — 97535 SELF CARE MNGMENT TRAINING: CPT | Performed by: OCCUPATIONAL THERAPIST

## 2025-08-26 PROCEDURE — 94761 N-INVAS EAR/PLS OXIMETRY MLT: CPT

## 2025-08-26 PROCEDURE — 63710000001 REVEFENACIN 175 MCG/3ML SOLUTION: Performed by: NEUROLOGICAL SURGERY

## 2025-08-26 PROCEDURE — 94664 DEMO&/EVAL PT USE INHALER: CPT

## 2025-08-26 PROCEDURE — 97166 OT EVAL MOD COMPLEX 45 MIN: CPT | Performed by: OCCUPATIONAL THERAPIST

## 2025-08-26 PROCEDURE — 99024 POSTOP FOLLOW-UP VISIT: CPT | Performed by: NEUROLOGICAL SURGERY

## 2025-08-26 PROCEDURE — 97162 PT EVAL MOD COMPLEX 30 MIN: CPT

## 2025-08-26 PROCEDURE — 25010000002 CEFAZOLIN PER 500 MG: Performed by: NEUROLOGICAL SURGERY

## 2025-08-26 RX ORDER — HYDROCODONE BITARTRATE AND ACETAMINOPHEN 7.5; 325 MG/1; MG/1
1 TABLET ORAL 3 TIMES DAILY PRN
Qty: 12 TABLET | Refills: 0 | Status: SHIPPED | OUTPATIENT
Start: 2025-08-26

## 2025-08-26 RX ADMIN — BUDESONIDE 0.5 MG: 0.5 SUSPENSION RESPIRATORY (INHALATION) at 08:21

## 2025-08-26 RX ADMIN — PANTOPRAZOLE SODIUM 40 MG: 40 TABLET, DELAYED RELEASE ORAL at 06:12

## 2025-08-26 RX ADMIN — CEFAZOLIN 2000 MG: 2 INJECTION, POWDER, FOR SOLUTION INTRAMUSCULAR; INTRAVENOUS at 06:10

## 2025-08-26 RX ADMIN — GABAPENTIN 600 MG: 300 CAPSULE ORAL at 06:12

## 2025-08-26 RX ADMIN — LISINOPRIL 20 MG: 20 TABLET ORAL at 08:52

## 2025-08-26 RX ADMIN — ARFORMOTEROL TARTRATE 15 MCG: 15 SOLUTION RESPIRATORY (INHALATION) at 08:20

## 2025-08-26 RX ADMIN — VENLAFAXINE HYDROCHLORIDE 150 MG: 75 CAPSULE, EXTENDED RELEASE ORAL at 08:51

## 2025-08-26 RX ADMIN — AMLODIPINE BESYLATE 10 MG: 10 TABLET ORAL at 08:52

## 2025-08-26 RX ADMIN — OXYCODONE HYDROCHLORIDE AND ACETAMINOPHEN 1 TABLET: 7.5; 325 TABLET ORAL at 10:58

## 2025-08-26 RX ADMIN — SENNOSIDES, DOCUSATE SODIUM 2 TABLET: 50; 8.6 TABLET, FILM COATED ORAL at 08:52

## 2025-08-26 RX ADMIN — GABAPENTIN 600 MG: 300 CAPSULE ORAL at 13:57

## 2025-08-26 RX ADMIN — BUSPIRONE HYDROCHLORIDE 15 MG: 15 TABLET ORAL at 08:52

## 2025-08-26 RX ADMIN — CELECOXIB 200 MG: 200 CAPSULE ORAL at 08:52

## 2025-08-26 RX ADMIN — REVEFENACIN 175 MCG: 175 SOLUTION RESPIRATORY (INHALATION) at 08:21

## 2025-08-26 RX ADMIN — VENLAFAXINE HYDROCHLORIDE 75 MG: 75 CAPSULE, EXTENDED RELEASE ORAL at 08:51

## 2025-08-26 RX ADMIN — CETIRIZINE HYDROCHLORIDE 5 MG: 10 TABLET, FILM COATED ORAL at 08:52

## 2025-08-26 RX ADMIN — LEVOTHYROXINE SODIUM 150 MCG: 0.15 TABLET ORAL at 06:12

## 2025-08-27 RX ORDER — LEVOTHYROXINE SODIUM 150 UG/1
150 TABLET ORAL
Qty: 30 TABLET | Refills: 1 | Status: SHIPPED | OUTPATIENT
Start: 2025-08-27

## (undated) DEVICE — MIXER A07A CEMENT

## (undated) DEVICE — C-ARM DRAPE: Brand: DEROYAL

## (undated) DEVICE — SUT VIC PLS CTD ANTIB BR 3/0 8/18IN 45CM

## (undated) DEVICE — GLV SURG PREMIERPRO MIC LTX PF SZ7.5 BRN

## (undated) DEVICE — GLIDESHEATH SLENDER STAINLESS STEEL KIT: Brand: GLIDESHEATH SLENDER

## (undated) DEVICE — GAUZE,BORDER,4"X8",2"X6"PAD,STERILE: Brand: MEDLINE

## (undated) DEVICE — JP PERF DRN SIL FLT 7MM FULL: Brand: CARDINAL HEALTH

## (undated) DEVICE — MODEL BT2000 P/N 700287-012KIT CONTENTS: MANIFOLD WITH SALINE AND CONTRAST PORTS, SALINE TUBING WITH SPIKE AND HAND SYRINGE, TRANSDUCER: Brand: BT2000 AUTOMATED MANIFOLD KIT

## (undated) DEVICE — SNAP KOVER: Brand: UNBRANDED

## (undated) DEVICE — SCRW CORT S/TAP STRDRV 2.7X14MM
Type: IMPLANTABLE DEVICE | Status: NON-FUNCTIONAL
Removed: 2020-03-10

## (undated) DEVICE — DRSNG WND GZ PAD BORDERED 4X8IN STRL

## (undated) DEVICE — SCRW CORT S/TAP STRDRV 2.7X16MM
Type: IMPLANTABLE DEVICE | Status: NON-FUNCTIONAL
Removed: 2020-03-10

## (undated) DEVICE — CVR HNDL LT SURG ACCSSRY BLU STRL

## (undated) DEVICE — SYS SKIN CLS DERMABOND PRINEO W/22CM MESH TP

## (undated) DEVICE — BNDG ESMARK 6INX9FT STRL

## (undated) DEVICE — STRAP POSTN KN/BDY FM 5X72IN DISP

## (undated) DEVICE — C-ARM: Brand: UNBRANDED

## (undated) DEVICE — BNDG ELAS ELITE V/CLOSE 4IN 5YD LF STRL

## (undated) DEVICE — SUT MONOCRYL 4/0 PS2 27IN Y426H ETY426H

## (undated) DEVICE — PK CATH CARD 10

## (undated) DEVICE — ELECTRD BLD EZ CLN MOD XLNG 2.75IN

## (undated) DEVICE — DISPOSABLE IRRIGATION BIPOLAR CORD, M1000 TYPE: Brand: KIRWAN

## (undated) DEVICE — HDRST INTUB GENTLETOUCH SLOT 7IN RT

## (undated) DEVICE — SPNG GZ WOVN 4X4IN 12PLY 10/BX STRL

## (undated) DEVICE — GLV SURG TRIUMPH ORTHO W/ALOE PF LTX 8.5 STRL

## (undated) DEVICE — GLV SURG PREMIERPRO MIC LTX PF SZ6.5 BRN

## (undated) DEVICE — ADULT, W/LG. BACK PAD, RADIOTRANSPARENT ELEMENT AND LEAD WIRE COMPATIBLE W/: Brand: DEFIBRILLATION ELECTRODES

## (undated) DEVICE — INTENDED FOR TISSUE SEPARATION, AND OTHER PROCEDURES THAT REQUIRE A SHARP SURGICAL BLADE TO PUNCTURE OR CUT.: Brand: BARD-PARKER ® STAINLESS STEEL BLADES

## (undated) DEVICE — PATIENT RETURN ELECTRODE, SINGLE-USE, CONTACT QUALITY MONITORING, ADULT, WITH 9FT CORD, FOR PATIENTS WEIGING OVER 33LBS. (15KG): Brand: MEGADYNE

## (undated) DEVICE — BIT DRL QC CALIB 2.5X135X45MM NS

## (undated) DEVICE — BIT DRL QC DIA W/DEPTHMARK 1.8X110MM

## (undated) DEVICE — PK NEURO DISC 10

## (undated) DEVICE — BLANKT WARM UPPR/BDY ARM/OUT 57X196CM

## (undated) DEVICE — 3M™ STERI-STRIP™ REINFORCED ADHESIVE SKIN CLOSURES, R1547, 1/2 IN X 4 IN (12 MM X 100 MM), 6 STRIPS/ENVELOPE: Brand: 3M™ STERI-STRIP™

## (undated) DEVICE — CATH DIAG EXPO .045 FL3.5 5F 100CM

## (undated) DEVICE — Device

## (undated) DEVICE — PAD UNDERCAST WYTEX 4IN 4YD LF STRL

## (undated) DEVICE — ANTIBACTERIAL UNDYED BRAIDED (POLYGLACTIN 910), SYNTHETIC ABSORBABLE SUTURE: Brand: COATED VICRYL

## (undated) DEVICE — PERI-LOC VLP 3.5MM X 42MM LOCKING                                    SCREW SELF-TAPPING
Type: IMPLANTABLE DEVICE | Status: NON-FUNCTIONAL
Brand: PERI-LOC VLP
Removed: 2020-03-10

## (undated) DEVICE — TOOL MR8-14MH30 MR8 14CM MATCH 3MM: Brand: MIDAS REX MR8

## (undated) DEVICE — SYR LUERLOK 30CC

## (undated) DEVICE — GLV SURG PREMIERPRO MIC LTX PF SZ8.5 BRN

## (undated) DEVICE — ADHS LIQ MASTISOL 2/3ML

## (undated) DEVICE — BIT DRL PERC QC CALIB 2.8X200MM

## (undated) DEVICE — BONE BIOPSY DEVICE F05A BBD SIZE 3: Brand: MEDTRONIC REUSABLE INSTRUMENTS

## (undated) DEVICE — TOOL MR8-14BA60 MR8 14CM BALL 6MM: Brand: MIDAS REX MR8

## (undated) DEVICE — DRSNG GZ PETROLTM XEROFORM CURAD 1X8IN STRL

## (undated) DEVICE — SUT SILK 2/0 PS 18IN 1588H

## (undated) DEVICE — STRIP,CLOSURE,WOUND,MEDI-STRIP,1/2X4: Brand: MEDLINE

## (undated) DEVICE — JACKSON-PRATT 100CC BULB RESERVOIR: Brand: CARDINAL HEALTH

## (undated) DEVICE — SUT MNCRYL PLS ANTIB UD 4/0 PS2 18IN

## (undated) DEVICE — PK ARTHSCP KN 10

## (undated) DEVICE — CVR PROB ULTRASND/TRANSD W/GEL 7X11IN STRL

## (undated) DEVICE — GLV SURG RAD SENSICARE SHLD PF LF SZ8 STRL

## (undated) DEVICE — PAD UNDERCAST WYTEX 6IN 4YD LF STRL

## (undated) DEVICE — SYR LL TP 10ML STRL

## (undated) DEVICE — TB CASSET ARTHSCP CROSSFLOW INFLOW LF

## (undated) DEVICE — DEV COMPR RADL PRELUDESYNCEZ 30ML 32CM

## (undated) DEVICE — MODEL AT P65, P/N 701554-001KIT CONTENTS: HAND CONTROLLER, 3-WAY HIGH-PRESSURE STOPCOCK WITH ROTATING END AND PREMIUM HIGH-PRESSURE TUBING: Brand: ANGIOTOUCH® KIT

## (undated) DEVICE — SCRW CORT S/TAP STRDRV 2.4X22MM
Type: IMPLANTABLE DEVICE | Status: NON-FUNCTIONAL
Removed: 2020-03-10

## (undated) DEVICE — CONN TBG Y 5 IN 1 LF STRL

## (undated) DEVICE — GW PERIPH GUIDERIGHT STD/EXCHNG/J/TIP SS 0.035IN 5X260CM

## (undated) DEVICE — TOOL MR8-14MH30T MR8 14CM MH SYMTRI 3MM: Brand: MIDAS REX MR8

## (undated) DEVICE — GLV SURG PREMIERPRO ORTHO LTX PF SZ8 BRN

## (undated) DEVICE — GW THRD SPADE PT NO/COLR 1.6X150MM

## (undated) DEVICE — CATH DIAG EXPO M/ PK 5F FL4/FR4 PIG

## (undated) DEVICE — SPLNT PLSTR CAST XFAST 3IN

## (undated) DEVICE — APPL CHLORAPREP TINTED 26ML TEAL

## (undated) DEVICE — BONE TAMP KIT KPX203PB FF X2 20/3 1 STP: Brand: KYPHOPAK™ TRAY

## (undated) DEVICE — PK KYPHOPLASTY 10

## (undated) DEVICE — GLV SURG PREMIERPRO MIC LTX PF SZ7 BRN

## (undated) DEVICE — PK EXTREM UPPR 10

## (undated) DEVICE — PAD ARMBRD SURG CONVOL 7.5X20X2IN

## (undated) DEVICE — ARM SLING: Brand: DEROYAL

## (undated) DEVICE — RESERVOIR,SUCTION,100CC,SILICONE: Brand: MEDLINE